# Patient Record
Sex: FEMALE | Race: WHITE | NOT HISPANIC OR LATINO | Employment: OTHER | URBAN - METROPOLITAN AREA
[De-identification: names, ages, dates, MRNs, and addresses within clinical notes are randomized per-mention and may not be internally consistent; named-entity substitution may affect disease eponyms.]

---

## 2017-01-10 DIAGNOSIS — M54.9 CHRONIC BACK PAIN GREATER THAN 3 MONTHS DURATION: ICD-10-CM

## 2017-01-10 DIAGNOSIS — G89.29 CHRONIC BACK PAIN GREATER THAN 3 MONTHS DURATION: ICD-10-CM

## 2017-01-11 RX ORDER — HYDROCODONE BITARTRATE AND ACETAMINOPHEN 7.5; 325 MG/1; MG/1
1 TABLET ORAL EVERY 6 HOURS PRN
Qty: 90 TABLET | Refills: 0 | Status: SHIPPED | OUTPATIENT
Start: 2017-01-11 | End: 2017-04-11 | Stop reason: SDUPTHER

## 2017-01-16 ENCOUNTER — TELEPHONE (OUTPATIENT)
Dept: GASTROENTEROLOGY | Facility: CLINIC | Age: 62
End: 2017-01-16

## 2017-01-16 DIAGNOSIS — R12 HEARTBURN: Primary | ICD-10-CM

## 2017-01-16 DIAGNOSIS — R10.816 EPIGASTRIC ABDOMINAL TENDERNESS WITHOUT REBOUND TENDERNESS: ICD-10-CM

## 2017-01-16 DIAGNOSIS — R07.9 NONSPECIFIC CHEST PAIN: ICD-10-CM

## 2017-01-16 NOTE — TELEPHONE ENCOUNTER
"----- Message from Janny Joshua RN sent at 1/16/2017  1:01 PM CST -----  Regarding: Cancellation of Order # [id number]  Order number [id number] for the procedure [facility] GI [GI5]   has been canceled by Janny Joshua RN [[id number]]. This   procedure was ordered by LYNETTE Roper [[id number]] on Dec  27, 2016 for the patient Edie Hernandez [[id number]]. The reason   for cancellation was "None".  "

## 2017-01-16 NOTE — TELEPHONE ENCOUNTER
EGD was canceled due to financial issues, please offer patient the option to do an upper gi radiology study to further evaluate symptoms (order in system)  thanks  HAYDEN

## 2017-01-19 ENCOUNTER — DOCUMENTATION ONLY (OUTPATIENT)
Dept: FAMILY MEDICINE | Facility: CLINIC | Age: 62
End: 2017-01-19

## 2017-01-19 NOTE — PROGRESS NOTES
Pre-Visit Chart Review  For Appointment Scheduled on 1-20-17    Health Maintenance Due   Topic Date Due    TETANUS VACCINE  07/26/1974    Zoster Vaccine  07/26/2016

## 2017-01-20 ENCOUNTER — LAB VISIT (OUTPATIENT)
Dept: LAB | Facility: HOSPITAL | Age: 62
End: 2017-01-20
Attending: FAMILY MEDICINE
Payer: COMMERCIAL

## 2017-01-20 ENCOUNTER — OFFICE VISIT (OUTPATIENT)
Dept: FAMILY MEDICINE | Facility: CLINIC | Age: 62
End: 2017-01-20
Payer: COMMERCIAL

## 2017-01-20 VITALS
HEIGHT: 65 IN | HEART RATE: 62 BPM | DIASTOLIC BLOOD PRESSURE: 60 MMHG | WEIGHT: 249.13 LBS | SYSTOLIC BLOOD PRESSURE: 106 MMHG | TEMPERATURE: 98 F | BODY MASS INDEX: 41.51 KG/M2

## 2017-01-20 DIAGNOSIS — E53.8 B12 NUTRITIONAL DEFICIENCY: ICD-10-CM

## 2017-01-20 DIAGNOSIS — Z23 NEED FOR SHINGLES VACCINE: ICD-10-CM

## 2017-01-20 DIAGNOSIS — E78.5 HYPERLIPIDEMIA, UNSPECIFIED HYPERLIPIDEMIA TYPE: ICD-10-CM

## 2017-01-20 DIAGNOSIS — G47.33 OSA (OBSTRUCTIVE SLEEP APNEA): ICD-10-CM

## 2017-01-20 DIAGNOSIS — E55.9 VITAMIN D DEFICIENCY: ICD-10-CM

## 2017-01-20 DIAGNOSIS — E03.9 HYPOTHYROIDISM, UNSPECIFIED TYPE: Primary | ICD-10-CM

## 2017-01-20 DIAGNOSIS — E03.9 HYPOTHYROIDISM, UNSPECIFIED TYPE: ICD-10-CM

## 2017-01-20 DIAGNOSIS — Z23 NEED FOR DIPHTHERIA-TETANUS-PERTUSSIS (TDAP) VACCINE: ICD-10-CM

## 2017-01-20 LAB
25(OH)D3+25(OH)D2 SERPL-MCNC: 29 NG/ML
ALBUMIN SERPL BCP-MCNC: 3.7 G/DL
ALP SERPL-CCNC: 85 U/L
ALT SERPL W/O P-5'-P-CCNC: 23 U/L
ANION GAP SERPL CALC-SCNC: 9 MMOL/L
AST SERPL-CCNC: 20 U/L
BASOPHILS # BLD AUTO: 0.03 K/UL
BASOPHILS NFR BLD: 0.5 %
BILIRUB SERPL-MCNC: 1.5 MG/DL
BUN SERPL-MCNC: 18 MG/DL
CALCIUM SERPL-MCNC: 9.5 MG/DL
CHLORIDE SERPL-SCNC: 106 MMOL/L
CHOLEST/HDLC SERPL: 3.7 {RATIO}
CO2 SERPL-SCNC: 27 MMOL/L
CREAT SERPL-MCNC: 1.1 MG/DL
DIFFERENTIAL METHOD: NORMAL
EOSINOPHIL # BLD AUTO: 0.4 K/UL
EOSINOPHIL NFR BLD: 6.4 %
ERYTHROCYTE [DISTWIDTH] IN BLOOD BY AUTOMATED COUNT: 13.1 %
EST. GFR  (AFRICAN AMERICAN): >60 ML/MIN/1.73 M^2
EST. GFR  (NON AFRICAN AMERICAN): 54.7 ML/MIN/1.73 M^2
GLUCOSE SERPL-MCNC: 95 MG/DL
HCT VFR BLD AUTO: 41.6 %
HDL/CHOLESTEROL RATIO: 27 %
HDLC SERPL-MCNC: 215 MG/DL
HDLC SERPL-MCNC: 58 MG/DL
HGB BLD-MCNC: 14.1 G/DL
LDLC SERPL CALC-MCNC: 129.2 MG/DL
LYMPHOCYTES # BLD AUTO: 1.3 K/UL
LYMPHOCYTES NFR BLD: 22.3 %
MCH RBC QN AUTO: 30.9 PG
MCHC RBC AUTO-ENTMCNC: 33.9 %
MCV RBC AUTO: 91 FL
MONOCYTES # BLD AUTO: 0.5 K/UL
MONOCYTES NFR BLD: 8.1 %
NEUTROPHILS # BLD AUTO: 3.7 K/UL
NEUTROPHILS NFR BLD: 62.5 %
NONHDLC SERPL-MCNC: 157 MG/DL
PLATELET # BLD AUTO: 238 K/UL
PMV BLD AUTO: 9.9 FL
POTASSIUM SERPL-SCNC: 4.1 MMOL/L
PROT SERPL-MCNC: 7 G/DL
RBC # BLD AUTO: 4.57 M/UL
SODIUM SERPL-SCNC: 142 MMOL/L
T4 FREE SERPL-MCNC: 0.81 NG/DL
TRIGL SERPL-MCNC: 139 MG/DL
TSH SERPL DL<=0.005 MIU/L-ACNC: 7.92 UIU/ML
VIT B12 SERPL-MCNC: 352 PG/ML
WBC # BLD AUTO: 5.91 K/UL

## 2017-01-20 PROCEDURE — 80053 COMPREHEN METABOLIC PANEL: CPT

## 2017-01-20 PROCEDURE — 90736 HZV VACCINE LIVE SUBQ: CPT | Mod: S$GLB,,, | Performed by: FAMILY MEDICINE

## 2017-01-20 PROCEDURE — 80061 LIPID PANEL: CPT

## 2017-01-20 PROCEDURE — 90472 IMMUNIZATION ADMIN EACH ADD: CPT | Mod: S$GLB,,, | Performed by: FAMILY MEDICINE

## 2017-01-20 PROCEDURE — 85025 COMPLETE CBC W/AUTO DIFF WBC: CPT

## 2017-01-20 PROCEDURE — 82607 VITAMIN B-12: CPT

## 2017-01-20 PROCEDURE — 84439 ASSAY OF FREE THYROXINE: CPT

## 2017-01-20 PROCEDURE — 90471 IMMUNIZATION ADMIN: CPT | Mod: S$GLB,,, | Performed by: FAMILY MEDICINE

## 2017-01-20 PROCEDURE — 82306 VITAMIN D 25 HYDROXY: CPT

## 2017-01-20 PROCEDURE — 1159F MED LIST DOCD IN RCRD: CPT | Mod: S$GLB,,, | Performed by: FAMILY MEDICINE

## 2017-01-20 PROCEDURE — 84443 ASSAY THYROID STIM HORMONE: CPT

## 2017-01-20 PROCEDURE — 99999 PR PBB SHADOW E&M-EST. PATIENT-LVL III: CPT | Mod: PBBFAC,,, | Performed by: FAMILY MEDICINE

## 2017-01-20 PROCEDURE — 36415 COLL VENOUS BLD VENIPUNCTURE: CPT | Mod: PO

## 2017-01-20 PROCEDURE — 99214 OFFICE O/P EST MOD 30 MIN: CPT | Mod: 25,S$GLB,, | Performed by: FAMILY MEDICINE

## 2017-01-20 PROCEDURE — 90715 TDAP VACCINE 7 YRS/> IM: CPT | Mod: S$GLB,,, | Performed by: FAMILY MEDICINE

## 2017-01-20 NOTE — MR AVS SNAPSHOT
[facility]  [address]  [place] [address]  Phone: [phone]  Fax: [phone]                  Edie Hernandez   2017 9:40 AM   Office Visit    Description:  Female : 1955   Provider:  Lydia Eugnee MD   Department:  [facility]           Reason for Visit     Follow-up     Fatigue           Diagnoses this Visit        Comments    Hypothyroidism, unspecified type    -  Primary     B12 nutritional deficiency         Hyperlipidemia, unspecified hyperlipidemia type         Vitamin D deficiency         Need for diphtheria-tetanus-pertussis (Tdap) vaccine         Need for shingles vaccine         BMI 40.0-44.9, adult         CLINT (obstructive sleep apnea)                To Do List           Future Appointments        Provider Department Dept Phone    2017 12:15 PM [facility] [phone]    2017 9:20 AM Lydia Eugene MD [facility] [phone]      Goals (5 Years of Data)     None      Follow-Up and Disposition     Return in about 3 months (around 2017).      [facility] On [facility]     [facility] On Call Nurse Care Line - 24/7 Assistance  Registered nurses in the [facility] On Call Center provide clinical advisement, health education, appointment booking, and other advisory services.  Call for this free service at [phone].             Medications           Message regarding Medications     Verify the changes and/or additions to your medication regime listed below are the same as discussed with your clinician today.  If any of these changes or additions are incorrect, please notify your healthcare provider.             Verify that the below list of medications is an accurate representation of the medications you are currently taking.  If none reported, the list may be blank. If incorrect, please contact your healthcare provider. Carry this list with you in case of emergency.           Current Medications      "albuterol 90 mcg/actuation inhaler Inhale 2 puffs into the lungs every 6 (six) hours as needed for Wheezing.    alprazolam (XANAX) 0.5 MG tablet Take 1 tablet (0.5 mg total) by mouth nightly as needed for Anxiety.    carisoprodol (SOMA) 350 MG tablet Take 1 tablet (350 mg total) by mouth nightly as needed for Muscle spasms.    cetirizine (ALL DAY ALLERGY, CETIRIZINE,) 10 MG tablet Take 1 tablet (10 mg total) by mouth once daily.    cholecalciferol, vitamin D3, 1,000 unit capsule Take 1 capsule (1,000 Units total) by mouth once daily.    clobetasol (TEMOVATE) 0.05 % cream APPLY TOPICALLY TWICE A DAY    dicyclomine (BENTYL) 10 MG capsule Take 1 capsule (10 mg total) by mouth every 6 (six) hours as needed. PRN Abdominal pain/cramping    fluticasone (FLONASE) 50 mcg/actuation nasal spray 1 spray by Each Nare route once daily.    furosemide (LASIX) 40 MG tablet Take 1 tablet (40 mg total) by mouth daily as needed. Every day PRN    gabapentin (NEURONTIN) 300 MG capsule Take 1 capsule (300 mg total) by mouth 2 (two) times daily.    hydrocodone-acetaminophen 7.5-325mg (NORCO) 7.5-325 mg per tablet Take 1 tablet by mouth every 6 (six) hours as needed for Pain.    indomethacin (INDOCIN) 25 MG capsule TAKE ONE CAPSULE BY MOUTH 3 TIMES A DAY WITH MEALS    levothyroxine (SYNTHROID) 137 MCG Tab tablet Take 1 tablet (137 mcg total) by mouth once daily.    methylphenidate (RITALIN LA) 40 MG 24 hr capsule Take 40 mg by mouth as needed.    oxybutynin (DITROPAN XL) 15 MG TR24 Take 1 tablet (15 mg total) by mouth once daily.    pantoprazole (PROTONIX) 40 MG tablet Take 1 tablet (40 mg total) by mouth once daily.           Clinical Reference Information           Vital Signs - Last Recorded  Most recent update: 1/20/2017  9:24 AM by Elli Bonilla MA    BP Pulse Temp Ht Wt BMI    106/60 (BP Location: Left arm, Patient Position: Sitting, BP Method: Automatic) 62 98.2 °F (36.8 °C) (Oral) 5' 5" (1.651 m) 113 kg (249 lb 1.9 oz) 41.46 " kg/m2      Blood Pressure          Most Recent Value    BP  106/60      Allergies as of 1/20/2017     Codeine      Immunizations Administered on Date of Encounter - 1/20/2017     Name Date Dose VIS Date Route    TDAP 1/20/2017 0.5 mL 2/24/2015 Intramuscular    Zoster 1/20/2017 0.65 mL 10/6/2009 Subcutaneous      Orders Placed During Today's Visit      Normal Orders This Visit    Tdap Vaccine (Adult)     Zoster Vaccine - Live     Future Labs/Procedures Expected by Expires    CBC auto differential  1/20/2017 3/21/2018    Comprehensive metabolic panel  1/20/2017 3/21/2018    T4, free  1/20/2017 3/21/2018    TSH  1/20/2017 3/21/2018    Vitamin B12  1/20/2017 3/21/2018    Vitamin D  1/20/2017 3/21/2018    Lipid panel  7/20/2017 (Approximate) 1/20/2018      Instructions      Walking for Fitness  Fitness walking has something for everyone, even people who are already fit. Walking is one of the safest ways to condition your body aerobically. It can boost energy, help you lose weight, and reduce stress.    Physical benefits  · Walking strengthens your heart and lungs, and tones your muscles.  · When walking, your feet land with less impact than in other sports. This reduces chances of muscle, bone, and joint injury.  · Regular walking improves your cholesterol levels and lowers your risk of heart disease. And it helps you control your blood sugar if you have diabetes.  · Walking is a weight-bearing activity, which helps maintain bone density. This can help prevent osteoporosis.  Personal rewards  · Taking walks can help you relax and manage stress. And fitness walking may make you feel better about yourself.  · Walking can help you sleep better at night and make you less likely to be depressed.  · Regular walking may help maintain your memory as you get older.  · Walking is a great way to spend extra time with friends and family members. Be sure to invite your dog along!  Q&A about fitness walking  Q: Will walking keep me  fit?  A: Yes. Regular walking at the right pace gives you all the benefits of other aerobic activities, such as jogging and swimming.  Q: Will walking help me lose weight and keep it off?  A: Yes. Per mile, walking can burn as many calories as jogging. Your health care provider can help work walking into your weight-loss plan.  Q: Is walking safe for my health?  A: Yes. Walking is safe if you have high blood pressure, diabetes, heart disease, or other conditions. Talk to your health care provider before you start.  © 4487-8110 [employer]. [address] [place] [address], [place] [address]. All rights reserved. This information is not intended as a substitute for professional medical care. Always follow your healthcare professional's instructions.        Weight Management: Getting Started  Healthy bodies come in all shapes and sizes. Not all bodies are made to be thin. For some people, a healthy weight is higher than the average weight listed on weight charts. Your healthcare provider can help you decide on a healthy weight for you.    Reasons to lose weight  Losing weight can help with some health problems, such as high blood pressure, heart disease, diabetes, sleep apnea, and arthritis. You may also feel more energy.  Set your long-term goal  Your goal doesn't even have to be a specific weight. You may decide on a fitness goal (such as being able to walk 10 miles a week), or a health goal (such as lowering your blood pressure). Choose a goal that is measurable and reasonable, so you know when you've reached it. A goal of reaching a BMI of less than 25 is not always reasonable (or possible).   Make an action plan  Habits dont change overnight. Setting your goals too high can leave you feeling discouraged if you cant reach them. Be realistic. Choose one or two small changes you can make now. Set an action plan for how you are going to make these changes. When you can stick to this plan, keep making a few more  small changes. Taking small steps will help you stay on the path to success.  Track your progress  Write down your goals. Then, keep a daily record of your progress. Write down what you eat and how active you are. This record lets you look back on how much youve done. It may also help when youre feeling frustrated. Reward yourself for success. Even if you dont reach every goal, give yourself credit for what you do get done.  Get support  Encouragement from others can help make losing weight easier. Ask your family members and friends for support. They may even want to join you. Also look to your healthcare provider, registered dietitian, and  for help. Your local hospital can give you more information about nutrition, exercise, and weight loss.  © 8952-4387 [employer]. [address] [place] [address], [place] [address]. All rights reserved. This information is not intended as a substitute for professional medical care. Always follow your healthcare professional's instructions.

## 2017-01-20 NOTE — PROGRESS NOTES
Subjective:       Patient ID: Edie Hernandez is a 61 y.o. female.    Chief Complaint: Follow-up and Fatigue    Patient Active Problem List   Diagnosis    GERD (gastroesophageal reflux disease)    Chronic back pain greater than 3 months duration    Environmental allergies    Fibromyalgia    B12 nutritional deficiency    CLINT (obstructive sleep apnea)    Chronic sinusitis    Asthma    Hypothyroid    Nausea    Night sweats    Tinnitus, bilateral    Frequent UTI    Hyperlipidemia    Colon polyp, hyperplastic    Interstitial cystitis    Hemangioma    Wart    Globus sensation    DDD (degenerative disc disease)    BMI 40.0-44.9, adult    Edema    Mixed incontinence   Patient is here to f/u chronic conditions    CLINT/daytime somnolence-Nuvigil did not work.  Went back to ritalin which helps and is sufficient.  No daytime somnolence.  No se    Weight loss -15 lbs since 1 month ago-unexplained    HPI  Review of Systems   Constitutional: Positive for fatigue and unexpected weight change.   Respiratory: Negative for chest tightness and shortness of breath.    Cardiovascular: Negative for chest pain, palpitations and leg swelling.   Gastrointestinal: Negative for abdominal pain.   Endocrine: Negative for polydipsia, polyphagia and polyuria.   Musculoskeletal: Negative for arthralgias.   Neurological: Negative for dizziness, syncope, light-headedness and headaches.       Objective:      Physical Exam   Constitutional: She is oriented to person, place, and time. She appears well-developed and well-nourished.   Cardiovascular: Normal rate, regular rhythm and normal heart sounds.    Pulmonary/Chest: Effort normal and breath sounds normal.   Musculoskeletal: She exhibits no edema.   Neurological: She is alert and oriented to person, place, and time.   Skin: Skin is warm and dry.   Psychiatric: She has a normal mood and affect.   Nursing note and vitals reviewed.      Assessment:       1. Hypothyroidism,  "unspecified type    2. B12 nutritional deficiency    3. Hyperlipidemia, unspecified hyperlipidemia type    4. Vitamin D deficiency    5. Need for diphtheria-tetanus-pertussis (Tdap) vaccine    6. Need for shingles vaccine    7. BMI 40.0-44.9, adult    8. CLINT (obstructive sleep apnea)        Plan:         1. Hypothyroidism, unspecified type  Stable condition.  Continue current medications.  Will adjust based on lab findings or if condition changes.    - TSH; Future  - CBC auto differential; Future  - T4, free; Future    2. B12 nutritional deficiency  Screen and treat as indicated:    - Vitamin B12; Future    3. Hyperlipidemia, unspecified hyperlipidemia type  Stable condition.  Continue current medications.  Will adjust based on lab findings or if condition changes.    - Comprehensive metabolic panel; Future  - Lipid panel; Future    4. Vitamin D deficiency  Stable condition.  Continue current medications.  Will adjust based on lab findings or if condition changes.    - Vitamin D; Future    5. Need for diphtheria-tetanus-pertussis (Tdap) vaccine  Immunize today.  Counseled patient on risks, benefits and side effects.  Patient elected to proceed with vaccination.    - Tdap Vaccine (Adult)    6. Need for shingles vaccine  Immunize today.  Counseled patient on risks, benefits and side effects.  Patient elected to proceed with vaccination.    - Zoster Vaccine - Live    7. BMI 40.0-44.9, adult  Counseled patient on his ideal body weight, health consequences of being obese and current recommendations including weekly exercise and a heart healthy diet.  Current BMI is:Estimated body mass index is 41.46 kg/(m^2) as calculated from the following:    Height as of this encounter: 5' 5" (1.651 m).    Weight as of this encounter: 113 kg (249 lb 1.9 oz)..  Patient is aware that ideal BMI < 25 or Weight in (lb) to have BMI = 25: 149.9.        8. CLINT (obstructive sleep apnea)  Cont ritalin for daytime somnolence    [facility] goal " documentation:  Patient readiness: acceptance and barriers:readiness  During the course of the visit the patient was educated and counseled about the following: Obesity:   General weight loss/lifestyle modification strategies discussed (elicit support from others; identify saboteurs; non-food rewards, etc).  Goals: Obesity: Reduce calorie intake and BMI  Goal/Outcomes met:none  The following self management tools provided:none  Patient Instructions (the written plan) was given to the patient/family: Yes  Time spent with patient: 20 minutes    Patient with be reevaluated in 3 months or sooner prn    Greater than 50% of this visit was spent counseling as described in above documentation:Yes

## 2017-01-20 NOTE — PATIENT INSTRUCTIONS

## 2017-01-20 NOTE — NURSING
2 patient identifiers used (name & ). Administered tdap vaccine IM. Patient tolerated well, no bleeding at insertion site noted. Pain scale 0/10. Aseptic technique maintained. 2 patient identifiers used (name & ). Administered Zostavax vaccine IM. Patient tolerated well, no bleeding at insertion site noted. Pain scale 0/10. Aseptic technique maintained. Immunization information given to patient.

## 2017-01-20 NOTE — TELEPHONE ENCOUNTER
Spoke with pt, offered the other option. Pt states she has so many bills right now and has to meet her deductible first, and then will maybe call back to schedule this

## 2017-01-26 ENCOUNTER — TELEPHONE (OUTPATIENT)
Dept: FAMILY MEDICINE | Facility: CLINIC | Age: 62
End: 2017-01-26

## 2017-01-26 DIAGNOSIS — Z12.31 ENCOUNTER FOR SCREENING MAMMOGRAM FOR MALIGNANT NEOPLASM OF BREAST: Primary | ICD-10-CM

## 2017-01-26 NOTE — TELEPHONE ENCOUNTER
----- Message from Snow Huitron sent at 1/26/2017  1:32 PM CST -----  Patient states she needs a order for a mammo please call when she can schedule [phone] (home)

## 2017-01-31 ENCOUNTER — HOSPITAL ENCOUNTER (OUTPATIENT)
Dept: RADIOLOGY | Facility: CLINIC | Age: 62
Discharge: HOME | End: 2017-01-31
Attending: FAMILY MEDICINE
Payer: COMMERCIAL

## 2017-01-31 DIAGNOSIS — Z12.31 ENCOUNTER FOR SCREENING MAMMOGRAM FOR MALIGNANT NEOPLASM OF BREAST: ICD-10-CM

## 2017-01-31 PROCEDURE — 77067 SCR MAMMO BI INCL CAD: CPT | Mod: TC

## 2017-01-31 PROCEDURE — 77067 SCR MAMMO BI INCL CAD: CPT | Mod: 26,,, | Performed by: RADIOLOGY

## 2017-01-31 PROCEDURE — 77063 BREAST TOMOSYNTHESIS BI: CPT | Mod: 26,,, | Performed by: RADIOLOGY

## 2017-02-02 ENCOUNTER — TELEPHONE (OUTPATIENT)
Dept: FAMILY MEDICINE | Facility: CLINIC | Age: 62
End: 2017-02-02

## 2017-02-02 ENCOUNTER — TELEPHONE (OUTPATIENT)
Dept: RADIOLOGY | Facility: HOSPITAL | Age: 62
End: 2017-02-02

## 2017-02-02 DIAGNOSIS — R92.8 ABNORMAL MAMMOGRAM OF RIGHT BREAST: Primary | ICD-10-CM

## 2017-02-02 NOTE — TELEPHONE ENCOUNTER
Patient called stating she wanted to cancel her mammogram.   She will have diagnostic mammogram done at [facility].  Instructed to call Dr Eugene to have diagnostic mammogram orders sent to them.   Due to cost She has not met her deductible.

## 2017-02-02 NOTE — TELEPHONE ENCOUNTER
----- Message from Hanh Zavala sent at 2/2/2017 12:05 PM CST -----  Contact: self [phone]  She is requesting that you send the orders for the diagnostic mammo and u/s to [facility].  Phone# [phone].  Thank you!

## 2017-02-24 DIAGNOSIS — G89.29 CHRONIC BACK PAIN GREATER THAN 3 MONTHS DURATION: ICD-10-CM

## 2017-02-24 DIAGNOSIS — M54.9 CHRONIC BACK PAIN GREATER THAN 3 MONTHS DURATION: ICD-10-CM

## 2017-02-24 RX ORDER — CARISOPRODOL 350 MG/1
350 TABLET ORAL NIGHTLY PRN
Qty: 30 TABLET | Refills: 0 | Status: SHIPPED | OUTPATIENT
Start: 2017-02-24 | End: 2017-04-11 | Stop reason: SDUPTHER

## 2017-03-07 ENCOUNTER — OFFICE VISIT (OUTPATIENT)
Dept: OBSTETRICS AND GYNECOLOGY | Facility: CLINIC | Age: 62
End: 2017-03-07
Payer: COMMERCIAL

## 2017-03-07 VITALS
DIASTOLIC BLOOD PRESSURE: 66 MMHG | WEIGHT: 266.44 LBS | HEIGHT: 65 IN | HEART RATE: 71 BPM | BODY MASS INDEX: 44.39 KG/M2 | SYSTOLIC BLOOD PRESSURE: 119 MMHG

## 2017-03-07 DIAGNOSIS — Z12.31 ENCOUNTER FOR SCREENING MAMMOGRAM FOR BREAST CANCER: ICD-10-CM

## 2017-03-07 DIAGNOSIS — Z01.419 WELL WOMAN EXAM WITH ROUTINE GYNECOLOGICAL EXAM: Primary | ICD-10-CM

## 2017-03-07 PROCEDURE — 99396 PREV VISIT EST AGE 40-64: CPT | Mod: S$GLB,,, | Performed by: OBSTETRICS & GYNECOLOGY

## 2017-03-07 PROCEDURE — 99999 PR PBB SHADOW E&M-EST. PATIENT-LVL III: CPT | Mod: PBBFAC,,, | Performed by: OBSTETRICS & GYNECOLOGY

## 2017-03-07 RX ORDER — METHYLPHENIDATE HYDROCHLORIDE 10 MG/1
10 TABLET ORAL DAILY PRN
COMMUNITY
Start: 2017-02-24 | End: 2020-01-02 | Stop reason: SDUPTHER

## 2017-03-07 RX ORDER — CLOBETASOL PROPIONATE 0.5 MG/G
1 CREAM TOPICAL 2 TIMES DAILY
Qty: 30 G | Refills: 2 | Status: SHIPPED | OUTPATIENT
Start: 2017-03-07 | End: 2017-05-22 | Stop reason: SDUPTHER

## 2017-03-07 RX ORDER — ALLOPURINOL 300 MG/1
300 TABLET ORAL DAILY
Refills: 4 | COMMUNITY
Start: 2016-12-08 | End: 2018-05-24 | Stop reason: SDUPTHER

## 2017-03-07 RX ORDER — METHYLPHENIDATE HYDROCHLORIDE 40 MG/1
CAPSULE, EXTENDED RELEASE ORAL
COMMUNITY
Start: 2017-02-24 | End: 2017-04-11

## 2017-03-07 NOTE — PROGRESS NOTES
SUBJECTIVE:   61 y.o. female   for annual routine Pap and checkup. No LMP recorded. Patient has had a hysterectomy..  She has no unusual complaints.        Past Medical History:   Diagnosis Date    Arthritis     Colon polyp, hyperplastic     recheck 5-10 years    Diverticulosis     Fatty liver     GERD (gastroesophageal reflux disease)     CLINT (obstructive sleep apnea)     C-pap    Thyroid disease      Past Surgical History:   Procedure Laterality Date    COLONOSCOPY  2013    Dr. Vogel: repeat in 5-10 years    COLONOSCOPY W/ BIOPSIES AND POLYPECTOMY  2013    hyperplastic, recheck 5-10 years    EXTERNAL EAR SURGERY      HYSTERECTOMY      THROAT SURGERY      for CLINT    UPPER GASTROINTESTINAL ENDOSCOPY       Social History     Social History    Marital status:      Spouse name: N/A    Number of children: N/A    Years of education: N/A     Occupational History     [employer]     Social History Main Topics    Smoking status: Never Smoker    Smokeless tobacco: Never Used    Alcohol use No    Drug use: No    Sexual activity: Yes     Partners: Male     Other Topics Concern    Not on file     Social History Narrative     Family History   Problem Relation Age of Onset    Cancer Mother      breast    Hypertension Mother     Heart disease Father     Hypertension Father     Cancer Maternal Aunt      breast    Macular degeneration Neg Hx     Retinal detachment Neg Hx     Glaucoma Neg Hx     Colon cancer Neg Hx     Colon polyps Neg Hx     Crohn's disease Neg Hx     Ulcerative colitis Neg Hx      OB History    Para Term  AB SAB TAB Ectopic Multiple Living   2 2 2             # Outcome Date GA Lbr Krish/2nd Weight Sex Delivery Anes PTL Lv   2 Term            1 Term                     Current Outpatient Prescriptions   Medication Sig Dispense Refill    albuterol 90 mcg/actuation inhaler Inhale 2 puffs into the lungs every 6 (six) hours  as needed for Wheezing. 18 g 0    allopurinol (ZYLOPRIM) 300 MG tablet Take 300 mg by mouth once daily.  4    alprazolam (XANAX) 0.5 MG tablet Take 1 tablet (0.5 mg total) by mouth nightly as needed for Anxiety. 30 tablet 2    carisoprodol (SOMA) 350 MG tablet Take 1 tablet (350 mg total) by mouth nightly as needed for Muscle spasms. 30 tablet 0    cetirizine (ALL DAY ALLERGY, CETIRIZINE,) 10 MG tablet Take 1 tablet (10 mg total) by mouth once daily. 30 tablet 3    cholecalciferol, vitamin D3, 1,000 unit capsule Take 1 capsule (1,000 Units total) by mouth once daily.  0    clobetasol (TEMOVATE) 0.05 % cream Apply 1 application topically 2 (two) times daily. Apply to affected area 30 g 2    fluticasone (FLONASE) 50 mcg/actuation nasal spray 1 spray by Each Nare route once daily. 16 g 3    furosemide (LASIX) 40 MG tablet Take 1 tablet (40 mg total) by mouth daily as needed. Every day PRN 30 tablet 4    gabapentin (NEURONTIN) 300 MG capsule Take 1 capsule (300 mg total) by mouth 2 (two) times daily. 60 capsule 5    hydrocodone-acetaminophen 7.5-325mg (NORCO) 7.5-325 mg per tablet Take 1 tablet by mouth every 6 (six) hours as needed for Pain. 90 tablet 0    indomethacin (INDOCIN) 25 MG capsule TAKE ONE CAPSULE BY MOUTH 3 TIMES A DAY WITH MEALS 90 capsule 3    levothyroxine (SYNTHROID) 137 MCG Tab tablet Take 1 tablet (137 mcg total) by mouth once daily. 30 tablet 5    methylphenidate (METADATE CD) 40 MG CR capsule       methylphenidate (RITALIN LA) 40 MG 24 hr capsule Take 40 mg by mouth as needed.      methylphenidate (RITALIN) 10 MG tablet       oxybutynin (DITROPAN XL) 15 MG TR24 Take 1 tablet (15 mg total) by mouth once daily. 30 tablet 5    pantoprazole (PROTONIX) 40 MG tablet Take 1 tablet (40 mg total) by mouth once daily. 30 tablet 5     No current facility-administered medications for this visit.      Allergies: Codeine     ROS:  Constitutional: no weight loss, weight gain, fever,  "fatigue  Eyes:  No vision changes, glasses/contacts  ENT/Mouth: No ulcers, sinus problems, ears ringing, headache  Cardiovascular: No inability to lie flat, chest pain, exercise intolerance, swelling, heart palpitations  Respiratory: No wheezing, coughing blood, shortness of breath, or cough  Gastrointestinal: No diarrhea, bloody stool, nausea/vomiting, constipation, gas, hemorrhoids  Genitourinary: No blood in urine, painful urination, urgency of urination, frequency of urination, incomplete emptying, incontinence, abnormal bleeding, painful periods, heavy periods, vaginal discharge, vaginal odor, painful intercourse, sexual problems, bleeding after intercourse.  Musculoskeletal: No muscle weakness  Skin/Breast: No painful breasts, nipple discharge, masses, rash, ulcers  Neurological: No passing out, seizures, numbness, headache  Endocrine: No diabetes, hypothyroid, hyperthyroid, hot flashes, hair loss, abnormal hair growth, ance  Psychiatric: No depression, crying  Hematologic: No bruises, bleeding, swollen lymph nodes, anemia.      OBJECTIVE:   The patient appears well, alert, oriented x 3, in no distress.  /66  Pulse 71  Ht 5' 5" (1.651 m)  Wt 120.8 kg (266 lb 6.8 oz)  BMI 44.34 kg/m2  NECK: no thyromegaly, trachea midline  SKIN: no acne, striae, hirsutism  BREAST EXAM: breasts appear normal, no suspicious masses, no skin or nipple changes or axillary nodes  ABDOMEN: no hernias, masses, or hepatosplenomegaly  GENITALIA: normal external genitalia, no erythema, no discharge  URETHRA: normal urethra, normal urethral meatus  VAGINA: mucosal atrophy  CERVIX:  absent  UTERUS: uterus absent  ADNEXA: no mass, fullness, tenderness    ASSESSMENT and PLAN    Well woman exam with routine gynecological exam  -     Mammo Digital Screening Bilat with CAD; Future; Expected date: 3/7/17    Encounter for screening mammogram for breast cancer  -     Mammo Digital Screening Bilat with CAD; Future; Expected date: " 3/7/17    Other orders  -     clobetasol (TEMOVATE) 0.05 % cream; Apply 1 application topically 2 (two) times daily. Apply to affected area  Dispense: 30 g; Refill: 2

## 2017-03-09 ENCOUNTER — TELEPHONE (OUTPATIENT)
Dept: FAMILY MEDICINE | Facility: CLINIC | Age: 62
End: 2017-03-09

## 2017-03-09 DIAGNOSIS — R92.8 ABNORMAL MAMMOGRAM OF RIGHT BREAST: Primary | ICD-10-CM

## 2017-03-09 NOTE — TELEPHONE ENCOUNTER
Notify patient: diag mammo and u/s at [facility] findings were interpreted with probable benign characteristics.  However a f/u right breast was recommended in 6 months.  I have placed an order

## 2017-03-17 DIAGNOSIS — E55.9 VITAMIN D DEFICIENCY: Primary | ICD-10-CM

## 2017-03-17 DIAGNOSIS — E03.9 HYPOTHYROIDISM (ACQUIRED): ICD-10-CM

## 2017-03-17 RX ORDER — LEVOTHYROXINE SODIUM 150 UG/1
150 TABLET ORAL DAILY
Qty: 90 TABLET | Refills: 3 | Status: SHIPPED | OUTPATIENT
Start: 2017-03-17 | End: 2017-10-18 | Stop reason: SDUPTHER

## 2017-03-17 RX ORDER — ERGOCALCIFEROL 1.25 MG/1
50000 CAPSULE ORAL
Qty: 12 CAPSULE | Refills: 1 | Status: SHIPPED | OUTPATIENT
Start: 2017-03-17 | End: 2017-04-11 | Stop reason: SDUPTHER

## 2017-04-11 ENCOUNTER — DOCUMENTATION ONLY (OUTPATIENT)
Dept: FAMILY MEDICINE | Facility: CLINIC | Age: 62
End: 2017-04-11

## 2017-04-11 ENCOUNTER — OFFICE VISIT (OUTPATIENT)
Dept: FAMILY MEDICINE | Facility: CLINIC | Age: 62
End: 2017-04-11
Payer: COMMERCIAL

## 2017-04-11 VITALS
BODY MASS INDEX: 44.15 KG/M2 | WEIGHT: 265 LBS | HEIGHT: 65 IN | SYSTOLIC BLOOD PRESSURE: 120 MMHG | HEART RATE: 68 BPM | TEMPERATURE: 98 F | DIASTOLIC BLOOD PRESSURE: 68 MMHG

## 2017-04-11 DIAGNOSIS — N39.0 URINARY TRACT INFECTION WITHOUT HEMATURIA, SITE UNSPECIFIED: Primary | ICD-10-CM

## 2017-04-11 DIAGNOSIS — E55.9 VITAMIN D DEFICIENCY: ICD-10-CM

## 2017-04-11 DIAGNOSIS — M79.7 FIBROMYALGIA: ICD-10-CM

## 2017-04-11 DIAGNOSIS — F41.9 CHRONIC ANXIETY: ICD-10-CM

## 2017-04-11 DIAGNOSIS — M75.52 CHRONIC SHOULDER BURSITIS, LEFT: ICD-10-CM

## 2017-04-11 DIAGNOSIS — G89.29 CHRONIC BACK PAIN GREATER THAN 3 MONTHS DURATION: ICD-10-CM

## 2017-04-11 DIAGNOSIS — E03.9 HYPOTHYROIDISM, UNSPECIFIED TYPE: ICD-10-CM

## 2017-04-11 DIAGNOSIS — M54.9 CHRONIC BACK PAIN GREATER THAN 3 MONTHS DURATION: ICD-10-CM

## 2017-04-11 DIAGNOSIS — K21.9 GASTROESOPHAGEAL REFLUX DISEASE, ESOPHAGITIS PRESENCE NOT SPECIFIED: ICD-10-CM

## 2017-04-11 DIAGNOSIS — N32.81 OAB (OVERACTIVE BLADDER): ICD-10-CM

## 2017-04-11 DIAGNOSIS — E66.01 MORBID OBESITY WITH BMI OF 40.0-44.9, ADULT: ICD-10-CM

## 2017-04-11 PROCEDURE — 1160F RVW MEDS BY RX/DR IN RCRD: CPT | Mod: S$GLB,,, | Performed by: FAMILY MEDICINE

## 2017-04-11 PROCEDURE — 87086 URINE CULTURE/COLONY COUNT: CPT

## 2017-04-11 PROCEDURE — 96372 THER/PROPH/DIAG INJ SC/IM: CPT | Mod: S$GLB,,, | Performed by: FAMILY MEDICINE

## 2017-04-11 PROCEDURE — 87186 SC STD MICRODIL/AGAR DIL: CPT

## 2017-04-11 PROCEDURE — 99214 OFFICE O/P EST MOD 30 MIN: CPT | Mod: 25,S$GLB,, | Performed by: FAMILY MEDICINE

## 2017-04-11 PROCEDURE — 87088 URINE BACTERIA CULTURE: CPT

## 2017-04-11 PROCEDURE — 87077 CULTURE AEROBIC IDENTIFY: CPT

## 2017-04-11 PROCEDURE — 99999 PR PBB SHADOW E&M-EST. PATIENT-LVL III: CPT | Mod: PBBFAC,,, | Performed by: FAMILY MEDICINE

## 2017-04-11 RX ORDER — KETOROLAC TROMETHAMINE 30 MG/ML
60 INJECTION, SOLUTION INTRAMUSCULAR; INTRAVENOUS
Status: COMPLETED | OUTPATIENT
Start: 2017-04-11 | End: 2017-04-11

## 2017-04-11 RX ORDER — CARISOPRODOL 350 MG/1
350 TABLET ORAL NIGHTLY PRN
Qty: 30 TABLET | Refills: 0 | Status: SHIPPED | OUTPATIENT
Start: 2017-04-11 | End: 2017-05-22 | Stop reason: SDUPTHER

## 2017-04-11 RX ORDER — HYDROCODONE BITARTRATE AND ACETAMINOPHEN 7.5; 325 MG/1; MG/1
1 TABLET ORAL EVERY 6 HOURS PRN
Qty: 90 TABLET | Refills: 0 | Status: SHIPPED | OUTPATIENT
Start: 2017-04-11 | End: 2017-07-10 | Stop reason: SDUPTHER

## 2017-04-11 RX ORDER — OXYBUTYNIN CHLORIDE 15 MG/1
15 TABLET, EXTENDED RELEASE ORAL DAILY
Qty: 30 TABLET | Refills: 5 | Status: SHIPPED | OUTPATIENT
Start: 2017-04-11 | End: 2018-01-20 | Stop reason: SDUPTHER

## 2017-04-11 RX ORDER — ALPRAZOLAM 0.5 MG/1
0.5 TABLET ORAL NIGHTLY PRN
Qty: 30 TABLET | Refills: 2 | Status: SHIPPED | OUTPATIENT
Start: 2017-04-11 | End: 2017-05-22 | Stop reason: SDUPTHER

## 2017-04-11 RX ORDER — CIPROFLOXACIN 250 MG/1
250 TABLET, FILM COATED ORAL EVERY 12 HOURS
Qty: 6 TABLET | Refills: 0 | Status: SHIPPED | OUTPATIENT
Start: 2017-04-11 | End: 2017-04-21 | Stop reason: ALTCHOICE

## 2017-04-11 RX ORDER — ERGOCALCIFEROL 1.25 MG/1
50000 CAPSULE ORAL
Qty: 12 CAPSULE | Refills: 1 | Status: SHIPPED | OUTPATIENT
Start: 2017-04-11 | End: 2017-11-08 | Stop reason: SDUPTHER

## 2017-04-11 RX ORDER — GABAPENTIN 300 MG/1
300 CAPSULE ORAL 2 TIMES DAILY
Qty: 60 CAPSULE | Refills: 5 | Status: SHIPPED | OUTPATIENT
Start: 2017-04-11 | End: 2018-09-14 | Stop reason: SINTOL

## 2017-04-11 RX ORDER — PANTOPRAZOLE SODIUM 40 MG/1
40 TABLET, DELAYED RELEASE ORAL DAILY
Qty: 30 TABLET | Refills: 5 | Status: SHIPPED | OUTPATIENT
Start: 2017-04-11 | End: 2017-12-24 | Stop reason: SDUPTHER

## 2017-04-11 RX ADMIN — KETOROLAC TROMETHAMINE 60 MG: 30 INJECTION, SOLUTION INTRAMUSCULAR; INTRAVENOUS at 10:04

## 2017-04-11 NOTE — NURSING
2 Patient identifiers used (name & ). Administered 60 mg Toradol IM. Patient tolerated well. No bleeding at insertion site noted. Pain scale 0/10. Allergies reviewed. Aseptic technique maintained.

## 2017-04-11 NOTE — PROGRESS NOTES
Pre-Visit Chart Review  For Appointment Scheduled on 4-12-17    There are no preventive care reminders to display for this patient.

## 2017-04-11 NOTE — PATIENT INSTRUCTIONS

## 2017-04-11 NOTE — MR AVS SNAPSHOT
[facility]  [address] [place] [address]  Phone: [phone]  Fax: [phone]                  Edie Hernandez   2017 9:40 AM   Office Visit    Description:  Female : 1955   Provider:  Lydia Eugene MD   Department:  [facility]           Reason for Visit     Follow-up     Arm Pain           Diagnoses this Visit        Comments    Urinary tract infection without hematuria, site unspecified    -  Primary     Hypothyroidism, unspecified type         Gastroesophageal reflux disease, esophagitis presence not specified         OAB (overactive bladder)         Chronic back pain greater than 3 months duration         Vitamin D deficiency         Fibromyalgia         Chronic anxiety         Chronic shoulder bursitis, left                To Do List           Future Appointments        Provider Department Dept Phone    2017 10:30 AM [facility] [phone]    7/10/2017 10:00 AM Lydia Eugene MD [facility] [phone]      Goals (5 Years of Data)     None      Follow-Up and Disposition     Return in about 3 months (around 2017).       These Medications        Disp Refills Start End    ciprofloxacin HCl (CIPRO) 250 MG tablet 6 tablet 0 2017     Take 1 tablet (250 mg total) by mouth every 12 (twelve) hours. - Oral    Pharmacy: [facility]/pharmacy #[id number] - [place] [address]  Ph #: [phone]       pantoprazole (PROTONIX) 40 MG tablet 30 tablet 5 2017     Take 1 tablet (40 mg total) by mouth once daily. - Oral    Pharmacy: [facility]/pharmacy #[id number] - [place] [address]  Ph #: [phone]       oxybutynin (DITROPAN XL) 15 MG TR24 30 tablet 5 2017     Take 1 tablet (15 mg total) by mouth once daily. - Oral    Pharmacy: [facility]/pharmacy #[id number] - [place] [address]  Ph #: [phone]       hydrocodone-acetaminophen 7.5-325mg (NORCO) 7.5-325 mg per tablet 90 tablet 0 2017      Take 1 tablet by mouth every 6 (six) hours as needed for Pain. - Oral    Pharmacy: [facility]/pharmacy #[id number] - [address] Ph #: [phone]       gabapentin (NEURONTIN) 300 MG capsule 60 capsule 5 4/11/2017     Take 1 capsule (300 mg total) by mouth 2 (two) times daily. - Oral    Pharmacy: [facility]/pharmacy #[facility] - [address] Ph #: [phone]       ergocalciferol (ERGOCALCIFEROL) 50,000 unit Cap 12 capsule 1 4/11/2017     Take 1 capsule (50,000 Units total) by mouth every 7 days. - Oral    Pharmacy: [facility]/pharmacy #[facility] - [address] Ph #: [phone]       carisoprodol (SOMA) 350 MG tablet 30 tablet 0 4/11/2017     Take 1 tablet (350 mg total) by mouth nightly as needed for Muscle spasms. - Oral    Pharmacy: [facility]/pharmacy #[id number] - [address] Ph #: [phone]       alprazolam (XANAX) 0.5 MG tablet 30 tablet 2 4/11/2017     Take 1 tablet (0.5 mg total) by mouth nightly as needed for Anxiety. - Oral    Pharmacy: [facility]/pharmacy #[id number] - [address] Ph #: [phone]         [facility]     [facility] Nurse Care Line - 24/7 Assistance  Unless otherwise directed by your provider, please contact [facility], our nurse care line that is available for 24/7 assistance.     Registered nurses in the [facility] provide: appointment scheduling, clinical advisement, health education, and other advisory services.  Call: [phone] (toll free)               Medications           Message regarding Medications     Verify the changes and/or additions to your medication regime listed below are the same as discussed with your clinician today.  If any of these changes or additions are incorrect, please notify your healthcare provider.        START taking these NEW medications        Refills    ciprofloxacin HCl (CIPRO) 250 MG tablet 0    Sig: Take 1 tablet (250 mg total) by mouth every 12 (twelve) hours.    Class:  Normal    Route: Oral      These medications were administered today        Dose Freq    ketorolac injection 60 mg 60 mg Clinic/HOD 1 time    Sig: Inject 2 mLs (60 mg total) into the muscle one time.    Class: Normal    Route: Intramuscular      STOP taking these medications     methylphenidate (METADATE CD) 40 MG CR capsule            Verify that the below list of medications is an accurate representation of the medications you are currently taking.  If none reported, the list may be blank. If incorrect, please contact your healthcare provider. Carry this list with you in case of emergency.           Current Medications     albuterol 90 mcg/actuation inhaler Inhale 2 puffs into the lungs every 6 (six) hours as needed for Wheezing.    allopurinol (ZYLOPRIM) 300 MG tablet Take 300 mg by mouth once daily.    alprazolam (XANAX) 0.5 MG tablet Take 1 tablet (0.5 mg total) by mouth nightly as needed for Anxiety.    carisoprodol (SOMA) 350 MG tablet Take 1 tablet (350 mg total) by mouth nightly as needed for Muscle spasms.    cetirizine (ALL DAY ALLERGY, CETIRIZINE,) 10 MG tablet Take 1 tablet (10 mg total) by mouth once daily.    cholecalciferol, vitamin D3, 1,000 unit capsule Take 1 capsule (1,000 Units total) by mouth once daily.    clobetasol (TEMOVATE) 0.05 % cream Apply 1 application topically 2 (two) times daily. Apply to affected area    ergocalciferol (ERGOCALCIFEROL) 50,000 unit Cap Take 1 capsule (50,000 Units total) by mouth every 7 days.    fluticasone (FLONASE) 50 mcg/actuation nasal spray 1 spray by Each Nare route once daily.    furosemide (LASIX) 40 MG tablet Take 1 tablet (40 mg total) by mouth daily as needed. Every day PRN    gabapentin (NEURONTIN) 300 MG capsule Take 1 capsule (300 mg total) by mouth 2 (two) times daily.    hydrocodone-acetaminophen 7.5-325mg (NORCO) 7.5-325 mg per tablet Take 1 tablet by mouth every 6 (six) hours as needed for Pain.    indomethacin (INDOCIN) 25 MG capsule TAKE ONE  "CAPSULE BY MOUTH 3 TIMES A DAY WITH MEALS    levothyroxine (SYNTHROID) 150 MCG tablet Take 1 tablet (150 mcg total) by mouth once daily.    methylphenidate (RITALIN LA) 40 MG 24 hr capsule Take 40 mg by mouth as needed.    methylphenidate (RITALIN) 10 MG tablet     oxybutynin (DITROPAN XL) 15 MG TR24 Take 1 tablet (15 mg total) by mouth once daily.    pantoprazole (PROTONIX) 40 MG tablet Take 1 tablet (40 mg total) by mouth once daily.    ciprofloxacin HCl (CIPRO) 250 MG tablet Take 1 tablet (250 mg total) by mouth every 12 (twelve) hours.           Clinical Reference Information           Your Vitals Were     BP Pulse Temp Height Weight BMI    120/68 (BP Location: Right arm, Patient Position: Sitting, BP Method: Automatic) 68 98.1 °F (36.7 °C) (Oral) 5' 5" (1.651 m) 120.2 kg (264 lb 15.9 oz) 44.1 kg/m2      Blood Pressure          Most Recent Value    BP  120/68      Allergies as of 4/11/2017     Codeine      Immunizations Administered on Date of Encounter - 4/11/2017     None      Orders Placed During Today's Visit      Normal Orders This Visit    Urine culture     Future Labs/Procedures Expected by Expires    T3, free  4/11/2017 6/10/2018    T4, free  4/11/2017 6/10/2018    TSH  4/11/2017 6/10/2018      Instructions      Walking for Fitness  Fitness walking has something for everyone, even people who are already fit. Walking is one of the safest ways to condition your body aerobically. It can boost energy, help you lose weight, and reduce stress.    Physical benefits  · Walking strengthens your heart and lungs, and tones your muscles.  · When walking, your feet land with less impact than in other sports. This reduces chances of muscle, bone, and joint injury.  · Regular walking improves your cholesterol levels and lowers your risk of heart disease. And it helps you control your blood sugar if you have diabetes.  · Walking is a weight-bearing activity, which helps maintain bone density. This can help prevent " osteoporosis.  Personal rewards  · Taking walks can help you relax and manage stress. And fitness walking may make you feel better about yourself.  · Walking can help you sleep better at night and make you less likely to be depressed.  · Regular walking may help maintain your memory as you get older.  · Walking is a great way to spend extra time with friends and family members. Be sure to invite your dog along!  Q&A about fitness walking  Q: Will walking keep me fit?  A: Yes. Regular walking at the right pace gives you all the benefits of other aerobic activities, such as jogging and swimming.  Q: Will walking help me lose weight and keep it off?  A: Yes. Per mile, walking can burn as many calories as jogging. Your health care provider can help work walking into your weight-loss plan.  Q: Is walking safe for my health?  A: Yes. Walking is safe if you have high blood pressure, diabetes, heart disease, or other conditions. Talk to your health care provider before you start.  Date Last Reviewed: 5/9/2015 © 2000-2016 [employer]. [address] [place] [address]. All rights reserved. This information is not intended as a substitute for professional medical care. Always follow your healthcare professional's instructions.        Weight Management: Getting Started  Healthy bodies come in all shapes and sizes. Not all bodies are made to be thin. For some people, a healthy weight is higher than the average weight listed on weight charts. Your healthcare provider can help you decide on a healthy weight for you.    Reasons to lose weight  Losing weight can help with some health problems, such as high blood pressure, heart disease, diabetes, sleep apnea, and arthritis. You may also feel more energy.  Set your long-term goal  Your goal doesn't even have to be a specific weight. You may decide on a fitness goal (such as being able to walk 10 miles a week), or a health goal (such as lowering your blood  pressure). Choose a goal that is measurable and reasonable, so you know when you've reached it. A goal of reaching a BMI of less than 25 is not always reasonable (or possible).   Make an action plan  Habits dont change overnight. Setting your goals too high can leave you feeling discouraged if you cant reach them. Be realistic. Choose one or two small changes you can make now. Set an action plan for how you are going to make these changes. When you can stick to this plan, keep making a few more small changes. Taking small steps will help you stay on the path to success.  Track your progress  Write down your goals. Then, keep a daily record of your progress. Write down what you eat and how active you are. This record lets you look back on how much youve done. It may also help when youre feeling frustrated. Reward yourself for success. Even if you dont reach every goal, give yourself credit for what you do get done.  Get support  Encouragement from others can help make losing weight easier. Ask your family members and friends for support. They may even want to join you. Also look to your healthcare provider, registered dietitian, and  for help. Your local hospital can give you more information about nutrition, exercise, and weight loss.  Date Last Reviewed: 1/31/2016  © 1064-3645 [employer]. [address] [place] [address], [place] [address]. All rights reserved. This information is not intended as a substitute for professional medical care. Always follow your healthcare professional's instructions.             Language Assistance Services     ATTENTION: Language assistance services are available, free of charge. Please call [phone].      ATENCIÓN: Si habla español, tiene a dallas disposición servicios gratuitos de asistencia lingüística. Llame al [phone].     [facility] Ý: N?u b?n nói Ti?ng Vi?t, có các d?ch v? h? tr? ngôn ng? mi?n phí dành cho b?n. G?i s? [phone].          [facility] complies with applicable Federal civil rights laws and does not discriminate on the basis of race, color, national origin, age, disability, or sex.

## 2017-04-13 LAB — BACTERIA UR CULT: NORMAL

## 2017-04-13 NOTE — PROGRESS NOTES
Subjective:       Patient ID: Edie Hernandez is a 61 y.o. female.    Chief Complaint: Follow-up and Arm Pain    Patient Active Problem List   Diagnosis    GERD (gastroesophageal reflux disease)    Chronic back pain greater than 3 months duration    Environmental allergies    Fibromyalgia    B12 nutritional deficiency    CLINT (obstructive sleep apnea)    Chronic sinusitis    Asthma    Hypothyroid    Nausea    Night sweats    Tinnitus, bilateral    Frequent UTI    Hyperlipidemia    Colon polyp, hyperplastic    Interstitial cystitis    Hemangioma    Wart    Globus sensation    DDD (degenerative disc disease)    Morbid obesity with BMI of 40.0-44.9, adult    Edema    Mixed incontinence       HPI  Review of Systems   Constitutional: Negative for chills and fever.   Gastrointestinal: Positive for abdominal pain. Negative for nausea and vomiting.   Genitourinary: Positive for dysuria, frequency and urgency.   Skin: Negative for rash.       Objective:      Physical Exam   Constitutional: She is oriented to person, place, and time. She appears well-developed and well-nourished.   Cardiovascular: Normal rate, regular rhythm and normal heart sounds.    Pulmonary/Chest: Effort normal and breath sounds normal. No respiratory distress.   Abdominal: Soft. Bowel sounds are normal. She exhibits no distension and no mass. There is tenderness in the suprapubic area. There is no rebound and no guarding. No hernia.   Musculoskeletal:        Left shoulder: She exhibits tenderness and pain. She exhibits normal range of motion and no spasm.   Increased pain with abd and flexion > 100 degrees   Neurological: She is alert and oriented to person, place, and time.   Skin: Skin is warm and dry.   Psychiatric: She has a normal mood and affect.   Nursing note and vitals reviewed.      Assessment:       1. Urinary tract infection without hematuria, site unspecified    2. Hypothyroidism, unspecified type    3.  Gastroesophageal reflux disease, esophagitis presence not specified    4. OAB (overactive bladder)    5. Chronic back pain greater than 3 months duration    6. Vitamin D deficiency    7. Fibromyalgia    8. Chronic anxiety    9. Chronic shoulder bursitis, left    10. Morbid obesity with BMI of 40.0-44.9, adult        Plan:       1. Urinary tract infection without hematuria, site unspecified  Patient was counseled to increase fluid intake.  Avoid carbonated beverages.  Empty your bladder frequently, before and after intercourse, and wipe front to back when cleaning after using the bathroom.  Cranberry juice intake may help.  Notify MD if you have fever > 100.4, severe abdominal pain, blood in urine or if you see no improvement in 3 days.    - ciprofloxacin HCl (CIPRO) 250 MG tablet; Take 1 tablet (250 mg total) by mouth every 12 (twelve) hours.  Dispense: 6 tablet; Refill: 0  - Urine culture    2. Hypothyroidism, unspecified type  Stable condition.  Continue current medications.  Will adjust based on lab findings or if condition changes.    - TSH; Future  - T4, free; Future  - T3, free; Future    3. Gastroesophageal reflux disease, esophagitis presence not specified  Controlled on current medications.  Continue current medications.    - pantoprazole (PROTONIX) 40 MG tablet; Take 1 tablet (40 mg total) by mouth once daily.  Dispense: 30 tablet; Refill: 5    4. OAB (overactive bladder)  Controlled on current medications.  Continue current medications.    - oxybutynin (DITROPAN XL) 15 MG TR24; Take 1 tablet (15 mg total) by mouth once daily.  Dispense: 30 tablet; Refill: 5    5. Chronic back pain greater than 3 months duration  Controlled on current medications.  Continue current medications.    - hydrocodone-acetaminophen 7.5-325mg (NORCO) 7.5-325 mg per tablet; Take 1 tablet by mouth every 6 (six) hours as needed for Pain.  Dispense: 90 tablet; Refill: 0  - gabapentin (NEURONTIN) 300 MG capsule; Take 1 capsule (300 mg  total) by mouth 2 (two) times daily.  Dispense: 60 capsule; Refill: 5  - carisoprodol (SOMA) 350 MG tablet; Take 1 tablet (350 mg total) by mouth nightly as needed for Muscle spasms.  Dispense: 30 tablet; Refill: 0    6. Vitamin D deficiency  Stable condition.  Continue current medications.  Will adjust based on lab findings or if condition changes.    - ergocalciferol (ERGOCALCIFEROL) 50,000 unit Cap; Take 1 capsule (50,000 Units total) by mouth every 7 days.  Dispense: 12 capsule; Refill: 1    7. Fibromyalgia  Use prn-limit use  - alprazolam (XANAX) 0.5 MG tablet; Take 1 tablet (0.5 mg total) by mouth nightly as needed for Anxiety.  Dispense: 30 tablet; Refill: 2    8. Chronic anxiety  Counseled patient on habit forming potential of opiates, risk of sedation, respiratory suppression or arrest especially if used in conjunction with benzodiazepines or alcohol.  Counseled patient to avoid driving while on the medication, rules of the pain contract and need for routine 3 month follow ups.  Patient agrees to all aspects of the plan of care and wishes to proceed with therapy.  The plan is always to use alternatives less habit forming, to utilize interventions and therapies that reduce pain as an adjunctive treatment, and limit and wean the dose of narcotics as soon as able and appropriate.      - alprazolam (XANAX) 0.5 MG tablet; Take 1 tablet (0.5 mg total) by mouth nightly as needed for Anxiety.  Dispense: 30 tablet; Refill: 2    9. Chronic shoulder bursitis, left  RICE, heat, rom exercises. Treat today:  - ketorolac injection 60 mg; Inject 2 mLs (60 mg total) into the muscle one time.      10. Morbid obesity with BMI of 40.0-44.9, adult  Counseled patient on his ideal body weight, health consequences of being obese and current recommendations including weekly exercise and a heart healthy diet.  Current BMI is:Estimated body mass index is 44.1 kg/(m^2) as calculated from the following:    Height as of this encounter:  "5' 5" (1.651 m).    Weight as of this encounter: 120.2 kg (264 lb 15.9 oz)..  Patient is aware that ideal BMI < 25 or Weight in (lb) to have BMI = 25: 149.9.    [facility] goal documentation:  Patient readiness: acceptance and barriers:readiness  During the course of the visit the patient was educated and counseled about the following: Obesity:   General weight loss/lifestyle modification strategies discussed (elicit support from others; identify saboteurs; non-food rewards, etc).  Goals: Obesity: Reduce calorie intake and BMI  Goal/Outcomes met:obesity  The following self management tools provided:none  Patient Instructions (the written plan) was given to the patient/family: Yes  Time spent with patient: 20 minutes    Patient with be reevaluated in 3 months or sooner prn    Greater than 50% of this visit was spent counseling as described in above documentation:Yes            "

## 2017-04-17 ENCOUNTER — PATIENT MESSAGE (OUTPATIENT)
Dept: FAMILY MEDICINE | Facility: CLINIC | Age: 62
End: 2017-04-17

## 2017-04-17 DIAGNOSIS — N39.0 URINARY TRACT INFECTION WITHOUT HEMATURIA, SITE UNSPECIFIED: Primary | ICD-10-CM

## 2017-04-19 ENCOUNTER — LAB VISIT (OUTPATIENT)
Dept: LAB | Facility: HOSPITAL | Age: 62
End: 2017-04-19
Attending: FAMILY MEDICINE
Payer: COMMERCIAL

## 2017-04-19 DIAGNOSIS — N39.0 URINARY TRACT INFECTION WITHOUT HEMATURIA, SITE UNSPECIFIED: ICD-10-CM

## 2017-04-19 LAB
BACTERIA #/AREA URNS AUTO: ABNORMAL /HPF
BILIRUB UR QL STRIP: NEGATIVE
CLARITY UR REFRACT.AUTO: ABNORMAL
COLOR UR AUTO: YELLOW
GLUCOSE UR QL STRIP: NEGATIVE
HGB UR QL STRIP: NEGATIVE
KETONES UR QL STRIP: NEGATIVE
LEUKOCYTE ESTERASE UR QL STRIP: ABNORMAL
MICROSCOPIC COMMENT: ABNORMAL
NITRITE UR QL STRIP: NEGATIVE
PH UR STRIP: 7 [PH] (ref 5–8)
PROT UR QL STRIP: NEGATIVE
RBC #/AREA URNS AUTO: 2 /HPF (ref 0–4)
SP GR UR STRIP: 1.01 (ref 1–1.03)
SQUAMOUS #/AREA URNS AUTO: 3 /HPF
URN SPEC COLLECT METH UR: ABNORMAL
UROBILINOGEN UR STRIP-ACNC: NEGATIVE EU/DL
WBC #/AREA URNS AUTO: 6 /HPF (ref 0–5)

## 2017-04-19 PROCEDURE — 81001 URINALYSIS AUTO W/SCOPE: CPT

## 2017-04-19 PROCEDURE — 87086 URINE CULTURE/COLONY COUNT: CPT

## 2017-04-21 ENCOUNTER — TELEPHONE (OUTPATIENT)
Dept: FAMILY MEDICINE | Facility: CLINIC | Age: 62
End: 2017-04-21

## 2017-04-21 DIAGNOSIS — N39.0 URINARY TRACT INFECTION WITHOUT HEMATURIA, SITE UNSPECIFIED: Primary | ICD-10-CM

## 2017-04-21 LAB
BACTERIA UR CULT: NORMAL
BACTERIA UR CULT: NORMAL

## 2017-04-21 RX ORDER — AMOXICILLIN AND CLAVULANATE POTASSIUM 875; 125 MG/1; MG/1
1 TABLET, FILM COATED ORAL EVERY 12 HOURS
Qty: 20 TABLET | Refills: 0 | Status: SHIPPED | OUTPATIENT
Start: 2017-04-21 | End: 2017-05-01

## 2017-04-21 NOTE — TELEPHONE ENCOUNTER
I am going to start her on treatment for UTI with a different abx -Augmentin 875/125 BID for 10 days. Repeat urinalysis in 2 weeks. Return to clinic if symptoms worsen or do not improve with treatment before then.

## 2017-04-21 NOTE — TELEPHONE ENCOUNTER
----- Message from Yaneli Shore MA sent at 4/21/2017  1:26 PM CDT -----  Patient notified and state she is still having frequent urination and bladder pressure. Please advise.

## 2017-05-22 DIAGNOSIS — M54.9 CHRONIC BACK PAIN GREATER THAN 3 MONTHS DURATION: ICD-10-CM

## 2017-05-22 DIAGNOSIS — M79.7 FIBROMYALGIA: ICD-10-CM

## 2017-05-22 DIAGNOSIS — F41.9 CHRONIC ANXIETY: ICD-10-CM

## 2017-05-22 DIAGNOSIS — G89.29 CHRONIC BACK PAIN GREATER THAN 3 MONTHS DURATION: ICD-10-CM

## 2017-05-22 DIAGNOSIS — E55.9 VITAMIN D DEFICIENCY: ICD-10-CM

## 2017-05-22 RX ORDER — CLOBETASOL PROPIONATE 0.5 MG/G
1 CREAM TOPICAL 2 TIMES DAILY
Qty: 30 G | Refills: 2 | Status: SHIPPED | OUTPATIENT
Start: 2017-05-22

## 2017-05-22 RX ORDER — CARISOPRODOL 350 MG/1
350 TABLET ORAL NIGHTLY PRN
Qty: 30 TABLET | Refills: 0 | Status: SHIPPED | OUTPATIENT
Start: 2017-05-22 | End: 2017-07-10 | Stop reason: SDUPTHER

## 2017-05-22 RX ORDER — ALPRAZOLAM 0.5 MG/1
0.5 TABLET ORAL NIGHTLY PRN
Qty: 30 TABLET | Refills: 2 | Status: SHIPPED | OUTPATIENT
Start: 2017-05-22 | End: 2017-07-10 | Stop reason: SDUPTHER

## 2017-05-22 RX ORDER — VIT C/E/ZN/COPPR/LUTEIN/ZEAXAN 250MG-90MG
1000 CAPSULE ORAL DAILY
Refills: 0 | COMMUNITY
Start: 2017-05-22 | End: 2017-06-13 | Stop reason: DRUGHIGH

## 2017-05-22 NOTE — TELEPHONE ENCOUNTER
Patient is requesting a refill on Clobetasol Cream. Last ov and refill was on 3/7/17 with 2 refills.

## 2017-06-13 ENCOUNTER — OFFICE VISIT (OUTPATIENT)
Dept: UROLOGY | Facility: CLINIC | Age: 62
End: 2017-06-13
Payer: COMMERCIAL

## 2017-06-13 VITALS
DIASTOLIC BLOOD PRESSURE: 57 MMHG | BODY MASS INDEX: 43.65 KG/M2 | WEIGHT: 262 LBS | TEMPERATURE: 98 F | HEART RATE: 67 BPM | HEIGHT: 65 IN | SYSTOLIC BLOOD PRESSURE: 103 MMHG

## 2017-06-13 DIAGNOSIS — R30.0 DYSURIA: Primary | ICD-10-CM

## 2017-06-13 DIAGNOSIS — N30.10 IC (INTERSTITIAL CYSTITIS): ICD-10-CM

## 2017-06-13 DIAGNOSIS — R39.89 BLADDER PAIN: ICD-10-CM

## 2017-06-13 LAB
BILIRUB SERPL-MCNC: ABNORMAL MG/DL
BLOOD URINE, POC: ABNORMAL
COLOR, POC UA: ABNORMAL
GLUCOSE UR QL STRIP: ABNORMAL
KETONES UR QL STRIP: ABNORMAL
LEUKOCYTE ESTERASE URINE, POC: ABNORMAL
NITRITE, POC UA: ABNORMAL
PH, POC UA: 5
POC RESIDUAL URINE VOLUME: 0 ML (ref 0–100)
PROTEIN, POC: ABNORMAL
SPECIFIC GRAVITY, POC UA: 1.02
UROBILINOGEN, POC UA: ABNORMAL

## 2017-06-13 PROCEDURE — 99214 OFFICE O/P EST MOD 30 MIN: CPT | Mod: 25,S$GLB,, | Performed by: NURSE PRACTITIONER

## 2017-06-13 PROCEDURE — 99999 PR PBB SHADOW E&M-EST. PATIENT-LVL IV: CPT | Mod: PBBFAC,,, | Performed by: NURSE PRACTITIONER

## 2017-06-13 PROCEDURE — 87086 URINE CULTURE/COLONY COUNT: CPT

## 2017-06-13 PROCEDURE — 81001 URINALYSIS AUTO W/SCOPE: CPT | Mod: S$GLB,,, | Performed by: NURSE PRACTITIONER

## 2017-06-13 PROCEDURE — 51798 US URINE CAPACITY MEASURE: CPT | Mod: S$GLB,,, | Performed by: NURSE PRACTITIONER

## 2017-06-13 RX ORDER — METHYLPHENIDATE HYDROCHLORIDE 40 MG/1
CAPSULE, EXTENDED RELEASE ORAL
COMMUNITY
Start: 2017-05-02 | End: 2017-06-13 | Stop reason: SDUPTHER

## 2017-06-13 NOTE — PROGRESS NOTES
[facility] Note  Staff: LYNETTE Bingham-C      Chief Complaint: History of interstitial cystitis, dysuria, nocturia, hx of recurrent urinary tract infections.    Subjective:        HPI: Edie Hernandez is a 61 y.o. female presents with complaints of   Nocturia 2-3x nightly, increased dysuria and lower pelvic pains x 3 weeks.  Pt states it mostly occurs when she awakens in the mornings.    PVR by bladder scan performed by CHIKIS Telles MA today: 0 mL*    History of urine cultures:  04/19/2017:  Multiple organisms  04/11/2017:  E. Coli  06/13/2016:  +Group B Strep; was treated with Augmentin    Pt was last seen by Dr. Nicholson on 07/06/2016.  In the past, pt has been diagnosed with having interstitial cystitis on April 2013.  She underwent a cystoscopy, urethral dilation and bladder hydrodistention on 04/23/2013.    REVIEW OF SYSTEMS:  Review of Systems   Constitutional: Negative for chills, diaphoresis, fever and weight loss.   HENT: Negative for congestion, hearing loss, nosebleeds and sore throat.    Eyes: Negative for blurred vision and pain.   Respiratory: Negative for cough and wheezing.    Cardiovascular: Negative for chest pain, palpitations and leg swelling.   Gastrointestinal: Negative for abdominal pain, heartburn, nausea and vomiting.   Genitourinary: Positive for dysuria, frequency and urgency. Negative for flank pain and hematuria.   Musculoskeletal: Negative for back pain, joint pain, myalgias and neck pain.   Skin: Negative for itching and rash.   Neurological: Negative for dizziness, tremors, sensory change, seizures, loss of consciousness, weakness and headaches.   Endo/Heme/Allergies: Does not bruise/bleed easily.   Psychiatric/Behavioral: Negative for depression and suicidal ideas. The patient is not nervous/anxious.      PMHx:  Past Medical History:   Diagnosis Date    Arthritis     Colon polyp, hyperplastic 1/13    recheck 5-10 years    Diverticulosis     Fatty liver      GERD (gastroesophageal reflux disease)     CLINT (obstructive sleep apnea)     C-pap    Thyroid disease      PSHx:  Past Surgical History:   Procedure Laterality Date    COLONOSCOPY  02/14/2013    Dr. Vogel: repeat in 5-10 years    COLONOSCOPY W/ BIOPSIES AND POLYPECTOMY  02/2013    hyperplastic, recheck 5-10 years    EXTERNAL EAR SURGERY      HYSTERECTOMY      THROAT SURGERY      for CLINT    UPPER GASTROINTESTINAL ENDOSCOPY       Social History     Social History    Marital status:      Spouse name: N/A    Number of children: N/A    Years of education: N/A     Occupational History     [employer] [place]     Social History Main Topics    Smoking status: Never Smoker    Smokeless tobacco: Never Used    Alcohol use No    Drug use: No    Sexual activity: Yes     Partners: Male     Other Topics Concern    None     Social History Narrative    None     Allergies:  Codeine    Medications: reviewed   Objective:     Vitals:    06/13/17 1043   BP: (!) 103/57   Pulse: 67   Temp: 98.1 °F (36.7 °C)     Physical Exam   Vitals reviewed.  Constitutional: She is oriented to person, place, and time. She appears well-developed and well-nourished.   HENT:   Head: Normocephalic and atraumatic.   Eyes: Conjunctivae and EOM are normal. Pupils are equal, round, and reactive to light.   Neck: Normal range of motion. Neck supple.   Cardiovascular: Normal rate, regular rhythm, normal heart sounds and intact distal pulses.    Pulmonary/Chest: Effort normal and breath sounds normal.   Abdominal: Soft. Bowel sounds are normal.   Musculoskeletal: Normal range of motion.   Neurological: She is alert and oriented to person, place, and time. She has normal reflexes.   Skin: Skin is warm and dry.     Psychiatric: She has a normal mood and affect. Her behavior is normal. Judgment and thought content normal.     LABS REVIEW:  UA today:   Color, UA  yellow clear   Spec Grav UA 1.020   pH, UA 5   WBC, UA 1+    Nitrite, UA n   Protein n   Glucose, UA n   Ketones, UA n   Urobilinogen, UA n   Bilirubin n   Blood, UA trace     Cr:   Lab Results   Component Value Date    CREATININE 1.1 01/20/2017     Assessment:       1. Dysuria    2. Bladder pain    3. IC (interstitial cystitis)          Plan:     We will send urine for culture testing today.    Due to recent recurrent UTIs and/or if culture shows no current infection, pt may need repeat cystoscopy for further evaluation of her symptoms.    F/u with Dr. Nicholson at next office visit.    [facility]: Active    Sherley Willis, FNP-C

## 2017-06-15 LAB
BACTERIA UR CULT: NORMAL
BACTERIA UR CULT: NORMAL

## 2017-06-28 DIAGNOSIS — G89.29 CHRONIC BACK PAIN GREATER THAN 3 MONTHS DURATION: ICD-10-CM

## 2017-06-28 DIAGNOSIS — M54.9 CHRONIC BACK PAIN GREATER THAN 3 MONTHS DURATION: ICD-10-CM

## 2017-06-29 RX ORDER — CARISOPRODOL 350 MG/1
350 TABLET ORAL NIGHTLY PRN
Qty: 30 TABLET | Refills: 0 | OUTPATIENT
Start: 2017-06-29

## 2017-07-07 ENCOUNTER — DOCUMENTATION ONLY (OUTPATIENT)
Dept: FAMILY MEDICINE | Facility: CLINIC | Age: 62
End: 2017-07-07

## 2017-07-07 NOTE — PROGRESS NOTES
Pre-Visit Chart Review  For Appointment Scheduled on 7/10/17    There are no preventive care reminders to display for this patient.

## 2017-07-10 ENCOUNTER — OFFICE VISIT (OUTPATIENT)
Dept: FAMILY MEDICINE | Facility: CLINIC | Age: 62
End: 2017-07-10
Payer: COMMERCIAL

## 2017-07-10 VITALS
TEMPERATURE: 98 F | SYSTOLIC BLOOD PRESSURE: 119 MMHG | WEIGHT: 267 LBS | HEIGHT: 65 IN | BODY MASS INDEX: 44.48 KG/M2 | DIASTOLIC BLOOD PRESSURE: 58 MMHG | HEART RATE: 69 BPM

## 2017-07-10 DIAGNOSIS — G89.29 CHRONIC BACK PAIN GREATER THAN 3 MONTHS DURATION: ICD-10-CM

## 2017-07-10 DIAGNOSIS — G89.29 CHRONIC BACK PAIN GREATER THAN 3 MONTHS DURATION: Primary | ICD-10-CM

## 2017-07-10 DIAGNOSIS — M54.2 NECK PAIN: ICD-10-CM

## 2017-07-10 DIAGNOSIS — M54.9 CHRONIC BACK PAIN GREATER THAN 3 MONTHS DURATION: ICD-10-CM

## 2017-07-10 DIAGNOSIS — E66.01 MORBID OBESITY WITH BMI OF 40.0-44.9, ADULT: ICD-10-CM

## 2017-07-10 DIAGNOSIS — M79.7 FIBROMYALGIA: ICD-10-CM

## 2017-07-10 DIAGNOSIS — F41.9 CHRONIC ANXIETY: ICD-10-CM

## 2017-07-10 DIAGNOSIS — M54.9 CHRONIC BACK PAIN GREATER THAN 3 MONTHS DURATION: Primary | ICD-10-CM

## 2017-07-10 PROCEDURE — 96372 THER/PROPH/DIAG INJ SC/IM: CPT | Mod: S$GLB,,, | Performed by: NURSE PRACTITIONER

## 2017-07-10 PROCEDURE — 99214 OFFICE O/P EST MOD 30 MIN: CPT | Mod: S$GLB,,, | Performed by: NURSE PRACTITIONER

## 2017-07-10 PROCEDURE — 99999 PR PBB SHADOW E&M-EST. PATIENT-LVL III: CPT | Mod: PBBFAC,,, | Performed by: NURSE PRACTITIONER

## 2017-07-10 RX ORDER — CARISOPRODOL 350 MG/1
350 TABLET ORAL NIGHTLY PRN
Qty: 30 TABLET | Refills: 0 | Status: SHIPPED | OUTPATIENT
Start: 2017-07-10 | End: 2017-09-14 | Stop reason: SDUPTHER

## 2017-07-10 RX ORDER — ALPRAZOLAM 0.5 MG/1
0.5 TABLET ORAL NIGHTLY PRN
Qty: 30 TABLET | Refills: 2 | Status: SHIPPED | OUTPATIENT
Start: 2017-07-10 | End: 2017-09-14 | Stop reason: SDUPTHER

## 2017-07-10 RX ORDER — HYDROCODONE BITARTRATE AND ACETAMINOPHEN 7.5; 325 MG/1; MG/1
1 TABLET ORAL EVERY 6 HOURS PRN
Qty: 90 TABLET | Refills: 0 | Status: SHIPPED | OUTPATIENT
Start: 2017-07-10 | End: 2017-10-18 | Stop reason: SDUPTHER

## 2017-07-10 RX ORDER — KETOROLAC TROMETHAMINE 30 MG/ML
60 INJECTION, SOLUTION INTRAMUSCULAR; INTRAVENOUS
Status: COMPLETED | OUTPATIENT
Start: 2017-07-10 | End: 2017-07-10

## 2017-07-10 RX ADMIN — KETOROLAC TROMETHAMINE 60 MG: 30 INJECTION, SOLUTION INTRAMUSCULAR; INTRAVENOUS at 10:07

## 2017-07-10 NOTE — PROGRESS NOTES
Subjective:       Patient ID: Edie Hernandez is a 61 y.o. female.    Chief Complaint: Medication Refill (Norco)    Ms. Hernandez presents to the clinic today for medication documentation for Norco, Soma, Xanax.  She takes Norco and Soma for chronic back pain which radiates down the left leg.  She also has neck and left shoulder pain.  This was improved with Toradol shot at last visit and she requests another.  She is obese and she exercises by walking 1/2 mile per day.  She eats a lot of sweets.        Review of Systems   Constitutional: Negative for chills and fever.   Respiratory: Negative for cough and shortness of breath.    Cardiovascular: Negative for chest pain, palpitations and leg swelling.   Musculoskeletal: Positive for arthralgias, back pain and neck pain.   Neurological: Negative for numbness.       Objective:      Physical Exam   Constitutional: She is oriented to person, place, and time. She appears well-nourished. No distress.   HENT:   Head: Normocephalic and atraumatic.   Right Ear: External ear normal.   Left Ear: External ear normal.   Mouth/Throat: Oropharynx is clear and moist. No oropharyngeal exudate.   Eyes: Pupils are equal, round, and reactive to light. Right eye exhibits no discharge. Left eye exhibits no discharge.   Neck: Neck supple. No thyromegaly present.   Cardiovascular: Normal rate and regular rhythm.  Exam reveals no gallop and no friction rub.    No murmur heard.  Pulmonary/Chest: Effort normal and breath sounds normal. No respiratory distress. She has no wheezes. She has no rales.   Abdominal: Soft. She exhibits no distension. There is no tenderness.   Musculoskeletal:        Left shoulder: She exhibits no tenderness.        Lumbar back: She exhibits tenderness. She exhibits no bony tenderness.        Back:    Lymphadenopathy:     She has no cervical adenopathy.   Neurological: She is alert and oriented to person, place, and time. Coordination normal.   Skin: Skin is warm and  dry.   Psychiatric: She has a normal mood and affect. Her behavior is normal. Thought content normal.   Vitals reviewed.          Current Outpatient Prescriptions:     allopurinol (ZYLOPRIM) 300 MG tablet, Take 300 mg by mouth once daily., Disp: , Rfl: 4    alprazolam (XANAX) 0.5 MG tablet, Take 1 tablet (0.5 mg total) by mouth nightly as needed for Anxiety., Disp: 30 tablet, Rfl: 2    carisoprodol (SOMA) 350 MG tablet, Take 1 tablet (350 mg total) by mouth nightly as needed for Muscle spasms., Disp: 30 tablet, Rfl: 0    cetirizine (ALL DAY ALLERGY, CETIRIZINE,) 10 MG tablet, Take 1 tablet (10 mg total) by mouth once daily. (Patient taking differently: Take 10 mg by mouth daily as needed. ), Disp: 30 tablet, Rfl: 3    clobetasol (TEMOVATE) 0.05 % cream, Apply 1 application topically 2 (two) times daily. Apply to affected area, Disp: 30 g, Rfl: 2    ergocalciferol (ERGOCALCIFEROL) 50,000 unit Cap, Take 1 capsule (50,000 Units total) by mouth every 7 days., Disp: 12 capsule, Rfl: 1    fluticasone (FLONASE) 50 mcg/actuation nasal spray, 1 spray by Each Nare route once daily. (Patient taking differently: 1 spray by Each Nare route daily as needed. ), Disp: 16 g, Rfl: 3    furosemide (LASIX) 40 MG tablet, Take 1 tablet (40 mg total) by mouth daily as needed. Every day PRN, Disp: 30 tablet, Rfl: 4    gabapentin (NEURONTIN) 300 MG capsule, Take 1 capsule (300 mg total) by mouth 2 (two) times daily., Disp: 60 capsule, Rfl: 5    hydrocodone-acetaminophen 7.5-325mg (NORCO) 7.5-325 mg per tablet, Take 1 tablet by mouth every 6 (six) hours as needed for Pain., Disp: 90 tablet, Rfl: 0    indomethacin (INDOCIN) 25 MG capsule, TAKE ONE CAPSULE BY MOUTH 3 TIMES A DAY WITH MEALS, Disp: 90 capsule, Rfl: 3    levothyroxine (SYNTHROID) 150 MCG tablet, Take 1 tablet (150 mcg total) by mouth once daily., Disp: 90 tablet, Rfl: 3    methylphenidate (RITALIN LA) 40 MG 24 hr capsule, Take 40 mg by mouth daily as needed. , Disp:  , Rfl:     methylphenidate (RITALIN) 10 MG tablet, Take 10 mg by mouth daily as needed. , Disp: , Rfl:     oxybutynin (DITROPAN XL) 15 MG TR24, Take 1 tablet (15 mg total) by mouth once daily., Disp: 30 tablet, Rfl: 5    pantoprazole (PROTONIX) 40 MG tablet, Take 1 tablet (40 mg total) by mouth once daily., Disp: 30 tablet, Rfl: 5    albuterol 90 mcg/actuation inhaler, Inhale 2 puffs into the lungs every 6 (six) hours as needed for Wheezing., Disp: 18 g, Rfl: 0    Current Facility-Administered Medications:     ketorolac injection 60 mg, 60 mg, Intramuscular, 1 time in Clinic/HOD, Zainab Shaikh, LYNETTE-PHYLLIS  Assessment:       1. Chronic back pain greater than 3 months duration    2. Neck pain    3. Morbid obesity with BMI of 40.0-44.9, adult    4. Fibromyalgia        Plan:       Edie was seen today for medication refill.    Diagnoses and all orders for this visit:    Chronic back pain greater than 3 months duration  Does not want JED.   Advised weight loss, yoga.  -     ketorolac injection 60 mg; Inject 2 mLs (60 mg total) into the muscle one time.    Neck pain  Likely muscular.    Make sure your pillow is supportive.  Apply heat, consider massage therapy.    Morbid obesity with BMI of 40.0-44.9, adult  Diet diary.   Do not keep sweets in the house.    Fibromyalgia  Taking Xanax PRN occasionally.    Advised do not mix Xanax, Soma, Norco due to risk of respiratory depression and death.  Do not drink ETOH on these medications.  She has an alarm on her bathroom cabinet to prevent theft of the medications.    Patient readiness: acceptance and barriers:readiness    During the course of the visit the patient was educated and counseled about the following:     Obesity:   Diet interventions: moderate (500 kCal/d) deficit diet and qualitative changes (increase low-fat,  high-fiber foods).  Regular aerobic exercise program discussed.    Goals: Obesity: Reduce calorie intake and BMI    Did patient meet goals/outcomes:  No    The following self management tools provided: declined    Patient Instructions (the written plan) was given to the patient/family.     Time spent with patient: 30 minutes

## 2017-09-14 DIAGNOSIS — G89.29 CHRONIC BACK PAIN GREATER THAN 3 MONTHS DURATION: ICD-10-CM

## 2017-09-14 DIAGNOSIS — M79.7 FIBROMYALGIA: ICD-10-CM

## 2017-09-14 DIAGNOSIS — F41.9 CHRONIC ANXIETY: ICD-10-CM

## 2017-09-14 DIAGNOSIS — M54.9 CHRONIC BACK PAIN GREATER THAN 3 MONTHS DURATION: ICD-10-CM

## 2017-09-14 RX ORDER — CARISOPRODOL 350 MG/1
350 TABLET ORAL NIGHTLY PRN
Qty: 30 TABLET | Refills: 0 | Status: SHIPPED | OUTPATIENT
Start: 2017-09-14 | End: 2018-02-23 | Stop reason: SDUPTHER

## 2017-09-14 RX ORDER — ALPRAZOLAM 0.5 MG/1
0.5 TABLET ORAL NIGHTLY PRN
Qty: 30 TABLET | Refills: 0 | Status: SHIPPED | OUTPATIENT
Start: 2017-09-14 | End: 2017-10-18

## 2017-10-17 ENCOUNTER — DOCUMENTATION ONLY (OUTPATIENT)
Dept: FAMILY MEDICINE | Facility: CLINIC | Age: 62
End: 2017-10-17

## 2017-10-17 NOTE — PROGRESS NOTES
Pre-Visit Chart Review  For Appointment Scheduled on 10/18/17    Health Maintenance Due   Topic Date Due    Influenza Vaccine  08/01/2017

## 2017-10-18 ENCOUNTER — LAB VISIT (OUTPATIENT)
Dept: LAB | Facility: HOSPITAL | Age: 62
End: 2017-10-18
Attending: FAMILY MEDICINE
Payer: COMMERCIAL

## 2017-10-18 ENCOUNTER — OFFICE VISIT (OUTPATIENT)
Dept: FAMILY MEDICINE | Facility: CLINIC | Age: 62
End: 2017-10-18
Payer: COMMERCIAL

## 2017-10-18 VITALS
DIASTOLIC BLOOD PRESSURE: 62 MMHG | TEMPERATURE: 98 F | OXYGEN SATURATION: 96 % | HEART RATE: 58 BPM | HEIGHT: 65 IN | BODY MASS INDEX: 43.89 KG/M2 | WEIGHT: 263.44 LBS | SYSTOLIC BLOOD PRESSURE: 116 MMHG

## 2017-10-18 DIAGNOSIS — E55.9 VITAMIN D DEFICIENCY: ICD-10-CM

## 2017-10-18 DIAGNOSIS — M54.9 CHRONIC BACK PAIN GREATER THAN 3 MONTHS DURATION: ICD-10-CM

## 2017-10-18 DIAGNOSIS — D49.2: ICD-10-CM

## 2017-10-18 DIAGNOSIS — F11.20 CONTINUOUS OPIOID DEPENDENCE: ICD-10-CM

## 2017-10-18 DIAGNOSIS — Z23 FLU VACCINE NEED: ICD-10-CM

## 2017-10-18 DIAGNOSIS — E66.01 MORBID OBESITY WITH BMI OF 40.0-44.9, ADULT: ICD-10-CM

## 2017-10-18 DIAGNOSIS — R30.0 DYSURIA: ICD-10-CM

## 2017-10-18 DIAGNOSIS — E53.8 B12 NUTRITIONAL DEFICIENCY: ICD-10-CM

## 2017-10-18 DIAGNOSIS — E78.5 HYPERLIPIDEMIA, UNSPECIFIED HYPERLIPIDEMIA TYPE: Primary | ICD-10-CM

## 2017-10-18 DIAGNOSIS — F41.9 ANXIETY: ICD-10-CM

## 2017-10-18 DIAGNOSIS — E03.9 HYPOTHYROIDISM (ACQUIRED): ICD-10-CM

## 2017-10-18 DIAGNOSIS — E03.9 HYPOTHYROIDISM, UNSPECIFIED TYPE: ICD-10-CM

## 2017-10-18 DIAGNOSIS — E78.5 HYPERLIPIDEMIA, UNSPECIFIED HYPERLIPIDEMIA TYPE: ICD-10-CM

## 2017-10-18 DIAGNOSIS — G89.29 CHRONIC BACK PAIN GREATER THAN 3 MONTHS DURATION: ICD-10-CM

## 2017-10-18 LAB
25(OH)D3+25(OH)D2 SERPL-MCNC: 30 NG/ML
ALBUMIN SERPL BCP-MCNC: 3.5 G/DL
ALP SERPL-CCNC: 88 U/L
ALT SERPL W/O P-5'-P-CCNC: 30 U/L
ANION GAP SERPL CALC-SCNC: 10 MMOL/L
AST SERPL-CCNC: 24 U/L
BASOPHILS # BLD AUTO: 0.04 K/UL
BASOPHILS NFR BLD: 0.6 %
BILIRUB SERPL-MCNC: 1.5 MG/DL
BILIRUB SERPL-MCNC: NEGATIVE MG/DL
BLOOD URINE, POC: NEGATIVE
BUN SERPL-MCNC: 16 MG/DL
CALCIUM SERPL-MCNC: 9.4 MG/DL
CHLORIDE SERPL-SCNC: 107 MMOL/L
CHOLEST SERPL-MCNC: 206 MG/DL
CHOLEST/HDLC SERPL: 3.5 {RATIO}
CO2 SERPL-SCNC: 26 MMOL/L
COLOR, POC UA: YELLOW
CREAT SERPL-MCNC: 1 MG/DL
DIFFERENTIAL METHOD: NORMAL
EOSINOPHIL # BLD AUTO: 0.4 K/UL
EOSINOPHIL NFR BLD: 5.9 %
ERYTHROCYTE [DISTWIDTH] IN BLOOD BY AUTOMATED COUNT: 13.1 %
EST. GFR  (AFRICAN AMERICAN): >60 ML/MIN/1.73 M^2
EST. GFR  (NON AFRICAN AMERICAN): >60 ML/MIN/1.73 M^2
GLUCOSE SERPL-MCNC: 109 MG/DL
GLUCOSE UR QL STRIP: NORMAL
HCT VFR BLD AUTO: 42 %
HDLC SERPL-MCNC: 59 MG/DL
HDLC SERPL: 28.6 %
HGB BLD-MCNC: 14.1 G/DL
IMM GRANULOCYTES # BLD AUTO: 0.02 K/UL
IMM GRANULOCYTES NFR BLD AUTO: 0.3 %
KETONES UR QL STRIP: NEGATIVE
LDLC SERPL CALC-MCNC: 117.6 MG/DL
LEUKOCYTE ESTERASE URINE, POC: ABNORMAL
LYMPHOCYTES # BLD AUTO: 1.4 K/UL
LYMPHOCYTES NFR BLD: 21.1 %
MCH RBC QN AUTO: 30.4 PG
MCHC RBC AUTO-ENTMCNC: 33.6 G/DL
MCV RBC AUTO: 91 FL
MONOCYTES # BLD AUTO: 0.5 K/UL
MONOCYTES NFR BLD: 7.6 %
NEUTROPHILS # BLD AUTO: 4.4 K/UL
NEUTROPHILS NFR BLD: 64.5 %
NITRITE, POC UA: NEGATIVE
NONHDLC SERPL-MCNC: 147 MG/DL
NRBC BLD-RTO: 0 /100 WBC
PH, POC UA: 5
PLATELET # BLD AUTO: 264 K/UL
PMV BLD AUTO: 9.8 FL
POTASSIUM SERPL-SCNC: 4.1 MMOL/L
PROT SERPL-MCNC: 7 G/DL
PROTEIN, POC: NEGATIVE
RBC # BLD AUTO: 4.64 M/UL
SODIUM SERPL-SCNC: 143 MMOL/L
SPECIFIC GRAVITY, POC UA: 1.01
T3FREE SERPL-MCNC: 2.3 PG/ML
T4 FREE SERPL-MCNC: 1.06 NG/DL
TRIGL SERPL-MCNC: 147 MG/DL
TSH SERPL DL<=0.005 MIU/L-ACNC: 4.67 UIU/ML
UROBILINOGEN, POC UA: NORMAL
WBC # BLD AUTO: 6.74 K/UL

## 2017-10-18 PROCEDURE — 36415 COLL VENOUS BLD VENIPUNCTURE: CPT | Mod: PO

## 2017-10-18 PROCEDURE — 80307 DRUG TEST PRSMV CHEM ANLYZR: CPT

## 2017-10-18 PROCEDURE — 82306 VITAMIN D 25 HYDROXY: CPT

## 2017-10-18 PROCEDURE — 84439 ASSAY OF FREE THYROXINE: CPT

## 2017-10-18 PROCEDURE — 80061 LIPID PANEL: CPT

## 2017-10-18 PROCEDURE — 81001 URINALYSIS AUTO W/SCOPE: CPT | Mod: S$GLB,,, | Performed by: FAMILY MEDICINE

## 2017-10-18 PROCEDURE — 96372 THER/PROPH/DIAG INJ SC/IM: CPT | Mod: S$GLB,,, | Performed by: FAMILY MEDICINE

## 2017-10-18 PROCEDURE — 99214 OFFICE O/P EST MOD 30 MIN: CPT | Mod: 25,S$GLB,, | Performed by: FAMILY MEDICINE

## 2017-10-18 PROCEDURE — 80053 COMPREHEN METABOLIC PANEL: CPT

## 2017-10-18 PROCEDURE — 87086 URINE CULTURE/COLONY COUNT: CPT

## 2017-10-18 PROCEDURE — 99999 PR PBB SHADOW E&M-EST. PATIENT-LVL IV: CPT | Mod: PBBFAC,,, | Performed by: FAMILY MEDICINE

## 2017-10-18 PROCEDURE — 84443 ASSAY THYROID STIM HORMONE: CPT

## 2017-10-18 PROCEDURE — 84481 FREE ASSAY (FT-3): CPT

## 2017-10-18 PROCEDURE — 85025 COMPLETE CBC W/AUTO DIFF WBC: CPT

## 2017-10-18 RX ORDER — METHYLPHENIDATE HYDROCHLORIDE 40 MG/1
CAPSULE, EXTENDED RELEASE ORAL
COMMUNITY
Start: 2017-10-06 | End: 2017-10-18 | Stop reason: SDUPTHER

## 2017-10-18 RX ORDER — TRAZODONE HYDROCHLORIDE 50 MG/1
50 TABLET ORAL NIGHTLY PRN
Qty: 30 TABLET | Refills: 5 | Status: SHIPPED | OUTPATIENT
Start: 2017-10-18 | End: 2018-02-02

## 2017-10-18 RX ORDER — HYDROCODONE BITARTRATE AND ACETAMINOPHEN 7.5; 325 MG/1; MG/1
1 TABLET ORAL EVERY 6 HOURS PRN
Qty: 90 TABLET | Refills: 0 | Status: SHIPPED | OUTPATIENT
Start: 2017-10-18 | End: 2018-02-02 | Stop reason: SDUPTHER

## 2017-10-18 RX ORDER — KETOROLAC TROMETHAMINE 30 MG/ML
60 INJECTION, SOLUTION INTRAMUSCULAR; INTRAVENOUS ONCE
Status: COMPLETED | OUTPATIENT
Start: 2017-10-18 | End: 2017-10-18

## 2017-10-18 RX ORDER — CARISOPRODOL 350 MG/1
350 TABLET ORAL NIGHTLY PRN
Qty: 30 TABLET | Refills: 0 | Status: CANCELLED | OUTPATIENT
Start: 2017-10-18

## 2017-10-18 RX ORDER — LEVOTHYROXINE SODIUM 150 UG/1
150 TABLET ORAL DAILY
Qty: 90 TABLET | Refills: 3 | Status: SHIPPED | OUTPATIENT
Start: 2017-10-18 | End: 2017-11-12 | Stop reason: SDUPTHER

## 2017-10-18 RX ORDER — HYDROXYZINE HYDROCHLORIDE 25 MG/1
25 TABLET, FILM COATED ORAL 4 TIMES DAILY PRN
Qty: 30 TABLET | Refills: 0 | Status: SHIPPED | OUTPATIENT
Start: 2017-10-18 | End: 2018-02-02

## 2017-10-18 RX ORDER — TIZANIDINE HYDROCHLORIDE 4 MG/1
4 CAPSULE, GELATIN COATED ORAL 3 TIMES DAILY PRN
Qty: 30 CAPSULE | Refills: 1 | Status: SHIPPED | OUTPATIENT
Start: 2017-10-18 | End: 2017-10-28

## 2017-10-18 RX ADMIN — KETOROLAC TROMETHAMINE 60 MG: 30 INJECTION, SOLUTION INTRAMUSCULAR; INTRAVENOUS at 09:10

## 2017-10-18 NOTE — LETTER
"2017    Edie KE[place]Y [place] [address]             [facility]  [address]  [place] [address]  Phone: [phone]  Fax: [phone]   2017    Re: Edei Schreiberton        : 1955        MRN: [id number]    PAIN ORIENTATION AGREEMENT    My doctor and I have decided that as part of my treatment for chronic pain, I will receive prescriptions for controlled substances.  As a patient, I will agree to the following terms in order for my provider to effectively treat my pain and also comply with the rules set forth by [facility] Board of Medical Examiners and Drug Enforcement Agency.  1. I understand that in order for me to receive the best possible care, my pain management doctor needs a copy of any previous medical records, including MRI, office notes, lab results, etc.  2. I will provide a full list of my medications, current dose, and how often I take my medication.  3. A single physician shall be responsible for prescribing my pain medication.  4. I understand that my physician may require an office visit every 3 months for certain controlled substances.  5. It is my responsibility to keep my appointments.  If I miss my schedule appointment, I will be rescheduled to the first available time slot.  I understand that pain medications may not be refilled until seen.  6. If my doctor agrees to refill my pain medication by telephone, I will call the office at least 5 business days in advance to request a refill prescription.  7. Refill prescriptions will not be written in the evenings, weekends, or on holidays.  8. Each prescription is expected to last at least one month.  Refills will not be given early if I "run out early", "lose a prescription", "spill" or "misplaced" my medication.  9. It may be necessary for prescriptions to be picked up in person and proof of identification may be required.  10. I will have my pain medication filled at " only one pharmacy.                 [id number] - [place], [place] - [address]  [address]  [place] [address]  Phone: [phone] Fax: [phone]    [id number] - [place], [place] - [address]  [address]  [place] [address]  Phone: [phone] Fax: [phone]         11. A baseline drug screen may be completed on my first visit and or randomly at other routine clinic visits.      I have read and understand the above information.  I will, to the best of my ability, adhere to these policies and commitments.  I further understand that noncompliance with these policies may result in my being discharged as the patient.      _____________________                     _____Edie Hernandez______  Patient signature/date         Patient printed  name                                                                                  _____________________                    ____________________  Physician signature/printed name/date         Witness/date    Lydia Eugene MD 10/18/2017                BEHAVIOR AGREEMENT FOR THE USE OF CONTROLLED DRUGS  The following has been explained to me:  1. It is possible that I may become physically dependent, psychologically dependent, tolerant and/or addicted to controlled substance medications.   2. Physical dependence occurs if withdrawal symptoms are experienced when the drug is suddenly discontinued.  3. Tolerance is the need for higher doses of the drug to achieve the same amount of pain control.  4. Addition is a psychological and behavioral syndrome that is recognized when the patient abuses the drug to obtain mental numbness or euphoria (get high), or shows drug craving behavior or manipulative attitude toward the physician in order to obtain the drug.  5. Withdrawal symptoms may occur if pain medication is stopped abruptly.   These symptoms include yawning, sweating, watery eyes, runny nose, anxiety, tremors, achy muscles, hot and cold flashes,  ""gooseflesh ", abdominal cramps or diarrhea.  6. I will not cut or chew long-acting pain medication.  7. If severe sedation (sleepiness) or any other medical emergency relating to my pain medication occurs, I will contact my doctor's office or seek ER attention immediately.  8. I will not combine these drugs with alcohol or recreational drugs (this includes marijuana).     9. I must inform my doctor if I am taking any other sedating drugs such as Valium, Ativan, seizure medication or psychiatric drugs.    10. I will inform my physician of any current or prior history of drug abuse or prescription medication misuse.  11. These medications may be harmful to an unborn child.  I have been advised to use 2 forms of birth control (at least one barrier, such as condoms) while using these medications.    12. If I test positive for drugs that my doctor has not prescribed, and/or if I refuses a random drug test, my physician has the right to stop my controlled substance, end  his/her relationship with me, and I may be terminated from the clinic.   13. If at any time I become violent or abusive, verbally or physically, my actions will be considered cause to terminate care from the clinic and discontinuation of pain medications.          I understand and agree that if I fail to abide by the above agreements, or if I show signs suspicious of narcotic over use or abuse, my pain management physician may discontinue treatment, and narcotic prescriptions will be discontinued.            _____________________                     _____Edie Hernandez______  Patient signature/date          Patient printed  name                                                                                _____________________                    ____________________  Physician signature/printed name/date           Witness/date    Lydia Eugene MD 10/18/2017         "

## 2017-10-18 NOTE — PROGRESS NOTES
Subjective:       Patient ID: Edie Hernandez is a 62 y.o. female.    Chief Complaint: hypothyroidism, hyperlipidemia (complains of cough, left ear pain, spot on neck left side)    Patient Active Problem List   Diagnosis    GERD (gastroesophageal reflux disease)    Chronic back pain greater than 3 months duration    Environmental allergies    Fibromyalgia    B12 nutritional deficiency    CLINT (obstructive sleep apnea)    Chronic sinusitis    Asthma    Hypothyroid    Nausea    Night sweats    Tinnitus, bilateral    Frequent UTI    Hyperlipidemia    Colon polyp, hyperplastic    Interstitial cystitis    Hemangioma    Wart    Globus sensation    DDD (degenerative disc disease)    Morbid obesity with BMI of 40.0-44.9, adult    Edema    Mixed incontinence   Family history of substance abuse: female   prescription drugs-4(daughter)    Personal history of substance abuse: no    Age (alexei box if 16 to 45) :no-0    History of preadolescent sexual abuse:no    Psychological disease: male with ADD,OCD, bipolar or schizophrenia-2(dad schizophrenia)    Total:_____6___  Low risk:0-3  Moderate risk:4-7  High risk:>8  Chronic low back pain requiring norco 7.5 mg one qpm (90/3 months).  IS doing home exercises.  Has tried pt.  Not interested in surgery or JED.  Taking 1 soma a day for muscle spasm and xanax at bedtime for sleep or intense anxiety.  Recommended avoidance of sedatives while taking norco due to risk of overdose.  Patient agrees to try alternatives.  UDS and contract signed today. DPS checked- no evidence of diversion    HPI  Review of Systems   Constitutional: Negative for fatigue and unexpected weight change.   Respiratory: Negative for chest tightness and shortness of breath.    Cardiovascular: Negative for chest pain, palpitations and leg swelling.   Gastrointestinal: Negative for abdominal pain.   Musculoskeletal: Negative for arthralgias.   Neurological: Negative for dizziness, syncope,  light-headedness and headaches.       Objective:      Physical Exam   Constitutional: She is oriented to person, place, and time. She appears well-developed and well-nourished.   Cardiovascular: Normal rate, regular rhythm and normal heart sounds.    Pulmonary/Chest: Effort normal and breath sounds normal.   Musculoskeletal: She exhibits no edema.   Neurological: She is alert and oriented to person, place, and time.   Skin: Skin is warm and dry.        Psychiatric: She has a normal mood and affect.   Nursing note and vitals reviewed.      Assessment:       1. Hyperlipidemia, unspecified hyperlipidemia type    2. Hypothyroidism (acquired)    3. Chronic back pain greater than 3 months duration    4. B12 nutritional deficiency    5. Hypothyroidism, unspecified type    6. Morbid obesity with BMI of 40.0-44.9, adult    7. Continuous opioid dependence    8. Flu vaccine need    9. Vitamin D deficiency    10. Anxiety    11. Neoplasm of skin of shoulder    12. Dysuria        Plan:       1. Hypothyroidism (acquired)  Stable condition.  Continue current medications.  Will adjust based on lab findings or if condition changes.    - levothyroxine (SYNTHROID) 150 MCG tablet; Take 1 tablet (150 mcg total) by mouth once daily.  Dispense: 90 tablet; Refill: 3  - TSH; Future  - T3, free; Future  - T4, free; Future    2. Chronic back pain greater than 3 months duration  Controlled on current medications.  Continue current medications.  Counseled patient on habit forming potential of opiates, risk of sedation, respiratory suppression or arrest especially if used in conjunction with benzodiazepines or alcohol.  Counseled patient to avoid driving while on the medication, rules of the pain contract and need for routine 3 month follow ups.  Patient agrees to all aspects of the plan of care and wishes to proceed with therapy.  The plan is always to use alternatives less habit forming, to utilize interventions and therapies that reduce pain  "as an adjunctive treatment, and limit and wean the dose of narcotics as soon as able and appropriate.    - hydrocodone-acetaminophen 7.5-325mg (NORCO) 7.5-325 mg per tablet; Take 1 tablet by mouth every 6 (six) hours as needed for Pain.  Dispense: 90 tablet; Refill: 0  - ketorolac injection 60 mg; Inject 2 mLs (60 mg total) into the muscle once.    3. Hyperlipidemia, unspecified hyperlipidemia type  Stable condition.  Continue current medications.  Will adjust based on lab findings or if condition changes.    - Comprehensive metabolic panel; Future  - Lipid panel; Future    4. B12 nutritional deficiency  Stable condition.  Continue current medications.  Will adjust based on lab findings or if condition changes.    - CBC auto differential; Future    5. Hypothyroidism, unspecified type  Stable condition.  Continue current medications.  Will adjust based on lab findings or if condition changes.      6. Morbid obesity with BMI of 40.0-44.9, adult  Counseled patient on his ideal body weight, health consequences of being obese and current recommendations including weekly exercise and a heart healthy diet.  Current BMI is:Estimated body mass index is 43.84 kg/m² as calculated from the following:    Height as of this encounter: 5' 5" (1.651 m).    Weight as of this encounter: 119.5 kg (263 lb 7.2 oz)..  Patient is aware that ideal BMI < 25 or Weight in (lb) to have BMI = 25: 149.9.        7. Continuous opioid dependence  order  - POCT Urine Drug Screen Pain Management  - Pain Clinic Drug Screen    8. Flu vaccine need  Immunize today.  Counseled patient on risks, benefits and side effects.  Patient elected to proceed with vaccination.      9. Vitamin D deficiency  Stable condition.  Continue current medications.  Will adjust based on lab findings or if condition changes.    - Vitamin D; Future    10. Anxiety  USe prn.    - hydrOXYzine HCl (ATARAX) 25 MG tablet; Take 1 tablet (25 mg total) by mouth 4 (four) times daily as " needed for Anxiety.  Dispense: 30 tablet; Refill: 0  D/c xanax    11. Neoplasm of skin of shoulder  Refer for eval and treat  - Ambulatory referral to Dermatology    12. Dysuria  Screen and treat as indicated:    - POCT urinalysis, dipstick or tablet reag  - Urine culture  [facility] goal documentation:  Patient readiness: acceptance and barriers:readiness  During the course of the visit the patient was educated and counseled about the following: Obesity:   General weight loss/lifestyle modification strategies discussed (elicit support from others; identify saboteurs; non-food rewards, etc).  Goals: Obesity: Reduce calorie intake and BMI  Goal/Outcomes met:obesity  The following self management tools provided:none  Patient Instructions (the written plan) was given to the patient/family: Yes  Time spent with patient: 20 minutes    Patient with be reevaluated in 3 months or sooner prn    Greater than 50% of this visit was spent counseling as described in above documentation:Yes

## 2017-10-19 LAB
BACTERIA UR CULT: NORMAL
BACTERIA UR CULT: NORMAL

## 2017-10-23 LAB
6MAM UR QL: NOT DETECTED
7AMINOCLONAZEPAM UR QL: NOT DETECTED
A-OH ALPRAZ UR QL: NOT DETECTED
ALPRAZ UR QL: NOT DETECTED
AMPHET UR QL SCN: NOT DETECTED
ANNOTATION COMMENT IMP: NORMAL
ANNOTATION COMMENT IMP: NORMAL
BARBITURATES UR QL: NOT DETECTED
BUPRENORPHINE UR QL: NOT DETECTED
BZE UR QL: NOT DETECTED
CARBOXYTHC UR QL: NOT DETECTED
CARISOPRODOL UR QL: NOT DETECTED
CLONAZEPAM UR QL: NOT DETECTED
CODEINE UR QL: NOT DETECTED
CREAT UR-MCNC: 120.6 MG/DL (ref 20–400)
DIAZEPAM UR QL: NOT DETECTED
ETHYL GLUCURONIDE UR QL: NOT DETECTED
FENTANYL UR QL: NOT DETECTED
HYDROCODONE UR QL: NOT DETECTED
HYDROMORPHONE UR QL: NOT DETECTED
LORAZEPAM UR QL: NOT DETECTED
MDA UR QL: NOT DETECTED
MDEA UR QL: NOT DETECTED
MDMA UR QL: NOT DETECTED
ME-PHENIDATE UR QL: PRESENT
MEPERIDINE UR QL: NOT DETECTED
METHADONE UR QL: NOT DETECTED
METHAMPHET UR QL: NOT DETECTED
MIDAZOLAM UR QL SCN: NOT DETECTED
MORPHINE UR QL: NOT DETECTED
NORBUPRENORPHINE UR QL CFM: NOT DETECTED
NORDIAZEPAM UR QL: NOT DETECTED
NORFENTANYL UR QL: NOT DETECTED
NORHYDROCODONE UR QL CFM: NOT DETECTED
NOROXYCODONE UR QL CFM: NOT DETECTED
NOROXYMORPHONE: NOT DETECTED
OXAZEPAM UR QL: NOT DETECTED
OXYCODONE UR QL: NOT DETECTED
OXYMORPHONE UR QL: NOT DETECTED
PATHOLOGY STUDY: NORMAL
PCP UR QL: NOT DETECTED
PHENTERMINE UR QL: NOT DETECTED
PROPOXYPH UR QL: NOT DETECTED
SERVICE CMNT-IMP: NORMAL
TAPENTADOL UR QL SCN: NOT DETECTED
TAPENTADOL-O-SULF: NOT DETECTED
TEMAZEPAM UR QL: NOT DETECTED
TRAMADOL UR QL: NOT DETECTED
ZOLPIDEM UR QL: NOT DETECTED

## 2017-11-08 DIAGNOSIS — E55.9 VITAMIN D DEFICIENCY: ICD-10-CM

## 2017-11-09 RX ORDER — ERGOCALCIFEROL 1.25 MG/1
CAPSULE ORAL
Qty: 12 CAPSULE | Refills: 1 | Status: SHIPPED | OUTPATIENT
Start: 2017-11-09 | End: 2018-05-02 | Stop reason: SDUPTHER

## 2017-11-12 ENCOUNTER — PATIENT MESSAGE (OUTPATIENT)
Dept: FAMILY MEDICINE | Facility: CLINIC | Age: 62
End: 2017-11-12

## 2017-11-12 DIAGNOSIS — E03.9 HYPOTHYROIDISM (ACQUIRED): ICD-10-CM

## 2017-11-12 RX ORDER — LEVOTHYROXINE SODIUM 137 UG/1
137 TABLET ORAL DAILY
Qty: 90 TABLET | Refills: 3 | Status: SHIPPED | OUTPATIENT
Start: 2017-11-12 | End: 2018-05-24 | Stop reason: SDUPTHER

## 2017-12-24 DIAGNOSIS — K21.9 GASTROESOPHAGEAL REFLUX DISEASE, ESOPHAGITIS PRESENCE NOT SPECIFIED: ICD-10-CM

## 2017-12-26 RX ORDER — PANTOPRAZOLE SODIUM 40 MG/1
TABLET, DELAYED RELEASE ORAL
Qty: 30 TABLET | Refills: 5 | Status: SHIPPED | OUTPATIENT
Start: 2017-12-26 | End: 2018-05-24 | Stop reason: SDUPTHER

## 2018-01-20 DIAGNOSIS — N32.81 OAB (OVERACTIVE BLADDER): ICD-10-CM

## 2018-01-22 RX ORDER — OXYBUTYNIN CHLORIDE 15 MG/1
TABLET, EXTENDED RELEASE ORAL
Qty: 30 TABLET | Refills: 5 | Status: SHIPPED | OUTPATIENT
Start: 2018-01-22 | End: 2018-05-24 | Stop reason: SDUPTHER

## 2018-02-02 ENCOUNTER — OFFICE VISIT (OUTPATIENT)
Dept: FAMILY MEDICINE | Facility: CLINIC | Age: 63
End: 2018-02-02
Payer: COMMERCIAL

## 2018-02-02 VITALS
HEIGHT: 65 IN | HEART RATE: 71 BPM | TEMPERATURE: 98 F | DIASTOLIC BLOOD PRESSURE: 64 MMHG | SYSTOLIC BLOOD PRESSURE: 116 MMHG | WEIGHT: 270.75 LBS | BODY MASS INDEX: 45.11 KG/M2

## 2018-02-02 DIAGNOSIS — M54.9 CHRONIC BACK PAIN GREATER THAN 3 MONTHS DURATION: ICD-10-CM

## 2018-02-02 DIAGNOSIS — E66.01 MORBID OBESITY WITH BMI OF 40.0-44.9, ADULT: ICD-10-CM

## 2018-02-02 DIAGNOSIS — G89.29 CHRONIC BACK PAIN GREATER THAN 3 MONTHS DURATION: ICD-10-CM

## 2018-02-02 DIAGNOSIS — M25.512 CHRONIC LEFT SHOULDER PAIN: Primary | ICD-10-CM

## 2018-02-02 DIAGNOSIS — F11.20 CONTINUOUS OPIOID DEPENDENCE: ICD-10-CM

## 2018-02-02 DIAGNOSIS — Z86.010 HISTORY OF COLON POLYPS: ICD-10-CM

## 2018-02-02 DIAGNOSIS — G89.29 CHRONIC LEFT SHOULDER PAIN: Primary | ICD-10-CM

## 2018-02-02 PROCEDURE — 80307 DRUG TEST PRSMV CHEM ANLYZR: CPT

## 2018-02-02 PROCEDURE — 99999 PR PBB SHADOW E&M-EST. PATIENT-LVL IV: CPT | Mod: PBBFAC,,, | Performed by: FAMILY MEDICINE

## 2018-02-02 PROCEDURE — 3008F BODY MASS INDEX DOCD: CPT | Mod: S$GLB,,, | Performed by: FAMILY MEDICINE

## 2018-02-02 PROCEDURE — 99214 OFFICE O/P EST MOD 30 MIN: CPT | Mod: S$GLB,,, | Performed by: FAMILY MEDICINE

## 2018-02-02 RX ORDER — HYDROCODONE BITARTRATE AND ACETAMINOPHEN 7.5; 325 MG/1; MG/1
1 TABLET ORAL EVERY 6 HOURS PRN
Qty: 90 TABLET | Refills: 0 | Status: SHIPPED | OUTPATIENT
Start: 2018-02-02 | End: 2018-05-24 | Stop reason: SDUPTHER

## 2018-02-02 NOTE — PROGRESS NOTES
"Subjective:       Patient ID: Edie Hernandez is a 62 y.o. female.    Chief Complaint: Follow-up    Patient Active Problem List   Diagnosis    GERD (gastroesophageal reflux disease)    Chronic back pain greater than 3 months duration    Environmental allergies    Fibromyalgia    B12 nutritional deficiency    CLINT (obstructive sleep apnea)    Chronic sinusitis    Asthma    Hypothyroid    Nausea    Night sweats    Tinnitus, bilateral    Frequent UTI    Hyperlipidemia    Colon polyp, hyperplastic    Interstitial cystitis    Hemangioma    Wart    Globus sensation    DDD (degenerative disc disease)    Morbid obesity with BMI of 40.0-44.9, adult    Edema    Mixed incontinence    Continuous opioid dependence- contract 10/18/17-failed uds for norco   Left shoulder pain 10/17-ache and muscle pain    Insomnia-couldn't tolerate trazodone.  Hydoxyzine made her feel not well-"yucks"  HPI  Review of Systems   Constitutional: Negative for fatigue and unexpected weight change.   Respiratory: Negative for chest tightness and shortness of breath.    Cardiovascular: Negative for chest pain, palpitations and leg swelling.   Gastrointestinal: Negative for abdominal pain.   Musculoskeletal: Positive for arthralgias.   Neurological: Negative for dizziness, syncope, light-headedness and headaches.       Objective:      Physical Exam   Constitutional: She is oriented to person, place, and time. She appears well-developed and well-nourished.   Cardiovascular: Normal rate, regular rhythm and normal heart sounds.    Pulmonary/Chest: Effort normal and breath sounds normal.   Musculoskeletal: She exhibits no edema.        Left shoulder: She exhibits tenderness. She exhibits normal range of motion.   Neurological: She is alert and oriented to person, place, and time.   Skin: Skin is warm and dry.   Psychiatric: She has a normal mood and affect.   Nursing note and vitals reviewed.      Assessment:       1. Chronic left " shoulder pain    2. Continuous opioid dependence- contract 10/18/17-failed uds for norco    3. History of colon polyps    4. Chronic back pain greater than 3 months duration    5. Morbid obesity with BMI of 40.0-44.9, adult        Plan:       1. Continuous opioid dependence- contract 10/18/17-failed uds for norco  Controlled on current medications.  Continue current medications.  Repeat:  - POCT Urine Drug Screen Pain Management  - Pain Clinic Drug Screen    2. Chronic left shoulder pain  Refer for eval and treat  - Ambulatory referral to Orthopedics    3. History of colon polyps  Screen and treat as indicated:    - Ambulatory referral to Gastroenterology    4. Chronic back pain greater than 3 months duration  Home Care:  1. You may need to stay in bed the first few days. But, as soon as possible, begin sitting or walking to avoid problems with prolonged bed rest (muscle weakness, worsening back stiffness and pain, blood clots in the legs).  2. When in bed, try to find a position of comfort. A firm mattress is best. Try lying flat on your back with pillows under your knees. You can also try lying on your side with your knees bent up towards your chest and a pillow between your knees.  3. Avoid prolonged sitting. This puts more stress on the lower back than standing or walking.  4. During the first two days after injury, apply an ICE PACK to the painful area for 20 minutes every 2-4 hours. This will reduce swelling and pain. HEAT (hot shower, hot bath or heating pad) works well for muscle spasm. You can start with ice, then switch to heat after two days. Some patients feel best alternating ice and heat treatments. Use the one method that feels the best to you.  5. You may use acetaminophen (Tylenol) or ibuprofen (Motrin, Advil) to control pain, unless another pain medicine was prescribed. [NOTE: If you have chronic liver or kidney disease or ever had a stomach ulcer or GI bleeding, talk with your doctor before using  "these medicines.]  6. Be aware of safe lifting methods and do not lift anything over 15 pounds until all the pain is gone      - hydrocodone-acetaminophen 7.5-325mg (NORCO) 7.5-325 mg per tablet; Take 1 tablet by mouth every 6 (six) hours as needed for Pain.  Dispense: 90 tablet; Refill: 0    5. Morbid obesity with BMI of 40.0-44.9, adult  Counseled patient on his ideal body weight, health consequences of being obese and current recommendations including weekly exercise and a heart healthy diet.  Current BMI is:Estimated body mass index is 45.05 kg/m² as calculated from the following:    Height as of this encounter: 5' 5" (1.651 m).    Weight as of this encounter: 122.8 kg (270 lb 11.6 oz)..  Patient is aware that ideal BMI < 25 or Weight in (lb) to have BMI = 25: 149.9.        [facility] goal documentation:  Patient readiness: acceptance and barriers:readiness  During the course of the visit the patient was educated and counseled about the following: Obesity:   General weight loss/lifestyle modification strategies discussed (elicit support from others; identify saboteurs; non-food rewards, etc).  Goals: Obesity: Reduce calorie intake and BMI  Goal/Outcomes met:obesity  The following self management tools provided:none  Patient Instructions (the written plan) was given to the patient/family: Yes  Time spent with patient: 20 minutes    Patient with be reevaluated in 3 months or sooner prn    Greater than 50% of this visit was spent counseling as described in above documentation:Yes    "

## 2018-02-06 LAB
6MAM UR QL: NOT DETECTED
7AMINOCLONAZEPAM UR QL: NOT DETECTED
A-OH ALPRAZ UR QL: NOT DETECTED
ALPRAZ UR QL: NOT DETECTED
AMPHET UR QL SCN: NOT DETECTED
ANNOTATION COMMENT IMP: NORMAL
ANNOTATION COMMENT IMP: NORMAL
BARBITURATES UR QL: NOT DETECTED
BUPRENORPHINE UR QL: NOT DETECTED
BZE UR QL: NOT DETECTED
CARBOXYTHC UR QL: NOT DETECTED
CARISOPRODOL UR QL: NOT DETECTED
CLONAZEPAM UR QL: NOT DETECTED
CODEINE UR QL: NOT DETECTED
CREAT UR-MCNC: 155.2 MG/DL (ref 20–400)
DIAZEPAM UR QL: NOT DETECTED
ETHYL GLUCURONIDE UR QL: NOT DETECTED
FENTANYL UR QL: NOT DETECTED
HYDROCODONE UR QL: PRESENT
HYDROMORPHONE UR QL: NOT DETECTED
LORAZEPAM UR QL: NOT DETECTED
MDA UR QL: NOT DETECTED
MDEA UR QL: NOT DETECTED
MDMA UR QL: NOT DETECTED
ME-PHENIDATE UR QL: PRESENT
MEPERIDINE UR QL: NOT DETECTED
METHADONE UR QL: NOT DETECTED
METHAMPHET UR QL: NOT DETECTED
MIDAZOLAM UR QL SCN: NOT DETECTED
MORPHINE UR QL: NOT DETECTED
NORBUPRENORPHINE UR QL CFM: NOT DETECTED
NORDIAZEPAM UR QL: NOT DETECTED
NORFENTANYL UR QL: NOT DETECTED
NORHYDROCODONE UR QL CFM: PRESENT
NOROXYCODONE UR QL CFM: NOT DETECTED
NOROXYMORPHONE: NOT DETECTED
OXAZEPAM UR QL: NOT DETECTED
OXYCODONE UR QL: NOT DETECTED
OXYMORPHONE UR QL: NOT DETECTED
PATHOLOGY STUDY: NORMAL
PCP UR QL: NOT DETECTED
PHENTERMINE UR QL: NOT DETECTED
PROPOXYPH UR QL: NOT DETECTED
SERVICE CMNT-IMP: NORMAL
TAPENTADOL UR QL SCN: NOT DETECTED
TAPENTADOL-O-SULF: NOT DETECTED
TEMAZEPAM UR QL: NOT DETECTED
TRAMADOL UR QL: NOT DETECTED
ZOLPIDEM UR QL: NOT DETECTED

## 2018-02-23 ENCOUNTER — OFFICE VISIT (OUTPATIENT)
Dept: FAMILY MEDICINE | Facility: CLINIC | Age: 63
End: 2018-02-23
Payer: COMMERCIAL

## 2018-02-23 VITALS
HEART RATE: 57 BPM | BODY MASS INDEX: 44.81 KG/M2 | TEMPERATURE: 98 F | RESPIRATION RATE: 16 BRPM | DIASTOLIC BLOOD PRESSURE: 65 MMHG | WEIGHT: 268.94 LBS | HEIGHT: 65 IN | SYSTOLIC BLOOD PRESSURE: 124 MMHG

## 2018-02-23 DIAGNOSIS — M54.9 CHRONIC BACK PAIN GREATER THAN 3 MONTHS DURATION: ICD-10-CM

## 2018-02-23 DIAGNOSIS — M54.42 ACUTE MIDLINE LOW BACK PAIN WITH LEFT-SIDED SCIATICA: ICD-10-CM

## 2018-02-23 DIAGNOSIS — H65.93 SEROMUCINOUS OTITIS MEDIA OF BOTH EARS: ICD-10-CM

## 2018-02-23 DIAGNOSIS — G89.29 CHRONIC BACK PAIN GREATER THAN 3 MONTHS DURATION: ICD-10-CM

## 2018-02-23 DIAGNOSIS — H69.93 DYSFUNCTION OF BOTH EUSTACHIAN TUBES: Primary | ICD-10-CM

## 2018-02-23 PROCEDURE — 99999 PR PBB SHADOW E&M-EST. PATIENT-LVL III: CPT | Mod: PBBFAC,,, | Performed by: FAMILY MEDICINE

## 2018-02-23 PROCEDURE — 96372 THER/PROPH/DIAG INJ SC/IM: CPT | Mod: S$GLB,,, | Performed by: FAMILY MEDICINE

## 2018-02-23 PROCEDURE — 99214 OFFICE O/P EST MOD 30 MIN: CPT | Mod: 25,S$GLB,, | Performed by: FAMILY MEDICINE

## 2018-02-23 PROCEDURE — 3008F BODY MASS INDEX DOCD: CPT | Mod: S$GLB,,, | Performed by: FAMILY MEDICINE

## 2018-02-23 RX ORDER — CARISOPRODOL 350 MG/1
350 TABLET ORAL NIGHTLY PRN
Qty: 30 TABLET | Refills: 0 | Status: SHIPPED | OUTPATIENT
Start: 2018-02-23 | End: 2018-05-24 | Stop reason: SDUPTHER

## 2018-02-23 RX ORDER — METHYLPREDNISOLONE 4 MG/1
TABLET ORAL
Qty: 1 PACKAGE | Refills: 0 | Status: SHIPPED | OUTPATIENT
Start: 2018-02-23 | End: 2018-05-24

## 2018-02-23 RX ORDER — KETOROLAC TROMETHAMINE 30 MG/ML
30 INJECTION, SOLUTION INTRAMUSCULAR; INTRAVENOUS ONCE
Status: COMPLETED | OUTPATIENT
Start: 2018-02-23 | End: 2018-02-23

## 2018-02-23 RX ADMIN — KETOROLAC TROMETHAMINE 30 MG: 30 INJECTION, SOLUTION INTRAMUSCULAR; INTRAVENOUS at 09:02

## 2018-02-23 NOTE — PATIENT INSTRUCTIONS
Flonase - 2 squirts each nostril every day.  Zyrtec 10 mg once a day and add original, behind the counter Sudafed - 30 mg and use as needed for congestion (ear/nose/sinus) up to three times a day.

## 2018-05-02 DIAGNOSIS — E55.9 VITAMIN D DEFICIENCY: ICD-10-CM

## 2018-05-02 RX ORDER — ERGOCALCIFEROL 1.25 MG/1
CAPSULE ORAL
Qty: 12 CAPSULE | Refills: 1 | Status: SHIPPED | OUTPATIENT
Start: 2018-05-02 | End: 2018-05-24 | Stop reason: SDUPTHER

## 2018-05-23 ENCOUNTER — DOCUMENTATION ONLY (OUTPATIENT)
Dept: FAMILY MEDICINE | Facility: CLINIC | Age: 63
End: 2018-05-23

## 2018-05-23 NOTE — PROGRESS NOTES
Pre-Visit Chart Review  For Appointment Scheduled on 5-24-18    Health Maintenance Due   Topic Date Due    Colonoscopy  02/14/2018

## 2018-05-24 ENCOUNTER — LAB VISIT (OUTPATIENT)
Dept: LAB | Facility: HOSPITAL | Age: 63
End: 2018-05-24
Attending: FAMILY MEDICINE
Payer: COMMERCIAL

## 2018-05-24 ENCOUNTER — OFFICE VISIT (OUTPATIENT)
Dept: FAMILY MEDICINE | Facility: CLINIC | Age: 63
End: 2018-05-24
Payer: COMMERCIAL

## 2018-05-24 VITALS
HEART RATE: 58 BPM | WEIGHT: 268.75 LBS | BODY MASS INDEX: 44.78 KG/M2 | DIASTOLIC BLOOD PRESSURE: 72 MMHG | HEIGHT: 65 IN | TEMPERATURE: 98 F | SYSTOLIC BLOOD PRESSURE: 130 MMHG

## 2018-05-24 DIAGNOSIS — G89.29 CHRONIC BACK PAIN GREATER THAN 3 MONTHS DURATION: ICD-10-CM

## 2018-05-24 DIAGNOSIS — E03.9 HYPOTHYROIDISM, UNSPECIFIED TYPE: ICD-10-CM

## 2018-05-24 DIAGNOSIS — E03.9 HYPOTHYROIDISM (ACQUIRED): ICD-10-CM

## 2018-05-24 DIAGNOSIS — E53.8 B12 NUTRITIONAL DEFICIENCY: ICD-10-CM

## 2018-05-24 DIAGNOSIS — E66.01 MORBID OBESITY WITH BMI OF 40.0-44.9, ADULT: ICD-10-CM

## 2018-05-24 DIAGNOSIS — E78.5 HYPERLIPIDEMIA, UNSPECIFIED HYPERLIPIDEMIA TYPE: ICD-10-CM

## 2018-05-24 DIAGNOSIS — K63.5 HYPERPLASTIC COLONIC POLYP, UNSPECIFIED PART OF COLON: ICD-10-CM

## 2018-05-24 DIAGNOSIS — K21.9 GASTROESOPHAGEAL REFLUX DISEASE, ESOPHAGITIS PRESENCE NOT SPECIFIED: ICD-10-CM

## 2018-05-24 DIAGNOSIS — E55.9 VITAMIN D DEFICIENCY: ICD-10-CM

## 2018-05-24 DIAGNOSIS — E78.5 HYPERLIPIDEMIA, UNSPECIFIED HYPERLIPIDEMIA TYPE: Primary | ICD-10-CM

## 2018-05-24 DIAGNOSIS — N32.81 OAB (OVERACTIVE BLADDER): ICD-10-CM

## 2018-05-24 DIAGNOSIS — E79.0 HYPERURICEMIA: ICD-10-CM

## 2018-05-24 DIAGNOSIS — J06.9 UPPER RESPIRATORY TRACT INFECTION, UNSPECIFIED TYPE: ICD-10-CM

## 2018-05-24 DIAGNOSIS — F11.20 CONTINUOUS OPIOID DEPENDENCE: ICD-10-CM

## 2018-05-24 DIAGNOSIS — Z87.39 HISTORY OF ACUTE GOUTY ARTHRITIS: ICD-10-CM

## 2018-05-24 DIAGNOSIS — H65.91 RIGHT SEROUS OTITIS MEDIA, UNSPECIFIED CHRONICITY: ICD-10-CM

## 2018-05-24 DIAGNOSIS — M54.9 CHRONIC BACK PAIN GREATER THAN 3 MONTHS DURATION: ICD-10-CM

## 2018-05-24 LAB
25(OH)D3+25(OH)D2 SERPL-MCNC: 28 NG/ML
ALBUMIN SERPL BCP-MCNC: 3.6 G/DL
ALP SERPL-CCNC: 95 U/L
ALT SERPL W/O P-5'-P-CCNC: 29 U/L
ANION GAP SERPL CALC-SCNC: 9 MMOL/L
AST SERPL-CCNC: 21 U/L
BASOPHILS # BLD AUTO: 0.04 K/UL
BASOPHILS NFR BLD: 0.7 %
BILIRUB SERPL-MCNC: 1.2 MG/DL
BUN SERPL-MCNC: 12 MG/DL
CALCIUM SERPL-MCNC: 9.2 MG/DL
CHLORIDE SERPL-SCNC: 107 MMOL/L
CHOLEST SERPL-MCNC: 208 MG/DL
CHOLEST/HDLC SERPL: 3.5 {RATIO}
CO2 SERPL-SCNC: 26 MMOL/L
CREAT SERPL-MCNC: 0.9 MG/DL
DIFFERENTIAL METHOD: NORMAL
EOSINOPHIL # BLD AUTO: 0.3 K/UL
EOSINOPHIL NFR BLD: 5 %
ERYTHROCYTE [DISTWIDTH] IN BLOOD BY AUTOMATED COUNT: 13.2 %
EST. GFR  (AFRICAN AMERICAN): >60 ML/MIN/1.73 M^2
EST. GFR  (NON AFRICAN AMERICAN): >60 ML/MIN/1.73 M^2
GLUCOSE SERPL-MCNC: 103 MG/DL
HCT VFR BLD AUTO: 42.6 %
HDLC SERPL-MCNC: 59 MG/DL
HDLC SERPL: 28.4 %
HGB BLD-MCNC: 14 G/DL
IMM GRANULOCYTES # BLD AUTO: 0.01 K/UL
IMM GRANULOCYTES NFR BLD AUTO: 0.2 %
LDLC SERPL CALC-MCNC: 118.8 MG/DL
LYMPHOCYTES # BLD AUTO: 1.4 K/UL
LYMPHOCYTES NFR BLD: 22.9 %
MCH RBC QN AUTO: 30.2 PG
MCHC RBC AUTO-ENTMCNC: 32.9 G/DL
MCV RBC AUTO: 92 FL
MONOCYTES # BLD AUTO: 0.5 K/UL
MONOCYTES NFR BLD: 8 %
NEUTROPHILS # BLD AUTO: 3.8 K/UL
NEUTROPHILS NFR BLD: 63.2 %
NONHDLC SERPL-MCNC: 149 MG/DL
NRBC BLD-RTO: 0 /100 WBC
PLATELET # BLD AUTO: 260 K/UL
PMV BLD AUTO: 10 FL
POTASSIUM SERPL-SCNC: 4.2 MMOL/L
PROT SERPL-MCNC: 6.8 G/DL
RBC # BLD AUTO: 4.63 M/UL
SODIUM SERPL-SCNC: 142 MMOL/L
T4 FREE SERPL-MCNC: 0.83 NG/DL
TRIGL SERPL-MCNC: 151 MG/DL
TSH SERPL DL<=0.005 MIU/L-ACNC: 5.26 UIU/ML
URATE SERPL-MCNC: 7.8 MG/DL
VIT B12 SERPL-MCNC: 298 PG/ML
WBC # BLD AUTO: 5.97 K/UL

## 2018-05-24 PROCEDURE — 84439 ASSAY OF FREE THYROXINE: CPT

## 2018-05-24 PROCEDURE — 82607 VITAMIN B-12: CPT

## 2018-05-24 PROCEDURE — 99214 OFFICE O/P EST MOD 30 MIN: CPT | Mod: 25,S$GLB,, | Performed by: FAMILY MEDICINE

## 2018-05-24 PROCEDURE — 80061 LIPID PANEL: CPT

## 2018-05-24 PROCEDURE — 80053 COMPREHEN METABOLIC PANEL: CPT

## 2018-05-24 PROCEDURE — 3008F BODY MASS INDEX DOCD: CPT | Mod: CPTII,S$GLB,, | Performed by: FAMILY MEDICINE

## 2018-05-24 PROCEDURE — 84550 ASSAY OF BLOOD/URIC ACID: CPT

## 2018-05-24 PROCEDURE — 85025 COMPLETE CBC W/AUTO DIFF WBC: CPT

## 2018-05-24 PROCEDURE — 99999 PR PBB SHADOW E&M-EST. PATIENT-LVL IV: CPT | Mod: PBBFAC,,, | Performed by: FAMILY MEDICINE

## 2018-05-24 PROCEDURE — 82306 VITAMIN D 25 HYDROXY: CPT

## 2018-05-24 PROCEDURE — 96372 THER/PROPH/DIAG INJ SC/IM: CPT | Mod: S$GLB,,, | Performed by: FAMILY MEDICINE

## 2018-05-24 PROCEDURE — 36415 COLL VENOUS BLD VENIPUNCTURE: CPT | Mod: PO

## 2018-05-24 PROCEDURE — 84443 ASSAY THYROID STIM HORMONE: CPT

## 2018-05-24 RX ORDER — KETOROLAC TROMETHAMINE 30 MG/ML
60 INJECTION, SOLUTION INTRAMUSCULAR; INTRAVENOUS
Status: COMPLETED | OUTPATIENT
Start: 2018-05-24 | End: 2018-05-24

## 2018-05-24 RX ORDER — INDOMETHACIN 25 MG/1
CAPSULE ORAL
Qty: 90 CAPSULE | Refills: 3 | Status: SHIPPED | OUTPATIENT
Start: 2018-05-24 | End: 2019-04-22 | Stop reason: SDUPTHER

## 2018-05-24 RX ORDER — LEVOTHYROXINE SODIUM 137 UG/1
137 TABLET ORAL DAILY
Qty: 90 TABLET | Refills: 3 | Status: SHIPPED | OUTPATIENT
Start: 2018-05-24 | End: 2018-12-29

## 2018-05-24 RX ORDER — DICLOFENAC SODIUM 10 MG/G
2 GEL TOPICAL 4 TIMES DAILY
Qty: 100 G | Refills: 3 | Status: SHIPPED | OUTPATIENT
Start: 2018-05-24 | End: 2018-09-14

## 2018-05-24 RX ORDER — ALBUTEROL SULFATE 90 UG/1
2 AEROSOL, METERED RESPIRATORY (INHALATION) EVERY 6 HOURS PRN
Qty: 18 G | Refills: 3 | Status: SHIPPED | OUTPATIENT
Start: 2018-05-24 | End: 2020-09-29 | Stop reason: ALTCHOICE

## 2018-05-24 RX ORDER — ERGOCALCIFEROL 1.25 MG/1
50000 CAPSULE ORAL
Qty: 12 CAPSULE | Refills: 1 | Status: SHIPPED | OUTPATIENT
Start: 2018-05-24 | End: 2019-06-17 | Stop reason: SDUPTHER

## 2018-05-24 RX ORDER — PANTOPRAZOLE SODIUM 40 MG/1
40 TABLET, DELAYED RELEASE ORAL DAILY
Qty: 90 TABLET | Refills: 3 | Status: SHIPPED | OUTPATIENT
Start: 2018-05-24 | End: 2019-06-13 | Stop reason: SDUPTHER

## 2018-05-24 RX ORDER — HYDROCODONE BITARTRATE AND ACETAMINOPHEN 7.5; 325 MG/1; MG/1
1 TABLET ORAL EVERY 6 HOURS PRN
Qty: 90 TABLET | Refills: 0 | Status: SHIPPED | OUTPATIENT
Start: 2018-05-24 | End: 2018-08-27 | Stop reason: SDUPTHER

## 2018-05-24 RX ORDER — OXYBUTYNIN CHLORIDE 15 MG/1
15 TABLET, EXTENDED RELEASE ORAL DAILY
Qty: 90 TABLET | Refills: 3 | Status: SHIPPED | OUTPATIENT
Start: 2018-05-24 | End: 2019-06-08 | Stop reason: SDUPTHER

## 2018-05-24 RX ORDER — ALLOPURINOL 300 MG/1
300 TABLET ORAL DAILY
Qty: 90 TABLET | Refills: 4 | Status: SHIPPED | OUTPATIENT
Start: 2018-05-24 | End: 2020-07-01

## 2018-05-24 RX ORDER — CARISOPRODOL 350 MG/1
350 TABLET ORAL NIGHTLY PRN
Qty: 30 TABLET | Refills: 0 | Status: SHIPPED | OUTPATIENT
Start: 2018-05-24 | End: 2018-08-06 | Stop reason: SDUPTHER

## 2018-05-24 RX ORDER — FLUTICASONE PROPIONATE 50 MCG
1 SPRAY, SUSPENSION (ML) NASAL DAILY PRN
Qty: 1 BOTTLE | Refills: 11 | Status: SHIPPED | OUTPATIENT
Start: 2018-05-24 | End: 2021-04-11 | Stop reason: SDUPTHER

## 2018-05-24 RX ADMIN — KETOROLAC TROMETHAMINE 60 MG: 30 INJECTION, SOLUTION INTRAMUSCULAR; INTRAVENOUS at 10:05

## 2018-05-24 NOTE — PROGRESS NOTES
Subjective:       Patient ID: Edie Hernandez is a 62 y.o. female.    Chief Complaint: Follow-up    HPI  Review of Systems   Constitutional: Negative for fatigue and unexpected weight change.   Respiratory: Negative for chest tightness and shortness of breath.    Cardiovascular: Negative for chest pain, palpitations and leg swelling.   Gastrointestinal: Negative for abdominal pain.   Musculoskeletal: Negative for arthralgias.   Neurological: Negative for dizziness, syncope, light-headedness and headaches.       Patient Active Problem List   Diagnosis    GERD (gastroesophageal reflux disease)    Chronic back pain greater than 3 months duration    Environmental allergies    Fibromyalgia    B12 nutritional deficiency    CLINT (obstructive sleep apnea)    Chronic sinusitis    Asthma    Hypothyroid    Nausea    Night sweats    Tinnitus, bilateral    Frequent UTI    Hyperlipidemia    Colon polyp, hyperplastic    Interstitial cystitis    Hemangioma    Wart    Globus sensation    DDD (degenerative disc disease)    Morbid obesity with BMI of 40.0-44.9, adult    Edema    Mixed incontinence    Continuous opioid dependence- contract 10/18/17-failed uds for norco, + 2/18     Patient is here for a chronic conditions follow up.    Lab Visit on 05/24/2018   Component Date Value Ref Range Status    WBC 05/24/2018 5.97  3.90 - 12.70 K/uL Final    RBC 05/24/2018 4.63  4.00 - 5.40 M/uL Final    Hemoglobin 05/24/2018 14.0  12.0 - 16.0 g/dL Final    Hematocrit 05/24/2018 42.6  37.0 - 48.5 % Final    MCV 05/24/2018 92  82 - 98 fL Final    MCH 05/24/2018 30.2  27.0 - 31.0 pg Final    MCHC 05/24/2018 32.9  32.0 - 36.0 g/dL Final    RDW 05/24/2018 13.2  11.5 - 14.5 % Final    Platelets 05/24/2018 260  150 - 350 K/uL Final    MPV 05/24/2018 10.0  9.2 - 12.9 fL Final    Immature Granulocytes 05/24/2018 0.2  0.0 - 0.5 % Final    Gran # (ANC) 05/24/2018 3.8  1.8 - 7.7 K/uL Final    Immature Grans (Abs)  05/24/2018 0.01  0.00 - 0.04 K/uL Final    Lymph # 05/24/2018 1.4  1.0 - 4.8 K/uL Final    Mono # 05/24/2018 0.5  0.3 - 1.0 K/uL Final    Eos # 05/24/2018 0.3  0.0 - 0.5 K/uL Final    Baso # 05/24/2018 0.04  0.00 - 0.20 K/uL Final    nRBC 05/24/2018 0  0 /100 WBC Final    Gran% 05/24/2018 63.2  38.0 - 73.0 % Final    Lymph% 05/24/2018 22.9  18.0 - 48.0 % Final    Mono% 05/24/2018 8.0  4.0 - 15.0 % Final    Eosinophil% 05/24/2018 5.0  0.0 - 8.0 % Final    Basophil% 05/24/2018 0.7  0.0 - 1.9 % Final    Differential Method 05/24/2018 Automated   Final    Sodium 05/24/2018 142  136 - 145 mmol/L Final    Potassium 05/24/2018 4.2  3.5 - 5.1 mmol/L Final    Chloride 05/24/2018 107  95 - 110 mmol/L Final    CO2 05/24/2018 26  23 - 29 mmol/L Final    Glucose 05/24/2018 103  70 - 110 mg/dL Final    BUN, Bld 05/24/2018 12  8 - 23 mg/dL Final    Creatinine 05/24/2018 0.9  0.5 - 1.4 mg/dL Final    Calcium 05/24/2018 9.2  8.7 - 10.5 mg/dL Final    Total Protein 05/24/2018 6.8  6.0 - 8.4 g/dL Final    Albumin 05/24/2018 3.6  3.5 - 5.2 g/dL Final    Total Bilirubin 05/24/2018 1.2* 0.1 - 1.0 mg/dL Final    Alkaline Phosphatase 05/24/2018 95  55 - 135 U/L Final    AST 05/24/2018 21  10 - 40 U/L Final    ALT 05/24/2018 29  10 - 44 U/L Final    Anion Gap 05/24/2018 9  8 - 16 mmol/L Final    eGFR if African American 05/24/2018 >60.0  >60 mL/min/1.73 m^2 Final    eGFR if non African American 05/24/2018 >60.0  >60 mL/min/1.73 m^2 Final    Cholesterol 05/24/2018 208* 120 - 199 mg/dL Final    Triglycerides 05/24/2018 151* 30 - 150 mg/dL Final    HDL 05/24/2018 59  40 - 75 mg/dL Final    LDL Cholesterol 05/24/2018 118.8  63.0 - 159.0 mg/dL Final    HDL/Chol Ratio 05/24/2018 28.4  20.0 - 50.0 % Final    Total Cholesterol/HDL Ratio 05/24/2018 3.5  2.0 - 5.0 Final    Non-HDL Cholesterol 05/24/2018 149  mg/dL Final    TSH 05/24/2018 5.259* 0.400 - 4.000 uIU/mL Final    Free T4 05/24/2018 0.83  0.71 - 1.51  ng/dL Final    Uric Acid 05/24/2018 7.8* 2.4 - 5.7 mg/dL Final    Vitamin B-12 05/24/2018 298  210 - 950 pg/mL Final    Vit D, 25-Hydroxy 05/24/2018 28* 30 - 96 ng/mL Final   }  Objective:      Physical Exam   Constitutional: She is oriented to person, place, and time. She appears well-developed and well-nourished.   Cardiovascular: Normal rate, regular rhythm and normal heart sounds.    Pulmonary/Chest: Effort normal and breath sounds normal.   Musculoskeletal: She exhibits no edema.   Neurological: She is alert and oriented to person, place, and time.   Skin: Skin is warm and dry.   Psychiatric: She has a normal mood and affect.   Nursing note and vitals reviewed.      Assessment:       1. Hyperlipidemia, unspecified hyperlipidemia type    2. B12 nutritional deficiency    3. Hypothyroidism, unspecified type    4. Hyperplastic colonic polyp, unspecified part of colon    5. Morbid obesity with BMI of 40.0-44.9, adult    6. Continuous opioid dependence- contract 10/18/17-failed uds for norco    7. Vitamin D deficiency    8. Hyperuricemia    9. Upper respiratory tract infection, unspecified type    10. Chronic back pain greater than 3 months duration    11. History of acute gouty arthritis    12. Hypothyroidism (acquired)    13. OAB (overactive bladder)    14. Gastroesophageal reflux disease, esophagitis presence not specified    15. Right serous otitis media, unspecified chronicity        Plan:     1. Hyperlipidemia, unspecified hyperlipidemia type  Stable condition.  Continue current medications.  Will adjust based on lab findings or if condition changes.    - Comprehensive metabolic panel; Future  - Lipid panel; Future    2. B12 nutritional deficiency  Screen and treat as indicated:    - Vitamin B12; Future    3. Hypothyroidism, unspecified type  Stable condition.  Continue current medications.  Will adjust based on lab findings or if condition changes.    - CBC auto differential; Future  - TSH; Future  - T4,  "free; Future    4. Hyperplastic colonic polyp, unspecified part of colon  Refer for eval and treat  - Ambulatory referral to Gastroenterology    5. Morbid obesity with BMI of 40.0-44.9, adult  Counseled patient on his ideal body weight, health consequences of being obese and current recommendations including weekly exercise and a heart healthy diet.  Current BMI is:Estimated body mass index is 44.72 kg/m² as calculated from the following:    Height as of this encounter: 5' 5" (1.651 m).    Weight as of this encounter: 121.9 kg (268 lb 11.9 oz)..  Patient is aware that ideal BMI < 25 or Weight in (lb) to have BMI = 25: 149.9.        6. Continuous opioid dependence- contract 10/18/17-failed uds for norco  Controlled on current medications.  Continue current medications.      7. Vitamin D deficiency  Stable condition.  Continue current medications.  Will adjust based on lab findings or if condition changes.    - Vitamin D; Future  - ergocalciferol (VITAMIN D2) 50,000 unit Cap; Take 1 capsule (50,000 Units total) by mouth every 7 days.  Dispense: 12 capsule; Refill: 1    8. Hyperuricemia  Screen and treat as indicated:    - Uric acid; Future    9. Upper respiratory tract infection, unspecified type  Use prn  - albuterol 90 mcg/actuation inhaler; Inhale 2 puffs into the lungs every 6 (six) hours as needed for Wheezing.  Dispense: 18 g; Refill: 3    10. Chronic back pain greater than 3 months duration  Controlled on current medications.  Continue current medications.    - carisoprodol (SOMA) 350 MG tablet; Take 1 tablet (350 mg total) by mouth nightly as needed for Muscle spasms.  Dispense: 30 tablet; Refill: 0  - HYDROcodone-acetaminophen (NORCO) 7.5-325 mg per tablet; Take 1 tablet by mouth every 6 (six) hours as needed for Pain.  Dispense: 90 tablet; Refill: 0  - Ambulatory referral to Physical Medicine Rehab  - diclofenac sodium (VOLTAREN) 1 % Gel; Apply 2 g topically 4 (four) times daily.  Dispense: 100 g; Refill: " 3  - ketorolac injection 60 mg; Inject 2 mLs (60 mg total) into the muscle one time.    11. History of acute gouty arthritis  Use prn  - indomethacin (INDOCIN) 25 MG capsule; TAKE ONE CAPSULE BY MOUTH 3 TIMES A DAY WITH MEALS  Dispense: 90 capsule; Refill: 3    12. Hypothyroidism (acquired)  Stable condition.  Continue current medications.  Will adjust based on lab findings or if condition changes.    - levothyroxine (SYNTHROID) 137 MCG Tab tablet; Take 1 tablet (137 mcg total) by mouth once daily.  Dispense: 90 tablet; Refill: 3    13. OAB (overactive bladder)  Continue  - oxybutynin (DITROPAN XL) 15 MG TR24; Take 1 tablet (15 mg total) by mouth once daily.  Dispense: 90 tablet; Refill: 3    14. Gastroesophageal reflux disease, esophagitis presence not specified  Controlled on current medications.  Continue current medications.    - pantoprazole (PROTONIX) 40 MG tablet; Take 1 tablet (40 mg total) by mouth once daily.  Dispense: 90 tablet; Refill: 3    15. Right serous otitis media, unspecified chronicity  Refer for eval and treat  - Ambulatory referral to ENT    Reeval 3 months PA Rebeka and 6 months with me

## 2018-05-24 NOTE — NURSING
2 Patient identifiers used (name & ). Administered 60 mgs of Toradol  IM to L Glt. Patient tolerated well. No bleeding at insertion site noted. Pain scale 0/10. Allergies reviewed. Aseptic technique maintained.

## 2018-06-13 ENCOUNTER — TELEPHONE (OUTPATIENT)
Dept: FAMILY MEDICINE | Facility: CLINIC | Age: 63
End: 2018-06-13

## 2018-06-13 DIAGNOSIS — Z12.31 ENCOUNTER FOR SCREENING MAMMOGRAM FOR BREAST CANCER: Primary | ICD-10-CM

## 2018-06-13 NOTE — TELEPHONE ENCOUNTER
----- Message from Mary aKte Boone sent at 6/13/2018  3:14 PM CDT -----  Contact: PT  Type:  Mammogram    Caller is requesting to schedule their annual mammogram appointment.  Order is not listed in EPIC.  Please enter order and contact patient to schedule.    Name of Caller:  Ediecathleen Hernandez   Where would they like the mammogram performed?  n/a  Best Call Back Number:      Additional Information:  n/a

## 2018-06-14 DIAGNOSIS — Z12.31 ENCOUNTER FOR SCREENING MAMMOGRAM FOR HIGH-RISK PATIENT: Primary | ICD-10-CM

## 2018-06-15 ENCOUNTER — OFFICE VISIT (OUTPATIENT)
Dept: GASTROENTEROLOGY | Facility: CLINIC | Age: 63
End: 2018-06-15
Payer: COMMERCIAL

## 2018-06-15 VITALS
DIASTOLIC BLOOD PRESSURE: 62 MMHG | WEIGHT: 269.81 LBS | HEART RATE: 66 BPM | SYSTOLIC BLOOD PRESSURE: 116 MMHG | BODY MASS INDEX: 44.9 KG/M2

## 2018-06-15 DIAGNOSIS — K21.9 GASTROESOPHAGEAL REFLUX DISEASE, ESOPHAGITIS PRESENCE NOT SPECIFIED: ICD-10-CM

## 2018-06-15 DIAGNOSIS — R19.7 DIARRHEA, UNSPECIFIED TYPE: ICD-10-CM

## 2018-06-15 DIAGNOSIS — Z86.010 HISTORY OF COLON POLYPS: Primary | ICD-10-CM

## 2018-06-15 PROCEDURE — 99999 PR PBB SHADOW E&M-EST. PATIENT-LVL II: CPT | Mod: PBBFAC,,, | Performed by: INTERNAL MEDICINE

## 2018-06-15 PROCEDURE — 3008F BODY MASS INDEX DOCD: CPT | Mod: CPTII,S$GLB,, | Performed by: INTERNAL MEDICINE

## 2018-06-15 PROCEDURE — 99215 OFFICE O/P EST HI 40 MIN: CPT | Mod: S$GLB,,, | Performed by: INTERNAL MEDICINE

## 2018-06-15 NOTE — PROGRESS NOTES
[facility]     CC: Diarrhea    HPI 62 y.o. female presents for evaluation of > 20 years of chronic, intermittent, non-bloody diarrhea, not associated with PO intake, nocturnal stools, weight loss or blood in stool. She states she will have diarrhea for several days then it will resolve. Her last colonoscopy was in 2013 and notable for small benign colon polyps. She has no family history of colon polyps.She rarely consumes dairy and does not use sugar free substitutes.     She also has chronic GERD well controlled with daily Protonix. Her last EGD was several years ago, which she reports as normal. She denies dysphagia.     Past Medical History:   Diagnosis Date    Arthritis     Colon polyp, hyperplastic 1/13    recheck 5-10 years    Diverticulosis     Fatty liver     GERD (gastroesophageal reflux disease)     CLINT (obstructive sleep apnea)     C-pap    Thyroid disease        Past Surgical History:   Procedure Laterality Date    COLONOSCOPY  02/14/2013    Dr. 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; drives a school bus    Family History   Problem Relation Age of Onset    Cancer Mother         breast    Hypertension Mother     Heart disease Father     Hypertension Father     Cancer Maternal Aunt         breast    Macular degeneration Neg Hx     Retinal detachment Neg Hx     Glaucoma Neg Hx     Colon cancer Neg Hx     Colon polyps Neg Hx     Crohn's disease Neg Hx     Ulcerative colitis Neg Hx        Review of Systems  General ROS: negative for - chills, fever or weight loss  Psychological ROS: negative for - hallucination, depression or suicidal  ideation  Ophthalmic ROS: negative for - blurry vision, photophobia or eye pain  ENT ROS: negative for - epistaxis, sore throat or rhinorrhea  Respiratory ROS: no cough, shortness of breath, or wheezing  Cardiovascular ROS: no chest pain or dyspnea on exertion  Gastrointestinal ROS: + intermittent diarrhea, no blood in stool, + GERD  Genito-Urinary ROS: no dysuria, trouble voiding, or hematuria  Musculoskeletal ROS: negative for - arthralgia, myalgia, weakness  Neurological ROS: no syncope or seizures; no ataxia  Dermatological ROS: negative for pruritis, rash and jaundice    Physical Examination  /62   Pulse 66   Wt 122.4 kg (269 lb 13.5 oz)   BMI 44.90 kg/m²   General appearance: alert, cooperative, no distress  HENT: Normocephalic, atraumatic, neck symmetrical, no nasal discharge   Eyes: conjunctivae/corneas clear, PERRL, EOM's intact, sclera anicteric  Lungs: clear to auscultation bilaterally, no dullness to percussion bilaterally, symmetric expansion, breathing unlabored  Heart: regular rate and rhythm without rub; no displacement of the PMI   Abdomen: soft, nontender,nondistended, BS normoactive, no organomegaly appreciated  Extremities: extremities symmetric; no clubbing, cyanosis, or edema  Integument: Skin color, texture, turgor normal; no rashes; hair distrubution normal, no jaundice  Neurologic: Alert and oriented X 3, no focal sensory or motor neurologic deficits  Psychiatric: no pressured speech; normal affect; no evidence of impaired cognition, no anxiety/depression     Labs:  Lab Results   Component Value Date    WBC 5.97 05/24/2018    HGB 14.0 05/24/2018    HCT 42.6 05/24/2018    MCV 92 05/24/2018     05/24/2018         Imaging:  CT abdomen/pelvis 2016  was independently visualized and reviewed by me and showed fatty liver, stable adrenal nodule    Assessment:   62 y.o. female with personal history of colon polyps presents with chronic intermittent diarrhea and chronic controlled  GERD. Chronic diarrhea ? IBS    Plan:  -Schedule colonoscopy for screening (h/o polyps)  -Continue daily PPI- instructed to take 30 minutes prior to breakfast  -Patient does not wish for specific treatment of diarrhea as chronic in nature and does not affect her quality of life.    Emely Husain MD  [facility]  [address], [address]  [place] [address]  Office: [phone]  Fax: [phone]

## 2018-06-18 ENCOUNTER — HOSPITAL ENCOUNTER (OUTPATIENT)
Dept: RADIOLOGY | Facility: CLINIC | Age: 63
Discharge: HOME | End: 2018-06-18
Attending: FAMILY MEDICINE
Payer: COMMERCIAL

## 2018-06-18 DIAGNOSIS — Z12.31 ENCOUNTER FOR SCREENING MAMMOGRAM FOR HIGH-RISK PATIENT: ICD-10-CM

## 2018-06-18 PROCEDURE — 77067 SCR MAMMO BI INCL CAD: CPT | Mod: TC,PO

## 2018-06-18 PROCEDURE — 77067 SCR MAMMO BI INCL CAD: CPT | Mod: 26,,, | Performed by: RADIOLOGY

## 2018-06-18 PROCEDURE — 77063 BREAST TOMOSYNTHESIS BI: CPT | Mod: 26,,, | Performed by: RADIOLOGY

## 2018-06-20 ENCOUNTER — TELEPHONE (OUTPATIENT)
Dept: GASTROENTEROLOGY | Facility: CLINIC | Age: 63
End: 2018-06-20

## 2018-06-20 NOTE — TELEPHONE ENCOUNTER
----- Message from Terrance Gary sent at 6/20/2018 10:02 AM CDT -----  Contact: self   Patient want to cancel colonoscopy procedure will call back to reschedule, any questions please call back at [phone] (home)

## 2018-06-20 NOTE — TELEPHONE ENCOUNTER
Returned call to pt regarding her procedure. Pt states she will need to cancel her colonoscopy d/t she can not afford the financial obligation at this time. Endo notified.

## 2018-07-26 DIAGNOSIS — E53.8 B12 NUTRITIONAL DEFICIENCY: Primary | ICD-10-CM

## 2018-07-26 DIAGNOSIS — E53.8 VITAMIN B12 DEFICIENCY: ICD-10-CM

## 2018-07-26 DIAGNOSIS — E55.9 VITAMIN D DEFICIENCY: ICD-10-CM

## 2018-07-26 DIAGNOSIS — E78.5 HYPERLIPIDEMIA, UNSPECIFIED HYPERLIPIDEMIA TYPE: ICD-10-CM

## 2018-07-26 DIAGNOSIS — E03.9 HYPOTHYROIDISM, UNSPECIFIED TYPE: ICD-10-CM

## 2018-08-06 DIAGNOSIS — M54.9 CHRONIC BACK PAIN GREATER THAN 3 MONTHS DURATION: ICD-10-CM

## 2018-08-06 DIAGNOSIS — G89.29 CHRONIC BACK PAIN GREATER THAN 3 MONTHS DURATION: ICD-10-CM

## 2018-08-08 RX ORDER — CARISOPRODOL 350 MG/1
350 TABLET ORAL NIGHTLY PRN
Qty: 30 TABLET | Refills: 0 | Status: SHIPPED | OUTPATIENT
Start: 2018-08-08 | End: 2018-09-14

## 2018-08-09 NOTE — TELEPHONE ENCOUNTER
The  (Prescription Monitoring Program) website was checked with no inappropriate activity found.    Patient contract up to date, renews in October 2018    Use of soma and opioids is strongly discouraged due to increased risk of abuse.

## 2018-08-23 ENCOUNTER — DOCUMENTATION ONLY (OUTPATIENT)
Dept: FAMILY MEDICINE | Facility: CLINIC | Age: 63
End: 2018-08-23

## 2018-08-23 NOTE — PROGRESS NOTES
Pre-Visit Chart Review  For Appointment Scheduled on 8/24/18    Health Maintenance Due   Topic Date Due    Colonoscopy  02/14/2018    Influenza Vaccine  08/01/2018

## 2018-08-24 ENCOUNTER — OFFICE VISIT (OUTPATIENT)
Dept: FAMILY MEDICINE | Facility: CLINIC | Age: 63
End: 2018-08-24
Payer: COMMERCIAL

## 2018-08-24 VITALS
HEIGHT: 65 IN | OXYGEN SATURATION: 96 % | TEMPERATURE: 98 F | SYSTOLIC BLOOD PRESSURE: 109 MMHG | WEIGHT: 269.19 LBS | HEART RATE: 65 BPM | BODY MASS INDEX: 44.85 KG/M2 | DIASTOLIC BLOOD PRESSURE: 66 MMHG

## 2018-08-24 DIAGNOSIS — G89.29 CHRONIC BACK PAIN GREATER THAN 3 MONTHS DURATION: Primary | ICD-10-CM

## 2018-08-24 DIAGNOSIS — M54.9 CHRONIC BACK PAIN GREATER THAN 3 MONTHS DURATION: Primary | ICD-10-CM

## 2018-08-24 DIAGNOSIS — F11.20 CONTINUOUS OPIOID DEPENDENCE: ICD-10-CM

## 2018-08-24 DIAGNOSIS — E66.01 MORBID OBESITY WITH BMI OF 40.0-44.9, ADULT: ICD-10-CM

## 2018-08-24 PROCEDURE — 99999 PR PBB SHADOW E&M-EST. PATIENT-LVL V: CPT | Mod: PBBFAC,,, | Performed by: PHYSICIAN ASSISTANT

## 2018-08-24 PROCEDURE — 3008F BODY MASS INDEX DOCD: CPT | Mod: CPTII,S$GLB,, | Performed by: PHYSICIAN ASSISTANT

## 2018-08-24 PROCEDURE — 96372 THER/PROPH/DIAG INJ SC/IM: CPT | Mod: S$GLB,,, | Performed by: PHYSICIAN ASSISTANT

## 2018-08-24 PROCEDURE — 99213 OFFICE O/P EST LOW 20 MIN: CPT | Mod: 25,S$GLB,, | Performed by: PHYSICIAN ASSISTANT

## 2018-08-24 RX ORDER — KETOROLAC TROMETHAMINE 30 MG/ML
60 INJECTION, SOLUTION INTRAMUSCULAR; INTRAVENOUS
Status: COMPLETED | OUTPATIENT
Start: 2018-08-24 | End: 2018-08-24

## 2018-08-24 RX ADMIN — KETOROLAC TROMETHAMINE 60 MG: 30 INJECTION, SOLUTION INTRAMUSCULAR; INTRAVENOUS at 10:08

## 2018-08-24 NOTE — PATIENT INSTRUCTIONS
Weight Management: Getting Started  Healthy bodies come in all shapes and sizes. Not all bodies are made to be thin. For some people, a healthy weight is higher than the average weight listed on weight charts. Your healthcare provider can help you decide on a healthy weight for you.    Reasons to lose weight  Losing weight can help with some health problems, such as high blood pressure, heart disease, diabetes, sleep apnea, and arthritis. You may also feel more energy.  Set your long-term goal  Your goal doesn't even have to be a specific weight. You may decide on a fitness goal (such as being able to walk 10 miles a week), or a health goal (such as lowering your blood pressure). Choose a goal that is measurable and reasonable, so you know when you've reached it. A goal of reaching a BMI of less than 25 is not always reasonable (or possible).   Make an action plan  Habits dont change overnight. Setting your goals too high can leave you feeling discouraged if you cant reach them. Be realistic. Choose one or two small changes you can make now. Set an action plan for how you are going to make these changes. When you can stick to this plan, keep making a few more small changes. Taking small steps will help you stay on the path to success.  Track your progress  Write down your goals. Then, keep a daily record of your progress. Write down what you eat and how active you are. This record lets you look back on how much youve done. It may also help when youre feeling frustrated. Reward yourself for success. Even if you dont reach every goal, give yourself credit for what you do get done.  Get support  Encouragement from others can help make losing weight easier. Ask your family members and friends for support. They may even want to join you. Also look to your healthcare provider, registered dietitian, and  for help. Your local hospital can give you more information about nutrition, exercise, and  weight loss.  Date Last Reviewed: 1/31/2016 © 2000-2017 [employer]. [address] [place] [address], [place] [address]. All rights reserved. This information is not intended as a substitute for professional medical care. Always follow your healthcare professional's instructions.        Weight Management: Healthy Eating  Food is your bodys fuel. You cant live without it. The key is to give your body enough nutrients and energy without eating too much. Reading food labels can help you make healthy choices. Also, learn new eating habits to manage your weight.     All the values on the label are based on one serving. The serving size is the average portion. Remember to multiply the values on the label by the number of servings you eat.   Eat less fat  A gram of fat has almost 2.5 times the calories of a gram of protein or carbohydrates. Try to balance your food choices so that only 20% to 35% of your calories comes from total fat. This means an average of 2½ to 3½ grams of fat for each 100 calories you eat.  Eat more fiber  High-fiber foods are digested more slowly than low-fiber foods, so you feel full longer. Try to get at least 25 grams of fiber each day for a 2000 calorie diet. Foods high in fiber include:  · Vegetables and fruits  · Whole-grain or bran breads, pastas, and cereals  · Legumes (beans) and peas  As you begin to eat more fiber, be sure to drink plenty of water to keep your digestive system working smoothly.  Tips  Do's and don'ts include:   · Dont skip meals. This often leads to overeating later on. Its best to spread your eating throughout the day.  · Eat a variety of foods, not just a few favorites.  · If you find yourself eating when youre not hungry, ask yourself why. Many of us eat when were bored, stressed, or just to be polite. Listen to your body. If youre not hungry, get busy doing something else instead of eating.  · Eat slower, shooting for 20 to 30 minutes for each meal. It  takes 20 minutes for your stomach to tell your brain that its full. Slow eaters tend to eat less and are still satisfied, while fast eaters may tend to be overeaters.   · Pay attention to what you eat. Dont read or watch TV during your meal.  Date Last Reviewed: 1/31/2016  © 5544-9099 [employer]. [address] [place] [address], [place] [address]. All rights reserved. This information is not intended as a substitute for professional medical care. Always follow your healthcare professional's instructions.

## 2018-08-24 NOTE — PROGRESS NOTES
Subjective:       Patient ID: Edie Hernandez is a 63 y.o. female.    Chief Complaint: Follow-up (3 months)    Mrs. Hernandez is a 63 year old female who present to clinic for 3 month f/u on chronic back pain.     Continuous use of opioids follow-up:  Current medication and dosing:  norco 7.5/325 mg every 6 hours PRN  Side effects: none  Pain controlled: stable  Medication compliance: taking medication only PRN  UDS: 2/2/2018  pain contract signed: 8/24/2018    She states she was watering her flowers recently and tripped and hit her arm on the pilling under her house. She states since that episode her back pain has worsened and she is requesting an injection for pain today. She informs me that she has been holding gabapentin x 3 months because she was concerned this caused her memory problems. She does not want me to remove it from her med list in case she wants to resume it later.      Review of Systems   Constitutional: Negative for activity change, appetite change, chills, fatigue and fever.   Eyes: Negative for visual disturbance.   Respiratory: Negative for cough and shortness of breath.    Cardiovascular: Negative for chest pain, palpitations and leg swelling.   Gastrointestinal: Negative for abdominal pain, constipation, diarrhea, nausea and vomiting.   Musculoskeletal: Positive for back pain. Negative for arthralgias.   Neurological: Negative for dizziness, weakness, light-headedness and headaches.       Objective:      Vitals:    08/24/18 0943   BP: 109/66   Pulse: 65   Temp: 97.8 °F (36.6 °C)     Physical Exam   Constitutional: She is oriented to person, place, and time. She appears well-developed.   Obese body habitus.    HENT:   Head: Normocephalic and atraumatic.   Eyes: Conjunctivae and EOM are normal. Pupils are equal, round, and reactive to light.   Cardiovascular: Normal rate, regular rhythm, normal heart sounds and intact distal pulses.   Pulmonary/Chest: Effort normal and breath sounds normal.    Musculoskeletal:        Back:         Arms:  Neurological: She is alert and oriented to person, place, and time.   Skin: Skin is warm and dry.   Psychiatric: She has a normal mood and affect.       Assessment:       1. Chronic back pain greater than 3 months duration    2. Continuous opioid dependence- contract 10/18/17-failed uds for norco, + 2/18    3. Morbid obesity with BMI of 40.0-44.9, adult        Plan:       Chronic back pain greater than 3 months duration  -     ketorolac injection 60 mg; Inject 2 mLs (60 mg total) into the muscle one time.   - Continue norco 7.5/325 mg only nightly PRN for severe pain  - Continue soma as prescribed  - Pt is holding gabapentin at this time.     Continuous opioid dependence- contract 10/18/17-failed uds for norco, + 2/18         - Contract updated today 8/24/18         - UDS 2/2/2018    Morbid obesity with BMI of 40.0-44.9, adult         - See below      Patient readiness: acceptance and barriers:none    During the course of the visit the patient was educated and counseled about the following:     Obesity:   Informal exercise measures discussed, e.g. taking stairs instead of elevator.    Goals: Obesity: Reduce calorie intake and BMI    Did patient meet goals/outcomes: No    The following self management tools provided: declined    Patient Instructions (the written plan) was given to the patient/family.     Time spent with patient: 15 minutes     Follow up in November with labs prior with  Dr. Eugene

## 2018-08-24 NOTE — LETTER
"2018    Edie [place]Y [place] [address]             [facility]  [address]  [place] [address]  Phone: [phone]  Fax: [phone]   2018    Re: Edie Hernandez        : 1955        MRN: [id number]    PAIN ORIENTATION AGREEMENT    My doctor and I have decided that as part of my treatment for chronic pain, I will receive prescriptions for controlled substances.  As a patient, I will agree to the following terms in order for my provider to effectively treat my pain and also comply with the rules set forth by [facility] Board of Medical Examiners and Drug Enforcement Agency.  1. I understand that in order for me to receive the best possible care, my pain management doctor needs a copy of any previous medical records, including MRI, office notes, lab results, etc.  2. I will provide a full list of my medications, current dose, and how often I take my medication.  3. A single physician shall be responsible for prescribing my pain medication.  4. I understand that my physician may require an office visit every 3 months for certain controlled substances.  5. It is my responsibility to keep my appointments.  If I miss my schedule appointment, I will be rescheduled to the first available time slot.  I understand that pain medications may not be refilled until seen.  6. If my doctor agrees to refill my pain medication by telephone, I will call the office at least 5 business days in advance to request a refill prescription.  7. Refill prescriptions will not be written in the evenings, weekends, or on holidays.  8. Each prescription is expected to last at least one month.  Refills will not be given early if I "run out early", "lose a prescription", "spill" or "misplaced" my medication.  9. It may be necessary for prescriptions to be picked up in person and proof of identification may be required.  10. I will have my pain medication filled at only " "one pharmacy.                 [facility]/pharmacy #[id number] - [place] - [address]  [address] [place] [address]  Phone: [phone] Fax: [phone]      11. A baseline drug screen may be completed on my first visit and or randomly at other routine clinic visits.      I have read and understand the above information.  I will, to the best of my ability, adhere to these policies and commitments.  I further understand that noncompliance with these policies may result in my being discharged as the patient.      _____________________                     _____Edie RICCI [place]  Patient signature/date         Patient printed  name                                                                                  _____________________                    ____________________  Physician signature/printed name/date         Witness/date    Kerri Staley PA-C 8/24/2018                BEHAVIOR AGREEMENT FOR THE USE OF CONTROLLED DRUGS  The following has been explained to me:  1. It is possible that I may become physically dependent, psychologically dependent, tolerant and/or addicted to controlled substance medications.   2. Physical dependence occurs if withdrawal symptoms are experienced when the drug is suddenly discontinued.  3. Tolerance is the need for higher doses of the drug to achieve the same amount of pain control.  4. Addition is a psychological and behavioral syndrome that is recognized when the patient abuses the drug to obtain mental numbness or euphoria (get high), or shows drug craving behavior or manipulative attitude toward the physician in order to obtain the drug.  5. Withdrawal symptoms may occur if pain medication is stopped abruptly.   These symptoms include yawning, sweating, watery eyes, runny nose, anxiety, tremors, achy muscles, hot and cold flashes, "gooseflesh ", abdominal cramps or diarrhea.  6. I will not cut or chew long-acting pain medication.  7. If severe sedation (sleepiness) or any " other medical emergency relating to my pain medication occurs, I will contact my doctor's office or seek ER attention immediately.  8. I will not combine these drugs with alcohol or recreational drugs (this includes marijuana).     9. I must inform my doctor if I am taking any other sedating drugs such as Valium, Ativan, seizure medication or psychiatric drugs.    10. I will inform my physician of any current or prior history of drug abuse or prescription medication misuse.  11. These medications may be harmful to an unborn child.  I have been advised to use 2 forms of birth control (at least one barrier, such as condoms) while using these medications.    12. If I test positive for drugs that my doctor has not prescribed, and/or if I refuses a random drug test, my physician has the right to stop my controlled substance, end  his/her relationship with me, and I may be terminated from the clinic.   13. If at any time I become violent or abusive, verbally or physically, my actions will be considered cause to terminate care from the clinic and discontinuation of pain medications.          I understand and agree that if I fail to abide by the above agreements, or if I show signs suspicious of narcotic over use or abuse, my pain management physician may discontinue treatment, and narcotic prescriptions will be discontinued.            _____________________                     _____Edie Hernandez  Patient signature/date          Patient printed  name                                                                                _____________________                    ____________________  Physician signature/printed name/date           Witness/date    Kerri Staley PA-C 8/24/2018

## 2018-08-27 DIAGNOSIS — G89.29 CHRONIC BACK PAIN GREATER THAN 3 MONTHS DURATION: ICD-10-CM

## 2018-08-27 DIAGNOSIS — M54.9 CHRONIC BACK PAIN GREATER THAN 3 MONTHS DURATION: ICD-10-CM

## 2018-08-27 RX ORDER — HYDROCODONE BITARTRATE AND ACETAMINOPHEN 7.5; 325 MG/1; MG/1
1 TABLET ORAL EVERY 6 HOURS PRN
Qty: 90 TABLET | Refills: 0 | Status: SHIPPED | OUTPATIENT
Start: 2018-08-27 | End: 2018-12-06 | Stop reason: SDUPTHER

## 2018-09-14 ENCOUNTER — OFFICE VISIT (OUTPATIENT)
Dept: FAMILY MEDICINE | Facility: CLINIC | Age: 63
End: 2018-09-14
Payer: COMMERCIAL

## 2018-09-14 ENCOUNTER — DOCUMENTATION ONLY (OUTPATIENT)
Dept: FAMILY MEDICINE | Facility: CLINIC | Age: 63
End: 2018-09-14

## 2018-09-14 VITALS
WEIGHT: 266.56 LBS | HEART RATE: 77 BPM | DIASTOLIC BLOOD PRESSURE: 64 MMHG | SYSTOLIC BLOOD PRESSURE: 109 MMHG | BODY MASS INDEX: 44.41 KG/M2 | TEMPERATURE: 98 F | HEIGHT: 65 IN

## 2018-09-14 DIAGNOSIS — G62.9 NEUROPATHY: ICD-10-CM

## 2018-09-14 DIAGNOSIS — E66.01 MORBID OBESITY WITH BMI OF 40.0-44.9, ADULT: ICD-10-CM

## 2018-09-14 DIAGNOSIS — M54.9 CHRONIC BACK PAIN GREATER THAN 3 MONTHS DURATION: ICD-10-CM

## 2018-09-14 DIAGNOSIS — M79.7 FIBROMYALGIA: ICD-10-CM

## 2018-09-14 DIAGNOSIS — G89.29 CHRONIC MIDLINE LOW BACK PAIN, WITH SCIATICA PRESENCE UNSPECIFIED: Primary | ICD-10-CM

## 2018-09-14 DIAGNOSIS — F11.20 CONTINUOUS OPIOID DEPENDENCE: ICD-10-CM

## 2018-09-14 DIAGNOSIS — M54.5 CHRONIC MIDLINE LOW BACK PAIN, WITH SCIATICA PRESENCE UNSPECIFIED: Primary | ICD-10-CM

## 2018-09-14 DIAGNOSIS — G89.29 CHRONIC BACK PAIN GREATER THAN 3 MONTHS DURATION: ICD-10-CM

## 2018-09-14 PROCEDURE — 96372 THER/PROPH/DIAG INJ SC/IM: CPT | Mod: S$GLB,,, | Performed by: PHYSICIAN ASSISTANT

## 2018-09-14 PROCEDURE — 99999 PR PBB SHADOW E&M-EST. PATIENT-LVL IV: CPT | Mod: PBBFAC,,, | Performed by: PHYSICIAN ASSISTANT

## 2018-09-14 PROCEDURE — 99213 OFFICE O/P EST LOW 20 MIN: CPT | Mod: 25,S$GLB,, | Performed by: PHYSICIAN ASSISTANT

## 2018-09-14 PROCEDURE — 3008F BODY MASS INDEX DOCD: CPT | Mod: CPTII,S$GLB,, | Performed by: PHYSICIAN ASSISTANT

## 2018-09-14 RX ORDER — METHOCARBAMOL 500 MG/1
500 TABLET, FILM COATED ORAL NIGHTLY
Qty: 30 TABLET | Refills: 0 | Status: SHIPPED | OUTPATIENT
Start: 2018-09-14 | End: 2018-09-17 | Stop reason: RX

## 2018-09-14 RX ORDER — DULOXETIN HYDROCHLORIDE 30 MG/1
30 CAPSULE, DELAYED RELEASE ORAL DAILY
Qty: 30 CAPSULE | Refills: 11 | Status: SHIPPED | OUTPATIENT
Start: 2018-09-14 | End: 2020-06-08

## 2018-09-14 RX ORDER — KETOROLAC TROMETHAMINE 30 MG/ML
60 INJECTION, SOLUTION INTRAMUSCULAR; INTRAVENOUS ONCE
Status: COMPLETED | OUTPATIENT
Start: 2018-09-14 | End: 2018-09-14

## 2018-09-14 RX ADMIN — KETOROLAC TROMETHAMINE 60 MG: 30 INJECTION, SOLUTION INTRAMUSCULAR; INTRAVENOUS at 12:09

## 2018-09-14 NOTE — PROGRESS NOTES
Pre-Visit Chart Review  For Appointment Scheduled on 09/14/2018  Health Maintenance Due   Topic Date Due    Colonoscopy  02/14/2018    Influenza Vaccine  08/01/2018

## 2018-09-14 NOTE — MEDICAL/APP STUDENT
Subjective:       Patient ID: Edie Hernandez is a 63 y.o. female.    Chief Complaint: Hip Pain and Back Pain    Patient presents to clinic today for back pain and hip pain. She states that this is a chronic issue for her which has been managed by LAMB[facility]O Garsia and [facility]. She states that she Soma does not work for her and is interested in a different medication to help her sleep, such as Xanax. I discussed that these medications should not be used in combination. She states that Gabapentin has improved her pain in the past, but that she stopped taking this because it caused her to have memory impairment. She is interested in trying a different muscle relaxer for her pain and to help her sleep. She rates her pain as an 8/10 today in the office and is interested in a Toradol shot as these have helped her in the past. She has been seen by [facility] where they offered her surgical intervention for her pain, however she states she is not interested in that at this time.        Review of Systems   Constitutional: Negative for chills and fever.   Respiratory: Negative for cough and shortness of breath.    Cardiovascular: Negative for chest pain.   Gastrointestinal: Negative for nausea and vomiting.   Musculoskeletal: Positive for arthralgias, back pain and myalgias.   All other systems reviewed and are negative.      Objective:      Physical Exam   Constitutional: She is oriented to person, place, and time. She appears well-developed.   Patient is obese.   HENT:   Head: Normocephalic and atraumatic.   Eyes: Conjunctivae are normal.   Cardiovascular: Normal rate.   Pulmonary/Chest: Effort normal. No respiratory distress.   Neurological: She is alert and oriented to person, place, and time.   Psychiatric: She has a normal mood and affect. Her behavior is normal.       Assessment:       1. Chronic midline low back pain, with sciatica presence unspecified    2. Fibromyalgia    3. Neuropathy    4. Chronic back pain greater than 3 months  duration    5. Morbid obesity with BMI of 40.0-44.9, adult    6. Continuous opioid dependence- contract 10/18/17-failed uds for norco, + 2/18        Plan:       Edie was seen today for hip pain and back pain.    Diagnoses and all orders for this visit:    Chronic midline low back pain, with sciatica presence unspecified  -     ketorolac injection 60 mg; Inject 2 mLs (60 mg total) into the muscle once.    Fibromyalgia  -     DULoxetine (CYMBALTA) 30 MG capsule; Take 1 capsule (30 mg total) by mouth once daily.    Neuropathy  -     DULoxetine (CYMBALTA) 30 MG capsule; Take 1 capsule (30 mg total) by mouth once daily.    Chronic back pain greater than 3 months duration   -  Continue Norco PRN, Toradol injection in the office today    Morbid obesity with BMI of 40.0-44.9, adult   - Encouraged diet and exercise    Continuous opioid dependence- contract 10/18/17-failed uds for norco, + 2/18    Other orders  -     methocarbamol (ROBAXIN) 500 MG Tab; Take 1 tablet (500 mg total) by mouth every evening. For back pain/muscle spasm

## 2018-09-14 NOTE — PROGRESS NOTES
Subjective:       Patient ID: Edie Hernandez is a 63 y.o. female.     Chief Complaint: Hip Pain and Back Pain     Patient presents to clinic today for back pain and hip pain. She states that this is a chronic issue for her which has been managed by [facility] Garsia and [facility]. She states that she Soma does not work for her and is interested in a different medication to help her sleep, such as Xanax. I discussed that these medications should not be used in combination. She states that Gabapentin has improved her pain in the past, but that she stopped taking this because it caused her to have memory impairment. She is interested in trying a different muscle relaxer for her pain and to help her sleep. She rates her pain as an 8/10 today in the office and is interested in a Toradol shot as these have helped her in the past. She has been seen by [facility] where they offered her surgical intervention for her pain, however she states she is not interested in that at this time.           Review of Systems   Constitutional: Negative for chills and fever.   Respiratory: Negative for cough and shortness of breath.    Cardiovascular: Negative for chest pain.   Gastrointestinal: Negative for nausea and vomiting.   Musculoskeletal: Positive for arthralgias, back pain and myalgias.   All other systems reviewed and are negative.      Objective:   Physical Exam   Constitutional: She is oriented to person, place, and time. She appears well-developed.   Patient is obese.   HENT:   Head: Normocephalic and atraumatic.   Eyes: Conjunctivae are normal.   Cardiovascular: Normal rate.   Pulmonary/Chest: Effort normal. No respiratory distress.   Neurological: She is alert and oriented to person, place, and time.   Psychiatric: She has a normal mood and affect. Her behavior is normal.       Assessment:       1. Chronic midline low back pain, with sciatica presence unspecified    2. Fibromyalgia    3. Neuropathy    4. Chronic back pain greater than 3  months duration    5. Morbid obesity with BMI of 40.0-44.9, adult    6. Continuous opioid dependence- contract 10/18/17-failed uds for norco, + 2/18        Plan:       Edie was seen today for hip pain and back pain.     Diagnoses and all orders for this visit:     Chronic midline low back pain, with sciatica presence unspecified  -     ketorolac injection 60 mg; Inject 2 mLs (60 mg total) into the muscle once.     Fibromyalgia  -     DULoxetine (CYMBALTA) 30 MG capsule; Take 1 capsule (30 mg total) by mouth once daily.     Neuropathy  -     DULoxetine (CYMBALTA) 30 MG capsule; Take 1 capsule (30 mg total) by mouth once daily.     Chronic back pain greater than 3 months duration              -  Continue Norco PRN, Toradol injection in the office today     Morbid obesity with BMI of 40.0-44.9, adult              - Encouraged diet and exercise     Continuous opioid dependence- contract 10/18/17-failed uds for norco, + 2/18     Other orders  -     methocarbamol (ROBAXIN) 500 MG Tab; Take 1 tablet (500 mg total) by mouth every evening. For back pain/muscle spasm      Patient readiness: acceptance and barriers:readiness    During the course of the visit the patient was educated and counseled about the following:     Obesity:   General weight loss/lifestyle modification strategies discussed (elicit support from others; identify saboteurs; non-food rewards, etc).    Goals: Obesity: Reduce calorie intake and BMI    Did patient meet goals/outcomes: No    The following self management tools provided: excercise log    Patient Instructions (the written plan) was given to the patient/family.     Time spent with patient: 30 minutes    Barriers to medications present (no )    Adverse reactions to current medications (no)    Over the counter medications reviewed (Yes)

## 2018-09-17 ENCOUNTER — TELEPHONE (OUTPATIENT)
Dept: FAMILY MEDICINE | Facility: CLINIC | Age: 63
End: 2018-09-17

## 2018-09-17 DIAGNOSIS — G89.29 OTHER CHRONIC PAIN: Primary | ICD-10-CM

## 2018-09-17 RX ORDER — METAXALONE 400 MG/1
400 TABLET ORAL 3 TIMES DAILY
Qty: 30 TABLET | Refills: 0 | Status: SHIPPED | OUTPATIENT
Start: 2018-09-17 | End: 2018-12-12 | Stop reason: SDUPTHER

## 2018-09-25 ENCOUNTER — OFFICE VISIT (OUTPATIENT)
Dept: ORTHOPEDICS | Facility: CLINIC | Age: 63
End: 2018-09-25
Payer: COMMERCIAL

## 2018-09-25 VITALS
BODY MASS INDEX: 43.32 KG/M2 | SYSTOLIC BLOOD PRESSURE: 142 MMHG | DIASTOLIC BLOOD PRESSURE: 77 MMHG | WEIGHT: 260 LBS | HEART RATE: 60 BPM | HEIGHT: 65 IN

## 2018-09-25 DIAGNOSIS — M72.2 PLANTAR FASCIA SYNDROME: ICD-10-CM

## 2018-09-25 DIAGNOSIS — M51.36 DEGENERATIVE DISC DISEASE, LUMBAR: Primary | ICD-10-CM

## 2018-09-25 PROCEDURE — 73630 X-RAY EXAM OF FOOT: CPT | Mod: RT,,, | Performed by: ORTHOPAEDIC SURGERY

## 2018-09-25 PROCEDURE — 99204 OFFICE O/P NEW MOD 45 MIN: CPT | Mod: 25,,, | Performed by: ORTHOPAEDIC SURGERY

## 2018-09-25 PROCEDURE — 72100 X-RAY EXAM L-S SPINE 2/3 VWS: CPT | Mod: ,,, | Performed by: ORTHOPAEDIC SURGERY

## 2018-09-25 PROCEDURE — 3008F BODY MASS INDEX DOCD: CPT | Mod: ,,, | Performed by: ORTHOPAEDIC SURGERY

## 2018-09-25 RX ORDER — TRAMADOL HYDROCHLORIDE 50 MG/1
50 TABLET ORAL EVERY 6 HOURS PRN
Qty: 30 TABLET | Refills: 0 | Status: SHIPPED | OUTPATIENT
Start: 2018-09-25 | End: 2018-10-05

## 2018-09-25 RX ORDER — MELOXICAM 7.5 MG/1
7.5 TABLET ORAL DAILY
Qty: 30 TABLET | Refills: 1 | Status: SHIPPED | OUTPATIENT
Start: 2018-09-25 | End: 2018-10-25

## 2018-09-25 NOTE — PROGRESS NOTES
[facility]    Subjective:     Chief Complaint:   Chief Complaint   Patient presents with    Right Foot - Pain     Right foot pain x yesterday. States that she woke up with her foot in severe pain and states that it was hard for her to put weight on the foot.     Lower Back - Pain     Lower back pain x a while.  States that the pain is radiating down her legs. States that she does not have any numbness or tingling down her leg.        Past Medical History:   Diagnosis Date    Arthritis     Colon polyp, hyperplastic 1/13    recheck 5-10 years    Diverticulosis     Fatty liver     GERD (gastroesophageal reflux disease)     CLINT (obstructive sleep apnea)     C-pap    Thyroid disease        Past Surgical History:   Procedure Laterality Date    COLONOSCOPY  02/14/2013    Dr. Vogel: repeat in 5-10 years    COLONOSCOPY N/A 2/14/2013    Performed by Angelica Shankar III, [facility] at [facility] ENDO    COLONOSCOPY W/ BIOPSIES AND POLYPECTOMY  02/2013    hyperplastic, recheck 5-10 years    CYSTOSCOPY WITH HYDRODISTENSION N/A 4/23/2013    Performed by Thom Nicholson MD at [facility] OR    EXTERNAL EAR SURGERY      HYSTERECTOMY      THROAT SURGERY      for CLINT    UPPER GASTROINTESTINAL ENDOSCOPY         Current Outpatient Medications   Medication Sig    clobetasol (TEMOVATE) 0.05 % cream Apply 1 application topically 2 (two) times daily. Apply to affected area    ergocalciferol (VITAMIN D2) 50,000 unit Cap Take 1 capsule (50,000 Units total) by mouth every 7 days.    HYDROcodone-acetaminophen (NORCO) 7.5-325 mg per tablet Take 1 tablet by mouth every 6 (six) hours as needed for Pain.    levothyroxine (SYNTHROID) 137 MCG Tab tablet Take 1 tablet (137 mcg total) by mouth once daily.    metaxalone (SKELAXIN) 400 mg tablet Take 1 tablet (400 mg total) by mouth 3 (three) times daily. for 10 days    methylphenidate (RITALIN LA) 40 MG 24 hr capsule Take 40 mg by mouth daily as needed.     methylphenidate  (RITALIN) 10 MG tablet Take 10 mg by mouth daily as needed.     oxybutynin (DITROPAN XL) 15 MG TR24 Take 1 tablet (15 mg total) by mouth once daily.    pantoprazole (PROTONIX) 40 MG tablet Take 1 tablet (40 mg total) by mouth once daily.    albuterol 90 mcg/actuation inhaler Inhale 2 puffs into the lungs every 6 (six) hours as needed for Wheezing.    allopurinol (ZYLOPRIM) 300 MG tablet Take 1 tablet (300 mg total) by mouth once daily.    cetirizine (ALL DAY ALLERGY, CETIRIZINE,) 10 MG tablet Take 1 tablet (10 mg total) by mouth once daily. (Patient taking differently: Take 10 mg by mouth daily as needed. )    diclofenac sodium (VOLTAREN) 1 % Gel Apply 2 g topically 4 (four) times daily.    DULoxetine (CYMBALTA) 30 MG capsule Take 1 capsule (30 mg total) by mouth once daily.    fluticasone (FLONASE) 50 mcg/actuation nasal spray 1 spray (50 mcg total) by Each Nare route daily as needed.    furosemide (LASIX) 40 MG tablet Take 1 tablet (40 mg total) by mouth daily as needed. Every day PRN    indomethacin (INDOCIN) 25 MG capsule TAKE ONE CAPSULE BY MOUTH 3 TIMES A DAY WITH MEALS     No current facility-administered medications for this visit.        Review of patient's allergies indicates:   Allergen Reactions    Codeine      Other reaction(s): Nausea    Gabapentin Other (See Comments)     Memory loss       Family History   Problem Relation Age of Onset    Hypertension Mother     Breast cancer Mother 50    Heart disease Father     Hypertension Father     Ovarian cancer Paternal Aunt     Macular degeneration Neg Hx     Retinal detachment Neg Hx     Glaucoma Neg Hx     Colon cancer Neg Hx     Colon polyps Neg Hx     Crohn's disease Neg Hx     Ulcerative colitis Neg Hx        Social History     Socioeconomic History    Marital status:      Spouse name: Not on file    Number of children: Not on file    Years of education: Not on file    Highest education level: Not on file   Social Needs     Financial resource strain: Not on file    Food insecurity - worry: Not on file    Food insecurity - inability: Not on file    Transportation needs - medical: Not on file    Transportation needs - non-medical: Not on file   Occupational History     Employer: [employer]   Tobacco Use    Smoking status: Never Smoker    Smokeless tobacco: Never Used   Substance and Sexual Activity    Alcohol use: No    Drug use: No    Sexual activity: Yes     Partners: Male   Other Topics Concern    Not on file   Social History Narrative    Not on file       History of present illness: Patient comes in today for her right foot low back. She's had several months of low back pain. In terms of her right foot its own been bothering her for a day. She does not feel like the pain is connected. She denies any numbness or tingling in the lower extremities. Denies any bowel or bladder dysfunction.      Review of Systems:    Constitution: Negative for chills, fever, and sweats.  Negative for unexplained weight loss.    HENT:  Negative for headaches and blurry vision.    Cardiovascular:Negative for chest pain or irregular heart beat. Negative for hypertension.    Respiratory:  Negative for cough and shortness of breath.    Gastrointestinal: Negative for abdominal pain, heartburn, melena, nausea, and vomitting.    Genitourinary:  Negative bladder incontinence and dysuria.    Musculoskeletal:  See HPI for details.     Neurological: Negative for numbness.    Psychiatric/Behavioral: Negative for depression.  The patient is not nervous/anxious.      Endocrine: Negative for polyuria    Hematologic/Lymphatic: Negative for bleeding problem.  Does not bruise/bleed easily.    Skin: Negative for poor would healing and rash    Objective:      Physical Examination:    Vital Signs:    Vitals:    09/25/18 1011   BP: (!) 142/77   Pulse: 60       Body mass index is 43.27 kg/m².    This a well-developed, well nourished patient in  no acute distress.  They are alert and oriented and cooperative to examination.          Pertinent New Results:    XRAY Report / Interpretation:   AP and lateral the lumbar spine demonstrate mild degenerative changes of the lower lumbar segments. Primarily L4-5 and S1. No fractures or subluxations.    AP and lateral of the right foot demonstrate traction spurs off the calcaneus at both the insertion of the plantar fascia and the Achilles tendon. No fractures or subluxations    Assessment/Plan:      Lumbar degenerative and early plantar fasciitis. I started her on physical therapy. I've started her on Mobicox. She will follow-up in a month      This note was created using Dragon voice recognition software that occasionally misinterpreted phrases or words.

## 2018-10-27 DIAGNOSIS — E55.9 VITAMIN D DEFICIENCY: ICD-10-CM

## 2018-10-29 RX ORDER — ERGOCALCIFEROL 1.25 MG/1
CAPSULE ORAL
Qty: 12 CAPSULE | Refills: 1 | Status: SHIPPED | OUTPATIENT
Start: 2018-10-29 | End: 2019-04-24 | Stop reason: SDUPTHER

## 2018-11-05 ENCOUNTER — PATIENT OUTREACH (OUTPATIENT)
Dept: ADMINISTRATIVE | Facility: HOSPITAL | Age: 63
End: 2018-11-05

## 2018-11-09 ENCOUNTER — PATIENT MESSAGE (OUTPATIENT)
Dept: FAMILY MEDICINE | Facility: CLINIC | Age: 63
End: 2018-11-09

## 2018-11-13 NOTE — TELEPHONE ENCOUNTER
Would recommend avoidance of soma or other habit forming medications considering the other medications she takes.  Please make appt with me or OVIDIO to discuss alternatives.

## 2018-11-15 ENCOUNTER — LAB VISIT (OUTPATIENT)
Dept: LAB | Facility: HOSPITAL | Age: 63
End: 2018-11-15
Attending: FAMILY MEDICINE
Payer: COMMERCIAL

## 2018-11-15 DIAGNOSIS — E78.5 HYPERLIPIDEMIA, UNSPECIFIED HYPERLIPIDEMIA TYPE: ICD-10-CM

## 2018-11-15 DIAGNOSIS — E53.8 VITAMIN B12 DEFICIENCY: ICD-10-CM

## 2018-11-15 DIAGNOSIS — E55.9 VITAMIN D DEFICIENCY: ICD-10-CM

## 2018-11-15 DIAGNOSIS — E03.9 HYPOTHYROIDISM, UNSPECIFIED TYPE: ICD-10-CM

## 2018-11-15 LAB
25(OH)D3+25(OH)D2 SERPL-MCNC: 27 NG/ML
ALBUMIN SERPL BCP-MCNC: 3.5 G/DL
ALP SERPL-CCNC: 109 U/L
ALT SERPL W/O P-5'-P-CCNC: 33 U/L
ANION GAP SERPL CALC-SCNC: 9 MMOL/L
AST SERPL-CCNC: 28 U/L
BASOPHILS # BLD AUTO: 0.04 K/UL
BASOPHILS NFR BLD: 0.6 %
BILIRUB SERPL-MCNC: 1.4 MG/DL
BUN SERPL-MCNC: 12 MG/DL
CALCIUM SERPL-MCNC: 9 MG/DL
CHLORIDE SERPL-SCNC: 107 MMOL/L
CHOLEST SERPL-MCNC: 201 MG/DL
CHOLEST/HDLC SERPL: 3.7 {RATIO}
CO2 SERPL-SCNC: 25 MMOL/L
CREAT SERPL-MCNC: 0.9 MG/DL
DIFFERENTIAL METHOD: NORMAL
EOSINOPHIL # BLD AUTO: 0.4 K/UL
EOSINOPHIL NFR BLD: 6.3 %
ERYTHROCYTE [DISTWIDTH] IN BLOOD BY AUTOMATED COUNT: 13.2 %
EST. GFR  (AFRICAN AMERICAN): >60 ML/MIN/1.73 M^2
EST. GFR  (NON AFRICAN AMERICAN): >60 ML/MIN/1.73 M^2
GLUCOSE SERPL-MCNC: 124 MG/DL
HCT VFR BLD AUTO: 40.5 %
HDLC SERPL-MCNC: 55 MG/DL
HDLC SERPL: 27.4 %
HGB BLD-MCNC: 13.2 G/DL
IMM GRANULOCYTES # BLD AUTO: 0.02 K/UL
IMM GRANULOCYTES NFR BLD AUTO: 0.3 %
LDLC SERPL CALC-MCNC: 119.6 MG/DL
LYMPHOCYTES # BLD AUTO: 1.3 K/UL
LYMPHOCYTES NFR BLD: 19.9 %
MCH RBC QN AUTO: 30 PG
MCHC RBC AUTO-ENTMCNC: 32.6 G/DL
MCV RBC AUTO: 92 FL
MONOCYTES # BLD AUTO: 0.4 K/UL
MONOCYTES NFR BLD: 5.8 %
NEUTROPHILS # BLD AUTO: 4.5 K/UL
NEUTROPHILS NFR BLD: 67.1 %
NONHDLC SERPL-MCNC: 146 MG/DL
NRBC BLD-RTO: 0 /100 WBC
PLATELET # BLD AUTO: 277 K/UL
PMV BLD AUTO: 10 FL
POTASSIUM SERPL-SCNC: 4.1 MMOL/L
PROT SERPL-MCNC: 6.8 G/DL
RBC # BLD AUTO: 4.4 M/UL
SODIUM SERPL-SCNC: 141 MMOL/L
T4 FREE SERPL-MCNC: 0.92 NG/DL
TRIGL SERPL-MCNC: 132 MG/DL
TSH SERPL DL<=0.005 MIU/L-ACNC: 4.42 UIU/ML
VIT B12 SERPL-MCNC: 370 PG/ML
WBC # BLD AUTO: 6.68 K/UL

## 2018-11-15 PROCEDURE — 80053 COMPREHEN METABOLIC PANEL: CPT

## 2018-11-15 PROCEDURE — 82306 VITAMIN D 25 HYDROXY: CPT

## 2018-11-15 PROCEDURE — 80061 LIPID PANEL: CPT

## 2018-11-15 PROCEDURE — 82607 VITAMIN B-12: CPT

## 2018-11-15 PROCEDURE — 84439 ASSAY OF FREE THYROXINE: CPT

## 2018-11-15 PROCEDURE — 84443 ASSAY THYROID STIM HORMONE: CPT

## 2018-11-15 PROCEDURE — 36415 COLL VENOUS BLD VENIPUNCTURE: CPT | Mod: PO

## 2018-11-15 PROCEDURE — 85025 COMPLETE CBC W/AUTO DIFF WBC: CPT

## 2018-12-06 ENCOUNTER — OFFICE VISIT (OUTPATIENT)
Dept: FAMILY MEDICINE | Facility: CLINIC | Age: 63
End: 2018-12-06
Payer: COMMERCIAL

## 2018-12-06 VITALS
HEIGHT: 65 IN | BODY MASS INDEX: 44.92 KG/M2 | DIASTOLIC BLOOD PRESSURE: 56 MMHG | HEART RATE: 65 BPM | WEIGHT: 269.63 LBS | SYSTOLIC BLOOD PRESSURE: 136 MMHG | TEMPERATURE: 98 F

## 2018-12-06 DIAGNOSIS — M54.9 CHRONIC BACK PAIN GREATER THAN 3 MONTHS DURATION: ICD-10-CM

## 2018-12-06 DIAGNOSIS — E66.01 MORBID OBESITY WITH BMI OF 40.0-44.9, ADULT: ICD-10-CM

## 2018-12-06 DIAGNOSIS — M54.50 CHRONIC MIDLINE LOW BACK PAIN WITHOUT SCIATICA: Primary | ICD-10-CM

## 2018-12-06 DIAGNOSIS — K63.5 HYPERPLASTIC COLONIC POLYP, UNSPECIFIED PART OF COLON: ICD-10-CM

## 2018-12-06 DIAGNOSIS — G89.29 CHRONIC BACK PAIN GREATER THAN 3 MONTHS DURATION: ICD-10-CM

## 2018-12-06 DIAGNOSIS — G47.33 OSA (OBSTRUCTIVE SLEEP APNEA): ICD-10-CM

## 2018-12-06 DIAGNOSIS — F11.20 CONTINUOUS OPIOID DEPENDENCE: ICD-10-CM

## 2018-12-06 DIAGNOSIS — G89.29 CHRONIC MIDLINE LOW BACK PAIN WITHOUT SCIATICA: Primary | ICD-10-CM

## 2018-12-06 PROCEDURE — 96372 THER/PROPH/DIAG INJ SC/IM: CPT | Mod: S$GLB,,, | Performed by: NURSE PRACTITIONER

## 2018-12-06 PROCEDURE — 99999 PR PBB SHADOW E&M-EST. PATIENT-LVL IV: CPT | Mod: PBBFAC,,, | Performed by: NURSE PRACTITIONER

## 2018-12-06 PROCEDURE — 99214 OFFICE O/P EST MOD 30 MIN: CPT | Mod: 25,S$GLB,, | Performed by: NURSE PRACTITIONER

## 2018-12-06 PROCEDURE — 3008F BODY MASS INDEX DOCD: CPT | Mod: CPTII,S$GLB,, | Performed by: NURSE PRACTITIONER

## 2018-12-06 RX ORDER — KETOROLAC TROMETHAMINE 30 MG/ML
30 INJECTION, SOLUTION INTRAMUSCULAR; INTRAVENOUS
Status: COMPLETED | OUTPATIENT
Start: 2018-12-06 | End: 2018-12-06

## 2018-12-06 RX ADMIN — KETOROLAC TROMETHAMINE 30 MG: 30 INJECTION, SOLUTION INTRAMUSCULAR; INTRAVENOUS at 11:12

## 2018-12-06 NOTE — TELEPHONE ENCOUNTER
Pt seen 12/6/18  Urine tox screen 2/18  Contract 8/24/18  [place]  checked--gets Ritalin from Dr. Yang.

## 2018-12-06 NOTE — PROGRESS NOTES
Subjective:       Patient ID: Edie Hernandez is a 63 y.o. female.    Chief Complaint: No chief complaint on file.    Ms. Hernandez presents to the clinic today for three month follow up for back pain.  She reports the back pain is no better and she did see Dr. Moe.  She does not want surgery. She is having a lot of stress due to raising grandson who is on the autism spectrum.  She cannot afford co-pays to see a therapist.  She talks to her  about her stress.  She is due for colonoscopy but cannot afford it at this time.      Review of Systems   Constitutional: Negative for chills and fever.   HENT: Negative for congestion, ear pain and sinus pressure.    Respiratory: Negative for cough and shortness of breath.    Cardiovascular: Negative for chest pain, palpitations and leg swelling.   Gastrointestinal: Negative for abdominal pain, blood in stool, constipation, diarrhea, nausea and vomiting.   Musculoskeletal: Positive for back pain. Negative for joint swelling.   Skin: Negative for rash and wound.   Psychiatric/Behavioral: Negative for suicidal ideas.        +stress       Objective:      Physical Exam   Constitutional: She is oriented to person, place, and time. She appears well-nourished. No distress.   HENT:   Head: Normocephalic and atraumatic.   Right Ear: External ear normal.   Left Ear: External ear normal.   Mouth/Throat: Oropharynx is clear and moist. No oropharyngeal exudate.   Eyes: Pupils are equal, round, and reactive to light. Right eye exhibits no discharge. Left eye exhibits no discharge.   Neck: Neck supple. No thyromegaly present.   Cardiovascular: Normal rate and regular rhythm. Exam reveals no gallop and no friction rub.   No murmur heard.  Pulmonary/Chest: Effort normal and breath sounds normal. No respiratory distress. She has no wheezes. She has no rales.   Abdominal: Soft. She exhibits no distension. There is no tenderness.   Musculoskeletal:        Lumbar back: She exhibits  tenderness. She exhibits no bony tenderness.   Lymphadenopathy:     She has no cervical adenopathy.   Neurological: She is alert and oriented to person, place, and time. Coordination normal.   Skin: Skin is warm and dry.   Psychiatric: She has a normal mood and affect. Her behavior is normal. Thought content normal.   Vitals reviewed.          Current Outpatient Medications:     albuterol 90 mcg/actuation inhaler, Inhale 2 puffs into the lungs every 6 (six) hours as needed for Wheezing., Disp: 18 g, Rfl: 3    allopurinol (ZYLOPRIM) 300 MG tablet, Take 1 tablet (300 mg total) by mouth once daily., Disp: 90 tablet, Rfl: 4    cetirizine (ALL DAY ALLERGY, CETIRIZINE,) 10 MG tablet, Take 1 tablet (10 mg total) by mouth once daily. (Patient taking differently: Take 10 mg by mouth daily as needed. ), Disp: 30 tablet, Rfl: 3    clobetasol (TEMOVATE) 0.05 % cream, Apply 1 application topically 2 (two) times daily. Apply to affected area, Disp: 30 g, Rfl: 2    diclofenac sodium (VOLTAREN) 1 % Gel, Apply 2 g topically 4 (four) times daily., Disp: 100 g, Rfl: 3    DULoxetine (CYMBALTA) 30 MG capsule, Take 1 capsule (30 mg total) by mouth once daily., Disp: 30 capsule, Rfl: 11    ergocalciferol (VITAMIN D2) 50,000 unit Cap, Take 1 capsule (50,000 Units total) by mouth every 7 days., Disp: 12 capsule, Rfl: 1    fluticasone (FLONASE) 50 mcg/actuation nasal spray, 1 spray (50 mcg total) by Each Nare route daily as needed., Disp: 1 Bottle, Rfl: 11    furosemide (LASIX) 40 MG tablet, Take 1 tablet (40 mg total) by mouth daily as needed. Every day PRN, Disp: 30 tablet, Rfl: 4    HYDROcodone-acetaminophen (NORCO) 7.5-325 mg per tablet, Take 1 tablet by mouth every 6 (six) hours as needed for Pain., Disp: 90 tablet, Rfl: 0    indomethacin (INDOCIN) 25 MG capsule, TAKE ONE CAPSULE BY MOUTH 3 TIMES A DAY WITH MEALS, Disp: 90 capsule, Rfl: 3    levothyroxine (SYNTHROID) 137 MCG Tab tablet, Take 1 tablet (137 mcg total) by mouth  once daily., Disp: 90 tablet, Rfl: 3    methylphenidate (RITALIN LA) 40 MG 24 hr capsule, Take 40 mg by mouth daily as needed. , Disp: , Rfl:     methylphenidate (RITALIN) 10 MG tablet, Take 10 mg by mouth daily as needed. , Disp: , Rfl:     oxybutynin (DITROPAN XL) 15 MG TR24, Take 1 tablet (15 mg total) by mouth once daily., Disp: 90 tablet, Rfl: 3    pantoprazole (PROTONIX) 40 MG tablet, Take 1 tablet (40 mg total) by mouth once daily., Disp: 90 tablet, Rfl: 3    VITAMIN D2 50,000 unit capsule, TAKE 1 CAPSULE BY MOUTH EVERY 7 DAYS, Disp: 12 capsule, Rfl: 1    Current Facility-Administered Medications:     ketorolac injection 30 mg, 30 mg, Intramuscular, 1 time in Clinic/HOD, Zainab [place], MARGARETP-PHYLLIS  Assessment:       1. Chronic midline low back pain without sciatica    2. Continuous opioid dependence- contract 08/24/2018-failed uds for norco, + 2/18    3. Morbid obesity with BMI of 40.0-44.9, adult    4. Hyperplastic colonic polyp, unspecified part of colon    5. CLINT (obstructive sleep apnea)        Plan:       Chronic midline low back pain without sciatica  No NSAID's for 24 hours.   -     ketorolac injection 30 mg    Continuous opioid dependence- contract 08/24/2018-failed uds for norco, + 2/18  Will send refill of [place] to PCP.  She asked about Soma--advised this medication is dangerous and can cause respiratory depression and death especially when mixed with opiates.  She verbalized understanding.  RTC 3 mos.    Morbid obesity with BMI of 40.0-44.9, adult  Eating more due to stress.   Discussed stress management.    Hyperplastic colonic polyp, unspecified part of colon  Cannot afford colonoscopy at this time.    CLINT (obstructive sleep apnea)  Stable, follow up Dr. Yang.      Patient readiness: acceptance and barriers:none    During the course of the visit the patient was educated and counseled about the following:     Obesity:   General weight loss/lifestyle modification strategies discussed  (elicit support from others; identify saboteurs; non-food rewards, etc).  Regular aerobic exercise program discussed.    Goals: Obesity: Reduce calorie intake and BMI    Did patient meet goals/outcomes: No    The following self management tools provided: declined    Patient Instructions (the written plan) was given to the patient/family.     Time spent with patient: 30 minutes    Barriers to medications present (no )    Adverse reactions to current medications (no)    Over the counter medications reviewed (Yes)

## 2018-12-08 DIAGNOSIS — M51.36 DEGENERATIVE DISC DISEASE, LUMBAR: ICD-10-CM

## 2018-12-08 RX ORDER — HYDROCODONE BITARTRATE AND ACETAMINOPHEN 7.5; 325 MG/1; MG/1
1 TABLET ORAL EVERY 6 HOURS PRN
Qty: 90 TABLET | Refills: 0 | Status: SHIPPED | OUTPATIENT
Start: 2018-12-08 | End: 2019-03-06 | Stop reason: SDUPTHER

## 2018-12-11 RX ORDER — MELOXICAM 7.5 MG/1
TABLET ORAL
Qty: 30 TABLET | Refills: 1 | OUTPATIENT
Start: 2018-12-11

## 2018-12-12 DIAGNOSIS — G89.29 OTHER CHRONIC PAIN: ICD-10-CM

## 2018-12-12 RX ORDER — METAXALONE 400 MG/1
TABLET ORAL
Qty: 30 TABLET | Refills: 0 | Status: SHIPPED | OUTPATIENT
Start: 2018-12-12 | End: 2019-03-06 | Stop reason: SDUPTHER

## 2018-12-29 DIAGNOSIS — E55.9 VITAMIN D DEFICIENCY: Primary | ICD-10-CM

## 2018-12-29 DIAGNOSIS — E03.9 HYPOTHYROIDISM (ACQUIRED): ICD-10-CM

## 2018-12-29 RX ORDER — LEVOTHYROXINE SODIUM 150 UG/1
137 TABLET ORAL DAILY
Qty: 90 TABLET | Refills: 3 | Status: SHIPPED | OUTPATIENT
Start: 2018-12-29 | End: 2019-12-01 | Stop reason: SDUPTHER

## 2019-01-24 ENCOUNTER — OFFICE VISIT (OUTPATIENT)
Dept: FAMILY MEDICINE | Facility: CLINIC | Age: 64
End: 2019-01-24
Payer: COMMERCIAL

## 2019-01-24 VITALS
TEMPERATURE: 98 F | BODY MASS INDEX: 44.76 KG/M2 | DIASTOLIC BLOOD PRESSURE: 68 MMHG | HEART RATE: 72 BPM | SYSTOLIC BLOOD PRESSURE: 123 MMHG | WEIGHT: 268.94 LBS

## 2019-01-24 DIAGNOSIS — E66.01 MORBID OBESITY WITH BMI OF 40.0-44.9, ADULT: ICD-10-CM

## 2019-01-24 DIAGNOSIS — N30.01 ACUTE CYSTITIS WITH HEMATURIA: Primary | ICD-10-CM

## 2019-01-24 DIAGNOSIS — N30.10 INTERSTITIAL CYSTITIS: ICD-10-CM

## 2019-01-24 LAB
BILIRUB SERPL-MCNC: ABNORMAL MG/DL
BLOOD URINE, POC: ABNORMAL
CLARITY UR: ABNORMAL
COLOR, POC UA: ABNORMAL
GLUCOSE UR QL STRIP: NORMAL
KETONES UR QL STRIP: ABNORMAL
LEUKOCYTE ESTERASE URINE, POC: ABNORMAL
NITRITE, POC UA: ABNORMAL
PH, POC UA: 5
PROTEIN, POC: ABNORMAL
SPECIFIC GRAVITY, POC UA: 1015
UROBILINOGEN, POC UA: NORMAL

## 2019-01-24 PROCEDURE — 99999 PR PBB SHADOW E&M-EST. PATIENT-LVL V: CPT | Mod: PBBFAC,,, | Performed by: NURSE PRACTITIONER

## 2019-01-24 PROCEDURE — 99214 OFFICE O/P EST MOD 30 MIN: CPT | Mod: 25,S$GLB,, | Performed by: NURSE PRACTITIONER

## 2019-01-24 PROCEDURE — 99214 PR OFFICE/OUTPT VISIT, EST, LEVL IV, 30-39 MIN: ICD-10-PCS | Mod: 25,S$GLB,, | Performed by: NURSE PRACTITIONER

## 2019-01-24 PROCEDURE — 81002 URINALYSIS NONAUTO W/O SCOPE: CPT | Mod: S$GLB,,, | Performed by: NURSE PRACTITIONER

## 2019-01-24 PROCEDURE — 3008F PR BODY MASS INDEX (BMI) DOCUMENTED: ICD-10-PCS | Mod: CPTII,S$GLB,, | Performed by: NURSE PRACTITIONER

## 2019-01-24 PROCEDURE — 99999 PR PBB SHADOW E&M-EST. PATIENT-LVL V: ICD-10-PCS | Mod: PBBFAC,,, | Performed by: NURSE PRACTITIONER

## 2019-01-24 PROCEDURE — 87086 URINE CULTURE/COLONY COUNT: CPT

## 2019-01-24 PROCEDURE — 3008F BODY MASS INDEX DOCD: CPT | Mod: CPTII,S$GLB,, | Performed by: NURSE PRACTITIONER

## 2019-01-24 PROCEDURE — 87088 URINE BACTERIA CULTURE: CPT

## 2019-01-24 PROCEDURE — 87077 CULTURE AEROBIC IDENTIFY: CPT | Mod: 59

## 2019-01-24 PROCEDURE — 81002 POCT URINE DIPSTICK WITHOUT MICROSCOPE: ICD-10-PCS | Mod: S$GLB,,, | Performed by: NURSE PRACTITIONER

## 2019-01-24 PROCEDURE — 87186 SC STD MICRODIL/AGAR DIL: CPT | Mod: 59

## 2019-01-24 RX ORDER — SULFAMETHOXAZOLE AND TRIMETHOPRIM 800; 160 MG/1; MG/1
1 TABLET ORAL 2 TIMES DAILY
Qty: 6 TABLET | Refills: 0 | Status: SHIPPED | OUTPATIENT
Start: 2019-01-24 | End: 2019-01-27

## 2019-01-24 NOTE — PROGRESS NOTES
Subjective:       Patient ID: Edie Hernandez is a 63 y.o. female.    Chief Complaint: Urinary Tract Infection (possible for 2-3 days)    Urinary Tract Infection    This is a new problem. The current episode started acute onset. The problem occurs every urination. The problem has been unchanged. The quality of the pain is described as burning. There has been no fever. She is not sexually active. There is a history of pyelonephritis. Associated symptoms include urgency. Pertinent negatives include no chills, flank pain, frequency, hematuria, hesitancy, possible pregnancy or constipation. She has tried nothing for the symptoms. The treatment provided no relief. interstitial cystitis     Review of Systems   Constitutional: Negative for chills and fever.   Gastrointestinal: Negative for constipation.   Genitourinary: Positive for dysuria and urgency. Negative for decreased urine volume, difficulty urinating, flank pain, frequency, hematuria and hesitancy.       Objective:      Physical Exam   Constitutional: She is oriented to person, place, and time. She appears well-nourished. No distress.   HENT:   Head: Normocephalic and atraumatic.   Right Ear: External ear normal.   Left Ear: External ear normal.   Eyes: Right eye exhibits no discharge. Left eye exhibits no discharge.   Cardiovascular: Normal rate and regular rhythm. Exam reveals no gallop and no friction rub.   No murmur heard.  Pulmonary/Chest: Effort normal and breath sounds normal. No respiratory distress. She has no wheezes. She has no rales.   Abdominal: Soft. Bowel sounds are normal. She exhibits no distension. There is tenderness in the suprapubic area.   Neurological: She is alert and oriented to person, place, and time. Coordination normal.   Skin: Skin is warm and dry.   Psychiatric: She has a normal mood and affect. Her behavior is normal. Thought content normal.   Vitals reviewed.          Current Outpatient Medications:     albuterol 90  mcg/actuation inhaler, Inhale 2 puffs into the lungs every 6 (six) hours as needed for Wheezing., Disp: 18 g, Rfl: 3    allopurinol (ZYLOPRIM) 300 MG tablet, Take 1 tablet (300 mg total) by mouth once daily., Disp: 90 tablet, Rfl: 4    cetirizine (ALL DAY ALLERGY, CETIRIZINE,) 10 MG tablet, Take 1 tablet (10 mg total) by mouth once daily. (Patient taking differently: Take 10 mg by mouth daily as needed. ), Disp: 30 tablet, Rfl: 3    clobetasol (TEMOVATE) 0.05 % cream, Apply 1 application topically 2 (two) times daily. Apply to affected area, Disp: 30 g, Rfl: 2    diclofenac sodium (VOLTAREN) 1 % Gel, Apply 2 g topically 4 (four) times daily., Disp: 100 g, Rfl: 3    DULoxetine (CYMBALTA) 30 MG capsule, Take 1 capsule (30 mg total) by mouth once daily., Disp: 30 capsule, Rfl: 11    ergocalciferol (VITAMIN D2) 50,000 unit Cap, Take 1 capsule (50,000 Units total) by mouth every 7 days., Disp: 12 capsule, Rfl: 1    fluticasone (FLONASE) 50 mcg/actuation nasal spray, 1 spray (50 mcg total) by Each Nare route daily as needed., Disp: 1 Bottle, Rfl: 11    furosemide (LASIX) 40 MG tablet, Take 1 tablet (40 mg total) by mouth daily as needed. Every day PRN, Disp: 30 tablet, Rfl: 4    HYDROcodone-acetaminophen (NORCO) 7.5-325 mg per tablet, Take 1 tablet by mouth every 6 (six) hours as needed for Pain., Disp: 90 tablet, Rfl: 0    indomethacin (INDOCIN) 25 MG capsule, TAKE ONE CAPSULE BY MOUTH 3 TIMES A DAY WITH MEALS, Disp: 90 capsule, Rfl: 3    levothyroxine (SYNTHROID) 150 MCG tablet, Take 1 tablet (150 mcg total) by mouth once daily., Disp: 90 tablet, Rfl: 3    metaxalone (SKELAXIN) 400 mg tablet, TAKE 1 TABLET BY MOUTH 3 TIMES A DAY FOR 10 DAYS, Disp: 30 tablet, Rfl: 0    methylphenidate (RITALIN LA) 40 MG 24 hr capsule, Take 40 mg by mouth daily as needed. , Disp: , Rfl:     methylphenidate (RITALIN) 10 MG tablet, Take 10 mg by mouth daily as needed. , Disp: , Rfl:     oxybutynin (DITROPAN XL) 15 MG TR24,  Take 1 tablet (15 mg total) by mouth once daily., Disp: 90 tablet, Rfl: 3    pantoprazole (PROTONIX) 40 MG tablet, Take 1 tablet (40 mg total) by mouth once daily., Disp: 90 tablet, Rfl: 3    sulfamethoxazole-trimethoprim 800-160mg (BACTRIM DS) 800-160 mg Tab, Take 1 tablet by mouth 2 (two) times daily. for 3 days, Disp: 6 tablet, Rfl: 0    VITAMIN D2 50,000 unit capsule, TAKE 1 CAPSULE BY MOUTH EVERY 7 DAYS, Disp: 12 capsule, Rfl: 1  Assessment:       1. Acute cystitis with hematuria    2. Interstitial cystitis    3. Morbid obesity with BMI of 40.0-44.9, adult        Plan:       Acute cystitis with hematuria  Increase water intake.  -     POCT URINE DIPSTICK WITHOUT MICROSCOPE  -     Urine culture; Future; Expected date: 01/24/2019  -     sulfamethoxazole-trimethoprim 800-160mg (BACTRIM DS) 800-160 mg Tab; Take 1 tablet by mouth 2 (two) times daily. for 3 days  Dispense: 6 tablet; Refill: 0    Interstitial cystitis  No caffeine, soda, OJ, acidic drinks    Morbid obesity with BMI of 40.0-44.9, adult  Patient readiness: acceptance and barriers:none    During the course of the visit the patient was educated and counseled about the following:     Obesity:   General weight loss/lifestyle modification strategies discussed (elicit support from others; identify saboteurs; non-food rewards, etc).    Goals: Obesity: Reduce calorie intake and BMI    Did patient meet goals/outcomes: No    The following self management tools provided: declined    Patient Instructions (the written plan) was given to the patient/family.     Time spent with patient: 15 minutes    Barriers to medications present (no )    Adverse reactions to current medications (no)    Over the counter medications reviewed (No)

## 2019-01-24 NOTE — PATIENT INSTRUCTIONS

## 2019-01-28 LAB — BACTERIA UR CULT: NORMAL

## 2019-03-06 ENCOUNTER — LAB VISIT (OUTPATIENT)
Dept: LAB | Facility: HOSPITAL | Age: 64
End: 2019-03-06
Attending: NURSE PRACTITIONER
Payer: COMMERCIAL

## 2019-03-06 ENCOUNTER — OFFICE VISIT (OUTPATIENT)
Dept: FAMILY MEDICINE | Facility: CLINIC | Age: 64
End: 2019-03-06
Payer: COMMERCIAL

## 2019-03-06 VITALS
DIASTOLIC BLOOD PRESSURE: 59 MMHG | HEIGHT: 65 IN | WEIGHT: 277.31 LBS | SYSTOLIC BLOOD PRESSURE: 125 MMHG | HEART RATE: 66 BPM | BODY MASS INDEX: 46.2 KG/M2 | TEMPERATURE: 98 F

## 2019-03-06 DIAGNOSIS — M54.9 CHRONIC BACK PAIN GREATER THAN 3 MONTHS DURATION: ICD-10-CM

## 2019-03-06 DIAGNOSIS — E03.9 HYPOTHYROIDISM, UNSPECIFIED TYPE: ICD-10-CM

## 2019-03-06 DIAGNOSIS — E55.9 VITAMIN D DEFICIENCY: ICD-10-CM

## 2019-03-06 DIAGNOSIS — G89.29 CHRONIC BACK PAIN GREATER THAN 3 MONTHS DURATION: ICD-10-CM

## 2019-03-06 DIAGNOSIS — E03.9 HYPOTHYROIDISM (ACQUIRED): ICD-10-CM

## 2019-03-06 DIAGNOSIS — M54.5 CHRONIC LOW BACK PAIN, UNSPECIFIED BACK PAIN LATERALITY, WITH SCIATICA PRESENCE UNSPECIFIED: Primary | ICD-10-CM

## 2019-03-06 DIAGNOSIS — G89.29 CHRONIC LOW BACK PAIN, UNSPECIFIED BACK PAIN LATERALITY, WITH SCIATICA PRESENCE UNSPECIFIED: Primary | ICD-10-CM

## 2019-03-06 DIAGNOSIS — F11.20 CONTINUOUS OPIOID DEPENDENCE: ICD-10-CM

## 2019-03-06 DIAGNOSIS — E66.01 MORBID OBESITY WITH BMI OF 45.0-49.9, ADULT: ICD-10-CM

## 2019-03-06 LAB
25(OH)D3+25(OH)D2 SERPL-MCNC: 28 NG/ML
T4 FREE SERPL-MCNC: 1.18 NG/DL
TSH SERPL DL<=0.005 MIU/L-ACNC: 1.95 UIU/ML

## 2019-03-06 PROCEDURE — 99999 PR PBB SHADOW E&M-EST. PATIENT-LVL IV: ICD-10-PCS | Mod: PBBFAC,,, | Performed by: NURSE PRACTITIONER

## 2019-03-06 PROCEDURE — 99214 OFFICE O/P EST MOD 30 MIN: CPT | Mod: S$GLB,,, | Performed by: NURSE PRACTITIONER

## 2019-03-06 PROCEDURE — 99999 PR PBB SHADOW E&M-EST. PATIENT-LVL IV: CPT | Mod: PBBFAC,,, | Performed by: NURSE PRACTITIONER

## 2019-03-06 PROCEDURE — 3008F PR BODY MASS INDEX (BMI) DOCUMENTED: ICD-10-PCS | Mod: CPTII,S$GLB,, | Performed by: NURSE PRACTITIONER

## 2019-03-06 PROCEDURE — 84443 ASSAY THYROID STIM HORMONE: CPT

## 2019-03-06 PROCEDURE — 82306 VITAMIN D 25 HYDROXY: CPT

## 2019-03-06 PROCEDURE — 3008F BODY MASS INDEX DOCD: CPT | Mod: CPTII,S$GLB,, | Performed by: NURSE PRACTITIONER

## 2019-03-06 PROCEDURE — 84439 ASSAY OF FREE THYROXINE: CPT

## 2019-03-06 PROCEDURE — 99214 PR OFFICE/OUTPT VISIT, EST, LEVL IV, 30-39 MIN: ICD-10-PCS | Mod: S$GLB,,, | Performed by: NURSE PRACTITIONER

## 2019-03-06 PROCEDURE — 36415 COLL VENOUS BLD VENIPUNCTURE: CPT | Mod: PO

## 2019-03-06 PROCEDURE — 80307 DRUG TEST PRSMV CHEM ANLYZR: CPT

## 2019-03-06 RX ORDER — METAXALONE 400 MG/1
400 TABLET ORAL 3 TIMES DAILY PRN
Qty: 30 TABLET | Refills: 1 | Status: SHIPPED | OUTPATIENT
Start: 2019-03-06 | End: 2019-04-22 | Stop reason: SDUPTHER

## 2019-03-07 RX ORDER — HYDROCODONE BITARTRATE AND ACETAMINOPHEN 7.5; 325 MG/1; MG/1
1 TABLET ORAL EVERY 6 HOURS PRN
Qty: 90 TABLET | Refills: 0 | Status: SHIPPED | OUTPATIENT
Start: 2019-03-07 | End: 2019-06-17 | Stop reason: SDUPTHER

## 2019-03-11 LAB
6MAM UR QL: NOT DETECTED
7AMINOCLONAZEPAM UR QL: NOT DETECTED
A-OH ALPRAZ UR QL: NOT DETECTED
ALPRAZ UR QL: NOT DETECTED
AMPHET UR QL SCN: NOT DETECTED
ANNOTATION COMMENT IMP: NORMAL
ANNOTATION COMMENT IMP: NORMAL
BARBITURATES UR QL: NOT DETECTED
BUPRENORPHINE UR QL: NOT DETECTED
BZE UR QL: NOT DETECTED
CARBOXYTHC UR QL: NOT DETECTED
CARISOPRODOL UR QL: NOT DETECTED
CLONAZEPAM UR QL: NOT DETECTED
CODEINE UR QL: NOT DETECTED
CREAT UR-MCNC: 59.7 MG/DL (ref 20–400)
DIAZEPAM UR QL: NOT DETECTED
ETHYL GLUCURONIDE UR QL: NOT DETECTED
FENTANYL UR QL: NOT DETECTED
HYDROCODONE UR QL: PRESENT
HYDROMORPHONE UR QL: NOT DETECTED
LORAZEPAM UR QL: NOT DETECTED
MDA UR QL: NOT DETECTED
MDEA UR QL: NOT DETECTED
MDMA UR QL: NOT DETECTED
ME-PHENIDATE UR QL: NOT DETECTED
MEPERIDINE UR QL: NOT DETECTED
METHADONE UR QL: NOT DETECTED
METHAMPHET UR QL: NOT DETECTED
MIDAZOLAM UR QL SCN: NOT DETECTED
MORPHINE UR QL: NOT DETECTED
NORBUPRENORPHINE UR QL CFM: NOT DETECTED
NORDIAZEPAM UR QL: NOT DETECTED
NORFENTANYL UR QL: NOT DETECTED
NORHYDROCODONE UR QL CFM: NOT DETECTED
NOROXYCODONE UR QL CFM: NOT DETECTED
NOROXYMORPHONE: NOT DETECTED
OXAZEPAM UR QL: NOT DETECTED
OXYCODONE UR QL: NOT DETECTED
OXYMORPHONE UR QL: NOT DETECTED
PATHOLOGY STUDY: NORMAL
PCP UR QL: NOT DETECTED
PHENTERMINE UR QL: NOT DETECTED
PROPOXYPH UR QL: NOT DETECTED
SERVICE CMNT-IMP: NORMAL
TAPENTADOL UR QL SCN: NOT DETECTED
TAPENTADOL-O-SULF: NOT DETECTED
TEMAZEPAM UR QL: NOT DETECTED
TRAMADOL UR QL: NOT DETECTED
ZOLPIDEM UR QL: NOT DETECTED

## 2019-04-22 DIAGNOSIS — G89.29 CHRONIC LOW BACK PAIN, UNSPECIFIED BACK PAIN LATERALITY, WITH SCIATICA PRESENCE UNSPECIFIED: ICD-10-CM

## 2019-04-22 DIAGNOSIS — M54.5 CHRONIC LOW BACK PAIN, UNSPECIFIED BACK PAIN LATERALITY, WITH SCIATICA PRESENCE UNSPECIFIED: ICD-10-CM

## 2019-04-22 DIAGNOSIS — Z87.39 HISTORY OF ACUTE GOUTY ARTHRITIS: ICD-10-CM

## 2019-04-22 RX ORDER — METAXALONE 400 MG/1
400 TABLET ORAL 3 TIMES DAILY PRN
Qty: 30 TABLET | Refills: 1 | Status: SHIPPED | OUTPATIENT
Start: 2019-04-22 | End: 2019-06-17 | Stop reason: SDUPTHER

## 2019-04-22 RX ORDER — INDOMETHACIN 25 MG/1
CAPSULE ORAL
Qty: 90 CAPSULE | Refills: 0 | Status: SHIPPED | OUTPATIENT
Start: 2019-04-22 | End: 2020-07-01 | Stop reason: SDUPTHER

## 2019-04-24 DIAGNOSIS — E55.9 VITAMIN D DEFICIENCY: ICD-10-CM

## 2019-04-25 RX ORDER — ERGOCALCIFEROL 1.25 MG/1
CAPSULE ORAL
Qty: 12 CAPSULE | Refills: 1 | Status: SHIPPED | OUTPATIENT
Start: 2019-04-25 | End: 2019-10-04 | Stop reason: SDUPTHER

## 2019-06-03 ENCOUNTER — PATIENT OUTREACH (OUTPATIENT)
Dept: ADMINISTRATIVE | Facility: HOSPITAL | Age: 64
End: 2019-06-03

## 2019-06-08 DIAGNOSIS — N32.81 OAB (OVERACTIVE BLADDER): ICD-10-CM

## 2019-06-09 RX ORDER — OXYBUTYNIN CHLORIDE 15 MG/1
TABLET, EXTENDED RELEASE ORAL
Qty: 90 TABLET | Refills: 3 | Status: SHIPPED | OUTPATIENT
Start: 2019-06-09 | End: 2020-03-19 | Stop reason: SDUPTHER

## 2019-06-10 ENCOUNTER — OCCUPATIONAL HEALTH (OUTPATIENT)
Dept: URGENT CARE | Facility: CLINIC | Age: 64
End: 2019-06-10

## 2019-06-10 PROCEDURE — 99499 UNLISTED E&M SERVICE: CPT | Mod: S$GLB,,, | Performed by: NURSE PRACTITIONER

## 2019-06-10 PROCEDURE — 99499 PR PHYSICAL - DOT/CDL: ICD-10-PCS | Mod: S$GLB,,, | Performed by: NURSE PRACTITIONER

## 2019-06-13 DIAGNOSIS — K21.9 GASTROESOPHAGEAL REFLUX DISEASE, ESOPHAGITIS PRESENCE NOT SPECIFIED: ICD-10-CM

## 2019-06-13 RX ORDER — PANTOPRAZOLE SODIUM 40 MG/1
TABLET, DELAYED RELEASE ORAL
Qty: 90 TABLET | Refills: 3 | Status: SHIPPED | OUTPATIENT
Start: 2019-06-13 | End: 2020-03-19 | Stop reason: SDUPTHER

## 2019-06-17 ENCOUNTER — OFFICE VISIT (OUTPATIENT)
Dept: FAMILY MEDICINE | Facility: CLINIC | Age: 64
End: 2019-06-17
Payer: COMMERCIAL

## 2019-06-17 VITALS
HEART RATE: 61 BPM | DIASTOLIC BLOOD PRESSURE: 68 MMHG | WEIGHT: 268.75 LBS | BODY MASS INDEX: 44.78 KG/M2 | TEMPERATURE: 98 F | RESPIRATION RATE: 12 BRPM | HEIGHT: 65 IN | SYSTOLIC BLOOD PRESSURE: 130 MMHG | OXYGEN SATURATION: 96 %

## 2019-06-17 DIAGNOSIS — R73.9 HYPERGLYCEMIA: ICD-10-CM

## 2019-06-17 DIAGNOSIS — G89.29 CHRONIC LOW BACK PAIN, UNSPECIFIED BACK PAIN LATERALITY, WITH SCIATICA PRESENCE UNSPECIFIED: ICD-10-CM

## 2019-06-17 DIAGNOSIS — H65.91 RIGHT OTITIS MEDIA WITH EFFUSION: ICD-10-CM

## 2019-06-17 DIAGNOSIS — M54.9 CHRONIC BACK PAIN GREATER THAN 3 MONTHS DURATION: ICD-10-CM

## 2019-06-17 DIAGNOSIS — Z12.31 ENCOUNTER FOR SCREENING MAMMOGRAM FOR MALIGNANT NEOPLASM OF BREAST: ICD-10-CM

## 2019-06-17 DIAGNOSIS — G89.29 CHRONIC BACK PAIN GREATER THAN 3 MONTHS DURATION: ICD-10-CM

## 2019-06-17 DIAGNOSIS — E66.01 MORBID OBESITY WITH BMI OF 45.0-49.9, ADULT: ICD-10-CM

## 2019-06-17 DIAGNOSIS — E53.8 B12 NUTRITIONAL DEFICIENCY: ICD-10-CM

## 2019-06-17 DIAGNOSIS — F11.20 CONTINUOUS OPIOID DEPENDENCE: ICD-10-CM

## 2019-06-17 DIAGNOSIS — K63.5 HYPERPLASTIC COLONIC POLYP, UNSPECIFIED PART OF COLON: ICD-10-CM

## 2019-06-17 DIAGNOSIS — M54.5 CHRONIC LOW BACK PAIN, UNSPECIFIED BACK PAIN LATERALITY, WITH SCIATICA PRESENCE UNSPECIFIED: ICD-10-CM

## 2019-06-17 DIAGNOSIS — E03.9 HYPOTHYROIDISM, UNSPECIFIED TYPE: ICD-10-CM

## 2019-06-17 DIAGNOSIS — E55.9 VITAMIN D DEFICIENCY: ICD-10-CM

## 2019-06-17 DIAGNOSIS — E78.5 HYPERLIPIDEMIA, UNSPECIFIED HYPERLIPIDEMIA TYPE: Primary | ICD-10-CM

## 2019-06-17 PROCEDURE — 99999 PR PBB SHADOW E&M-EST. PATIENT-LVL IV: ICD-10-PCS | Mod: PBBFAC,,, | Performed by: FAMILY MEDICINE

## 2019-06-17 PROCEDURE — 3008F BODY MASS INDEX DOCD: CPT | Mod: CPTII,S$GLB,, | Performed by: FAMILY MEDICINE

## 2019-06-17 PROCEDURE — 99214 OFFICE O/P EST MOD 30 MIN: CPT | Mod: S$GLB,,, | Performed by: FAMILY MEDICINE

## 2019-06-17 PROCEDURE — 3008F PR BODY MASS INDEX (BMI) DOCUMENTED: ICD-10-PCS | Mod: CPTII,S$GLB,, | Performed by: FAMILY MEDICINE

## 2019-06-17 PROCEDURE — 99999 PR PBB SHADOW E&M-EST. PATIENT-LVL IV: CPT | Mod: PBBFAC,,, | Performed by: FAMILY MEDICINE

## 2019-06-17 PROCEDURE — 99214 PR OFFICE/OUTPT VISIT, EST, LEVL IV, 30-39 MIN: ICD-10-PCS | Mod: S$GLB,,, | Performed by: FAMILY MEDICINE

## 2019-06-17 RX ORDER — METAXALONE 400 MG/1
400 TABLET ORAL 3 TIMES DAILY PRN
Qty: 90 TABLET | Refills: 1 | Status: SHIPPED | OUTPATIENT
Start: 2019-06-17 | End: 2019-07-12 | Stop reason: SDUPTHER

## 2019-06-17 RX ORDER — HYDROCODONE BITARTRATE AND ACETAMINOPHEN 7.5; 325 MG/1; MG/1
1 TABLET ORAL EVERY 6 HOURS PRN
Qty: 90 TABLET | Refills: 0 | Status: SHIPPED | OUTPATIENT
Start: 2019-06-17 | End: 2019-10-04 | Stop reason: SDUPTHER

## 2019-06-17 RX ORDER — DICLOFENAC SODIUM 10 MG/G
2 GEL TOPICAL 4 TIMES DAILY
COMMUNITY
End: 2022-08-19 | Stop reason: SDUPTHER

## 2019-06-17 RX ORDER — AZITHROMYCIN 250 MG/1
TABLET, FILM COATED ORAL
Qty: 6 TABLET | Refills: 0 | Status: SHIPPED | OUTPATIENT
Start: 2019-06-17 | End: 2019-06-22

## 2019-06-17 NOTE — PROGRESS NOTES
Subjective:       Patient ID: Edie Henrandez is a 63 y.o. female.    Chief Complaint: Follow-up (3mth f/u )    HPI  Review of Systems   Constitutional: Negative for fatigue and unexpected weight change.   Respiratory: Negative for chest tightness and shortness of breath.    Cardiovascular: Negative for chest pain, palpitations and leg swelling.   Gastrointestinal: Negative for abdominal pain.   Musculoskeletal: Negative for arthralgias.   Neurological: Negative for dizziness, syncope, light-headedness and headaches.       Patient Active Problem List   Diagnosis    GERD (gastroesophageal reflux disease)    Chronic back pain greater than 3 months duration    Environmental allergies    Fibromyalgia    B12 nutritional deficiency    CLINT (obstructive sleep apnea)    Chronic sinusitis    Asthma    Hypothyroid    Nausea    Night sweats    Tinnitus, bilateral    Frequent UTI    Hyperlipidemia    Colon polyp, hyperplastic    Interstitial cystitis    Hemangioma    Wart    Globus sensation    DDD (degenerative disc disease), lumbar    Morbid obesity with BMI of 45.0-49.9, adult    Edema    Mixed incontinence    Continuous opioid dependence- contract 10/18/17-failed uds for norco, + 2/18     Patient is here for a chronic conditions follow up.    H/o colon polyp. Needs 5 year surveillance 2018.  Can't afford out of pocket if neg so putting it off.  Will check into cologuard    Continuous use of opioids follow-up:  Current medication and dosing:  norco 7.5 qday   Supply:  90 day supply  Side effects: none  Pain controlled: yes  Medication compliance: yes  DPS checked today: yes today, no evidence of diversion  UDS: 3/19  pain contract signed: 9/18  Opioid risk tool done:n/A  Cause of Pain: chronic low back apin      Taking ritalin LA 40mg a day for daytime somnolence Dr. Yang. Taking break over the summer since off  Objective:      Physical Exam   Constitutional: She is oriented to person, place, and  time. She appears well-developed and well-nourished.   HENT:   Right Ear: Ear canal normal. Tympanic membrane is erythematous and retracted. A middle ear effusion is present.   Left Ear: Tympanic membrane and ear canal normal.   Cardiovascular: Normal rate, regular rhythm and normal heart sounds.   Pulmonary/Chest: Effort normal and breath sounds normal.   Musculoskeletal: She exhibits no edema.   Neurological: She is alert and oriented to person, place, and time.   Skin: Skin is warm and dry.   Psychiatric: She has a normal mood and affect.   Nursing note and vitals reviewed.      Assessment:       1. Hyperlipidemia, unspecified hyperlipidemia type    2. Continuous opioid dependence- contract 10/18/17-failed uds for norco, + 2/18    3. B12 nutritional deficiency    4. Hypothyroidism, unspecified type    5. Morbid obesity with BMI of 45.0-49.9, adult    6. Hyperglycemia    7. Vitamin D deficiency    8. Hyperplastic colonic polyp, unspecified part of colon    9. Chronic back pain greater than 3 months duration    10. Chronic low back pain, unspecified back pain laterality, with sciatica presence unspecified    11. Right otitis media with effusion    12. Encounter for screening mammogram for malignant neoplasm of breast        Plan:         1. Hyperlipidemia, unspecified hyperlipidemia type  Controlled on current medications.  Continue current medications.    - Comprehensive metabolic panel; Future  - Lipid panel; Future    2. Continuous opioid dependence- contract 10/18/17-failed uds for norco, + 2/18  Controlled on current medications.  Continue current medications.  Counseled patient on habit forming potential of opiates, risk of sedation, respiratory suppression or arrest especially if used in conjunction with benzodiazepines or alcohol.  Counseled patient to avoid driving while on the medication, rules of the pain contract and need for routine 3 month follow ups.  Patient agrees to all aspects of the plan of care  "and wishes to proceed with therapy.  The plan is always to use alternatives less habit forming, to utilize interventions and therapies that reduce pain as an adjunctive treatment, and limit and wean the dose of narcotics as soon as able and appropriate.        3. B12 nutritional deficiency  Stable condition.  Continue current medications.  Will adjust based on lab findings or if condition changes.    - Vitamin B12; Future    4. Hypothyroidism, unspecified type  Controlled on current medications.  Continue current medications.    - CBC auto differential; Future  - TSH; Future  - T4, free; Future    5. Morbid obesity with BMI of 45.0-49.9, adult  Counseled patient on his ideal body weight, health consequences of being obese and current recommendations including weekly exercise and a heart healthy diet.  Current BMI is:Estimated body mass index is 44.72 kg/m² as calculated from the following:    Height as of this encounter: 5' 5" (1.651 m).    Weight as of this encounter: 121.9 kg (268 lb 11.9 oz)..  Patient is aware that ideal BMI < 25 or Weight in (lb) to have BMI = 25: 149.9.        6. Hyperglycemia   Your blood sugar is borderline high.  This means you are at risk for developing type 2 diabetes mellitus.  To lessen your risk you should exercise regularly, avoid excess carbohydrates and work toward a body mass index of less than 25.      - Hemoglobin A1c; Future    7. Vitamin D deficiency  Cont weekly supplement  - Vitamin D; Future    8. Hyperplastic colonic polyp, unspecified part of colon  Recommend colonoscopy surveillance asap    9. Chronic back pain greater than 3 months duration  Cont current mgmt  - HYDROcodone-acetaminophen (NORCO) 7.5-325 mg per tablet; Take 1 tablet by mouth every 6 (six) hours as needed for Pain.  Dispense: 90 tablet; Refill: 0    10. Chronic low back pain, unspecified back pain laterality, with sciatica presence unspecified  Cont current mgmt  - metaxalone (SKELAXIN) 400 mg tablet; Take " 1 tablet (400 mg total) by mouth 3 (three) times daily as needed.  Dispense: 90 tablet; Refill: 1    11. Right otitis media with effusion  Recommend otc non-sedating antihistamine such as Loratadine and/or steroid nasal spray such as Flonase as directed and as needed.  Please return to clinic if symptoms persist after these interventions.  Treat  - azithromycin (Z-JESS) 250 MG tablet; Take 2 tablets by mouth on day 1; Take 1 tablet by mouth on days 2-5  Dispense: 6 tablet; Refill: 0    12. Encounter for screening mammogram for malignant neoplasm of breast  Screen and treat as indicated:    - Mammo Digital Screening Bilateral With CAD; Future    Time spent with patient: 20 minutes    Patient with be reevaluated in 3 months or sooner prn    Greater than 50% of this visit was spent counseling as described in above documentation:Yes

## 2019-06-17 NOTE — PATIENT INSTRUCTIONS
Check into whether insurance pays for Cologuard  Walking for Fitness  Fitness walking has something for everyone, even people who are already fit. Walking is one of the safest ways to condition your body aerobically. It can boost energy, help you lose weight, and reduce stress.    Physical benefits  · Walking strengthens your heart and lungs, and tones your muscles.  · When walking, your feet land with less impact than in other sports. This reduces chances of muscle, bone, and joint injury.  · Regular walking improves your cholesterol levels and lowers your risk of heart disease. And it helps you control your blood sugar if you have diabetes.  · Walking is a weight-bearing activity, which helps maintain bone density. This can help prevent osteoporosis.  Personal rewards  · Taking walks can help you relax and manage stress. And fitness walking may make you feel better about yourself.  · Walking can help you sleep better at night and make you less likely to be depressed.  · Regular walking may help maintain your memory as you get older.  · Walking is a great way to spend extra time with friends and family members. Be sure to invite your dog along!  Q&A about fitness walking  Q: Will walking keep me fit?  A: Yes. Regular walking at the right pace gives you all the benefits of other aerobic activities, such as jogging and swimming.  Q: Will walking help me lose weight and keep it off?  A: Yes. Per mile, walking can burn as many calories as jogging. Your health care provider can help work walking into your weight-loss plan.  Q: Is walking safe for my health?  A: Yes. Walking is safe if you have high blood pressure, diabetes, heart disease, or other conditions. Talk to your healthcare provider before you start.  Date Last Reviewed: 4/1/2017  © 5321-4097 [employer]. [address] [place] [address], [place] [address]. All rights reserved. This information is not intended as a substitute for professional medical care.  Always follow your healthcare professional's instructions.          Weight Management: Getting Started  Healthy bodies come in all shapes and sizes. Not all bodies are made to be thin. For some people, a healthy weight is higher than the average weight listed on weight charts. Your healthcare provider can help you decide on a healthy weight for you.    Reasons to lose weight  Losing weight can help with some health problems, such as high blood pressure, heart disease, diabetes, sleep apnea, and arthritis. You may also feel more energy.  Set your long-term goal  Your goal doesn't even have to be a specific weight. You may decide on a fitness goal (such as being able to walk 10 miles a week), or a health goal (such as lowering your blood pressure). Choose a goal that is measurable and reasonable, so you know when you've reached it. A goal of reaching a BMI of less than 25 is not always reasonable (or possible).   Make an action plan  Habits dont change overnight. Setting your goals too high can leave you feeling discouraged if you cant reach them. Be realistic. Choose one or two small changes you can make now. Set an action plan for how you are going to make these changes. When you can stick to this plan, keep making a few more small changes. Taking small steps will help you stay on the path to success.  Track your progress  Write down your goals. Then, keep a daily record of your progress. Write down what you eat and how active you are. This record lets you look back on how much youve done. It may also help when youre feeling frustrated. Reward yourself for success. Even if you dont reach every goal, give yourself credit for what you do get done.  Get support  Encouragement from others can help make losing weight easier. Ask your family members and friends for support. They may even want to join you. Also look to your healthcare provider, registered dietitian, and  for help. Your local  hospital can give you more information about nutrition, exercise, and weight loss.  Date Last Reviewed: 1/31/2016  © 3642-3086 [employer], [employer]. [address] [place] [address], [place] [address]. All rights reserved. This information is not intended as a substitute for professional medical care. Always follow your healthcare professional's instructions.

## 2019-06-20 ENCOUNTER — HOSPITAL ENCOUNTER (OUTPATIENT)
Dept: RADIOLOGY | Facility: CLINIC | Age: 64
Discharge: HOME | End: 2019-06-20
Attending: FAMILY MEDICINE
Payer: COMMERCIAL

## 2019-06-20 DIAGNOSIS — Z12.31 ENCOUNTER FOR SCREENING MAMMOGRAM FOR MALIGNANT NEOPLASM OF BREAST: ICD-10-CM

## 2019-06-20 PROCEDURE — 77067 SCR MAMMO BI INCL CAD: CPT | Mod: 26,,, | Performed by: RADIOLOGY

## 2019-06-20 PROCEDURE — 77067 MAMMO DIGITAL SCREENING BILAT WITH TOMOSYNTHESIS_CAD: ICD-10-PCS | Mod: 26,,, | Performed by: RADIOLOGY

## 2019-06-20 PROCEDURE — 77067 SCR MAMMO BI INCL CAD: CPT | Mod: TC,PO

## 2019-06-20 PROCEDURE — 77063 BREAST TOMOSYNTHESIS BI: CPT | Mod: 26,,, | Performed by: RADIOLOGY

## 2019-06-20 PROCEDURE — 77063 MAMMO DIGITAL SCREENING BILAT WITH TOMOSYNTHESIS_CAD: ICD-10-PCS | Mod: 26,,, | Performed by: RADIOLOGY

## 2019-07-12 DIAGNOSIS — G89.29 CHRONIC LOW BACK PAIN, UNSPECIFIED BACK PAIN LATERALITY, WITH SCIATICA PRESENCE UNSPECIFIED: ICD-10-CM

## 2019-07-12 DIAGNOSIS — M54.5 CHRONIC LOW BACK PAIN, UNSPECIFIED BACK PAIN LATERALITY, WITH SCIATICA PRESENCE UNSPECIFIED: ICD-10-CM

## 2019-07-14 RX ORDER — METAXALONE 400 MG/1
400 TABLET ORAL 3 TIMES DAILY PRN
Qty: 90 TABLET | Refills: 1 | Status: SHIPPED | OUTPATIENT
Start: 2019-07-14 | End: 2019-08-10 | Stop reason: SDUPTHER

## 2019-08-05 NOTE — PROGRESS NOTES
8/5/2019      Elizabeth Patiño  [address] [place] [address]         To [employer] Employer of Elizabeth Patiño:    Please excuse Elizabeth Patiño from work from 7/22/19 through 8/5/19. Her reason for missing work is surigcal. She is to return to work on 8/6/19 with no lifting anything over 10lb sand is avoid any excessive twisting for 4 weeks from the date of surgery.    Thank you,          Mary Espinoza MD  General Surgery  [facility]  [id number] [place]  [address] [phone]   Subjective:       Patient ID: Edie Hernandez is a 62 y.o. female.    Chief Complaint: Otalgia (left ear pain; shooting pains, not constant; X 2 days)    2- also hurt low back raking leaves at home.  Pain radiates from low midline L-S area to left hip/thigh.  Same pain pattern as past events. Some left sided muscle spasms as well.  Has used carisopodol in past (bedtime only).      Otalgia    There is pain in the left ear. This is a new problem. The current episode started in the past 7 days. The problem has been waxing and waning. There has been no fever. The pain is mild. Pertinent negatives include no abdominal pain, rash or sore throat. Associated symptoms comments: Ears feel clogged; chronic sinus congestion.. She has tried nothing for the symptoms.     Review of Systems   Constitutional: Negative for fever.   HENT: Positive for ear pain. Negative for sore throat.    Respiratory: Negative for shortness of breath.    Cardiovascular: Negative for chest pain.   Gastrointestinal: Negative for abdominal pain and nausea.   Skin: Negative for rash.   All other systems reviewed and are negative.      Objective:      Physical Exam   Constitutional: She appears well-developed. No distress.   HENT:   Right Ear: Ear canal normal. Tympanic membrane is bulging. Tympanic membrane is not erythematous. A middle ear effusion is present.   Left Ear: Ear canal normal. Tympanic membrane is injected and bulging. Tympanic membrane is not erythematous. A middle ear effusion is present.   Nose: Mucosal edema present. Right sinus exhibits no maxillary sinus tenderness. Left sinus exhibits no maxillary sinus tenderness.   Mouth/Throat: Posterior oropharyngeal erythema present.   Neck: Neck supple.   Cardiovascular: Normal rate and regular rhythm.    No murmur heard.  Pulmonary/Chest: Effort normal and breath sounds normal.   Musculoskeletal:        Lumbar back: She exhibits tenderness.        Back:    Lymphadenopathy:     She has no  cervical adenopathy.   Skin: Skin is warm and dry.       Assessment:       1. Dysfunction of both eustachian tubes    2. Seromucinous otitis media of both ears    3. Acute midline low back pain with left-sided sciatica    4. Chronic back pain greater than 3 months duration        Plan:         Edie was seen today for otalgia.    Diagnoses and all orders for this visit:    Dysfunction of both eustachian tubes  -     methylPREDNISolone (MEDROL DOSEPACK) 4 mg tablet; use as directed    Seromucinous otitis media of both ears  -     methylPREDNISolone (MEDROL DOSEPACK) 4 mg tablet; use as directed    Acute midline low back pain with left-sided sciatica  -     ketorolac injection 30 mg; Inject 30 mg into the muscle once.  -     methylPREDNISolone (MEDROL DOSEPACK) 4 mg tablet; use as directed    Chronic back pain greater than 3 months duration  -     ketorolac injection 30 mg; Inject 30 mg into the muscle once.  -     carisoprodol (SOMA) 350 MG tablet; Take 1 tablet (350 mg total) by mouth nightly as needed for Muscle spasms.

## 2019-08-10 DIAGNOSIS — G89.29 CHRONIC LOW BACK PAIN, UNSPECIFIED BACK PAIN LATERALITY, WITH SCIATICA PRESENCE UNSPECIFIED: ICD-10-CM

## 2019-08-10 DIAGNOSIS — M54.5 CHRONIC LOW BACK PAIN, UNSPECIFIED BACK PAIN LATERALITY, WITH SCIATICA PRESENCE UNSPECIFIED: ICD-10-CM

## 2019-08-10 RX ORDER — METAXALONE 400 MG/1
400 TABLET ORAL 3 TIMES DAILY PRN
Qty: 90 TABLET | Refills: 1 | Status: SHIPPED | OUTPATIENT
Start: 2019-08-10 | End: 2019-09-09 | Stop reason: SDUPTHER

## 2019-09-09 DIAGNOSIS — M54.5 CHRONIC LOW BACK PAIN, UNSPECIFIED BACK PAIN LATERALITY, WITH SCIATICA PRESENCE UNSPECIFIED: ICD-10-CM

## 2019-09-09 DIAGNOSIS — G89.29 CHRONIC LOW BACK PAIN, UNSPECIFIED BACK PAIN LATERALITY, WITH SCIATICA PRESENCE UNSPECIFIED: ICD-10-CM

## 2019-09-09 RX ORDER — METAXALONE 400 MG/1
TABLET ORAL
Qty: 90 TABLET | Refills: 1 | Status: SHIPPED | OUTPATIENT
Start: 2019-09-09 | End: 2019-10-03 | Stop reason: SDUPTHER

## 2019-09-27 ENCOUNTER — LAB VISIT (OUTPATIENT)
Dept: LAB | Facility: HOSPITAL | Age: 64
End: 2019-09-27
Attending: FAMILY MEDICINE
Payer: COMMERCIAL

## 2019-09-27 DIAGNOSIS — R73.9 HYPERGLYCEMIA: ICD-10-CM

## 2019-09-27 DIAGNOSIS — E53.8 B12 NUTRITIONAL DEFICIENCY: ICD-10-CM

## 2019-09-27 DIAGNOSIS — E55.9 VITAMIN D DEFICIENCY: ICD-10-CM

## 2019-09-27 DIAGNOSIS — E78.5 HYPERLIPIDEMIA, UNSPECIFIED HYPERLIPIDEMIA TYPE: ICD-10-CM

## 2019-09-27 DIAGNOSIS — E03.9 HYPOTHYROIDISM, UNSPECIFIED TYPE: ICD-10-CM

## 2019-09-27 LAB
25(OH)D3+25(OH)D2 SERPL-MCNC: 28 NG/ML (ref 30–96)
ALBUMIN SERPL BCP-MCNC: 3.5 G/DL (ref 3.5–5.2)
ALP SERPL-CCNC: 119 U/L (ref 55–135)
ALT SERPL W/O P-5'-P-CCNC: 41 U/L (ref 10–44)
ANION GAP SERPL CALC-SCNC: 10 MMOL/L (ref 8–16)
AST SERPL-CCNC: 35 U/L (ref 10–40)
BASOPHILS # BLD AUTO: 0.04 K/UL (ref 0–0.2)
BASOPHILS NFR BLD: 0.7 % (ref 0–1.9)
BILIRUB SERPL-MCNC: 1.3 MG/DL (ref 0.1–1)
BUN SERPL-MCNC: 14 MG/DL (ref 8–23)
CALCIUM SERPL-MCNC: 8.7 MG/DL (ref 8.7–10.5)
CHLORIDE SERPL-SCNC: 106 MMOL/L (ref 95–110)
CHOLEST SERPL-MCNC: 191 MG/DL (ref 120–199)
CHOLEST/HDLC SERPL: 3.7 {RATIO} (ref 2–5)
CO2 SERPL-SCNC: 21 MMOL/L (ref 23–29)
CREAT SERPL-MCNC: 1.2 MG/DL (ref 0.5–1.4)
DIFFERENTIAL METHOD: ABNORMAL
EOSINOPHIL # BLD AUTO: 0.4 K/UL (ref 0–0.5)
EOSINOPHIL NFR BLD: 7.1 % (ref 0–8)
ERYTHROCYTE [DISTWIDTH] IN BLOOD BY AUTOMATED COUNT: 13 % (ref 11.5–14.5)
EST. GFR  (AFRICAN AMERICAN): 55.6 ML/MIN/1.73 M^2
EST. GFR  (NON AFRICAN AMERICAN): 48.2 ML/MIN/1.73 M^2
ESTIMATED AVG GLUCOSE: 180 MG/DL (ref 68–131)
GLUCOSE SERPL-MCNC: 280 MG/DL (ref 70–110)
HBA1C MFR BLD HPLC: 7.9 % (ref 4–5.6)
HCT VFR BLD AUTO: 43.2 % (ref 37–48.5)
HDLC SERPL-MCNC: 51 MG/DL (ref 40–75)
HDLC SERPL: 26.7 % (ref 20–50)
HGB BLD-MCNC: 13.9 G/DL (ref 12–16)
IMM GRANULOCYTES # BLD AUTO: 0.02 K/UL (ref 0–0.04)
IMM GRANULOCYTES NFR BLD AUTO: 0.3 % (ref 0–0.5)
LDLC SERPL CALC-MCNC: 96.2 MG/DL (ref 63–159)
LYMPHOCYTES # BLD AUTO: 1.2 K/UL (ref 1–4.8)
LYMPHOCYTES NFR BLD: 20 % (ref 18–48)
MCH RBC QN AUTO: 31.2 PG (ref 27–31)
MCHC RBC AUTO-ENTMCNC: 32.2 G/DL (ref 32–36)
MCV RBC AUTO: 97 FL (ref 82–98)
MONOCYTES # BLD AUTO: 0.4 K/UL (ref 0.3–1)
MONOCYTES NFR BLD: 6.3 % (ref 4–15)
NEUTROPHILS # BLD AUTO: 3.9 K/UL (ref 1.8–7.7)
NEUTROPHILS NFR BLD: 65.6 % (ref 38–73)
NONHDLC SERPL-MCNC: 140 MG/DL
NRBC BLD-RTO: 0 /100 WBC
PLATELET # BLD AUTO: 240 K/UL (ref 150–350)
PMV BLD AUTO: 10.2 FL (ref 9.2–12.9)
POTASSIUM SERPL-SCNC: 3.9 MMOL/L (ref 3.5–5.1)
PROT SERPL-MCNC: 6.6 G/DL (ref 6–8.4)
RBC # BLD AUTO: 4.46 M/UL (ref 4–5.4)
SODIUM SERPL-SCNC: 137 MMOL/L (ref 136–145)
T4 FREE SERPL-MCNC: 0.88 NG/DL (ref 0.71–1.51)
TRIGL SERPL-MCNC: 219 MG/DL (ref 30–150)
TSH SERPL DL<=0.005 MIU/L-ACNC: 2.69 UIU/ML (ref 0.4–4)
VIT B12 SERPL-MCNC: 358 PG/ML (ref 210–950)
WBC # BLD AUTO: 5.89 K/UL (ref 3.9–12.7)

## 2019-09-27 PROCEDURE — 83036 HEMOGLOBIN GLYCOSYLATED A1C: CPT

## 2019-09-27 PROCEDURE — 80053 COMPREHEN METABOLIC PANEL: CPT

## 2019-09-27 PROCEDURE — 80061 LIPID PANEL: CPT

## 2019-09-27 PROCEDURE — 84439 ASSAY OF FREE THYROXINE: CPT

## 2019-09-27 PROCEDURE — 36415 COLL VENOUS BLD VENIPUNCTURE: CPT | Mod: PO

## 2019-09-27 PROCEDURE — 85025 COMPLETE CBC W/AUTO DIFF WBC: CPT

## 2019-09-27 PROCEDURE — 82306 VITAMIN D 25 HYDROXY: CPT

## 2019-09-27 PROCEDURE — 82607 VITAMIN B-12: CPT

## 2019-09-27 PROCEDURE — 84443 ASSAY THYROID STIM HORMONE: CPT

## 2019-10-03 DIAGNOSIS — M54.5 CHRONIC LOW BACK PAIN, UNSPECIFIED BACK PAIN LATERALITY, WITH SCIATICA PRESENCE UNSPECIFIED: ICD-10-CM

## 2019-10-03 DIAGNOSIS — G89.29 CHRONIC BACK PAIN GREATER THAN 3 MONTHS DURATION: ICD-10-CM

## 2019-10-03 DIAGNOSIS — G89.29 CHRONIC LOW BACK PAIN, UNSPECIFIED BACK PAIN LATERALITY, WITH SCIATICA PRESENCE UNSPECIFIED: ICD-10-CM

## 2019-10-03 DIAGNOSIS — M54.9 CHRONIC BACK PAIN GREATER THAN 3 MONTHS DURATION: ICD-10-CM

## 2019-10-04 ENCOUNTER — LAB VISIT (OUTPATIENT)
Dept: LAB | Facility: HOSPITAL | Age: 64
End: 2019-10-04
Attending: NURSE PRACTITIONER
Payer: COMMERCIAL

## 2019-10-04 ENCOUNTER — OFFICE VISIT (OUTPATIENT)
Dept: FAMILY MEDICINE | Facility: CLINIC | Age: 64
End: 2019-10-04
Payer: COMMERCIAL

## 2019-10-04 VITALS
HEART RATE: 70 BPM | BODY MASS INDEX: 44.95 KG/M2 | DIASTOLIC BLOOD PRESSURE: 62 MMHG | HEIGHT: 65 IN | OXYGEN SATURATION: 97 % | SYSTOLIC BLOOD PRESSURE: 128 MMHG | TEMPERATURE: 98 F | WEIGHT: 269.81 LBS

## 2019-10-04 DIAGNOSIS — E11.65 UNCONTROLLED TYPE 2 DIABETES MELLITUS WITH HYPERGLYCEMIA: ICD-10-CM

## 2019-10-04 DIAGNOSIS — R30.0 DYSURIA: ICD-10-CM

## 2019-10-04 DIAGNOSIS — E11.9 TYPE 2 DIABETES MELLITUS WITHOUT COMPLICATION, UNSPECIFIED WHETHER LONG TERM INSULIN USE: ICD-10-CM

## 2019-10-04 DIAGNOSIS — H65.191 ACUTE MUCOID OTITIS MEDIA OF RIGHT EAR: Primary | ICD-10-CM

## 2019-10-04 DIAGNOSIS — E78.1 HYPERTRIGLYCERIDEMIA: ICD-10-CM

## 2019-10-04 DIAGNOSIS — E78.5 HYPERLIPIDEMIA, UNSPECIFIED HYPERLIPIDEMIA TYPE: ICD-10-CM

## 2019-10-04 DIAGNOSIS — G62.9 NEUROPATHY: ICD-10-CM

## 2019-10-04 DIAGNOSIS — R94.4 DECREASED GFR: ICD-10-CM

## 2019-10-04 DIAGNOSIS — E11.9 TYPE 2 DIABETES MELLITUS WITHOUT COMPLICATION: ICD-10-CM

## 2019-10-04 DIAGNOSIS — E55.9 VITAMIN D DEFICIENCY: ICD-10-CM

## 2019-10-04 DIAGNOSIS — F11.20 CONTINUOUS OPIOID DEPENDENCE: ICD-10-CM

## 2019-10-04 DIAGNOSIS — E03.9 HYPOTHYROIDISM, UNSPECIFIED TYPE: ICD-10-CM

## 2019-10-04 LAB
ANION GAP SERPL CALC-SCNC: 10 MMOL/L (ref 8–16)
BILIRUB SERPL-MCNC: ABNORMAL MG/DL
BLOOD URINE, POC: 50
BUN SERPL-MCNC: 13 MG/DL (ref 8–23)
CALCIUM SERPL-MCNC: 8.9 MG/DL (ref 8.7–10.5)
CHLORIDE SERPL-SCNC: 104 MMOL/L (ref 95–110)
CO2 SERPL-SCNC: 26 MMOL/L (ref 23–29)
COLOR, POC UA: YELLOW
CREAT SERPL-MCNC: 0.8 MG/DL (ref 0.5–1.4)
EST. GFR  (AFRICAN AMERICAN): >60 ML/MIN/1.73 M^2
EST. GFR  (NON AFRICAN AMERICAN): >60 ML/MIN/1.73 M^2
GLUCOSE SERPL-MCNC: 312 MG/DL (ref 70–110)
GLUCOSE UR QL STRIP: 1000
KETONES UR QL STRIP: ABNORMAL
LEUKOCYTE ESTERASE URINE, POC: ABNORMAL
NITRITE, POC UA: ABNORMAL
PH, POC UA: 5
POTASSIUM SERPL-SCNC: 3.8 MMOL/L (ref 3.5–5.1)
PROTEIN, POC: ABNORMAL
SODIUM SERPL-SCNC: 140 MMOL/L (ref 136–145)
SPECIFIC GRAVITY, POC UA: 1015
UROBILINOGEN, POC UA: ABNORMAL

## 2019-10-04 PROCEDURE — 81002 POCT URINE DIPSTICK WITHOUT MICROSCOPE: ICD-10-PCS | Mod: S$GLB,ICN,, | Performed by: NURSE PRACTITIONER

## 2019-10-04 PROCEDURE — 99999 PR PBB SHADOW E&M-EST. PATIENT-LVL IV: CPT | Mod: PBBFAC,,, | Performed by: NURSE PRACTITIONER

## 2019-10-04 PROCEDURE — 3008F PR BODY MASS INDEX (BMI) DOCUMENTED: ICD-10-PCS | Mod: CPTII,S$GLB,ICN, | Performed by: NURSE PRACTITIONER

## 2019-10-04 PROCEDURE — 36415 COLL VENOUS BLD VENIPUNCTURE: CPT | Mod: PO

## 2019-10-04 PROCEDURE — 81002 URINALYSIS NONAUTO W/O SCOPE: CPT | Mod: S$GLB,ICN,, | Performed by: NURSE PRACTITIONER

## 2019-10-04 PROCEDURE — 99214 OFFICE O/P EST MOD 30 MIN: CPT | Mod: 25,S$GLB,ICN, | Performed by: NURSE PRACTITIONER

## 2019-10-04 PROCEDURE — 87186 SC STD MICRODIL/AGAR DIL: CPT

## 2019-10-04 PROCEDURE — 87077 CULTURE AEROBIC IDENTIFY: CPT

## 2019-10-04 PROCEDURE — 3051F PR MOST RECENT HEMOGLOBIN A1C LEVEL 7.0 - < 8.0%: ICD-10-PCS | Mod: CPTII,S$GLB,, | Performed by: NURSE PRACTITIONER

## 2019-10-04 PROCEDURE — 99999 PR PBB SHADOW E&M-EST. PATIENT-LVL IV: ICD-10-PCS | Mod: PBBFAC,,, | Performed by: NURSE PRACTITIONER

## 2019-10-04 PROCEDURE — 87088 URINE BACTERIA CULTURE: CPT

## 2019-10-04 PROCEDURE — 3008F BODY MASS INDEX DOCD: CPT | Mod: CPTII,S$GLB,ICN, | Performed by: NURSE PRACTITIONER

## 2019-10-04 PROCEDURE — 87086 URINE CULTURE/COLONY COUNT: CPT

## 2019-10-04 PROCEDURE — 87147 CULTURE TYPE IMMUNOLOGIC: CPT

## 2019-10-04 PROCEDURE — 99214 PR OFFICE/OUTPT VISIT, EST, LEVL IV, 30-39 MIN: ICD-10-PCS | Mod: 25,S$GLB,ICN, | Performed by: NURSE PRACTITIONER

## 2019-10-04 PROCEDURE — 3051F HG A1C>EQUAL 7.0%<8.0%: CPT | Mod: CPTII,S$GLB,, | Performed by: NURSE PRACTITIONER

## 2019-10-04 PROCEDURE — 80048 BASIC METABOLIC PNL TOTAL CA: CPT

## 2019-10-04 RX ORDER — GABAPENTIN 300 MG/1
300 CAPSULE ORAL 2 TIMES DAILY
Qty: 60 CAPSULE | Refills: 3 | Status: SHIPPED | OUTPATIENT
Start: 2019-10-04 | End: 2019-12-18 | Stop reason: SDUPTHER

## 2019-10-04 RX ORDER — AMOXICILLIN AND CLAVULANATE POTASSIUM 500; 125 MG/1; MG/1
1 TABLET, FILM COATED ORAL 2 TIMES DAILY
Qty: 10 TABLET | Refills: 0 | Status: SHIPPED | OUTPATIENT
Start: 2019-10-04 | End: 2019-10-04 | Stop reason: DRUGHIGH

## 2019-10-04 RX ORDER — AMOXICILLIN AND CLAVULANATE POTASSIUM 875; 125 MG/1; MG/1
1 TABLET, FILM COATED ORAL 2 TIMES DAILY
Qty: 14 TABLET | Refills: 0 | Status: SHIPPED | OUTPATIENT
Start: 2019-10-04 | End: 2019-10-11

## 2019-10-04 RX ORDER — GABAPENTIN 300 MG/1
300 CAPSULE ORAL 2 TIMES DAILY
COMMUNITY
End: 2019-10-04 | Stop reason: SDUPTHER

## 2019-10-04 RX ORDER — INSULIN PUMP SYRINGE, 3 ML
EACH MISCELLANEOUS
Qty: 1 EACH | Refills: 0 | Status: SHIPPED | OUTPATIENT
Start: 2019-10-04 | End: 2023-05-15

## 2019-10-04 RX ORDER — ERGOCALCIFEROL 1.25 MG/1
50000 CAPSULE ORAL
Qty: 12 CAPSULE | Refills: 1 | Status: SHIPPED | OUTPATIENT
Start: 2019-10-04 | End: 2020-02-03 | Stop reason: SDUPTHER

## 2019-10-04 NOTE — PATIENT INSTRUCTIONS
Understanding Carbohydrates, Fats, and Protein  Food is a source of fuel and nourishment for your body. Its also a source of pleasure. Having diabetes doesnt mean you have to eat special foods or give up desserts. Instead, your dietitian can show you how to plan meals to suit your body. To start, learn how different foods affect blood sugar.  Carbohydrates  Carbohydrates are the main source of fuel for the body. Carbohydrates raise blood sugar. Many people think carbohydrates are only found in pasta or bread. But carbohydrates are actually in many kinds of foods:  · Sugars occur naturally in foods such as fruit, milk, honey, and molasses. Sugars can also be added to many foods, from cereals and yogurt to candy and desserts. Sugars raise blood sugar.  · Starches are found in bread, cereals, pasta, and dried beans. Theyre also found in corn, peas, potatoes, yam, acorn squash, and butternut squash. Starches also raise blood sugar.   · Fiber is found in foods such as vegetables, fruits, beans, and whole grains. Unlike other carbs, fiber isnt digested or absorbed. So it doesnt raise blood sugar. In fact, fiber can help keep blood sugar from rising too fast. It also helps keep blood cholesterol at a healthy level.  Did you know?  Even though carbohydrates raise blood sugar, its best to have some in every meal. They are an important part of a healthy diet.   Fat  Fat is an energy source that can be stored until needed. Fat does not raise blood sugar. However, it can raise blood cholesterol, increasing the risk of heart disease. Fat is also high in calories, which can cause weight gain. Not all types of fat are the same.  More Healthy:  · Monounsaturated fats are mostly found in vegetable oils, such as olive, canola, and peanut oils. They are also found in avocados and some nuts. Monounsaturated fats are healthy for your heart. Thats because they lower LDL (unhealthy) cholesterol.  · Polyunsaturated fats are mostly  found in vegetable oils, such as corn, safflower, and soybean oils. They are also found in some seeds, nuts, and fish. Polyunsaturated fats lower LDL (unhealthy) cholesterol. So, choosing them instead of saturated fats is healthy for your heart. Certain unsaturated fats can help lower triglycerides.   Less Healthy:  · Saturated fats are found in animal products, such as meat, poultry, whole milk, lard, and butter. Saturated fats raise LDL cholesterol and are not healthy for your heart.  · Hydrogenated oils and trans fats are formed when vegetable oils are processed into solid fats. They are found in many processed foods. Hydrogenated oils and trans fats raise LDL cholesterol and lower HDL (healthy) cholesterol. They are not healthy for your heart.  Protein  Protein helps the body build and repair muscle and other tissue. Protein has little or no effect on blood sugar. However, many foods that contain protein also contain saturated fat. By choosing low-fat protein sources, you can get the benefits of protein without the extra fat:  · Plant protein is found in dry beans and peas, nuts, and soy products, such as tofu and soymilk. These sources tend to be cholesterol-free and low in saturated fat.  · Animal protein is found in fish, poultry, meat, cheese, milk, and eggs. These contain cholesterol and can be high in saturated fat. Aim for lean, lower-fat choices.  Date Last Reviewed: 3/1/2016  © 2439-6117 [employer]. [address] [place] [id number]. All rights reserved. This information is not intended as a substitute for professional medical care. Always follow your healthcare professional's instructions.        Diet: Diabetes  Food is an important tool that you can use to control diabetes and stay healthy. Eating well-balanced meals in the correct amounts will help you control your blood glucose levels and prevent low blood sugar reactions. It will also help you reduce the health risks of  diabetes. There is no one specific diet that is right for everyone with diabetes. But there are general guidelines to follow. A registered dietitian (RD) will create a tailored diet approach thats just right for you. He or she will also help you plan healthy meals and snacks. If you have any questions, call your dietitian for advice.     Guidelines for success  Talk with your healthcare provider before starting a diabetes diet or weight loss program. If you haven't talked with a dietitian yet, ask your provider for a referral. The following guidelines can help you succeed:  · Select foods from the 6 food groups below. Your dietitian will help you find food choices within each group. He or she will also show you serving sizes and how many servings you can have at each meal.  ¨ Grains, beans, and starchy vegetables  ¨ Vegetables  ¨ Fruit  ¨ Milk or yogurt  ¨ Meat, poultry, fish, or tofu  ¨ Healthy fats  · Check your blood sugar levels as directed by your provider. Take any medicine as prescribed by your provider.  · Learn to read food labels and pick the right portion sizes.  · Eat only the amount of food in your meal plan. Eat about the same amount of food at regular times each day. Dont skip meals. Eat meals 4 to 5 hours apart, with snacks in between.  · Limit alcohol. It raises blood sugar levels. Drink water or calorie-free diet drinks that use safe sweeteners.  · Eat less fat to help lower your risk of heart disease. Use nonfat or low-fat dairy products and lean meats. Avoid fried foods. Use cooking oils that are unsaturated, such as olive, canola, or peanut oil.  · Talk with your dietitian about safe sugar substitutes.  · Avoid added salt. It can contribute to high blood pressure, which can cause heart disease. People with diabetes already have a risk of high blood pressure and heart disease.  · Stay at a healthy weight. If you need to lose weight, cut down on your portion sizes. But dont skip meals. Exercise  is an important part of any weight management program. Talk with your provider about an exercise program thats right for you.  · For more information about the best diet plan for you, talk with a registered dietitian (RD). To find an RD in your area, contact:  ¨ Academy of Nutrition and Dietetics www.eatright.org  ¨ The American Diabetes Association [phone] www.diabetes.org  Date Last Reviewed: 8/1/2016 © 2000-2017 [employer]. [address] [place] [address]. All rights reserved. This information is not intended as a substitute for professional medical care. Always follow your healthcare professional's instructions.        Understanding Carbohydrates    A car needs the right type of fuel to run. And you need the right kind of food to function. To keep your energy level up, your body needs food that has carbohydrates. But carbohydrates raise blood sugar levels higher and faster than other kinds of food. Your dietitian will work with you to figure out the amount of carbohydrates you need.  Starches  Starches are found in grains, some vegetables, and beans. Grain products include bread, pasta, cereal, and tortillas. Starchy vegetables include potatoes, peas, corn, lima beans, yams, and squash. Kidney beans, montoya beans, black beans, garbanzo beans, and lentils also contain starches.  Sugars  Sugars are found naturally in many foods. Or sugar can be added. Foods that contain natural sugar include fruits and fruit juices, dairy products, honey, and molasses. Added sugars are found in most desserts, processed foods, candy, regular soda, and fruit drinks. These are very helpful for treating low blood sugar, or hypoglycemia. They provide sugar quickly. Try to keep at least 15 to 20 grams of these simple sugars with you at all times. Eat this if you begin to have low blood sugar symptoms.  Fiber  Fiber comes from plant foods. Most fiber isnt digested by the body. Instead of raising blood  sugar levels like other carbohydrates, it actually stops blood sugar from rising too fast. Fiber is found in fruits, vegetables, whole grains, beans, peas, and many nuts.  Carb counting  Keep track of the amount of carbohydrates you eat. This can help you keep the right balance of physical activity and medicine. The amount of carbohydrates needed will vary for each person. It depends on many things such as your health, the medicines you take, and how active you are. Your healthcare team will help you figure out the right amount of carbohydrates for you. You may start with around 45 to 60 grams of carbohydrate per meal, depending on your situation. Carb counting is a system that helps you keep track of the carbohydrates you eat at each meal.  Carbohydrates come from a variety of foods. These include grains, starchy vegetables, fruit, milk, beans, and snack foods. You can either count carbohydrate grams or carbohydrate servings. When you count carbohydrate servings, 1 carbohydrate serving = 15 grams of carbohydrates.  Here are some examples of foods containing about 15 grams of carbohydrates (1 serving of carbohydrates):  · 1/2 cup of canned or frozen fruit  · A small piece of fresh fruit (4 ounces)  · 1 slice of bread  · 1/2 cup of oatmeal  · 1/3 cup of rice  · 4 to 6 crackers  · 1/2 English muffin  · 1/2 cup of black beans  · 1/4 of a large baked potato (3 ounces)  · 2/3 cup of plain fat-free yogurt  · 1 cup of soup  · 1/2 cup of casserole  · 6 chicken nuggets  · 2-inch-square brownie or cake without frosting  · 2 small cookies  · 1/2 cup of ice cream or sherbet  Carb counting is easier when food labels are available. Look at the label to see how many grams of total carbohydrates the food contains. Then you can figure out how much you should eat.  Two very important lines to look at on the label are the serving size and the total carbohydrate amount. Here are some tips for using food labels to count your  carbohydrate intake:  · Check the serving size. The information on the label is based on that serving size. If you eat more than the listed serving size, you may have to double or triple the other information on the label.   · Check the total grams of carbohydrates. Total carbohydrate from the label includes sugar, starch, and fiber. Be sure to use the total carbohydrate number and not sugar alone.  · Know how many grams of carbohydrates you can have.  Be familiar with the matching portion sizes.  · Compare labels. Compare the labels of different products, looking at serving sizes and total carbohydrates to find the products that work best for you.   · Don't forget protein and fat. With all the focus on carb counting, it might be easy to forget protein and fat in your meals. Don't forget to include sources of protein and healthy fat to balance your meals.  Its also important to be consistent with the amount and time you eat when taking a fixed dose of diabetes medicine. Work with your healthcare provider or dietitian if you need additional help. He or she can help you keep track of your carbohydrate intake. He or she can also help you figure out how many grams of carbohydrates you should have.  Date Last Reviewed: 7/1/2016  © 6757-1697 [employer]. [address] [place] [address], [place] [address]. All rights reserved. This information is not intended as a substitute for professional medical care. Always follow your healthcare professional's instructions.        Long-Term Complications of Diabetes    Diabetes can cause health problems over time. These are called complications. They are more likely to happen if your blood sugar is often too high. Over time, high blood sugar can damage blood vessels in your body. It is important to keep your blood sugar in your target range. This can help prevent or delay complications from diabetes.  Possible complications  Complications of diabetes include:  · Eye problems,  including damage to the blood vessels in the eyes (retinopathy), pressure in the eye (glaucoma), and clouding of the eyes lens (a cataract). Eye problems can eventually lead to irreversible blindness.   · Tooth and gum problems (periodontal disease), causing loss of teeth and bone  · Blood vessel (vascular) disease leading to circulation problems, heart attack or stroke, or a need for amputation of a limb   · Problems with sexual function leading to erectile dysfunction in men and sexual discomfort in women   · Kidney disease (nephropathy) can eventually lead to kidney failure, which may require dialysis or kidney transplant   · Nerve problems (neuropathy), causing pain or loss of feeling in your feet and other parts of your body, potentially leading to an amputation of a limb   · High blood pressure (hypertension), putting strain on your heart and blood vessels  · Serious infections, possibly leading to loss of toes, feet, or limbs  How to avoid complications  The serious consequences of these complications may be avoidable for most people with diabetes by managing your blood glucose, blood pressure, and cholesterol levels. This can help you feel better and stay healthy. You can manage diabetes by tracking your blood sugar. You can also eat healthy and exercise to avoid gaining weight. And you should take medicine if directed by your healthcare provider.  Date Last Reviewed: 5/1/2016  © 5395-2724 [employer]. [address] [place] [address], [place] [address]. All rights reserved. This information is not intended as a substitute for professional medical care. Always follow your healthcare professional's instructions.        Healthy Meals for Diabetes     A healthcare provider will help you develop a meal plan that fits your needs.   Ask your healthcare team to help you make a meal plan that fits your needs. Your meal plan tells you when to eat your meals and snacks, what kinds of foods to eat, and how much of  each food to eat. You dont have to give up all the foods you like. But you do need to follow some guidelines.  Choose healthy carbohydrates  Starches, sugars, and fiber are all types of carbohydrates. Fiber can help lower your cholesterol and triglycerides. Fiber is also healthy for your heart. You should have 20 to 35 grams of total fiber each day. Fiber-rich foods include:  · Whole-grain breads and cereals  · Bulgur wheat  · Brown rice     · Whole-wheat pasta  · Fruits and vegetables  · Dry beans, and peas   Keep track of the amount of carbohydrates you eat. This can help you keep the right balance of physical activity and medicine. The amount of carbohydrates needed will vary for each person. It depends on many things such as your health, the medicines you take, and how active you are. Your healthcare team will help you figure out the right amount of carbohydrates for you. You may start with around 45 to 60 grams of carbohydrates per meal, depending on your situation.   Here are some examples of foods containing about 15 grams of carbohydrates (1 serving of carbohydrates):  · 1/2 cup of canned or frozen fruit  · A small piece of fresh fruit (4 ounces)  · 1 slice of bread  · 1/2 cup of oatmeal  · 1/3 cup of rice  · 4 to 6 crackers  · 1/2 English muffin  · 1/2 cup of black beans  · 1/4 of a large baked potato (3 ounces)  · 2/3 cup of plain fat-free yogurt  · 1 cup of soup  · 1/2 cup of casserole  · 6 chicken nuggets  · 2-inch-square brownie or cake without frosting  · 2 small cookies  · 1/2 cup of ice cream or sherbet  Choose healthy protein foods  Eating protein that is low in fat can help you control your weight. It also helps keep your heart healthy. Low-fat protein foods include:  · Fish  · Plant proteins, such as dry beans and peas, nuts, and soy products like tofu and soymilk  · Lean meat with all visible fat removed  · Poultry with the skin removed  · Low-fat or nonfat milk, cheese, and yogurt  Limit  unhealthy fats and sugar  Saturated and trans fats are unhealthy for your heart. They raise LDL (bad) cholesterol. Fat is also high in calories, so it can make you gain weight. To cut down on unhealthy fats and sugar, limit these foods:  · Butter or margarine  · Palm and palm kernel oils and coconut oil  · Cream  · Cheese  · Weir  · Lunch meats     · Ice cream  · Sweet bakery goods such as pies, muffins, and donuts  · Jams and jellies  · Candy bars  · Regular sodas   How much to eat  The amount of food you eat affects your blood sugar. It also affects your weight. Your healthcare team will tell you how much of each type of food you should eat.  · Use measuring cups and spoons and a food scale to measure serving sizes.  · Learn what a correct serving size looks like on your plate. This will help when you are away from home and cant measure your servings.  · Eat only the number of servings given on your meal plan for each food. Dont take seconds.  · Learn to read food labels. Be sure to look at serving size, total carbohydrates, fiber, calories, sugar, and saturated and trans fats. Look for healthier alternatives to foods that have added sugar.  · Plan ahead for parties so you can still have a good time without going overboard with unhealthy food choices. Set a good example yourself by bringing a healthy dish to pot lucks.   Choose healthy snacks  When it comes to snacks, we usually think about foods with added sugar and fats. But there are many other options for healthier snack choices. Here are a few snack ideas to choose from:  Snacks with less than 5 grams of carbohydrates  · 1 piece of string cheese  · 3 celery sticks plus 1 tablespoon of peanut butter  · 5 cherry tomatoes plus 1 tablespoon of ranch dressing  · 1 hard-boiled egg  · 1/4 cup of fresh blueberries  ·  5 baby carrots  · 1 cup of light popcorn  · 1/2 cup of sugar-free gelatin  · 15 almonds  Snacks with about 10 to 20 grams of carbohydrates  · 1/3  cup of hummus plus 1 cup of fresh cut nonstarchy vegetables (carrots, green peppers, broccoli, celery, or a combination)  · 1/2 cup of fresh or canned fruit plus 1/4 cup of cottage cheese  · 1/2 cup of tuna salad with 4 crackers  · 2 rice cakes and a tablespoon of peanut butter  · 1 small apple or orange  · 3 cups light popcorn  · 1/2 of a turkey sandwich (1 slice of whole-wheat bread, 2 ounces of turkey, and mustard)  Portion sizes are important to controlling your blood sugar and staying at a healthy weight. Stock up on healthy snack items so you always have them on hand.  When to eat  Your meal plan will likely include breakfast, lunch, dinner, and some snacks.  · Try to eat your meals and snacks at about the same times each day.  · Eat all your meals and snacks. Skipping a meal or snack can make your blood sugar drop too low. It can also cause you to eat too much at the next meal or snack. Then your blood sugar could get too high.  Date Last Reviewed: 7/1/2016  © 6204-5204 [employer]. [address] [place] [address], [place] [address]. All rights reserved. This information is not intended as a substitute for professional medical care. Always follow your healthcare professional's instructions.

## 2019-10-04 NOTE — PROGRESS NOTES
Subjective:       Patient ID: Edie Hernandez is a 64 y.o. female.    Chief Complaint: Follow-up (burning urine)    Ms. Hernandez presents today for a chronic conditions F/U and lab review. Recent labs indicate diabetes, A1C 7.9. Elevated triglycerides 219, decreased GFR, elevated blood sugar, low vitamin D. No history of diabetes,  and daughter both diagnosed. She is very sedentary. Drives a school bus. Eats a lot of sweets. She does not want to take metformin or an injectable medication.     Also has complaints of painful urination and ear pain. Left ear more painful than the right. No fever or sinus symptoms. Has hx of frequent UTIs, she reports she is minimally symptomatic.     Vitals stable today. Requesting refill on medications, including Norco. Prescribed 7.5-325 mg [id number] PRN. Only taking if pain is severe.     Diet recall-  Breakfast- grits, eggs, breakfast meat  No lunch  Dinner- fried shrimp, red potatoes, shrimp     Urinary Tract Infection    This is a new problem. The current episode started in the past 7 days. The problem has been unchanged. The quality of the pain is described as burning. The pain is at a severity of 2/10. The pain is mild. There has been no fever. Pertinent negatives include no chills, flank pain, frequency, hematuria, hesitancy, nausea, sweats or vomiting. She has tried nothing for the symptoms. Her past medical history is significant for diabetes mellitus.     Patient Active Problem List   Diagnosis    GERD (gastroesophageal reflux disease)    Chronic back pain greater than 3 months duration    Environmental allergies    Fibromyalgia    B12 nutritional deficiency    CLINT (obstructive sleep apnea)    Chronic sinusitis    Asthma    Hypothyroid    Nausea    Night sweats    Tinnitus, bilateral    Frequent UTI    Hyperlipidemia    Colon polyp, hyperplastic    Interstitial cystitis    Hemangioma    Wart    Globus sensation    DDD (degenerative disc disease),  lumbar    Morbid obesity with BMI of 45.0-49.9, adult    Edema    Mixed incontinence    Continuous opioid dependence- contract 10/18/17-failed uds for norco, + 2/18    Hypertriglyceridemia    Uncontrolled type 2 diabetes mellitus with hyperglycemia       Current Outpatient Medications:     allopurinol (ZYLOPRIM) 300 MG tablet, Take 1 tablet (300 mg total) by mouth once daily., Disp: 90 tablet, Rfl: 4    cetirizine (ALL DAY ALLERGY, CETIRIZINE,) 10 MG tablet, Take 1 tablet (10 mg total) by mouth once daily. (Patient taking differently: Take 10 mg by mouth daily as needed. ), Disp: 30 tablet, Rfl: 3    clobetasol (TEMOVATE) 0.05 % cream, Apply 1 application topically 2 (two) times daily. Apply to affected area, Disp: 30 g, Rfl: 2    diclofenac sodium (VOLTAREN) 1 % Gel, Apply 2 g topically 4 (four) times daily., Disp: , Rfl:     ergocalciferol (VITAMIN D2) 50,000 unit Cap, Take 1 capsule (50,000 Units total) by mouth every 7 days., Disp: 12 capsule, Rfl: 1    fluticasone (FLONASE) 50 mcg/actuation nasal spray, 1 spray (50 mcg total) by Each Nare route daily as needed., Disp: 1 Bottle, Rfl: 11    furosemide (LASIX) 40 MG tablet, Take 1 tablet (40 mg total) by mouth daily as needed. Every day PRN, Disp: 30 tablet, Rfl: 4    gabapentin (NEURONTIN) 300 MG capsule, Take 1 capsule (300 mg total) by mouth 2 (two) times daily., Disp: 60 capsule, Rfl: 3    HYDROcodone-acetaminophen (NORCO) 7.5-325 mg per tablet, Take 1 tablet by mouth every 6 (six) hours as needed for Pain., Disp: 90 tablet, Rfl: 0    indomethacin (INDOCIN) 25 MG capsule, TAKE ONE CAPSULE BY MOUTH 3 TIMES A DAY WITH MEALS, Disp: 90 capsule, Rfl: 0    levothyroxine (SYNTHROID) 150 MCG tablet, Take 1 tablet (150 mcg total) by mouth once daily., Disp: 90 tablet, Rfl: 3    metaxalone (SKELAXIN) 400 mg tablet, TAKE 1 TABLET BY MOUTH 3 TIMES DAILY AS NEEDED., Disp: 90 tablet, Rfl: 1    methylphenidate (RITALIN LA) 40 MG 24 hr capsule, Take 40 mg  by mouth daily as needed. , Disp: , Rfl:     methylphenidate (RITALIN) 10 MG tablet, Take 10 mg by mouth daily as needed. , Disp: , Rfl:     oxybutynin (DITROPAN XL) 15 MG TR24, TAKE 1 TABLET BY MOUTH EVERY DAY, Disp: 90 tablet, Rfl: 3    pantoprazole (PROTONIX) 40 MG tablet, TAKE 1 TABLET BY MOUTH EVERY DAY, Disp: 90 tablet, Rfl: 3    albuterol 90 mcg/actuation inhaler, Inhale 2 puffs into the lungs every 6 (six) hours as needed for Wheezing., Disp: 18 g, Rfl: 3    amoxicillin-clavulanate 875-125mg (AUGMENTIN) 875-125 mg per tablet, Take 1 tablet by mouth 2 (two) times daily. for 7 days, Disp: 14 tablet, Rfl: 0    blood-glucose meter kit, Use as instructed, Disp: 1 each, Rfl: 0    DULoxetine (CYMBALTA) 30 MG capsule, Take 1 capsule (30 mg total) by mouth once daily., Disp: 30 capsule, Rfl: 11    empagliflozin (JARDIANCE) 10 mg Tab, Take 10 mg by mouth once daily., Disp: 30 tablet, Rfl: 1    Lab Results   Component Value Date    WBC 5.89 09/27/2019    HGB 13.9 09/27/2019    HCT 43.2 09/27/2019     09/27/2019    CHOL 191 09/27/2019    TRIG 219 (H) 09/27/2019    HDL 51 09/27/2019    ALT 41 09/27/2019    AST 35 09/27/2019     09/27/2019    K 3.9 09/27/2019     09/27/2019    CREATININE 1.2 09/27/2019    BUN 14 09/27/2019    CO2 21 (L) 09/27/2019    TSH 2.685 09/27/2019    HGBA1C 7.9 (H) 09/27/2019     Review of Systems   Constitutional: Negative for chills.   Respiratory: Positive for shortness of breath (from obesity). Negative for wheezing.    Cardiovascular: Negative for chest pain and palpitations.   Gastrointestinal: Negative for abdominal pain, nausea and vomiting.   Genitourinary: Negative for flank pain, frequency, hematuria and hesitancy.   Musculoskeletal: Positive for arthralgias and back pain.   Neurological: Negative for dizziness, weakness and light-headedness.       Objective:      Physical Exam   Constitutional: She is oriented to person, place, and time. Vital signs are  normal. She appears well-developed and well-nourished. No distress.   Obese body habitus    HENT:   Right Ear: External ear and ear canal normal.   Left Ear: External ear and ear canal normal.   Dull left TM  purulence behind right TM    Cardiovascular: Normal rate, regular rhythm and normal heart sounds.   Pulmonary/Chest: Effort normal and breath sounds normal. She has no wheezes.   Abdominal: Soft. Normal appearance.   Musculoskeletal: She exhibits no edema.   Neurological: She is alert and oriented to person, place, and time.   Skin: Skin is warm and dry.   Psychiatric: She has a normal mood and affect.   Nursing note and vitals reviewed.      Assessment:       1. Acute mucoid otitis media of right ear    2. Dysuria    3. Uncontrolled type 2 diabetes mellitus with hyperglycemia    4. Hyperlipidemia, unspecified hyperlipidemia type    5. Hypothyroidism, unspecified type    6. Continuous opioid dependence- contract 10/18/17-failed uds for norco, + 2/18    7. Decreased GFR    8. Neuropathy    9. Vitamin D deficiency    10. Hypertriglyceridemia        Plan:       Edie was seen today for follow-up.    Diagnoses and all orders for this visit:    Acute mucoid otitis media of right ear  -     amoxicillin-clavulanate 875-125mg (AUGMENTIN) 875-125 mg per tablet; Take 1 tablet by mouth 2 (two) times daily. for 7 days  Take antibiotics with food, increase fluids. RTC if symptoms fail to improve, become worse or return after completion of antibiotics     Dysuria  -     POCT urine dipstick without microscope  -     Urine culture  -     amoxicillin-clavulanate 875-125mg (AUGMENTIN) 875-125 mg per tablet; Take 1 tablet by mouth 2 (two) times daily. for 7 days  Await culture  Patient was counseled to increase fluid intake.  Avoid carbonated beverages.  Empty your bladder frequently, before and after intercourse, and wipe front to back when cleaning after using the bathroom.  Cranberry juice intake may help.  Notify MD if you  have fever > 100.4, severe abdominal pain, blood in urine or if you see no improvement in 3 days.    Uncontrolled type 2 diabetes mellitus with hyperglycemia  -     empagliflozin (JARDIANCE) 10 mg Tab; Take 10 mg by mouth once daily.  -     blood-glucose meter kit; Use as instructed  -     Ambulatory consult to Diabetic Education; Future  Patient refuses metformin, injectable medications. Agreeable to Jardiance at lowest dose-  takes this medication. Discussed side effects including increased incidence of UTIs. Educated to drink large amounts of water  Prescription sent for glucometer- patient monitor BG at least 2x daily, preferable 2x daily. Record on provided log. Educated that goal fasting AM BG is 150 or less. Also discussed she may require more than 1 medication to gain diabetic control.   Emphasized need for lifestyle modifications- printed materials provided, diabetic education referral  Discussed hypoglycemia- notify clinic if readings below 70 or symptomatic  Check labs- adjust based on kidney function, if needed    Hyperlipidemia, unspecified hyperlipidemia type  The 10-year ASCVD risk score (Pepe WHITESIDE Jr., et al., 2013) is: 8.6%    Values used to calculate the score:      Age: 64 years      Sex: Female      Is Non- : No      Diabetic: Yes      Tobacco smoker: No      Systolic Blood Pressure: 128 mmHg      Is BP treated: No      HDL Cholesterol: 51 mg/dL      Total Cholesterol: 191 mg/dL  Discussed increased risk associated with type 2 diabetes. Patient verbalized understanding and will consider statin.   Lipid panel reviewed    Hypothyroidism, unspecified type  Stable condition.  Continue current medications.  Will adjust based on lab findings or if condition changes.    Continuous opioid dependence- contract 10/18/17-failed uds for norco, + 2/18  Medications managed by Dr. Eugene, will submit refill for her to review    Decreased GFR  -     Basic metabolic panel;  Future  Recheck labs    Neuropathy  -     gabapentin (NEURONTIN) 300 MG capsule; Take 1 capsule (300 mg total) by mouth 2 (two) times daily.  Patient requesting refill    Vitamin D deficiency  -     ergocalciferol (VITAMIN D2) 50,000 unit Cap; Take 1 capsule (50,000 Units total) by mouth every 7 days.  Stable condition.  Continue current medications.  Will adjust based on lab findings or if condition changes.    Hypertriglyceridemia  I recommend a heart healthy diet rich in fiber, fresh vegetables and fruit and low in saturated fats (fried foods, red meat, etc.).  I also recommend regular exercise including a minimum of 150 minutes of cardio exercise per week and 2-30 minute workouts of strength training like light weights, yoga, pilates, etc.  You should work toward a body mass index of < 25.    Recommend fiber supplement, fish oil to help  Patient to consider statin    Patient readiness: acceptance and barriers:none    During the course of the visit the patient was educated and counseled about the following:     Diabetes:  Discussed general issues about diabetes pathophysiology and management.  Addressed ADA diet.  Suggested low cholesterol diet.  Encouraged aerobic exercise.  Diabetes educator referral.  Reminded to bring in blood sugar diary at next visit.  Follow up in 4 weeks or as needed.    Goals: Diabetes: Maintain Hemoglobin A1C below 7    Did patient meet goals/outcomes: No    The following self management tools provided: blood glucose log    Patient Instructions (the written plan) was given to the patient/family.     Time spent with patient: 45 minutes    Barriers to medications present (no )    Adverse reactions to current medications (no)    Over the counter medications reviewed (Yes)    F/U 1 month with blood glucose log

## 2019-10-06 RX ORDER — METAXALONE 400 MG/1
TABLET ORAL
Qty: 90 TABLET | Refills: 1 | Status: SHIPPED | OUTPATIENT
Start: 2019-10-06 | End: 2019-11-06 | Stop reason: SDUPTHER

## 2019-10-06 RX ORDER — HYDROCODONE BITARTRATE AND ACETAMINOPHEN 7.5; 325 MG/1; MG/1
1 TABLET ORAL EVERY 6 HOURS PRN
Qty: 90 TABLET | Refills: 0 | Status: SHIPPED | OUTPATIENT
Start: 2019-10-06 | End: 2020-01-02 | Stop reason: SDUPTHER

## 2019-10-07 LAB
BACTERIA UR CULT: ABNORMAL
BACTERIA UR CULT: ABNORMAL

## 2019-10-07 NOTE — PROGRESS NOTES
Spoke with patient regarding her results, she expressed understanding and will call if her symptoms don't desolve.

## 2019-10-09 RX ORDER — LANCETS
EACH MISCELLANEOUS
COMMUNITY
End: 2019-10-09 | Stop reason: SDUPTHER

## 2019-10-09 RX ORDER — LANCETS
1 EACH MISCELLANEOUS 4 TIMES DAILY
Qty: 100 EACH | Refills: 3 | Status: SHIPPED | OUTPATIENT
Start: 2019-10-09 | End: 2020-09-29 | Stop reason: SDUPTHER

## 2019-10-09 NOTE — TELEPHONE ENCOUNTER
----- Message from Olive Bates sent at 10/9/2019 10:43 AM CDT -----  Type: Needs Medical Advice    Who Called:  Patient  Best Call Back Number: [phone]  Additional Information: Patient needs Diabetic Test Strips ordered at [facility]/received meter only/please order and call patient back to advise.

## 2019-10-18 ENCOUNTER — PATIENT OUTREACH (OUTPATIENT)
Dept: ADMINISTRATIVE | Facility: HOSPITAL | Age: 64
End: 2019-10-18

## 2019-10-18 NOTE — LETTER
October 25, 2019    [place] [id number]             [facility]  [address]  [facility] [address]  Phone: [phone] [facility] is committed to your overall health.  To help you get the most out of each of your visits, we will review your information to make sure you are up to date on all of your recommended tests and/or procedures.       Dr. Lydia Eugene MD has found that your chart shows you may be due for a:     DIABETIC EYE EXAM   DIABETIC FOOT EXAM   URINE MICROALBUMIN (LAB)     If you have had any of the above done at another facility, please bring the records or information with you so that your record at [facility] will be complete.  If you would like to schedule any of these, please contact the clinic at [phone], OR USE THE SCHEDULING LINK PROVIDED IN THIS MESSAGE TO EASILY SCHEDULE YOUR DIABETIC EYE EXAM WITH DR. STALLWORTH.       If you are currently taking medication, please bring it with you to your appointment for review.     Also, if you have any type of Advanced Directives, please bring them with you to your office visit so we may scan them into your chart.     Thank You,     Your [facility] Team,   MD Sherley Camilo LPN Clinical Care Coordinator   [facility] [address] [place] [address]   Phone [phone]   Fax [phone]

## 2019-10-18 NOTE — PROGRESS NOTES
Health Maintenance Due   Topic Date Due    Foot Exam  07/26/1965    Pneumococcal Vaccine (Medium Risk) (1 of 1 - PPSV23) 07/26/1975    Shingles Vaccine (2 of 3) 03/17/2017    Eye Exam  07/14/2017    Urine Microalbumin  10/18/2018

## 2019-10-24 DIAGNOSIS — E55.9 VITAMIN D DEFICIENCY: ICD-10-CM

## 2019-10-24 RX ORDER — ERGOCALCIFEROL 1.25 MG/1
CAPSULE ORAL
Qty: 12 CAPSULE | Refills: 1 | Status: SHIPPED | OUTPATIENT
Start: 2019-10-24 | End: 2020-05-27

## 2019-10-25 DIAGNOSIS — E11.9 TYPE 2 DIABETES MELLITUS WITHOUT COMPLICATION, UNSPECIFIED WHETHER LONG TERM INSULIN USE: ICD-10-CM

## 2019-11-01 ENCOUNTER — OFFICE VISIT (OUTPATIENT)
Dept: FAMILY MEDICINE | Facility: CLINIC | Age: 64
End: 2019-11-01
Payer: COMMERCIAL

## 2019-11-01 VITALS
HEART RATE: 60 BPM | SYSTOLIC BLOOD PRESSURE: 128 MMHG | RESPIRATION RATE: 16 BRPM | TEMPERATURE: 99 F | OXYGEN SATURATION: 96 % | WEIGHT: 262.56 LBS | HEIGHT: 65 IN | BODY MASS INDEX: 43.75 KG/M2 | DIASTOLIC BLOOD PRESSURE: 70 MMHG

## 2019-11-01 DIAGNOSIS — E11.65 UNCONTROLLED TYPE 2 DIABETES MELLITUS WITH HYPERGLYCEMIA: Primary | ICD-10-CM

## 2019-11-01 DIAGNOSIS — R35.0 URINARY FREQUENCY: ICD-10-CM

## 2019-11-01 LAB
BILIRUB SERPL-MCNC: ABNORMAL MG/DL
BLOOD URINE, POC: ABNORMAL
COLOR, POC UA: YELLOW
GLUCOSE UR QL STRIP: 500
KETONES UR QL STRIP: ABNORMAL
LEUKOCYTE ESTERASE URINE, POC: ABNORMAL
NITRITE, POC UA: ABNORMAL
PH, POC UA: 6
PROTEIN, POC: 30
SPECIFIC GRAVITY, POC UA: 1.01
UROBILINOGEN, POC UA: NORMAL

## 2019-11-01 PROCEDURE — 99213 PR OFFICE/OUTPT VISIT, EST, LEVL III, 20-29 MIN: ICD-10-PCS | Mod: 25,S$GLB,, | Performed by: NURSE PRACTITIONER

## 2019-11-01 PROCEDURE — 81002 URINALYSIS NONAUTO W/O SCOPE: CPT | Mod: S$GLB,,, | Performed by: NURSE PRACTITIONER

## 2019-11-01 PROCEDURE — 81002 POCT URINE DIPSTICK WITHOUT MICROSCOPE: ICD-10-PCS | Mod: S$GLB,,, | Performed by: NURSE PRACTITIONER

## 2019-11-01 PROCEDURE — 87086 URINE CULTURE/COLONY COUNT: CPT

## 2019-11-01 PROCEDURE — 99213 OFFICE O/P EST LOW 20 MIN: CPT | Mod: 25,S$GLB,, | Performed by: NURSE PRACTITIONER

## 2019-11-01 PROCEDURE — 3008F BODY MASS INDEX DOCD: CPT | Mod: CPTII,S$GLB,, | Performed by: NURSE PRACTITIONER

## 2019-11-01 PROCEDURE — 99999 PR PBB SHADOW E&M-EST. PATIENT-LVL III: CPT | Mod: PBBFAC,,, | Performed by: NURSE PRACTITIONER

## 2019-11-01 PROCEDURE — 3008F PR BODY MASS INDEX (BMI) DOCUMENTED: ICD-10-PCS | Mod: CPTII,S$GLB,, | Performed by: NURSE PRACTITIONER

## 2019-11-01 PROCEDURE — 99999 PR PBB SHADOW E&M-EST. PATIENT-LVL III: ICD-10-PCS | Mod: PBBFAC,,, | Performed by: NURSE PRACTITIONER

## 2019-11-01 RX ORDER — METFORMIN HYDROCHLORIDE 500 MG/1
1000 TABLET, EXTENDED RELEASE ORAL 2 TIMES DAILY WITH MEALS
Qty: 360 TABLET | Refills: 3 | Status: SHIPPED | OUTPATIENT
Start: 2019-11-01 | End: 2020-03-19 | Stop reason: SDUPTHER

## 2019-11-01 NOTE — PROGRESS NOTES
Subjective:       Patient ID: Edie Hernandez is a 64 y.o. female.    Chief Complaint: Urinary Tract Infection and Otalgia    Ms. Hernandez presents today for follow-up on diabetes.  At her last visit she was started on Jardiance because she was hesitant to take metformin or an injectable medication.  She reports that since starting the medication, feels like she has a urinary tract infection and initially had a yeast infection that was treated with over-the-counter medications.  Does still seem to have urinary frequency but no other urinary symptoms.  Denies dysuria, urgency or hematuria.  Has blood sugar log today.  Most morning fasting glucose readings at goal of less than 150.  She has made efforts to change her diet to avoid sugars and carbohydrates.  No episodes of hypoglycemia.  Today she reports right ear pain. Was treated for left ear infection at last appointment.  Vitals stable.  She is due for a foot exam.    Patient Active Problem List   Diagnosis    GERD (gastroesophageal reflux disease)    Chronic back pain greater than 3 months duration    Environmental allergies    Fibromyalgia    B12 nutritional deficiency    CLINT (obstructive sleep apnea)    Chronic sinusitis    Asthma    Hypothyroid    Nausea    Night sweats    Tinnitus, bilateral    Frequent UTI    Hyperlipidemia    Colon polyp, hyperplastic    Interstitial cystitis    Hemangioma    Wart    Globus sensation    DDD (degenerative disc disease), lumbar    Morbid obesity with BMI of 45.0-49.9, adult    Edema    Mixed incontinence    Continuous opioid dependence- contract 10/18/17-failed uds for norco, + 2/18    Hypertriglyceridemia    Uncontrolled type 2 diabetes mellitus with hyperglycemia       Current Outpatient Medications:     allopurinol (ZYLOPRIM) 300 MG tablet, Take 1 tablet (300 mg total) by mouth once daily., Disp: 90 tablet, Rfl: 4    blood sugar diagnostic Strp, 1 each by Misc.(Non-Drug; Combo Route) route 4  (four) times daily. Patient has One touch Ultra II meter, Disp: 100 strip, Rfl: 3    blood-glucose meter kit, Use as instructed, Disp: 1 each, Rfl: 0    cetirizine (ALL DAY ALLERGY, CETIRIZINE,) 10 MG tablet, Take 1 tablet (10 mg total) by mouth once daily. (Patient taking differently: Take 10 mg by mouth daily as needed. ), Disp: 30 tablet, Rfl: 3    clobetasol (TEMOVATE) 0.05 % cream, Apply 1 application topically 2 (two) times daily. Apply to affected area, Disp: 30 g, Rfl: 2    diclofenac sodium (VOLTAREN) 1 % Gel, Apply 2 g topically 4 (four) times daily., Disp: , Rfl:     ergocalciferol (ERGOCALCIFEROL) 50,000 unit Cap, TAKE 1 CAPSULE BY MOUTH EVERY 7 DAYS, Disp: 12 capsule, Rfl: 1    ergocalciferol (VITAMIN D2) 50,000 unit Cap, Take 1 capsule (50,000 Units total) by mouth every 7 days., Disp: 12 capsule, Rfl: 1    fluticasone (FLONASE) 50 mcg/actuation nasal spray, 1 spray (50 mcg total) by Each Nare route daily as needed., Disp: 1 Bottle, Rfl: 11    furosemide (LASIX) 40 MG tablet, Take 1 tablet (40 mg total) by mouth daily as needed. Every day PRN, Disp: 30 tablet, Rfl: 4    gabapentin (NEURONTIN) 300 MG capsule, Take 1 capsule (300 mg total) by mouth 2 (two) times daily., Disp: 60 capsule, Rfl: 3    HYDROcodone-acetaminophen (NORCO) 7.5-325 mg per tablet, Take 1 tablet by mouth every 6 (six) hours as needed for Pain., Disp: 90 tablet, Rfl: 0    indomethacin (INDOCIN) 25 MG capsule, TAKE ONE CAPSULE BY MOUTH 3 TIMES A DAY WITH MEALS, Disp: 90 capsule, Rfl: 0    lancets Misc, 1 each by Misc.(Non-Drug; Combo Route) route 4 (four) times daily. Patient has a one touch ultra II meter, Disp: 100 each, Rfl: 3    levothyroxine (SYNTHROID) 150 MCG tablet, Take 1 tablet (150 mcg total) by mouth once daily., Disp: 90 tablet, Rfl: 3    metaxalone (SKELAXIN) 400 mg tablet, TAKE 1 TABLET BY MOUTH 3 TIMES DAILY AS NEEDED., Disp: 90 tablet, Rfl: 1    methylphenidate (RITALIN LA) 40 MG 24 hr capsule, Take 40  mg by mouth daily as needed. , Disp: , Rfl:     methylphenidate (RITALIN) 10 MG tablet, Take 10 mg by mouth daily as needed. , Disp: , Rfl:     oxybutynin (DITROPAN XL) 15 MG TR24, TAKE 1 TABLET BY MOUTH EVERY DAY, Disp: 90 tablet, Rfl: 3    pantoprazole (PROTONIX) 40 MG tablet, TAKE 1 TABLET BY MOUTH EVERY DAY, Disp: 90 tablet, Rfl: 3    albuterol 90 mcg/actuation inhaler, Inhale 2 puffs into the lungs every 6 (six) hours as needed for Wheezing., Disp: 18 g, Rfl: 3    DULoxetine (CYMBALTA) 30 MG capsule, Take 1 capsule (30 mg total) by mouth once daily., Disp: 30 capsule, Rfl: 11    metFORMIN (GLUCOPHAGE-XR) 500 MG 24 hr tablet, Take 2 tablets (1,000 mg total) by mouth 2 (two) times daily with meals., Disp: 360 tablet, Rfl: 3    Lab Results   Component Value Date    WBC 5.89 09/27/2019    HGB 13.9 09/27/2019    HCT 43.2 09/27/2019     09/27/2019    CHOL 191 09/27/2019    TRIG 219 (H) 09/27/2019    HDL 51 09/27/2019    ALT 41 09/27/2019    AST 35 09/27/2019     10/04/2019    K 3.8 10/04/2019     10/04/2019    CREATININE 0.8 10/04/2019    BUN 13 10/04/2019    CO2 26 10/04/2019    TSH 2.685 09/27/2019    HGBA1C 7.9 (H) 09/27/2019     Review of Systems   Constitutional: Negative for chills.   HENT: Positive for ear pain (right).    Respiratory: Negative for cough and wheezing.    Cardiovascular: Negative for chest pain and palpitations.   Gastrointestinal: Negative for abdominal pain, nausea and vomiting.   Genitourinary: Positive for frequency. Negative for difficulty urinating, dysuria, flank pain, hematuria and urgency.   Musculoskeletal: Positive for arthralgias and back pain.   Neurological: Negative for dizziness, weakness and light-headedness.         Objective:      Physical Exam   Constitutional: She is oriented to person, place, and time. Vital signs are normal. She appears well-developed and well-nourished. No distress.   HENT:   Right Ear: Tympanic membrane, external ear and ear  canal normal.   Left Ear: Tympanic membrane, external ear and ear canal normal.   No evidence of infection bilateral ears   Cardiovascular: Normal rate, regular rhythm and normal heart sounds.   Pulses:       Dorsalis pedis pulses are 2+ on the right side, and 2+ on the left side.   Pulmonary/Chest: Effort normal and breath sounds normal. She has no wheezes.   Abdominal: Soft. Normal appearance.   Musculoskeletal: She exhibits no edema.   Feet:   Right Foot:   Protective Sensation: 10 sites tested. 10 sites sensed.   Skin Integrity: Positive for callus (heel). Negative for skin breakdown or dry skin.   Left Foot:   Protective Sensation: 10 sites tested. 10 sites sensed.   Skin Integrity: Positive for callus (heel). Negative for skin breakdown or dry skin.   Neurological: She is alert and oriented to person, place, and time.   Skin: Skin is warm and dry.   Psychiatric: She has a normal mood and affect.   Nursing note and vitals reviewed.      Assessment:       1. Uncontrolled type 2 diabetes mellitus with hyperglycemia    2. Urinary frequency        Plan:       Edie was seen today for urinary tract infection and otalgia.    Diagnoses and all orders for this visit:    Uncontrolled type 2 diabetes mellitus with hyperglycemia  -     metFORMIN (GLUCOPHAGE-XR) 500 MG 24 hr tablet; Take 2 tablets (1,000 mg total) by mouth 2 (two) times daily with meals.  -     Hemoglobin A1c; Future  -     Comprehensive metabolic panel; Future  Stop jardiance given side effects, agreeable to try metformin extended release  Continue to monitoring and dietary efforts  Notify clinic if metformin causes GI upset, consider alternate therapy.  Not open to injectable medications at this time.   Repeat labs before follow-up    Urinary frequency  -     POCT urine dipstick without microscope  -     Urine culture  Test of cure, await culture before treatment  Patient was counseled to increase fluid intake.  Avoid carbonated beverages.  Empty your  bladder frequently, before and after intercourse, and wipe front to back when cleaning after using the bathroom.  Cranberry juice intake may help.  Notify MD if you have fever > 100.4, severe abdominal pain, blood in urine or if you see no improvement in 3 days.    Patient readiness: acceptance and barriers:none    During the course of the visit the patient was educated and counseled about the following:     Diabetes:  Discussed general issues about diabetes pathophysiology and management.  Addressed ADA diet.  Suggested low cholesterol diet.  Encouraged aerobic exercise.  Discussed foot care.  Started metformin; see  medication orders.    Goals: Diabetes: Maintain Hemoglobin A1C below 7    Did patient meet goals/outcomes: No    The following self management tools provided: blood glucose log    Patient Instructions (the written plan) was given to the patient/family.     Time spent with patient: 15 minutes    Barriers to medications present (no )    Adverse reactions to current medications (yes-jardiance, changed to metformin)    Over the counter medications reviewed (Yes)    Follow-up as scheduled, fasting labs prior

## 2019-11-02 LAB
LEFT EYE DM RETINOPATHY: NEGATIVE
RIGHT EYE DM RETINOPATHY: NEGATIVE

## 2019-11-03 LAB
BACTERIA UR CULT: NORMAL
BACTERIA UR CULT: NORMAL

## 2019-11-04 ENCOUNTER — TELEPHONE (OUTPATIENT)
Dept: FAMILY MEDICINE | Facility: CLINIC | Age: 64
End: 2019-11-04

## 2019-11-04 DIAGNOSIS — R35.0 URINARY FREQUENCY: Primary | ICD-10-CM

## 2019-11-04 NOTE — PROGRESS NOTES
Spoke with patient regarding her results, patient said she still have the burning when she urinate, she can drop off a sample on tomorrow. Please place orders.

## 2019-11-04 NOTE — TELEPHONE ENCOUNTER
I advise the cath specimen for the most sterile technique, but if she refuses, the choice is hers. A cath sample is the most reliable collection method. Orders in for home collect.

## 2019-11-04 NOTE — TELEPHONE ENCOUNTER
----- Message from Polly Malin sent at 11/4/2019 12:10 PM CST -----  Contact: self  Type:  Patient Returning Call    Who Called:  Patient   Who Left Message for Patient:  Hernesto   Does the patient know what this is regarding?:    Best Call Back Number:  [phone] (home)     Additional Information:

## 2019-11-04 NOTE — TELEPHONE ENCOUNTER
Spoke with patient regarding Yas suggestion below, patient stated she did not want to do a catheter because she had that done before and it hurts. Patient still would like to drop off a sample tomorrow and she said she promise to wipe really clean with the wipes.

## 2019-11-05 ENCOUNTER — LAB VISIT (OUTPATIENT)
Dept: LAB | Facility: HOSPITAL | Age: 64
End: 2019-11-05
Attending: NURSE PRACTITIONER
Payer: COMMERCIAL

## 2019-11-05 DIAGNOSIS — R35.0 URINARY FREQUENCY: ICD-10-CM

## 2019-11-05 LAB
BACTERIA #/AREA URNS AUTO: ABNORMAL /HPF
BILIRUB UR QL STRIP: NEGATIVE
CLARITY UR REFRACT.AUTO: CLEAR
COLOR UR AUTO: YELLOW
GLUCOSE UR QL STRIP: NEGATIVE
HGB UR QL STRIP: NEGATIVE
KETONES UR QL STRIP: NEGATIVE
LEUKOCYTE ESTERASE UR QL STRIP: ABNORMAL
MICROSCOPIC COMMENT: ABNORMAL
NITRITE UR QL STRIP: NEGATIVE
PH UR STRIP: 5 [PH] (ref 5–8)
PROT UR QL STRIP: NEGATIVE
RBC #/AREA URNS AUTO: 1 /HPF (ref 0–4)
SP GR UR STRIP: 1.01 (ref 1–1.03)
SQUAMOUS #/AREA URNS AUTO: 1 /HPF
URN SPEC COLLECT METH UR: ABNORMAL
WBC #/AREA URNS AUTO: 7 /HPF (ref 0–5)

## 2019-11-05 PROCEDURE — 81001 URINALYSIS AUTO W/SCOPE: CPT

## 2019-11-05 PROCEDURE — 87086 URINE CULTURE/COLONY COUNT: CPT

## 2019-11-06 DIAGNOSIS — G89.29 CHRONIC LOW BACK PAIN: ICD-10-CM

## 2019-11-06 DIAGNOSIS — M54.50 CHRONIC LOW BACK PAIN: ICD-10-CM

## 2019-11-06 RX ORDER — METAXALONE 400 MG/1
TABLET ORAL
Qty: 90 TABLET | Refills: 1 | Status: SHIPPED | OUTPATIENT
Start: 2019-11-06 | End: 2020-02-02

## 2019-11-07 LAB
BACTERIA UR CULT: NORMAL
BACTERIA UR CULT: NORMAL

## 2019-11-08 ENCOUNTER — TELEPHONE (OUTPATIENT)
Dept: FAMILY MEDICINE | Facility: CLINIC | Age: 64
End: 2019-11-08

## 2019-11-08 NOTE — TELEPHONE ENCOUNTER
Spoke with patient regarding her results, informed her that celestina suggested a urine cath but patient refused again. She will come in to drop off another sample today.

## 2019-11-08 NOTE — TELEPHONE ENCOUNTER
----- Message from Lisa Saini sent at 11/8/2019  7:13 AM CST -----    Type:  Test Results    Who Called:  pt  Name of Test (Lab/Mammo/Etc): urine  Best Call Back Number:  [phone]  Additional Information:   Test results

## 2019-11-12 ENCOUNTER — PATIENT MESSAGE (OUTPATIENT)
Dept: FAMILY MEDICINE | Facility: CLINIC | Age: 64
End: 2019-11-12

## 2019-11-12 DIAGNOSIS — N39.0 URINARY TRACT INFECTION WITHOUT HEMATURIA, SITE UNSPECIFIED: Primary | ICD-10-CM

## 2019-11-14 ENCOUNTER — TELEPHONE (OUTPATIENT)
Dept: FAMILY MEDICINE | Facility: CLINIC | Age: 64
End: 2019-11-14

## 2019-11-14 NOTE — TELEPHONE ENCOUNTER
----- Message from Rodolfo Vasquez sent at 11/14/2019 10:03 AM CST -----  Contact: same  Patient called in and stated she missed a call back from office about patient may having a bladder infection.  Patient would like a call back at [phone]

## 2019-11-14 NOTE — TELEPHONE ENCOUNTER
Urine cultures x 2 have shown multiple organisms which means it is likely not a UTI but a contaminated urine specimen.  A cath specimen is much more accurate.  Since she is having recurrent problems I recommend a urology consult. Please schedule one.

## 2019-11-14 NOTE — TELEPHONE ENCOUNTER
Returned call to patient regarding test results and referral to Urologist, no answer left message for patient to call back.

## 2019-11-21 ENCOUNTER — PES CALL (OUTPATIENT)
Dept: ADMINISTRATIVE | Facility: CLINIC | Age: 64
End: 2019-11-21

## 2019-11-27 DIAGNOSIS — E11.65 UNCONTROLLED TYPE 2 DIABETES MELLITUS WITH HYPERGLYCEMIA: ICD-10-CM

## 2019-11-27 RX ORDER — EMPAGLIFLOZIN 10 MG/1
TABLET, FILM COATED ORAL
Qty: 30 TABLET | Refills: 11 | Status: SHIPPED | OUTPATIENT
Start: 2019-11-27 | End: 2020-03-05

## 2019-12-01 DIAGNOSIS — E03.9 HYPOTHYROIDISM (ACQUIRED): ICD-10-CM

## 2019-12-01 RX ORDER — LEVOTHYROXINE SODIUM 150 UG/1
137 TABLET ORAL DAILY
Qty: 90 TABLET | Refills: 3 | Status: SHIPPED | OUTPATIENT
Start: 2019-12-01 | End: 2020-03-19 | Stop reason: SDUPTHER

## 2019-12-18 ENCOUNTER — TELEPHONE (OUTPATIENT)
Dept: FAMILY MEDICINE | Facility: CLINIC | Age: 64
End: 2019-12-18

## 2019-12-18 DIAGNOSIS — G62.9 NEUROPATHY: ICD-10-CM

## 2019-12-18 RX ORDER — GABAPENTIN 300 MG/1
300 CAPSULE ORAL 2 TIMES DAILY
Qty: 180 CAPSULE | Refills: 0 | Status: SHIPPED | OUTPATIENT
Start: 2019-12-18 | End: 2020-03-19 | Stop reason: SDUPTHER

## 2019-12-18 RX ORDER — GABAPENTIN 300 MG/1
CAPSULE ORAL
Qty: 180 CAPSULE | Refills: 0 | Status: SHIPPED | OUTPATIENT
Start: 2019-12-18 | End: 2019-12-18

## 2019-12-18 NOTE — TELEPHONE ENCOUNTER
Spoke with patient , she stated she is not allergic to Gabapentin, she thought she was years ago, but she has been taking this medication for 7 years. Patient would like a refill please

## 2019-12-20 ENCOUNTER — LAB VISIT (OUTPATIENT)
Dept: LAB | Facility: HOSPITAL | Age: 64
End: 2019-12-20
Attending: INTERNAL MEDICINE
Payer: COMMERCIAL

## 2019-12-20 DIAGNOSIS — K58.0 IRRITABLE BOWEL SYNDROME WITH DIARRHEA: Primary | ICD-10-CM

## 2019-12-20 LAB — CRP SERPL-MCNC: 8.1 MG/L (ref 0–8.2)

## 2019-12-20 PROCEDURE — 36415 COLL VENOUS BLD VENIPUNCTURE: CPT | Mod: PO

## 2019-12-20 PROCEDURE — 86140 C-REACTIVE PROTEIN: CPT

## 2019-12-20 PROCEDURE — 83516 IMMUNOASSAY NONANTIBODY: CPT | Mod: 59

## 2019-12-23 LAB
GLIADIN PEPTIDE IGA SER-ACNC: 2 UNITS
GLIADIN PEPTIDE IGG SER-ACNC: 2 UNITS
IGA SERPL-MCNC: 93 MG/DL (ref 70–400)
TTG IGA SER-ACNC: 3 UNITS
TTG IGG SER-ACNC: 4 UNITS

## 2019-12-30 ENCOUNTER — LAB VISIT (OUTPATIENT)
Dept: LAB | Facility: HOSPITAL | Age: 64
End: 2019-12-30
Attending: NURSE PRACTITIONER
Payer: COMMERCIAL

## 2019-12-30 DIAGNOSIS — E11.65 UNCONTROLLED TYPE 2 DIABETES MELLITUS WITH HYPERGLYCEMIA: ICD-10-CM

## 2019-12-30 LAB
ALBUMIN SERPL BCP-MCNC: 3.6 G/DL (ref 3.5–5.2)
ALP SERPL-CCNC: 93 U/L (ref 55–135)
ALT SERPL W/O P-5'-P-CCNC: 32 U/L (ref 10–44)
ANION GAP SERPL CALC-SCNC: 11 MMOL/L (ref 8–16)
AST SERPL-CCNC: 23 U/L (ref 10–40)
BILIRUB SERPL-MCNC: 1.1 MG/DL (ref 0.1–1)
BUN SERPL-MCNC: 15 MG/DL (ref 8–23)
CALCIUM SERPL-MCNC: 9.3 MG/DL (ref 8.7–10.5)
CHLORIDE SERPL-SCNC: 107 MMOL/L (ref 95–110)
CO2 SERPL-SCNC: 24 MMOL/L (ref 23–29)
CREAT SERPL-MCNC: 0.9 MG/DL (ref 0.5–1.4)
EST. GFR  (AFRICAN AMERICAN): >60 ML/MIN/1.73 M^2
EST. GFR  (NON AFRICAN AMERICAN): >60 ML/MIN/1.73 M^2
ESTIMATED AVG GLUCOSE: 134 MG/DL (ref 68–131)
GLUCOSE SERPL-MCNC: 109 MG/DL (ref 70–110)
HBA1C MFR BLD HPLC: 6.3 % (ref 4–5.6)
POTASSIUM SERPL-SCNC: 4.1 MMOL/L (ref 3.5–5.1)
PROT SERPL-MCNC: 6.8 G/DL (ref 6–8.4)
SODIUM SERPL-SCNC: 142 MMOL/L (ref 136–145)

## 2019-12-30 PROCEDURE — 83036 HEMOGLOBIN GLYCOSYLATED A1C: CPT

## 2019-12-30 PROCEDURE — 80053 COMPREHEN METABOLIC PANEL: CPT

## 2019-12-30 PROCEDURE — 36415 COLL VENOUS BLD VENIPUNCTURE: CPT | Mod: PO

## 2020-01-02 ENCOUNTER — OFFICE VISIT (OUTPATIENT)
Dept: FAMILY MEDICINE | Facility: CLINIC | Age: 65
End: 2020-01-02
Payer: COMMERCIAL

## 2020-01-02 VITALS
HEIGHT: 65 IN | DIASTOLIC BLOOD PRESSURE: 70 MMHG | RESPIRATION RATE: 14 BRPM | SYSTOLIC BLOOD PRESSURE: 112 MMHG | WEIGHT: 260.38 LBS | OXYGEN SATURATION: 96 % | BODY MASS INDEX: 43.38 KG/M2 | HEART RATE: 69 BPM | TEMPERATURE: 98 F

## 2020-01-02 DIAGNOSIS — E53.8 B12 NUTRITIONAL DEFICIENCY: ICD-10-CM

## 2020-01-02 DIAGNOSIS — E78.5 HYPERLIPIDEMIA ASSOCIATED WITH TYPE 2 DIABETES MELLITUS: ICD-10-CM

## 2020-01-02 DIAGNOSIS — E11.9 TYPE 2 DIABETES MELLITUS WITHOUT COMPLICATION, WITHOUT LONG-TERM CURRENT USE OF INSULIN: ICD-10-CM

## 2020-01-02 DIAGNOSIS — E11.69 HYPERLIPIDEMIA ASSOCIATED WITH TYPE 2 DIABETES MELLITUS: ICD-10-CM

## 2020-01-02 DIAGNOSIS — Z86.010 HISTORY OF COLON POLYPS: ICD-10-CM

## 2020-01-02 DIAGNOSIS — E55.9 VITAMIN D DEFICIENCY: ICD-10-CM

## 2020-01-02 DIAGNOSIS — M54.9 CHRONIC BACK PAIN GREATER THAN 3 MONTHS DURATION: ICD-10-CM

## 2020-01-02 DIAGNOSIS — G89.29 CHRONIC BACK PAIN GREATER THAN 3 MONTHS DURATION: ICD-10-CM

## 2020-01-02 DIAGNOSIS — E66.01 MORBID OBESITY WITH BMI OF 45.0-49.9, ADULT: ICD-10-CM

## 2020-01-02 DIAGNOSIS — E03.9 HYPOTHYROIDISM, UNSPECIFIED TYPE: Primary | ICD-10-CM

## 2020-01-02 PROBLEM — E78.1 HYPERTRIGLYCERIDEMIA: Status: RESOLVED | Noted: 2019-10-04 | Resolved: 2020-01-02

## 2020-01-02 PROBLEM — Z86.0100 HISTORY OF COLON POLYPS: Status: ACTIVE | Noted: 2020-01-02

## 2020-01-02 PROCEDURE — 99214 PR OFFICE/OUTPT VISIT, EST, LEVL IV, 30-39 MIN: ICD-10-PCS | Mod: 25,S$GLB,, | Performed by: FAMILY MEDICINE

## 2020-01-02 PROCEDURE — 3044F HG A1C LEVEL LT 7.0%: CPT | Mod: CPTII,S$GLB,, | Performed by: FAMILY MEDICINE

## 2020-01-02 PROCEDURE — 3008F BODY MASS INDEX DOCD: CPT | Mod: CPTII,S$GLB,, | Performed by: FAMILY MEDICINE

## 2020-01-02 PROCEDURE — 99999 PR PBB SHADOW E&M-EST. PATIENT-LVL III: CPT | Mod: PBBFAC,,, | Performed by: FAMILY MEDICINE

## 2020-01-02 PROCEDURE — 99214 OFFICE O/P EST MOD 30 MIN: CPT | Mod: 25,S$GLB,, | Performed by: FAMILY MEDICINE

## 2020-01-02 PROCEDURE — 96372 PR INJECTION,THERAP/PROPH/DIAG2ST, IM OR SUBCUT: ICD-10-PCS | Mod: S$GLB,,, | Performed by: FAMILY MEDICINE

## 2020-01-02 PROCEDURE — 99999 PR PBB SHADOW E&M-EST. PATIENT-LVL III: ICD-10-PCS | Mod: PBBFAC,,, | Performed by: FAMILY MEDICINE

## 2020-01-02 PROCEDURE — 96372 THER/PROPH/DIAG INJ SC/IM: CPT | Mod: S$GLB,,, | Performed by: FAMILY MEDICINE

## 2020-01-02 PROCEDURE — 3008F PR BODY MASS INDEX (BMI) DOCUMENTED: ICD-10-PCS | Mod: CPTII,S$GLB,, | Performed by: FAMILY MEDICINE

## 2020-01-02 PROCEDURE — 3044F PR MOST RECENT HEMOGLOBIN A1C LEVEL <7.0%: ICD-10-PCS | Mod: CPTII,S$GLB,, | Performed by: FAMILY MEDICINE

## 2020-01-02 RX ORDER — KETOROLAC TROMETHAMINE 30 MG/ML
60 INJECTION, SOLUTION INTRAMUSCULAR; INTRAVENOUS
Status: COMPLETED | OUTPATIENT
Start: 2020-01-02 | End: 2020-01-02

## 2020-01-02 RX ORDER — HYDROCODONE BITARTRATE AND ACETAMINOPHEN 7.5; 325 MG/1; MG/1
1 TABLET ORAL EVERY 6 HOURS PRN
Qty: 90 TABLET | Refills: 0 | Status: SHIPPED | OUTPATIENT
Start: 2020-01-02 | End: 2020-03-19 | Stop reason: SDUPTHER

## 2020-01-02 RX ADMIN — KETOROLAC TROMETHAMINE 60 MG: 30 INJECTION, SOLUTION INTRAMUSCULAR; INTRAVENOUS at 01:01

## 2020-01-02 NOTE — PROGRESS NOTES
Subjective:       Patient ID: Edie Hernandez is a 64 y.o. female.    Chief Complaint: Follow-up (3mth f/u diabetes)    HPI  Review of Systems   Constitutional: Negative for fatigue and unexpected weight change.   Respiratory: Negative for chest tightness and shortness of breath.    Cardiovascular: Negative for chest pain, palpitations and leg swelling.   Gastrointestinal: Negative for abdominal pain.   Musculoskeletal: Negative for arthralgias.   Neurological: Negative for dizziness, syncope, light-headedness and headaches.       Patient Active Problem List   Diagnosis    GERD (gastroesophageal reflux disease)    Chronic back pain greater than 3 months duration    Environmental allergies    Fibromyalgia    B12 nutritional deficiency    CLINT (obstructive sleep apnea)    Chronic sinusitis    Asthma    Hypothyroid    Nausea    Night sweats    Tinnitus, bilateral    Frequent UTI    Hyperlipidemia    Colon polyp, hyperplastic    Interstitial cystitis    Hemangioma    Wart    Globus sensation    DDD (degenerative disc disease), lumbar    Morbid obesity with BMI of 45.0-49.9, adult    Edema    Mixed incontinence    Continuous opioid dependence- contract 10/18/17-failed uds for norco, + 2/18    Type 2 diabetes mellitus    History of colon polyps     Patient is here for a chronic conditions follow up.    Reviewed labs 12/19    Pulm/Sleep Dr. Yang-CLINT on daily methylphendiate    Continuous use of opioids follow-up:  Current medication and dosing:  norco 7.5 once daily  Supply:  90 day supply  Side effects: none  Pain controlled: yes  Medication compliance: yes  DPS checked today: no evidence of diversion  UDS: 3/19  pain contract signed: today    Cause of Pain: chronic low back pain    Colonoscopy just done-polyp [address] [place] [address] GI    Objective:      Physical Exam   Constitutional: She is oriented to person, place, and time. She appears well-developed and well-nourished.   Cardiovascular: Normal  "rate, regular rhythm and normal heart sounds.   Pulmonary/Chest: Effort normal and breath sounds normal.   Musculoskeletal: She exhibits no edema.   Neurological: She is alert and oriented to person, place, and time.   Skin: Skin is warm and dry.   Psychiatric: She has a normal mood and affect.   Nursing note and vitals reviewed.      Assessment:       1. Hypothyroidism, unspecified type    2. History of colon polyps    3. B12 nutritional deficiency    4. Type 2 diabetes mellitus without complication, without long-term current use of insulin    5. Morbid obesity with BMI of 45.0-49.9, adult    6. Chronic back pain greater than 3 months duration    7. Vitamin D deficiency    8. Hyperlipidemia associated with type 2 diabetes mellitus        Plan:         1. History of colon polyps  Cont colonoscopic surveillance    2. Hypothyroidism, unspecified type  Stable condition.  Continue current medications.  Will adjust based on lab findings or if condition changes.    - CBC auto differential; Future  - TSH; Future  - T4, free; Future    3. B12 nutritional deficiency  Screen and treat as indicated:    - Vitamin B12; Future    4. Type 2 diabetes mellitus without complication, without long-term current use of insulin  Stable condition.  Continue current medications.  Will adjust based on lab findings or if condition changes.    - Comprehensive metabolic panel; Future  - Hemoglobin A1c; Future  - Microalbumin/creatinine urine ratio; Future    5. Morbid obesity with BMI of 45.0-49.9, adult  Counseled patient on his ideal body weight, health consequences of being obese and current recommendations including weekly exercise and a heart healthy diet.  Current BMI is:Estimated body mass index is 43.33 kg/m² as calculated from the following:    Height as of this encounter: 5' 5" (1.651 m).    Weight as of this encounter: 118.1 kg (260 lb 5.8 oz)..  Patient is aware that ideal BMI < 25 or Weight in (lb) to have BMI = 25: " 149.9.        6. Chronic back pain greater than 3 months duration  Controlled on current medications.  Continue current medications.  Counseled patient on habit forming potential of opiates, risk of sedation, respiratory suppression or arrest especially if used in conjunction with benzodiazepines or alcohol.  Counseled patient to avoid driving while on the medication, rules of the pain contract and need for routine 3 month follow ups.  Patient agrees to all aspects of the plan of care and wishes to proceed with therapy.  The plan is always to use alternatives less habit forming, to utilize interventions and therapies that reduce pain as an adjunctive treatment, and limit and wean the dose of narcotics as soon as able and appropriate.      - HYDROcodone-acetaminophen (NORCO) 7.5-325 mg per tablet; Take 1 tablet by mouth every 6 (six) hours as needed for Pain.  Dispense: 90 tablet; Refill: 0  - ketorolac injection 60 mg    7. Vitamin D deficiency  Screen and treat as indicated:    - Vitamin D; Future    8. Hyperlipidemia associated with type 2 diabetes mellitus  Stable condition.  Continue current medications.  Will adjust based on lab findings or if condition changes.    - Lipid panel; Future        Time spent with patient: 20 minutes    Patient with be reevaluated in 3 and  6 months or sooner prn    Greater than 50% of this visit was spent counseling as described in above documentation:Yes

## 2020-01-02 NOTE — PROGRESS NOTES
Patients name and date of birth Verified. Patient received Ketorolac 60 mg injection IM to the left upper outer quad gluteus as ordered per MAR. Patient tolerated injection well, no adverse reactons noted at this time.

## 2020-01-02 NOTE — LETTER
2020                                 NAME:Edie Hernandez  : 1955   MRN: [id number]     [facility]  [address] [address]  Phone: [phone]  Fax: [phone]      [facility]  PAIN MANAGEMENT    PAIN MANAGEMENT CONTRACT      My doctor and I have decided that as part of my treatment for chronic pain, I will receive prescriptions for controlled substances. As a patient, I will agree to the following terms in order for my provider to effectively treat my pain and also comply with the rules set forth by [facility] Board of Medical Examiners and Drug Enforcement Agency.     1. A single physician shall be responsible for prescribing my pain medication.    2. I understand that in order for me to receive the best possible care, my prescribing doctor needs me to provide a copy of any of my previous copy of any previous medical records,including MRI, office notes, lab results, etc.    3. I will also provide a full list of ALL of my medications, current dose, and how often I take my medication. I must inform my doctor if I am taking any other medications, particularly sedating medications, such as Valium, Ativan, seizure medications, or psychiatric medications.    4. I will inform my physician of any current or prior history of abuse or misuse of prescription medication, illegal drugs, or alcohol.    5. I must not obtain controlled substances (including but not limited to, Xanax, Vicodin,Percocet, Tylenol #3,  ) from any other doctor without first telling my physician at this clinic.    6. I understand that my physician will assess the efficacy of my treatment with controlled substances and monitor my progress every twelve weeks, unless my physician, in his orher clinical judgment, determines otherwise.    7. It is my responsibility to keep my appointments. If I miss my scheduled appointment, I will be rescheduled to the first available time slot. I  "understand that pain medications may not be refilled until seen during a scheduled appointment.    8. I will take my medications only in the directed doses and manner and at the directed time. I will not deviate from my prescribed usage.    9. I will not combine these prescribed medications with alcohol or recreational medications,including, but not limited to, marijuana.    10. I will not drive or operate heavy machinery while on this medication.    11. I will not cut or chew long-acting pain medication.    12. I must inform my physician of any side effects that I may be experiencing.    13. If severe sedation (sleepiness) or any other medical emergency relating to my pain medication occurs, I will make contact with my doctors office or seek ER attention immediately.    14. If my doctor agrees to refill my pain medication by telephone, I will call the office at least 5 business days in advance to request a refill prescription.    15. Refill prescriptions will not be written in the evenings, weekends, or on holidays.    16. Each prescription is expected to last at least one month. Refills will not be given early if I"run out early", "lose a prescription", "spill" or "misplace" my medication. I will report stolen medications to the police.    17. No prescription refills will be given if I have not been seen by my physician in the clinic for 3 months.    18. It may be necessary for prescriptions to be picked up in person and proof of identification may be required.    19. I will have my pain medication filled at only one pharmacy.          (pharmacy name/address)     20. A baseline medication screen may be completed on my first visit and or randomly at other routine clinic visits.    21. These medications may be harmful to an unborn child. I have been advised to use 2 forms of birth control (at least one barrier, such as condoms) while using these medications. I will inform my doctor immediately if I become pregnant " while on this medication.    22. If I test positive for medications that my doctor has not prescribed, and/or if I refuse a random medication test, my physician has the right to stop my controlled substance, end his/her relationship with me, and I may be terminated from the clinic.    23. If at any time I become violent or abusive, verbally or physically, my actions will be considered cause to terminate care from the clinic and discontinuation of pain medications.    I have read and understand the above information. I will, to the best of my ability, adhere to these policies and commitments. I further understand that noncompliance with these policies or demonstration of signs suspicious of medication overuse or abuse, may result in my being discharged as the patient.     I DO HEREBY AGREE AND UNDERSTAND ALL OF THE ABOVE. I HAVE RECEIVED A COPY OF THIS CONTROLLED SUBSTANCE TREATMENT ORIENTATION AND BEHAVIOR AGREEMENT.       Patient Signature:______________________________ Date/Time:________________    Patient Printed Name: Edie Hernandez     Physician Signature:____________________________ Date/Time:________________    Physician Printed Name: Lydia Eugene MD    Witness Signature:_____________________________ Date/Time:________________    Witness Printed Name

## 2020-01-14 ENCOUNTER — PATIENT OUTREACH (OUTPATIENT)
Dept: ADMINISTRATIVE | Facility: HOSPITAL | Age: 65
End: 2020-01-14

## 2020-01-20 ENCOUNTER — OFFICE VISIT (OUTPATIENT)
Dept: ENDOCRINOLOGY | Facility: CLINIC | Age: 65
End: 2020-01-20
Payer: COMMERCIAL

## 2020-01-20 VITALS
HEART RATE: 76 BPM | DIASTOLIC BLOOD PRESSURE: 80 MMHG | BODY MASS INDEX: 43.14 KG/M2 | TEMPERATURE: 98 F | WEIGHT: 258.94 LBS | HEIGHT: 65 IN | SYSTOLIC BLOOD PRESSURE: 138 MMHG

## 2020-01-20 DIAGNOSIS — E11.9 TYPE 2 DIABETES MELLITUS WITHOUT COMPLICATION, WITHOUT LONG-TERM CURRENT USE OF INSULIN: ICD-10-CM

## 2020-01-20 DIAGNOSIS — Z78.0 POSTMENOPAUSAL: ICD-10-CM

## 2020-01-20 DIAGNOSIS — E03.8 HYPOTHYROIDISM DUE TO HASHIMOTO'S THYROIDITIS: ICD-10-CM

## 2020-01-20 DIAGNOSIS — E06.3 HYPOTHYROIDISM DUE TO HASHIMOTO'S THYROIDITIS: ICD-10-CM

## 2020-01-20 DIAGNOSIS — D35.02 ADRENAL ADENOMA, LEFT: Primary | ICD-10-CM

## 2020-01-20 DIAGNOSIS — G47.33 OSA (OBSTRUCTIVE SLEEP APNEA): ICD-10-CM

## 2020-01-20 PROCEDURE — 3008F BODY MASS INDEX DOCD: CPT | Mod: CPTII,S$GLB,, | Performed by: PHYSICIAN ASSISTANT

## 2020-01-20 PROCEDURE — 99203 PR OFFICE/OUTPT VISIT, NEW, LEVL III, 30-44 MIN: ICD-10-PCS | Mod: S$GLB,,, | Performed by: PHYSICIAN ASSISTANT

## 2020-01-20 PROCEDURE — 99999 PR PBB SHADOW E&M-EST. PATIENT-LVL V: CPT | Mod: PBBFAC,,, | Performed by: PHYSICIAN ASSISTANT

## 2020-01-20 PROCEDURE — 3008F PR BODY MASS INDEX (BMI) DOCUMENTED: ICD-10-PCS | Mod: CPTII,S$GLB,, | Performed by: PHYSICIAN ASSISTANT

## 2020-01-20 PROCEDURE — 99203 OFFICE O/P NEW LOW 30 MIN: CPT | Mod: S$GLB,,, | Performed by: PHYSICIAN ASSISTANT

## 2020-01-20 PROCEDURE — 99999 PR PBB SHADOW E&M-EST. PATIENT-LVL V: ICD-10-PCS | Mod: PBBFAC,,, | Performed by: PHYSICIAN ASSISTANT

## 2020-01-20 PROCEDURE — 3044F HG A1C LEVEL LT 7.0%: CPT | Mod: CPTII,S$GLB,, | Performed by: PHYSICIAN ASSISTANT

## 2020-01-20 PROCEDURE — 3044F PR MOST RECENT HEMOGLOBIN A1C LEVEL <7.0%: ICD-10-PCS | Mod: CPTII,S$GLB,, | Performed by: PHYSICIAN ASSISTANT

## 2020-01-20 NOTE — LETTER
January 26, 2020      Yas Jimenez, MARC  [address] [facility] [address]           [place] [facility]/Diabetes  [address]  [facility] [address]  Phone: [phone]          Patient: Edie Hernandez   MR Number: [id number]   YOB: 1955   Date of Visit: 1/20/2020       Dear Yas Jimenez:    Thank you for referring Edie Hernandez to me for evaluation. Attached you will find relevant portions of my assessment and plan of care.    If you have questions, please do not hesitate to call me. I look forward to following Edie Hernandez along with you.    Sincerely,    DELIA Ulrich PA-C    Enclosure  CC:  No Recipients    If you would like to receive this communication electronically, please contact [email or web address] or [phone] to request more information on [facility] access.    For providers and/or their staff who would like to refer a patient to [facility], please contact us through our one-stop-shop provider referral line, [facility], at [phone].    If you feel you have received this communication in error or would no longer like to receive these types of communications, please e-mail [email or web address]

## 2020-01-20 NOTE — PROGRESS NOTES
"CC: Adrenal Nodule    HPI: Edie Hernandez is a 64 y.o. female here for an adrenal nodule along with pending conditions listed in the Visit Diagnosis. New to endocrine.    Adrenal nodule  Dx in 2016  Last CT 12/19 from [facility] shows a 1.7 left adrenal nodule with 48 HU on postcontrast and 53 units on the delayed scan. No precontrast HU score available. This nodule was 1.1 cm in 2016.   No HA or palpitations.  States sweating started a few months ago. She will feel a hot flash on the top half of her body. Bp is controlled. No hair loss or acne.    Hypothyroidism  Dx 35 years ago.   Her daughters had nodular thyroid disease and hashimoto's.   Thyroid u/s 4/12 did not show any nodules.  + diarrhea, memory changes, fatigue, wt loss.   No tremors, constipation or changes in nails.     T2DM  Dx 6 wks ago  Taking metformin 1000 mg bid  Eye exam: 10/20 [facility]  Podiatry exam: n/a  No formal exercise. Walks stairs daily.     CLINT-wears CPAP    PMHx, PSHx: reviewed in epic.    Social Hx: no ETOH/tobacco use.     ROS:   Constitutional: wt loss  Eyes: No recent visual changes  Cardiovascular: Denies current anginal symptoms  Respiratory: Denies current respiratory difficulty  Gastrointestinal: Denies recent bowel disturbances  GenitoUrinary - No dysuria  Skin: No new skin rash  Neurologic: No focal neurologic complaints  Musculoskeletal: + joint pain, back pain  Endocrine: no polyphagia, polydipsia or polyuria  Remainder ROS negative     /80 (BP Location: Left arm, Patient Position: Sitting, BP Method: Medium (Manual))   Pulse 76   Temp 98.1 °F (36.7 °C) (Oral)   Ht 5' 5" (1.651 m)   Wt 117.4 kg (258 lb 14.9 oz)   BMI 43.09 kg/m²      Personally reviewed labs below:    Lab Results   Component Value Date    TSH 2.685 09/27/2019    FREET4 0.88 09/27/2019        Chemistry        Component Value Date/Time     12/30/2019 0909    K 4.1 12/30/2019 0909     12/30/2019 0909    CO2 24 12/30/2019 0909    BUN 15 " 12/30/2019 0909    CREATININE 0.9 12/30/2019 0909    CREATININE 0.9 04/16/2013 0952     12/30/2019 0909        Component Value Date/Time    CALCIUM 9.3 12/30/2019 0909    CALCIUM 9.5 04/16/2013 0952    ALKPHOS 93 12/30/2019 0909    AST 23 12/30/2019 0909    ALT 32 12/30/2019 0909    BILITOT 1.1 (H) 12/30/2019 0909    ESTGFRAFRICA >60.0 12/30/2019 0909    EGFRNONAA >60.0 12/30/2019 0909         Lab Results   Component Value Date    HGBA1C 6.3 (H) 12/30/2019    HGBA1C 7.9 (H) 09/27/2019    HGBA1C 5.7 07/08/2008      PE:  GENERAL: middle aged female, well developed, well nourished  NECK: Supple neck, normal thyroid. No bruit. + nuchal skin tags.  LYMPHATIC: No cervical or supraclavicular lymphadenopathy  CARDIOVASCULAR: Normal heart sounds, + 1 pedal edema bl  RESPIRATORY: Normal effort, clear to auscultation bl  ABDOMEN: soft, non-tender, non-distended.  MUSC: 2+ DTR UE/LE  NEURO: steady gait, CN ll-Xll grossly intact  PSYCH: normal mood and affect  FEET: appropriate footwear.     CT scan 12/19          Assessment/Plan:   1. Adrenal adenoma, left  Metanephrines, Plasma Free    ACTH    Aldosterone    Renin    Testosterone Panel    Estrogens, fractionated   2. Hypothyroidism due to Hashimoto's thyroiditis     3. Postmenopausal  DXA Bone Density Spine And Hip    Cortisol   4. Type 2 diabetes mellitus without complication, without long-term current use of insulin     5. CLINT (obstructive sleep apnea)       Left Adrenal Adenoma- check hormones for abnormal function. R/o cushing's or pheochromocytoma. Will repeat CT scan next time at [facility] to determine precontrast HU. There may be a discrepancy in the size of the nodule because a comparison was made between two facilities.  Hypothyroidism- TFTs today. Continue LT4 dose.  Postmenopausal-Dexa scan  [id number] Metformin. F/u w/ PCP.  [id number] CPAP    Additional labs approved by  to determine adrenal function.    F/u in 6 mths

## 2020-01-30 ENCOUNTER — OFFICE VISIT (OUTPATIENT)
Dept: FAMILY MEDICINE | Facility: CLINIC | Age: 65
End: 2020-01-30
Payer: COMMERCIAL

## 2020-01-30 VITALS
HEIGHT: 65 IN | WEIGHT: 256.38 LBS | SYSTOLIC BLOOD PRESSURE: 134 MMHG | TEMPERATURE: 98 F | HEART RATE: 70 BPM | BODY MASS INDEX: 42.71 KG/M2 | DIASTOLIC BLOOD PRESSURE: 84 MMHG | OXYGEN SATURATION: 95 %

## 2020-01-30 DIAGNOSIS — R19.00 RIGHT FLANK MASS: Primary | ICD-10-CM

## 2020-01-30 DIAGNOSIS — D17.1 LIPOMA OF BACK: ICD-10-CM

## 2020-01-30 DIAGNOSIS — M51.36 DDD (DEGENERATIVE DISC DISEASE), LUMBAR: ICD-10-CM

## 2020-01-30 PROCEDURE — 3008F PR BODY MASS INDEX (BMI) DOCUMENTED: ICD-10-PCS | Mod: CPTII,S$GLB,, | Performed by: FAMILY MEDICINE

## 2020-01-30 PROCEDURE — 99999 PR PBB SHADOW E&M-EST. PATIENT-LVL III: CPT | Mod: PBBFAC,,, | Performed by: FAMILY MEDICINE

## 2020-01-30 PROCEDURE — 99214 PR OFFICE/OUTPT VISIT, EST, LEVL IV, 30-39 MIN: ICD-10-PCS | Mod: 25,S$GLB,, | Performed by: FAMILY MEDICINE

## 2020-01-30 PROCEDURE — 3008F BODY MASS INDEX DOCD: CPT | Mod: CPTII,S$GLB,, | Performed by: FAMILY MEDICINE

## 2020-01-30 PROCEDURE — 99999 PR PBB SHADOW E&M-EST. PATIENT-LVL III: ICD-10-PCS | Mod: PBBFAC,,, | Performed by: FAMILY MEDICINE

## 2020-01-30 PROCEDURE — 99214 OFFICE O/P EST MOD 30 MIN: CPT | Mod: 25,S$GLB,, | Performed by: FAMILY MEDICINE

## 2020-01-30 PROCEDURE — 96372 PR INJECTION,THERAP/PROPH/DIAG2ST, IM OR SUBCUT: ICD-10-PCS | Mod: S$GLB,,, | Performed by: FAMILY MEDICINE

## 2020-01-30 PROCEDURE — 96372 THER/PROPH/DIAG INJ SC/IM: CPT | Mod: S$GLB,,, | Performed by: FAMILY MEDICINE

## 2020-01-30 RX ORDER — KETOROLAC TROMETHAMINE 30 MG/ML
60 INJECTION, SOLUTION INTRAMUSCULAR; INTRAVENOUS
Status: COMPLETED | OUTPATIENT
Start: 2020-01-30 | End: 2020-01-30

## 2020-01-30 RX ADMIN — KETOROLAC TROMETHAMINE 60 MG: 30 INJECTION, SOLUTION INTRAMUSCULAR; INTRAVENOUS at 09:01

## 2020-01-30 NOTE — PROGRESS NOTES
Identified patient's name and . Administered 60 mg tordol, IM. Patient tolerated well, aseptic technique maintained. Pain scale 0/10 with injection. No residual bleeding at insertion site noted.

## 2020-01-30 NOTE — PROGRESS NOTES
Subjective:       Patient ID: Edie Hernandez is a 64 y.o. female.    Chief Complaint: No chief complaint on file.    Patient here for UC visit.  Pt reports feeling a lump under skin in right flank area of back.  Medial to that, the skin has felt itchy for years - nothing noted as far as rash etc on the skin.    Mass   This is a new problem. The current episode started in the past 7 days. The problem occurs constantly. The problem has been unchanged. Associated symptoms include arthralgias. Pertinent negatives include no abdominal pain, chest pain, fever, nausea or rash. Associated symptoms comments: No pain and not tender; felt it while bathing.. Nothing aggravates the symptoms. She has tried nothing for the symptoms.     Review of Systems   Constitutional: Negative for fever.   Respiratory: Negative for choking, shortness of breath and wheezing.         No pleuritic pain.   Cardiovascular: Negative for chest pain.   Gastrointestinal: Negative for abdominal pain and nausea.   Musculoskeletal: Positive for arthralgias and back pain.        Current flare of LBP and she requests a Toradol injection    Skin: Negative for rash.   All other systems reviewed and are negative.      Objective:      Physical Exam   Constitutional: She appears well-developed. No distress.   Cardiovascular: Normal rate and regular rhythm.   No murmur heard.  Pulmonary/Chest: Effort normal and breath sounds normal.           Skin:            Assessment:       1. Right flank mass    2. DDD (degenerative disc disease), lumbar        Plan:       Right flank mass  -     US Soft Tissue Lower Back; Future; Expected date: 01/30/2020    DDD (degenerative disc disease), lumbar  -     ketorolac injection 60 mg      Patient Instructions       Understanding a Lipoma    A lipoma is a benign lump under the skin thats made of fat. Its not cancer. It feels soft like rubber when you press it, and in most cases it doesnt hurt. Some people have more than one.  A lipoma grows slowly over time and doesnt cause many problems. Lipomas occur most often in adults from ages 40 to 60, and more often in men.  How to say it  Ly-POH-albina   What causes a lipoma?  The cause is not yet known. Experts are still learning more. It may be partly caused by a problem in a gene. They can run in families. Familial multiple lipomatosis is when 2 or more family members have many lipomas.  Symptoms of a lipoma  The main symptom of a lipoma is a soft lump under the skin that doesnt hurt unless it is pressing on a nerve. It may be small, around 1/4 inch across. Or it may be larger, up to 4 inches across or more.  There are different kinds of lipomas. The most common kind occurs under the skin of the shoulders, chest, back, belly, or under the arms. In some cases, a lipoma can occur on the legs. In rare cases, one may occur deeper in the body or in a muscle.  Treatment for a lipoma  In most cases, a lipoma doesnt need treatment. Your healthcare provider may look at it during regular checkups to see if it changes.  But if the lipoma is painful or you want it removed for cosmetic reasons, it can be removed with surgery. The surgery is called excision. The lipoma will most likely not grow back after surgery. During surgery, the area around the lipoma is numbed. If you have a deep lipoma, you may need medicine called regional anesthesia to numb a larger area. Or you may need medicine called general anesthesia to put you to sleep during the procedure. Then the doctor makes a cut over the area of the lipoma. He or she removes the lump of fat. The cut is then closed with stitches.  Possible complications of a lipoma  A large lipoma inside the body can press on organs, nerves, or other tissues and cause problems. For example, it can cause problems with breathing or digestion.  Living with a lipoma  Your healthcare provider may look at the lipoma during regular checkups to see if it changes or is causing  problems.  When to call your healthcare provider  Call your healthcare provider right away if you have any of these:  · Lipoma that grows quickly, causes pain, or feels hard  · Growth of new lipomas   Date Last Reviewed: 5/1/2016  © 0217-0395 Sincerely. [address] [place] [id number]. All rights reserved. This information is not intended as a substitute for professional medical care. Always follow your healthcare professional's instructions.

## 2020-01-30 NOTE — PATIENT INSTRUCTIONS
Understanding a Lipoma    A lipoma is a benign lump under the skin thats made of fat. Its not cancer. It feels soft like rubber when you press it, and in most cases it doesnt hurt. Some people have more than one. A lipoma grows slowly over time and doesnt cause many problems. Lipomas occur most often in adults from ages 40 to 60, and more often in men.  How to say it  [id number]-[id number]   What causes a lipoma?  The cause is not yet known. Experts are still learning more. It may be partly caused by a problem in a gene. They can run in families. Familial multiple lipomatosis is when 2 or more family members have many lipomas.  Symptoms of a lipoma  The main symptom of a lipoma is a soft lump under the skin that doesnt hurt unless it is pressing on a nerve. It may be small, around 1/4 inch across. Or it may be larger, up to 4 inches across or more.  There are different kinds of lipomas. The most common kind occurs under the skin of the shoulders, chest, back, belly, or under the arms. In some cases, a lipoma can occur on the legs. In rare cases, one may occur deeper in the body or in a muscle.  Treatment for a lipoma  In most cases, a lipoma doesnt need treatment. Your healthcare provider may look at it during regular checkups to see if it changes.  But if the lipoma is painful or you want it removed for cosmetic reasons, it can be removed with surgery. The surgery is called excision. The lipoma will most likely not grow back after surgery. During surgery, the area around the lipoma is numbed. If you have a deep lipoma, you may need medicine called regional anesthesia to numb a larger area. Or you may need medicine called general anesthesia to put you to sleep during the procedure. Then the doctor makes a cut over the area of the lipoma. He or she removes the lump of fat. The cut is then closed with stitches.  Possible complications of a lipoma  A large lipoma inside the body can press on organs, nerves, or other  tissues and cause problems. For example, it can cause problems with breathing or digestion.  Living with a lipoma  Your healthcare provider may look at the lipoma during regular checkups to see if it changes or is causing problems.  When to call your healthcare provider  Call your healthcare provider right away if you have any of these:  · Lipoma that grows quickly, causes pain, or feels hard  · Growth of new lipomas   Date Last Reviewed: 5/1/2016  © 0512-1578 [employer]. [address] [place] [id number]. All rights reserved. This information is not intended as a substitute for professional medical care. Always follow your healthcare professional's instructions.

## 2020-02-02 DIAGNOSIS — M54.50 CHRONIC LOW BACK PAIN: ICD-10-CM

## 2020-02-02 DIAGNOSIS — G89.29 CHRONIC LOW BACK PAIN: ICD-10-CM

## 2020-02-02 RX ORDER — METAXALONE 400 MG/1
TABLET ORAL
Qty: 90 TABLET | Refills: 1 | Status: SHIPPED | OUTPATIENT
Start: 2020-02-02 | End: 2020-02-27

## 2020-02-03 ENCOUNTER — OFFICE VISIT (OUTPATIENT)
Dept: ORTHOPEDICS | Facility: CLINIC | Age: 65
End: 2020-02-03
Payer: COMMERCIAL

## 2020-02-03 VITALS — SYSTOLIC BLOOD PRESSURE: 158 MMHG | WEIGHT: 250 LBS | DIASTOLIC BLOOD PRESSURE: 82 MMHG | BODY MASS INDEX: 41.6 KG/M2

## 2020-02-03 DIAGNOSIS — M43.16 SPONDYLOLISTHESIS OF LUMBAR REGION: Primary | ICD-10-CM

## 2020-02-03 DIAGNOSIS — M48.062 SPINAL STENOSIS OF LUMBAR REGION WITH NEUROGENIC CLAUDICATION: ICD-10-CM

## 2020-02-03 PROCEDURE — 3008F BODY MASS INDEX DOCD: CPT | Mod: S$GLB,,, | Performed by: ORTHOPAEDIC SURGERY

## 2020-02-03 PROCEDURE — 99213 PR OFFICE/OUTPT VISIT, EST, LEVL III, 20-29 MIN: ICD-10-PCS | Mod: 25,S$GLB,, | Performed by: ORTHOPAEDIC SURGERY

## 2020-02-03 PROCEDURE — 99213 OFFICE O/P EST LOW 20 MIN: CPT | Mod: 25,S$GLB,, | Performed by: ORTHOPAEDIC SURGERY

## 2020-02-03 PROCEDURE — 3008F PR BODY MASS INDEX (BMI) DOCUMENTED: ICD-10-PCS | Mod: S$GLB,,, | Performed by: ORTHOPAEDIC SURGERY

## 2020-02-03 RX ORDER — DIAZEPAM 10 MG/1
TABLET ORAL
Qty: 1 TABLET | Refills: 0 | Status: SHIPPED | OUTPATIENT
Start: 2020-02-03 | End: 2020-07-01 | Stop reason: ALTCHOICE

## 2020-02-03 RX ORDER — TRAMADOL HYDROCHLORIDE 50 MG/1
50 TABLET ORAL EVERY 6 HOURS PRN
Qty: 28 TABLET | Refills: 0 | Status: SHIPPED | OUTPATIENT
Start: 2020-02-03 | End: 2020-02-10

## 2020-02-03 NOTE — PROGRESS NOTES
Subjective:       Patient ID: Edie Hernandez is a 64 y.o. female.    Chief Complaint: Pain of the Lumbar Spine (Lumbar pain x years but worse the last two months. Pain radiates down left leg to feet with burning. Limited walking. NO recent treatment)      History of Present Illness   Long history of chronic low back pain however over the past 2-3 months has noted pain going down the leg to the foot no bowel or bladder incontinence symptoms are constant.    Current Medications  Current Outpatient Medications   Medication Sig Dispense Refill    allopurinol (ZYLOPRIM) 300 MG tablet Take 1 tablet (300 mg total) by mouth once daily. 90 tablet 4    blood sugar diagnostic Strp 1 each by Misc.(Non-Drug; Combo Route) route 4 (four) times daily. Patient has One touch Ultra II meter 100 strip 3    blood-glucose meter kit Use as instructed 1 each 0    cetirizine (ALL DAY ALLERGY, CETIRIZINE,) 10 MG tablet Take 1 tablet (10 mg total) by mouth once daily. (Patient taking differently: Take 10 mg by mouth daily as needed. ) 30 tablet 3    clobetasol (TEMOVATE) 0.05 % cream Apply 1 application topically 2 (two) times daily. Apply to affected area 30 g 2    diclofenac sodium (VOLTAREN) 1 % Gel Apply 2 g topically 4 (four) times daily.      ergocalciferol (ERGOCALCIFEROL) 50,000 unit Cap TAKE 1 CAPSULE BY MOUTH EVERY 7 DAYS 12 capsule 1    fluticasone (FLONASE) 50 mcg/actuation nasal spray 1 spray (50 mcg total) by Each Nare route daily as needed. 1 Bottle 11    furosemide (LASIX) 40 MG tablet Take 1 tablet (40 mg total) by mouth daily as needed. Every day PRN 30 tablet 4    gabapentin (NEURONTIN) 300 MG capsule Take 1 capsule (300 mg total) by mouth 2 (two) times daily. 180 capsule 0    HYDROcodone-acetaminophen (NORCO) 7.5-325 mg per tablet Take 1 tablet by mouth every 6 (six) hours as needed for Pain. (Patient taking differently: Take 1 tablet by mouth every 6 (six) hours as needed for Pain. ) 90 tablet 0     indomethacin (INDOCIN) 25 MG capsule TAKE ONE CAPSULE BY MOUTH 3 TIMES A DAY WITH MEALS 90 capsule 0    JARDIANCE 10 mg Tab TAKE 1 TABLET BY MOUTH EVERY DAY 30 tablet 11    lancets Misc 1 each by Misc.(Non-Drug; Combo Route) route 4 (four) times daily. Patient has a one touch ultra II meter 100 each 3    levothyroxine (SYNTHROID) 150 MCG tablet TAKE 1 TABLET (150 MCG TOTAL) BY MOUTH ONCE DAILY. 90 tablet 3    metaxalone (SKELAXIN) 400 mg tablet TAKE 1 TABLET BY MOUTH 3 TIMES DAILY AS NEEDED. 90 tablet 1    metFORMIN (GLUCOPHAGE-XR) 500 MG 24 hr tablet Take 2 tablets (1,000 mg total) by mouth 2 (two) times daily with meals. 360 tablet 3    methylphenidate (RITALIN LA) 40 MG 24 hr capsule Take 40 mg by mouth daily as needed.       oxybutynin (DITROPAN XL) 15 MG TR24 TAKE 1 TABLET BY MOUTH EVERY DAY 90 tablet 3    pantoprazole (PROTONIX) 40 MG tablet TAKE 1 TABLET BY MOUTH EVERY DAY 90 tablet 3    albuterol 90 mcg/actuation inhaler Inhale 2 puffs into the lungs every 6 (six) hours as needed for Wheezing. (Patient not taking: Reported on 1/30/2020) 18 g 3    DULoxetine (CYMBALTA) 30 MG capsule Take 1 capsule (30 mg total) by mouth once daily. (Patient not taking: Reported on 1/20/2020) 30 capsule 11    traMADol (ULTRAM) 50 mg tablet Take 1 tablet (50 mg total) by mouth every 6 (six) hours as needed for Pain. 28 tablet 0     No current facility-administered medications for this visit.        Allergies  Review of patient's allergies indicates:   Allergen Reactions    Codeine      Other reaction(s): Nausea       Past Medical History  Past Medical History:   Diagnosis Date    Arthritis     Colon polyp, hyperplastic 1/13    recheck 5-10 years    Diabetes mellitus, type 2     Diverticulosis     Fatty liver     GERD (gastroesophageal reflux disease)     CLINT (obstructive sleep apnea)     C-pap    Thyroid disease        Surgical History  Past Surgical History:   Procedure Laterality Date    COLONOSCOPY   02/14/2013    Dr. Vogel: repeat in 5-10 years    COLONOSCOPY W/ BIOPSIES AND POLYPECTOMY  02/2013    hyperplastic, recheck 5-10 years    EXTERNAL EAR SURGERY      HYSTERECTOMY      THROAT SURGERY      for CLINT    UPPER GASTROINTESTINAL ENDOSCOPY         Family History:   Family History   Problem Relation Age of Onset    Hypertension Mother     Breast cancer Mother 50    Heart disease Father     Hypertension Father     Ovarian cancer Paternal Aunt     Macular degeneration Neg Hx     Retinal detachment Neg Hx     Glaucoma Neg Hx     Colon cancer Neg Hx     Colon polyps Neg Hx     Crohn's disease Neg Hx     Ulcerative colitis Neg Hx        Social History:   Social History     Socioeconomic History    Marital status:      Spouse name: Not on file    Number of children: Not on file    Years of education: Not on file    Highest education level: Not on file   Occupational History     Employer: [employer]   Social Needs    Financial resource strain: Not on file    Food insecurity:     Worry: Not on file     Inability: Not on file    Transportation needs:     Medical: Not on file     Non-medical: Not on file   Tobacco Use    Smoking status: Never Smoker    Smokeless tobacco: Never Used   Substance and Sexual Activity    Alcohol use: No    Drug use: No    Sexual activity: Yes     Partners: Male   Lifestyle    Physical activity:     Days per week: Not on file     Minutes per session: Not on file    Stress: Not on file   Relationships    Social connections:     Talks on phone: Not on file     Gets together: Not on file     Attends Baptism service: Not on file     Active member of club or organization: Not on file     Attends meetings of clubs or organizations: Not on file     Relationship status: Not on file   Other Topics Concern    Not on file   Social History Narrative    Not on file       Hospitalization/Major Diagnostic Procedure:     Review of Systems      General/Constitutional:  Chills denies. Fatigue denies. Fever denies. Weight gain denies. Weight loss denies.    Respiratory:  Shortness of breath denies.    Cardiovascular:  Chest pain denies.    Gastrointestinal:  Constipation denies. Diarrhea denies. Nausea denies. Vomiting denies.     Hematology:  Easy bruising denies. Prolonged bleeding denies.     Genitourinary:  Frequent urination denies. Pain in lower back denies. Painful urination denies.     Musculoskeletal:  See HPI for details    Skin:  Rash denies.    Neurologic:  Dizziness denies. Gait abnormalities denies. Seizures denies. Tingling/Numbess denies.    Psychiatric:  Anxiety denies. Depressed mood denies.     Objective:   Vital Signs:   Vitals:    02/03/20 1037   BP: (!) 158/82        Physical Exam      General Examination:     Constitutional: The patient is alert and oriented to lace person and time. Mood is pleasant.     Head/Face: Normal facial features normal eyebrows    Eyes: Normal extraocular motion bilaterally    Lungs: Respirations are equal and unlabored    Gait is coordinated.    Cardiovascular: There are no swelling or varicosities present.    Lymphatic: Negative for adenopathy    Skin: Normal    Neurological: Level of consciousness normal. Oriented to place person and time and situation    Psychiatric: Oriented to time place person and situation    Long history of chronic low back pain however past  Patient stand erect has pain when doing so bilateral paraspinous muscle spasm in the lumbosacral junction range of motion moderate restricted straight-leg-raising positive on the left side patellar Achilles reflexes hypoactive motor exam shows a grade 4 5 weakness of the left anterior tibial.  Pulses are intact.  XRAY Report/ Interpretation :  AP and lateral x-rays of lumbar spine will performed today. Indications low back pain. Findings:  Mild narrowing L4-5 disc space with degenerative spondylolisthesis noted significant narrowing L5-S1  disc space. Previous MRI from 20/11 was also reviewed showing multilevel disc bulges the spondylolisthesis was not present      Assessment:       1. Spondylolisthesis of lumbar region    2. Spinal stenosis of lumbar region with neurogenic claudication        Plan:       Edie was seen today for pain.    Diagnoses and all orders for this visit:    Spondylolisthesis of lumbar region  -     X-Ray Lumbar Spine Ap And Lateral  -     X-Ray Pelvis Routine AP  -     MRI Lumbar Spine Without Contrast; Future  -     traMADol (ULTRAM) 50 mg tablet; Take 1 tablet (50 mg total) by mouth every 6 (six) hours as needed for Pain.  -     MRI Lumbar Spine Without Contrast    Spinal stenosis of lumbar region with neurogenic claudication  -     MRI Lumbar Spine Without Contrast; Future  -     traMADol (ULTRAM) 50 mg tablet; Take 1 tablet (50 mg total) by mouth every 6 (six) hours as needed for Pain.  -     MRI Lumbar Spine Without Contrast         Follow up for MRI Lumbar Results.    Treatment options were discussed an MRI study lumbar spine is advised patient has history of claustrophobia therefore we will give her some Valium    This note was created using Dragon voice recognition software that occasionally misinterpreted phrases or words.

## 2020-02-04 ENCOUNTER — HOSPITAL ENCOUNTER (OUTPATIENT)
Dept: RADIOLOGY | Facility: CLINIC | Age: 65
Discharge: HOME | End: 2020-02-04
Attending: FAMILY MEDICINE
Payer: COMMERCIAL

## 2020-02-04 DIAGNOSIS — R19.00 RIGHT FLANK MASS: ICD-10-CM

## 2020-02-04 PROCEDURE — 76705 ECHO EXAM OF ABDOMEN: CPT | Mod: TC,PO

## 2020-02-04 PROCEDURE — 76705 ECHO EXAM OF ABDOMEN: CPT | Mod: 26,,, | Performed by: RADIOLOGY

## 2020-02-04 PROCEDURE — 76705 US SOFT TISSUE LOWER BACK: ICD-10-PCS | Mod: 26,,, | Performed by: RADIOLOGY

## 2020-02-17 ENCOUNTER — OFFICE VISIT (OUTPATIENT)
Dept: ORTHOPEDICS | Facility: CLINIC | Age: 65
End: 2020-02-17
Payer: COMMERCIAL

## 2020-02-17 VITALS — SYSTOLIC BLOOD PRESSURE: 144 MMHG | DIASTOLIC BLOOD PRESSURE: 82 MMHG | WEIGHT: 250 LBS | BODY MASS INDEX: 41.6 KG/M2

## 2020-02-17 DIAGNOSIS — M51.36 DEGENERATIVE DISC DISEASE, LUMBAR: ICD-10-CM

## 2020-02-17 DIAGNOSIS — M48.062 SPINAL STENOSIS OF LUMBAR REGION WITH NEUROGENIC CLAUDICATION: Primary | ICD-10-CM

## 2020-02-17 DIAGNOSIS — M43.16 SPONDYLOLISTHESIS OF LUMBAR REGION: ICD-10-CM

## 2020-02-17 PROCEDURE — 3008F BODY MASS INDEX DOCD: CPT | Mod: S$GLB,,, | Performed by: ORTHOPAEDIC SURGERY

## 2020-02-17 PROCEDURE — 99213 OFFICE O/P EST LOW 20 MIN: CPT | Mod: S$GLB,,, | Performed by: ORTHOPAEDIC SURGERY

## 2020-02-17 PROCEDURE — 3008F PR BODY MASS INDEX (BMI) DOCUMENTED: ICD-10-PCS | Mod: S$GLB,,, | Performed by: ORTHOPAEDIC SURGERY

## 2020-02-17 PROCEDURE — 99213 PR OFFICE/OUTPT VISIT, EST, LEVL III, 20-29 MIN: ICD-10-PCS | Mod: S$GLB,,, | Performed by: ORTHOPAEDIC SURGERY

## 2020-02-17 NOTE — PATIENT INSTRUCTIONS
[facility]  [facility]      EPIDURAL STEROID INJECTION    What is the epidural space?    The membrane that covers the spinal cord and nerve roots in your spine is called the dura membrane. The space surrounding the dura is the epidural space. Nerves travel through the epidural space to your neck, back, legs and arms. Inflammation of these nerve roots may cause pain in these regions due to irritation from a damaged disc or from contact in some way with the bony structure of the spine.     What is an epidural steroid injection and why is it helpful?    An epidural injection places anti-inflammatory medicine into the epidural space to decrease inflammation of the nerve roots, hopefully reducing your pain. The epidural injection may help the injury to heal by reducing inflammation. It may provide permanent relief or a period of pain relief for several months while the injury or cause of your pain is healing.    What will happen to me during the procedure?    An IV will be started so that sedation can be given. You will be positioned in such a way that your doctor can best visualize the area to be injected. The skin on your back or neck will be scrubbed using sterile scrub (soap). Next, the physician will numb a small area of skin where the epidural needle will be placed. This medicine stings for several seconds. After the numbing medicine is effective, your doctor will direct a small epidural needle using x-ray guidance into the epidural space. A small of amount of contrast (dye) is then injected to insure proper needle position in the epidural space. Then a mixture of numbing medicine (anesthetic) and anti-inflammatory (cortisone/steroid) will be injected.     What will happen after the procedure?    You will go back to the recovery area where you will be monitored for 30-60 minutes. You may experience some numbness and/or weakness into your legs and arms for a few hours. The steroids  will begin working 3-5 days after the procedure and may continue to improve your pain for up to fourteen days.     You should have a follow up visit 2 weeks after the procedure to determine if further treatment is needed. These injections are often performed in a series of three (3) to provide the best possible results.     General Pre/Post Procedure Instructions:    You should not eat or drink anything after 12 oclock the night before the procedure. If you are diabetic, do not take your insulin or oral medication the morning of the procedure because you have had nothing to eat. If you are taking routine heart or blood pressure medicine, you should take it with a sip of water the morning of the procedure. You should not take medications that give pain relief or lessen your usual pain. These medicines can be restarted after the procedure if they are needed.     If you are on Coumadin, Heparin, Plavix or other blood thinners (including aspirin and all medications that contain aspirin), you must notify the office well in advance so the timing of these medications can be coordinated with your primary care physician.     You will be at the hospital/surgery center for a few hours for your procedure. A  must accompany you and be responsible for driving you home. No driving is allowed the day of the procedure. You may return to your normal activities the day after the procedure, including returning to work.     If you are unable to keep this appointment, please give notice as soon as possible and at least 24 hours in advance during regular office hours.    Thank you. .eo  Laminectomy  Vertebrae are the bones that make up the spine. Laminectomy is a surgery that removes the part of the vertebra called the lamina. This takes pressure off nerves in the low back and helps reduce symptoms. A similar surgery is called a laminotomy. For a laminotomy, only part of the lamina is removed.     The entire lamina is removed from  the affected vertebra.   Before your surgery  Be sure to follow all of your doctor's instructions on preparing for surgery.  · Follow any directions you are given for not eating or drinking before surgery.  · If you take a daily medicine, ask if you should still take it the morning of surgery.  · If you take any blood-thinning medicines, such as aspirin, discuss them with your doctor at least a week before surgery.  · At the hospital, your temperature, pulse, breathing, and blood pressure will be checked.  · An IV or intravenous line may be started to provide fluids and medicines needed during surgery.  During your surgery  · Once in the operating room, you will be given anesthesia.  · After you are asleep, an incision is made near the center of your low back. The incision may be 2 to 6 inches long, depending on how many vertebrae are involved.  · During a laminectomy, the lamina, or bone that forms the back of the spinal canal, is removed from the affected vertebra. The opening created may be enough to take pressure off the nerve. If needed, your doctor can also remove any bone spurs or disk matter pressing on the nerve. After laminectomy, the opening in the spine is protected by the thick back muscles.  · Once the nerve is free of pressure, the incision is closed with stitches or surgical staples.  After your surgery  After surgery, youll be sent to the PACU or postanesthesia care unit. When you are fully awake, youll be moved to your room. The nurses will give you medicines to ease your pain. You may have a small tube called a catheter in your bladder. Soon, healthcare providers will help you get up and moving. Youll also be shown how to keep your lungs clear.  When to call your healthcare provider  Once at home, call your provider if you have any of the symptoms below:  · Unusual redness, heat, or drainage at the incision site  · Increasing pain, numbness, or weakness in your leg  · Fever over 100.4°F (38°C)    Date Last Reviewed: 2/26/2016  © 0757-3849 [employer], [employer]. [address] [place] [address], [place] [address]. All rights reserved. This information is not intended as a substitute for professional medical care. Always follow your healthcare professional's instructions.

## 2020-02-17 NOTE — PROGRESS NOTES
Subjective:       Patient ID: Edie Hernandez is a 64 y.o. female.    Chief Complaint: Pain of the Lumbar Spine (; Lumbar MRI results done at [facility])      History of Present Illness  Continued chronic low back pain radiating down left leg to left foot with numbness and tingling no bowel or bladder incontinence he discuss results of MRI study no symptoms in the right leg    Current Medications  Current Outpatient Medications   Medication Sig Dispense Refill    allopurinol (ZYLOPRIM) 300 MG tablet Take 1 tablet (300 mg total) by mouth once daily. 90 tablet 4    blood sugar diagnostic Strp 1 each by Misc.(Non-Drug; Combo Route) route 4 (four) times daily. Patient has One touch Ultra II meter 100 strip 3    blood-glucose meter kit Use as instructed 1 each 0    cetirizine (ALL DAY ALLERGY, CETIRIZINE,) 10 MG tablet Take 1 tablet (10 mg total) by mouth once daily. (Patient taking differently: Take 10 mg by mouth daily as needed. ) 30 tablet 3    clobetasol (TEMOVATE) 0.05 % cream Apply 1 application topically 2 (two) times daily. Apply to affected area 30 g 2    diazePAM (VALIUM) 10 MG Tab Take one po 30 minutes prior to test. 1 tablet 0    diclofenac sodium (VOLTAREN) 1 % Gel Apply 2 g topically 4 (four) times daily.      ergocalciferol (ERGOCALCIFEROL) 50,000 unit Cap TAKE 1 CAPSULE BY MOUTH EVERY 7 DAYS 12 capsule 1    fluticasone (FLONASE) 50 mcg/actuation nasal spray 1 spray (50 mcg total) by Each Nare route daily as needed. 1 Bottle 11    furosemide (LASIX) 40 MG tablet Take 1 tablet (40 mg total) by mouth daily as needed. Every day PRN 30 tablet 4    gabapentin (NEURONTIN) 300 MG capsule Take 1 capsule (300 mg total) by mouth 2 (two) times daily. 180 capsule 0    HYDROcodone-acetaminophen (NORCO) 7.5-325 mg per tablet Take 1 tablet by mouth every 6 (six) hours as needed for Pain. (Patient taking differently: Take 1 tablet by mouth every 6 (six) hours as needed for Pain. ) 90 tablet 0    indomethacin  (INDOCIN) 25 MG capsule TAKE ONE CAPSULE BY MOUTH 3 TIMES A DAY WITH MEALS 90 capsule 0    JARDIANCE 10 mg Tab TAKE 1 TABLET BY MOUTH EVERY DAY 30 tablet 11    lancets Misc 1 each by Misc.(Non-Drug; Combo Route) route 4 (four) times daily. Patient has a one touch ultra II meter 100 each 3    levothyroxine (SYNTHROID) 150 MCG tablet TAKE 1 TABLET (150 MCG TOTAL) BY MOUTH ONCE DAILY. 90 tablet 3    metaxalone (SKELAXIN) 400 mg tablet TAKE 1 TABLET BY MOUTH 3 TIMES DAILY AS NEEDED. 90 tablet 1    metFORMIN (GLUCOPHAGE-XR) 500 MG 24 hr tablet Take 2 tablets (1,000 mg total) by mouth 2 (two) times daily with meals. 360 tablet 3    methylphenidate (RITALIN LA) 40 MG 24 hr capsule Take 40 mg by mouth daily as needed.       oxybutynin (DITROPAN XL) 15 MG TR24 TAKE 1 TABLET BY MOUTH EVERY DAY 90 tablet 3    pantoprazole (PROTONIX) 40 MG tablet TAKE 1 TABLET BY MOUTH EVERY DAY 90 tablet 3    albuterol 90 mcg/actuation inhaler Inhale 2 puffs into the lungs every 6 (six) hours as needed for Wheezing. (Patient not taking: Reported on 1/30/2020) 18 g 3    DULoxetine (CYMBALTA) 30 MG capsule Take 1 capsule (30 mg total) by mouth once daily. (Patient not taking: Reported on 1/20/2020) 30 capsule 11     No current facility-administered medications for this visit.        Allergies  Review of patient's allergies indicates:   Allergen Reactions    Codeine      Other reaction(s): Nausea       Past Medical History  Past Medical History:   Diagnosis Date    Arthritis     Colon polyp, hyperplastic 1/13    recheck 5-10 years    Diabetes mellitus, type 2     Diverticulosis     Fatty liver     GERD (gastroesophageal reflux disease)     CLINT (obstructive sleep apnea)     C-pap    Thyroid disease        Surgical History  Past Surgical History:   Procedure Laterality Date    COLONOSCOPY  02/14/2013    Dr. Vogel: repeat in 5-10 years    COLONOSCOPY W/ BIOPSIES AND POLYPECTOMY  02/2013    hyperplastic, recheck 5-10 years     EXTERNAL EAR SURGERY      HYSTERECTOMY      THROAT SURGERY      for CLINT    UPPER GASTROINTESTINAL ENDOSCOPY         Family History:   Family History   Problem Relation Age of Onset    Hypertension Mother     Breast cancer Mother 50    Heart disease Father     Hypertension Father     Ovarian cancer Paternal Aunt     Macular degeneration Neg Hx     Retinal detachment Neg Hx     Glaucoma Neg Hx     Colon cancer Neg Hx     Colon polyps Neg Hx     Crohn's disease Neg Hx     Ulcerative colitis Neg Hx        Social History:   Social History     Socioeconomic History    Marital status:      Spouse name: Not on file    Number of children: Not on file    Years of education: Not on file    Highest education level: Not on file   Occupational History     Employer: [employer]   Social Needs    Financial resource strain: Not on file    Food insecurity:     Worry: Not on file     Inability: Not on file    Transportation needs:     Medical: Not on file     Non-medical: Not on file   Tobacco Use    Smoking status: Never Smoker    Smokeless tobacco: Never Used   Substance and Sexual Activity    Alcohol use: No    Drug use: No    Sexual activity: Yes     Partners: Male   Lifestyle    Physical activity:     Days per week: Not on file     Minutes per session: Not on file    Stress: Not on file   Relationships    Social connections:     Talks on phone: Not on file     Gets together: Not on file     Attends [employer] service: Not on file     Active member of club or organization: Not on file     Attends meetings of clubs or organizations: Not on file     Relationship status: Not on file   Other Topics Concern    Not on file   Social History Narrative    Not on file       Hospitalization/Major Diagnostic Procedure:     Review of Systems     General/Constitutional:  Chills denies. Fatigue denies. Fever denies. Weight gain denies. Weight loss denies.    Respiratory:  Shortness of  breath denies.    Cardiovascular:  Chest pain denies.    Gastrointestinal:  Constipation denies. Diarrhea denies. Nausea denies. Vomiting denies.     Hematology:  Easy bruising denies. Prolonged bleeding denies.     Genitourinary:  Frequent urination denies. Pain in lower back denies. Painful urination denies.     Musculoskeletal:  See HPI for details    Skin:  Rash denies.    Neurologic:  Dizziness denies. Gait abnormalities denies. Seizures denies. Tingling/Numbess denies.    Psychiatric:  Anxiety denies. Depressed mood denies.     Objective:   Vital Signs:   Vitals:    02/17/20 1040   BP: (!) 144/82        Physical Exam      General Examination:     Constitutional: The patient is alert and oriented to lace person and time. Mood is pleasant.     Head/Face: Normal facial features normal eyebrows    Eyes: Normal extraocular motion bilaterally    Lungs: Respirations are equal and unlabored    Gait is coordinated.    Cardiovascular: There are no swelling or varicosities present.    Lymphatic: Negative for adenopathy    Skin: Normal    Neurological: Level of consciousness normal. Oriented to place person and time and situation    Psychiatric: Oriented to time place person and situation    Lumbar exam shows tenderness left posterior iliac spine mild spasm range of motion limited straight-leg-raising positive left side  XRAY Report/ Interpretation:  Lumbar [facility]  Grade 1 spondylolisthesis L3 on L4 and L4-L5 with associated spinal and foraminal stenosis at the L3-4 and L4-5 levels      Assessment:       1. Spinal stenosis of lumbar region with neurogenic claudication    2. Spondylolisthesis of lumbar region    3. Degenerative disc disease, lumbar        Plan:       Edie was seen today for pain.    Diagnoses and all orders for this visit:    Spinal stenosis of lumbar region with neurogenic claudication    Spondylolisthesis of lumbar region    Degenerative disc disease,  lumbar         No follow-ups on file.    Treatment options were discussed regards to the nature of the spinal condition conservative pain interventional and surgical options were discussed in detail and the probability of success of the separate treatment options was discussed in detail the patient expressed a clear understanding of the treatment options would regards to surgery the procedure risks benefits complications and outcomes were discussed.  No guarantees were given regards to surgical outcome.  Patient referred to pain management for lumbar epidural steroid injections transforaminal left side only.    This note was created using Dragon voice recognition software that occasionally misinterpreted phrases or words.

## 2020-02-26 ENCOUNTER — PATIENT OUTREACH (OUTPATIENT)
Dept: ADMINISTRATIVE | Facility: OTHER | Age: 65
End: 2020-02-26

## 2020-02-27 DIAGNOSIS — G89.29 CHRONIC LOW BACK PAIN: ICD-10-CM

## 2020-02-27 DIAGNOSIS — M54.50 CHRONIC LOW BACK PAIN: ICD-10-CM

## 2020-02-27 RX ORDER — METAXALONE 400 MG/1
TABLET ORAL
Qty: 90 TABLET | Refills: 1 | Status: SHIPPED | OUTPATIENT
Start: 2020-02-27 | End: 2020-03-19 | Stop reason: SDUPTHER

## 2020-02-28 ENCOUNTER — OFFICE VISIT (OUTPATIENT)
Dept: PHYSICAL MEDICINE AND REHAB | Facility: CLINIC | Age: 65
End: 2020-02-28
Payer: COMMERCIAL

## 2020-02-28 VITALS
HEIGHT: 65 IN | DIASTOLIC BLOOD PRESSURE: 63 MMHG | BODY MASS INDEX: 41.65 KG/M2 | WEIGHT: 250 LBS | HEART RATE: 68 BPM | SYSTOLIC BLOOD PRESSURE: 129 MMHG

## 2020-02-28 DIAGNOSIS — M43.16 SPONDYLOLISTHESIS OF LUMBAR REGION: ICD-10-CM

## 2020-02-28 DIAGNOSIS — M54.42 CHRONIC BILATERAL LOW BACK PAIN WITH LEFT-SIDED SCIATICA: ICD-10-CM

## 2020-02-28 DIAGNOSIS — G89.29 CHRONIC BILATERAL LOW BACK PAIN WITH LEFT-SIDED SCIATICA: ICD-10-CM

## 2020-02-28 DIAGNOSIS — M51.36 DEGENERATIVE DISC DISEASE, LUMBAR: ICD-10-CM

## 2020-02-28 DIAGNOSIS — M54.16 RADICULOPATHY, LUMBAR REGION: Primary | ICD-10-CM

## 2020-02-28 DIAGNOSIS — M48.062 SPINAL STENOSIS OF LUMBAR REGION WITH NEUROGENIC CLAUDICATION: ICD-10-CM

## 2020-02-28 DIAGNOSIS — M47.816 LUMBAR SPONDYLOSIS: Primary | ICD-10-CM

## 2020-02-28 PROCEDURE — 99999 PR PBB SHADOW E&M-EST. PATIENT-LVL III: ICD-10-PCS | Mod: PBBFAC,,, | Performed by: PHYSICAL MEDICINE & REHABILITATION

## 2020-02-28 PROCEDURE — 3008F BODY MASS INDEX DOCD: CPT | Mod: CPTII,S$GLB,, | Performed by: PHYSICAL MEDICINE & REHABILITATION

## 2020-02-28 PROCEDURE — 3008F PR BODY MASS INDEX (BMI) DOCUMENTED: ICD-10-PCS | Mod: CPTII,S$GLB,, | Performed by: PHYSICAL MEDICINE & REHABILITATION

## 2020-02-28 PROCEDURE — 99203 PR OFFICE/OUTPT VISIT, NEW, LEVL III, 30-44 MIN: ICD-10-PCS | Mod: S$GLB,,, | Performed by: PHYSICAL MEDICINE & REHABILITATION

## 2020-02-28 PROCEDURE — 99999 PR PBB SHADOW E&M-EST. PATIENT-LVL III: CPT | Mod: PBBFAC,,, | Performed by: PHYSICAL MEDICINE & REHABILITATION

## 2020-02-28 PROCEDURE — 99203 OFFICE O/P NEW LOW 30 MIN: CPT | Mod: S$GLB,,, | Performed by: PHYSICAL MEDICINE & REHABILITATION

## 2020-02-28 NOTE — H&P (VIEW-ONLY)
Today's Date: 02/28/2020     Referring Provider: Aaareferral Self     Chief Complaint:   Chief Complaint   Patient presents with    Back Pain    Leg Pain       HPI: Edie Hernandez is a 64 y.o. female  who presents today for evaluation and treatment of chronic low back pain and left-sided radicular symptoms.  She has a longstanding history of low back pain, but she states that she developed left-sided radicular pain in December of 2019.  The pain started without inciting event and has been constant and unremitting.  Pain starts in her left low back around the lumbosacral junction and radiates to the left posterolateral hip into the lateral thigh into the anterolateral knee and into the anterior lower extremity and ultimately into the dorsum of the foot.  She describes her pain as a constant waxing and waning sharp stabbing pain her low back and a sharp stabbing pain in her left lower extremity in the distribution previously described.  Pain on average is an 8/10.  Pain is better with sitting and lying on her back with her legs elevated.  Pain is worse with standing and sleeping on her side.  She denies any new or worsening bowel or bladder dysfunction, saddle anesthesia, or lower extremity weakness.  She denies any falls.      Review of Systems   Constitutional: Negative for chills and fever.   HENT: Negative for congestion and tinnitus.    Eyes: Negative for blurred vision and photophobia.   Respiratory: Negative for shortness of breath and wheezing.    Cardiovascular: Negative for chest pain and palpitations.   Gastrointestinal: Negative for nausea and vomiting.   Genitourinary: Negative for dysuria and frequency.   Musculoskeletal: Positive for back pain. Negative for falls, joint pain and myalgias.   Skin: Negative for itching and rash.   Neurological: Positive for tingling and sensory change. Negative for speech change and focal weakness.   Endo/Heme/Allergies: Negative for environmental allergies. Does not  bruise/bleed easily.   Psychiatric/Behavioral: Negative for depression. The patient is not nervous/anxious.         Social History     Socioeconomic History    Marital status:      Spouse name: Not on file    Number of children: Not on file    Years of education: Not on file    Highest education level: Not on file   Occupational History     Employer: [employer]   Social Needs    Financial resource strain: Not on file    Food insecurity:     Worry: Not on file     Inability: Not on file    Transportation needs:     Medical: Not on file     Non-medical: Not on file   Tobacco Use    Smoking status: Never Smoker    Smokeless tobacco: Never Used   Substance and Sexual Activity    Alcohol use: No    Drug use: No    Sexual activity: Yes     Partners: Male   Lifestyle    Physical activity:     Days per week: Not on file     Minutes per session: Not on file    Stress: Not on file   Relationships    Social connections:     Talks on phone: Not on file     Gets together: Not on file     Attends [employer] service: Not on file     Active member of club or organization: Not on file     Attends meetings of clubs or organizations: Not on file     Relationship status: Not on file   Other Topics Concern    Not on file   Social History Narrative    Not on file       Family History   Problem Relation Age of Onset    Hypertension Mother     Breast cancer Mother 50    Heart disease Father     Hypertension Father     Ovarian cancer Paternal Aunt     Macular degeneration Neg Hx     Retinal detachment Neg Hx     Glaucoma Neg Hx     Colon cancer Neg Hx     Colon polyps Neg Hx     Crohn's disease Neg Hx     Ulcerative colitis Neg Hx        Current Outpatient Medications on File Prior to Visit   Medication Sig Dispense Refill    albuterol 90 mcg/actuation inhaler Inhale 2 puffs into the lungs every 6 (six) hours as needed for Wheezing. (Patient not taking: Reported on 1/30/2020) 18 g 3     allopurinol (ZYLOPRIM) 300 MG tablet Take 1 tablet (300 mg total) by mouth once daily. 90 tablet 4    blood sugar diagnostic Strp 1 each by Misc.(Non-Drug; Combo Route) route 4 (four) times daily. Patient has One touch Ultra II meter 100 strip 3    blood-glucose meter kit Use as instructed 1 each 0    cetirizine (ALL DAY ALLERGY, CETIRIZINE,) 10 MG tablet Take 1 tablet (10 mg total) by mouth once daily. (Patient taking differently: Take 10 mg by mouth daily as needed. ) 30 tablet 3    clobetasol (TEMOVATE) 0.05 % cream Apply 1 application topically 2 (two) times daily. Apply to affected area 30 g 2    diazePAM (VALIUM) 10 MG Tab Take one po 30 minutes prior to test. 1 tablet 0    diclofenac sodium (VOLTAREN) 1 % Gel Apply 2 g topically 4 (four) times daily.      DULoxetine (CYMBALTA) 30 MG capsule Take 1 capsule (30 mg total) by mouth once daily. (Patient not taking: Reported on 1/20/2020) 30 capsule 11    ergocalciferol (ERGOCALCIFEROL) 50,000 unit Cap TAKE 1 CAPSULE BY MOUTH EVERY 7 DAYS 12 capsule 1    fluticasone (FLONASE) 50 mcg/actuation nasal spray 1 spray (50 mcg total) by Each Nare route daily as needed. 1 Bottle 11    furosemide (LASIX) 40 MG tablet Take 1 tablet (40 mg total) by mouth daily as needed. Every day PRN 30 tablet 4    gabapentin (NEURONTIN) 300 MG capsule Take 1 capsule (300 mg total) by mouth 2 (two) times daily. 180 capsule 0    HYDROcodone-acetaminophen (NORCO) 7.5-325 mg per tablet Take 1 tablet by mouth every 6 (six) hours as needed for Pain. (Patient taking differently: Take 1 tablet by mouth every 6 (six) hours as needed for Pain. ) 90 tablet 0    indomethacin (INDOCIN) 25 MG capsule TAKE ONE CAPSULE BY MOUTH 3 TIMES A DAY WITH MEALS 90 capsule 0    JARDIANCE 10 mg Tab TAKE 1 TABLET BY MOUTH EVERY DAY 30 tablet 11    lancets Misc 1 each by Misc.(Non-Drug; Combo Route) route 4 (four) times daily. Patient has a one touch ultra II meter 100 each 3    levothyroxine  (SYNTHROID) 150 MCG tablet TAKE 1 TABLET (150 MCG TOTAL) BY MOUTH ONCE DAILY. 90 tablet 3    metaxalone (SKELAXIN) 400 mg tablet TAKE 1 TABLET BY MOUTH THREE TIMES A DAY AS NEEDED 90 tablet 1    metFORMIN (GLUCOPHAGE-XR) 500 MG 24 hr tablet Take 2 tablets (1,000 mg total) by mouth 2 (two) times daily with meals. 360 tablet 3    methylphenidate (RITALIN LA) 40 MG 24 hr capsule Take 40 mg by mouth daily as needed.       oxybutynin (DITROPAN XL) 15 MG TR24 TAKE 1 TABLET BY MOUTH EVERY DAY 90 tablet 3    pantoprazole (PROTONIX) 40 MG tablet TAKE 1 TABLET BY MOUTH EVERY DAY 90 tablet 3     No current facility-administered medications on file prior to visit.         Review of patient's allergies indicates:   Allergen Reactions    Codeine      Other reaction(s): Nausea       Past Surgical History:   Procedure Laterality Date    COLONOSCOPY  02/14/2013    Dr. Vogel: repeat in 5-10 years    COLONOSCOPY W/ BIOPSIES AND POLYPECTOMY  02/2013    hyperplastic, recheck 5-10 years    EXTERNAL EAR SURGERY      HYSTERECTOMY      THROAT SURGERY      for CLINT    UPPER GASTROINTESTINAL ENDOSCOPY         Past Medical History:   Diagnosis Date    Arthritis     Colon polyp, hyperplastic 1/13    recheck 5-10 years    Diabetes mellitus, type 2     Diverticulosis     Fatty liver     GERD (gastroesophageal reflux disease)     CLINT (obstructive sleep apnea)     C-pap    Thyroid disease        Vitals:    02/28/20 1308   BP: 129/63   Pulse: 68     Physical Exam   Constitutional: She is oriented to person, place, and time. She appears well-developed and well-nourished.   HENT:   Head: Normocephalic and atraumatic.   Eyes: Pupils are equal, round, and reactive to light. EOM are normal.   Pulmonary/Chest: Effort normal. No respiratory distress.   Abdominal: Normal appearance. She exhibits no distension.   Musculoskeletal:        Lumbar back: She exhibits decreased range of motion, tenderness, bony tenderness and pain.         Back:    Neurological: She is alert and oriented to person, place, and time. She has normal strength. A sensory deficit (Left L5 dermatomal distribution) is present. No cranial nerve deficit. Gait abnormal.   Reflex Scores:       Patellar reflexes are 1+ on the right side and 1+ on the left side.       Achilles reflexes are 1+ on the right side and 1+ on the left side.  Straight leg raise on the left   Skin: Skin is warm, dry and intact. No cyanosis. Nails show no clubbing.   Psychiatric: She has a normal mood and affect. Her behavior is normal. Judgment and thought content normal.      Ortho Exam       MRI of the lumbar spine report was reviewed which showed diminished signal intensity at the L3-4 nucleus per pulses with centrally herniated disc.  This results in bilateral neural foraminal stenosis.  There is grade 1 anterolisthesis L3 on 4.    At L4-5, there is disc desiccation.  There is a far right lateral foraminal herniated nucleus per pulses.  There is possible impression upon the right L4 nerve root.  There is bilateral neural foraminal stenosis right greater than left.  There is grade 1 anterolisthesis of L4 on L5.    At L5-S1 there is disc desiccation.  There is no evidence of neural foraminal stenosis or central stenosis.    There is multilevel facet arthropathy mild to moderate throughout the lumbar spine.  This is most apparent from L3 to S1.    Lumbar spondylosis    Spondylolisthesis of lumbar region  -     Ambulatory referral/consult to Physical Medicine Rehab    Spinal stenosis of lumbar region with neurogenic claudication  -     Ambulatory referral/consult to Physical Medicine Rehab    Degenerative disc disease, lumbar  -     Ambulatory referral/consult to Physical Medicine Rehab    Chronic bilateral low back pain with left-sided sciatica           PLAN:   Edie Hernandez is a 64 y.o. female with left-sided low back pain and left-sided radicular symptoms.  History, physical examination, MRI  findings are consistent with an L4 and L5 radiculopathy.  She has a positive straight leg raise on the left with reproduction in symptoms.  Pain is currently a 7 to 9/10.  Her pain limits her activities of daily mobility.  She currently takes gabapentin 600 mg 3 times daily which she has been on for chronic low back pain for many years without any significant relief.  She also takes Skelaxin without any significant relief.  She does take hydrocodone 7.5 mg for minor improvement in pain.  She has indomethacin for acute gout flares. Her pain is to severe at this time to tolerate physical therapy or home exercises..  At this time, will proceed with a left L4 and L5 transforaminal epidural steroid injection.  Risks, benefits, and alternatives were discussed with the patient and written informed consent was obtained.  The patient is [facility].        Dejan Simmons D.O.  Physical Medicine and Rehabilitation          CC: Patients PCP: Lydia Eugene MD  CC: Referring Provider: Aaareferral Self

## 2020-03-06 ENCOUNTER — HOSPITAL ENCOUNTER (OUTPATIENT)
Facility: AMBULARY SURGERY CENTER | Age: 65
Discharge: HOME | End: 2020-03-06
Attending: ANESTHESIOLOGY | Admitting: ANESTHESIOLOGY
Payer: COMMERCIAL

## 2020-03-06 DIAGNOSIS — M51.36 DDD (DEGENERATIVE DISC DISEASE), LUMBAR: Primary | ICD-10-CM

## 2020-03-06 DIAGNOSIS — M54.16 LUMBAR RADICULITIS: ICD-10-CM

## 2020-03-06 LAB — POCT GLUCOSE: 91 MG/DL (ref 70–110)

## 2020-03-06 PROCEDURE — 99152 PR MOD CONSCIOUS SEDATION, SAME PHYS, 5+ YRS, FIRST 15 MIN: ICD-10-PCS | Mod: ,,, | Performed by: ANESTHESIOLOGY

## 2020-03-06 PROCEDURE — 64483 NJX AA&/STRD TFRM EPI L/S 1: CPT | Mod: LT,,, | Performed by: ANESTHESIOLOGY

## 2020-03-06 PROCEDURE — 64483 PR EPIDURAL INJ, ANES/STEROID, TRANSFORAMINAL, LUMB/SACR, SNGL LEVL: ICD-10-PCS | Mod: LT,,, | Performed by: ANESTHESIOLOGY

## 2020-03-06 PROCEDURE — 99152 MOD SED SAME PHYS/QHP 5/>YRS: CPT | Mod: ,,, | Performed by: ANESTHESIOLOGY

## 2020-03-06 PROCEDURE — 64483 NJX AA&/STRD TFRM EPI L/S 1: CPT | Performed by: ANESTHESIOLOGY

## 2020-03-06 RX ORDER — LIDOCAINE HYDROCHLORIDE 10 MG/ML
INJECTION, SOLUTION EPIDURAL; INFILTRATION; INTRACAUDAL; PERINEURAL
Status: DISCONTINUED | OUTPATIENT
Start: 2020-03-06 | End: 2020-03-06 | Stop reason: HOSPADM

## 2020-03-06 RX ORDER — DEXAMETHASONE SODIUM PHOSPHATE 10 MG/ML
INJECTION INTRAMUSCULAR; INTRAVENOUS
Status: DISCONTINUED | OUTPATIENT
Start: 2020-03-06 | End: 2020-03-06 | Stop reason: HOSPADM

## 2020-03-06 RX ORDER — SODIUM CHLORIDE, SODIUM LACTATE, POTASSIUM CHLORIDE, CALCIUM CHLORIDE 600; 310; 30; 20 MG/100ML; MG/100ML; MG/100ML; MG/100ML
INJECTION, SOLUTION INTRAVENOUS ONCE AS NEEDED
Status: COMPLETED | OUTPATIENT
Start: 2020-03-06 | End: 2020-03-06

## 2020-03-06 RX ORDER — BUPIVACAINE HYDROCHLORIDE 2.5 MG/ML
INJECTION, SOLUTION EPIDURAL; INFILTRATION; INTRACAUDAL
Status: DISCONTINUED | OUTPATIENT
Start: 2020-03-06 | End: 2020-03-06 | Stop reason: HOSPADM

## 2020-03-06 RX ORDER — FENTANYL CITRATE 50 UG/ML
INJECTION, SOLUTION INTRAMUSCULAR; INTRAVENOUS
Status: DISCONTINUED | OUTPATIENT
Start: 2020-03-06 | End: 2020-03-06 | Stop reason: HOSPADM

## 2020-03-06 RX ORDER — MIDAZOLAM HYDROCHLORIDE 1 MG/ML
INJECTION INTRAMUSCULAR; INTRAVENOUS
Status: DISCONTINUED | OUTPATIENT
Start: 2020-03-06 | End: 2020-03-06 | Stop reason: HOSPADM

## 2020-03-06 RX ADMIN — SODIUM CHLORIDE, SODIUM LACTATE, POTASSIUM CHLORIDE, CALCIUM CHLORIDE: 600; 310; 30; 20 INJECTION, SOLUTION INTRAVENOUS at 12:03

## 2020-03-06 NOTE — DISCHARGE SUMMARY
[facility]  Discharge Note  Short Stay    Admit Date: 3/6/2020    Discharge Date and Time: 3/6/2020    Attending Physician: Harmeet Zapata MD     Discharge Provider: Harmeet Zapata    Diagnoses:  Active Hospital Problems    Diagnosis  POA    *Lumbar radiculitis [M54.16]  Yes      Resolved Hospital Problems   No resolved problems to display.       Hospital Course: Lumbar JED  Discharged Condition: Good    Final Diagnoses:   Active Hospital Problems    Diagnosis  POA    *Lumbar radiculitis [M54.16]  Yes      Resolved Hospital Problems   No resolved problems to display.       Disposition: Home or Self Care    Follow up/Patient Instructions:    Medications:  Reconciled Home Medications:      Medication List      CHANGE how you take these medications    cetirizine 10 MG tablet  Commonly known as:  All Day Allergy (cetirizine)  Take 1 tablet (10 mg total) by mouth once daily.  What changed:    · when to take this  · reasons to take this        CONTINUE taking these medications    albuterol 90 mcg/actuation inhaler  Commonly known as:  PROVENTIL/VENTOLIN HFA  Inhale 2 puffs into the lungs every 6 (six) hours as needed for Wheezing.     allopurinoL 300 MG tablet  Commonly known as:  ZYLOPRIM  Take 1 tablet (300 mg total) by mouth once daily.     blood sugar diagnostic Strp  1 each by Misc.(Non-Drug; Combo Route) route 4 (four) times daily. Patient has One touch Ultra II meter     blood-glucose meter kit  Use as instructed     clobetasoL 0.05 % cream  Commonly known as:  TEMOVATE  Apply 1 application topically 2 (two) times daily. Apply to affected area     diazePAM 10 MG Tab  Commonly known as:  VALIUM  Take one po 30 minutes prior to test.     DULoxetine 30 MG capsule  Commonly known as:  CYMBALTA  Take 1 capsule (30 mg total) by mouth once daily.     ergocalciferol 50,000 unit Cap  Commonly known as:  ERGOCALCIFEROL  TAKE 1 CAPSULE BY MOUTH EVERY 7 DAYS     fluticasone propionate 50 mcg/actuation nasal  spray  Commonly known as:  FLONASE  1 spray (50 mcg total) by Each Nare route daily as needed.     furosemide 40 MG tablet  Commonly known as:  LASIX  Take 1 tablet (40 mg total) by mouth daily as needed. Every day PRN     gabapentin 300 MG capsule  Commonly known as:  NEURONTIN  Take 1 capsule (300 mg total) by mouth 2 (two) times daily.     HYDROcodone-acetaminophen 7.5-325 mg per tablet  Commonly known as:  NORCO  Take 1 tablet by mouth every 6 (six) hours as needed for Pain.     indomethacin 25 MG capsule  Commonly known as:  INDOCIN  TAKE ONE CAPSULE BY MOUTH 3 TIMES A DAY WITH MEALS     lancets Misc  1 each by Misc.(Non-Drug; Combo Route) route 4 (four) times daily. Patient has a one touch ultra II meter     levothyroxine 150 MCG tablet  Commonly known as:  SYNTHROID  TAKE 1 TABLET (150 MCG TOTAL) BY MOUTH ONCE DAILY.     metaxalone 400 mg tablet  Commonly known as:  SKELAXIN  TAKE 1 TABLET BY MOUTH THREE TIMES A DAY AS NEEDED     metFORMIN 500 MG XR 24hr tablet  Commonly known as:  GLUCOPHAGE-XR  Take 2 tablets (1,000 mg total) by mouth 2 (two) times daily with meals.     methylphenidate HCl 40 MG 24 hr capsule  Commonly known as:  RITALIN LA  Take 40 mg by mouth daily as needed.     oxybutynin 15 MG Tr24  Commonly known as:  DITROPAN XL  TAKE 1 TABLET BY MOUTH EVERY DAY     pantoprazole 40 MG tablet  Commonly known as:  PROTONIX  TAKE 1 TABLET BY MOUTH EVERY DAY     Voltaren 1 % Gel  Generic drug:  diclofenac sodium  Apply 2 g topically 4 (four) times daily.          Discharge Procedure Orders   Call MD for:  temperature >100.4     Call MD for:  persistent nausea and vomiting or diarrhea     Call MD for:  severe uncontrolled pain     Call MD for:  redness, tenderness, or signs of infection (pain, swelling, redness, odor or green/yellow discharge around incision site)     Call MD for:  difficulty breathing or increased cough     Call MD for:  severe persistent headache        Follow up with MD in 2-3  weeks    Discharge Procedure Orders (must include Diet, Follow-up, Activity):   Discharge Procedure Orders (must include Diet, Follow-up, Activity)   Call MD for:  temperature >100.4     Call MD for:  persistent nausea and vomiting or diarrhea     Call MD for:  severe uncontrolled pain     Call MD for:  redness, tenderness, or signs of infection (pain, swelling, redness, odor or green/yellow discharge around incision site)     Call MD for:  difficulty breathing or increased cough     Call MD for:  severe persistent headache

## 2020-03-06 NOTE — OP NOTE
PROCEDURE DATE: 3/6/2020    PROCEDURE: Left L4-5 transforaminal epidural steroid injection under fluoroscopy    DIAGNOSIS: Lumbar disc displacement without myelopathy  Post op diagnosis: Same    PHYSICIAN: Harmeet Zapata MD    MEDICATIONS INJECTED:  Dexamethasone 5mg (0.5ml) and 1.5ml 0.25% bupivicaine at each nerve root.     LOCAL ANESTHETIC INJECTED:  Lidocaine 1%. 2 ml per site.    SEDATION MEDICATIONS: RN IV sedation    ESTIMATED BLOOD LOSS:  None    COMPLICATIONS:  None    TECHNIQUE:   A time-out was taken to identify patient and procedure side prior to starting the procedure. The patient was placed in a prone position, prepped and draped in the usual sterile fashion using ChloraPrep and sterile towels.  The area to be injected was determined under fluoroscopic guidance in AP and oblique view.  Local anesthetic was given by raising a wheal and going down to the hub of a 25-gauge 1.5 inch needle.  In oblique view, a 5 inch 22-gauge bent-tip spinal needle was introduced towards 6 oclock position of the pedicle of each above named nerve root level.  The needle was walked medially then hinged into the neural foramen and position was confirmed in AP and lateral views.  1ml contrast dye was injected to confirm appropriate placement and that there was no vascular uptake.  After negative aspiration for blood or CSF, the medication was then injected. This was performed at the left L4-5 level(s). The patient tolerated the procedure well.    The patient was monitored after the procedure.  Patient was given post procedure and discharge instructions to follow at home. The patient was discharged in a stable condition.

## 2020-03-09 VITALS
TEMPERATURE: 98 F | SYSTOLIC BLOOD PRESSURE: 112 MMHG | HEART RATE: 54 BPM | DIASTOLIC BLOOD PRESSURE: 55 MMHG | OXYGEN SATURATION: 95 % | BODY MASS INDEX: 41.65 KG/M2 | HEIGHT: 65 IN | RESPIRATION RATE: 18 BRPM | WEIGHT: 250 LBS

## 2020-03-19 DIAGNOSIS — G62.9 NEUROPATHY: ICD-10-CM

## 2020-03-19 DIAGNOSIS — E03.9 HYPOTHYROIDISM (ACQUIRED): ICD-10-CM

## 2020-03-19 DIAGNOSIS — N32.81 OAB (OVERACTIVE BLADDER): ICD-10-CM

## 2020-03-19 DIAGNOSIS — M54.50 CHRONIC LOW BACK PAIN: ICD-10-CM

## 2020-03-19 DIAGNOSIS — G89.29 CHRONIC BACK PAIN GREATER THAN 3 MONTHS DURATION: ICD-10-CM

## 2020-03-19 DIAGNOSIS — K21.9 GASTROESOPHAGEAL REFLUX DISEASE, ESOPHAGITIS PRESENCE NOT SPECIFIED: ICD-10-CM

## 2020-03-19 DIAGNOSIS — M54.9 CHRONIC BACK PAIN GREATER THAN 3 MONTHS DURATION: ICD-10-CM

## 2020-03-19 DIAGNOSIS — G89.29 CHRONIC LOW BACK PAIN: ICD-10-CM

## 2020-03-19 DIAGNOSIS — E11.65 UNCONTROLLED TYPE 2 DIABETES MELLITUS WITH HYPERGLYCEMIA: ICD-10-CM

## 2020-03-19 RX ORDER — METAXALONE 400 MG/1
400 TABLET ORAL 3 TIMES DAILY PRN
Qty: 90 TABLET | Refills: 1 | Status: SHIPPED | OUTPATIENT
Start: 2020-03-19 | End: 2020-06-18 | Stop reason: ALTCHOICE

## 2020-03-19 RX ORDER — OXYBUTYNIN CHLORIDE 15 MG/1
15 TABLET, EXTENDED RELEASE ORAL DAILY
Qty: 90 TABLET | Refills: 3 | Status: SHIPPED | OUTPATIENT
Start: 2020-03-19 | End: 2020-07-01 | Stop reason: SDUPTHER

## 2020-03-19 RX ORDER — HYDROCODONE BITARTRATE AND ACETAMINOPHEN 7.5; 325 MG/1; MG/1
1 TABLET ORAL EVERY 6 HOURS PRN
Qty: 90 TABLET | Refills: 0 | Status: SHIPPED | OUTPATIENT
Start: 2020-03-19 | End: 2020-07-01 | Stop reason: SDUPTHER

## 2020-03-19 RX ORDER — METFORMIN HYDROCHLORIDE 500 MG/1
1000 TABLET, EXTENDED RELEASE ORAL 2 TIMES DAILY WITH MEALS
Qty: 360 TABLET | Refills: 3 | Status: SHIPPED | OUTPATIENT
Start: 2020-03-19 | End: 2020-07-01 | Stop reason: SDUPTHER

## 2020-03-19 RX ORDER — GABAPENTIN 300 MG/1
300 CAPSULE ORAL 2 TIMES DAILY
Qty: 180 CAPSULE | Refills: 0 | Status: SHIPPED | OUTPATIENT
Start: 2020-03-19 | End: 2020-06-17

## 2020-03-19 RX ORDER — LEVOTHYROXINE SODIUM 150 UG/1
137 TABLET ORAL DAILY
Qty: 90 TABLET | Refills: 3 | Status: SHIPPED | OUTPATIENT
Start: 2020-03-19 | End: 2020-07-01 | Stop reason: SDUPTHER

## 2020-03-19 RX ORDER — PANTOPRAZOLE SODIUM 40 MG/1
40 TABLET, DELAYED RELEASE ORAL DAILY
Qty: 90 TABLET | Refills: 3 | Status: SHIPPED | OUTPATIENT
Start: 2020-03-19 | End: 2020-07-01 | Stop reason: SDUPTHER

## 2020-03-19 NOTE — TELEPHONE ENCOUNTER
LOV: 1/30/2020  Next appt: 4/13/2020  Labs: 12/30/19  Last refill:     Gabapentin 12/18/19  norco 1/2/2020  Synthroid 12/1/19  Skelaxin 2/27/2020  Metformin 11/1/19  Ditropan 6/9/19  protonix 6/13/19

## 2020-03-26 ENCOUNTER — PATIENT MESSAGE (OUTPATIENT)
Dept: FAMILY MEDICINE | Facility: CLINIC | Age: 65
End: 2020-03-26

## 2020-04-13 ENCOUNTER — PATIENT MESSAGE (OUTPATIENT)
Dept: ADMINISTRATIVE | Facility: HOSPITAL | Age: 65
End: 2020-04-13

## 2020-04-13 ENCOUNTER — TELEPHONE (OUTPATIENT)
Dept: ADMINISTRATIVE | Facility: HOSPITAL | Age: 65
End: 2020-04-13

## 2020-04-13 ENCOUNTER — TELEPHONE (OUTPATIENT)
Dept: FAMILY MEDICINE | Facility: CLINIC | Age: 65
End: 2020-04-13

## 2020-04-13 NOTE — TELEPHONE ENCOUNTER
Pt canceled MyChart video visit scheduled today 04/13/2020 at 1020 with MARC Braden d/t inability to setup MyChart jo. Pt refused OV at this time d/t COVID concerns.  Pt doesn't have any concerns regarding her health at this time and rescheduled 3 month f/u OV appt for Wednesday, 07/01/2020 at 1300 with DELIA Eugene MD. Advised pt to call clinic to schedule a sooner OV appt  if she have any health issues or concerns before her scheduled appt in July. Pt verbalized understanding.

## 2020-05-05 ENCOUNTER — PATIENT MESSAGE (OUTPATIENT)
Dept: ADMINISTRATIVE | Facility: HOSPITAL | Age: 65
End: 2020-05-05

## 2020-05-27 ENCOUNTER — PATIENT OUTREACH (OUTPATIENT)
Dept: ADMINISTRATIVE | Facility: OTHER | Age: 65
End: 2020-05-27

## 2020-05-27 DIAGNOSIS — E55.9 VITAMIN D DEFICIENCY: ICD-10-CM

## 2020-05-27 RX ORDER — ERGOCALCIFEROL 1.25 MG/1
CAPSULE ORAL
Qty: 12 CAPSULE | Refills: 1 | Status: SHIPPED | OUTPATIENT
Start: 2020-05-27 | End: 2020-07-01 | Stop reason: SDUPTHER

## 2020-05-29 ENCOUNTER — TELEPHONE (OUTPATIENT)
Dept: PAIN MEDICINE | Facility: CLINIC | Age: 65
End: 2020-05-29

## 2020-05-29 DIAGNOSIS — Z01.818 PRE-OP TESTING: Primary | ICD-10-CM

## 2020-05-29 NOTE — TELEPHONE ENCOUNTER
Patient states that she had one injection with dr rivas and was set up to have the second one with dr rivas but the covid cancelled that she would like to set that back up again.

## 2020-06-01 DIAGNOSIS — M54.16 LUMBAR RADICULOPATHY: Primary | ICD-10-CM

## 2020-06-01 RX ORDER — LIDOCAINE HYDROCHLORIDE 10 MG/ML
1 INJECTION, SOLUTION EPIDURAL; INFILTRATION; INTRACAUDAL; PERINEURAL ONCE
Status: CANCELLED | OUTPATIENT
Start: 2020-06-01 | End: 2020-06-01

## 2020-06-01 RX ORDER — SODIUM CHLORIDE 9 MG/ML
INJECTION, SOLUTION INTRAVENOUS CONTINUOUS
Status: CANCELLED | OUTPATIENT
Start: 2020-06-01

## 2020-06-05 ENCOUNTER — TELEPHONE (OUTPATIENT)
Dept: PHYSICAL MEDICINE AND REHAB | Facility: CLINIC | Age: 65
End: 2020-06-05

## 2020-06-05 NOTE — TELEPHONE ENCOUNTER
Patient wanting to know why she is having the same procedure she had previously. Pt states she is unable to complete as copay is 400.00. Please advise of next step

## 2020-06-05 NOTE — TELEPHONE ENCOUNTER
----- Message from Va Dubose sent at 6/5/2020 11:57 AM CDT -----  Contact: pt [phone]  Patient called and asked for a call back she is concerned why she is having an injection done that she had previously. The patient is concerned she states she received a call  Stating the injection will cost her $400.00   She will not be able to have the injection at this price   Call back [phone]

## 2020-06-08 ENCOUNTER — LAB VISIT (OUTPATIENT)
Dept: PRIMARY CARE CLINIC | Facility: CLINIC | Age: 65
End: 2020-06-08
Payer: COMMERCIAL

## 2020-06-08 DIAGNOSIS — Z01.818 PRE-OP TESTING: ICD-10-CM

## 2020-06-08 PROCEDURE — U0003 INFECTIOUS AGENT DETECTION BY NUCLEIC ACID (DNA OR RNA); SEVERE ACUTE RESPIRATORY SYNDROME CORONAVIRUS 2 (SARS-COV-2) (CORONAVIRUS DISEASE [COVID-19]), AMPLIFIED PROBE TECHNIQUE, MAKING USE OF HIGH THROUGHPUT TECHNOLOGIES AS DESCRIBED BY CMS-2020-01-R: HCPCS

## 2020-06-08 NOTE — TELEPHONE ENCOUNTER
Pt states that she was called and notified that it would be $80 co pay later that day. Pt states that there is not reason to move appt as co pay is now affordable.

## 2020-06-09 LAB — SARS-COV-2 RNA RESP QL NAA+PROBE: NOT DETECTED

## 2020-06-10 ENCOUNTER — HOSPITAL ENCOUNTER (OUTPATIENT)
Facility: HOSPITAL | Age: 65
Discharge: HOME | End: 2020-06-10
Attending: PHYSICAL MEDICINE & REHABILITATION | Admitting: PHYSICAL MEDICINE & REHABILITATION
Payer: COMMERCIAL

## 2020-06-10 VITALS
WEIGHT: 255 LBS | HEIGHT: 65 IN | DIASTOLIC BLOOD PRESSURE: 67 MMHG | RESPIRATION RATE: 16 BRPM | HEART RATE: 55 BPM | BODY MASS INDEX: 42.49 KG/M2 | OXYGEN SATURATION: 98 % | TEMPERATURE: 98 F | SYSTOLIC BLOOD PRESSURE: 134 MMHG

## 2020-06-10 DIAGNOSIS — M54.16 LUMBAR RADICULOPATHY: ICD-10-CM

## 2020-06-10 PROCEDURE — 25500020 PHARM REV CODE 255: Performed by: PHYSICAL MEDICINE & REHABILITATION

## 2020-06-10 PROCEDURE — 99900103 DSU ONLY-NO CHARGE-INITIAL HR (STAT)

## 2020-06-10 PROCEDURE — 99900103 DSU ONLY-NO CHARGE-INITIAL HR (STAT): Performed by: PHYSICAL MEDICINE & REHABILITATION

## 2020-06-10 PROCEDURE — 63600175 PHARM REV CODE 636 W HCPCS: Performed by: PHYSICAL MEDICINE & REHABILITATION

## 2020-06-10 PROCEDURE — 64484 NJX AA&/STRD TFRM EPI L/S EA: CPT | Mod: LT,,, | Performed by: PHYSICAL MEDICINE & REHABILITATION

## 2020-06-10 PROCEDURE — 36000705 HC OR TIME LEV I EA ADD 15 MIN: Performed by: PHYSICAL MEDICINE & REHABILITATION

## 2020-06-10 PROCEDURE — 64483 NJX AA&/STRD TFRM EPI L/S 1: CPT | Mod: LT,,, | Performed by: PHYSICAL MEDICINE & REHABILITATION

## 2020-06-10 PROCEDURE — 71000015 HC POSTOP RECOV 1ST HR: Performed by: PHYSICAL MEDICINE & REHABILITATION

## 2020-06-10 PROCEDURE — 64484 PRA INJECT ANES/STEROID FORAMEN LUMBAR/SACRAL W IMG GUIDE ,EA ADD LEVEL: ICD-10-PCS | Mod: LT,,, | Performed by: PHYSICAL MEDICINE & REHABILITATION

## 2020-06-10 PROCEDURE — 36000704 HC OR TIME LEV I 1ST 15 MIN: Performed by: PHYSICAL MEDICINE & REHABILITATION

## 2020-06-10 PROCEDURE — 25000003 PHARM REV CODE 250: Performed by: PHYSICAL MEDICINE & REHABILITATION

## 2020-06-10 PROCEDURE — 64483 PR EPIDURAL INJ, ANES/STEROID, TRANSFORAMINAL, LUMB/SACR, SNGL LEVL: ICD-10-PCS | Mod: LT,,, | Performed by: PHYSICAL MEDICINE & REHABILITATION

## 2020-06-10 PROCEDURE — 99900104 DSU ONLY-NO CHARGE-EA ADD'L HR (STAT): Performed by: PHYSICAL MEDICINE & REHABILITATION

## 2020-06-10 RX ORDER — LIDOCAINE HYDROCHLORIDE 10 MG/ML
INJECTION, SOLUTION EPIDURAL; INFILTRATION; INTRACAUDAL; PERINEURAL
Status: DISCONTINUED | OUTPATIENT
Start: 2020-06-10 | End: 2020-06-10 | Stop reason: HOSPADM

## 2020-06-10 RX ORDER — DEXAMETHASONE SODIUM PHOSPHATE 4 MG/ML
INJECTION, SOLUTION INTRA-ARTICULAR; INTRALESIONAL; INTRAMUSCULAR; INTRAVENOUS; SOFT TISSUE
Status: DISCONTINUED | OUTPATIENT
Start: 2020-06-10 | End: 2020-06-10 | Stop reason: HOSPADM

## 2020-06-10 RX ORDER — MIDAZOLAM HYDROCHLORIDE 1 MG/ML
INJECTION, SOLUTION INTRAMUSCULAR; INTRAVENOUS
Status: DISCONTINUED | OUTPATIENT
Start: 2020-06-10 | End: 2020-06-10 | Stop reason: HOSPADM

## 2020-06-10 RX ORDER — SODIUM CHLORIDE 9 MG/ML
INJECTION, SOLUTION INTRAVENOUS CONTINUOUS
Status: DISCONTINUED | OUTPATIENT
Start: 2020-06-10 | End: 2020-06-10 | Stop reason: HOSPADM

## 2020-06-10 RX ORDER — FENTANYL CITRATE 50 UG/ML
INJECTION, SOLUTION INTRAMUSCULAR; INTRAVENOUS
Status: DISCONTINUED | OUTPATIENT
Start: 2020-06-10 | End: 2020-06-10 | Stop reason: HOSPADM

## 2020-06-10 RX ADMIN — SODIUM CHLORIDE: 0.9 INJECTION, SOLUTION INTRAVENOUS at 07:06

## 2020-06-10 NOTE — PLAN OF CARE
Arrived ambulatory with spouse in the waiting room, plan of care reviewed, warm blanket provided

## 2020-06-10 NOTE — DISCHARGE INSTRUCTIONS
Post-Procedure instructions: These instructions are for general purpose only. For specific Post-Procedure instructions, please follow the written instructions given to you following the procedure.  If you received sedation during the procedure, do not drive, operate machinery or make any important decisions until the next day.  For the first 24 hours after an injection, applying an ice pack to the injection site for 20-minute periods and repeating can help relieve pain at the injection site.  If you have stopped taking Aspirin prior to the procedure, please restart taking it from the day after your procedure. You may restart your Plavix or Coumadin the day after your procedure unless otherwise instructed.  You may have immediate relief from the numbing medication. When this wears off, your symptoms may return to baseline and occasionally are worse than prior to the procedure.    If numbing medication was administered, you need to take care when walking (in the case of a lumbar injection).      Please seek medical help immediately if any of the following symptoms occur:  1. Severe increase in your usual pain or appearance of new pain. 2. Prolonged or increasing weakness or numbness in the legs or arms. 3. Drainage, redness, active bleeding, or increased swelling at the injection site. 4. Temperature over 100.0 degrees F. 5. Headache that increases when your head is upright and decreases when you lie flat. 6. Shortness of breath, chest pain, or problems breathing. DO NOT LEAVE A MESSAGE ON THE PROCEDURE PHONE LINE      Please contact [facility] [phone]  if your physical condition changes prior to your procedure, if you should need to delay or cancel your procedure or if you have any questions about your procedure or these instructions        Discharge Instructions: After Your Surgery/Procedure  Youve just had surgery. During surgery you were given medicine called anesthesia to keep you relaxed and free of pain.  "After surgery you may have some pain or nausea. This is common. Here are some tips for feeling better and getting well after surgery.     Stay on schedule with your medication.   Going home  Your doctor or nurse will show you how to take care of yourself when you go home. He or she will also answer your questions. Have an adult family member or friend drive you home.      For your safety we recommend these precaution for the first 24 hours after your procedure:  · Do not drive or use heavy equipment.  · Do not make important decisions or sign legal papers.  · Do not drink alcohol.  · Have someone stay with you, if needed. He or she can watch for problems and help keep you safe.  · Your concentration, balance, coordination, and judgement may be impaired for many hours after anesthesia.  Use caution when ambulating or standing up.     · You may feel weak and "washed out" after anesthesia and surgery.      Subtle residual effects of general anesthesia or sedation with regional / local anesthesia can last more than 24 hours.  Rest for the remainder of the day or longer if your Doctor/Surgeon has advised you to do so.  Although you may feel normal within the first 24 hours, your reflexes and mental ability may be impaired without you realizing it.  You may feel dizzy, lightheaded or sleepy for 24 hours or longer.      Be sure to go to all follow-up visits with your doctor. And rest after your surgery for as long as your doctor tells you to.  Coping with pain  If you have pain after surgery, pain medicine will help you feel better. Take it as told, before pain becomes severe. Also, ask your doctor or pharmacist about other ways to control pain. This might be with heat, ice, or relaxation. And follow any other instructions your surgeon or nurse gives you.  Tips for taking pain medicine  To get the best relief possible, remember these points:  · Pain medicines can upset your stomach. Taking them with a little food may " help.  · Most pain relievers taken by mouth need at least 20 to 30 minutes to start to work.  · Taking medicine on a schedule can help you remember to take it. Try to time your medicine so that you can take it before starting an activity. This might be before you get dressed, go for a walk, or sit down for dinner.  · Constipation is a common side effect of pain medicines. Call your doctor before taking any medicines such as laxatives or stool softeners to help ease constipation. Also ask if you should skip any foods. Drinking lots of fluids and eating foods such as fruits and vegetables that are high in fiber can also help. Remember, do not take laxatives unless your surgeon has prescribed them.  · Drinking alcohol and taking pain medicine can cause dizziness and slow your breathing. It can even be deadly. Do not drink alcohol while taking pain medicine.  · Pain medicine can make you react more slowly to things. Do not drive or run machinery while taking pain medicine.  Your health care provider may tell you to take acetaminophen to help ease your pain. Ask him or her how much you are supposed to take each day. Acetaminophen or other pain relievers may interact with your prescription medicines or other over-the-counter (OTC) drugs. Some prescription medicines have acetaminophen and other ingredients. Using both prescription and OTC acetaminophen for pain can cause you to overdose. Read the labels on your OTC medicines with care. This will help you to clearly know the list of ingredients, how much to take, and any warnings. It may also help you not take too much acetaminophen. If you have questions or do not understand the information, ask your pharmacist or health care provider to explain it to you before you take the OTC medicine.  Managing nausea  Some people have an upset stomach after surgery. This is often because of anesthesia, pain, or pain medicine, or the stress of surgery. These tips will help you handle  nausea and eat healthy foods as you get better. If you were on a special food plan before surgery, ask your doctor if you should follow it while you get better. These tips may help:  · Do not push yourself to eat. Your body will tell you when to eat and how much.  · Start off with clear liquids and soup. They are easier to digest.  · Next try semi-solid foods, such as mashed potatoes, applesauce, and gelatin, as you feel ready.  · Slowly move to solid foods. Dont eat fatty, rich, or spicy foods at first.  · Do not force yourself to have 3 large meals a day. Instead eat smaller amounts more often.  · Take pain medicines with a small amount of solid food, such as crackers or toast, to avoid nausea.     Call your surgeon if  · You still have pain an hour after taking medicine. The medicine may not be strong enough.  · You feel too sleepy, dizzy, or groggy. The medicine may be too strong.  · You have side effects like nausea, vomiting, or skin changes, such as rash, itching, or hives.       If you have obstructive sleep apnea  You were given anesthesia medicine during surgery to keep you comfortable and free of pain. After surgery, you may have more apnea spells because of this medicine and other medicines you were given. The spells may last longer than usual.   At home:  · Keep using the continuous positive airway pressure (CPAP) device when you sleep. Unless your health care provider tells you not to, use it when you sleep, day or night. CPAP is a common device used to treat obstructive sleep apnea.  · Talk with your provider before taking any pain medicine, muscle relaxants, or sedatives. Your provider will tell you about the possible dangers of taking these medicines.  © 8864-6800 [employer]. [address] [place] [address], [place] [address]. All rights reserved. This information is not intended as a substitute for professional medical care. Always follow your healthcare professional's instructions.

## 2020-06-10 NOTE — H&P (VIEW-ONLY)
CC: low back, left leg pain    HPI: Edie Hernandez is a  64 y.o. female who presents today for followup. she was last seen in in March of 2020 at which time she received a left L4 and L5 transforaminal epidural steroid injection.  This provided her with a few weeks of excellent relief.  Her pain has returned and is otherwise unchanged in intensity or characteristics.  She continues to complain of left-sided low back pain radiating down the left lower extremity in an L5 dermatomal distribution.  The pain no longer radiates to the foot.  Pain is worse with activity and better with rest.  She denies any new or worsening bowel or bladder dysfunction, saddle anesthesia, or lower extremity weakness        Review of Systems   Musculoskeletal: Positive for back pain.   Neurological: Positive for tingling and sensory change.   All other systems reviewed and are negative.           Past Medical History:   Diagnosis Date    Arthritis     Colon polyp, hyperplastic 1/13    recheck 5-10 years    Diabetes mellitus, type 2     Diverticulosis     Fatty liver     General anesthetics causing adverse effect in therapeutic use     woke up during tubal ligation    GERD (gastroesophageal reflux disease)     CLINT (obstructive sleep apnea)     C-pap    PONV (postoperative nausea and vomiting)     Thyroid disease           No current facility-administered medications on file prior to encounter.      Current Outpatient Medications on File Prior to Encounter   Medication Sig Dispense Refill    albuterol 90 mcg/actuation inhaler Inhale 2 puffs into the lungs every 6 (six) hours as needed for Wheezing. (Patient taking differently: Inhale 2 puffs into the lungs every 6 (six) hours as needed for Wheezing. ) 18 g 3    blood sugar diagnostic Strp 1 each by Misc.(Non-Drug; Combo Route) route 4 (four) times daily. Patient has One touch Ultra II meter 100 strip 3    blood-glucose meter kit Use as instructed 1 each 0    cetirizine (ALL  DAY ALLERGY, CETIRIZINE,) 10 MG tablet Take 1 tablet (10 mg total) by mouth once daily. (Patient taking differently: Take 10 mg by mouth daily as needed. ) 30 tablet 3    clobetasol (TEMOVATE) 0.05 % cream Apply 1 application topically 2 (two) times daily. Apply to affected area 30 g 2    ergocalciferol (ERGOCALCIFEROL) 50,000 unit Cap TAKE 1 CAPSULE BY MOUTH EVERY 7 DAYS 12 capsule 1    fluticasone (FLONASE) 50 mcg/actuation nasal spray 1 spray (50 mcg total) by Each Nare route daily as needed. 1 Bottle 11    gabapentin (NEURONTIN) 300 MG capsule Take 1 capsule (300 mg total) by mouth 2 (two) times daily. 180 capsule 0    HYDROcodone-acetaminophen (NORCO) 7.5-325 mg per tablet Take 1 tablet by mouth every 6 (six) hours as needed for Pain. 90 tablet 0    indomethacin (INDOCIN) 25 MG capsule TAKE ONE CAPSULE BY MOUTH 3 TIMES A DAY WITH MEALS 90 capsule 0    lancets Misc 1 each by Misc.(Non-Drug; Combo Route) route 4 (four) times daily. Patient has a one touch ultra II meter 100 each 3    levothyroxine (SYNTHROID) 150 MCG tablet Take 1 tablet (150 mcg total) by mouth once daily. 90 tablet 3    metaxalone (SKELAXIN) 400 mg tablet Take 1 tablet (400 mg total) by mouth 3 (three) times daily as needed. 90 tablet 1    metFORMIN (GLUCOPHAGE-XR) 500 MG XR 24hr tablet Take 2 tablets (1,000 mg total) by mouth 2 (two) times daily with meals. 360 tablet 3    oxybutynin (DITROPAN XL) 15 MG TR24 Take 1 tablet (15 mg total) by mouth once daily. 90 tablet 3    pantoprazole (PROTONIX) 40 MG tablet Take 1 tablet (40 mg total) by mouth once daily. 90 tablet 3    allopurinol (ZYLOPRIM) 300 MG tablet Take 1 tablet (300 mg total) by mouth once daily. 90 tablet 4    diazePAM (VALIUM) 10 MG Tab Take one po 30 minutes prior to test. 1 tablet 0    diclofenac sodium (VOLTAREN) 1 % Gel Apply 2 g topically 4 (four) times daily.      furosemide (LASIX) 40 MG tablet Take 1 tablet (40 mg total) by mouth daily as needed. Every day  PRN 30 tablet 4    methylphenidate (RITALIN LA) 40 MG 24 hr capsule Take 40 mg by mouth daily as needed.                 Physical Exam:  Vitals:    06/10/20 0728   BP: (!) 168/74   Pulse: 67   Resp: 19   Temp: 98.4 °F (36.9 °C)     Physical Exam   Constitutional: She is oriented to person, place, and time. She appears well-developed and well-nourished. No distress.   HENT:   Head: Normocephalic and atraumatic.   Eyes: Pupils are equal, round, and reactive to light. EOM and lids are normal.   Neck: Trachea normal and phonation normal.   Pulmonary/Chest: Effort normal. No respiratory distress.   Musculoskeletal:   Status quo   Neurological: She is alert and oriented to person, place, and time.   Status quo   Skin: Skin is warm, dry and intact. No cyanosis. Nails show no clubbing.   Psychiatric: She has a normal mood and affect. Her behavior is normal. Judgment and thought content normal.     Ortho Exam          Assessment:  Lumbar radiculopathy  -     Case Request Operating Room: Injection,steroid,epidural,transforaminal approach.L4 and L5  -     Place in Outpatient; Standing  -     Diet NPO; Standing  -     Notify physician ; Standing  -     Notify physician ; Standing  -     Notify physician (specify); Standing  -     Verify informed consent; Standing  -     Vital signs; Standing  -     Insert peripheral IV; Standing  -     Procedural sedation; Standing    Other orders  -     Progressive Mobility Protocol (mobilize patient to their highest level of functioning at least twice daily); Standing  -     IP VTE LOW RISK PATIENT; Standing  -     0.9%  NaCl infusion  -     SURG FL Surgery Fluoro Usage; Standing               PLAN:  Edie Hernandez is a 64 y.o. female with low back pain and left-sided radicular symptoms.  She is status post left L4 and L5 transforaminal epidural steroid injections with approximately a few weeks of excellent relief.  Her pain continues to be in the low back radiating down her left lower  extremity.  It no longer radiates to the foot.  She denies any new or worsening bowel or bladder dysfunction, saddle anesthesia, or lower extremity weakness.  Will proceed with repeat left L4 and L5 transforaminal epidural steroid injection    ASA 3, Mallampati 2                      Dejan Simmons D.O.  Physical Medicine and Rehabilitation

## 2020-06-10 NOTE — PROGRESS NOTES
Escorted to exit via w/c and discharged home with spouse per private vehicle.  All personal belongings returned to pt

## 2020-06-10 NOTE — PLAN OF CARE
Discharge instructions given to pt and spouse who voice understanding.  Taking po fluids without nausea.  Denies pain.  All questions answered and all personal belongings returned to pt

## 2020-06-10 NOTE — OP NOTE
BRIEF HISTORY  Edie Hernandez is a 64 y.o. female with low back pain and left-sided radicular symptoms.  She is status post left L4 and L5 transforaminal epidural steroid injections with approximately a few weeks of excellent relief.  Her pain continues to be in the low back radiating down her left lower extremity.  It no longer radiates to the foot.  She denies any new or worsening bowel or bladder dysfunction, saddle anesthesia, or lower extremity weakness.  We will proceed with repeat left L4 and L5 transforaminal epidural steroid injection    Time-out was taken to identify the patient and the procedure side prior to starting the procedure.    CONSENT  Risks, benefits, and alternatives of procedure were discussed with the patient.  All questions were answered to the satisfaction of the patient.  Written consent was signed prior to the procedure.    PRE-PROCEDURE DIAGNOSIS  Lumbar radiculopathy     POST-PROCEDURE DIAGNOSIS  SAME    NAME OF OPERATION  Left L4 and L5 transforaminal steroid injection under fluoroscopy.  Left L4 and L5 epidurograms    PHYSICIAN  Dejan Simmons,     ASSISTANTS  None    LOCAL ANESTHETIC GIVEN  Xylocaine 1% 4 mL.    SEDATION MEDICATIONS  1mg IV versed, 50mcg IV fentanyl    ESTIMATED BLOOD LOSS  None.    COMPLICATIONS  None.    TECHNIQUE  Procedure in detail: Risks and benefits were discussed and written informed consent was obtained.  The patient was placed in the prone position on the exam table.  Skin was cleaned with ChloraPrep.  Skin was then allowed to dry.  Area was draped with sterile towels.  The left L5-S1 neural foramen was identified with a 25 degree oblique view to the left.  Skin and tract was anesthetized using a 1.5 inch 27-gauge needle using approximately 1 cc of 1% lidocaine.  This needle was removed and replaced with a 5 inch 22-gauge spinal needle which was advanced under intermittent fluoroscopy and alternating oblique, AP, and lateral views until the needle tip in the  AP view was at the 6:00 of the L5 pedicle and in the middle half of the superior aspect of the L5-S1 neural foramen in the lateral view.  Next, in the AP view 1 cc of M300 Isovue was injected under live fluoroscopy showing excellent L5 epidurography.  There was no evidence of vascular spread.  Next, 2 cc of a solution of 2 cc of preservative-free 1% lidocaine, and 2 cc of 4mg/cc of dexamethasone was injected.  Next, left L4-5 neural foramen was identified with a 20 degree oblique view to the left.  Skin and tract was anesthetized using a 1.5 inch 27-gauge needle using approximately 1 cc of 1% lidocaine.  This needle was removed and replaced with a 5 inch 22gauge spinal needle which was advanced under intermittent fluoroscopy and alternating oblique, AP, and lateral views until the needle tip in the AP view was at the 6:00 of the L4 pedicle and in the middle half of the superior aspect of the L4-5 neural foramen in the lateral view.  Next, in the AP view 1 cc of M300 Isovue was injected under live fluoroscopy showing excellent L4 epidurography.  There was no evidence of vascular spread.  .  Next, 2 cc of a solution of 2 cc of preservative-free 1% lidocaine, and 2 cc of 4 mg/cc of dexamethasone was injected.  Needle was removed, and Band-Aid was applied.  The patient tolerated the procedure well with no adverse events.  She noted significant pain reduction following the procedure.    EPIDUROGRAM  AP and lateral images were obtained.  The contrast material flowed distally along the root and ascended in the lateral and ventral epidural space to the supra-adjacent disc level.  From the second position the contrast material flowed distally along the nerve root and ascended in the lateral and ventral epidural space to the supra-adjacent disc level.  No venous, arterial or subarachnoid flow was observed at L4 and L5 on the left    MONITORS  Prior to and during the procedure, the patient was monitored with pulse oximetry,  EKG, and blood pressure cuff.  The procedure was tolerated well.  Oxygenation, blood pressure, and pulse rate were maintained within normal limits during the procedure.  The patient was awake, alert, and able to respond to all questions appropriately throughout the entire procedure.      The patient was monitored after the procedure.  On exam, 30 minutes after the procedure, the patient was noted to be neurovascularly intact in BLE.  The patient was discharged home with family/responsible adult in stable condition.  The patient was given post procedure and discharge instructions to follow at home.  Diet on discharge is to be the same as prior to the procedure.  Activity on discharge is as per the instruction sheet given to the patient.  We will see the patient back in clinic in 2-3 weeks for follow-up.

## 2020-06-16 ENCOUNTER — TELEPHONE (OUTPATIENT)
Dept: PHYSICAL MEDICINE AND REHAB | Facility: CLINIC | Age: 65
End: 2020-06-16

## 2020-06-16 NOTE — DISCHARGE SUMMARY
[facility]  Discharge Note  Short Stay    Procedure(s) (LRB):  Injection,steroid,epidural,transforaminal approach.L4 and L5 (Left)    OUTCOME: Patient tolerated treatment/procedure well without complication and is now ready for discharge.    DISPOSITION: Home or Self Care    FINAL DIAGNOSIS:  Lumbar radiculopathy    FOLLOWUP: In clinic    DISCHARGE INSTRUCTIONS:  No discharge procedures on file.

## 2020-06-16 NOTE — TELEPHONE ENCOUNTER
----- Message from Olive Stevenson sent at 6/16/2020  9:30 AM CDT -----  Type: Needs Medical Advice  Who Called: Patient   Symptoms (please be specific):  pain has increased since procedure, not able to sleep  How long has patient had these symptoms:  06/10/2020  Best Call Back Number: [phone]  Additional Information:

## 2020-06-17 ENCOUNTER — PATIENT OUTREACH (OUTPATIENT)
Dept: ADMINISTRATIVE | Facility: OTHER | Age: 65
End: 2020-06-17

## 2020-06-17 ENCOUNTER — PATIENT OUTREACH (OUTPATIENT)
Dept: ADMINISTRATIVE | Facility: HOSPITAL | Age: 65
End: 2020-06-17

## 2020-06-17 NOTE — PROGRESS NOTES
Chart review completed 06/17/2020.  Care Everywhere updates requested and reviewed.  Immunizations reconciled. Media reviewed.     [facility], [facility], and [employer] reviewed    EYE EXAM REQUEST FAXED TO [facility]

## 2020-06-17 NOTE — LETTER
AUTHORIZATION FOR RELEASE OF   CONFIDENTIAL INFORMATION    Dear [facility],    We are seeing Edie Hernandez, date of birth 1955, in the clinic at [facility]. Lydia Eugene MD is the patient's PCP. Edie Hernandez has an outstanding lab/procedure at the time we reviewed her chart. In order to help keep her health information updated, she has authorized us to request the following medical record(s):        (  )  MAMMOGRAM                                      (  )  COLONOSCOPY      (  )  PAP SMEAR                                          (  )  OUTSIDE LAB RESULTS     (  )  DEXA SCAN                                          ( X )  EYE EXAM            (  )  FOOT EXAM                                          (  )  ENTIRE RECORD     (  )  OUTSIDE IMMUNIZATIONS                 (  )  _______________         Please fax records to [facility], Amy L Hammons, MD, [phone]    Sherley Carrera LPN  Clinical Care Coordinator  [address] [address]  P: [phone]  F: [phone]            Patient Name: Edie Hernandez  : 1955  Patient Phone #: [phone]

## 2020-06-18 ENCOUNTER — OFFICE VISIT (OUTPATIENT)
Dept: PHYSICAL MEDICINE AND REHAB | Facility: CLINIC | Age: 65
End: 2020-06-18
Payer: COMMERCIAL

## 2020-06-18 VITALS
HEART RATE: 70 BPM | HEIGHT: 65 IN | WEIGHT: 250 LBS | SYSTOLIC BLOOD PRESSURE: 128 MMHG | DIASTOLIC BLOOD PRESSURE: 61 MMHG | BODY MASS INDEX: 41.65 KG/M2

## 2020-06-18 DIAGNOSIS — Z01.818 PRE-OP TESTING: ICD-10-CM

## 2020-06-18 DIAGNOSIS — M54.16 LUMBAR RADICULOPATHY: Primary | ICD-10-CM

## 2020-06-18 DIAGNOSIS — M47.816 LUMBAR SPONDYLOSIS: ICD-10-CM

## 2020-06-18 DIAGNOSIS — Z74.09 IMPAIRED FUNCTIONAL MOBILITY AND ACTIVITY TOLERANCE: ICD-10-CM

## 2020-06-18 DIAGNOSIS — M51.36 DDD (DEGENERATIVE DISC DISEASE), LUMBAR: ICD-10-CM

## 2020-06-18 PROCEDURE — 99999 PR PBB SHADOW E&M-EST. PATIENT-LVL III: CPT | Mod: PBBFAC,,, | Performed by: PHYSICAL MEDICINE & REHABILITATION

## 2020-06-18 PROCEDURE — 99213 OFFICE O/P EST LOW 20 MIN: CPT | Mod: S$GLB,,, | Performed by: PHYSICAL MEDICINE & REHABILITATION

## 2020-06-18 PROCEDURE — 3008F BODY MASS INDEX DOCD: CPT | Mod: CPTII,S$GLB,, | Performed by: PHYSICAL MEDICINE & REHABILITATION

## 2020-06-18 PROCEDURE — 99213 PR OFFICE/OUTPT VISIT, EST, LEVL III, 20-29 MIN: ICD-10-PCS | Mod: S$GLB,,, | Performed by: PHYSICAL MEDICINE & REHABILITATION

## 2020-06-18 PROCEDURE — 3008F PR BODY MASS INDEX (BMI) DOCUMENTED: ICD-10-PCS | Mod: CPTII,S$GLB,, | Performed by: PHYSICAL MEDICINE & REHABILITATION

## 2020-06-18 PROCEDURE — 99999 PR PBB SHADOW E&M-EST. PATIENT-LVL III: ICD-10-PCS | Mod: PBBFAC,,, | Performed by: PHYSICAL MEDICINE & REHABILITATION

## 2020-06-18 RX ORDER — CYCLOBENZAPRINE HCL 5 MG
5 TABLET ORAL NIGHTLY PRN
Qty: 14 TABLET | Refills: 0 | Status: SHIPPED | OUTPATIENT
Start: 2020-06-18 | End: 2020-07-01 | Stop reason: SDUPTHER

## 2020-06-18 NOTE — PROGRESS NOTES
Chief Complaint   Patient presents with    Follow-up     procedure 6/10/20         HPI: Edie Hernandez is a  64 y.o. female who presents today for followup. she was last seen on 06/10/2020 at which time a performed a repeat left L4 and L5 transforaminal epidural steroid injection.  Initially, she received relief for the duration of the anesthetic.  When the anesthetic wore off, her pain was worse.  Her pain lasted for a few days and she finally obtain some relief.  She states that she is approximately 30-40% improved in pain.  She denies any new or worsening bowel or bladder dysfunction, saddle anesthesia, or lower extremity weakness.  Pain today is a 3 to 5/10.  Her pain has been gradually improving over the last few days.        Review of Systems   Constitutional: Negative for chills and fever.   HENT: Negative for congestion and tinnitus.    Eyes: Negative for blurred vision and photophobia.   Respiratory: Negative for shortness of breath and wheezing.    Cardiovascular: Negative for chest pain and palpitations.   Gastrointestinal: Negative for nausea and vomiting.   Genitourinary: Negative for dysuria and frequency.   Musculoskeletal: Positive for back pain. Negative for falls and myalgias.   Skin: Negative for itching and rash.   Neurological: Positive for tingling and sensory change. Negative for speech change and focal weakness.   Endo/Heme/Allergies: Negative for environmental allergies. Does not bruise/bleed easily.   Psychiatric/Behavioral: Negative for depression. The patient is not nervous/anxious.             Past Medical History:   Diagnosis Date    Arthritis     Colon polyp, hyperplastic 1/13    recheck 5-10 years    Diabetes mellitus, type 2     Diverticulosis     Fatty liver     General anesthetics causing adverse effect in therapeutic use     woke up during tubal ligation    GERD (gastroesophageal reflux disease)     CLINT (obstructive sleep apnea)     C-pap    PONV (postoperative nausea  and vomiting)     Thyroid disease           Current Outpatient Medications on File Prior to Visit   Medication Sig Dispense Refill    albuterol 90 mcg/actuation inhaler Inhale 2 puffs into the lungs every 6 (six) hours as needed for Wheezing. (Patient taking differently: Inhale 2 puffs into the lungs every 6 (six) hours as needed for Wheezing. ) 18 g 3    allopurinol (ZYLOPRIM) 300 MG tablet Take 1 tablet (300 mg total) by mouth once daily. 90 tablet 4    blood sugar diagnostic Strp 1 each by Misc.(Non-Drug; Combo Route) route 4 (four) times daily. Patient has One touch Ultra II meter 100 strip 3    blood-glucose meter kit Use as instructed 1 each 0    cetirizine (ALL DAY ALLERGY, CETIRIZINE,) 10 MG tablet Take 1 tablet (10 mg total) by mouth once daily. (Patient taking differently: Take 10 mg by mouth daily as needed. ) 30 tablet 3    clobetasol (TEMOVATE) 0.05 % cream Apply 1 application topically 2 (two) times daily. Apply to affected area 30 g 2    diazePAM (VALIUM) 10 MG Tab Take one po 30 minutes prior to test. 1 tablet 0    diclofenac sodium (VOLTAREN) 1 % Gel Apply 2 g topically 4 (four) times daily.      ergocalciferol (ERGOCALCIFEROL) 50,000 unit Cap TAKE 1 CAPSULE BY MOUTH EVERY 7 DAYS 12 capsule 1    fluticasone (FLONASE) 50 mcg/actuation nasal spray 1 spray (50 mcg total) by Each Nare route daily as needed. 1 Bottle 11    furosemide (LASIX) 40 MG tablet Take 1 tablet (40 mg total) by mouth daily as needed. Every day PRN 30 tablet 4    gabapentin (NEURONTIN) 300 MG capsule TAKE 1 CAPSULE BY MOUTH TWICE A  capsule 0    HYDROcodone-acetaminophen (NORCO) 7.5-325 mg per tablet Take 1 tablet by mouth every 6 (six) hours as needed for Pain. 90 tablet 0    indomethacin (INDOCIN) 25 MG capsule TAKE ONE CAPSULE BY MOUTH 3 TIMES A DAY WITH MEALS 90 capsule 0    lancets Misc 1 each by Misc.(Non-Drug; Combo Route) route 4 (four) times daily. Patient has a one touch ultra II meter 100 each 3     levothyroxine (SYNTHROID) 150 MCG tablet Take 1 tablet (150 mcg total) by mouth once daily. 90 tablet 3    metFORMIN (GLUCOPHAGE-XR) 500 MG XR 24hr tablet Take 2 tablets (1,000 mg total) by mouth 2 (two) times daily with meals. 360 tablet 3    methylphenidate (RITALIN LA) 40 MG 24 hr capsule Take 40 mg by mouth daily as needed.       oxybutynin (DITROPAN XL) 15 MG TR24 Take 1 tablet (15 mg total) by mouth once daily. 90 tablet 3    pantoprazole (PROTONIX) 40 MG tablet Take 1 tablet (40 mg total) by mouth once daily. 90 tablet 3     No current facility-administered medications on file prior to visit.               Physical Exam:  Vitals:    06/18/20 1036   BP: 128/61   Pulse: 70     Physical Exam  Constitutional:       General: She is not in acute distress.  HENT:      Head: Normocephalic and atraumatic.      Mouth/Throat:      Mouth: Mucous membranes are moist.   Eyes:      General: Lids are normal.      Extraocular Movements: Extraocular movements intact.      Conjunctiva/sclera: Conjunctivae normal.      Pupils: Pupils are equal, round, and reactive to light.   Pulmonary:      Effort: Pulmonary effort is normal. No respiratory distress.   Abdominal:      General: Abdomen is flat. There is no distension.   Musculoskeletal:        Back:       Comments: Low back and left leg:  Status quo   Neurological:      General: No focal deficit present.      Mental Status: She is alert and oriented to person, place, and time.      Sensory: Sensory deficit (Left L4 and L5 dermatomal distribution) present.      Gait: Gait abnormal.      Comments: Status quo   Psychiatric:         Mood and Affect: Mood and affect normal.       Ortho Exam          Assessment:  Lumbar radiculopathy    Lumbar spondylosis    DDD (degenerative disc disease), lumbar    Impaired functional mobility and activity tolerance    Pre-op testing  -     COVID-19 Routine Screening; Future; Expected date: 06/18/2020    Other orders  -     cyclobenzaprine  (FLEXERIL) 5 MG tablet; Take 1 tablet (5 mg total) by mouth nightly as needed for Muscle spasms. One to two tablets qhs prn pain  Dispense: 14 tablet; Refill: 0               PLAN:  Edie Hernandez is a 64 y.o. female with low back pain and left-sided radicular symptoms.  She is approximately 1 week status post left L4 and L5 transforaminal epidural steroid injection.  She received excellent relief for the duration of lidocaine, however after the lidocaine wore off her pain was temporarily worse.  Her pain is now 30-40% improved.  She did receive improvement with the initial epidural for a few days, however the pain had returned.  I will schedule her for a repeat left L4 and L5 transforaminal epidural steroid injection.  I will also have her follow up with Dr. Everton Godinez for potential surgical management as her pain is severe and limiting her activities of daily living and functional mobility.                      Dejan Simmons D.O.  Physical Medicine and Rehabilitation

## 2020-06-19 ENCOUNTER — OCCUPATIONAL HEALTH (OUTPATIENT)
Dept: URGENT CARE | Facility: CLINIC | Age: 65
End: 2020-06-19

## 2020-06-19 ENCOUNTER — PATIENT OUTREACH (OUTPATIENT)
Dept: ADMINISTRATIVE | Facility: HOSPITAL | Age: 65
End: 2020-06-19

## 2020-06-19 PROCEDURE — 99499 PR PHYSICAL - DOT/CDL: ICD-10-PCS | Mod: S$GLB,,, | Performed by: NURSE PRACTITIONER

## 2020-06-19 PROCEDURE — 99499 UNLISTED E&M SERVICE: CPT | Mod: S$GLB,,, | Performed by: NURSE PRACTITIONER

## 2020-06-22 RX ORDER — SODIUM CHLORIDE 9 MG/ML
INJECTION, SOLUTION INTRAVENOUS CONTINUOUS
Status: CANCELLED | OUTPATIENT
Start: 2020-06-22

## 2020-06-23 ENCOUNTER — NURSE TRIAGE (OUTPATIENT)
Dept: ADMINISTRATIVE | Facility: CLINIC | Age: 65
End: 2020-06-23

## 2020-06-23 NOTE — TELEPHONE ENCOUNTER
Contacted pt on behalf of Post Procedural Symptom [facility]. Pt verified by first and last name and . Pt denies any fever, cough, or difficulty breathing since procedure. Advised pt if these symptoms do arise to contact [facility] or PCP. No follow up needed.    Reason for Disposition   Health Information question, no triage required and triager able to answer question    Protocols used: INFORMATION ONLY CALL-A-

## 2020-06-24 ENCOUNTER — LAB VISIT (OUTPATIENT)
Dept: LAB | Facility: HOSPITAL | Age: 65
End: 2020-06-24
Attending: FAMILY MEDICINE
Payer: COMMERCIAL

## 2020-06-24 DIAGNOSIS — E03.9 HYPOTHYROIDISM, UNSPECIFIED TYPE: ICD-10-CM

## 2020-06-24 DIAGNOSIS — E53.8 B12 NUTRITIONAL DEFICIENCY: ICD-10-CM

## 2020-06-24 DIAGNOSIS — E11.9 TYPE 2 DIABETES MELLITUS WITHOUT COMPLICATION, WITHOUT LONG-TERM CURRENT USE OF INSULIN: ICD-10-CM

## 2020-06-24 DIAGNOSIS — E78.5 HYPERLIPIDEMIA ASSOCIATED WITH TYPE 2 DIABETES MELLITUS: ICD-10-CM

## 2020-06-24 DIAGNOSIS — E55.9 VITAMIN D DEFICIENCY: ICD-10-CM

## 2020-06-24 DIAGNOSIS — E11.69 HYPERLIPIDEMIA ASSOCIATED WITH TYPE 2 DIABETES MELLITUS: ICD-10-CM

## 2020-06-24 LAB
25(OH)D3+25(OH)D2 SERPL-MCNC: 27 NG/ML (ref 30–96)
ALBUMIN SERPL BCP-MCNC: 3.6 G/DL (ref 3.5–5.2)
ALP SERPL-CCNC: 87 U/L (ref 55–135)
ALT SERPL W/O P-5'-P-CCNC: 27 U/L (ref 10–44)
ANION GAP SERPL CALC-SCNC: 8 MMOL/L (ref 8–16)
AST SERPL-CCNC: 20 U/L (ref 10–40)
BASOPHILS # BLD AUTO: 0.05 K/UL (ref 0–0.2)
BASOPHILS NFR BLD: 0.6 % (ref 0–1.9)
BILIRUB SERPL-MCNC: 1.1 MG/DL (ref 0.1–1)
BUN SERPL-MCNC: 15 MG/DL (ref 8–23)
CALCIUM SERPL-MCNC: 9.3 MG/DL (ref 8.7–10.5)
CHLORIDE SERPL-SCNC: 105 MMOL/L (ref 95–110)
CHOLEST SERPL-MCNC: 208 MG/DL (ref 120–199)
CHOLEST/HDLC SERPL: 3.6 {RATIO} (ref 2–5)
CO2 SERPL-SCNC: 26 MMOL/L (ref 23–29)
CREAT SERPL-MCNC: 1 MG/DL (ref 0.5–1.4)
DIFFERENTIAL METHOD: ABNORMAL
EOSINOPHIL # BLD AUTO: 0.5 K/UL (ref 0–0.5)
EOSINOPHIL NFR BLD: 6.7 % (ref 0–8)
ERYTHROCYTE [DISTWIDTH] IN BLOOD BY AUTOMATED COUNT: 12.9 % (ref 11.5–14.5)
EST. GFR  (AFRICAN AMERICAN): >60 ML/MIN/1.73 M^2
EST. GFR  (NON AFRICAN AMERICAN): >60 ML/MIN/1.73 M^2
ESTIMATED AVG GLUCOSE: 134 MG/DL (ref 68–131)
GLUCOSE SERPL-MCNC: 130 MG/DL (ref 70–110)
HBA1C MFR BLD HPLC: 6.3 % (ref 4–5.6)
HCT VFR BLD AUTO: 42.6 % (ref 37–48.5)
HDLC SERPL-MCNC: 58 MG/DL (ref 40–75)
HDLC SERPL: 27.9 % (ref 20–50)
HGB BLD-MCNC: 13.6 G/DL (ref 12–16)
IMM GRANULOCYTES # BLD AUTO: 0.02 K/UL (ref 0–0.04)
IMM GRANULOCYTES NFR BLD AUTO: 0.3 % (ref 0–0.5)
LDLC SERPL CALC-MCNC: 114 MG/DL (ref 63–159)
LYMPHOCYTES # BLD AUTO: 1.7 K/UL (ref 1–4.8)
LYMPHOCYTES NFR BLD: 21.2 % (ref 18–48)
MCH RBC QN AUTO: 30.4 PG (ref 27–31)
MCHC RBC AUTO-ENTMCNC: 31.9 G/DL (ref 32–36)
MCV RBC AUTO: 95 FL (ref 82–98)
MONOCYTES # BLD AUTO: 0.5 K/UL (ref 0.3–1)
MONOCYTES NFR BLD: 6.6 % (ref 4–15)
NEUTROPHILS # BLD AUTO: 5.1 K/UL (ref 1.8–7.7)
NEUTROPHILS NFR BLD: 64.6 % (ref 38–73)
NONHDLC SERPL-MCNC: 150 MG/DL
NRBC BLD-RTO: 0 /100 WBC
PLATELET # BLD AUTO: 264 K/UL (ref 150–350)
PMV BLD AUTO: 9.9 FL (ref 9.2–12.9)
POTASSIUM SERPL-SCNC: 4.3 MMOL/L (ref 3.5–5.1)
PROT SERPL-MCNC: 6.8 G/DL (ref 6–8.4)
RBC # BLD AUTO: 4.48 M/UL (ref 4–5.4)
SODIUM SERPL-SCNC: 139 MMOL/L (ref 136–145)
T4 FREE SERPL-MCNC: 0.94 NG/DL (ref 0.71–1.51)
TRIGL SERPL-MCNC: 180 MG/DL (ref 30–150)
TSH SERPL DL<=0.005 MIU/L-ACNC: 2.47 UIU/ML (ref 0.4–4)
VIT B12 SERPL-MCNC: 297 PG/ML (ref 210–950)
WBC # BLD AUTO: 7.87 K/UL (ref 3.9–12.7)

## 2020-06-24 PROCEDURE — 82607 VITAMIN B-12: CPT

## 2020-06-24 PROCEDURE — 83036 HEMOGLOBIN GLYCOSYLATED A1C: CPT

## 2020-06-24 PROCEDURE — 82306 VITAMIN D 25 HYDROXY: CPT

## 2020-06-24 PROCEDURE — 80061 LIPID PANEL: CPT

## 2020-06-24 PROCEDURE — 80053 COMPREHEN METABOLIC PANEL: CPT

## 2020-06-24 PROCEDURE — 36415 COLL VENOUS BLD VENIPUNCTURE: CPT | Mod: PO

## 2020-06-24 PROCEDURE — 84443 ASSAY THYROID STIM HORMONE: CPT

## 2020-06-24 PROCEDURE — 85025 COMPLETE CBC W/AUTO DIFF WBC: CPT

## 2020-06-24 PROCEDURE — 84439 ASSAY OF FREE THYROXINE: CPT

## 2020-07-01 ENCOUNTER — OFFICE VISIT (OUTPATIENT)
Dept: FAMILY MEDICINE | Facility: CLINIC | Age: 65
End: 2020-07-01
Payer: COMMERCIAL

## 2020-07-01 VITALS
BODY MASS INDEX: 43.45 KG/M2 | DIASTOLIC BLOOD PRESSURE: 77 MMHG | TEMPERATURE: 97 F | HEIGHT: 65 IN | RESPIRATION RATE: 14 BRPM | HEART RATE: 82 BPM | OXYGEN SATURATION: 96 % | SYSTOLIC BLOOD PRESSURE: 130 MMHG | WEIGHT: 260.81 LBS

## 2020-07-01 DIAGNOSIS — E55.9 VITAMIN D DEFICIENCY: ICD-10-CM

## 2020-07-01 DIAGNOSIS — E11.65 UNCONTROLLED TYPE 2 DIABETES MELLITUS WITH HYPERGLYCEMIA: ICD-10-CM

## 2020-07-01 DIAGNOSIS — N32.81 OAB (OVERACTIVE BLADDER): ICD-10-CM

## 2020-07-01 DIAGNOSIS — D17.1 LIPOMA OF BACK: Primary | ICD-10-CM

## 2020-07-01 DIAGNOSIS — Z87.39 HISTORY OF ACUTE GOUTY ARTHRITIS: ICD-10-CM

## 2020-07-01 DIAGNOSIS — G62.9 NEUROPATHY: ICD-10-CM

## 2020-07-01 DIAGNOSIS — K21.9 GASTROESOPHAGEAL REFLUX DISEASE, ESOPHAGITIS PRESENCE NOT SPECIFIED: ICD-10-CM

## 2020-07-01 DIAGNOSIS — E03.9 HYPOTHYROIDISM (ACQUIRED): ICD-10-CM

## 2020-07-01 DIAGNOSIS — M54.9 CHRONIC BACK PAIN GREATER THAN 3 MONTHS DURATION: ICD-10-CM

## 2020-07-01 DIAGNOSIS — G89.29 CHRONIC BACK PAIN GREATER THAN 3 MONTHS DURATION: ICD-10-CM

## 2020-07-01 PROCEDURE — 80307 DRUG TEST PRSMV CHEM ANLYZR: CPT

## 2020-07-01 PROCEDURE — 3008F PR BODY MASS INDEX (BMI) DOCUMENTED: ICD-10-PCS | Mod: CPTII,S$GLB,, | Performed by: FAMILY MEDICINE

## 2020-07-01 PROCEDURE — 99214 PR OFFICE/OUTPT VISIT, EST, LEVL IV, 30-39 MIN: ICD-10-PCS | Mod: S$GLB,,, | Performed by: FAMILY MEDICINE

## 2020-07-01 PROCEDURE — 3044F HG A1C LEVEL LT 7.0%: CPT | Mod: CPTII,S$GLB,, | Performed by: FAMILY MEDICINE

## 2020-07-01 PROCEDURE — 99999 PR PBB SHADOW E&M-EST. PATIENT-LVL IV: CPT | Mod: PBBFAC,,, | Performed by: FAMILY MEDICINE

## 2020-07-01 PROCEDURE — 99999 PR PBB SHADOW E&M-EST. PATIENT-LVL IV: ICD-10-PCS | Mod: PBBFAC,,, | Performed by: FAMILY MEDICINE

## 2020-07-01 PROCEDURE — 3044F PR MOST RECENT HEMOGLOBIN A1C LEVEL <7.0%: ICD-10-PCS | Mod: CPTII,S$GLB,, | Performed by: FAMILY MEDICINE

## 2020-07-01 PROCEDURE — 99214 OFFICE O/P EST MOD 30 MIN: CPT | Mod: S$GLB,,, | Performed by: FAMILY MEDICINE

## 2020-07-01 PROCEDURE — 3008F BODY MASS INDEX DOCD: CPT | Mod: CPTII,S$GLB,, | Performed by: FAMILY MEDICINE

## 2020-07-01 RX ORDER — PANTOPRAZOLE SODIUM 40 MG/1
40 TABLET, DELAYED RELEASE ORAL DAILY
Qty: 90 TABLET | Refills: 3 | Status: SHIPPED | OUTPATIENT
Start: 2020-07-01 | End: 2022-05-16 | Stop reason: SDUPTHER

## 2020-07-01 RX ORDER — INDOMETHACIN 25 MG/1
CAPSULE ORAL
Qty: 90 CAPSULE | Refills: 0 | Status: SHIPPED | OUTPATIENT
Start: 2020-07-01 | End: 2020-11-11 | Stop reason: SDUPTHER

## 2020-07-01 RX ORDER — HYDROCODONE BITARTRATE AND ACETAMINOPHEN 7.5; 325 MG/1; MG/1
1 TABLET ORAL EVERY 6 HOURS PRN
Qty: 90 TABLET | Refills: 0 | Status: SHIPPED | OUTPATIENT
Start: 2020-07-01 | End: 2020-09-29 | Stop reason: SDUPTHER

## 2020-07-01 RX ORDER — CYCLOBENZAPRINE HCL 5 MG
5 TABLET ORAL 3 TIMES DAILY PRN
Qty: 90 TABLET | Status: SHIPPED | OUTPATIENT
Start: 2020-07-01 | End: 2020-12-22 | Stop reason: SDUPTHER

## 2020-07-01 RX ORDER — METFORMIN HYDROCHLORIDE 500 MG/1
1000 TABLET, EXTENDED RELEASE ORAL 2 TIMES DAILY WITH MEALS
Qty: 360 TABLET | Refills: 3 | Status: SHIPPED | OUTPATIENT
Start: 2020-07-01 | End: 2020-12-22 | Stop reason: SDUPTHER

## 2020-07-01 RX ORDER — HYDROCODONE BITARTRATE AND ACETAMINOPHEN 7.5; 325 MG/1; MG/1
1 TABLET ORAL
Qty: 30 TABLET | Refills: 0 | Status: SHIPPED | OUTPATIENT
Start: 2020-08-30 | End: 2020-07-27 | Stop reason: SDUPTHER

## 2020-07-01 RX ORDER — OXYBUTYNIN CHLORIDE 15 MG/1
15 TABLET, EXTENDED RELEASE ORAL DAILY
Qty: 90 TABLET | Refills: 3 | Status: SHIPPED | OUTPATIENT
Start: 2020-07-01 | End: 2020-12-22 | Stop reason: SDUPTHER

## 2020-07-01 RX ORDER — LEVOTHYROXINE SODIUM 150 UG/1
137 TABLET ORAL DAILY
Qty: 90 TABLET | Refills: 3 | Status: SHIPPED | OUTPATIENT
Start: 2020-07-01 | End: 2020-12-22 | Stop reason: SDUPTHER

## 2020-07-01 RX ORDER — HYDROCODONE BITARTRATE AND ACETAMINOPHEN 7.5; 325 MG/1; MG/1
1 TABLET ORAL
Qty: 30 TABLET | Refills: 0 | Status: SHIPPED | OUTPATIENT
Start: 2020-07-31 | End: 2020-07-27 | Stop reason: SDUPTHER

## 2020-07-01 RX ORDER — ERGOCALCIFEROL 1.25 MG/1
50000 CAPSULE ORAL
Qty: 12 CAPSULE | Refills: 1 | Status: SHIPPED | OUTPATIENT
Start: 2020-07-01 | End: 2020-11-11 | Stop reason: SDUPTHER

## 2020-07-01 RX ORDER — GABAPENTIN 300 MG/1
300 CAPSULE ORAL 2 TIMES DAILY
Qty: 180 CAPSULE | Refills: 3 | Status: SHIPPED | OUTPATIENT
Start: 2020-07-01 | End: 2021-10-27 | Stop reason: ALTCHOICE

## 2020-07-01 NOTE — LETTER
2020                                 NAME:Edie Hernandez  : 1955   MRN: [id number]     [facility]  [address] [address]  Phone: [phone]  Fax: [phone]      [facility]  PAIN MANAGEMENT    PAIN MANAGEMENT CONTRACT      My doctor and I have decided that as part of my treatment for chronic pain, I will receive prescriptions for controlled substances. As a patient, I will agree to the following terms in order for my provider to effectively treat my pain and also comply with the rules set forth by [facility] Board of Medical Examiners and Drug Enforcement Agency.     1. A single physician shall be responsible for prescribing my pain medication.    2. I understand that in order for me to receive the best possible care, my prescribing doctor needs me to provide a copy of any of my previous copy of any previous medical records,including MRI, office notes, lab results, etc.    3. I will also provide a full list of ALL of my medications, current dose, and how often I take my medication. I must inform my doctor if I am taking any other medications, particularly sedating medications, such as Valium, Ativan, seizure medications, or psychiatric medications.    4. I will inform my physician of any current or prior history of abuse or misuse of prescription medication, illegal drugs, or alcohol.    5. I must not obtain controlled substances (including but not limited to, Xanax, Vicodin,Percocet, Tylenol #3,  ) from any other doctor without first telling my physician at this clinic.    6. I understand that my physician will assess the efficacy of my treatment with controlled substances and monitor my progress every twelve weeks, unless my physician, in his orher clinical judgment, determines otherwise.    7. It is my responsibility to keep my appointments. If I miss my scheduled appointment, I will be rescheduled to the first available time slot. I understand  "that pain medications may not be refilled until seen during a scheduled appointment.    8. I will take my medications only in the directed doses and manner and at the directed time. I will not deviate from my prescribed usage.    9. I will not combine these prescribed medications with alcohol or recreational medications,including, but not limited to, marijuana.    10. I will not drive or operate heavy machinery while on this medication.    11. I will not cut or chew long-acting pain medication.    12. I must inform my physician of any side effects that I may be experiencing.    13. If severe sedation (sleepiness) or any other medical emergency relating to my pain medication occurs, I will make contact with my doctors office or seek ER attention immediately.    14. If my doctor agrees to refill my pain medication by telephone, I will call the office at least 5 business days in advance to request a refill prescription.    15. Refill prescriptions will not be written in the evenings, weekends, or on holidays.    16. Each prescription is expected to last at least one month. Refills will not be given early if I"run out early", "lose a prescription", "spill" or "misplace" my medication. I will report stolen medications to the police.    17. No prescription refills will be given if I have not been seen by my physician in the clinic for 3 months.    18. It may be necessary for prescriptions to be picked up in person and proof of identification may be required.    19. I will have my pain medication filled at only one pharmacy.          (pharmacy name/address)     20. A baseline medication screen may be completed on my first visit and or randomly at other routine clinic visits.    21. These medications may be harmful to an unborn child. I have been advised to use 2 forms of birth control (at least one barrier, such as condoms) while using these medications. I will inform my doctor immediately if I become pregnant while on " this medication.    22. If I test positive for medications that my doctor has not prescribed, and/or if I refuse a random medication test, my physician has the right to stop my controlled substance, end his/her relationship with me, and I may be terminated from the clinic.    23. If at any time I become violent or abusive, verbally or physically, my actions will be considered cause to terminate care from the clinic and discontinuation of pain medications.    I have read and understand the above information. I will, to the best of my ability, adhere to these policies and commitments. I further understand that noncompliance with these policies or demonstration of signs suspicious of medication overuse or abuse, may result in my being discharged as the patient.     I DO HEREBY AGREE AND UNDERSTAND ALL OF THE ABOVE. I HAVE RECEIVED A COPY OF THIS CONTROLLED SUBSTANCE TREATMENT ORIENTATION AND BEHAVIOR AGREEMENT.       Patient Signature:______________________________ Date/Time:________________    Patient Printed Name: Edie Hernandez     Physician Signature:____________________________ Date/Time:________________    Physician Printed Name: Lydia Eugene MD    Witness Signature:_____________________________ Date/Time:________________    Witness Printed Name

## 2020-07-01 NOTE — LETTER
2020                                 NAME:Edie Hernandez  : 1955   MRN: [id number]     [facility]  [address] [address]  Phone: [phone]  Fax: [phone]      [facility]  PAIN MANAGEMENT    PAIN MANAGEMENT CONTRACT      My doctor and I have decided that as part of my treatment for chronic pain, I will receive prescriptions for controlled substances. As a patient, I will agree to the following terms in order for my provider to effectively treat my pain and also comply with the rules set forth by [facility] Board of Medical Examiners and Drug Enforcement Agency.     1. A single physician shall be responsible for prescribing my pain medication.    2. I understand that in order for me to receive the best possible care, my prescribing doctor needs me to provide a copy of any of my previous copy of any previous medical records,including MRI, office notes, lab results, etc.    3. I will also provide a full list of ALL of my medications, current dose, and how often I take my medication. I must inform my doctor if I am taking any other medications, particularly sedating medications, such as Valium, Ativan, seizure medications, or psychiatric medications.    4. I will inform my physician of any current or prior history of abuse or misuse of prescription medication, illegal drugs, or alcohol.    5. I must not obtain controlled substances (including but not limited to, Xanax, Vicodin,Percocet, Tylenol #3,  ) from any other doctor without first telling my physician at this clinic.    6. I understand that my physician will assess the efficacy of my treatment with controlled substances and monitor my progress every twelve weeks, unless my physician, in his orher clinical judgment, determines otherwise.    7. It is my responsibility to keep my appointments. If I miss my scheduled appointment, I will be rescheduled to the first available time slot. I understand  "that pain medications may not be refilled until seen during a scheduled appointment.    8. I will take my medications only in the directed doses and manner and at the directed time. I will not deviate from my prescribed usage.    9. I will not combine these prescribed medications with alcohol or recreational medications,including, but not limited to, marijuana.    10. I will not drive or operate heavy machinery while on this medication.    11. I will not cut or chew long-acting pain medication.    12. I must inform my physician of any side effects that I may be experiencing.    13. If severe sedation (sleepiness) or any other medical emergency relating to my pain medication occurs, I will make contact with my doctors office or seek ER attention immediately.    14. If my doctor agrees to refill my pain medication by telephone, I will call the office at least 5 business days in advance to request a refill prescription.    15. Refill prescriptions will not be written in the evenings, weekends, or on holidays.    16. Each prescription is expected to last at least one month. Refills will not be given early if I"run out early", "lose a prescription", "spill" or "misplace" my medication. I will report stolen medications to the police.    17. No prescription refills will be given if I have not been seen by my physician in the clinic for 3 months.    18. It may be necessary for prescriptions to be picked up in person and proof of identification may be required.    19. I will have my pain medication filled at only one pharmacy.          (pharmacy name/address)     20. A baseline medication screen may be completed on my first visit and or randomly at other routine clinic visits.    21. These medications may be harmful to an unborn child. I have been advised to use 2 forms of birth control (at least one barrier, such as condoms) while using these medications. I will inform my doctor immediately if I become pregnant while on " this medication.    22. If I test positive for medications that my doctor has not prescribed, and/or if I refuse a random medication test, my physician has the right to stop my controlled substance, end his/her relationship with me, and I may be terminated from the clinic.    23. If at any time I become violent or abusive, verbally or physically, my actions will be considered cause to terminate care from the clinic and discontinuation of pain medications.    I have read and understand the above information. I will, to the best of my ability, adhere to these policies and commitments. I further understand that noncompliance with these policies or demonstration of signs suspicious of medication overuse or abuse, may result in my being discharged as the patient.     I DO HEREBY AGREE AND UNDERSTAND ALL OF THE ABOVE. I HAVE RECEIVED A COPY OF THIS CONTROLLED SUBSTANCE TREATMENT ORIENTATION AND BEHAVIOR AGREEMENT.       Patient Signature:______________________________ Date/Time:________________    Patient Printed Name: Edie Hernandez     Physician Signature:____________________________ Date/Time:________________    Physician Printed Name: Lydia Eugene MD    Witness Signature:_____________________________ Date/Time:________________    Witness Printed Name

## 2020-07-01 NOTE — PROGRESS NOTES
Subjective:       Patient ID: Edie Hernandez is a 64 y.o. female.    Chief Complaint: Follow-up (3mth f/u)    HPI  Review of Systems   Constitutional: Negative for fatigue and unexpected weight change.   Respiratory: Negative for chest tightness and shortness of breath.    Cardiovascular: Negative for chest pain, palpitations and leg swelling.   Gastrointestinal: Negative for abdominal pain.   Musculoskeletal: Positive for back pain. Negative for arthralgias.   Neurological: Negative for dizziness, syncope, light-headedness and headaches.       Patient Active Problem List   Diagnosis    GERD (gastroesophageal reflux disease)    Chronic back pain greater than 3 months duration    Environmental allergies    Fibromyalgia    B12 nutritional deficiency    CLINT (obstructive sleep apnea)    Chronic sinusitis    Asthma    Hypothyroid    Nausea    Night sweats    Tinnitus, bilateral    Frequent UTI    Hyperlipidemia    Colon polyp, hyperplastic    Interstitial cystitis    Hemangioma    Wart    Globus sensation    DDD (degenerative disc disease), lumbar    Morbid obesity with BMI of 45.0-49.9, adult    Edema    Mixed incontinence    Continuous opioid dependence- contract 10/18/17-failed uds for norco, + 2/18    Type 2 diabetes mellitus    History of colon polyps    Lumbar radiculitis    Lumbar radiculopathy     Patient is here for a chronic conditions follow up.    Patient is here for a chronic conditions follow up.    Reviewed labs 12/19     Pulm/Sleep Dr. Yang-CLINT on daily methylphendiate     Continuous use of opioids follow-up:  Current medication and dosing:  norco 7.5 once daily  Supply:  90 day supply  Side effects: none  Pain controlled: yes  Medication compliance: yes  DPS checked today: no evidence of diversion  UDS: 3/19, today  pain contract signed:tried today but printer not working     Cause of Pain: chronic low back pain       Reviewed labs 6/20   Objective:      Physical  Exam  Vitals signs and nursing note reviewed.   Constitutional:       Appearance: She is well-developed.   Cardiovascular:      Rate and Rhythm: Normal rate and regular rhythm.      Heart sounds: Normal heart sounds.   Pulmonary:      Effort: Pulmonary effort is normal.      Breath sounds: Normal breath sounds.   Musculoskeletal:        Arms:    Skin:     General: Skin is warm and dry.   Neurological:      Mental Status: She is alert and oriented to person, place, and time.         Assessment:       1. Lipoma of back    2. Vitamin D deficiency    3. Neuropathy    4. Chronic back pain greater than 3 months duration    5. History of acute gouty arthritis    6. Hypothyroidism (acquired)    7. Uncontrolled type 2 diabetes mellitus with hyperglycemia    8. OAB (overactive bladder)    9. Gastroesophageal reflux disease, esophagitis presence not specified        Plan:         1. Vitamin D deficiency  Stable condition.  Continue current medications.  Will adjust based on lab findings or if condition changes.    - ergocalciferol (ERGOCALCIFEROL) 50,000 unit Cap; Take 1 capsule (50,000 Units total) by mouth every 7 days.  Dispense: 12 capsule; Refill: 1    2. Neuropathy  controlled  - gabapentin (NEURONTIN) 300 MG capsule; Take 1 capsule (300 mg total) by mouth 2 (two) times daily.  Dispense: 180 capsule; Refill: 3    3. Chronic back pain greater than 3 months duration  continue  - cyclobenzaprine (FLEXERIL) 5 MG tablet; Take 1 tablet (5 mg total) by mouth 3 (three) times daily as needed for Muscle spasms. One to two tablets qhs prn pain  Dispense: 90 tablet; Refill: prn  - HYDROcodone-acetaminophen (NORCO) 7.5-325 mg per tablet; Take 1 tablet by mouth every 6 (six) hours as needed for Pain.  Dispense: 90 tablet; Refill: 0  - HYDROcodone-acetaminophen (NORCO) 7.5-325 mg per tablet; Take 1 tablet by mouth every 24 hours as needed for Pain.  Dispense: 30 tablet; Refill: 0  - HYDROcodone-acetaminophen (NORCO) 7.5-325 mg per  tablet; Take 1 tablet by mouth every 24 hours as needed for Pain.  Dispense: 30 tablet; Refill: 0  - POCT Urine Drug Screen Pain Management  - Pain Clinic Drug Screen    4. History of acute gouty arthritis  Cont prn  - indomethacin (INDOCIN) 25 MG capsule; TAKE ONE CAPSULE BY MOUTH 3 TIMES A DAY WITH MEALS  Dispense: 90 capsule; Refill: 0    5. Hypothyroidism (acquired)  Stable condition.  Continue current medications.  Will adjust based on lab findings or if condition changes.    - levothyroxine (SYNTHROID) 150 MCG tablet; Take 1 tablet (150 mcg total) by mouth once daily.  Dispense: 90 tablet; Refill: 3    6. Uncontrolled type 2 diabetes mellitus with hyperglycemia  Stable condition.  Continue current medications.  Will adjust based on lab findings or if condition changes.    - metFORMIN (GLUCOPHAGE-XR) 500 MG XR 24hr tablet; Take 2 tablets (1,000 mg total) by mouth 2 (two) times daily with meals.  Dispense: 360 tablet; Refill: 3    7. OAB (overactive bladder)  continue  - oxybutynin (DITROPAN XL) 15 MG TR24; Take 1 tablet (15 mg total) by mouth once daily.  Dispense: 90 tablet; Refill: 3    8. Gastroesophageal reflux disease, esophagitis presence not specified  Counseled patient on prevention of reflux with changes in diet and behavior.  I recommended avoidance of greasy and spicy foods, caffeine and eating within 3 hours of bedtime.  I counseled the patient to avoid eating large meals and instead eating more frequent small meals.  I also recommended weight loss and elevation of the head of the bed by 6 inches.  If symptoms persist after these changes medication may be needed to control GERD.      - pantoprazole (PROTONIX) 40 MG tablet; Take 1 tablet (40 mg total) by mouth once daily.  Dispense: 90 tablet; Refill: 3    9. Lipoma of back  Refer for eval and treat  - Ambulatory referral/consult to General Surgery; Future        Time spent with patient: 20 minutes    Patient with be reevaluated in 3 and 6 months or  sooner prn    Greater than 50% of this visit was spent counseling as described in above documentation:Yes

## 2020-07-03 ENCOUNTER — LAB VISIT (OUTPATIENT)
Dept: PRIMARY CARE CLINIC | Facility: CLINIC | Age: 65
End: 2020-07-03
Payer: COMMERCIAL

## 2020-07-03 DIAGNOSIS — Z01.818 PRE-OP TESTING: ICD-10-CM

## 2020-07-03 PROCEDURE — U0003 INFECTIOUS AGENT DETECTION BY NUCLEIC ACID (DNA OR RNA); SEVERE ACUTE RESPIRATORY SYNDROME CORONAVIRUS 2 (SARS-COV-2) (CORONAVIRUS DISEASE [COVID-19]), AMPLIFIED PROBE TECHNIQUE, MAKING USE OF HIGH THROUGHPUT TECHNOLOGIES AS DESCRIBED BY CMS-2020-01-R: HCPCS

## 2020-07-04 LAB — SARS-COV-2 RNA RESP QL NAA+PROBE: NOT DETECTED

## 2020-07-05 LAB

## 2020-07-06 ENCOUNTER — HOSPITAL ENCOUNTER (OUTPATIENT)
Facility: AMBULARY SURGERY CENTER | Age: 65
Discharge: HOME | End: 2020-07-06
Attending: PHYSICAL MEDICINE & REHABILITATION | Admitting: PHYSICAL MEDICINE & REHABILITATION
Payer: COMMERCIAL

## 2020-07-06 DIAGNOSIS — M54.16 LUMBAR RADICULOPATHY: ICD-10-CM

## 2020-07-06 LAB — POCT GLUCOSE: 120 MG/DL (ref 70–110)

## 2020-07-06 PROCEDURE — 64483 NJX AA&/STRD TFRM EPI L/S 1: CPT | Mod: LT,,, | Performed by: PHYSICAL MEDICINE & REHABILITATION

## 2020-07-06 PROCEDURE — 64483 NJX AA&/STRD TFRM EPI L/S 1: CPT | Performed by: PHYSICAL MEDICINE & REHABILITATION

## 2020-07-06 PROCEDURE — 64484 NJX AA&/STRD TFRM EPI L/S EA: CPT | Performed by: PHYSICAL MEDICINE & REHABILITATION

## 2020-07-06 PROCEDURE — 64484 NJX AA&/STRD TFRM EPI L/S EA: CPT | Mod: LT,,, | Performed by: PHYSICAL MEDICINE & REHABILITATION

## 2020-07-06 PROCEDURE — 64484 PRA INJECT ANES/STEROID FORAMEN LUMBAR/SACRAL W IMG GUIDE ,EA ADD LEVEL: ICD-10-PCS | Mod: LT,,, | Performed by: PHYSICAL MEDICINE & REHABILITATION

## 2020-07-06 PROCEDURE — 64483 PR EPIDURAL INJ, ANES/STEROID, TRANSFORAMINAL, LUMB/SACR, SNGL LEVL: ICD-10-PCS | Mod: LT,,, | Performed by: PHYSICAL MEDICINE & REHABILITATION

## 2020-07-06 RX ORDER — SODIUM CHLORIDE 9 MG/ML
INJECTION, SOLUTION INTRAVENOUS CONTINUOUS
Status: DISCONTINUED | OUTPATIENT
Start: 2020-07-06 | End: 2020-07-06 | Stop reason: HOSPADM

## 2020-07-06 RX ORDER — SODIUM CHLORIDE, SODIUM LACTATE, POTASSIUM CHLORIDE, CALCIUM CHLORIDE 600; 310; 30; 20 MG/100ML; MG/100ML; MG/100ML; MG/100ML
INJECTION, SOLUTION INTRAVENOUS CONTINUOUS
Status: ACTIVE | OUTPATIENT
Start: 2020-07-06

## 2020-07-06 RX ORDER — DEXAMETHASONE SODIUM PHOSPHATE 4 MG/ML
INJECTION, SOLUTION INTRA-ARTICULAR; INTRALESIONAL; INTRAMUSCULAR; INTRAVENOUS; SOFT TISSUE
Status: DISCONTINUED | OUTPATIENT
Start: 2020-07-06 | End: 2020-07-06 | Stop reason: HOSPADM

## 2020-07-06 RX ORDER — FENTANYL CITRATE 50 UG/ML
INJECTION, SOLUTION INTRAMUSCULAR; INTRAVENOUS
Status: DISCONTINUED | OUTPATIENT
Start: 2020-07-06 | End: 2020-07-06 | Stop reason: HOSPADM

## 2020-07-06 RX ORDER — LIDOCAINE HYDROCHLORIDE 10 MG/ML
INJECTION, SOLUTION EPIDURAL; INFILTRATION; INTRACAUDAL; PERINEURAL
Status: DISCONTINUED | OUTPATIENT
Start: 2020-07-06 | End: 2020-07-06 | Stop reason: HOSPADM

## 2020-07-06 RX ORDER — MIDAZOLAM HYDROCHLORIDE 1 MG/ML
INJECTION, SOLUTION INTRAMUSCULAR; INTRAVENOUS
Status: DISCONTINUED | OUTPATIENT
Start: 2020-07-06 | End: 2020-07-06 | Stop reason: HOSPADM

## 2020-07-06 RX ORDER — SODIUM CHLORIDE 9 MG/ML
INJECTION, SOLUTION INTRAMUSCULAR; INTRAVENOUS; SUBCUTANEOUS
Status: DISCONTINUED | OUTPATIENT
Start: 2020-07-06 | End: 2020-07-06 | Stop reason: HOSPADM

## 2020-07-06 RX ADMIN — SODIUM CHLORIDE, SODIUM LACTATE, POTASSIUM CHLORIDE, CALCIUM CHLORIDE: 600; 310; 30; 20 INJECTION, SOLUTION INTRAVENOUS at 08:07

## 2020-07-06 NOTE — OP NOTE
BRIEF HISTORY  Edie Hernandez is a 64 y.o. female with low back pain and left-sided radicular symptoms.  She is approximately 6 week status post left L4 and L5 transforaminal epidural steroid injection with excellent improvement in left lower left pain.  She continues to have left low back and lateral hip pain with standing and walking. We will proceed with a repeat left L4 and L5 transforaminal epidural steroid injection,      Time-out was taken to identify the patient and the procedure side prior to starting the procedure.    CONSENT  Risks, benefits, and alternatives of procedure were discussed with the patient.  All questions were answered to the satisfaction of the patient.  Written consent was signed prior to the procedure.    PRE-PROCEDURE DIAGNOSIS  Lumbar radiculopathy    POST-PROCEDURE DIAGNOSIS  SAME    NAME OF OPERATION  Left L4 and L5 transforaminal steroid injection under fluoroscopy.  Left L4 and L5 epidurograms    PHYSICIAN  Dejan Simmons, DO    ASSISTANTS  None    LOCAL ANESTHETIC GIVEN  Xylocaine 1% 4 mL.    SEDATION MEDICATIONS  RN IV sedation 2mg IV versed, 25 mcg IV fentanyl    ESTIMATED BLOOD LOSS  None.    COMPLICATIONS  None.    TECHNIQUE  Procedure in detail: Risks and benefits were discussed and written informed consent was obtained.  The patient was placed in the prone position on the exam table.  Skin was cleaned with ChloraPrep.  Skin was then allowed to dry.  Area was draped with sterile towels.  The right L5-S1 neural foramen was identified with a 30 degree oblique view to the left.  Skin and tract was anesthetized using a 1.5 inch 27-gauge needle using approximately 1 cc of 1% lidocaine.  This needle was removed and replaced with a 5 inch 22-gauge spinal  needle which was advanced under intermittent fluoroscopy and alternating oblique, AP, and lateral views until the needle tip in the AP view was at the 6:00 of the L5 pedicle and in the middle half of the superior aspect of the L5-S1  neural foramen in the lateral view.  Next, in the AP view 1 cc of M300 Isovue was injected under live fluoroscopy showing excellent L5 epidurography.  There was no evidence of vascular spread.  Next, 2 cc of a solution of 1cc of preservative-free normal saline, 2 cc of preservative-free 1% lidocaine, and 2 cc of 4 mg/cc of dexamethasone was injected.  Next, left L4-5 neural foramen was identified with a 20 degree oblique view to the left.  Skin and tract was anesthetized using a 1.5 inch 27-gauge needle using approximately 1 cc of 1% lidocaine.  This needle was removed and replaced with a 5 inch 22-gauge spinal needle which was advanced under intermittent fluoroscopy and alternating oblique, AP, and lateral views until the needle tip in the AP view was at the 6:00 of the L4 pedicle and in the middle half of the superior aspect of the L4-5 neural foramen in the lateral view.  Next, in the AP view 1 cc of M300 Isovue was injected under live fluoroscopy showing excellent L4 epidurography.  There is no evidence of vascular spread.  .  Next, 2 cc of a solution of 1 cc of preservative-free normal saline, 2 cc of preservative-free 1% lidocaine, and 2 cc of 4 mg/cc of dexamethasone was injected.  Needle was removed, and Band-Aid was applied.  The patient tolerated the procedure well with no adverse events.  She noted significant pain reduction following the procedure.    EPIDUROGRAM  AP and lateral images were obtained.  The contrast material flowed distally along the root and ascended in the lateral and ventral epidural space to the supra-adjacent disc level.  From the second position the contrast material flowed distally along the nerve root and ascended in the lateral and ventral epidural space to the supra-adjacent disc level.  No venous, arterial or subarachnoid flow was observed at L4 and L5 on the left.    MONITORS  Prior to and during the procedure, the patient was monitored with pulse oximetry, EKG, and blood  pressure cuff.  The procedure was tolerated well.  Oxygenation, blood pressure, and pulse rate were maintained within normal limits during the procedure.  The patient was awake, alert, and able to respond to all questions appropriately throughout the entire procedure.      The patient was monitored after the procedure.  On exam, 30 minutes after the procedure, the patient was noted to be neurovascularly intact in BLE.  The patient was discharged home with family/responsible adult in stable condition.  The patient was given post procedure and discharge instructions to follow at home.  Diet on discharge is to be the same as prior to the procedure.  Activity on discharge is as per the instruction sheet given to the patient.  We will see the patient back in clinic in 2-3 weeks for follow-up.

## 2020-07-06 NOTE — INTERVAL H&P NOTE
The patient has been examined and the H&P has been reviewed:    I concur with the findings and no changes have occurred since H&P was written.     ASA 2, Mallampati 2  Patient is cleared for procedure at [facility]    Anesthesia/Surgery risks, benefits and alternative options discussed and understood by patient/family.          Active Hospital Problems    Diagnosis  POA    Lumbar radiculopathy [M54.16]  Yes      Resolved Hospital Problems   No resolved problems to display.

## 2020-07-06 NOTE — DISCHARGE INSTRUCTIONS
Before leaving, please make sure you have all your personal belongings such as glasses, purses, wallets, keys, cell phones, jewelry, jackets etc     Pain injection instructions:     This procedure may take a couple weeks to relieve pain  You may get some pain relief from the local anesthetic initally.    No driving for 24 hrs.   Activity as tolerated- gradually increase activities.  Dont lift over 10 lbs for 24 hrs   No heat at injection sites x 2 days. No heating pads, hot tubs, saunas, or swimming in any body of water or pool for 2 days.  Use ice pack for mild swelling and for comfort , apply for 20 minutes, remove for 20 minute intervals. No direct contact of ice itself  to skin.  May shower today. Do not allow shower water to beat on injection sites for 2 days. No tub baths for two days.      Resume Aspirin, Plavix, or Coumadin the day after the procedure unless otherwise instructed.   If diabetic,monitor your glucose carefully as steroids can increase your glucose level    Seek immediate medical help for:   Severe increase in your usual pain or appearance of new pain.  Prolonged (more than 8 hours) or increasing weakness or numbness in the legs or arms. Numbing medicine was injected and can affect the messages to and from the brain and legs or arms.  .    Fever above 100.4 degrees F ,Drainage,redness,active bleeding, or increased swelling at the injection site.  Headache, shortness of breath, chest pain, or breathing problems.    After Surgery:  Always be aware that any surgery can cause these symptoms:    Pain- Medication can be prescribed for pain to decrease your pain but may not completely take your pain away. Over the Counter pain medicine my be enough and you can always use Ice and rest to help ease pain.    Bleeding- a little bleeding after a surgery is usually within normal.  If there is a lot of blood you need to notify your MD.  Emergency treatments of bleeding are cold application, elevation of the  bleeding site and compression.    Infection- Infection after surgery is NOT a normal occurrence.  Signs of infection are fever, swelling, hot to touch the incision.  If this occurs notify your MD immediately.    Nausea- this can be common after a surgery especially if you have had anesthesia medicine or are taking pain medicine.  Steroids have a side effect of nausea sometimes. Staying on clear liquids, bland foods, gingerale, or over the counter anti nausea medicines can help.  If you vomit more than once, notify your MD.  Anti Nausea medicines can be prescribed.

## 2020-07-07 VITALS
OXYGEN SATURATION: 98 % | HEART RATE: 67 BPM | DIASTOLIC BLOOD PRESSURE: 62 MMHG | RESPIRATION RATE: 18 BRPM | BODY MASS INDEX: 41.57 KG/M2 | SYSTOLIC BLOOD PRESSURE: 130 MMHG | TEMPERATURE: 98 F | WEIGHT: 249.81 LBS

## 2020-07-08 ENCOUNTER — TELEPHONE (OUTPATIENT)
Dept: FAMILY MEDICINE | Facility: CLINIC | Age: 65
End: 2020-07-08

## 2020-07-19 ENCOUNTER — NURSE TRIAGE (OUTPATIENT)
Dept: ADMINISTRATIVE | Facility: CLINIC | Age: 65
End: 2020-07-19

## 2020-07-20 NOTE — TELEPHONE ENCOUNTER
COVID19 Post Procedure Tracker Outreach:  Attempted to contact patient on behalf of [facility] Post Procedural Symptom Tracker. No answer. No follow up call required.    Additional Information   Negative: Caller is angry or rude (e.g., hangs up, verbally abusive, yelling)   Negative: Caller hangs up   Negative: Caller has already spoken with the PCP and has no further questions.   Negative: Caller has already spoken with another triager and has no further questions.   Negative: Caller has already spoken with another triager or PCP AND has further questions AND triager able to answer questions.   Negative: Busy signal.  First attempt to contact caller.  Follow-up call scheduled within 15 minutes.   Negative: No answer.  First attempt to contact caller.  Follow-up call scheduled within 15 minutes.   Negative: Message left on identified voice mail   Negative: Message left on unidentified voice mail.  Phone number verified.   Negative: Message left with person in household.   Negative: Wrong number reached.  Phone number verified.   Negative: Second attempt to contact family AND no contact made.  Phone number verified.   Negative: Cell phone out of range.  Phone number verified.   Negative: Pager number given.  Answering service notified.   Negative: Patient already left for the hospital/clinic.   Negative: Caller has cancelled the call before the first contact   Negative: Unable to complete triage due to phone connection issues   Negative: NON-URGENT call redirected to PCP's office because it is open    Protocols used: NO CONTACT OR DUPLICATE CONTACT CALL-A-

## 2020-07-27 ENCOUNTER — OFFICE VISIT (OUTPATIENT)
Dept: ORTHOPEDICS | Facility: CLINIC | Age: 65
End: 2020-07-27
Payer: COMMERCIAL

## 2020-07-27 ENCOUNTER — OFFICE VISIT (OUTPATIENT)
Dept: PHYSICAL MEDICINE AND REHAB | Facility: CLINIC | Age: 65
End: 2020-07-27
Payer: COMMERCIAL

## 2020-07-27 VITALS
HEIGHT: 65 IN | BODY MASS INDEX: 43.32 KG/M2 | DIASTOLIC BLOOD PRESSURE: 67 MMHG | HEART RATE: 79 BPM | WEIGHT: 260 LBS | SYSTOLIC BLOOD PRESSURE: 132 MMHG

## 2020-07-27 VITALS — WEIGHT: 260 LBS | BODY MASS INDEX: 43.27 KG/M2 | SYSTOLIC BLOOD PRESSURE: 141 MMHG | DIASTOLIC BLOOD PRESSURE: 79 MMHG

## 2020-07-27 DIAGNOSIS — M48.062 SPINAL STENOSIS OF LUMBAR REGION WITH NEUROGENIC CLAUDICATION: ICD-10-CM

## 2020-07-27 DIAGNOSIS — M47.816 LUMBAR FACET ARTHROPATHY: ICD-10-CM

## 2020-07-27 DIAGNOSIS — M70.62 GREATER TROCHANTERIC BURSITIS OF LEFT HIP: ICD-10-CM

## 2020-07-27 DIAGNOSIS — M54.16 LEFT LUMBAR RADICULITIS: ICD-10-CM

## 2020-07-27 DIAGNOSIS — M51.36 DEGENERATIVE DISC DISEASE, LUMBAR: ICD-10-CM

## 2020-07-27 DIAGNOSIS — M65.252 CALCIFIC TENDINITIS OF LEFT HIP: ICD-10-CM

## 2020-07-27 DIAGNOSIS — M47.816 LUMBAR SPONDYLOSIS: Primary | ICD-10-CM

## 2020-07-27 DIAGNOSIS — Z01.818 PRE-OP TESTING: ICD-10-CM

## 2020-07-27 DIAGNOSIS — M67.952 TENDINOPATHY OF LEFT GLUTEUS MEDIUS: ICD-10-CM

## 2020-07-27 DIAGNOSIS — M43.16 SPONDYLOLISTHESIS OF LUMBAR REGION: Primary | ICD-10-CM

## 2020-07-27 PROCEDURE — 20611 DRAIN/INJ JOINT/BURSA W/US: CPT | Mod: LT,S$GLB,, | Performed by: PHYSICAL MEDICINE & REHABILITATION

## 2020-07-27 PROCEDURE — 3008F BODY MASS INDEX DOCD: CPT | Mod: CPTII,S$GLB,, | Performed by: PHYSICAL MEDICINE & REHABILITATION

## 2020-07-27 PROCEDURE — 3008F PR BODY MASS INDEX (BMI) DOCUMENTED: ICD-10-PCS | Mod: S$GLB,,, | Performed by: ORTHOPAEDIC SURGERY

## 2020-07-27 PROCEDURE — 99213 OFFICE O/P EST LOW 20 MIN: CPT | Mod: 25,S$GLB,, | Performed by: PHYSICAL MEDICINE & REHABILITATION

## 2020-07-27 PROCEDURE — 3008F BODY MASS INDEX DOCD: CPT | Mod: S$GLB,,, | Performed by: ORTHOPAEDIC SURGERY

## 2020-07-27 PROCEDURE — 99213 PR OFFICE/OUTPT VISIT, EST, LEVL III, 20-29 MIN: ICD-10-PCS | Mod: 25,S$GLB,, | Performed by: PHYSICAL MEDICINE & REHABILITATION

## 2020-07-27 PROCEDURE — 99213 OFFICE O/P EST LOW 20 MIN: CPT | Mod: S$GLB,,, | Performed by: ORTHOPAEDIC SURGERY

## 2020-07-27 PROCEDURE — 99999 PR PBB SHADOW E&M-EST. PATIENT-LVL III: CPT | Mod: PBBFAC,,, | Performed by: PHYSICAL MEDICINE & REHABILITATION

## 2020-07-27 PROCEDURE — 3008F PR BODY MASS INDEX (BMI) DOCUMENTED: ICD-10-PCS | Mod: CPTII,S$GLB,, | Performed by: PHYSICAL MEDICINE & REHABILITATION

## 2020-07-27 PROCEDURE — 20611 LARGE JOINT ASPIRATION/INJECTION: L GREATER TROCHANTERIC BURSA: ICD-10-PCS | Mod: LT,S$GLB,, | Performed by: PHYSICAL MEDICINE & REHABILITATION

## 2020-07-27 PROCEDURE — 99213 PR OFFICE/OUTPT VISIT, EST, LEVL III, 20-29 MIN: ICD-10-PCS | Mod: S$GLB,,, | Performed by: ORTHOPAEDIC SURGERY

## 2020-07-27 PROCEDURE — 99999 PR PBB SHADOW E&M-EST. PATIENT-LVL III: ICD-10-PCS | Mod: PBBFAC,,, | Performed by: PHYSICAL MEDICINE & REHABILITATION

## 2020-07-27 RX ORDER — METHYLPHENIDATE HYDROCHLORIDE 10 MG/1
TABLET ORAL
COMMUNITY
Start: 2020-07-16

## 2020-07-27 RX ORDER — METHYLPREDNISOLONE ACETATE 40 MG/ML
40 INJECTION, SUSPENSION INTRA-ARTICULAR; INTRALESIONAL; INTRAMUSCULAR; SOFT TISSUE
Status: DISCONTINUED | OUTPATIENT
Start: 2020-07-27 | End: 2020-07-27 | Stop reason: HOSPADM

## 2020-07-27 RX ADMIN — METHYLPREDNISOLONE ACETATE 40 MG: 40 INJECTION, SUSPENSION INTRA-ARTICULAR; INTRALESIONAL; INTRAMUSCULAR; SOFT TISSUE at 01:07

## 2020-07-27 NOTE — PROCEDURES
Large Joint Aspiration/Injection: L greater trochanteric bursa    Date/Time: 7/27/2020 1:40 PM  Performed by: Dejan Simmons MD  Authorized by: Dejan Simmons MD     Consent Done?:  Yes (Written)  Indications:  Diagnostic evaluation and pain  Timeout: prior to procedure the correct patient, procedure, and site was verified    Prep: patient was prepped and draped in usual sterile fashion      Local anesthesia used?: Yes    Anesthesia:  Local infiltration  Local anesthetic:  Lidocaine 1% without epinephrine  Anesthetic total (ml):  2      Details:  Needle Size:  22 G  Ultrasonic Guidance for needle placement?: Yes    Images are saved and documented.  Location:  Hip  Site:  L greater trochanteric bursa  Medications:  40 mg methylPREDNISolone acetate 40 mg/mL    Procedure: left greater trochanteric bursa injection with ultrasound guidance    Preprocedure diagnosis:  left gluteus medius calcific tendinosis    Postprocedure diagnosis:  Same    Anesthetic:  Local    Assistant:  None    Equipment used:  WellFX HS60 with a curvilinear transducer    Procedure in detail:  Risks and benefits were discussed and written informed consent was obtained.  Patient was placed in the right lateral decubitus position.  Using ultrasound, the anterior and lateral facets of the left greater trochanter were visualized at the insertion points of the gluteus minimus and medius tendons respectively.  Posteriorly, skin was cleaned with ChloraPrep and allowed to dry.  Skin and tract was anesthetized using a 27 gauge 1.5 in needle using approximately 2 cc of 1% lidocaine.  This needle was removed and was replaced with a  22 gauge 3.5 in needle which was advanced under direct ultrasound visualization into the greater trochanteric bursa superficial to the gluteus medius tendon do where a 4 cc solution of 3 cc of 0.50% ropivacaine and 1 cc of 40 milligrams/milliliter of Depomedrol was injected under direct ultrasound visualization into the bursa.   Needle was removed and Band-Aid was applied.  The patient tolerated the procedure well with no adverse events.

## 2020-07-27 NOTE — PATIENT INSTRUCTIONS
[facility]  [facility]      STEP 1: Diagnostic Medial Branch Blocks (Facet Nerve Blocks)    What are medial branch nerves? Why are medial branch blocks helpful?     Medial branch nerves are very small nerve branches that communicate pain caused by the facet joints in the spine. These nerves do not control any muscles or sensation in the arms or legs. They are located along a bony groove in the spine.     If this procedure has been scheduled, there is strong evidence to suspect that the facet joints are the source of your pain. Benefit may be obtained from having these medial branch nerves temporarily blocked with an anesthetic to see if a more permanent way of blocking these nerves would provide pain relief for a longer term. Blocking these medial branch nerves temporarily stops the transmission of pain signals from the facet joints to the brain. If you receive temporary relief of a portion of your pain after these injections you will likely benefit from radiofrequency ablation of the same nerves. Radiofrequency ablation provides the same amount of relief as the diagnostic blocks, but lasts approximately 6-12 months.     What happens during medial branch blocks?    An IV will be started, so that sedation can be given. You will be placed on the x-ray table and positioned in such a way that the physician can best visualize the bony areas where the medial branch nerves pass. The skin is cleansed using sterile scrub (soap). Next the physician numbs a small area of skin with numbing medicine. The medicine stings for several seconds. Using x-ray guidance, your physician directs a small needle, near the specific nerve or nerves being tested. A small mixture of numbing medicine (anesthetic) and anti-inflammatory (cortisone/steroid) is then injected.    What happens after the procedure?  Immediately after the procedure, you will go back to the recovery area where you will be monitored for  30-60 minutes. You will be asked to report the immediate percentage of pain relief and record the relief experienced during that day on a post injection evaluation sheet (pain diary). You must call and set up a follow up appointment for two weeks after the procedure to discuss the results from this block and determine if you will proceed to step 2 of the treatment of your facet nerves.               [facility]  [facility]    STEP 2: Radiofrequency Ablation Medial Branches/Facet Nerves    What is radiofrequency (RF) ablation of the facet nerves? Why is it helpful?    Radiofrequency ablation is accomplished by sending a radiofrequency signal to the medial branch that provides sensation to the facet joint. Radiofrequency signal interrupts the pain signal going from the facet nerve to the brain.  The relief from the radiofrequency ablation should mimic that of the diagnostic medial branch blocks, but last approximately 6-12 months. The purpose of RF ablation of the facet nerves is to decrease pain from the facet joint and improve function. This is done only if pain is temporarily relieved by prior diagnostic facet nerve (medial branch nerve) blocks.     How is it done?   An IV will be started so that short acting sedation can be given during placement of needles near the facet nerves using x-ray guidance. The number of needles that will be placed depends on the number of levels that are being treated. After the needles are placed, you will then be awakened and a controlled radiofrequency signal will be sent to a facet nerve. The needles proper location in proximity of the nerve is confirmed by your identification of a change in sensation as a tightness, squeezing, pain, pressure, tingling, burning, etc. By successfully identifying this change in sensation the doctor confirms that the needle is close enough to the nerve so the best outcome of this procedure can be obtained. Your ability to  identify the change in sensation determines the quality of the block. A radiofrequency signal is then used to decrease the sensation of the facet joints to improve your pain. The procedure will take approximately 40-60 minutes. You will be monitored for an additional hour after the procedure. All measures will be taken to ensure your comfort and safety. After you return home, you may use ice packs to relieve any discomfort. You will not be able to drive the day of your procedure because you received sedation. You must call and set up a follow up appointment for two weeks after the procedure.     General Pre/Post Instructions:    You should not eat or drink anything after 12 oclock the night before the procedure. If you are diabetic, do not take your insulin or oral medication the morning of the procedure because you have had nothing to eat.     If you are taking routine heart or blood pressure medicine, you should take it with a sip of water the morning of the procedure.  You should not take medications that may give you pain relief or lessen you usual pain. These medicines can be restarted after the procedure if they are needed.     If you are on Coumadin, Heparin, Plavix or other blood thinners including aspirin and all medications that contain aspirin, you must notify the office well in advance so the timing of these medications can be coordinated with your primary care physician.     You will be at the hospital for a few hours for your procedure. A  must accompany you and be responsible for getting you home. No driving is allowed the day of the procedure. You may return to your normal activities the day after the procedure, including returning to work.     If you are unable to keep this appointment, please give notice as soon as possible and at least 24 hours in advance during regular office hours.    Thank you.

## 2020-07-27 NOTE — LETTER
July 27, 2020      Everton Godinez MD  [address] [facility]  [address]  [place] [address]           [place] [facility]  [address] [place] [address] [address]  [place] [address]  Phone: [phone]  Fax: [phone]          Patient: Edie Hernandez   MR Number: [id number]   YOB: 1955   Date of Visit: 7/27/2020       Dear Dr. Everton Godinez:    Thank you for referring Edie Hernandez to me for evaluation. Attached you will find relevant portions of my assessment and plan of care.    If you have questions, please do not hesitate to call me. I look forward to following Edie Hernandez along with you.    Sincerely,    Dejan Simmons MD    Enclosure  CC:  No Recipients    If you would like to receive this communication electronically, please contact [email or web address] or [phone] to request more information on [employer] access.    For providers and/or their staff who would like to refer a patient to [facility], please contact us through our one-stop-shop provider referral line, [facility], at [phone].    If you feel you have received this communication in error or would no longer like to receive these types of communications, please e-mail [email or web address]

## 2020-07-27 NOTE — H&P (VIEW-ONLY)
Chief Complaint   Patient presents with    Follow-up     discuss treatment         HPI: Edie Hernandez is a  65 y.o. female who presents today for followup. she was last seen approximately 3 weeks ago which time reformed a repeat left L3 and L4 transforaminal epidural steroid injection.  This has helped with her left lower leg pain.  She continues to complain of pain in the left upper buttock and lateral hip radiating down the lateral thigh stopping at about the knee.  Pain is worse with prolonged standing and walking as well as lying on her side at night.  She describes her pain as a dull achy pain.  She denies any numbness or tingling in the lower extremity or feet.  She denies any bowel or bladder dysfunction or saddle anesthesia.  Pain on average is a 7/10 i and limits her activities of ghazal living and functional mobility.      Review of Systems   Constitutional: Negative for chills and fever.   HENT: Negative for congestion and tinnitus.    Eyes: Negative for blurred vision and photophobia.   Respiratory: Negative for shortness of breath and wheezing.    Cardiovascular: Negative for chest pain and palpitations.   Gastrointestinal: Negative for nausea and vomiting.   Genitourinary: Negative for dysuria and frequency.   Musculoskeletal: Positive for joint pain and myalgias.   Skin: Negative for itching and rash.   Neurological: Negative for tingling, sensory change and speech change.   Endo/Heme/Allergies: Negative for environmental allergies. Does not bruise/bleed easily.   Psychiatric/Behavioral: Negative for depression. The patient is not nervous/anxious.             Past Medical History:   Diagnosis Date    Arthritis     Colon polyp, hyperplastic 1/13    recheck 5-10 years    Diabetes mellitus, type 2     Diverticulosis     Fatty liver     General anesthetics causing adverse effect in therapeutic use     woke up during tubal ligation    GERD (gastroesophageal reflux disease)     CLINT (obstructive  sleep apnea)     C-pap    PONV (postoperative nausea and vomiting)     Thyroid disease           Current Outpatient Medications on File Prior to Visit   Medication Sig Dispense Refill    albuterol 90 mcg/actuation inhaler Inhale 2 puffs into the lungs every 6 (six) hours as needed for Wheezing. (Patient taking differently: Inhale 2 puffs into the lungs every 6 (six) hours as needed for Wheezing. ) 18 g 3    blood sugar diagnostic Strp 1 each by Misc.(Non-Drug; Combo Route) route 4 (four) times daily. Patient has One touch Ultra II meter 100 strip 3    blood-glucose meter kit Use as instructed 1 each 0    cetirizine (ALL DAY ALLERGY, CETIRIZINE,) 10 MG tablet Take 1 tablet (10 mg total) by mouth once daily. (Patient taking differently: Take 10 mg by mouth daily as needed. ) 30 tablet 3    clobetasol (TEMOVATE) 0.05 % cream Apply 1 application topically 2 (two) times daily. Apply to affected area 30 g 2    cyclobenzaprine (FLEXERIL) 5 MG tablet Take 1 tablet (5 mg total) by mouth 3 (three) times daily as needed for Muscle spasms. One to two tablets qhs prn pain 90 tablet prn    diclofenac sodium (VOLTAREN) 1 % Gel Apply 2 g topically 4 (four) times daily.      ergocalciferol (ERGOCALCIFEROL) 50,000 unit Cap Take 1 capsule (50,000 Units total) by mouth every 7 days. 12 capsule 1    fluticasone (FLONASE) 50 mcg/actuation nasal spray 1 spray (50 mcg total) by Each Nare route daily as needed. 1 Bottle 11    furosemide (LASIX) 40 MG tablet Take 1 tablet (40 mg total) by mouth daily as needed. Every day PRN 30 tablet 4    gabapentin (NEURONTIN) 300 MG capsule Take 1 capsule (300 mg total) by mouth 2 (two) times daily. 180 capsule 3    HYDROcodone-acetaminophen (NORCO) 7.5-325 mg per tablet Take 1 tablet by mouth every 6 (six) hours as needed for Pain. 90 tablet 0    indomethacin (INDOCIN) 25 MG capsule TAKE ONE CAPSULE BY MOUTH 3 TIMES A DAY WITH MEALS 90 capsule 0    lancets Misc 1 each by Misc.(Non-Drug;  Combo Route) route 4 (four) times daily. Patient has a one touch ultra II meter 100 each 3    levothyroxine (SYNTHROID) 150 MCG tablet Take 1 tablet (150 mcg total) by mouth once daily. 90 tablet 3    metFORMIN (GLUCOPHAGE-XR) 500 MG XR 24hr tablet Take 2 tablets (1,000 mg total) by mouth 2 (two) times daily with meals. 360 tablet 3    methylphenidate (RITALIN LA) 40 MG 24 hr capsule Take 40 mg by mouth daily as needed.       methylphenidate HCl (RITALIN) 10 MG tablet       oxybutynin (DITROPAN XL) 15 MG TR24 Take 1 tablet (15 mg total) by mouth once daily. 90 tablet 3    pantoprazole (PROTONIX) 40 MG tablet Take 1 tablet (40 mg total) by mouth once daily. 90 tablet 3    [DISCONTINUED] HYDROcodone-acetaminophen (NORCO) 7.5-325 mg per tablet Take 1 tablet by mouth every 24 hours as needed for Pain. (Patient not taking: Reported on 7/27/2020) 30 tablet 0    [DISCONTINUED] HYDROcodone-acetaminophen (NORCO) 7.5-325 mg per tablet Take 1 tablet by mouth every 24 hours as needed for Pain. (Patient not taking: Reported on 7/27/2020) 30 tablet 0     Current Facility-Administered Medications on File Prior to Visit   Medication Dose Route Frequency Provider Last Rate Last Dose    lactated ringers infusion   Intravenous Continuous Dejan Simmons MD   Stopped at 07/06/20 1008              Physical Exam:  Vitals:    07/27/20 1341   BP: 132/67   Pulse: 79     Physical Exam  Constitutional:       Appearance: Normal appearance.   HENT:      Head: Normocephalic and atraumatic.      Nose: Nose normal. No congestion.      Mouth/Throat:      Mouth: Mucous membranes are moist.      Pharynx: Oropharynx is clear.   Eyes:      Extraocular Movements: Extraocular movements intact.      Pupils: Pupils are equal, round, and reactive to light.   Abdominal:      General: Abdomen is flat. There is no distension.   Musculoskeletal:      Lumbar back: She exhibits decreased range of motion, tenderness and pain.        Back:    Skin:      General: Skin is warm and dry.      Nails: There is no clubbing.     Neurological:      General: No focal deficit present.      Mental Status: She is alert and oriented to person, place, and time.      Cranial Nerves: Cranial nerves are intact.      Sensory: Sensation is intact.      Motor: Motor function is intact.      Gait: Gait is intact.   Psychiatric:         Mood and Affect: Mood and affect normal.       Left Hip Exam     Tenderness   The patient is experiencing tenderness in the greater trochanter.    Tests   SAMUEL: positive  Anson: positive                  Assessment:  Lumbar spondylosis    Spinal stenosis of lumbar region with neurogenic claudication  -     Ambulatory referral/consult to Physical Medicine Rehab    Degenerative disc disease, lumbar  -     Ambulatory referral/consult to Physical Medicine Rehab    Pre-op testing  -     COVID-19 Routine Screening; Future; Expected date: 07/27/2020    Greater trochanteric bursitis of left hip  -     Large Joint Aspiration/Injection: L greater trochanteric bursa    Calcific tendinitis of left hip  -     Large Joint Aspiration/Injection: L greater trochanteric bursa    Tendinopathy of left gluteus medius  -     Large Joint Aspiration/Injection: L greater trochanteric bursa               PLAN:  Edie Hernandez is a 65 y.o. female with chronic left-sided low back pain and referred pain down the left lower extremity.  She is status post left L3 and L4 transforaminal epidural steroid injections x3 with improvement in left lower leg pain.  She continues to complain of left lower back pain.  The pain can refer into the lateral hip and down the lateral thigh stopping at about the knee.  On diagnostic ultrasound examination of the left hip, there is irregularities at the insertion of the gluteus medius tendon on the lateral facet of the greater trochanter as well as a hyperechoic area within the tendon consistent with a calcific deposit.  I suspect that her left lateral  hip pain and leg pain may be secondary to gluteus medius calcific tendinopathy.  I will perform a left greater trochanteric bursa injection today with ultrasound guidance.  Should this provide her with significant relief in pain and function, this is likely the source of her pain.  I will also have her scheduled for left L2, L3, L4, L5 medial branch blocks to anesthetize the left L3-4, L4-5, L5-S1 facet joints.                      Dejan Simmons D.O.  Physical Medicine and Rehabilitation

## 2020-07-27 NOTE — PROGRESS NOTES
Chief Complaint   Patient presents with    Follow-up     discuss treatment         HPI: Edie Hernandez is a  65 y.o. female who presents today for followup. she was last seen approximately 3 weeks ago which time reformed a repeat left L3 and L4 transforaminal epidural steroid injection.  This has helped with her left lower leg pain.  She continues to complain of pain in the left upper buttock and lateral hip radiating down the lateral thigh stopping at about the knee.  Pain is worse with prolonged standing and walking as well as lying on her side at night.  She describes her pain as a dull achy pain.  She denies any numbness or tingling in the lower extremity or feet.  She denies any bowel or bladder dysfunction or saddle anesthesia.  Pain on average is a 7/10 i and limits her activities of ghazal living and functional mobility.      Review of Systems   Constitutional: Negative for chills and fever.   HENT: Negative for congestion and tinnitus.    Eyes: Negative for blurred vision and photophobia.   Respiratory: Negative for shortness of breath and wheezing.    Cardiovascular: Negative for chest pain and palpitations.   Gastrointestinal: Negative for nausea and vomiting.   Genitourinary: Negative for dysuria and frequency.   Musculoskeletal: Positive for joint pain and myalgias.   Skin: Negative for itching and rash.   Neurological: Negative for tingling, sensory change and speech change.   Endo/Heme/Allergies: Negative for environmental allergies. Does not bruise/bleed easily.   Psychiatric/Behavioral: Negative for depression. The patient is not nervous/anxious.             Past Medical History:   Diagnosis Date    Arthritis     Colon polyp, hyperplastic 1/13    recheck 5-10 years    Diabetes mellitus, type 2     Diverticulosis     Fatty liver     General anesthetics causing adverse effect in therapeutic use     woke up during tubal ligation    GERD (gastroesophageal reflux disease)     CLINT (obstructive  sleep apnea)     C-pap    PONV (postoperative nausea and vomiting)     Thyroid disease           Current Outpatient Medications on File Prior to Visit   Medication Sig Dispense Refill    albuterol 90 mcg/actuation inhaler Inhale 2 puffs into the lungs every 6 (six) hours as needed for Wheezing. (Patient taking differently: Inhale 2 puffs into the lungs every 6 (six) hours as needed for Wheezing. ) 18 g 3    blood sugar diagnostic Strp 1 each by Misc.(Non-Drug; Combo Route) route 4 (four) times daily. Patient has One touch Ultra II meter 100 strip 3    blood-glucose meter kit Use as instructed 1 each 0    cetirizine (ALL DAY ALLERGY, CETIRIZINE,) 10 MG tablet Take 1 tablet (10 mg total) by mouth once daily. (Patient taking differently: Take 10 mg by mouth daily as needed. ) 30 tablet 3    clobetasol (TEMOVATE) 0.05 % cream Apply 1 application topically 2 (two) times daily. Apply to affected area 30 g 2    cyclobenzaprine (FLEXERIL) 5 MG tablet Take 1 tablet (5 mg total) by mouth 3 (three) times daily as needed for Muscle spasms. One to two tablets qhs prn pain 90 tablet prn    diclofenac sodium (VOLTAREN) 1 % Gel Apply 2 g topically 4 (four) times daily.      ergocalciferol (ERGOCALCIFEROL) 50,000 unit Cap Take 1 capsule (50,000 Units total) by mouth every 7 days. 12 capsule 1    fluticasone (FLONASE) 50 mcg/actuation nasal spray 1 spray (50 mcg total) by Each Nare route daily as needed. 1 Bottle 11    furosemide (LASIX) 40 MG tablet Take 1 tablet (40 mg total) by mouth daily as needed. Every day PRN 30 tablet 4    gabapentin (NEURONTIN) 300 MG capsule Take 1 capsule (300 mg total) by mouth 2 (two) times daily. 180 capsule 3    HYDROcodone-acetaminophen (NORCO) 7.5-325 mg per tablet Take 1 tablet by mouth every 6 (six) hours as needed for Pain. 90 tablet 0    indomethacin (INDOCIN) 25 MG capsule TAKE ONE CAPSULE BY MOUTH 3 TIMES A DAY WITH MEALS 90 capsule 0    lancets Misc 1 each by Misc.(Non-Drug;  Combo Route) route 4 (four) times daily. Patient has a one touch ultra II meter 100 each 3    levothyroxine (SYNTHROID) 150 MCG tablet Take 1 tablet (150 mcg total) by mouth once daily. 90 tablet 3    metFORMIN (GLUCOPHAGE-XR) 500 MG XR 24hr tablet Take 2 tablets (1,000 mg total) by mouth 2 (two) times daily with meals. 360 tablet 3    methylphenidate (RITALIN [place]) 40 MG 24 hr capsule Take 40 mg by mouth daily as needed.       methylphenidate HCl (RITALIN) 10 MG tablet       oxybutynin (DITROPAN XL) 15 MG TR24 Take 1 tablet (15 mg total) by mouth once daily. 90 tablet 3    pantoprazole (PROTONIX) 40 MG tablet Take 1 tablet (40 mg total) by mouth once daily. 90 tablet 3    [DISCONTINUED] HYDROcodone-acetaminophen (NORCO) 7.5-325 mg per tablet Take 1 tablet by mouth every 24 hours as needed for Pain. (Patient not taking: Reported on 7/27/2020) 30 tablet 0    [DISCONTINUED] HYDROcodone-acetaminophen (NORCO) 7.5-325 mg per tablet Take 1 tablet by mouth every 24 hours as needed for Pain. (Patient not taking: Reported on 7/27/2020) 30 tablet 0     Current Facility-Administered Medications on File Prior to Visit   Medication Dose Route Frequency Provider Last Rate Last Dose    lactated ringers infusion   Intravenous Continuous Dejan Simmons MD   Stopped at 07/06/20 1008              Physical Exam:  Vitals:    07/27/20 1341   BP: 132/67   Pulse: 79     Physical Exam  Constitutional:       Appearance: Normal appearance.   HENT:      Head: Normocephalic and atraumatic.      Nose: Nose normal. No congestion.      Mouth/Throat:      Mouth: Mucous membranes are moist.      Pharynx: Oropharynx is clear.   Eyes:      Extraocular Movements: Extraocular movements intact.      Pupils: Pupils are equal, round, and reactive to light.   Abdominal:      General: Abdomen is flat. There is no distension.   Musculoskeletal:      Lumbar back: She exhibits decreased range of motion, tenderness and pain.        Back:    Skin:      General: Skin is warm and dry.      Nails: There is no clubbing.     Neurological:      General: No focal deficit present.      Mental Status: She is alert and oriented to person, place, and time.      Cranial Nerves: Cranial nerves are intact.      Sensory: Sensation is intact.      Motor: Motor function is intact.      Gait: Gait is intact.   Psychiatric:         Mood and Affect: Mood and affect normal.       Left Hip Exam     Tenderness   The patient is experiencing tenderness in the greater trochanter.    Tests   SAMUEL: positive  [place]: positive                  Assessment:  Lumbar spondylosis    Spinal stenosis of lumbar region with neurogenic claudication  -     Ambulatory referral/consult to Physical Medicine Rehab    Degenerative disc disease, lumbar  -     Ambulatory referral/consult to Physical Medicine Rehab    Pre-op testing  -     COVID-19 Routine Screening; Future; Expected date: 07/27/2020    Greater trochanteric bursitis of left hip  -     Large Joint Aspiration/Injection: L greater trochanteric bursa    Calcific tendinitis of left hip  -     Large Joint Aspiration/Injection: L greater trochanteric bursa    Tendinopathy of left gluteus medius  -     Large Joint Aspiration/Injection: L greater trochanteric bursa               PLAN:  Edie Hernandez is a 65 y.o. female with chronic left-sided low back pain and referred pain down the left lower extremity.  She is status post left L3 and L4 transforaminal epidural steroid injections x3 with improvement in left lower leg pain.  She continues to complain of left lower back pain.  The pain can refer into the lateral hip and down the lateral thigh stopping at about the knee.  On diagnostic ultrasound examination of the left hip, there is irregularities at the insertion of the gluteus medius tendon on the lateral facet of the greater trochanter as well as a hyperechoic area within the tendon consistent with a calcific deposit.  I suspect that her left lateral  hip pain and leg pain may be secondary to gluteus medius calcific tendinopathy.  I will perform a left greater trochanteric bursa injection today with ultrasound guidance.  Should this provide her with significant relief in pain and function, this is likely the source of her pain.  I will also have her scheduled for left L2, L3, L4, L5 medial branch blocks to anesthetize the left L3-4, L4-5, L5-S1 facet joints.                      Dejan Simmons D.O.  Physical Medicine and Rehabilitation

## 2020-07-27 NOTE — PROGRESS NOTES
Subjective:       Patient ID: Edie Hernandez is a 65 y.o. female.    Chief Complaint: Pain of the Lumbar Spine (Lumbar is a little better. She did see Dr Simmons for injections and the first two did not work but the 3rd helped some but she still can not walk well and has a lot of pain and still has buttock pain and the leg pain is coming back)      History of Present Illness  Patient was last seen by us in February 2020.  Chief complaint at that time was chronic low back pain worsening over time with significant left lower extremity radiculitis dysesthesia.  At that time we reviewed her MRI and noted  Grade 1 spondylolisthesis L3 on L4 and L4-L5 with associated spinal and foraminal stenosis at the L3-4 and L4-5 levels.  She was referred for pain management injections on the left; specifically transforaminal epidural steroid injections.  She has had 3 injections which have helped only minimally.  She was referred back to us by Dr. Simmons due to her significant pain and the disability that causes.    Patient states that her pain has severely decreased her quality of life and limits her mobility.  She is ready to proceed with surgical options if recommended  She does admit that the epidural steroid injections totally 3 have reduced her radiculopathy in the left leg.  She has no right leg complaints  Current Medications  Current Outpatient Medications   Medication Sig Dispense Refill    blood sugar diagnostic Strp 1 each by Misc.(Non-Drug; Combo Route) route 4 (four) times daily. Patient has One touch Ultra II meter 100 strip 3    blood-glucose meter kit Use as instructed 1 each 0    cetirizine (ALL DAY ALLERGY, CETIRIZINE,) 10 MG tablet Take 1 tablet (10 mg total) by mouth once daily. (Patient taking differently: Take 10 mg by mouth daily as needed. ) 30 tablet 3    clobetasol (TEMOVATE) 0.05 % cream Apply 1 application topically 2 (two) times daily. Apply to affected area 30 g 2    cyclobenzaprine (FLEXERIL) 5 MG  tablet Take 1 tablet (5 mg total) by mouth 3 (three) times daily as needed for Muscle spasms. One to two tablets qhs prn pain 90 tablet prn    diclofenac sodium (VOLTAREN) 1 % Gel Apply 2 g topically 4 (four) times daily.      ergocalciferol (ERGOCALCIFEROL) 50,000 unit Cap Take 1 capsule (50,000 Units total) by mouth every 7 days. 12 capsule 1    fluticasone (FLONASE) 50 mcg/actuation nasal spray 1 spray (50 mcg total) by Each Nare route daily as needed. 1 Bottle 11    furosemide (LASIX) 40 MG tablet Take 1 tablet (40 mg total) by mouth daily as needed. Every day PRN 30 tablet 4    gabapentin (NEURONTIN) 300 MG capsule Take 1 capsule (300 mg total) by mouth 2 (two) times daily. 180 capsule 3    HYDROcodone-acetaminophen (NORCO) 7.5-325 mg per tablet Take 1 tablet by mouth every 6 (six) hours as needed for Pain. 90 tablet 0    indomethacin (INDOCIN) 25 MG capsule TAKE ONE CAPSULE BY MOUTH 3 TIMES A DAY WITH MEALS 90 capsule 0    lancets Misc 1 each by Misc.(Non-Drug; Combo Route) route 4 (four) times daily. Patient has a one touch ultra II meter 100 each 3    levothyroxine (SYNTHROID) 150 MCG tablet Take 1 tablet (150 mcg total) by mouth once daily. 90 tablet 3    metFORMIN (GLUCOPHAGE-XR) 500 MG XR 24hr tablet Take 2 tablets (1,000 mg total) by mouth 2 (two) times daily with meals. 360 tablet 3    methylphenidate (RITALIN LA) 40 MG 24 hr capsule Take 40 mg by mouth daily as needed.       methylphenidate HCl (RITALIN) 10 MG tablet       oxybutynin (DITROPAN XL) 15 MG TR24 Take 1 tablet (15 mg total) by mouth once daily. 90 tablet 3    pantoprazole (PROTONIX) 40 MG tablet Take 1 tablet (40 mg total) by mouth once daily. 90 tablet 3    albuterol 90 mcg/actuation inhaler Inhale 2 puffs into the lungs every 6 (six) hours as needed for Wheezing. (Patient taking differently: Inhale 2 puffs into the lungs every 6 (six) hours as needed for Wheezing. ) 18 g 3     No current facility-administered medications  for this visit.      Facility-Administered Medications Ordered in Other Visits   Medication Dose Route Frequency Provider Last Rate Last Dose    lactated ringers infusion   Intravenous Continuous Dejan Simmons MD   Stopped at 07/06/20 1008       Allergies  Review of patient's allergies indicates:   Allergen Reactions    Codeine      Other reaction(s): Nausea       Past Medical History  Past Medical History:   Diagnosis Date    Arthritis     Colon polyp, hyperplastic 1/13    recheck 5-10 years    Diabetes mellitus, type 2     Diverticulosis     Fatty liver     General anesthetics causing adverse effect in therapeutic use     woke up during tubal ligation    GERD (gastroesophageal reflux disease)     CLINT (obstructive sleep apnea)     C-pap    PONV (postoperative nausea and vomiting)     Thyroid disease        Surgical History  Past Surgical History:   Procedure Laterality Date    COLONOSCOPY  02/14/2013    Dr. Vogel: repeat in 5-10 years    COLONOSCOPY W/ BIOPSIES AND POLYPECTOMY  02/2013    hyperplastic, recheck 5-10 years    EXTERNAL EAR SURGERY      HYSTERECTOMY      THROAT SURGERY      for CLINT    TRANSFORAMINAL EPIDURAL INJECTION OF STEROID Left 3/6/2020    Procedure: Injection,steroid,epidural,transforaminal approach;  Surgeon: Harmeet Zapata MD;  Location: [facility] OR;  Service: Pain Management;  Laterality: Left;  L4-L5    TRANSFORAMINAL EPIDURAL INJECTION OF STEROID Left 6/10/2020    Procedure: Injection,steroid,epidural,transforaminal approach.L4 and L5;  Surgeon: Dejan Simmons MD;  Location: [facility] OR;  Service: Pain Management;  Laterality: Left;    TRANSFORAMINAL EPIDURAL INJECTION OF STEROID Left 7/6/2020    Procedure: Injection,steroid,epidural,transforaminal approach. left L4 and L5;  Surgeon: Dejan Simmons MD;  Location: [facility] OR;  Service: Pain Management;  Laterality: Left;    UPPER GASTROINTESTINAL ENDOSCOPY         Family History:   Family History   Problem Relation Age of Onset     Hypertension Mother     Breast cancer Mother 50    Heart disease Father     Hypertension Father     Ovarian cancer Paternal Aunt     Macular degeneration Neg Hx     Retinal detachment Neg Hx     Glaucoma Neg Hx     Colon cancer Neg Hx     Colon polyps Neg Hx     Crohn's disease Neg Hx     Ulcerative colitis Neg Hx        Social History:   Social History     Socioeconomic History    Marital status:      Spouse name: Not on file    Number of children: Not on file    Years of education: Not on file    Highest education level: Not on file   Occupational History     Employer:  [employer]   Social Needs    Financial resource strain: Not on file    Food insecurity     Worry: Not on file     Inability: Not on file    Transportation needs     Medical: Not on file     Non-medical: Not on file   Tobacco Use    Smoking status: Never Smoker    Smokeless tobacco: Never Used   Substance and Sexual Activity    Alcohol use: No    Drug use: No    Sexual activity: Yes     Partners: Male   Lifestyle    Physical activity     Days per week: Not on file     Minutes per session: Not on file    Stress: Not on file   Relationships    Social connections     Talks on phone: Not on file     Gets together: Not on file     Attends Protestant service: Not on file     Active member of club or organization: Not on file     Attends meetings of clubs or organizations: Not on file     Relationship status: Not on file   Other Topics Concern    Not on file   Social History Narrative    Not on file       Hospitalization/Major Diagnostic Procedure:     Review of Systems     General/Constitutional:  Chills denies. Fatigue denies. Fever denies. Weight gain denies. Weight loss denies.    Respiratory:  Shortness of breath denies.    Cardiovascular:  Chest pain denies.    Gastrointestinal:  Constipation denies. Diarrhea denies. Nausea denies. Vomiting denies.     Hematology:  Easy bruising denies. Prolonged  bleeding denies.     Genitourinary:  Frequent urination denies. Pain in lower back denies. Painful urination denies.     Musculoskeletal:  See HPI for details    Skin:  Rash denies.    Neurologic:  Dizziness denies. Gait abnormalities denies. Seizures denies. Tingling/Numbess denies.    Psychiatric:  Anxiety denies. Depressed mood denies.     Objective:   Vital Signs:   Vitals:    07/27/20 0954   BP: (!) 141/79        Physical Exam      General Examination:     Constitutional: The patient is alert and oriented to lace person and time. Mood is pleasant.     Head/Face: Normal facial features normal eyebrows    Eyes: Normal extraocular motion bilaterally    Lungs: Respirations are equal and unlabored    Gait is coordinated.    Cardiovascular: There are no swelling or varicosities present.    Lymphatic: Negative for adenopathy    Skin: Normal    Neurological: Level of consciousness normal. Oriented to place person and time and situation    Psychiatric: Oriented to time place person and situation    Patient is morbidly obese.  Mild antalgic and guarded gait.  Lumbar range of motion within normal limits.  Tenderness palpation midline lumbar lumbosacral and over the left iliac crest.  Bilateral lower extremities are distal neurovascular intact except for some mild right EHL and anterior tibialis weakness 4/5.  Negative straight leg raise maneuver bilateral.  XRAY Report/ Interpretation:  No new studies today but previous MRI and x-rays were reviewed with the patient in the office today she has a degenerative spondylolisthesis at L3 and L4 with foraminal stenosis he has disc degeneration and facet joint hypertrophy      Assessment:       1. Spondylolisthesis of lumbar region    2. Spinal stenosis of lumbar region with neurogenic claudication    3. Degenerative disc disease, lumbar    4. Left lumbar radiculitis    5. Lumbar facet arthropathy        Plan:       Edie was seen today for pain.    Diagnoses and all orders for  this visit:    Spondylolisthesis of lumbar region  Comments:  L3-4, L4-5    Spinal stenosis of lumbar region with neurogenic claudication  -     Ambulatory referral/consult to Physical Medicine Rehab; Future    Degenerative disc disease, lumbar  -     Ambulatory referral/consult to Physical Medicine Rehab; Future    Left lumbar radiculitis    Lumbar facet arthropathy         Follow up in about 6 weeks (around 9/7/2020) for Lumbar MBB.    Treatment options were discussed regards to the nature of the spinal condition conservative pain interventional and surgical options were discussed in detail and the probability of success of the separate treatment options was discussed in detail the patient expressed a clear understanding of the treatment options would regards to surgery the procedure risks benefits complications and outcomes were discussed.  No guarantees were given regards to surgical outcome.  Is seems that this patient would like to try to get rid of some of her left-sided back pain referred pain to her left leg she is interested in surgery but is willing to explore options of medial branch blocks on the left side but clearly understands that this may help lessen some of her complaints but not likely resolve all of her axial back pain.    She is ready to have surgery but I think we should refer her back for a trial of left-sided medial branch blocks and if unsuccessful she and I will know we have tried all reasonable conservative measures before considering surgery which would consist of a lateral interbody fusion at L3-4 and L4-5 with posterior laminectomies of L3 and L4 decompressions on the left side and posterior instrumentation   we referred her back to Dr. Simmons for left L3-4 L4-5 and L5-1 medial branch and possible rhizotomy    This note was created using Dragon voice recognition software that occasionally misinterpreted phrases or words.

## 2020-07-31 ENCOUNTER — LAB VISIT (OUTPATIENT)
Dept: PRIMARY CARE CLINIC | Facility: CLINIC | Age: 65
End: 2020-07-31
Payer: COMMERCIAL

## 2020-07-31 DIAGNOSIS — Z01.818 PRE-OP TESTING: ICD-10-CM

## 2020-07-31 PROCEDURE — U0003 INFECTIOUS AGENT DETECTION BY NUCLEIC ACID (DNA OR RNA); SEVERE ACUTE RESPIRATORY SYNDROME CORONAVIRUS 2 (SARS-COV-2) (CORONAVIRUS DISEASE [COVID-19]), AMPLIFIED PROBE TECHNIQUE, MAKING USE OF HIGH THROUGHPUT TECHNOLOGIES AS DESCRIBED BY CMS-2020-01-R: HCPCS

## 2020-08-01 LAB — SARS-COV-2 RNA RESP QL NAA+PROBE: NOT DETECTED

## 2020-08-03 ENCOUNTER — HOSPITAL ENCOUNTER (OUTPATIENT)
Facility: AMBULARY SURGERY CENTER | Age: 65
Discharge: HOME | End: 2020-08-03
Attending: PHYSICAL MEDICINE & REHABILITATION | Admitting: PHYSICAL MEDICINE & REHABILITATION
Payer: COMMERCIAL

## 2020-08-03 DIAGNOSIS — M47.816 LUMBAR SPONDYLOSIS: ICD-10-CM

## 2020-08-03 PROCEDURE — 64495 INJ PARAVERT F JNT L/S 3 LEV: CPT | Performed by: PHYSICAL MEDICINE & REHABILITATION

## 2020-08-03 PROCEDURE — 64493 PR INJ DX/THER AGNT PARAVERT FACET JOINT,IMG GUIDE,LUMBAR/SAC,1ST LVL: ICD-10-PCS | Mod: LT,,, | Performed by: PHYSICAL MEDICINE & REHABILITATION

## 2020-08-03 PROCEDURE — 64493 INJ PARAVERT F JNT L/S 1 LEV: CPT | Mod: LT,,, | Performed by: PHYSICAL MEDICINE & REHABILITATION

## 2020-08-03 PROCEDURE — 64494 INJ PARAVERT F JNT L/S 2 LEV: CPT | Performed by: PHYSICAL MEDICINE & REHABILITATION

## 2020-08-03 PROCEDURE — 64494 INJ PARAVERT F JNT L/S 2 LEV: CPT | Mod: LT,,, | Performed by: PHYSICAL MEDICINE & REHABILITATION

## 2020-08-03 PROCEDURE — 64493 INJ PARAVERT F JNT L/S 1 LEV: CPT | Performed by: PHYSICAL MEDICINE & REHABILITATION

## 2020-08-03 PROCEDURE — 64494 PR INJ DX/THER AGNT PARAVERT FACET JOINT,IMG GUIDE,LUMBAR/SAC, 2ND LEVEL: ICD-10-PCS | Mod: LT,,, | Performed by: PHYSICAL MEDICINE & REHABILITATION

## 2020-08-03 RX ORDER — BUPIVACAINE HYDROCHLORIDE 5 MG/ML
INJECTION, SOLUTION EPIDURAL; INTRACAUDAL
Status: DISCONTINUED | OUTPATIENT
Start: 2020-08-03 | End: 2020-08-03 | Stop reason: HOSPADM

## 2020-08-03 RX ORDER — LIDOCAINE HYDROCHLORIDE 10 MG/ML
INJECTION, SOLUTION EPIDURAL; INFILTRATION; INTRACAUDAL; PERINEURAL
Status: DISCONTINUED | OUTPATIENT
Start: 2020-08-03 | End: 2020-08-03 | Stop reason: HOSPADM

## 2020-08-03 NOTE — INTERVAL H&P NOTE
The patient has been examined and the H&P has been reviewed:    I concur with the findings and changes have been noted since the H&P was written: I performed a left GTB injection which providedher with 80% reduction in left lateral hip and legt pain. Pain is now mainly axial on the left. we will proceed with a left L2, L3, L4 and L5 MBB # 1    Surgery risks, benefits and alternative options discussed and understood by patient/family.   ASA 3, Mallampati 2        Active Hospital Problems    Diagnosis  POA    Lumbar spondylosis [M47.816]  Yes      Resolved Hospital Problems   No resolved problems to display.

## 2020-08-03 NOTE — OP NOTE
BRIEF HISTORY  Edie Hernandez is a 65 y.o. female with chronic left-sided low back pain and referred pain down the left lower extremity.  She is status post left L3 and L4 transforaminal epidural steroid injections x3 with improvement in left lower leg pain.  She continues to complain of left lower back pain.  The pain can refer into the lateral hip and down the lateral thigh stopping at about the knee.  On diagnostic ultrasound examination of the left hip, there is irregularities at the insertion of the gluteus medius tendon on the lateral facet of the greater trochanter as well as a hyperechoic area within the tendon consistent with a calcific deposit.   she had a left greater trochanteric bursa injection with 80% reduction in lateral hip pain and leg pain.  She continues to complain of axial back pain on the left greater than the right.  Will proceed with medial branch blocks to left L2, L3, L4, L5 medial branches to anesthetize the left L3-4, L4-5, L5-S1 facet joint    Time-out was taken to identify the patient and the procedure side prior to starting the procedure.    CONSENT  Risks, benefits, and alternatives of procedure were discussed with the patient.  All questions were answered to the satisfaction of the patient.  Written consent was signed prior to the procedure.      PRE-PROCEDURE DIAGNOSIS  Lumbar spondylosis without myelopathy     POST-PROCEDURE DIAGNOSIS  SAME    NAME OF OPERATION  Left L2, L3 and L4 medial branch and bilateral L5 primary dorsal rami block.        PHYSICIAN  Dejan Simmons, DO    ASSISTANTS  None    LOCAL ANESTHETIC GIVEN  Xylocaine 1% 3 mL.     SEDATION MEDICATIONS  N/A      ESTIMATED BLOOD LOSS  None.    COMPLICATIONS  None.    PROCEDURE IN DETAIL  Risks and benefits were discussed and written informed consent was obtained.  The patient was placed in the prone position on the exam table.  Skin was cleaned with ChloraPrep and allowed to dry.  Skin was then draped in a sterile fashion.   The right sacral ala was identified with fluoroscopy, a 3.5 inch 25-gauge needle was advanced under intermittent fluoroscopy to the right sacral ala in the area of the right L5 primary dorsal ramus.  Next, the C arm was then rotated 15 degrees to the right and a 3.5 inch 25-gauge needle was advanced under intermittent fluoroscopy until the right L4 and L5 transverse processes wer reached in the area of the L2, L3 and L4 medial branches respectively.  Next, 0.75 cc of 0.50% bupivacaine preservative-free was then injected at each level after negative aspiration.  Needles were removed and the procedure was repeated on the left side in a similar fashion.  The patient tolerated the procedure well with no adverse events.  The patient noted excellent pain relief 10 minutes following the procedure. The patient was discharged with a pain diary which she will fill out every hour over the next 6 hours and return to clinic once completed.    Please note, the procedure was supposed to be for the left L2, L3, L4, L5 medial branches, however I initially performed the right and then proceeded to complete the left as initially planned.    MONITORS  Prior to and during the procedure, the patient was monitored with pulse oximetry, EKG, and blood pressure cuff.  The procedure was tolerated well.  Oxygenation, blood pressure, and pulse rate were maintained within normal limits during the procedure.  The patient was awake, alert, and able to respond to all questions appropriately throughout the entire procedure.    PAIN BEFORE THE PROCEDURE  3/10    PAIN AFTER THE PROCEDURE  0/10    The patient was monitored after the procedure.  On exam 30, minutes after the procedure, the patient was noted to be neurovascularly intact in BLE.  The patient was discharged home with family/responsible adult in stable condition.  The patient was given post procedure and discharge instructions to follow at home.  Diet on discharge is to be the same as prior  to the procedure.  Activity on discharge is as per instruction sheet given to the patient.  The patient will return pain diary to clinic once completed.

## 2020-08-03 NOTE — DISCHARGE INSTRUCTIONS
Before leaving, please make sure you have all your personal belongings such as glasses, purses, wallets, keys, cell phones, jewelry, jackets etc     Nerve Block  This was a test, not a treatment. Your pain may return.  Keep your pain journal Dr office will call to check your pain levels within 3 days   Perform activities that normally cause you pain during this testing period    Home care instructions  You may apply ice pack to the injection site for 2 days , NO HEAT for 2 days  You may take a shower but no soaking above level of injection in tub or pool for 2 days  Resume Aspirin, Plavix, or Coumadin the day after the procedure unless otherwise instructed.  Do not drive for 24 hr      SEEK  IMMEDIATE MEDICAL HELP FOR:  Severe increase in your usual pain or appearance of new pain  Prolonged  ( more than 8 hours)or increasing weakness or numbness in the legs or arms  Drainage, redness, active bleeding, or increased swelling at the injection site  Temperature over 100.0 degrees F.  Headache that increases when your head is upright and decreases when you lie flat  Shortness of breath, chest pain, or problems breathing      After Surgery:  Always be aware that any surgery can cause these symptoms:    Pain- After this  test, we expect your pain to decrease but  then it may return as the local anesthetic wears off. Try to NOT take your pain medicine during this testing period. Ice and rest can help with pain relief.    Bleeding- a little bleeding after a surgery is usually within normal.  If there is a lot of blood you need to notify your MD.  Emergency treatments of bleeding are cold application, elevation of the bleeding site and compression.    Infection- Infection after surgery is NOT a normal occurrence.  Signs of infection are fever, swelling, hot to touch the incision.  If this occurs notify your MD immediately.    Nausea- this can be common after a surgery especially if you have had anesthesia medicine or are  taking pain medicine.  Staying on clear liquids, bland foods, gingerale, or over the counter anti nausea medicines can help.  If you vomit more than once, notify your MD.  Anti Nausea medicines can be prescribed.

## 2020-08-04 VITALS
OXYGEN SATURATION: 96 % | WEIGHT: 260 LBS | RESPIRATION RATE: 18 BRPM | TEMPERATURE: 98 F | DIASTOLIC BLOOD PRESSURE: 55 MMHG | HEART RATE: 73 BPM | HEIGHT: 65 IN | BODY MASS INDEX: 43.32 KG/M2 | SYSTOLIC BLOOD PRESSURE: 118 MMHG

## 2020-08-05 ENCOUNTER — TELEPHONE (OUTPATIENT)
Dept: PHYSICAL MEDICINE AND REHAB | Facility: CLINIC | Age: 65
End: 2020-08-05

## 2020-08-05 DIAGNOSIS — M47.816 LUMBAR SPONDYLOSIS: Primary | ICD-10-CM

## 2020-08-05 NOTE — TELEPHONE ENCOUNTER
----- Message from Demetri Aguiar sent at 8/5/2020 12:45 PM CDT -----  Type: Needs Medical Advice    Who Called:  Patient  Best Call Back Number: [phone]  Additional Information: Patient would like to discuss scheduling an injection. Please call to advise. Thanks!

## 2020-08-06 DIAGNOSIS — Z01.818 PRE-OP TESTING: Primary | ICD-10-CM

## 2020-08-10 ENCOUNTER — LAB VISIT (OUTPATIENT)
Dept: PRIMARY CARE CLINIC | Facility: CLINIC | Age: 65
End: 2020-08-10
Payer: COMMERCIAL

## 2020-08-10 DIAGNOSIS — Z01.818 PRE-OP TESTING: ICD-10-CM

## 2020-08-10 PROCEDURE — U0003 INFECTIOUS AGENT DETECTION BY NUCLEIC ACID (DNA OR RNA); SEVERE ACUTE RESPIRATORY SYNDROME CORONAVIRUS 2 (SARS-COV-2) (CORONAVIRUS DISEASE [COVID-19]), AMPLIFIED PROBE TECHNIQUE, MAKING USE OF HIGH THROUGHPUT TECHNOLOGIES AS DESCRIBED BY CMS-2020-01-R: HCPCS

## 2020-08-11 LAB — SARS-COV-2 RNA RESP QL NAA+PROBE: NOT DETECTED

## 2020-08-13 ENCOUNTER — HOSPITAL ENCOUNTER (OUTPATIENT)
Facility: AMBULARY SURGERY CENTER | Age: 65
Discharge: HOME | End: 2020-08-13
Attending: PHYSICAL MEDICINE & REHABILITATION | Admitting: PHYSICAL MEDICINE & REHABILITATION
Payer: COMMERCIAL

## 2020-08-13 DIAGNOSIS — M47.816 LUMBAR SPONDYLOSIS: ICD-10-CM

## 2020-08-13 PROCEDURE — 64494 PR INJ DX/THER AGNT PARAVERT FACET JOINT,IMG GUIDE,LUMBAR/SAC, 2ND LEVEL: ICD-10-PCS | Mod: LT,,, | Performed by: PHYSICAL MEDICINE & REHABILITATION

## 2020-08-13 PROCEDURE — 64493 PR INJ DX/THER AGNT PARAVERT FACET JOINT,IMG GUIDE,LUMBAR/SAC,1ST LVL: ICD-10-PCS | Mod: LT,,, | Performed by: PHYSICAL MEDICINE & REHABILITATION

## 2020-08-13 PROCEDURE — 64493 INJ PARAVERT F JNT L/S 1 LEV: CPT | Performed by: PHYSICAL MEDICINE & REHABILITATION

## 2020-08-13 PROCEDURE — 64495 INJ PARAVERT F JNT L/S 3 LEV: CPT | Performed by: PHYSICAL MEDICINE & REHABILITATION

## 2020-08-13 PROCEDURE — 64495 PR INJ DX/THER AGNT PARAVERT FACET JOINT,IMG GUIDE,LUMBAR/SAC, ADD LEVEL: ICD-10-PCS | Mod: LT,,, | Performed by: PHYSICAL MEDICINE & REHABILITATION

## 2020-08-13 PROCEDURE — 64493 INJ PARAVERT F JNT L/S 1 LEV: CPT | Mod: LT,,, | Performed by: PHYSICAL MEDICINE & REHABILITATION

## 2020-08-13 PROCEDURE — 64494 INJ PARAVERT F JNT L/S 2 LEV: CPT | Mod: LT,,, | Performed by: PHYSICAL MEDICINE & REHABILITATION

## 2020-08-13 PROCEDURE — 64495 INJ PARAVERT F JNT L/S 3 LEV: CPT | Mod: LT,,, | Performed by: PHYSICAL MEDICINE & REHABILITATION

## 2020-08-13 PROCEDURE — 64494 INJ PARAVERT F JNT L/S 2 LEV: CPT | Performed by: PHYSICAL MEDICINE & REHABILITATION

## 2020-08-13 RX ORDER — LIDOCAINE HYDROCHLORIDE 10 MG/ML
INJECTION, SOLUTION EPIDURAL; INFILTRATION; INTRACAUDAL; PERINEURAL
Status: DISCONTINUED | OUTPATIENT
Start: 2020-08-13 | End: 2020-08-13 | Stop reason: HOSPADM

## 2020-08-13 RX ORDER — BUPIVACAINE HYDROCHLORIDE 5 MG/ML
INJECTION, SOLUTION EPIDURAL; INTRACAUDAL
Status: DISCONTINUED | OUTPATIENT
Start: 2020-08-13 | End: 2020-08-13 | Stop reason: HOSPADM

## 2020-08-13 NOTE — OP NOTE
BRIEF HISTORY  Edie Hernandez is a 65 y.o. female with chronic left-sided low back pain and referred pain down the left lower extremity.  She is status post left L3 and L4 transforaminal epidural steroid injections x3 with improvement in left lower leg pain.  She continues to complain of left lower back pain.  The pain can refer into the lateral hip and down the lateral thigh stopping at about the knee.  She has received bilatera; L2, L3, L4, L5 medial branch blocks to anesthetize the right and left L3-4, L4-5, L5-S1 facet joints with >80% reduction in pain for around 6 hours. We will proceed with medial branch block #2.    Time-out was taken to identify the patient and the procedure side prior to starting the procedure.    CONSENT  Risks, benefits, and alternatives of procedure were discussed with the patient.  All questions were answered to the satisfaction of the patient.  Written consent was signed prior to the procedure.      PRE-PROCEDURE DIAGNOSIS  Lumbar spondylosis without myelopathy     POST-PROCEDURE DIAGNOSIS  SAME    NAME OF OPERATION  Left L2, L3 and L4 medial branch and left L5 primary dorsal rami block.    PHYSICIAN  Dejan Simmons, DO    ASSISTANTS  None    LOCAL ANESTHETIC GIVEN  Xylocaine 1% 3 mL.     SEDATION MEDICATIONS  N/A      ESTIMATED BLOOD LOSS  None.    COMPLICATIONS  None.    PROCEDURE IN DETAIL  Risks and benefits were discussed and written informed consent was obtained.  The patient was placed in the prone position on the exam table.  Skin was cleaned with ChloraPrep and allowed to dry.  Skin was then draped in a sterile fashion.  The left sacral ala was identified with fluoroscopy, a 5 inch 22-gauge needle was advanced under intermittent fluoroscopy to the right sacral ala in the area of the right L5 primary dorsal ramus.  Next, the C arm was then rotated 15 degrees to the right and a 3.5 inch 25-gauge needle was advanced under intermittent fluoroscopy until the left L3, L4 and L5  transverse processes wer reached in the area of the L2, L3 and L4 medial branches respectively.  Next, 0.75 cc of 0.50% bupivacaine preservative-free was then injected at each level after negative aspiration.  Needles were removed and the procedure was repeated on the left side in a similar fashion.  The patient tolerated the procedure well with no adverse events.  The patient noted excellent pain relief 10 minutes following the procedure. The patient was discharged with a pain diary which she will fill out every hour over the next 6 hours and return to clinic once completed.      MONITORS  Prior to and during the procedure, the patient was monitored with pulse oximetry, EKG, and blood pressure cuff.  The procedure was tolerated well.  Oxygenation, blood pressure, and pulse rate were maintained within normal limits during the procedure.  The patient was awake, alert, and able to respond to all questions appropriately throughout the entire procedure.    PAIN BEFORE THE PROCEDURE  5/10    PAIN AFTER THE PROCEDURE  0/10    The patient was monitored after the procedure.  On exam 30, minutes after the procedure, the patient was noted to be neurovascularly intact in BLE.  The patient was discharged home with family/responsible adult in stable condition.  The patient was given post procedure and discharge instructions to follow at home.  Diet on discharge is to be the same as prior to the procedure.  Activity on discharge is as per instruction sheet given to the patient.  The patient will return pain diary to clinic once completed.

## 2020-08-13 NOTE — PLAN OF CARE
Stable states ready to go home, abiodun po fluids, pain minimal, ambulated to car with RN to  , no leg weakness

## 2020-08-13 NOTE — DISCHARGE INSTRUCTIONS
Before leaving, please make sure you have all your personal belongings such as glasses, purses, wallets, keys, cell phones, jewelry, jackets etc     Nerve Block  This was a test, not a treatment. Your pain may return.  Keep your pain journal Dr office will call to check your pain levels within 3 days   Perform activities that normally cause you pain during this testing period     You may get some pain relief from the local anesthetic initally.    No driving for 24 hrs.   Activity as tolerated- gradually increase activities.  Dont lift over 10 lbs for 24 hrs   No heat at injection sites for 2 full days. No heating pads, hot tubs, saunas, or swimming in any body of water or pool for 2 full days.  Use ice pack for mild swelling and for comfort , apply for 20 minutes, remove for 20 minute intervals. No direct contact of ice itself  to skin.  May shower today.  Do not allow shower water to beat on injections site(s) for 2 full days. No tub baths for two full days.      Resume Aspirin, Plavix, or Coumadin the day after the procedure unless otherwise instructed.   If diabetic,monitor your glucose carefully as steroids can increase your glucose level    Seek immediate medical help for:   Severe increase in your usual pain or appearance of new pain.  Prolonged (more than 8 hours) or increasing weakness or numbness in the legs or arms. Numbing medicine was injected and can affect the messages to and from the brain and legs or arms.  .    Fever above 100.4 degrees F ,Drainage,redness,active bleeding, or increased swelling at the injection site.  Headache, shortness of breath, chest pain, or breathing problems.    After Surgery:  Always be aware that any surgery can cause these symptoms:    Pain- Medication can be prescribed for pain to decrease your pain but may not completely take your pain away. Over the Counter pain medicine my be enough and you can always use Ice and rest to help ease pain.    Bleeding- a little bleeding  after a surgery is usually within normal.  If there is a lot of blood you need to notify your MD.  Emergency treatments of bleeding are cold application, elevation of the bleeding site and compression.    Infection- Infection after surgery is NOT a normal occurrence.  Signs of infection are fever, swelling, hot to touch the incision.  If this occurs notify your MD immediately.    Nausea- this can be common after a surgery especially if you have had anesthesia medicine or are taking pain medicine.  Steroids have a side effect of nausea sometimes. Staying on clear liquids, bland foods, gingerale, or over the counter anti nausea medicines can help.  If you vomit more than once, notify your MD.  Anti Nausea medicines can be prescribed.

## 2020-08-13 NOTE — INTERVAL H&P NOTE
The patient has been examined and the H&P has been reviewed:    I concur with the findings and changes have been noted since the H&P was written: Patient received MBB # 1 with 100% reduction in pain    Surgery risks, benefits and alternative options discussed and understood by patient/family.    ASA 2, Mallampati 3      Active Hospital Problems    Diagnosis  POA    Lumbar spondylosis [M47.816]  Yes      Resolved Hospital Problems   No resolved problems to display.

## 2020-08-14 VITALS
TEMPERATURE: 98 F | HEART RATE: 70 BPM | OXYGEN SATURATION: 97 % | WEIGHT: 260 LBS | DIASTOLIC BLOOD PRESSURE: 66 MMHG | RESPIRATION RATE: 20 BRPM | BODY MASS INDEX: 43.27 KG/M2 | SYSTOLIC BLOOD PRESSURE: 145 MMHG

## 2020-08-14 DIAGNOSIS — Z01.818 PRE-OP TESTING: Primary | ICD-10-CM

## 2020-08-14 DIAGNOSIS — M47.816 LUMBAR SPONDYLOSIS: Primary | ICD-10-CM

## 2020-08-14 RX ORDER — SODIUM CHLORIDE 9 MG/ML
INJECTION, SOLUTION INTRAVENOUS CONTINUOUS
Status: CANCELLED | OUTPATIENT
Start: 2020-08-14

## 2020-08-14 RX ORDER — LIDOCAINE HYDROCHLORIDE 10 MG/ML
1 INJECTION, SOLUTION EPIDURAL; INFILTRATION; INTRACAUDAL; PERINEURAL ONCE
Status: CANCELLED | OUTPATIENT
Start: 2020-08-14 | End: 2020-08-14

## 2020-08-17 NOTE — DISCHARGE SUMMARY
[facility]  Discharge Note  Short Stay    Procedure(s) (LRB):  Block-nerve-medial branch-lumbar.  L2, L3, L4, and L5 (Left)    OUTCOME: Patient tolerated treatment/procedure well without complication and is now ready for discharge.    DISPOSITION: Home or Self Care    FINAL DIAGNOSIS:  Lumbar spondylosis    FOLLOWUP:  We will call in assess response to medial branch block 2.    DISCHARGE INSTRUCTIONS:  No discharge procedures on file.

## 2020-08-24 ENCOUNTER — LAB VISIT (OUTPATIENT)
Dept: PRIMARY CARE CLINIC | Facility: CLINIC | Age: 65
End: 2020-08-24
Payer: COMMERCIAL

## 2020-08-24 DIAGNOSIS — Z01.818 PRE-OP TESTING: ICD-10-CM

## 2020-08-24 PROCEDURE — U0003 INFECTIOUS AGENT DETECTION BY NUCLEIC ACID (DNA OR RNA); SEVERE ACUTE RESPIRATORY SYNDROME CORONAVIRUS 2 (SARS-COV-2) (CORONAVIRUS DISEASE [COVID-19]), AMPLIFIED PROBE TECHNIQUE, MAKING USE OF HIGH THROUGHPUT TECHNOLOGIES AS DESCRIBED BY CMS-2020-01-R: HCPCS

## 2020-08-25 LAB — SARS-COV-2 RNA RESP QL NAA+PROBE: NOT DETECTED

## 2020-08-27 ENCOUNTER — HOSPITAL ENCOUNTER (OUTPATIENT)
Facility: AMBULARY SURGERY CENTER | Age: 65
Discharge: HOME | End: 2020-08-27
Attending: PHYSICAL MEDICINE & REHABILITATION | Admitting: PHYSICAL MEDICINE & REHABILITATION
Payer: COMMERCIAL

## 2020-08-27 DIAGNOSIS — M47.816 LUMBAR SPONDYLOSIS: ICD-10-CM

## 2020-08-27 LAB — POCT GLUCOSE: 120 MG/DL (ref 70–110)

## 2020-08-27 PROCEDURE — 64635 PR DESTROY LUMB/SAC FACET JNT: ICD-10-PCS | Mod: LT,,, | Performed by: PHYSICAL MEDICINE & REHABILITATION

## 2020-08-27 PROCEDURE — 64636 PR DESTROY L/S FACET JNT ADDL: ICD-10-PCS | Mod: LT,,, | Performed by: PHYSICAL MEDICINE & REHABILITATION

## 2020-08-27 PROCEDURE — 64636 DESTROY L/S FACET JNT ADDL: CPT | Mod: LT,,, | Performed by: PHYSICAL MEDICINE & REHABILITATION

## 2020-08-27 PROCEDURE — 64636 DESTROY L/S FACET JNT ADDL: CPT | Performed by: PHYSICAL MEDICINE & REHABILITATION

## 2020-08-27 PROCEDURE — 64635 DESTROY LUMB/SAC FACET JNT: CPT | Performed by: PHYSICAL MEDICINE & REHABILITATION

## 2020-08-27 PROCEDURE — 64635 DESTROY LUMB/SAC FACET JNT: CPT | Mod: LT,,, | Performed by: PHYSICAL MEDICINE & REHABILITATION

## 2020-08-27 RX ORDER — FENTANYL CITRATE 50 UG/ML
INJECTION, SOLUTION INTRAMUSCULAR; INTRAVENOUS
Status: DISCONTINUED | OUTPATIENT
Start: 2020-08-27 | End: 2020-08-27 | Stop reason: HOSPADM

## 2020-08-27 RX ORDER — SODIUM CHLORIDE, SODIUM LACTATE, POTASSIUM CHLORIDE, CALCIUM CHLORIDE 600; 310; 30; 20 MG/100ML; MG/100ML; MG/100ML; MG/100ML
INJECTION, SOLUTION INTRAVENOUS CONTINUOUS
Status: DISCONTINUED | OUTPATIENT
Start: 2020-08-27 | End: 2020-08-27 | Stop reason: HOSPADM

## 2020-08-27 RX ORDER — ONDANSETRON 2 MG/ML
4 INJECTION INTRAMUSCULAR; INTRAVENOUS ONCE AS NEEDED
Status: COMPLETED | OUTPATIENT
Start: 2020-08-27 | End: 2020-08-27

## 2020-08-27 RX ORDER — LIDOCAINE HYDROCHLORIDE 10 MG/ML
1 INJECTION, SOLUTION EPIDURAL; INFILTRATION; INTRACAUDAL; PERINEURAL ONCE
Status: DISCONTINUED | OUTPATIENT
Start: 2020-08-27 | End: 2020-08-27 | Stop reason: HOSPADM

## 2020-08-27 RX ORDER — MIDAZOLAM HYDROCHLORIDE 1 MG/ML
INJECTION INTRAMUSCULAR; INTRAVENOUS
Status: DISCONTINUED | OUTPATIENT
Start: 2020-08-27 | End: 2020-08-27 | Stop reason: HOSPADM

## 2020-08-27 RX ORDER — SODIUM CHLORIDE 9 MG/ML
INJECTION, SOLUTION INTRAMUSCULAR; INTRAVENOUS; SUBCUTANEOUS
Status: DISCONTINUED | OUTPATIENT
Start: 2020-08-27 | End: 2020-08-27 | Stop reason: HOSPADM

## 2020-08-27 RX ORDER — LIDOCAINE HYDROCHLORIDE 10 MG/ML
INJECTION, SOLUTION EPIDURAL; INFILTRATION; INTRACAUDAL; PERINEURAL
Status: DISCONTINUED | OUTPATIENT
Start: 2020-08-27 | End: 2020-08-27 | Stop reason: HOSPADM

## 2020-08-27 RX ORDER — BUPIVACAINE HYDROCHLORIDE 5 MG/ML
INJECTION, SOLUTION EPIDURAL; INTRACAUDAL
Status: DISCONTINUED | OUTPATIENT
Start: 2020-08-27 | End: 2020-08-27 | Stop reason: HOSPADM

## 2020-08-27 RX ORDER — SODIUM CHLORIDE 9 MG/ML
INJECTION, SOLUTION INTRAVENOUS CONTINUOUS
Status: DISCONTINUED | OUTPATIENT
Start: 2020-08-27 | End: 2020-08-27 | Stop reason: HOSPADM

## 2020-08-27 RX ORDER — ONDANSETRON 2 MG/ML
INJECTION INTRAMUSCULAR; INTRAVENOUS
Status: COMPLETED
Start: 2020-08-27 | End: 2020-08-27

## 2020-08-27 RX ORDER — DEXAMETHASONE SODIUM PHOSPHATE 4 MG/ML
INJECTION, SOLUTION INTRA-ARTICULAR; INTRALESIONAL; INTRAMUSCULAR; INTRAVENOUS; SOFT TISSUE
Status: DISCONTINUED | OUTPATIENT
Start: 2020-08-27 | End: 2020-08-27 | Stop reason: HOSPADM

## 2020-08-27 RX ADMIN — ONDANSETRON 4 MG: 2 INJECTION INTRAMUSCULAR; INTRAVENOUS at 10:08

## 2020-08-27 RX ADMIN — SODIUM CHLORIDE, SODIUM LACTATE, POTASSIUM CHLORIDE, CALCIUM CHLORIDE: 600; 310; 30; 20 INJECTION, SOLUTION INTRAVENOUS at 09:08

## 2020-08-27 NOTE — INTERVAL H&P NOTE
AThe patient has been examined and the H&P has been reviewed:    I concur with the findings and no changes have occurred since H&P was written.    Anesthesia/Surgery risks, benefits and alternative options discussed and understood by patient/family.      ASA 3, Mallampati 2    Active Hospital Problems    Diagnosis  POA    Lumbar spondylosis [M47.816]  Yes      Resolved Hospital Problems   No resolved problems to display.

## 2020-08-27 NOTE — OP NOTE
BRIEF HISTORY  Edie Hernandez is a very pleasant 65 y.o. female with chronic left sided back pain. She has received MBB x 2 to the left L2, L3, L4, and L5 with >80% improvement in pain and function for the duration of the anesthetic. We will proceed with MBB #2    Time-out was taken to identify the patient and the procedure side prior to starting the procedure.    CONSENT  Risks, benefits, and alternatives of procedure were discussed with the patient.  All questions were answered to the satisfaction of the patient.  Written consent was signed prior to the procedure.    PRE-PROCEDURE DIAGNOSIS  Lumbosacral spondylosis without myelopathy    POST-PROCEDURE DIAGNOSIS  Same     NAME OF OPERATION  Left L2, L3 and L4 medial branch and L5 primary dorsal ramus radiofrequency ablation (rhizotomy).     PHYSICIAN  Dejan Simmons,     ASSISTANTS  None    LOCAL ANESTHETIC GIVEN  Xylocaine 1% 5 mL.    SEDATION MEDICATIONS  Monitored anesthesia care provided.  Medications given:2mg IV Versed 100mg IV Fentanyl      MEDICATIONS INJECTED  1 mL of Bupivacaine 0.5% PF per level after testing and prior to ablation, 1 mL per level after ablation of mixture containing Decadron 1ml of 4mg/ml, 7 mL of preservative-free normal Saline.    ESTIMATED BLOOD LOSS  Trace.    COMPLICATIONS  None.    PROCEDURE IN DETAIL  Laying in the prone position, the patient was prepped and draped in the usual sterile fashion using Chloraprep from a sterile kit.  Following Chloraprep, sterile drapes were applied.  The proper level was determined under fluoroscopic guidance.  Local anesthetic was given using a 1/2-inch 27-gauge needle by raising a skin wheal and going down to the hub of the 27-gauge needle.  For the left L2, L3 and 4 medial branch, a 20-gauge 100-mm radiofrequency needle was introduced and advanced under intermittent fluoroscopic guidance toward the junction of the transverse process, the pedicle, and the superior articulating process until the  needle contacted periosteum. The needle was then guided over the superior aspect of the transverse process in small incremental steps until the needle was felt to just slide over the transverse process, thus overlying the locale of the respective medial branch at that level.  The bevel of the needle was then rotated to face the bone.  For the Right L5 primary dorsal ramus, a20-gauge 100-mm radiofrequency needle was introduced and advanced under intermittent fluoroscopic guidance toward the sacral ala until the needle contacted periosteum.  The needle was then guided over the superior aspect of the ala in small incremental steps until the needle was felt to just slide over the ala, thus overlying the locale of the L5 primary dorsal ramus.  The bevel of the needle was then rotated to face the bone.  A lateral view was then obtained to demonstrate that the needles were not positioned too far anteriorly (needle was not noted to encroach on the neural foramen).      Once the RF needles were felt to be in adequate position, testing was performed at each level. At each site, the medial branch nerve was stimulated at 2Hz to a maximum of 1-2 volts determined to finalize safe needle and electrode placement. The patient was awake and responsive during this portion of the procedure. After confirmatory testing, 1 mL of 0.5% Bupivacaine was injected at each level for anesthetic prior to ablation.  Local anesthetic was given approximately 30-60 seconds to take effect and then ablation was done per level. Utilizing the cooleif radiofrequency generator, each target was lesioned at 80 degrees Celsius for 105 seconds.  Tissue impedences were noted to be between 250 and 500 Ohms.   Electrodes and needles were then removed and bandages placed over the needle placement sites, the patient then returned to the supine position on a stretcher and transported to the recovery room without hemodynamic, neurologic, or allergic reactions.   Fluoroscopic images were printed for hard copy recording and digitally archived.      MONITORS  Prior to and during the procedure, the patient was monitored with pulse oximetry, EKG, and blood pressure cuff.  The procedure was tolerated well.  Oxygenation, blood pressure, and pulse rate were maintained within normal limits during the procedure.  The patient was awake, alert, and able to respond to all questions appropriately throughout the entire procedure.    The patient was monitored after the procedure.  On exam, 30 minutes after the procedure, the patient was noted to be neurovascularly intact in BLE.  The patient was discharged home with family/responsible adult in stable condition.  The patient was given post procedure and discharge instructions to follow at home.  Diet on discharge is to be the same as prior to the procedure.  Activity on discharge is as per the instruction sheet given to the patient.  We will see the patient back in clinic in 3-4 weeks for follow-up.

## 2020-08-27 NOTE — H&P
Today's Date: 08/27/2020     Referring Provider: Dr. Dejan Simmons     Chief Complaint: No chief complaint on file.      HPI: Edie Hernandez is a 65 y.o. female  who presents today for RFA to the left L2, L3, L4 and L5 medial branches to denervate the left L3-4, L4-5 and L5-S1 facet joints    Review of Systems   Constitutional: Negative for chills and fever.   HENT: Negative for congestion and tinnitus.    Eyes: Negative for blurred vision and photophobia.   Respiratory: Negative for shortness of breath and wheezing.    Cardiovascular: Negative for chest pain and palpitations.   Gastrointestinal: Negative for nausea and vomiting.   Genitourinary: Negative for dysuria and frequency.   Musculoskeletal: Positive for back pain. Negative for joint pain and myalgias.   Skin: Negative for itching and rash.   Neurological: Negative for speech change and focal weakness.   Endo/Heme/Allergies: Negative for environmental allergies. Does not bruise/bleed easily.   Psychiatric/Behavioral: Negative for depression. The patient is not nervous/anxious.         Social History     Socioeconomic History    Marital status:      Spouse name: Not on file    Number of children: Not on file    Years of education: Not on file    Highest education level: Not on file   Occupational History     Employer: [employer]   Social Needs    Financial resource strain: Not on file    Food insecurity     Worry: Not on file     Inability: Not on file    Transportation needs     Medical: Not on file     Non-medical: Not on file   Tobacco Use    Smoking status: Never Smoker    Smokeless tobacco: Never Used   Substance and Sexual Activity    Alcohol use: No    Drug use: No    Sexual activity: Yes     Partners: Male   Lifestyle    Physical activity     Days per week: Not on file     Minutes per session: Not on file    Stress: Not on file   Relationships    Social connections     Talks on phone: Not on file     Gets  together: Not on file     Attends Confucianist service: Not on file     Active member of club or organization: Not on file     Attends meetings of clubs or organizations: Not on file     Relationship status: Not on file   Other Topics Concern    Not on file   Social History Narrative    Not on file       Family History   Problem Relation Age of Onset    Hypertension Mother     Breast cancer Mother 50    Heart disease Father     Hypertension Father     Ovarian cancer Paternal Aunt     Macular degeneration Neg Hx     Retinal detachment Neg Hx     Glaucoma Neg Hx     Colon cancer Neg Hx     Colon polyps Neg Hx     Crohn's disease Neg Hx     Ulcerative colitis Neg Hx        Current Facility-Administered Medications on File Prior to Encounter   Medication Dose Route Frequency Provider Last Rate Last Dose    lactated ringers infusion   Intravenous Continuous Dejan Simmons MD   Stopped at 07/06/20 1008     Current Outpatient Medications on File Prior to Encounter   Medication Sig Dispense Refill    albuterol 90 mcg/actuation inhaler Inhale 2 puffs into the lungs every 6 (six) hours as needed for Wheezing. (Patient taking differently: Inhale 2 puffs into the lungs every 6 (six) hours as needed for Wheezing. ) 18 g 3    blood sugar diagnostic Strp 1 each by Misc.(Non-Drug; Combo Route) route 4 (four) times daily. Patient has One touch Ultra II meter 100 strip 3    blood-glucose meter kit Use as instructed 1 each 0    cetirizine (ALL DAY ALLERGY, CETIRIZINE,) 10 MG tablet Take 1 tablet (10 mg total) by mouth once daily. (Patient taking differently: Take 10 mg by mouth daily as needed. ) 30 tablet 3    clobetasol (TEMOVATE) 0.05 % cream Apply 1 application topically 2 (two) times daily. Apply to affected area 30 g 2    cyclobenzaprine (FLEXERIL) 5 MG tablet Take 1 tablet (5 mg total) by mouth 3 (three) times daily as needed for Muscle spasms. One to two tablets qhs prn pain 90 tablet prn    diclofenac sodium  (VOLTAREN) 1 % Gel Apply 2 g topically 4 (four) times daily.      ergocalciferol (ERGOCALCIFEROL) 50,000 unit Cap Take 1 capsule (50,000 Units total) by mouth every 7 days. 12 capsule 1    fluticasone (FLONASE) 50 mcg/actuation nasal spray 1 spray (50 mcg total) by Each Nare route daily as needed. 1 Bottle 11    furosemide (LASIX) 40 MG tablet Take 1 tablet (40 mg total) by mouth daily as needed. Every day PRN 30 tablet 4    gabapentin (NEURONTIN) 300 MG capsule Take 1 capsule (300 mg total) by mouth 2 (two) times daily. 180 capsule 3    HYDROcodone-acetaminophen (NORCO) 7.5-325 mg per tablet Take 1 tablet by mouth every 6 (six) hours as needed for Pain. 90 tablet 0    indomethacin (INDOCIN) 25 MG capsule TAKE ONE CAPSULE BY MOUTH 3 TIMES A DAY WITH MEALS 90 capsule 0    lancets Misc 1 each by Misc.(Non-Drug; Combo Route) route 4 (four) times daily. Patient has a one touch ultra II meter 100 each 3    levothyroxine (SYNTHROID) 150 MCG tablet Take 1 tablet (150 mcg total) by mouth once daily. 90 tablet 3    metFORMIN (GLUCOPHAGE-XR) 500 MG XR 24hr tablet Take 2 tablets (1,000 mg total) by mouth 2 (two) times daily with meals. 360 tablet 3    methylphenidate (RITALIN LA) 40 MG 24 hr capsule Take 40 mg by mouth daily as needed.       methylphenidate HCl (RITALIN) 10 MG tablet       oxybutynin (DITROPAN XL) 15 MG TR24 Take 1 tablet (15 mg total) by mouth once daily. 90 tablet 3    pantoprazole (PROTONIX) 40 MG tablet Take 1 tablet (40 mg total) by mouth once daily. 90 tablet 3        Review of patient's allergies indicates:   Allergen Reactions    Codeine      Other reaction(s): Nausea       Past Surgical History:   Procedure Laterality Date    COLONOSCOPY  02/14/2013    Dr. Vogel: repeat in 5-10 years    COLONOSCOPY W/ BIOPSIES AND POLYPECTOMY  02/2013    hyperplastic, recheck 5-10 years    EXTERNAL EAR SURGERY      HYSTERECTOMY      INJECTION OF ANESTHETIC AGENT AROUND MEDIAL BRANCH NERVES  INNERVATING LUMBAR FACET JOINT Bilateral 8/3/2020    Procedure: Block-nerve-medial branch-lumbar left L2, L3, L4, L5;  Surgeon: Dejan Simmons MD;  Location: [facility] OR;  Service: Pain Management;  Laterality: Bilateral;    INJECTION OF ANESTHETIC AGENT AROUND MEDIAL BRANCH NERVES INNERVATING LUMBAR FACET JOINT Left 8/13/2020    Procedure: Block-nerve-medial branch-lumbar.  L2, L3, L4, and L5;  Surgeon: Dejan Simmons MD;  Location: [facility] OR;  Service: Pain Management;  Laterality: Left;    THROAT SURGERY      for CLINT    TRANSFORAMINAL EPIDURAL INJECTION OF STEROID Left 3/6/2020    Procedure: Injection,steroid,epidural,transforaminal approach;  Surgeon: Harmeet Zapata MD;  Location: [facility] OR;  Service: Pain Management;  Laterality: Left;  L4-L5    TRANSFORAMINAL EPIDURAL INJECTION OF STEROID Left 6/10/2020    Procedure: Injection,steroid,epidural,transforaminal approach.L4 and L5;  Surgeon: Dejan Simmons MD;  Location: [facility] OR;  Service: Pain Management;  Laterality: Left;    TRANSFORAMINAL EPIDURAL INJECTION OF STEROID Left 7/6/2020    Procedure: Injection,steroid,epidural,transforaminal approach. left L4 and L5;  Surgeon: Dejan Simmons MD;  Location: [facility] OR;  Service: Pain Management;  Laterality: Left;    UPPER GASTROINTESTINAL ENDOSCOPY         Past Medical History:   Diagnosis Date    Arthritis     Colon polyp, hyperplastic 1/13    recheck 5-10 years    Diabetes mellitus, type 2     Diverticulosis     Fatty liver     General anesthetics causing adverse effect in therapeutic use     woke up during tubal ligation    GERD (gastroesophageal reflux disease)     CLINT (obstructive sleep apnea)     C-pap    PONV (postoperative nausea and vomiting)     Thyroid disease        Vitals:    08/27/20 0913   BP: (!) 155/81   Pulse: 68   Resp: 16   Temp: 97.9 °F (36.6 °C)       Physical Exam  Constitutional:       Appearance: Normal appearance.   HENT:      Head: Normocephalic and atraumatic.      Nose: Nose normal. No  congestion.      Mouth/Throat:      Mouth: Mucous membranes are moist.      Pharynx: Oropharynx is clear.   Eyes:      Extraocular Movements: Extraocular movements intact.      Pupils: Pupils are equal, round, and reactive to light.   Pulmonary:      Effort: Pulmonary effort is normal. No respiratory distress.   Abdominal:      General: Abdomen is flat. There is no distension.   Musculoskeletal:      Lumbar back: She exhibits tenderness.        Back:       Comments: Status quo   Skin:     General: Skin is warm and dry.   Neurological:      General: No focal deficit present.      Mental Status: She is alert and oriented to person, place, and time.   Psychiatric:         Mood and Affect: Mood normal.         Behavior: Behavior normal.         Thought Content: Thought content normal.        Ortho Exam     Lumbar spondylosis  -     Case Request Operating Room: Radiofrequency Ablation, Nerve, Spinal, Lumbar, Medial Branch,  L2, L3, L4, L5  -     Place in Outpatient; Standing  -     Diet NPO; Standing  -     Notify physician ; Standing  -     Notify physician ; Standing  -     Notify physician (specify); Standing  -     Verify informed consent; Standing  -     Vital signs; Standing  -     Insert peripheral IV; Standing  -     Procedural sedation; Standing    Other orders  -     Progressive Mobility Protocol (mobilize patient to their highest level of functioning at least twice daily); Standing  -     lidocaine (PF) 10 mg/ml (1%) injection 10 mg  -     IP VTE LOW RISK PATIENT; Standing  -     0.9%  NaCl infusion  -     POCT glucose; Standing  -     lidocaine (PF) 10 mg/ml (1%) injection  -     bupivacaine (PF) 0.5% (5 mg/mL) injection  -     sodium chloride 0.9% injection  -     dexamethasone injection  -     lactated ringers infusion           PLAN:   Edie Hernandez is a very pleasant 65 y.o. female with chronic left sided back pain. She has received MBB x 2 to the left L2, L3, L4, and L5 with >80% improvement in pain  and function for the duration of the anesthetic. We will proceed with MBB #2      Dejan Simmons D.O.  Physical Medicine and Rehabilitation          CC: Patients PCP: Lydia Eugene MD  CC: Referring Provider: Dr. Dejan Simmons

## 2020-08-27 NOTE — DISCHARGE INSTRUCTIONS
Before leaving, please make sure you have all your personal belongings such as glasses, purses, wallets, keys, cell phones, jewelry, jackets etc     RADIOFREqUENCY/Pain injection instructions:     This procedure may take a several weeks to relieve pain  You may get some pain relief from the local anesthetic initally.    No driving for 24 hrs.   Activity as tolerated- gradually increase activities.  Dont lift over 10 lbs for 24 hrs   No heat at injection sites for 2 full days. No heating pads, hot tubs, saunas, or swimming in any body of water or pool for 2 full days.  Use ice pack for mild swelling and for comfort , apply for 20 minutes, remove for 20 minute intervals. No direct contact of ice itself  to skin.  May shower today.  Do not allow shower water to beat on injections site(s) for 2 full days. No tub baths for two full days.      Resume Aspirin, Plavix, or Coumadin the day after the procedure unless otherwise instructed.   If diabetic,monitor your glucose carefully as steroids can increase your glucose level    Seek immediate medical help for:   Severe increase in your usual pain or appearance of new pain.  Prolonged (more than 8 hours) or increasing weakness or numbness in the legs or arms. Numbing medicine was injected and can affect the messages to and from the brain and legs or arms.  .    Fever above 100.4 degrees F ,Drainage,redness,active bleeding, or increased swelling at the injection site.  Headache, shortness of breath, chest pain, or breathing problems.     Radiofrequency of Nerves    After this procedure you may have increased pain for three days and lingering pain for up to 6 weeks.   Most patients feel better after 4-6  weeks.    Use your pain medications as needed but the goal of this treartment is to wean you off your pain medication.  You may have weakness after the procedure due to the numbing injection.  You may feel a sunburn effect and some patients may need a burn cream for the skin,  but only apply starting 2 days after your procedure    Use ice pack for discomfort :apply for 20 minutes, remove for 20 minutes for up to 2 days. Do not sleep with ice pack.  Do not use a heating pad or take tub baths or swim for 2 days.  You may shower today  Gradually increase your activities.  Dont lift anything over 10 lbs for the first 24 hours  Dont drive the day of the procedure Wait 24 hrs to drive.  Wait until tomorrow to resume any blood thinners (aspirin, Plavix, Coumadin) but you may resume all your other medications today.  If Diabetic, monitor you glucose carefully due to steroids  used for this procedure    Seek Medical Attention if you have:  Severe pain or headache  Fever or chills  Redness or swelling around the injection site   Difficulty breathing  Vomiting or Increasing numbness or weakness in arms or legs    After Surgery:  Always be aware that any surgery can cause these symptoms:    Pain- Medication can be prescribed for pain to decrease your pain but may not completely take your pain away.  Over the Counter pain medicine my be enough and you can always use Ice and rest to help ease pain.    Bleeding- a little bleeding after a surgery is usually within normal.  If there is a lot of blood you need to notify your MD.  Emergency treatments of bleeding are cold application, elevation of the bleeding site and compression.    Infection- Infection after surgery is NOT a normal occurrence.  Signs of infection are fever, swelling, hot to touch the incision.  If this occurs notify your MD immediately.    Nausea- this can be common after a surgery especially if you have had anesthesia medicine or are taking pain medicine. The steroid the Dr injected can have a side effect of Nausea.  Staying on clear liquids, bland foods, gingerale, or over the counter anti nausea medicines can help.  If you vomit more than once, notify your MD.  Anti Nausea medicines can be prescribed.

## 2020-08-28 VITALS
HEIGHT: 65 IN | SYSTOLIC BLOOD PRESSURE: 146 MMHG | OXYGEN SATURATION: 94 % | DIASTOLIC BLOOD PRESSURE: 80 MMHG | HEART RATE: 68 BPM | WEIGHT: 260 LBS | TEMPERATURE: 97 F | BODY MASS INDEX: 43.32 KG/M2 | RESPIRATION RATE: 18 BRPM

## 2020-08-31 NOTE — DISCHARGE SUMMARY
[facility]  Discharge Note  Short Stay    Procedure(s) (LRB):  Radiofrequency Ablation, Nerve, Spinal, Lumbar, Medial Branch,  L2, L3, L4, L5 (Left)    OUTCOME: Patient tolerated treatment/procedure well without complication and is now ready for discharge.    DISPOSITION: Home or Self Care    FINAL DIAGNOSIS:  Lumbar spondylosis    FOLLOWUP: In clinic 3-4 weeks    DISCHARGE INSTRUCTIONS:  No discharge procedures on file.

## 2020-09-15 ENCOUNTER — PATIENT OUTREACH (OUTPATIENT)
Dept: ADMINISTRATIVE | Facility: HOSPITAL | Age: 65
End: 2020-09-15

## 2020-09-15 ENCOUNTER — PATIENT OUTREACH (OUTPATIENT)
Dept: ADMINISTRATIVE | Facility: OTHER | Age: 65
End: 2020-09-15

## 2020-09-15 NOTE — PROGRESS NOTES
Chart review completed 09/15/2020.  Care Everywhere updates requested and reviewed.  Immunizations reconciled. Media reports reviewed.  Duplicate HM overrides and  orders removed.  Overdue HM topic chart audit and/or requested.  Overdue lab testing linked to upcoming lab appointments if applies.        Health Maintenance Due   Topic Date Due    Influenza Vaccine (1) 2020

## 2020-09-15 NOTE — PROGRESS NOTES
Chart was reviewed for overdue [facility]active [facility] Encounters ([facility])  topics  Updates were requested from care everywhere  Health Maintenance has been updated  LINKS immunization registry triggered

## 2020-09-17 ENCOUNTER — OFFICE VISIT (OUTPATIENT)
Dept: PHYSICAL MEDICINE AND REHAB | Facility: CLINIC | Age: 65
End: 2020-09-17
Payer: COMMERCIAL

## 2020-09-17 VITALS
BODY MASS INDEX: 43.32 KG/M2 | WEIGHT: 260 LBS | HEART RATE: 80 BPM | DIASTOLIC BLOOD PRESSURE: 71 MMHG | SYSTOLIC BLOOD PRESSURE: 118 MMHG | HEIGHT: 65 IN

## 2020-09-17 DIAGNOSIS — M79.2 PERIPHERAL NEURALGIA: Primary | ICD-10-CM

## 2020-09-17 DIAGNOSIS — M54.50 CHRONIC LEFT-SIDED LOW BACK PAIN WITHOUT SCIATICA: ICD-10-CM

## 2020-09-17 DIAGNOSIS — M62.830 SPASM OF MUSCLE OF LOWER BACK: ICD-10-CM

## 2020-09-17 DIAGNOSIS — G89.29 CHRONIC LEFT-SIDED LOW BACK PAIN WITHOUT SCIATICA: ICD-10-CM

## 2020-09-17 DIAGNOSIS — Z74.09 IMPAIRED FUNCTIONAL MOBILITY AND ACTIVITY TOLERANCE: ICD-10-CM

## 2020-09-17 DIAGNOSIS — M47.816 LUMBAR SPONDYLOSIS: ICD-10-CM

## 2020-09-17 PROCEDURE — 3008F BODY MASS INDEX DOCD: CPT | Mod: CPTII,S$GLB,, | Performed by: PHYSICAL MEDICINE & REHABILITATION

## 2020-09-17 PROCEDURE — 64450 NJX AA&/STRD OTHER PN/BRANCH: CPT | Mod: S$GLB,,, | Performed by: PHYSICAL MEDICINE & REHABILITATION

## 2020-09-17 PROCEDURE — 64450 PR NERVE BLOCK INJ, ANES/STEROID, OTHER PERIPHERAL: ICD-10-PCS | Mod: S$GLB,,, | Performed by: PHYSICAL MEDICINE & REHABILITATION

## 2020-09-17 PROCEDURE — 76942 PR U/S GUIDANCE FOR NEEDLE GUIDANCE: ICD-10-PCS | Mod: S$GLB,,, | Performed by: PHYSICAL MEDICINE & REHABILITATION

## 2020-09-17 PROCEDURE — 99999 PR PBB SHADOW E&M-EST. PATIENT-LVL III: CPT | Mod: PBBFAC,,, | Performed by: PHYSICAL MEDICINE & REHABILITATION

## 2020-09-17 PROCEDURE — 99213 OFFICE O/P EST LOW 20 MIN: CPT | Mod: 25,S$GLB,, | Performed by: PHYSICAL MEDICINE & REHABILITATION

## 2020-09-17 PROCEDURE — 3008F PR BODY MASS INDEX (BMI) DOCUMENTED: ICD-10-PCS | Mod: CPTII,S$GLB,, | Performed by: PHYSICAL MEDICINE & REHABILITATION

## 2020-09-17 PROCEDURE — 99213 PR OFFICE/OUTPT VISIT, EST, LEVL III, 20-29 MIN: ICD-10-PCS | Mod: 25,S$GLB,, | Performed by: PHYSICAL MEDICINE & REHABILITATION

## 2020-09-17 PROCEDURE — 76942 ECHO GUIDE FOR BIOPSY: CPT | Mod: S$GLB,,, | Performed by: PHYSICAL MEDICINE & REHABILITATION

## 2020-09-17 PROCEDURE — 99999 PR PBB SHADOW E&M-EST. PATIENT-LVL III: ICD-10-PCS | Mod: PBBFAC,,, | Performed by: PHYSICAL MEDICINE & REHABILITATION

## 2020-09-17 NOTE — PROCEDURES
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  The patient was placed in standing position on the exam table and leaned forward.  The left superior iliac crest was exposed and using a curvilinear transducer, the gluteus preethi origin on the iliac crest was identified in the area of the superior cluneal nerve.  Cephalad, skin was cleaned with alcohol and allowed to dry.  A 3.5 in 25 gauge needle was advanced onto the iliac crest in the area of the superior cluneal nerves where the nerves were and 8 cc solution of 6 cc of 0.50% ropivacaine into cc of 50% dextrose (overall 12.5% dextrose) was injected along the superior iliac crest from medial to lateral in the region of the superior cluneal nerves.  Needle was removed and Band-Aid was applied.  The patient tolerated the procedure well with no adverse events.

## 2020-09-17 NOTE — PROGRESS NOTES
Chief Complaint   Patient presents with    Back Pain     low back pain         HPI: Edie Hernandez is a  65 y.o. female who presents today for followup. she was last seen approximately 3 weeks ago which time I performed radiofrequency ablation to the left L2, L3, L4, L5 medial branches.  She states that this is provided her external relief in her low back pain.  She still continues to complain of some upper buttock pain around the iliac crest worse with standing.  She describes the pain as a burning type sensation on average a 3/10.  She denies any radicular symptoms, bowel or bladder changes, or saddle anesthesia.  Overall, she appreciates greater than 80% reduction in pain is significant improvement in function status post radiofrequency ablation.        Review of Systems   Musculoskeletal: Positive for back pain and myalgias.   All other systems reviewed and are negative.           Past Medical History:   Diagnosis Date    Arthritis     Colon polyp, hyperplastic 1/13    recheck 5-10 years    Diabetes mellitus, type 2     Diverticulosis     Fatty liver     General anesthetics causing adverse effect in therapeutic use     woke up during tubal ligation    GERD (gastroesophageal reflux disease)     CLINT (obstructive sleep apnea)     C-pap    PONV (postoperative nausea and vomiting)     Thyroid disease           Current Outpatient Medications on File Prior to Visit   Medication Sig Dispense Refill    albuterol 90 mcg/actuation inhaler Inhale 2 puffs into the lungs every 6 (six) hours as needed for Wheezing. (Patient taking differently: Inhale 2 puffs into the lungs every 6 (six) hours as needed for Wheezing. ) 18 g 3    blood sugar diagnostic Strp 1 each by Misc.(Non-Drug; Combo Route) route 4 (four) times daily. Patient has One touch Ultra II meter 100 strip 3    blood-glucose meter kit Use as instructed 1 each 0    cetirizine (ALL DAY ALLERGY, CETIRIZINE,) 10 MG tablet Take 1 tablet (10 mg total) by  mouth once daily. (Patient taking differently: Take 10 mg by mouth daily as needed. ) 30 tablet 3    clobetasol (TEMOVATE) 0.05 % cream Apply 1 application topically 2 (two) times daily. Apply to affected area 30 g 2    cyclobenzaprine (FLEXERIL) 5 MG tablet Take 1 tablet (5 mg total) by mouth 3 (three) times daily as needed for Muscle spasms. One to two tablets qhs prn pain 90 tablet prn    diclofenac sodium (VOLTAREN) 1 % Gel Apply 2 g topically 4 (four) times daily.      ergocalciferol (ERGOCALCIFEROL) 50,000 unit Cap Take 1 capsule (50,000 Units total) by mouth every 7 days. 12 capsule 1    fluticasone (FLONASE) 50 mcg/actuation nasal spray 1 spray (50 mcg total) by Each Nare route daily as needed. 1 Bottle 11    furosemide (LASIX) 40 MG tablet Take 1 tablet (40 mg total) by mouth daily as needed. Every day PRN 30 tablet 4    gabapentin (NEURONTIN) 300 MG capsule Take 1 capsule (300 mg total) by mouth 2 (two) times daily. 180 capsule 3    HYDROcodone-acetaminophen (NORCO) 7.5-325 mg per tablet Take 1 tablet by mouth every 6 (six) hours as needed for Pain. 90 tablet 0    indomethacin (INDOCIN) 25 MG capsule TAKE ONE CAPSULE BY MOUTH 3 TIMES A DAY WITH MEALS 90 capsule 0    lancets Misc 1 each by Misc.(Non-Drug; Combo Route) route 4 (four) times daily. Patient has a one touch ultra II meter 100 each 3    levothyroxine (SYNTHROID) 150 MCG tablet Take 1 tablet (150 mcg total) by mouth once daily. 90 tablet 3    metFORMIN (GLUCOPHAGE-XR) 500 MG XR 24hr tablet Take 2 tablets (1,000 mg total) by mouth 2 (two) times daily with meals. 360 tablet 3    methylphenidate (RITALIN LA) 40 MG 24 hr capsule Take 40 mg by mouth daily as needed.       methylphenidate HCl (RITALIN) 10 MG tablet       oxybutynin (DITROPAN XL) 15 MG TR24 Take 1 tablet (15 mg total) by mouth once daily. 90 tablet 3    pantoprazole (PROTONIX) 40 MG tablet Take 1 tablet (40 mg total) by mouth once daily. 90 tablet 3     Current  Facility-Administered Medications on File Prior to Visit   Medication Dose Route Frequency Provider Last Rate Last Dose    lactated ringers infusion   Intravenous Continuous Dejan Simmons MD   Stopped at 07/06/20 1008              Physical Exam:  Vitals:    09/17/20 1045   BP: 118/71   Pulse: 80     Physical Exam  Constitutional:       Appearance: Normal appearance.   HENT:      Head: Normocephalic and atraumatic.      Nose: Nose normal. No congestion.      Mouth/Throat:      Mouth: Mucous membranes are moist.      Pharynx: Oropharynx is clear.   Eyes:      Extraocular Movements: Extraocular movements intact.      Pupils: Pupils are equal, round, and reactive to light.   Pulmonary:      Effort: Pulmonary effort is normal. No respiratory distress.   Abdominal:      General: Abdomen is flat. There is no distension.   Musculoskeletal:      Lumbar back: She exhibits tenderness (Over the left superior iliac crest), pain and spasm.        Back:    Skin:     General: Skin is warm and dry.   Neurological:      General: No focal deficit present.      Mental Status: She is alert and oriented to person, place, and time.   Psychiatric:         Mood and Affect: Mood normal.         Behavior: Behavior normal.       Ortho Exam          Assessment:  Peripheral neuralgia    Chronic left-sided low back pain without sciatica    Lumbar spondylosis    Impaired functional mobility and activity tolerance    Spasm of muscle of lower back               PLAN:  Edie Hernandez is a 65 y.o. female with chronic left-sided low-back pain.  She is approximately 3 weeks status post radiofrequency ablation to the left L2, L3, L4, L5 medial branches.  She has noted excellent improvement in her mid to lower back pain.  At this time, she does have some achiness across the left iliac crest and upper buttock worse with prolonged standing.  I advised her that it can take up to a month to get the full effect from radiofrequency ablation.  At this time,  will proceed with trigger point injections/superior cluneal nerve blocks at the iliac crest with ultrasound guidance.  Please see procedure note for more details.  She will return to clinic as needed                      Dejan Simmons D.O.  Physical Medicine and Rehabilitation

## 2020-09-29 ENCOUNTER — OFFICE VISIT (OUTPATIENT)
Dept: FAMILY MEDICINE | Facility: CLINIC | Age: 65
End: 2020-09-29
Payer: COMMERCIAL

## 2020-09-29 ENCOUNTER — PATIENT MESSAGE (OUTPATIENT)
Dept: FAMILY MEDICINE | Facility: CLINIC | Age: 65
End: 2020-09-29

## 2020-09-29 VITALS
SYSTOLIC BLOOD PRESSURE: 128 MMHG | HEIGHT: 65 IN | DIASTOLIC BLOOD PRESSURE: 74 MMHG | TEMPERATURE: 98 F | WEIGHT: 258.81 LBS | BODY MASS INDEX: 43.12 KG/M2 | HEART RATE: 70 BPM

## 2020-09-29 DIAGNOSIS — F11.20 OPIOID TYPE DEPENDENCE, CONTINUOUS: ICD-10-CM

## 2020-09-29 DIAGNOSIS — E11.9 TYPE 2 DIABETES MELLITUS WITHOUT COMPLICATION, WITHOUT LONG-TERM CURRENT USE OF INSULIN: ICD-10-CM

## 2020-09-29 DIAGNOSIS — E66.01 OBESITY, CLASS III, BMI 40-49.9 (MORBID OBESITY): Primary | ICD-10-CM

## 2020-09-29 DIAGNOSIS — B37.9 YEAST INFECTION: ICD-10-CM

## 2020-09-29 DIAGNOSIS — N39.0 FREQUENT UTI: ICD-10-CM

## 2020-09-29 DIAGNOSIS — R30.0 DYSURIA: ICD-10-CM

## 2020-09-29 DIAGNOSIS — G89.29 CHRONIC BACK PAIN GREATER THAN 3 MONTHS DURATION: ICD-10-CM

## 2020-09-29 DIAGNOSIS — N39.0 URINARY TRACT INFECTION WITHOUT HEMATURIA, SITE UNSPECIFIED: ICD-10-CM

## 2020-09-29 DIAGNOSIS — M54.9 CHRONIC BACK PAIN GREATER THAN 3 MONTHS DURATION: ICD-10-CM

## 2020-09-29 DIAGNOSIS — Z79.899 ENCOUNTER FOR MEDICATION MANAGEMENT: ICD-10-CM

## 2020-09-29 LAB
BILIRUB SERPL-MCNC: NEGATIVE MG/DL
BLOOD URINE, POC: 50
CLARITY, POC UA: CLEAR
COLOR, POC UA: YELLOW
GLUCOSE UR QL STRIP: NORMAL
KETONES UR QL STRIP: NEGATIVE
LEUKOCYTE ESTERASE URINE, POC: ABNORMAL
NITRITE, POC UA: NEGATIVE
PH, POC UA: 5
PROTEIN, POC: ABNORMAL
SPECIFIC GRAVITY, POC UA: 1.02
UROBILINOGEN, POC UA: NORMAL

## 2020-09-29 PROCEDURE — 3008F PR BODY MASS INDEX (BMI) DOCUMENTED: ICD-10-PCS | Mod: CPTII,S$GLB,, | Performed by: FAMILY MEDICINE

## 2020-09-29 PROCEDURE — 81002 URINALYSIS NONAUTO W/O SCOPE: CPT | Mod: S$GLB,,, | Performed by: FAMILY MEDICINE

## 2020-09-29 PROCEDURE — 81001 URINALYSIS AUTO W/SCOPE: CPT

## 2020-09-29 PROCEDURE — 87077 CULTURE AEROBIC IDENTIFY: CPT

## 2020-09-29 PROCEDURE — 87086 URINE CULTURE/COLONY COUNT: CPT

## 2020-09-29 PROCEDURE — 3044F HG A1C LEVEL LT 7.0%: CPT | Mod: CPTII,S$GLB,, | Performed by: FAMILY MEDICINE

## 2020-09-29 PROCEDURE — 3044F PR MOST RECENT HEMOGLOBIN A1C LEVEL <7.0%: ICD-10-PCS | Mod: CPTII,S$GLB,, | Performed by: FAMILY MEDICINE

## 2020-09-29 PROCEDURE — 99214 OFFICE O/P EST MOD 30 MIN: CPT | Mod: 25,S$GLB,, | Performed by: FAMILY MEDICINE

## 2020-09-29 PROCEDURE — 99999 PR PBB SHADOW E&M-EST. PATIENT-LVL V: ICD-10-PCS | Mod: PBBFAC,,, | Performed by: FAMILY MEDICINE

## 2020-09-29 PROCEDURE — 3008F BODY MASS INDEX DOCD: CPT | Mod: CPTII,S$GLB,, | Performed by: FAMILY MEDICINE

## 2020-09-29 PROCEDURE — 99999 PR PBB SHADOW E&M-EST. PATIENT-LVL V: CPT | Mod: PBBFAC,,, | Performed by: FAMILY MEDICINE

## 2020-09-29 PROCEDURE — 87088 URINE BACTERIA CULTURE: CPT

## 2020-09-29 PROCEDURE — 81002 POCT URINE DIPSTICK WITHOUT MICROSCOPE: ICD-10-PCS | Mod: S$GLB,,, | Performed by: FAMILY MEDICINE

## 2020-09-29 PROCEDURE — 99214 PR OFFICE/OUTPT VISIT, EST, LEVL IV, 30-39 MIN: ICD-10-PCS | Mod: 25,S$GLB,, | Performed by: FAMILY MEDICINE

## 2020-09-29 PROCEDURE — 87186 SC STD MICRODIL/AGAR DIL: CPT | Mod: 59

## 2020-09-29 RX ORDER — SULFAMETHOXAZOLE AND TRIMETHOPRIM 800; 160 MG/1; MG/1
1 TABLET ORAL 2 TIMES DAILY
Qty: 14 TABLET | Refills: 0 | Status: SHIPPED | OUTPATIENT
Start: 2020-09-29 | End: 2020-10-02 | Stop reason: ALTCHOICE

## 2020-09-29 RX ORDER — HYDROCODONE BITARTRATE AND ACETAMINOPHEN 7.5; 325 MG/1; MG/1
1 TABLET ORAL
Qty: 90 TABLET | Refills: 0 | Status: SHIPPED | OUTPATIENT
Start: 2020-09-29 | End: 2021-01-04 | Stop reason: SDUPTHER

## 2020-09-29 RX ORDER — PHENAZOPYRIDINE HYDROCHLORIDE 100 MG/1
100 TABLET, FILM COATED ORAL 3 TIMES DAILY PRN
Qty: 6 TABLET | Refills: 0 | Status: SHIPPED | OUTPATIENT
Start: 2020-09-29 | End: 2020-10-01

## 2020-09-29 RX ORDER — FLUCONAZOLE 150 MG/1
150 TABLET ORAL DAILY
Qty: 2 TABLET | Refills: 0 | Status: SHIPPED | OUTPATIENT
Start: 2020-09-29 | End: 2020-09-30

## 2020-09-29 RX ORDER — HYDROCODONE BITARTRATE AND ACETAMINOPHEN 7.5; 325 MG/1; MG/1
1 TABLET ORAL
Qty: 30 TABLET | Refills: 0 | Status: CANCELLED | OUTPATIENT
Start: 2020-10-29

## 2020-09-29 RX ORDER — HYDROCODONE BITARTRATE AND ACETAMINOPHEN 7.5; 325 MG/1; MG/1
1 TABLET ORAL
Qty: 30 TABLET | Refills: 0 | Status: CANCELLED | OUTPATIENT
Start: 2020-11-29

## 2020-09-29 RX ORDER — LANCETS
1 EACH MISCELLANEOUS 4 TIMES DAILY
Qty: 100 EACH | Refills: 3 | Status: CANCELLED | OUTPATIENT
Start: 2020-09-29

## 2020-09-29 NOTE — PATIENT INSTRUCTIONS
"  Bladder Infection, Female (Adult)    Urine is normally doesn't have any bacteria in it. But bacteria can get into the urinary tract from the skin around the rectum. Or they can travel in the blood from elsewhere in the body. Once they are in your urinary tract, they can cause infection in the urethra (urethritis), the bladder (cystitis), or the kidneys (pyelonephritis).  The most common place for an infection is in the bladder. This is called a bladder infection. This is one of the most common infections in women. Most bladder infections are easily treated. They are not serious unless the infection spreads to the kidney.  The phrases "bladder infection," "UTI," and "cystitis" are often used to describe the same thing. But they are not always the same. Cystitis is an inflammation of the bladder. The most common cause of cystitis is an infection.  Symptoms  The infection causes inflammation in the urethra and bladder. This causes many of the symptoms. The most common symptoms of a bladder infection are:  · Pain or burning when urinating  · Having to urinate more often than usual  · Urgent need to urinate  · Only a small amount of urine comes out  · Blood in urine  · Abdominal discomfort. This is usually in the lower abdomen above the pubic bone.  · Cloudy urine  · Strong- or bad-smelling urine  · Unable to urinate (urinary retention)  · Unable to hold urine in (urinary incontinence)  · Fever  · Loss of appetite  · Confusion (in older adults)  Causes  Bladder infections are not contagious. You can't get one from someone else, from a toilet seat, or from sharing a bath.  The most common cause of bladder infections is bacteria from the bowels. The bacteria get onto the skin around the opening of the urethra. From there, they can get into the urine and travel up to the bladder, causing inflammation and infection. This usually happens because of:  · Wiping improperly after urinating. Always wipe from front to " back.  · Bowel incontinence  · Pregnancy  · Procedures such as having a catheter inserted  · Older age  · Not emptying your bladder. This can allow bacteria a chance to grow in your urine.  · Dehydration  · Constipation  · Sex  · Use of a diaphragm for birth control   Treatment  Bladder infections are diagnosed by a urine test. They are treated with antibiotics and usually clear up quickly without complications. Treatment helps prevent a more serious kidney infection.  Medicines  Medicines can help in the treatment of a bladder infection:  · Take antibiotics until they are used up, even if you feel better. It is important to finish them to make sure the infection has cleared.  · You can use acetaminophen or ibuprofen for pain, fever, or discomfort, unless another medicine was prescribed. If you have chronic liver or kidney disease, talk with your healthcare provider before using these medicines. Also talk with your provider if you've ever had a stomach ulcer or gastrointestinal bleeding, or are taking blood-thinner medicines.  · If you are given phenazopydridine to reduce burning with urination, it will cause your urine to become a bright orange color. This can stain clothing.  Care and prevention  These self-care steps can help prevent future infections:  · Drink plenty of fluids to prevent dehydration and flush out your bladder. Do this unless you must restrict fluids for other health reasons, or your doctor told you not to.  · Proper cleaning after going to the bathroom is important. Wipe from front to back after using the toilet to prevent the spread of bacteria.  · Urinate more often. Don't try to hold urine in for a long time.  · Wear loose-fitting clothes and cotton underwear. Avoid tight-fitting pants.  · Improve your diet and prevent constipation. Eat more fresh fruit and vegetables, and fiber, and less junk and fatty foods.  · Avoid sex until your symptoms are gone.  · Avoid caffeine, alcohol, and spicy  foods. These can irritate your bladder.  · Urinate right after intercourse to flush out your bladder.  · If you use birth control pills and have frequent bladder infections, discuss it with your doctor.  Follow-up care  Call your healthcare provider if all symptoms are not gone after 3 days of treatment. This is especially important if you have repeat infections.  If a culture was done, you will be told if your treatment needs to be changed. If directed, you can call to find out the results.  If X-rays were done, you will be told if the results will affect your treatment.  Call 911  Call 911 if any of the following occur:  · Trouble breathing  · Hard to wake up or confusion  · Fainting or loss of consciousness  · Rapid heart rate  When to seek medical advice  Call your healthcare provider right away if any of these occur:  · Fever of 100.4ºF (38.0ºC) or higher, or as directed by your healthcare provider  · Symptoms are not better by the third day of treatment  · Back or belly (abdominal) pain that gets worse  · Repeated vomiting, or unable to keep medicine down  · Weakness or dizziness  · Vaginal discharge  · Pain, redness, or swelling in the outer vaginal area (labia)  Date Last Reviewed: 10/1/2016  © 8880-0355 [employer]. [address] [place] [address], [place] [address]. All rights reserved. This information is not intended as a substitute for professional medical care. Always follow your healthcare professional's instructions.        Head Tilt / Upper Trapezius Stretch (Flexibility)    1. Sit up straight in a chair with your head and neck in a neutral position, ears in line with shoulders. Hold the edge of your chair seat with your right hand. Tuck your chin in slightly.  2. Tilt your head to the left, while looking straight ahead.  3. Put your left hand on the right side of your head. Gently pull your head to the left. Hold for 30 to 60 seconds. Use gentle pressure to increase the stretch. Dont force  your head into position.  4. Return your head and neck to the neutral position.  5. Repeat this exercise 2 times, or as instructed.  6. Switch sides and repeat 2 times, or as instructed.  Challenge yourself  Tuck one end of a towel under your left arm. Then bring the other end over your right shoulder. Pull the towel down on your right shoulder with both hands as you side-bend your head to the left. Repeat with the other side.   Date Last Reviewed: 3/10/2016  © 2026-4705 [employer]. [address] [place] [id number]. All rights reserved. This information is not intended as a substitute for professional medical care. Always follow your healthcare professional's instructions.

## 2020-09-29 NOTE — TELEPHONE ENCOUNTER
I have checked the patient's  prior to prescribing opioids.  Urine drug screen July 2020  Contact signed January 2020

## 2020-09-29 NOTE — PROGRESS NOTES
Subjective:       Patient ID: Edie Hernandez is a 65 y.o. female.    Chief Complaint: Follow-up (3 month follow up) and Medication Refill    This patient is new to me.  Ms Delarosa presents to the clinic today for her three month follow up and medication refills. Patient states she takes her Norco as directed and it does help with her pain. Patient recently had nerves burned in her back and states that has also helped with her pain.   Patient states that Friday she started to have a crick in her neck. Patient states that Saturday and Sunday the neck pain was so bad she was crying from pain. Patient states that the pain has decreased but it is now in traps and is still sore and painful to turn her head. Patient states she has tried taking her medications for this and it has not helped. Patient states her  tried to massage it but that made the pain worse. Patient states she tried some of the muscle creams she had at home and these also did not help. Patient educated on heat therapy for this and states she will try this.   Patient is also complaining of burning and frequent urination that started in the last week or so. Patient states she does get frequent bladder infections and needs to start her vitamin C supplement back. Patient has not had a urinalysis or urine culture with us since November but states she had a UTI a few months ago and had ant biotics at home that she took. Patient states she has been diagnosed with interstitial cystitis in the past. Patient states when she takes antibiotics she usually gets a yeast infection and would like medication for this.   Patient educated on plan of care, verbalized understanding.    Review of Systems   Constitutional: Negative for activity change, appetite change, chills, diaphoresis and fever.   HENT: Negative for congestion, ear pain, postnasal drip, sinus pressure, sneezing and sore throat.    Eyes: Negative for pain, discharge, redness and itching.   Respiratory:  Negative for apnea, cough, chest tightness, shortness of breath and wheezing.    Cardiovascular: Negative for chest pain and leg swelling.   Gastrointestinal: Negative for abdominal distention, abdominal pain, constipation, diarrhea, nausea and vomiting.   Genitourinary: Positive for dysuria and frequency. Negative for difficulty urinating and flank pain.   Musculoskeletal: Positive for neck pain.   Skin: Negative for color change, rash and wound.   Neurological: Negative for dizziness.       Patient Active Problem List   Diagnosis    GERD (gastroesophageal reflux disease)    Chronic back pain greater than 3 months duration    Environmental allergies    Fibromyalgia    B12 nutritional deficiency    CLINT (obstructive sleep apnea)    Chronic sinusitis    Asthma    Hypothyroid    Nausea    Night sweats    Tinnitus, bilateral    Frequent UTI    Hyperlipidemia    Colon polyp, hyperplastic    Interstitial cystitis    Hemangioma    Wart    Globus sensation    DDD (degenerative disc disease), lumbar    Morbid obesity with BMI of 45.0-49.9, adult    Edema    Mixed incontinence    Continuous opioid dependence- contract 10/18/17-failed uds for norco, + 2/18    Type 2 diabetes mellitus    History of colon polyps    Lumbar radiculitis    Lumbar radiculopathy    Lumbar spondylosis       Objective:      Physical Exam  Vitals signs reviewed.   Constitutional:       General: She is not in acute distress.     Appearance: Normal appearance. She is well-developed.   HENT:      Head: Normocephalic.      Nose: Nose normal.   Eyes:      Conjunctiva/sclera: Conjunctivae normal.      Pupils: Pupils are equal, round, and reactive to light.   Neck:      Musculoskeletal: Normal range of motion and neck supple.   Cardiovascular:      Rate and Rhythm: Normal rate and regular rhythm.      Heart sounds: Normal heart sounds.   Pulmonary:      Effort: Pulmonary effort is normal. No respiratory distress.      Breath  sounds: Normal breath sounds.   Abdominal:      General: Bowel sounds are normal. There is no distension.      Palpations: Abdomen is soft.      Tenderness: There is no abdominal tenderness. There is no right CVA tenderness or left CVA tenderness.   Skin:     General: Skin is warm and dry.      Findings: No rash.   Neurological:      Mental Status: She is alert and oriented to person, place, and time.   Psychiatric:         Behavior: Behavior normal.         Lab Results   Component Value Date    WBC 7.87 06/24/2020    HGB 13.6 06/24/2020    HCT 42.6 06/24/2020     06/24/2020    CHOL 208 (H) 06/24/2020    TRIG 180 (H) 06/24/2020    HDL 58 06/24/2020    ALT 27 06/24/2020    AST 20 06/24/2020     06/24/2020    K 4.3 06/24/2020     06/24/2020    CREATININE 1.0 06/24/2020    BUN 15 06/24/2020    CO2 26 06/24/2020    TSH 2.472 06/24/2020    HGBA1C 6.3 (H) 06/24/2020     The 10-year ASCVD risk score (Alden STEVO Jr., et al., 2013) is: 9.5%    Values used to calculate the score:      Age: 65 years      Sex: Female      Is Non- : No      Diabetic: Yes      Tobacco smoker: No      Systolic Blood Pressure: 128 mmHg      Is BP treated: No      HDL Cholesterol: 58 mg/dL      Total Cholesterol: 208 mg/dL    Assessment:       1. Obesity, Class III, BMI 40-49.9 (morbid obesity)    2. Dysuria    3. Opioid type dependence, continuous    4. Encounter for medication management    5. Frequent UTI    6. Chronic back pain greater than 3 months duration    7. Type 2 diabetes mellitus without complication, without long-term current use of insulin    8. Urinary tract infection without hematuria, site unspecified    9. Yeast infection        Plan:       Edie was seen today for follow-up and medication refill.    Diagnoses and all orders for this visit:    Obesity, Class III, BMI 40-49.9 (morbid obesity)  Comments:  today 43.07  Continue healthy diet and increase exercise as tolerated.  Continue  medications as prescribed.  Follow up with PCP    Dysuria / Frequent UTI / Urinary tract infection without hematuria, site unspecified  -     POCT URINE DIPSTICK WITHOUT MICROSCOPE  -     Urinalysis Microscopic  -     Urine culture  -     Urinalysis  -     sulfamethoxazole-trimethoprim 800-160mg (BACTRIM DS) 800-160 mg Tab; Take 1 tablet by mouth 2 (two) times daily. for 7 days  -     phenazopyridine (PYRIDIUM) 100 MG tablet; Take 1 tablet (100 mg total) by mouth 3 (three) times daily as needed for Pain.    Opioid type dependence, continuous / Encounter for medication management / Chronic back pain greater than 3 months duration  Medication refill sent to Dr Eugene.  Continue medications as prescribed.  Follow up with PCP    Type 2 diabetes mellitus without complication, without long-term current use of insulin  Continue medications as prescribed.  Follow up with PCP    Yeast infection  -     fluconazole (DIFLUCAN) 150 MG Tab; Take 1 tablet (150 mg total) by mouth once daily. for 1 day      Follow up in about 3 months (around 12/29/2020), or if symptoms worsen or fail to improve.

## 2020-09-30 LAB
BACTERIA #/AREA URNS AUTO: ABNORMAL /HPF
BILIRUB UR QL STRIP: NEGATIVE
CLARITY UR REFRACT.AUTO: ABNORMAL
COLOR UR AUTO: YELLOW
GLUCOSE UR QL STRIP: NEGATIVE
HGB UR QL STRIP: NEGATIVE
KETONES UR QL STRIP: NEGATIVE
LEUKOCYTE ESTERASE UR QL STRIP: ABNORMAL
MICROSCOPIC COMMENT: ABNORMAL
NITRITE UR QL STRIP: NEGATIVE
PH UR STRIP: 5 [PH] (ref 5–8)
PROT UR QL STRIP: NEGATIVE
RBC #/AREA URNS AUTO: 1 /HPF (ref 0–4)
SP GR UR STRIP: 1.02 (ref 1–1.03)
SQUAMOUS #/AREA URNS AUTO: 1 /HPF
URN SPEC COLLECT METH UR: ABNORMAL
WBC #/AREA URNS AUTO: 9 /HPF (ref 0–5)

## 2020-09-30 RX ORDER — LANCETS
1 EACH MISCELLANEOUS 4 TIMES DAILY
Qty: 100 EACH | Refills: 3 | Status: SHIPPED | OUTPATIENT
Start: 2020-09-30 | End: 2020-11-11

## 2020-10-01 LAB
BACTERIA UR CULT: ABNORMAL
BACTERIA UR CULT: ABNORMAL

## 2020-10-02 DIAGNOSIS — N39.0 URINARY TRACT INFECTION WITHOUT HEMATURIA, SITE UNSPECIFIED: Primary | ICD-10-CM

## 2020-10-02 RX ORDER — CIPROFLOXACIN 250 MG/1
250 TABLET, FILM COATED ORAL 2 TIMES DAILY
Qty: 14 TABLET | Refills: 0 | Status: SHIPPED | OUTPATIENT
Start: 2020-10-02 | End: 2020-10-09

## 2020-10-14 ENCOUNTER — HOSPITAL ENCOUNTER (OUTPATIENT)
Dept: RADIOLOGY | Facility: HOSPITAL | Age: 65
Discharge: HOME | End: 2020-10-14
Attending: FAMILY MEDICINE
Payer: COMMERCIAL

## 2020-10-14 ENCOUNTER — OFFICE VISIT (OUTPATIENT)
Dept: FAMILY MEDICINE | Facility: CLINIC | Age: 65
End: 2020-10-14
Payer: COMMERCIAL

## 2020-10-14 VITALS
DIASTOLIC BLOOD PRESSURE: 74 MMHG | HEART RATE: 73 BPM | SYSTOLIC BLOOD PRESSURE: 118 MMHG | RESPIRATION RATE: 18 BRPM | WEIGHT: 254.56 LBS | OXYGEN SATURATION: 97 % | BODY MASS INDEX: 42.41 KG/M2 | HEIGHT: 65 IN | TEMPERATURE: 98 F

## 2020-10-14 DIAGNOSIS — R22.2 LUMP OF SKIN OF BACK: Primary | ICD-10-CM

## 2020-10-14 DIAGNOSIS — R10.9 FLANK PAIN, ACUTE: ICD-10-CM

## 2020-10-14 DIAGNOSIS — E66.01 OBESITY, CLASS III, BMI 40-49.9 (MORBID OBESITY): ICD-10-CM

## 2020-10-14 DIAGNOSIS — R22.2 LUMP OF SKIN OF BACK: ICD-10-CM

## 2020-10-14 PROCEDURE — 99999 PR PBB SHADOW E&M-EST. PATIENT-LVL V: CPT | Mod: PBBFAC,,, | Performed by: FAMILY MEDICINE

## 2020-10-14 PROCEDURE — 76700 US EXAM ABDOM COMPLETE: CPT | Mod: TC

## 2020-10-14 PROCEDURE — 76604 US SOFT TISSUE CHEST_UPPER BACK: ICD-10-PCS | Mod: 26,,, | Performed by: RADIOLOGY

## 2020-10-14 PROCEDURE — 99999 PR PBB SHADOW E&M-EST. PATIENT-LVL V: ICD-10-PCS | Mod: PBBFAC,,, | Performed by: FAMILY MEDICINE

## 2020-10-14 PROCEDURE — 76604 US EXAM CHEST: CPT | Mod: TC

## 2020-10-14 PROCEDURE — 99214 OFFICE O/P EST MOD 30 MIN: CPT | Mod: S$GLB,,, | Performed by: FAMILY MEDICINE

## 2020-10-14 PROCEDURE — 76700 US ABDOMEN COMPLETE: ICD-10-PCS | Mod: 26,,, | Performed by: RADIOLOGY

## 2020-10-14 PROCEDURE — 3008F PR BODY MASS INDEX (BMI) DOCUMENTED: ICD-10-PCS | Mod: CPTII,S$GLB,, | Performed by: FAMILY MEDICINE

## 2020-10-14 PROCEDURE — 3008F BODY MASS INDEX DOCD: CPT | Mod: CPTII,S$GLB,, | Performed by: FAMILY MEDICINE

## 2020-10-14 PROCEDURE — 76604 US EXAM CHEST: CPT | Mod: 26,,, | Performed by: RADIOLOGY

## 2020-10-14 PROCEDURE — 99214 PR OFFICE/OUTPT VISIT, EST, LEVL IV, 30-39 MIN: ICD-10-PCS | Mod: S$GLB,,, | Performed by: FAMILY MEDICINE

## 2020-10-14 PROCEDURE — 76700 US EXAM ABDOM COMPLETE: CPT | Mod: 26,,, | Performed by: RADIOLOGY

## 2020-10-14 NOTE — PROGRESS NOTES
Subjective:       Patient ID: Edie Hernandez is a 65 y.o. female.    Chief Complaint: Pain (rib cage)    Mr Delarosa presents to the clinic today with complaints of right flank pain/rib pain. Patient states she has a cyst on her back on that side and she believes this may be pushing on her ribs causing the pain. Patient states the pain is mainly in the front of her ribs like below the breast but she believes it originates in her back. Patient states right now pain is 4 out of 10 but yesterday it was excruciating and she was crying in pain. Patient states she took one of her pain pills and this almost resolved the pain but it is back.   Patient educated on plan of care, verbalized understanding.    Pain  This is a recurrent problem. The problem occurs daily. The problem has been waxing and waning. Associated symptoms include abdominal pain and myalgias. Pertinent negatives include no anorexia, arthralgias, change in bowel habit, chest pain, chills, congestion, coughing, diaphoresis, fatigue, fever, headaches, joint swelling, nausea, neck pain, numbness, rash, sore throat, swollen glands, urinary symptoms, vertigo, visual change, vomiting or weakness. Nothing aggravates the symptoms. She has tried oral narcotics for the symptoms. The treatment provided moderate relief.     Review of Systems   Constitutional: Negative for activity change, appetite change, chills, diaphoresis, fatigue and fever.   HENT: Negative for congestion, ear pain, postnasal drip, sinus pressure, sneezing and sore throat.    Eyes: Negative for pain, discharge, redness and itching.   Respiratory: Negative for apnea, cough, chest tightness, shortness of breath and wheezing.    Cardiovascular: Negative for chest pain and leg swelling.   Gastrointestinal: Positive for abdominal pain. Negative for abdominal distention, anorexia, change in bowel habit, constipation, diarrhea, nausea and vomiting.   Genitourinary: Negative for difficulty urinating, dysuria,  flank pain and frequency.   Musculoskeletal: Positive for myalgias. Negative for arthralgias, joint swelling and neck pain.   Skin: Negative for color change, rash and wound.   Neurological: Negative for dizziness, vertigo, weakness, numbness and headaches.       Patient Active Problem List   Diagnosis    GERD (gastroesophageal reflux disease)    Chronic back pain greater than 3 months duration    Environmental allergies    Fibromyalgia    B12 nutritional deficiency    CLINT (obstructive sleep apnea)    Chronic sinusitis    Asthma    Hypothyroid    Nausea    Night sweats    Tinnitus, bilateral    Frequent UTI    Hyperlipidemia    Colon polyp, hyperplastic    Interstitial cystitis    Hemangioma    Wart    Globus sensation    DDD (degenerative disc disease), lumbar    Morbid obesity with BMI of 45.0-49.9, adult    Edema    Mixed incontinence    Continuous opioid dependence- contract 10/18/17-failed uds for norco, + 2/18    Type 2 diabetes mellitus    History of colon polyps    Lumbar radiculitis    Lumbar radiculopathy    Lumbar spondylosis       Objective:      Physical Exam  Vitals signs reviewed.   Constitutional:       General: She is not in acute distress.     Appearance: Normal appearance. She is well-developed.   HENT:      Head: Normocephalic.      Nose: Nose normal.   Eyes:      Conjunctiva/sclera: Conjunctivae normal.      Pupils: Pupils are equal, round, and reactive to light.   Neck:      Musculoskeletal: Normal range of motion and neck supple.   Cardiovascular:      Rate and Rhythm: Normal rate and regular rhythm.      Heart sounds: Normal heart sounds.   Pulmonary:      Effort: Pulmonary effort is normal. No respiratory distress.      Breath sounds: Normal breath sounds.   Abdominal:      General: There is no distension.      Palpations: Abdomen is soft. There is no mass.      Tenderness: There is no abdominal tenderness. There is no right CVA tenderness, left CVA tenderness,  guarding or rebound.      Hernia: No hernia is present.   Skin:     General: Skin is warm and dry.      Findings: No rash.          Neurological:      Mental Status: She is alert and oriented to person, place, and time.   Psychiatric:         Behavior: Behavior normal.         Lab Results   Component Value Date    WBC 7.87 06/24/2020    HGB 13.6 06/24/2020    HCT 42.6 06/24/2020     06/24/2020    CHOL 208 (H) 06/24/2020    TRIG 180 (H) 06/24/2020    HDL 58 06/24/2020    ALT 27 06/24/2020    AST 20 06/24/2020     06/24/2020    K 4.3 06/24/2020     06/24/2020    CREATININE 1.0 06/24/2020    BUN 15 06/24/2020    CO2 26 06/24/2020    TSH 2.472 06/24/2020    HGBA1C 6.3 (H) 06/24/2020     The 10-year ASCVD risk score (Pepe WHITESIDE Jr., et al., 2013) is: 8.1%    Values used to calculate the score:      Age: 65 years      Sex: Female      Is Non- : No      Diabetic: Yes      Tobacco smoker: No      Systolic Blood Pressure: 118 mmHg      Is BP treated: No      HDL Cholesterol: 58 mg/dL      Total Cholesterol: 208 mg/dL    Assessment:       1. Lump of skin of back    2. Flank pain, acute    3. Obesity, Class III, BMI 40-49.9 (morbid obesity)        Plan:       Edie was seen today for pain.    Diagnoses and all orders for this visit:    Lump of skin of back  -     US Soft Tissue Chest_Upper Back; Future    Flank pain, acute  -     US Abdomen Complete; Future    Obesity, Class III, BMI 40-49.9 (morbid obesity)  Comments:  today 42.36  Continue working on healthy diet and increase exercise as tolerated.  Continue medications as prescribed.  Follow up with PCP      Follow up if symptoms worsen or fail to improve.

## 2020-10-15 ENCOUNTER — PATIENT MESSAGE (OUTPATIENT)
Dept: RHEUMATOLOGY | Facility: CLINIC | Age: 65
End: 2020-10-15

## 2020-10-15 DIAGNOSIS — D17.9 LIPOMA, UNSPECIFIED SITE: Primary | ICD-10-CM

## 2020-10-15 DIAGNOSIS — N28.1 RENAL CYST: Primary | ICD-10-CM

## 2020-10-16 ENCOUNTER — PATIENT MESSAGE (OUTPATIENT)
Dept: FAMILY MEDICINE | Facility: CLINIC | Age: 65
End: 2020-10-16

## 2020-11-11 ENCOUNTER — PATIENT MESSAGE (OUTPATIENT)
Dept: FAMILY MEDICINE | Facility: CLINIC | Age: 65
End: 2020-11-11

## 2020-11-11 RX ORDER — ALBUTEROL SULFATE 90 UG/1
2 AEROSOL, METERED RESPIRATORY (INHALATION) EVERY 6 HOURS PRN
Qty: 18 G | Status: SHIPPED | OUTPATIENT
Start: 2020-11-11 | End: 2022-05-24

## 2020-11-11 RX ORDER — LANCETS
EACH MISCELLANEOUS
Qty: 200 EACH | Refills: 3 | Status: SHIPPED | OUTPATIENT
Start: 2020-11-11

## 2020-11-11 NOTE — TELEPHONE ENCOUNTER
Patient is requesting a refill of her albuterol rescue inhaler.  States that she does not have her inhaler any more and the neighbors are doing a lot if burning in th yard.  Needs to get a new rx so she is prepared.

## 2020-11-16 ENCOUNTER — PATIENT MESSAGE (OUTPATIENT)
Dept: FAMILY MEDICINE | Facility: CLINIC | Age: 65
End: 2020-11-16

## 2020-12-11 DIAGNOSIS — E11.9 TYPE 2 DIABETES MELLITUS WITHOUT COMPLICATION, UNSPECIFIED WHETHER LONG TERM INSULIN USE: ICD-10-CM

## 2020-12-21 ENCOUNTER — PATIENT OUTREACH (OUTPATIENT)
Dept: ADMINISTRATIVE | Facility: HOSPITAL | Age: 65
End: 2020-12-21

## 2020-12-21 DIAGNOSIS — E11.9 DIABETES MELLITUS WITHOUT COMPLICATION: Primary | ICD-10-CM

## 2020-12-21 NOTE — PROGRESS NOTES
2020 Care Everywhere updates requested and reviewed.  Immunizations reconciled. Media reports reviewed.  Duplicate HM overrides and  orders removed.  Overdue HM topic chart audit and/or requested.  Overdue lab testing linked to upcoming lab appointments if applies.    LINKS DOWN 2020    WOG orders placed. HA1C  Letter mailed.  PORTAL      Health Maintenance Due   Topic Date Due    Pneumococcal Vaccine (Medium Risk) (1 of 1 - PPSV23) 1975    Shingles Vaccine (2 of 3) 2017    Influenza Vaccine (1) 2020    Eye Exam  2020    Hemoglobin A1c  2020    Foot Exam  2021

## 2020-12-21 NOTE — LETTER
December 28, 2020    [place] [id number]             [facility]  [address]  [facility] [address]  Phone: [phone] [facility] is committed to your overall health.  To help you get the most out of each of your visits, we will review your information to make sure you are up to date on all of your recommended tests and/or procedures.       Dr. Lydia Eugene MD has found that your chart shows you may be due for a:     DIABETIC EYE EXAM   DIABETIC FOOT EXAM   HEMOGLOBIN A1C (LAB)     If you have had any of the above done at another facility, please bring the records or information with you so that your record at [facility] will be complete.  If you would like to schedule any of these, please contact the clinic at [phone].     If you are currently taking medication, please bring it with you to your appointment for review.     Also, if you have any type of Advanced Directives, please bring them with you to your office visit so we may scan them into your chart.     Thank You,     Your [facility] Team,   MD Sherley Camilo LPN Clinical Care Coordinator   [facility]   [address] [place] [address]   Phone [phone]   Fax: [phone]

## 2020-12-22 DIAGNOSIS — E03.9 HYPOTHYROIDISM (ACQUIRED): ICD-10-CM

## 2020-12-22 DIAGNOSIS — G89.29 CHRONIC BACK PAIN GREATER THAN 3 MONTHS DURATION: ICD-10-CM

## 2020-12-22 DIAGNOSIS — E11.65 UNCONTROLLED TYPE 2 DIABETES MELLITUS WITH HYPERGLYCEMIA: ICD-10-CM

## 2020-12-22 DIAGNOSIS — N32.81 OAB (OVERACTIVE BLADDER): ICD-10-CM

## 2020-12-22 DIAGNOSIS — M54.9 CHRONIC BACK PAIN GREATER THAN 3 MONTHS DURATION: ICD-10-CM

## 2020-12-24 RX ORDER — LEVOTHYROXINE SODIUM 150 UG/1
137 TABLET ORAL DAILY
Qty: 90 TABLET | Refills: 3 | Status: SHIPPED | OUTPATIENT
Start: 2020-12-24 | End: 2021-11-16 | Stop reason: SDUPTHER

## 2020-12-24 RX ORDER — OXYBUTYNIN CHLORIDE 15 MG/1
15 TABLET, EXTENDED RELEASE ORAL DAILY
Qty: 90 TABLET | Refills: 3 | Status: SHIPPED | OUTPATIENT
Start: 2020-12-24 | End: 2022-08-04 | Stop reason: ALTCHOICE

## 2020-12-24 RX ORDER — CYCLOBENZAPRINE HCL 5 MG
5 TABLET ORAL 3 TIMES DAILY PRN
Qty: 90 TABLET | Status: SHIPPED | OUTPATIENT
Start: 2020-12-24 | End: 2021-08-19

## 2020-12-24 RX ORDER — METFORMIN HYDROCHLORIDE 500 MG/1
1000 TABLET, EXTENDED RELEASE ORAL 2 TIMES DAILY WITH MEALS
Qty: 360 TABLET | Refills: 3 | Status: SHIPPED | OUTPATIENT
Start: 2020-12-24 | End: 2022-09-27

## 2020-12-28 ENCOUNTER — PATIENT OUTREACH (OUTPATIENT)
Dept: ADMINISTRATIVE | Facility: OTHER | Age: 65
End: 2020-12-28

## 2020-12-30 ENCOUNTER — OFFICE VISIT (OUTPATIENT)
Dept: PHYSICAL MEDICINE AND REHAB | Facility: CLINIC | Age: 65
End: 2020-12-30
Payer: COMMERCIAL

## 2020-12-30 VITALS — BODY MASS INDEX: 42.32 KG/M2 | WEIGHT: 254 LBS | HEIGHT: 65 IN

## 2020-12-30 DIAGNOSIS — Z74.09 IMPAIRED FUNCTIONAL MOBILITY AND ACTIVITY TOLERANCE: ICD-10-CM

## 2020-12-30 DIAGNOSIS — M79.2 NEURALGIA: Primary | ICD-10-CM

## 2020-12-30 DIAGNOSIS — M47.816 LUMBAR SPONDYLOSIS: ICD-10-CM

## 2020-12-30 DIAGNOSIS — M62.830 SPASM OF MUSCLE OF LOWER BACK: ICD-10-CM

## 2020-12-30 DIAGNOSIS — M79.18 LEFT BUTTOCK PAIN: ICD-10-CM

## 2020-12-30 PROCEDURE — 99999 PR PBB SHADOW E&M-EST. PATIENT-LVL II: CPT | Mod: PBBFAC,,, | Performed by: PHYSICAL MEDICINE & REHABILITATION

## 2020-12-30 PROCEDURE — 3008F PR BODY MASS INDEX (BMI) DOCUMENTED: ICD-10-PCS | Mod: CPTII,S$GLB,, | Performed by: PHYSICAL MEDICINE & REHABILITATION

## 2020-12-30 PROCEDURE — 76942 PR U/S GUIDANCE FOR NEEDLE GUIDANCE: ICD-10-PCS | Mod: S$GLB,,, | Performed by: PHYSICAL MEDICINE & REHABILITATION

## 2020-12-30 PROCEDURE — 76942 ECHO GUIDE FOR BIOPSY: CPT | Mod: S$GLB,,, | Performed by: PHYSICAL MEDICINE & REHABILITATION

## 2020-12-30 PROCEDURE — 99213 PR OFFICE/OUTPT VISIT, EST, LEVL III, 20-29 MIN: ICD-10-PCS | Mod: 25,S$GLB,, | Performed by: PHYSICAL MEDICINE & REHABILITATION

## 2020-12-30 PROCEDURE — 1125F PR PAIN SEVERITY QUANTIFIED, PAIN PRESENT: ICD-10-PCS | Mod: S$GLB,,, | Performed by: PHYSICAL MEDICINE & REHABILITATION

## 2020-12-30 PROCEDURE — 99999 PR PBB SHADOW E&M-EST. PATIENT-LVL II: ICD-10-PCS | Mod: PBBFAC,,, | Performed by: PHYSICAL MEDICINE & REHABILITATION

## 2020-12-30 PROCEDURE — 3008F BODY MASS INDEX DOCD: CPT | Mod: CPTII,S$GLB,, | Performed by: PHYSICAL MEDICINE & REHABILITATION

## 2020-12-30 PROCEDURE — 1125F AMNT PAIN NOTED PAIN PRSNT: CPT | Mod: S$GLB,,, | Performed by: PHYSICAL MEDICINE & REHABILITATION

## 2020-12-30 PROCEDURE — 64450 NJX AA&/STRD OTHER PN/BRANCH: CPT | Mod: LT,S$GLB,, | Performed by: PHYSICAL MEDICINE & REHABILITATION

## 2020-12-30 PROCEDURE — 64450 PR NERVE BLOCK INJ, ANES/STEROID, OTHER PERIPHERAL: ICD-10-PCS | Mod: LT,S$GLB,, | Performed by: PHYSICAL MEDICINE & REHABILITATION

## 2020-12-30 PROCEDURE — 99213 OFFICE O/P EST LOW 20 MIN: CPT | Mod: 25,S$GLB,, | Performed by: PHYSICAL MEDICINE & REHABILITATION

## 2020-12-30 NOTE — PROCEDURES
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  The patient was placed in standing position on the exam table and leaned forward.  The left superior iliac crest was exposed and using a curvilinear transducer, the gluteus preethi origin on the iliac crest was identified in the area of the superior cluneal nerve.  Cephalad, skin was cleaned with alcohol and allowed to dry.  A 3.5 in 25 gauge needle was advanced onto the iliac crest in the area of the superior cluneal nerves where the nerves were and 8 cc solution of 6 cc of 0.50% ropivacaine and 2 cc of 50% dextrose (overall 12.5% dextrose) was injected along the superior iliac crest from medial to lateral in the region of the superior cluneal nerves.  Needle was removed and Band-Aid was applied.  The patient tolerated the procedure well with no adverse events.

## 2020-12-30 NOTE — PROGRESS NOTES
Chief Complaint   Patient presents with    Hip Pain     left hip injection         HPI: Edie Hernandez is a  65 y.o. female who presents today for followup. she was last seen in September of 2020.  At that visit, performed a left superior cluneal nerve block.  This provided her near 100% pain relief for approximately 3 months.  Her pain has returned and is otherwise unchanged in location, duration, intensity, or characteristics.  Pain today is a 5/10 and worse with prolonged standing and walking.  She denies any back pain at this time.  Pain is mainly in the left buttock.        Review of Systems   Constitutional: Negative for chills and fever.   HENT: Negative for congestion and tinnitus.    Eyes: Negative for blurred vision and photophobia.   Respiratory: Negative for shortness of breath and wheezing.    Cardiovascular: Negative for chest pain and palpitations.   Gastrointestinal: Negative for nausea and vomiting.   Genitourinary: Negative for dysuria and frequency.   Musculoskeletal: Positive for back pain and myalgias. Negative for joint pain.   Skin: Negative for itching and rash.   Neurological: Negative for speech change and focal weakness.   Endo/Heme/Allergies: Negative for environmental allergies. Does not bruise/bleed easily.   Psychiatric/Behavioral: Negative for depression. The patient is not nervous/anxious.             Past Medical History:   Diagnosis Date    Arthritis     Colon polyp, hyperplastic 1/13    recheck 5-10 years    Diabetes mellitus, type 2     Diverticulosis     Fatty liver     General anesthetics causing adverse effect in therapeutic use     woke up during tubal ligation    GERD (gastroesophageal reflux disease)     CLINT (obstructive sleep apnea)     C-pap    PONV (postoperative nausea and vomiting)     Thyroid disease           Current Outpatient Medications on File Prior to Visit   Medication Sig Dispense Refill    albuterol (VENTOLIN HFA) 90 mcg/actuation inhaler Inhale  2 puffs into the lungs every 6 (six) hours as needed for Wheezing. Rescue 18 g prn    blood-glucose meter kit Use as instructed 1 each 0    clobetasol (TEMOVATE) 0.05 % cream Apply 1 application topically 2 (two) times daily. Apply to affected area 30 g 2    cyclobenzaprine (FLEXERIL) 5 MG tablet Take 1 tablet (5 mg total) by mouth 3 (three) times daily as needed for Muscle spasms. One to two tablets qhs prn pain 90 tablet prn    diclofenac sodium (VOLTAREN) 1 % Gel Apply 2 g topically 4 (four) times daily.      ergocalciferol (ERGOCALCIFEROL) 50,000 unit Cap Take 1 capsule (50,000 Units total) by mouth every 7 days. 12 capsule 1    fluticasone (FLONASE) 50 mcg/actuation nasal spray 1 spray (50 mcg total) by Each Nare route daily as needed. 1 Bottle 11    furosemide (LASIX) 40 MG tablet Take 1 tablet (40 mg total) by mouth daily as needed. Every day PRN 30 tablet 4    gabapentin (NEURONTIN) 300 MG capsule Take 1 capsule (300 mg total) by mouth 2 (two) times daily. 180 capsule 3    HYDROcodone-acetaminophen (NORCO) 7.5-325 mg per tablet Take 1 tablet by mouth every 24 hours as needed for Pain. 90 tablet 0    indomethacin (INDOCIN) 25 MG capsule TAKE ONE CAPSULE BY MOUTH 3 TIMES A DAY WITH MEALS 90 capsule 1    lancets (ACCU-CHEK MULTICLIX LANCET) Misc Test 2 x day 200 each 3    levothyroxine (SYNTHROID) 150 MCG tablet Take 1 tablet (150 mcg total) by mouth once daily. 90 tablet 3    metFORMIN (GLUCOPHAGE-XR) 500 MG ER 24hr tablet Take 2 tablets (1,000 mg total) by mouth 2 (two) times daily with meals. 360 tablet 3    methylphenidate (RITALIN LA) 40 MG 24 hr capsule Take 40 mg by mouth daily as needed.       methylphenidate HCl (RITALIN) 10 MG tablet       ONETOUCH ULTRA BLUE TEST STRIP Strp USE TO TEST BLOOD SUGAR 100 strip 3    oxybutynin (DITROPAN XL) 15 MG TR24 Take 1 tablet (15 mg total) by mouth once daily. 90 tablet 3    pantoprazole (PROTONIX) 40 MG tablet Take 1 tablet (40 mg total) by mouth  "once daily. 90 tablet 3     Current Facility-Administered Medications on File Prior to Visit   Medication Dose Route Frequency Provider Last Rate Last Dose    lactated ringers infusion   Intravenous Continuous Dejan Simmons MD   Stopped at 07/06/20 1008              Physical Exam:  HT: 5'5", Pain 5/10, WT: 254 lb.  Physical Exam  Constitutional:       General: She is not in acute distress.     Appearance: Normal appearance.   HENT:      Head: Normocephalic and atraumatic.      Nose: Nose normal. No congestion.      Mouth/Throat:      Mouth: Mucous membranes are moist.      Pharynx: Oropharynx is clear.   Eyes:      Extraocular Movements: Extraocular movements intact.      Pupils: Pupils are equal, round, and reactive to light.   Neck:      Trachea: Trachea and phonation normal.   Pulmonary:      Effort: Pulmonary effort is normal. No respiratory distress.   Abdominal:      General: Abdomen is flat. There is no distension.   Musculoskeletal:      Lumbar back: She exhibits tenderness, pain and spasm.        Back:    Skin:     General: Skin is warm and dry.   Neurological:      General: No focal deficit present.      Mental Status: She is alert and oriented to person, place, and time.      Cranial Nerves: Cranial nerves are intact.      Sensory: Sensation is intact.      Motor: Motor function is intact.      Gait: Gait is intact.   Psychiatric:         Mood and Affect: Mood and affect normal.         Behavior: Behavior normal.       Ortho Exam          Assessment:  Neuralgia    Left buttock pain    Lumbar spondylosis    Spasm of muscle of lower back    Impaired functional mobility and activity tolerance               PLAN:  Edie Hernandez is a 65 y.o. female with left buttock pain.  She last received a left superior cluneal nerve block back in September which provided her with near 100% pain relief for approximately 3 months.  Her pain has returned over the last month and is otherwise unchanged in location, duration, " intensity, or characteristics.  At this time, I will proceed with a repeat left superior cluneal nerve block.  The patient will follow up in clinic as needed            Dejan Simmons D.O.

## 2021-01-04 ENCOUNTER — PATIENT MESSAGE (OUTPATIENT)
Dept: ADMINISTRATIVE | Facility: HOSPITAL | Age: 66
End: 2021-01-04

## 2021-01-04 ENCOUNTER — OFFICE VISIT (OUTPATIENT)
Dept: FAMILY MEDICINE | Facility: CLINIC | Age: 66
End: 2021-01-04
Payer: COMMERCIAL

## 2021-01-04 VITALS
TEMPERATURE: 98 F | SYSTOLIC BLOOD PRESSURE: 120 MMHG | HEART RATE: 84 BPM | OXYGEN SATURATION: 96 % | DIASTOLIC BLOOD PRESSURE: 60 MMHG | BODY MASS INDEX: 41.47 KG/M2 | RESPIRATION RATE: 14 BRPM | HEIGHT: 65 IN | WEIGHT: 248.88 LBS

## 2021-01-04 DIAGNOSIS — E53.8 B12 NUTRITIONAL DEFICIENCY: ICD-10-CM

## 2021-01-04 DIAGNOSIS — N39.0 FREQUENT UTI: ICD-10-CM

## 2021-01-04 DIAGNOSIS — E66.01 MORBID OBESITY WITH BMI OF 45.0-49.9, ADULT: ICD-10-CM

## 2021-01-04 DIAGNOSIS — E11.9 TYPE 2 DIABETES MELLITUS WITHOUT COMPLICATION, WITHOUT LONG-TERM CURRENT USE OF INSULIN: ICD-10-CM

## 2021-01-04 DIAGNOSIS — K21.9 GASTROESOPHAGEAL REFLUX DISEASE, UNSPECIFIED WHETHER ESOPHAGITIS PRESENT: ICD-10-CM

## 2021-01-04 DIAGNOSIS — M51.36 DDD (DEGENERATIVE DISC DISEASE), LUMBAR: ICD-10-CM

## 2021-01-04 DIAGNOSIS — N30.10 INTERSTITIAL CYSTITIS: ICD-10-CM

## 2021-01-04 DIAGNOSIS — E78.5 HYPERLIPIDEMIA, UNSPECIFIED HYPERLIPIDEMIA TYPE: Primary | ICD-10-CM

## 2021-01-04 DIAGNOSIS — E03.8 HYPOTHYROIDISM DUE TO HASHIMOTO'S THYROIDITIS: ICD-10-CM

## 2021-01-04 DIAGNOSIS — M54.9 CHRONIC BACK PAIN GREATER THAN 3 MONTHS DURATION: ICD-10-CM

## 2021-01-04 DIAGNOSIS — E06.3 HYPOTHYROIDISM DUE TO HASHIMOTO'S THYROIDITIS: ICD-10-CM

## 2021-01-04 DIAGNOSIS — F11.20 CONTINUOUS OPIOID DEPENDENCE: ICD-10-CM

## 2021-01-04 DIAGNOSIS — G89.29 CHRONIC BACK PAIN GREATER THAN 3 MONTHS DURATION: ICD-10-CM

## 2021-01-04 DIAGNOSIS — E55.9 VITAMIN D DEFICIENCY: ICD-10-CM

## 2021-01-04 PROCEDURE — 80307 DRUG TEST PRSMV CHEM ANLYZR: CPT

## 2021-01-04 PROCEDURE — 3008F PR BODY MASS INDEX (BMI) DOCUMENTED: ICD-10-PCS | Mod: CPTII,S$GLB,, | Performed by: FAMILY MEDICINE

## 2021-01-04 PROCEDURE — 99214 PR OFFICE/OUTPT VISIT, EST, LEVL IV, 30-39 MIN: ICD-10-PCS | Mod: S$GLB,,, | Performed by: FAMILY MEDICINE

## 2021-01-04 PROCEDURE — 87077 CULTURE AEROBIC IDENTIFY: CPT

## 2021-01-04 PROCEDURE — 99999 PR PBB SHADOW E&M-EST. PATIENT-LVL III: ICD-10-PCS | Mod: PBBFAC,,, | Performed by: FAMILY MEDICINE

## 2021-01-04 PROCEDURE — 87186 SC STD MICRODIL/AGAR DIL: CPT

## 2021-01-04 PROCEDURE — 87088 URINE BACTERIA CULTURE: CPT

## 2021-01-04 PROCEDURE — 3044F PR MOST RECENT HEMOGLOBIN A1C LEVEL <7.0%: ICD-10-PCS | Mod: CPTII,S$GLB,, | Performed by: FAMILY MEDICINE

## 2021-01-04 PROCEDURE — 1125F PR PAIN SEVERITY QUANTIFIED, PAIN PRESENT: ICD-10-PCS | Mod: S$GLB,,, | Performed by: FAMILY MEDICINE

## 2021-01-04 PROCEDURE — 3008F BODY MASS INDEX DOCD: CPT | Mod: CPTII,S$GLB,, | Performed by: FAMILY MEDICINE

## 2021-01-04 PROCEDURE — 99999 PR PBB SHADOW E&M-EST. PATIENT-LVL III: CPT | Mod: PBBFAC,,, | Performed by: FAMILY MEDICINE

## 2021-01-04 PROCEDURE — 99214 OFFICE O/P EST MOD 30 MIN: CPT | Mod: S$GLB,,, | Performed by: FAMILY MEDICINE

## 2021-01-04 PROCEDURE — 1125F AMNT PAIN NOTED PAIN PRSNT: CPT | Mod: S$GLB,,, | Performed by: FAMILY MEDICINE

## 2021-01-04 PROCEDURE — 3044F HG A1C LEVEL LT 7.0%: CPT | Mod: CPTII,S$GLB,, | Performed by: FAMILY MEDICINE

## 2021-01-04 PROCEDURE — 87086 URINE CULTURE/COLONY COUNT: CPT

## 2021-01-04 RX ORDER — HYDROCODONE BITARTRATE AND ACETAMINOPHEN 7.5; 325 MG/1; MG/1
1 TABLET ORAL
Qty: 90 TABLET | Refills: 0 | Status: SHIPPED | OUTPATIENT
Start: 2021-01-04 | End: 2021-04-11 | Stop reason: SDUPTHER

## 2021-01-04 RX ORDER — CIPROFLOXACIN 250 MG/1
250 TABLET, FILM COATED ORAL EVERY 12 HOURS
Qty: 14 TABLET | Refills: 0 | Status: SHIPPED | OUTPATIENT
Start: 2021-01-04 | End: 2021-01-08

## 2021-01-04 RX ORDER — ERGOCALCIFEROL 1.25 MG/1
50000 CAPSULE ORAL
Qty: 12 CAPSULE | Refills: 1 | Status: SHIPPED | OUTPATIENT
Start: 2021-01-04 | End: 2021-07-19

## 2021-01-07 DIAGNOSIS — Z12.31 OTHER SCREENING MAMMOGRAM: ICD-10-CM

## 2021-01-07 LAB
6MAM UR QL: NOT DETECTED
7AMINOCLONAZEPAM UR QL: NOT DETECTED
A-OH ALPRAZ UR QL: NOT DETECTED
ALPRAZ UR QL: NOT DETECTED
AMPHET UR QL SCN: NOT DETECTED
ANNOTATION COMMENT IMP: NORMAL
ANNOTATION COMMENT IMP: NORMAL
BACTERIA UR CULT: ABNORMAL
BACTERIA UR CULT: ABNORMAL
BARBITURATES UR QL: NOT DETECTED
BUPRENORPHINE UR QL: NOT DETECTED
BZE UR QL: NOT DETECTED
CARBOXYTHC UR QL: NOT DETECTED
CARISOPRODOL UR QL: NOT DETECTED
CLONAZEPAM UR QL: NOT DETECTED
CODEINE UR QL: NOT DETECTED
CREAT UR-MCNC: 101.4 MG/DL (ref 20–400)
DIAZEPAM UR QL: NOT DETECTED
ETHYL GLUCURONIDE UR QL: NOT DETECTED
FENTANYL UR QL: NOT DETECTED
HYDROCODONE UR QL: NOT DETECTED
HYDROMORPHONE UR QL: NOT DETECTED
LORAZEPAM UR QL: NOT DETECTED
MDA UR QL: NOT DETECTED
MDEA UR QL: NOT DETECTED
MDMA UR QL: NOT DETECTED
ME-PHENIDATE UR QL: NOT DETECTED
MEPERIDINE UR QL: NOT DETECTED
METHADONE UR QL: NOT DETECTED
METHAMPHET UR QL: NOT DETECTED
MIDAZOLAM UR QL SCN: NOT DETECTED
MORPHINE UR QL: NOT DETECTED
NORBUPRENORPHINE UR QL CFM: NOT DETECTED
NORDIAZEPAM UR QL: NOT DETECTED
NORFENTANYL UR QL: NOT DETECTED
NORHYDROCODONE UR QL CFM: PRESENT
NOROXYCODONE UR QL CFM: NOT DETECTED
NOROXYMORPHONE: NOT DETECTED
OXAZEPAM UR QL: NOT DETECTED
OXYCODONE UR QL: NOT DETECTED
OXYMORPHONE UR QL: NOT DETECTED
PATHOLOGY STUDY: NORMAL
PCP UR QL: NOT DETECTED
PHENTERMINE UR QL: NOT DETECTED
PROPOXYPH UR QL: NOT DETECTED
SERVICE CMNT-IMP: NORMAL
TAPENTADOL UR QL SCN: NOT DETECTED
TAPENTADOL-O-SULF: NOT DETECTED
TEMAZEPAM UR QL: NOT DETECTED
TRAMADOL UR QL: NOT DETECTED
ZOLPIDEM UR QL: NOT DETECTED

## 2021-01-08 DIAGNOSIS — N39.0 URINARY TRACT INFECTION WITHOUT HEMATURIA, SITE UNSPECIFIED: Primary | ICD-10-CM

## 2021-01-08 RX ORDER — NITROFURANTOIN 25; 75 MG/1; MG/1
100 CAPSULE ORAL 2 TIMES DAILY
Qty: 14 CAPSULE | Refills: 0 | Status: SHIPPED | OUTPATIENT
Start: 2021-01-08 | End: 2021-07-16

## 2021-01-18 DIAGNOSIS — B37.9 YEAST INFECTION: ICD-10-CM

## 2021-01-19 RX ORDER — FLUCONAZOLE 150 MG/1
TABLET ORAL
Qty: 2 TABLET | Refills: 0 | OUTPATIENT
Start: 2021-01-19

## 2021-03-17 DIAGNOSIS — E11.9 TYPE 2 DIABETES MELLITUS WITHOUT COMPLICATION, UNSPECIFIED WHETHER LONG TERM INSULIN USE: ICD-10-CM

## 2021-03-24 ENCOUNTER — PATIENT MESSAGE (OUTPATIENT)
Dept: FAMILY MEDICINE | Facility: CLINIC | Age: 66
End: 2021-03-24

## 2021-03-28 ENCOUNTER — PATIENT OUTREACH (OUTPATIENT)
Dept: ADMINISTRATIVE | Facility: OTHER | Age: 66
End: 2021-03-28

## 2021-03-31 ENCOUNTER — OFFICE VISIT (OUTPATIENT)
Dept: PHYSICAL MEDICINE AND REHAB | Facility: CLINIC | Age: 66
End: 2021-03-31
Payer: COMMERCIAL

## 2021-03-31 VITALS
HEART RATE: 76 BPM | SYSTOLIC BLOOD PRESSURE: 120 MMHG | WEIGHT: 248 LBS | BODY MASS INDEX: 41.32 KG/M2 | HEIGHT: 65 IN | DIASTOLIC BLOOD PRESSURE: 70 MMHG

## 2021-03-31 DIAGNOSIS — M79.2 NEURALGIA: Primary | ICD-10-CM

## 2021-03-31 DIAGNOSIS — M47.816 LUMBAR FACET ARTHROPATHY: ICD-10-CM

## 2021-03-31 DIAGNOSIS — G89.29 CHRONIC LEFT-SIDED LOW BACK PAIN WITHOUT SCIATICA: ICD-10-CM

## 2021-03-31 DIAGNOSIS — M54.50 CHRONIC LEFT-SIDED LOW BACK PAIN WITHOUT SCIATICA: ICD-10-CM

## 2021-03-31 PROCEDURE — 76942 PR U/S GUIDANCE FOR NEEDLE GUIDANCE: ICD-10-PCS | Mod: S$GLB,,, | Performed by: PHYSICAL MEDICINE & REHABILITATION

## 2021-03-31 PROCEDURE — 3008F BODY MASS INDEX DOCD: CPT | Mod: CPTII,S$GLB,, | Performed by: PHYSICAL MEDICINE & REHABILITATION

## 2021-03-31 PROCEDURE — 64450 NJX AA&/STRD OTHER PN/BRANCH: CPT | Mod: LT,S$GLB,, | Performed by: PHYSICAL MEDICINE & REHABILITATION

## 2021-03-31 PROCEDURE — 3008F PR BODY MASS INDEX (BMI) DOCUMENTED: ICD-10-PCS | Mod: CPTII,S$GLB,, | Performed by: PHYSICAL MEDICINE & REHABILITATION

## 2021-03-31 PROCEDURE — 99999 PR PBB SHADOW E&M-EST. PATIENT-LVL III: ICD-10-PCS | Mod: PBBFAC,,, | Performed by: PHYSICAL MEDICINE & REHABILITATION

## 2021-03-31 PROCEDURE — 99999 PR PBB SHADOW E&M-EST. PATIENT-LVL III: CPT | Mod: PBBFAC,,, | Performed by: PHYSICAL MEDICINE & REHABILITATION

## 2021-03-31 PROCEDURE — 64450 PR NERVE BLOCK INJ, ANES/STEROID, OTHER PERIPHERAL: ICD-10-PCS | Mod: LT,S$GLB,, | Performed by: PHYSICAL MEDICINE & REHABILITATION

## 2021-03-31 PROCEDURE — 99213 PR OFFICE/OUTPT VISIT, EST, LEVL III, 20-29 MIN: ICD-10-PCS | Mod: 25,S$GLB,, | Performed by: PHYSICAL MEDICINE & REHABILITATION

## 2021-03-31 PROCEDURE — 99213 OFFICE O/P EST LOW 20 MIN: CPT | Mod: 25,S$GLB,, | Performed by: PHYSICAL MEDICINE & REHABILITATION

## 2021-03-31 PROCEDURE — 1125F PR PAIN SEVERITY QUANTIFIED, PAIN PRESENT: ICD-10-PCS | Mod: S$GLB,,, | Performed by: PHYSICAL MEDICINE & REHABILITATION

## 2021-03-31 PROCEDURE — 1125F AMNT PAIN NOTED PAIN PRSNT: CPT | Mod: S$GLB,,, | Performed by: PHYSICAL MEDICINE & REHABILITATION

## 2021-03-31 PROCEDURE — 76942 ECHO GUIDE FOR BIOPSY: CPT | Mod: S$GLB,,, | Performed by: PHYSICAL MEDICINE & REHABILITATION

## 2021-03-31 RX ORDER — ROPINIROLE 0.5 MG/1
0.5 TABLET, FILM COATED ORAL 2 TIMES DAILY
COMMUNITY
Start: 2021-03-20 | End: 2021-10-06

## 2021-04-01 ENCOUNTER — PATIENT MESSAGE (OUTPATIENT)
Dept: FAMILY MEDICINE | Facility: CLINIC | Age: 66
End: 2021-04-01

## 2021-04-05 ENCOUNTER — PATIENT MESSAGE (OUTPATIENT)
Dept: ADMINISTRATIVE | Facility: HOSPITAL | Age: 66
End: 2021-04-05

## 2021-04-07 ENCOUNTER — HOSPITAL ENCOUNTER (OUTPATIENT)
Dept: RADIOLOGY | Facility: CLINIC | Age: 66
Discharge: HOME | End: 2021-04-07
Attending: FAMILY MEDICINE
Payer: COMMERCIAL

## 2021-04-07 DIAGNOSIS — Z12.31 OTHER SCREENING MAMMOGRAM: ICD-10-CM

## 2021-04-07 PROCEDURE — 77063 MAMMO DIGITAL SCREENING BILAT WITH TOMO: ICD-10-PCS | Mod: 26,,, | Performed by: RADIOLOGY

## 2021-04-07 PROCEDURE — 77063 BREAST TOMOSYNTHESIS BI: CPT | Mod: 26,,, | Performed by: RADIOLOGY

## 2021-04-07 PROCEDURE — 77067 SCR MAMMO BI INCL CAD: CPT | Mod: TC,PO

## 2021-04-07 PROCEDURE — 77067 MAMMO DIGITAL SCREENING BILAT WITH TOMO: ICD-10-PCS | Mod: 26,,, | Performed by: RADIOLOGY

## 2021-04-07 PROCEDURE — 77067 SCR MAMMO BI INCL CAD: CPT | Mod: 26,,, | Performed by: RADIOLOGY

## 2021-04-28 ENCOUNTER — PATIENT OUTREACH (OUTPATIENT)
Dept: ADMINISTRATIVE | Facility: OTHER | Age: 66
End: 2021-04-28

## 2021-04-28 ENCOUNTER — OFFICE VISIT (OUTPATIENT)
Dept: PHYSICAL MEDICINE AND REHAB | Facility: CLINIC | Age: 66
End: 2021-04-28
Payer: COMMERCIAL

## 2021-04-28 VITALS — RESPIRATION RATE: 24 BRPM | BODY MASS INDEX: 41.32 KG/M2 | HEIGHT: 65 IN | WEIGHT: 248 LBS

## 2021-04-28 DIAGNOSIS — G89.29 CHRONIC LEFT-SIDED LOW BACK PAIN WITHOUT SCIATICA: Primary | ICD-10-CM

## 2021-04-28 DIAGNOSIS — M26.622 LEFT-SIDED TEMPOROMANDIBULAR JOINT PAIN-DYSFUNCTION SYNDROME: ICD-10-CM

## 2021-04-28 DIAGNOSIS — Z74.09 IMPAIRED FUNCTIONAL MOBILITY AND ACTIVITY TOLERANCE: ICD-10-CM

## 2021-04-28 DIAGNOSIS — G89.29 OTHER CHRONIC PAIN: ICD-10-CM

## 2021-04-28 DIAGNOSIS — E66.01 CLASS 3 SEVERE OBESITY WITH BODY MASS INDEX (BMI) OF 40.0 TO 44.9 IN ADULT, UNSPECIFIED OBESITY TYPE, UNSPECIFIED WHETHER SERIOUS COMORBIDITY PRESENT: ICD-10-CM

## 2021-04-28 DIAGNOSIS — M54.50 CHRONIC LEFT-SIDED LOW BACK PAIN WITHOUT SCIATICA: Primary | ICD-10-CM

## 2021-04-28 DIAGNOSIS — M47.816 LUMBAR FACET ARTHROPATHY: ICD-10-CM

## 2021-04-28 PROCEDURE — 1125F PR PAIN SEVERITY QUANTIFIED, PAIN PRESENT: ICD-10-PCS | Mod: S$GLB,,, | Performed by: PHYSICAL MEDICINE & REHABILITATION

## 2021-04-28 PROCEDURE — 27096 INJECT SACROILIAC JOINT: CPT | Mod: LT,S$GLB,, | Performed by: PHYSICAL MEDICINE & REHABILITATION

## 2021-04-28 PROCEDURE — 99999 PR PBB SHADOW E&M-EST. PATIENT-LVL II: CPT | Mod: PBBFAC,,, | Performed by: PHYSICAL MEDICINE & REHABILITATION

## 2021-04-28 PROCEDURE — 1125F AMNT PAIN NOTED PAIN PRSNT: CPT | Mod: S$GLB,,, | Performed by: PHYSICAL MEDICINE & REHABILITATION

## 2021-04-28 PROCEDURE — 3008F PR BODY MASS INDEX (BMI) DOCUMENTED: ICD-10-PCS | Mod: CPTII,S$GLB,, | Performed by: PHYSICAL MEDICINE & REHABILITATION

## 2021-04-28 PROCEDURE — 99213 OFFICE O/P EST LOW 20 MIN: CPT | Mod: 25,S$GLB,, | Performed by: PHYSICAL MEDICINE & REHABILITATION

## 2021-04-28 PROCEDURE — 99213 PR OFFICE/OUTPT VISIT, EST, LEVL III, 20-29 MIN: ICD-10-PCS | Mod: 25,S$GLB,, | Performed by: PHYSICAL MEDICINE & REHABILITATION

## 2021-04-28 PROCEDURE — 3008F BODY MASS INDEX DOCD: CPT | Mod: CPTII,S$GLB,, | Performed by: PHYSICAL MEDICINE & REHABILITATION

## 2021-04-28 PROCEDURE — 27096 PR INJECTION,SACROILIAC JOINT: ICD-10-PCS | Mod: LT,S$GLB,, | Performed by: PHYSICAL MEDICINE & REHABILITATION

## 2021-04-28 PROCEDURE — 99999 PR PBB SHADOW E&M-EST. PATIENT-LVL II: ICD-10-PCS | Mod: PBBFAC,,, | Performed by: PHYSICAL MEDICINE & REHABILITATION

## 2021-04-29 ENCOUNTER — PATIENT MESSAGE (OUTPATIENT)
Dept: RESEARCH | Facility: HOSPITAL | Age: 66
End: 2021-04-29

## 2021-05-05 ENCOUNTER — OFFICE VISIT (OUTPATIENT)
Dept: ORTHOPEDICS | Facility: CLINIC | Age: 66
End: 2021-05-05
Payer: COMMERCIAL

## 2021-05-05 VITALS
SYSTOLIC BLOOD PRESSURE: 128 MMHG | BODY MASS INDEX: 41.32 KG/M2 | OXYGEN SATURATION: 99 % | HEIGHT: 65 IN | HEART RATE: 77 BPM | DIASTOLIC BLOOD PRESSURE: 60 MMHG | WEIGHT: 248 LBS

## 2021-05-05 DIAGNOSIS — M54.50 LUMBAR PAIN: ICD-10-CM

## 2021-05-05 DIAGNOSIS — M46.1 SACROILIITIS: Primary | ICD-10-CM

## 2021-05-05 DIAGNOSIS — M47.816 LUMBAR FACET ARTHROPATHY: ICD-10-CM

## 2021-05-05 DIAGNOSIS — M54.16 LUMBAR RADICULITIS: ICD-10-CM

## 2021-05-05 DIAGNOSIS — M51.36 DISC DEGENERATION, LUMBAR: ICD-10-CM

## 2021-05-05 PROCEDURE — 99213 PR OFFICE/OUTPT VISIT, EST, LEVL III, 20-29 MIN: ICD-10-PCS | Mod: S$GLB,,, | Performed by: ORTHOPAEDIC SURGERY

## 2021-05-05 PROCEDURE — 1125F PR PAIN SEVERITY QUANTIFIED, PAIN PRESENT: ICD-10-PCS | Mod: S$GLB,,, | Performed by: ORTHOPAEDIC SURGERY

## 2021-05-05 PROCEDURE — 99213 OFFICE O/P EST LOW 20 MIN: CPT | Mod: S$GLB,,, | Performed by: ORTHOPAEDIC SURGERY

## 2021-05-05 PROCEDURE — 3008F BODY MASS INDEX DOCD: CPT | Mod: S$GLB,,, | Performed by: ORTHOPAEDIC SURGERY

## 2021-05-05 PROCEDURE — 3008F PR BODY MASS INDEX (BMI) DOCUMENTED: ICD-10-PCS | Mod: S$GLB,,, | Performed by: ORTHOPAEDIC SURGERY

## 2021-05-05 PROCEDURE — 1125F AMNT PAIN NOTED PAIN PRSNT: CPT | Mod: S$GLB,,, | Performed by: ORTHOPAEDIC SURGERY

## 2021-05-06 DIAGNOSIS — M46.1 SACROILIITIS: Primary | ICD-10-CM

## 2021-05-06 DIAGNOSIS — Z03.818 ENCOUNTER FOR OBSERVATION FOR SUSPECTED EXPOSURE TO OTHER BIOLOGICAL AGENTS RULED OUT: ICD-10-CM

## 2021-05-17 ENCOUNTER — OFFICE VISIT (OUTPATIENT)
Dept: ORTHOPEDICS | Facility: CLINIC | Age: 66
End: 2021-05-17
Payer: COMMERCIAL

## 2021-05-17 VITALS
WEIGHT: 248 LBS | HEART RATE: 71 BPM | OXYGEN SATURATION: 98 % | BODY MASS INDEX: 41.32 KG/M2 | HEIGHT: 65 IN | SYSTOLIC BLOOD PRESSURE: 134 MMHG | DIASTOLIC BLOOD PRESSURE: 88 MMHG

## 2021-05-17 DIAGNOSIS — M46.1 SACROILIITIS: Primary | ICD-10-CM

## 2021-05-17 PROCEDURE — 1125F AMNT PAIN NOTED PAIN PRSNT: CPT | Mod: S$GLB,,, | Performed by: ORTHOPAEDIC SURGERY

## 2021-05-17 PROCEDURE — 3008F BODY MASS INDEX DOCD: CPT | Mod: S$GLB,,, | Performed by: ORTHOPAEDIC SURGERY

## 2021-05-17 PROCEDURE — 99213 PR OFFICE/OUTPT VISIT, EST, LEVL III, 20-29 MIN: ICD-10-PCS | Mod: S$GLB,,, | Performed by: ORTHOPAEDIC SURGERY

## 2021-05-17 PROCEDURE — 99213 OFFICE O/P EST LOW 20 MIN: CPT | Mod: S$GLB,,, | Performed by: ORTHOPAEDIC SURGERY

## 2021-05-17 PROCEDURE — 1125F PR PAIN SEVERITY QUANTIFIED, PAIN PRESENT: ICD-10-PCS | Mod: S$GLB,,, | Performed by: ORTHOPAEDIC SURGERY

## 2021-05-17 PROCEDURE — 3008F PR BODY MASS INDEX (BMI) DOCUMENTED: ICD-10-PCS | Mod: S$GLB,,, | Performed by: ORTHOPAEDIC SURGERY

## 2021-05-25 ENCOUNTER — HOSPITAL ENCOUNTER (OUTPATIENT)
Dept: RADIOLOGY | Facility: HOSPITAL | Age: 66
Discharge: HOME | End: 2021-05-25
Attending: ORTHOPAEDIC SURGERY
Payer: COMMERCIAL

## 2021-05-25 ENCOUNTER — TELEPHONE (OUTPATIENT)
Dept: ORTHOPEDICS | Facility: CLINIC | Age: 66
End: 2021-05-25

## 2021-05-25 DIAGNOSIS — M46.1 SACROILIITIS: ICD-10-CM

## 2021-05-25 PROCEDURE — 72192 CT PELVIS W/O DYE: CPT | Mod: TC,PO

## 2021-06-01 ENCOUNTER — HOSPITAL ENCOUNTER (OUTPATIENT)
Dept: PREADMISSION TESTING | Facility: HOSPITAL | Age: 66
Discharge: HOME | End: 2021-06-01

## 2021-06-01 DIAGNOSIS — M46.1 SACROILIITIS: ICD-10-CM

## 2021-07-01 ENCOUNTER — LAB VISIT (OUTPATIENT)
Dept: LAB | Facility: HOSPITAL | Age: 66
End: 2021-07-01
Attending: FAMILY MEDICINE
Payer: COMMERCIAL

## 2021-07-01 DIAGNOSIS — E06.3 HYPOTHYROIDISM DUE TO HASHIMOTO'S THYROIDITIS: ICD-10-CM

## 2021-07-01 DIAGNOSIS — E53.8 B12 NUTRITIONAL DEFICIENCY: ICD-10-CM

## 2021-07-01 DIAGNOSIS — E03.8 HYPOTHYROIDISM DUE TO HASHIMOTO'S THYROIDITIS: ICD-10-CM

## 2021-07-01 DIAGNOSIS — E11.9 TYPE 2 DIABETES MELLITUS WITHOUT COMPLICATION, WITHOUT LONG-TERM CURRENT USE OF INSULIN: ICD-10-CM

## 2021-07-01 DIAGNOSIS — E78.5 HYPERLIPIDEMIA, UNSPECIFIED HYPERLIPIDEMIA TYPE: ICD-10-CM

## 2021-07-01 LAB
ALBUMIN SERPL BCP-MCNC: 3.6 G/DL (ref 3.5–5.2)
ALP SERPL-CCNC: 77 U/L (ref 55–135)
ALT SERPL W/O P-5'-P-CCNC: 17 U/L (ref 10–44)
ANION GAP SERPL CALC-SCNC: 10 MMOL/L (ref 8–16)
AST SERPL-CCNC: 14 U/L (ref 10–40)
BASOPHILS # BLD AUTO: 0.04 K/UL (ref 0–0.2)
BASOPHILS NFR BLD: 0.6 % (ref 0–1.9)
BILIRUB SERPL-MCNC: 1 MG/DL (ref 0.1–1)
BUN SERPL-MCNC: 17 MG/DL (ref 8–23)
CALCIUM SERPL-MCNC: 9.3 MG/DL (ref 8.7–10.5)
CHLORIDE SERPL-SCNC: 107 MMOL/L (ref 95–110)
CHOLEST SERPL-MCNC: 216 MG/DL (ref 120–199)
CHOLEST/HDLC SERPL: 3.3 {RATIO} (ref 2–5)
CO2 SERPL-SCNC: 25 MMOL/L (ref 23–29)
CREAT SERPL-MCNC: 0.9 MG/DL (ref 0.5–1.4)
DIFFERENTIAL METHOD: NORMAL
EOSINOPHIL # BLD AUTO: 0.5 K/UL (ref 0–0.5)
EOSINOPHIL NFR BLD: 8 % (ref 0–8)
ERYTHROCYTE [DISTWIDTH] IN BLOOD BY AUTOMATED COUNT: 13.6 % (ref 11.5–14.5)
EST. GFR  (AFRICAN AMERICAN): >60 ML/MIN/1.73 M^2
EST. GFR  (NON AFRICAN AMERICAN): >60 ML/MIN/1.73 M^2
ESTIMATED AVG GLUCOSE: 146 MG/DL (ref 68–131)
GLUCOSE SERPL-MCNC: 124 MG/DL (ref 70–110)
HBA1C MFR BLD: 6.7 % (ref 4–5.6)
HCT VFR BLD AUTO: 44 % (ref 37–48.5)
HDLC SERPL-MCNC: 66 MG/DL (ref 40–75)
HDLC SERPL: 30.6 % (ref 20–50)
HGB BLD-MCNC: 14.3 G/DL (ref 12–16)
IMM GRANULOCYTES # BLD AUTO: 0.02 K/UL (ref 0–0.04)
IMM GRANULOCYTES NFR BLD AUTO: 0.3 % (ref 0–0.5)
LDLC SERPL CALC-MCNC: 124.2 MG/DL (ref 63–159)
LYMPHOCYTES # BLD AUTO: 1.6 K/UL (ref 1–4.8)
LYMPHOCYTES NFR BLD: 24.2 % (ref 18–48)
MCH RBC QN AUTO: 30.5 PG (ref 27–31)
MCHC RBC AUTO-ENTMCNC: 32.5 G/DL (ref 32–36)
MCV RBC AUTO: 94 FL (ref 82–98)
MONOCYTES # BLD AUTO: 0.5 K/UL (ref 0.3–1)
MONOCYTES NFR BLD: 6.9 % (ref 4–15)
NEUTROPHILS # BLD AUTO: 4 K/UL (ref 1.8–7.7)
NEUTROPHILS NFR BLD: 60 % (ref 38–73)
NONHDLC SERPL-MCNC: 150 MG/DL
NRBC BLD-RTO: 0 /100 WBC
PLATELET # BLD AUTO: 266 K/UL (ref 150–450)
PMV BLD AUTO: 10.1 FL (ref 9.2–12.9)
POTASSIUM SERPL-SCNC: 4.2 MMOL/L (ref 3.5–5.1)
PROT SERPL-MCNC: 7 G/DL (ref 6–8.4)
RBC # BLD AUTO: 4.69 M/UL (ref 4–5.4)
SODIUM SERPL-SCNC: 142 MMOL/L (ref 136–145)
T4 FREE SERPL-MCNC: 0.9 NG/DL (ref 0.71–1.51)
TRIGL SERPL-MCNC: 129 MG/DL (ref 30–150)
TSH SERPL DL<=0.005 MIU/L-ACNC: 5.48 UIU/ML (ref 0.4–4)
VIT B12 SERPL-MCNC: 270 PG/ML (ref 210–950)
WBC # BLD AUTO: 6.64 K/UL (ref 3.9–12.7)

## 2021-07-01 PROCEDURE — 83036 HEMOGLOBIN GLYCOSYLATED A1C: CPT | Performed by: FAMILY MEDICINE

## 2021-07-01 PROCEDURE — 84443 ASSAY THYROID STIM HORMONE: CPT | Performed by: FAMILY MEDICINE

## 2021-07-01 PROCEDURE — 84439 ASSAY OF FREE THYROXINE: CPT | Performed by: FAMILY MEDICINE

## 2021-07-01 PROCEDURE — 80061 LIPID PANEL: CPT | Performed by: FAMILY MEDICINE

## 2021-07-01 PROCEDURE — 85025 COMPLETE CBC W/AUTO DIFF WBC: CPT | Performed by: FAMILY MEDICINE

## 2021-07-01 PROCEDURE — 36415 COLL VENOUS BLD VENIPUNCTURE: CPT | Mod: PO | Performed by: FAMILY MEDICINE

## 2021-07-01 PROCEDURE — 80053 COMPREHEN METABOLIC PANEL: CPT | Performed by: FAMILY MEDICINE

## 2021-07-01 PROCEDURE — 82607 VITAMIN B-12: CPT | Performed by: FAMILY MEDICINE

## 2021-07-16 ENCOUNTER — OFFICE VISIT (OUTPATIENT)
Dept: FAMILY MEDICINE | Facility: CLINIC | Age: 66
End: 2021-07-16
Payer: MEDICARE

## 2021-07-16 VITALS
HEART RATE: 80 BPM | OXYGEN SATURATION: 97 % | BODY MASS INDEX: 41.27 KG/M2 | SYSTOLIC BLOOD PRESSURE: 120 MMHG | TEMPERATURE: 99 F | HEIGHT: 65 IN | RESPIRATION RATE: 16 BRPM | DIASTOLIC BLOOD PRESSURE: 70 MMHG

## 2021-07-16 DIAGNOSIS — E11.9 DIABETIC EYE EXAM: ICD-10-CM

## 2021-07-16 DIAGNOSIS — J30.1 SEASONAL ALLERGIC RHINITIS DUE TO POLLEN: ICD-10-CM

## 2021-07-16 DIAGNOSIS — G89.29 CHRONIC BACK PAIN GREATER THAN 3 MONTHS DURATION: ICD-10-CM

## 2021-07-16 DIAGNOSIS — E03.8 HYPOTHYROIDISM DUE TO HASHIMOTO'S THYROIDITIS: ICD-10-CM

## 2021-07-16 DIAGNOSIS — E06.3 HYPOTHYROIDISM DUE TO HASHIMOTO'S THYROIDITIS: ICD-10-CM

## 2021-07-16 DIAGNOSIS — E11.9 TYPE 2 DIABETES MELLITUS WITHOUT COMPLICATION, WITHOUT LONG-TERM CURRENT USE OF INSULIN: ICD-10-CM

## 2021-07-16 DIAGNOSIS — E55.9 VITAMIN D DEFICIENCY: ICD-10-CM

## 2021-07-16 DIAGNOSIS — E78.5 HYPERLIPIDEMIA, UNSPECIFIED HYPERLIPIDEMIA TYPE: ICD-10-CM

## 2021-07-16 DIAGNOSIS — I25.10 ATHEROSCLEROSIS OF NATIVE CORONARY ARTERY OF NATIVE HEART WITHOUT ANGINA PECTORIS: ICD-10-CM

## 2021-07-16 DIAGNOSIS — Z01.00 DIABETIC EYE EXAM: ICD-10-CM

## 2021-07-16 DIAGNOSIS — M54.9 CHRONIC BACK PAIN GREATER THAN 3 MONTHS DURATION: ICD-10-CM

## 2021-07-16 DIAGNOSIS — E53.8 B12 DEFICIENCY: Primary | ICD-10-CM

## 2021-07-16 PROCEDURE — 99214 OFFICE O/P EST MOD 30 MIN: CPT | Mod: S$PBB,,, | Performed by: FAMILY MEDICINE

## 2021-07-16 PROCEDURE — 99999 PR PBB SHADOW E&M-EST. PATIENT-LVL IV: ICD-10-PCS | Mod: PBBFAC,,, | Performed by: FAMILY MEDICINE

## 2021-07-16 PROCEDURE — 99214 PR OFFICE/OUTPT VISIT, EST, LEVL IV, 30-39 MIN: ICD-10-PCS | Mod: S$PBB,,, | Performed by: FAMILY MEDICINE

## 2021-07-16 PROCEDURE — 99999 PR PBB SHADOW E&M-EST. PATIENT-LVL IV: CPT | Mod: PBBFAC,,, | Performed by: FAMILY MEDICINE

## 2021-07-16 PROCEDURE — 96372 THER/PROPH/DIAG INJ SC/IM: CPT | Mod: PBBFAC,PO

## 2021-07-16 PROCEDURE — 99214 OFFICE O/P EST MOD 30 MIN: CPT | Mod: PBBFAC,PO | Performed by: FAMILY MEDICINE

## 2021-07-16 RX ORDER — AZELASTINE 1 MG/ML
1 SPRAY, METERED NASAL 2 TIMES DAILY
Qty: 30 ML | Refills: 11 | Status: SHIPPED | OUTPATIENT
Start: 2021-07-16 | End: 2022-09-14 | Stop reason: SDUPTHER

## 2021-07-16 RX ORDER — ROSUVASTATIN CALCIUM 10 MG/1
10 TABLET, COATED ORAL DAILY
Qty: 90 TABLET | Refills: 3 | Status: CANCELLED | OUTPATIENT
Start: 2021-07-16 | End: 2022-07-16

## 2021-07-16 RX ORDER — CYANOCOBALAMIN 1000 UG/ML
1000 INJECTION, SOLUTION INTRAMUSCULAR; SUBCUTANEOUS
Status: COMPLETED | OUTPATIENT
Start: 2021-07-16 | End: 2021-07-16

## 2021-07-16 RX ORDER — HYDROCORTISONE 1 %
1 GEL (GRAM) TOPICAL DAILY
Qty: 90 TABLET | Refills: 3 | Status: SHIPPED | OUTPATIENT
Start: 2021-07-16 | End: 2022-11-01

## 2021-07-16 RX ORDER — HYDROCODONE BITARTRATE AND ACETAMINOPHEN 7.5; 325 MG/1; MG/1
1 TABLET ORAL
Qty: 90 TABLET | Refills: 0 | Status: SHIPPED | OUTPATIENT
Start: 2021-07-16 | End: 2021-10-11 | Stop reason: SDUPTHER

## 2021-07-16 RX ADMIN — CYANOCOBALAMIN 1000 MCG: 1000 INJECTION, SOLUTION INTRAMUSCULAR at 12:07

## 2021-07-19 ENCOUNTER — PATIENT MESSAGE (OUTPATIENT)
Dept: FAMILY MEDICINE | Facility: CLINIC | Age: 66
End: 2021-07-19

## 2021-07-26 ENCOUNTER — PATIENT MESSAGE (OUTPATIENT)
Dept: FAMILY MEDICINE | Facility: CLINIC | Age: 66
End: 2021-07-26

## 2021-07-29 ENCOUNTER — TELEPHONE (OUTPATIENT)
Dept: FAMILY MEDICINE | Facility: CLINIC | Age: 66
End: 2021-07-29

## 2021-07-29 ENCOUNTER — OFFICE VISIT (OUTPATIENT)
Dept: FAMILY MEDICINE | Facility: CLINIC | Age: 66
End: 2021-07-29
Payer: COMMERCIAL

## 2021-07-29 VITALS
HEIGHT: 65 IN | SYSTOLIC BLOOD PRESSURE: 132 MMHG | DIASTOLIC BLOOD PRESSURE: 84 MMHG | WEIGHT: 256.63 LBS | OXYGEN SATURATION: 97 % | RESPIRATION RATE: 18 BRPM | HEART RATE: 84 BPM | BODY MASS INDEX: 42.76 KG/M2

## 2021-07-29 DIAGNOSIS — N30.90 CYSTITIS: ICD-10-CM

## 2021-07-29 DIAGNOSIS — R35.0 URINE FREQUENCY: Primary | ICD-10-CM

## 2021-07-29 LAB
BILIRUB SERPL-MCNC: NEGATIVE MG/DL
BLOOD URINE, POC: ABNORMAL
CLARITY, POC UA: CLEAR
COLOR, POC UA: YELLOW
GLUCOSE UR QL STRIP: NORMAL
KETONES UR QL STRIP: NEGATIVE
LEUKOCYTE ESTERASE URINE, POC: ABNORMAL
NITRITE, POC UA: NEGATIVE
PH, POC UA: 5
PROTEIN, POC: ABNORMAL
SPECIFIC GRAVITY, POC UA: 1.02
UROBILINOGEN, POC UA: NORMAL

## 2021-07-29 PROCEDURE — 87077 CULTURE AEROBIC IDENTIFY: CPT | Performed by: STUDENT IN AN ORGANIZED HEALTH CARE EDUCATION/TRAINING PROGRAM

## 2021-07-29 PROCEDURE — 99213 PR OFFICE/OUTPT VISIT, EST, LEVL III, 20-29 MIN: ICD-10-PCS | Mod: S$GLB,,, | Performed by: STUDENT IN AN ORGANIZED HEALTH CARE EDUCATION/TRAINING PROGRAM

## 2021-07-29 PROCEDURE — 81001 URINALYSIS AUTO W/SCOPE: CPT | Performed by: STUDENT IN AN ORGANIZED HEALTH CARE EDUCATION/TRAINING PROGRAM

## 2021-07-29 PROCEDURE — 87086 URINE CULTURE/COLONY COUNT: CPT | Performed by: STUDENT IN AN ORGANIZED HEALTH CARE EDUCATION/TRAINING PROGRAM

## 2021-07-29 PROCEDURE — 99213 OFFICE O/P EST LOW 20 MIN: CPT | Mod: S$GLB,,, | Performed by: STUDENT IN AN ORGANIZED HEALTH CARE EDUCATION/TRAINING PROGRAM

## 2021-07-29 PROCEDURE — 81002 URINALYSIS NONAUTO W/O SCOPE: CPT | Mod: PBBFAC,59,PO | Performed by: STUDENT IN AN ORGANIZED HEALTH CARE EDUCATION/TRAINING PROGRAM

## 2021-07-29 PROCEDURE — 99999 PR PBB SHADOW E&M-EST. PATIENT-LVL V: ICD-10-PCS | Mod: PBBFAC,,, | Performed by: STUDENT IN AN ORGANIZED HEALTH CARE EDUCATION/TRAINING PROGRAM

## 2021-07-29 PROCEDURE — 99215 OFFICE O/P EST HI 40 MIN: CPT | Mod: PBBFAC,PO | Performed by: STUDENT IN AN ORGANIZED HEALTH CARE EDUCATION/TRAINING PROGRAM

## 2021-07-29 PROCEDURE — 87186 SC STD MICRODIL/AGAR DIL: CPT | Performed by: STUDENT IN AN ORGANIZED HEALTH CARE EDUCATION/TRAINING PROGRAM

## 2021-07-29 PROCEDURE — 99999 PR PBB SHADOW E&M-EST. PATIENT-LVL V: CPT | Mod: PBBFAC,,, | Performed by: STUDENT IN AN ORGANIZED HEALTH CARE EDUCATION/TRAINING PROGRAM

## 2021-07-29 PROCEDURE — 87088 URINE BACTERIA CULTURE: CPT | Performed by: STUDENT IN AN ORGANIZED HEALTH CARE EDUCATION/TRAINING PROGRAM

## 2021-07-30 ENCOUNTER — TELEPHONE (OUTPATIENT)
Dept: FAMILY MEDICINE | Facility: CLINIC | Age: 66
End: 2021-07-30

## 2021-07-30 DIAGNOSIS — N30.90 CYSTITIS: Primary | ICD-10-CM

## 2021-07-30 LAB
BACTERIA #/AREA URNS AUTO: ABNORMAL /HPF
BILIRUB UR QL STRIP: NEGATIVE
CLARITY UR REFRACT.AUTO: ABNORMAL
COLOR UR AUTO: YELLOW
GLUCOSE UR QL STRIP: NEGATIVE
HGB UR QL STRIP: NEGATIVE
HYALINE CASTS UR QL AUTO: 3 /LPF
KETONES UR QL STRIP: NEGATIVE
LEUKOCYTE ESTERASE UR QL STRIP: ABNORMAL
MICROSCOPIC COMMENT: ABNORMAL
NITRITE UR QL STRIP: NEGATIVE
NON-SQ EPI CELLS #/AREA URNS AUTO: 2 /HPF
PH UR STRIP: 5 [PH] (ref 5–8)
PROT UR QL STRIP: NEGATIVE
RBC #/AREA URNS AUTO: 1 /HPF (ref 0–4)
SP GR UR STRIP: 1.02 (ref 1–1.03)
SQUAMOUS #/AREA URNS AUTO: 3 /HPF
URN SPEC COLLECT METH UR: ABNORMAL
WBC #/AREA URNS AUTO: 38 /HPF (ref 0–5)

## 2021-07-30 RX ORDER — PHENAZOPYRIDINE HYDROCHLORIDE 100 MG/1
100 TABLET, FILM COATED ORAL 3 TIMES DAILY PRN
Qty: 60 TABLET | Refills: 0 | Status: SHIPPED | OUTPATIENT
Start: 2021-07-30 | End: 2021-08-09

## 2021-07-30 RX ORDER — AMOXICILLIN AND CLAVULANATE POTASSIUM 875; 125 MG/1; MG/1
1 TABLET, FILM COATED ORAL 2 TIMES DAILY
Qty: 10 TABLET | Refills: 0 | Status: SHIPPED | OUTPATIENT
Start: 2021-07-30 | End: 2021-08-04

## 2021-08-01 LAB
BACTERIA UR CULT: ABNORMAL
BACTERIA UR CULT: ABNORMAL

## 2021-08-19 ENCOUNTER — OFFICE VISIT (OUTPATIENT)
Dept: UROGYNECOLOGY | Facility: CLINIC | Age: 66
End: 2021-08-19
Payer: MEDICARE

## 2021-08-19 VITALS
HEART RATE: 71 BPM | SYSTOLIC BLOOD PRESSURE: 141 MMHG | DIASTOLIC BLOOD PRESSURE: 79 MMHG | HEIGHT: 65 IN | BODY MASS INDEX: 42.7 KG/M2

## 2021-08-19 DIAGNOSIS — N39.0 RECURRENT UTI: ICD-10-CM

## 2021-08-19 DIAGNOSIS — R35.0 URINE FREQUENCY: Primary | ICD-10-CM

## 2021-08-19 DIAGNOSIS — N30.90 CYSTITIS: ICD-10-CM

## 2021-08-19 DIAGNOSIS — N95.2 ATROPHIC VAGINITIS: ICD-10-CM

## 2021-08-19 LAB
BILIRUB SERPL-MCNC: NORMAL MG/DL
BLOOD URINE, POC: NORMAL
CLARITY, POC UA: CLEAR
COLOR, POC UA: YELLOW
GLUCOSE UR QL STRIP: NORMAL
KETONES UR QL STRIP: NORMAL
LEUKOCYTE ESTERASE URINE, POC: NORMAL
NITRITE, POC UA: NORMAL
PH, POC UA: 5
PROTEIN, POC: NORMAL
SPECIFIC GRAVITY, POC UA: 1.01
UROBILINOGEN, POC UA: NORMAL

## 2021-08-19 PROCEDURE — 3008F BODY MASS INDEX DOCD: CPT | Mod: HCNC,CPTII,S$GLB, | Performed by: NURSE PRACTITIONER

## 2021-08-19 PROCEDURE — 3044F HG A1C LEVEL LT 7.0%: CPT | Mod: HCNC,CPTII,S$GLB, | Performed by: NURSE PRACTITIONER

## 2021-08-19 PROCEDURE — 3078F DIAST BP <80 MM HG: CPT | Mod: HCNC,CPTII,S$GLB, | Performed by: NURSE PRACTITIONER

## 2021-08-19 PROCEDURE — 51700 PR IRRIGATION, BLADDER: ICD-10-PCS | Mod: HCNC,S$GLB,, | Performed by: NURSE PRACTITIONER

## 2021-08-19 PROCEDURE — 3288F FALL RISK ASSESSMENT DOCD: CPT | Mod: HCNC,CPTII,S$GLB, | Performed by: NURSE PRACTITIONER

## 2021-08-19 PROCEDURE — 1160F PR REVIEW ALL MEDS BY PRESCRIBER/CLIN PHARMACIST DOCUMENTED: ICD-10-PCS | Mod: HCNC,CPTII,S$GLB, | Performed by: NURSE PRACTITIONER

## 2021-08-19 PROCEDURE — 3077F SYST BP >= 140 MM HG: CPT | Mod: HCNC,CPTII,S$GLB, | Performed by: NURSE PRACTITIONER

## 2021-08-19 PROCEDURE — 99999 PR PBB SHADOW E&M-EST. PATIENT-LVL IV: ICD-10-PCS | Mod: PBBFAC,HCNC,, | Performed by: NURSE PRACTITIONER

## 2021-08-19 PROCEDURE — 3077F PR MOST RECENT SYSTOLIC BLOOD PRESSURE >= 140 MM HG: ICD-10-PCS | Mod: HCNC,CPTII,S$GLB, | Performed by: NURSE PRACTITIONER

## 2021-08-19 PROCEDURE — 51700 IRRIGATION OF BLADDER: CPT | Mod: HCNC,S$GLB,, | Performed by: NURSE PRACTITIONER

## 2021-08-19 PROCEDURE — 3044F PR MOST RECENT HEMOGLOBIN A1C LEVEL <7.0%: ICD-10-PCS | Mod: HCNC,CPTII,S$GLB, | Performed by: NURSE PRACTITIONER

## 2021-08-19 PROCEDURE — 81002 URINALYSIS NONAUTO W/O SCOPE: CPT | Mod: HCNC,S$GLB,, | Performed by: NURSE PRACTITIONER

## 2021-08-19 PROCEDURE — 1159F MED LIST DOCD IN RCRD: CPT | Mod: HCNC,CPTII,S$GLB, | Performed by: NURSE PRACTITIONER

## 2021-08-19 PROCEDURE — 99214 OFFICE O/P EST MOD 30 MIN: CPT | Mod: HCNC,25,S$GLB, | Performed by: NURSE PRACTITIONER

## 2021-08-19 PROCEDURE — 1101F PR PT FALLS ASSESS DOC 0-1 FALLS W/OUT INJ PAST YR: ICD-10-PCS | Mod: HCNC,CPTII,S$GLB, | Performed by: NURSE PRACTITIONER

## 2021-08-19 PROCEDURE — 1160F RVW MEDS BY RX/DR IN RCRD: CPT | Mod: HCNC,CPTII,S$GLB, | Performed by: NURSE PRACTITIONER

## 2021-08-19 PROCEDURE — 81002 POCT URINE DIPSTICK WITHOUT MICROSCOPE: ICD-10-PCS | Mod: HCNC,S$GLB,, | Performed by: NURSE PRACTITIONER

## 2021-08-19 PROCEDURE — 87086 URINE CULTURE/COLONY COUNT: CPT | Mod: HCNC | Performed by: NURSE PRACTITIONER

## 2021-08-19 PROCEDURE — 3078F PR MOST RECENT DIASTOLIC BLOOD PRESSURE < 80 MM HG: ICD-10-PCS | Mod: HCNC,CPTII,S$GLB, | Performed by: NURSE PRACTITIONER

## 2021-08-19 PROCEDURE — 99999 PR PBB SHADOW E&M-EST. PATIENT-LVL IV: CPT | Mod: PBBFAC,HCNC,, | Performed by: NURSE PRACTITIONER

## 2021-08-19 PROCEDURE — 3288F PR FALLS RISK ASSESSMENT DOCUMENTED: ICD-10-PCS | Mod: HCNC,CPTII,S$GLB, | Performed by: NURSE PRACTITIONER

## 2021-08-19 PROCEDURE — 1126F PR PAIN SEVERITY QUANTIFIED, NO PAIN PRESENT: ICD-10-PCS | Mod: HCNC,CPTII,S$GLB, | Performed by: NURSE PRACTITIONER

## 2021-08-19 PROCEDURE — 3008F PR BODY MASS INDEX (BMI) DOCUMENTED: ICD-10-PCS | Mod: HCNC,CPTII,S$GLB, | Performed by: NURSE PRACTITIONER

## 2021-08-19 PROCEDURE — 99214 PR OFFICE/OUTPT VISIT, EST, LEVL IV, 30-39 MIN: ICD-10-PCS | Mod: HCNC,25,S$GLB, | Performed by: NURSE PRACTITIONER

## 2021-08-19 PROCEDURE — 1159F PR MEDICATION LIST DOCUMENTED IN MEDICAL RECORD: ICD-10-PCS | Mod: HCNC,CPTII,S$GLB, | Performed by: NURSE PRACTITIONER

## 2021-08-19 PROCEDURE — 1126F AMNT PAIN NOTED NONE PRSNT: CPT | Mod: HCNC,CPTII,S$GLB, | Performed by: NURSE PRACTITIONER

## 2021-08-19 PROCEDURE — 1101F PT FALLS ASSESS-DOCD LE1/YR: CPT | Mod: HCNC,CPTII,S$GLB, | Performed by: NURSE PRACTITIONER

## 2021-08-19 RX ORDER — LIDOCAINE HYDROCHLORIDE 20 MG/ML
20 INJECTION, SOLUTION INFILTRATION; PERINEURAL
Status: COMPLETED | OUTPATIENT
Start: 2021-08-19 | End: 2021-08-19

## 2021-08-19 RX ORDER — GENTAMICIN SULFATE 40 MG/ML
80 INJECTION, SOLUTION INTRAMUSCULAR; INTRAVENOUS ONCE
Status: COMPLETED | OUTPATIENT
Start: 2021-08-19 | End: 2021-08-19

## 2021-08-19 RX ORDER — ESTRADIOL 0.1 MG/G
CREAM VAGINAL
Qty: 42.5 G | Refills: 3 | Status: SHIPPED | OUTPATIENT
Start: 2021-08-19

## 2021-08-19 RX ADMIN — LIDOCAINE HYDROCHLORIDE 20 ML: 20 INJECTION, SOLUTION INFILTRATION; PERINEURAL at 09:08

## 2021-08-19 RX ADMIN — GENTAMICIN SULFATE 80 MG: 40 INJECTION, SOLUTION INTRAMUSCULAR; INTRAVENOUS at 09:08

## 2021-08-21 LAB — BACTERIA UR CULT: NO GROWTH

## 2021-08-23 ENCOUNTER — TELEPHONE (OUTPATIENT)
Dept: ORTHOPEDICS | Facility: CLINIC | Age: 66
End: 2021-08-23

## 2021-08-24 ENCOUNTER — TELEPHONE (OUTPATIENT)
Dept: UROGYNECOLOGY | Facility: CLINIC | Age: 66
End: 2021-08-24

## 2021-08-24 ENCOUNTER — CLINICAL SUPPORT (OUTPATIENT)
Dept: UROGYNECOLOGY | Facility: CLINIC | Age: 66
End: 2021-08-24
Payer: MEDICARE

## 2021-08-24 DIAGNOSIS — R35.0 URINARY FREQUENCY: Primary | ICD-10-CM

## 2021-08-24 PROCEDURE — 87088 URINE BACTERIA CULTURE: CPT | Mod: HCNC | Performed by: OBSTETRICS & GYNECOLOGY

## 2021-08-24 PROCEDURE — 87086 URINE CULTURE/COLONY COUNT: CPT | Mod: HCNC | Performed by: OBSTETRICS & GYNECOLOGY

## 2021-08-24 PROCEDURE — 99499 UNLISTED E&M SERVICE: CPT | Mod: HCNC,S$GLB,, | Performed by: OBSTETRICS & GYNECOLOGY

## 2021-08-24 PROCEDURE — 99499 NO LOS: ICD-10-PCS | Mod: HCNC,S$GLB,, | Performed by: OBSTETRICS & GYNECOLOGY

## 2021-08-24 PROCEDURE — 87186 SC STD MICRODIL/AGAR DIL: CPT | Mod: HCNC | Performed by: OBSTETRICS & GYNECOLOGY

## 2021-08-24 PROCEDURE — 87077 CULTURE AEROBIC IDENTIFY: CPT | Mod: HCNC | Performed by: OBSTETRICS & GYNECOLOGY

## 2021-08-24 RX ORDER — CEPHALEXIN 500 MG/1
500 CAPSULE ORAL 3 TIMES DAILY
Qty: 15 CAPSULE | Refills: 0 | Status: SHIPPED | OUTPATIENT
Start: 2021-08-24 | End: 2021-08-29

## 2021-08-25 NOTE — PLAN OF CARE
Dc criteria met. Denies pain. Ambu;ates without difficulty. Home with  driving.   Discussed risk and benefits of surgical treatment vs. medical management.

## 2021-08-27 LAB — BACTERIA UR CULT: ABNORMAL

## 2021-08-27 RX ORDER — FLUCONAZOLE 150 MG/1
150 TABLET ORAL DAILY
Qty: 2 TABLET | Refills: 1 | Status: SHIPPED | OUTPATIENT
Start: 2021-08-27 | End: 2021-08-28

## 2021-09-09 ENCOUNTER — TELEPHONE (OUTPATIENT)
Dept: FAMILY MEDICINE | Facility: CLINIC | Age: 66
End: 2021-09-09

## 2021-09-14 ENCOUNTER — HOSPITAL ENCOUNTER (OUTPATIENT)
Dept: RADIOLOGY | Facility: HOSPITAL | Age: 66
Discharge: HOME | End: 2021-09-14
Attending: FAMILY MEDICINE
Payer: MEDICARE

## 2021-09-14 DIAGNOSIS — I25.10 ATHEROSCLEROSIS OF NATIVE CORONARY ARTERY OF NATIVE HEART WITHOUT ANGINA PECTORIS: ICD-10-CM

## 2021-09-14 PROCEDURE — 75571 CT HRT W/O DYE W/CA TEST: CPT | Mod: TC,HCNC

## 2021-09-14 PROCEDURE — 75571 CT HRT W/O DYE W/CA TEST: CPT | Mod: 26,HCNC,, | Performed by: RADIOLOGY

## 2021-09-14 PROCEDURE — 75571 CT CALCIUM SCORING CARDIAC: ICD-10-PCS | Mod: 26,HCNC,, | Performed by: RADIOLOGY

## 2021-09-21 ENCOUNTER — PATIENT OUTREACH (OUTPATIENT)
Dept: ADMINISTRATIVE | Facility: OTHER | Age: 66
End: 2021-09-21

## 2021-09-21 DIAGNOSIS — E11.9 TYPE 2 DIABETES MELLITUS WITHOUT COMPLICATION, WITHOUT LONG-TERM CURRENT USE OF INSULIN: Primary | ICD-10-CM

## 2021-09-23 ENCOUNTER — OFFICE VISIT (OUTPATIENT)
Dept: UROGYNECOLOGY | Facility: CLINIC | Age: 66
End: 2021-09-23
Payer: MEDICARE

## 2021-09-23 ENCOUNTER — TELEPHONE (OUTPATIENT)
Dept: ORTHOPEDICS | Facility: CLINIC | Age: 66
End: 2021-09-23

## 2021-09-23 VITALS
WEIGHT: 256.38 LBS | HEART RATE: 75 BPM | HEIGHT: 65 IN | SYSTOLIC BLOOD PRESSURE: 171 MMHG | DIASTOLIC BLOOD PRESSURE: 80 MMHG | BODY MASS INDEX: 42.71 KG/M2

## 2021-09-23 DIAGNOSIS — N95.2 ATROPHIC VAGINITIS: ICD-10-CM

## 2021-09-23 DIAGNOSIS — M46.1 SACROILIITIS: Primary | ICD-10-CM

## 2021-09-23 DIAGNOSIS — N39.0 RECURRENT UTI: ICD-10-CM

## 2021-09-23 DIAGNOSIS — N30.90 CYSTITIS: ICD-10-CM

## 2021-09-23 DIAGNOSIS — R35.0 URINARY FREQUENCY: Primary | ICD-10-CM

## 2021-09-23 PROCEDURE — 1159F PR MEDICATION LIST DOCUMENTED IN MEDICAL RECORD: ICD-10-PCS | Mod: HCNC,CPTII,S$GLB, | Performed by: NURSE PRACTITIONER

## 2021-09-23 PROCEDURE — 3077F PR MOST RECENT SYSTOLIC BLOOD PRESSURE >= 140 MM HG: ICD-10-PCS | Mod: HCNC,CPTII,S$GLB, | Performed by: NURSE PRACTITIONER

## 2021-09-23 PROCEDURE — 1160F RVW MEDS BY RX/DR IN RCRD: CPT | Mod: HCNC,CPTII,S$GLB, | Performed by: NURSE PRACTITIONER

## 2021-09-23 PROCEDURE — 99213 OFFICE O/P EST LOW 20 MIN: CPT | Mod: HCNC,S$GLB,, | Performed by: NURSE PRACTITIONER

## 2021-09-23 PROCEDURE — 1101F PR PT FALLS ASSESS DOC 0-1 FALLS W/OUT INJ PAST YR: ICD-10-PCS | Mod: HCNC,CPTII,S$GLB, | Performed by: NURSE PRACTITIONER

## 2021-09-23 PROCEDURE — 1101F PT FALLS ASSESS-DOCD LE1/YR: CPT | Mod: HCNC,CPTII,S$GLB, | Performed by: NURSE PRACTITIONER

## 2021-09-23 PROCEDURE — 99213 PR OFFICE/OUTPT VISIT, EST, LEVL III, 20-29 MIN: ICD-10-PCS | Mod: HCNC,S$GLB,, | Performed by: NURSE PRACTITIONER

## 2021-09-23 PROCEDURE — 3044F HG A1C LEVEL LT 7.0%: CPT | Mod: HCNC,CPTII,S$GLB, | Performed by: NURSE PRACTITIONER

## 2021-09-23 PROCEDURE — 3008F BODY MASS INDEX DOCD: CPT | Mod: HCNC,CPTII,S$GLB, | Performed by: NURSE PRACTITIONER

## 2021-09-23 PROCEDURE — 3008F PR BODY MASS INDEX (BMI) DOCUMENTED: ICD-10-PCS | Mod: HCNC,CPTII,S$GLB, | Performed by: NURSE PRACTITIONER

## 2021-09-23 PROCEDURE — 3079F DIAST BP 80-89 MM HG: CPT | Mod: HCNC,CPTII,S$GLB, | Performed by: NURSE PRACTITIONER

## 2021-09-23 PROCEDURE — 3077F SYST BP >= 140 MM HG: CPT | Mod: HCNC,CPTII,S$GLB, | Performed by: NURSE PRACTITIONER

## 2021-09-23 PROCEDURE — 1159F MED LIST DOCD IN RCRD: CPT | Mod: HCNC,CPTII,S$GLB, | Performed by: NURSE PRACTITIONER

## 2021-09-23 PROCEDURE — 99999 PR PBB SHADOW E&M-EST. PATIENT-LVL IV: CPT | Mod: PBBFAC,HCNC,, | Performed by: NURSE PRACTITIONER

## 2021-09-23 PROCEDURE — 3044F PR MOST RECENT HEMOGLOBIN A1C LEVEL <7.0%: ICD-10-PCS | Mod: HCNC,CPTII,S$GLB, | Performed by: NURSE PRACTITIONER

## 2021-09-23 PROCEDURE — 99999 PR PBB SHADOW E&M-EST. PATIENT-LVL IV: ICD-10-PCS | Mod: PBBFAC,HCNC,, | Performed by: NURSE PRACTITIONER

## 2021-09-23 PROCEDURE — 3288F FALL RISK ASSESSMENT DOCD: CPT | Mod: HCNC,CPTII,S$GLB, | Performed by: NURSE PRACTITIONER

## 2021-09-23 PROCEDURE — 1160F PR REVIEW ALL MEDS BY PRESCRIBER/CLIN PHARMACIST DOCUMENTED: ICD-10-PCS | Mod: HCNC,CPTII,S$GLB, | Performed by: NURSE PRACTITIONER

## 2021-09-23 PROCEDURE — 3288F PR FALLS RISK ASSESSMENT DOCUMENTED: ICD-10-PCS | Mod: HCNC,CPTII,S$GLB, | Performed by: NURSE PRACTITIONER

## 2021-09-23 PROCEDURE — 3079F PR MOST RECENT DIASTOLIC BLOOD PRESSURE 80-89 MM HG: ICD-10-PCS | Mod: HCNC,CPTII,S$GLB, | Performed by: NURSE PRACTITIONER

## 2021-10-05 ENCOUNTER — HOSPITAL ENCOUNTER (OUTPATIENT)
Dept: PREADMISSION TESTING | Facility: HOSPITAL | Age: 66
Discharge: HOME | End: 2021-10-05
Payer: MEDICARE

## 2021-10-05 DIAGNOSIS — M46.1 SACROILIITIS: ICD-10-CM

## 2021-10-06 ENCOUNTER — HOSPITAL ENCOUNTER (OUTPATIENT)
Dept: PREADMISSION TESTING | Facility: HOSPITAL | Age: 66
Discharge: HOME | End: 2021-10-06
Attending: ORTHOPAEDIC SURGERY
Payer: MEDICARE

## 2021-10-06 ENCOUNTER — HOSPITAL ENCOUNTER (OUTPATIENT)
Dept: RADIOLOGY | Facility: HOSPITAL | Age: 66
Discharge: HOME | End: 2021-10-06
Attending: ORTHOPAEDIC SURGERY
Payer: MEDICARE

## 2021-10-06 VITALS — HEIGHT: 65 IN | WEIGHT: 256 LBS | BODY MASS INDEX: 42.65 KG/M2

## 2021-10-06 DIAGNOSIS — M46.1 SACROILIITIS: ICD-10-CM

## 2021-10-06 DIAGNOSIS — Z03.818 ENCOUNTER FOR OBSERVATION FOR SUSPECTED EXPOSURE TO OTHER BIOLOGICAL AGENTS RULED OUT: ICD-10-CM

## 2021-10-06 LAB
ALBUMIN SERPL BCP-MCNC: 3.5 G/DL (ref 3.5–5.2)
ALP SERPL-CCNC: 77 U/L (ref 55–135)
ALT SERPL W/O P-5'-P-CCNC: 27 U/L (ref 10–44)
ANION GAP SERPL CALC-SCNC: 11 MMOL/L (ref 8–16)
AST SERPL-CCNC: 21 U/L (ref 10–40)
BACTERIA #/AREA URNS HPF: ABNORMAL /HPF
BASOPHILS # BLD AUTO: 0.05 K/UL (ref 0–0.2)
BASOPHILS NFR BLD: 0.7 % (ref 0–1.9)
BILIRUB SERPL-MCNC: 0.9 MG/DL (ref 0.1–1)
BILIRUB UR QL STRIP: NEGATIVE
BUN SERPL-MCNC: 13 MG/DL (ref 8–23)
CALCIUM SERPL-MCNC: 8.9 MG/DL (ref 8.7–10.5)
CHLORIDE SERPL-SCNC: 105 MMOL/L (ref 95–110)
CLARITY UR: CLEAR
CO2 SERPL-SCNC: 24 MMOL/L (ref 23–29)
COLOR UR: YELLOW
CREAT SERPL-MCNC: 0.9 MG/DL (ref 0.5–1.4)
DIFFERENTIAL METHOD: ABNORMAL
EOSINOPHIL # BLD AUTO: 0.4 K/UL (ref 0–0.5)
EOSINOPHIL NFR BLD: 5.9 % (ref 0–8)
ERYTHROCYTE [DISTWIDTH] IN BLOOD BY AUTOMATED COUNT: 13 % (ref 11.5–14.5)
EST. GFR  (AFRICAN AMERICAN): >60 ML/MIN/1.73 M^2
EST. GFR  (NON AFRICAN AMERICAN): >60 ML/MIN/1.73 M^2
GLUCOSE SERPL-MCNC: 170 MG/DL (ref 70–110)
GLUCOSE UR QL STRIP: NEGATIVE
HCT VFR BLD AUTO: 42.5 % (ref 37–48.5)
HGB BLD-MCNC: 13.9 G/DL (ref 12–16)
HGB UR QL STRIP: NEGATIVE
IMM GRANULOCYTES # BLD AUTO: 0.01 K/UL (ref 0–0.04)
IMM GRANULOCYTES NFR BLD AUTO: 0.1 % (ref 0–0.5)
KETONES UR QL STRIP: NEGATIVE
LEUKOCYTE ESTERASE UR QL STRIP: ABNORMAL
LYMPHOCYTES # BLD AUTO: 1.7 K/UL (ref 1–4.8)
LYMPHOCYTES NFR BLD: 22.1 % (ref 18–48)
MCH RBC QN AUTO: 30.9 PG (ref 27–31)
MCHC RBC AUTO-ENTMCNC: 32.7 G/DL (ref 32–36)
MCV RBC AUTO: 94 FL (ref 82–98)
MICROSCOPIC COMMENT: ABNORMAL
MONOCYTES # BLD AUTO: 0.6 K/UL (ref 0.3–1)
MONOCYTES NFR BLD: 7.4 % (ref 4–15)
NEUTROPHILS # BLD AUTO: 4.8 K/UL (ref 1.8–7.7)
NEUTROPHILS NFR BLD: 63.8 % (ref 38–73)
NITRITE UR QL STRIP: POSITIVE
NRBC BLD-RTO: 0 /100 WBC
PH UR STRIP: 6 [PH] (ref 5–8)
PLATELET # BLD AUTO: 257 K/UL (ref 150–450)
PMV BLD AUTO: 9.1 FL (ref 9.2–12.9)
POTASSIUM SERPL-SCNC: 4.2 MMOL/L (ref 3.5–5.1)
PROT SERPL-MCNC: 6.5 G/DL (ref 6–8.4)
PROT UR QL STRIP: NEGATIVE
RBC # BLD AUTO: 4.5 M/UL (ref 4–5.4)
RBC #/AREA URNS HPF: 0 /HPF (ref 0–4)
SODIUM SERPL-SCNC: 140 MMOL/L (ref 136–145)
SP GR UR STRIP: 1.02 (ref 1–1.03)
SQUAMOUS #/AREA URNS HPF: 7 /HPF
URN SPEC COLLECT METH UR: ABNORMAL
UROBILINOGEN UR STRIP-ACNC: NEGATIVE EU/DL
WBC # BLD AUTO: 7.45 K/UL (ref 3.9–12.7)
WBC #/AREA URNS HPF: 6 /HPF (ref 0–5)

## 2021-10-06 PROCEDURE — 99900103 DSU ONLY-NO CHARGE-INITIAL HR (STAT): Mod: HCNC

## 2021-10-06 PROCEDURE — 85025 COMPLETE CBC W/AUTO DIFF WBC: CPT | Mod: HCNC | Performed by: ORTHOPAEDIC SURGERY

## 2021-10-06 PROCEDURE — 71046 X-RAY EXAM CHEST 2 VIEWS: CPT | Mod: 26,HCNC,, | Performed by: RADIOLOGY

## 2021-10-06 PROCEDURE — 93010 EKG 12-LEAD: ICD-10-PCS | Mod: HCNC,,, | Performed by: INTERNAL MEDICINE

## 2021-10-06 PROCEDURE — 71046 XR CHEST PA AND LATERAL: ICD-10-PCS | Mod: 26,HCNC,, | Performed by: RADIOLOGY

## 2021-10-06 PROCEDURE — 93010 ELECTROCARDIOGRAM REPORT: CPT | Mod: HCNC,,, | Performed by: INTERNAL MEDICINE

## 2021-10-06 PROCEDURE — 93005 ELECTROCARDIOGRAM TRACING: CPT | Mod: HCNC

## 2021-10-06 PROCEDURE — 99900104 DSU ONLY-NO CHARGE-EA ADD'L HR (STAT): Mod: HCNC

## 2021-10-06 PROCEDURE — 71046 X-RAY EXAM CHEST 2 VIEWS: CPT | Mod: TC,HCNC,FY

## 2021-10-06 PROCEDURE — 36415 COLL VENOUS BLD VENIPUNCTURE: CPT | Mod: HCNC | Performed by: ORTHOPAEDIC SURGERY

## 2021-10-06 PROCEDURE — 80053 COMPREHEN METABOLIC PANEL: CPT | Mod: HCNC | Performed by: ORTHOPAEDIC SURGERY

## 2021-10-06 PROCEDURE — 81000 URINALYSIS NONAUTO W/SCOPE: CPT | Mod: HCNC | Performed by: ORTHOPAEDIC SURGERY

## 2021-10-07 ENCOUNTER — PATIENT MESSAGE (OUTPATIENT)
Dept: ADMINISTRATIVE | Facility: HOSPITAL | Age: 66
End: 2021-10-07

## 2021-10-09 ENCOUNTER — LAB VISIT (OUTPATIENT)
Dept: PRIMARY CARE CLINIC | Facility: CLINIC | Age: 66
End: 2021-10-09
Payer: MEDICARE

## 2021-10-09 DIAGNOSIS — Z01.818 PRE-OP TESTING: ICD-10-CM

## 2021-10-09 PROCEDURE — U0003 INFECTIOUS AGENT DETECTION BY NUCLEIC ACID (DNA OR RNA); SEVERE ACUTE RESPIRATORY SYNDROME CORONAVIRUS 2 (SARS-COV-2) (CORONAVIRUS DISEASE [COVID-19]), AMPLIFIED PROBE TECHNIQUE, MAKING USE OF HIGH THROUGHPUT TECHNOLOGIES AS DESCRIBED BY CMS-2020-01-R: HCPCS | Mod: HCNC | Performed by: ORTHOPAEDIC SURGERY

## 2021-10-09 PROCEDURE — U0005 INFEC AGEN DETEC AMPLI PROBE: HCPCS | Performed by: ORTHOPAEDIC SURGERY

## 2021-10-10 LAB
SARS-COV-2 RNA RESP QL NAA+PROBE: NOT DETECTED
SARS-COV-2- CYCLE NUMBER: NORMAL

## 2021-10-11 ENCOUNTER — ANESTHESIA EVENT (OUTPATIENT)
Dept: SURGERY | Facility: HOSPITAL | Age: 66
End: 2021-10-11
Payer: MEDICARE

## 2021-10-11 RX ORDER — CIPROFLOXACIN 500 MG/1
500 TABLET ORAL 2 TIMES DAILY
Qty: 6 TABLET | Refills: 0 | Status: SHIPPED | OUTPATIENT
Start: 2021-10-11 | End: 2021-10-14

## 2021-10-12 ENCOUNTER — HOSPITAL ENCOUNTER (OUTPATIENT)
Facility: HOSPITAL | Age: 66
Discharge: HOME HEALTH | End: 2021-10-13
Attending: ORTHOPAEDIC SURGERY | Admitting: ORTHOPAEDIC SURGERY
Payer: MEDICARE

## 2021-10-12 ENCOUNTER — ANESTHESIA (OUTPATIENT)
Dept: SURGERY | Facility: HOSPITAL | Age: 66
End: 2021-10-12
Payer: MEDICARE

## 2021-10-12 DIAGNOSIS — M51.36 DDD (DEGENERATIVE DISC DISEASE), LUMBAR: Primary | ICD-10-CM

## 2021-10-12 DIAGNOSIS — M46.1 SACROILIITIS: ICD-10-CM

## 2021-10-12 LAB
ANION GAP SERPL CALC-SCNC: 10 MMOL/L (ref 8–16)
BASOPHILS # BLD AUTO: 0.03 K/UL (ref 0–0.2)
BASOPHILS NFR BLD: 0.3 % (ref 0–1.9)
BUN SERPL-MCNC: 17 MG/DL (ref 8–23)
CALCIUM SERPL-MCNC: 8.8 MG/DL (ref 8.7–10.5)
CHLORIDE SERPL-SCNC: 104 MMOL/L (ref 95–110)
CO2 SERPL-SCNC: 25 MMOL/L (ref 23–29)
CREAT SERPL-MCNC: 1.1 MG/DL (ref 0.5–1.4)
DIFFERENTIAL METHOD: ABNORMAL
EOSINOPHIL # BLD AUTO: 0.2 K/UL (ref 0–0.5)
EOSINOPHIL NFR BLD: 1.9 % (ref 0–8)
ERYTHROCYTE [DISTWIDTH] IN BLOOD BY AUTOMATED COUNT: 13.1 % (ref 11.5–14.5)
EST. GFR  (AFRICAN AMERICAN): >60 ML/MIN/1.73 M^2
EST. GFR  (NON AFRICAN AMERICAN): 53 ML/MIN/1.73 M^2
GLUCOSE SERPL-MCNC: 161 MG/DL (ref 70–110)
HCT VFR BLD AUTO: 42.6 % (ref 37–48.5)
HGB BLD-MCNC: 13.9 G/DL (ref 12–16)
IMM GRANULOCYTES # BLD AUTO: 0.06 K/UL (ref 0–0.04)
IMM GRANULOCYTES NFR BLD AUTO: 0.6 % (ref 0–0.5)
LYMPHOCYTES # BLD AUTO: 1.1 K/UL (ref 1–4.8)
LYMPHOCYTES NFR BLD: 10.9 % (ref 18–48)
MCH RBC QN AUTO: 30.8 PG (ref 27–31)
MCHC RBC AUTO-ENTMCNC: 32.6 G/DL (ref 32–36)
MCV RBC AUTO: 94 FL (ref 82–98)
MONOCYTES # BLD AUTO: 0.3 K/UL (ref 0.3–1)
MONOCYTES NFR BLD: 3.2 % (ref 4–15)
NEUTROPHILS # BLD AUTO: 8.1 K/UL (ref 1.8–7.7)
NEUTROPHILS NFR BLD: 83.1 % (ref 38–73)
NRBC BLD-RTO: 0 /100 WBC
PLATELET # BLD AUTO: 215 K/UL (ref 150–450)
PMV BLD AUTO: 9 FL (ref 9.2–12.9)
POCT GLUCOSE: 175 MG/DL (ref 70–110)
POCT GLUCOSE: 227 MG/DL (ref 70–110)
POTASSIUM SERPL-SCNC: 4.7 MMOL/L (ref 3.5–5.1)
RBC # BLD AUTO: 4.52 M/UL (ref 4–5.4)
SODIUM SERPL-SCNC: 139 MMOL/L (ref 136–145)
WBC # BLD AUTO: 9.8 K/UL (ref 3.9–12.7)

## 2021-10-12 PROCEDURE — 25000003 PHARM REV CODE 250: Mod: HCNC | Performed by: ANESTHESIOLOGY

## 2021-10-12 PROCEDURE — D9220A PRA ANESTHESIA: ICD-10-PCS | Mod: HCNC,CRNA,, | Performed by: REGISTERED NURSE

## 2021-10-12 PROCEDURE — 36415 COLL VENOUS BLD VENIPUNCTURE: CPT | Mod: HCNC | Performed by: PHYSICIAN ASSISTANT

## 2021-10-12 PROCEDURE — 37000008 HC ANESTHESIA 1ST 15 MINUTES: Mod: HCNC | Performed by: ORTHOPAEDIC SURGERY

## 2021-10-12 PROCEDURE — 25000003 PHARM REV CODE 250: Mod: HCNC | Performed by: PHYSICIAN ASSISTANT

## 2021-10-12 PROCEDURE — 71000039 HC RECOVERY, EACH ADD'L HOUR: Mod: HCNC | Performed by: ORTHOPAEDIC SURGERY

## 2021-10-12 PROCEDURE — D9220A PRA ANESTHESIA: Mod: HCNC,CRNA,, | Performed by: REGISTERED NURSE

## 2021-10-12 PROCEDURE — 63600175 PHARM REV CODE 636 W HCPCS: Mod: HCNC | Performed by: PHYSICIAN ASSISTANT

## 2021-10-12 PROCEDURE — 99900103 DSU ONLY-NO CHARGE-INITIAL HR (STAT): Mod: HCNC | Performed by: ORTHOPAEDIC SURGERY

## 2021-10-12 PROCEDURE — 36000711: Mod: HCNC | Performed by: ORTHOPAEDIC SURGERY

## 2021-10-12 PROCEDURE — 94799 UNLISTED PULMONARY SVC/PX: CPT | Mod: HCNC

## 2021-10-12 PROCEDURE — D9220A PRA ANESTHESIA: Mod: HCNC,ANES,, | Performed by: ANESTHESIOLOGY

## 2021-10-12 PROCEDURE — 37000009 HC ANESTHESIA EA ADD 15 MINS: Mod: HCNC | Performed by: ORTHOPAEDIC SURGERY

## 2021-10-12 PROCEDURE — 71000033 HC RECOVERY, INTIAL HOUR: Mod: HCNC | Performed by: ORTHOPAEDIC SURGERY

## 2021-10-12 PROCEDURE — 25000003 PHARM REV CODE 250: Mod: HCNC | Performed by: ORTHOPAEDIC SURGERY

## 2021-10-12 PROCEDURE — 80048 BASIC METABOLIC PNL TOTAL CA: CPT | Mod: HCNC | Performed by: PHYSICIAN ASSISTANT

## 2021-10-12 PROCEDURE — D9220A PRA ANESTHESIA: ICD-10-PCS | Mod: HCNC,ANES,, | Performed by: ANESTHESIOLOGY

## 2021-10-12 PROCEDURE — 36000710: Mod: HCNC | Performed by: ORTHOPAEDIC SURGERY

## 2021-10-12 PROCEDURE — 99900104 DSU ONLY-NO CHARGE-EA ADD'L HR (STAT): Mod: HCNC | Performed by: ORTHOPAEDIC SURGERY

## 2021-10-12 PROCEDURE — 25000003 PHARM REV CODE 250: Mod: HCNC | Performed by: NURSE PRACTITIONER

## 2021-10-12 PROCEDURE — C1713 ANCHOR/SCREW BN/BN,TIS/BN: HCPCS | Mod: HCNC | Performed by: ORTHOPAEDIC SURGERY

## 2021-10-12 PROCEDURE — 63600175 PHARM REV CODE 636 W HCPCS: Mod: HCNC | Performed by: REGISTERED NURSE

## 2021-10-12 PROCEDURE — 94761 N-INVAS EAR/PLS OXIMETRY MLT: CPT | Mod: HCNC

## 2021-10-12 PROCEDURE — 63600175 PHARM REV CODE 636 W HCPCS: Mod: HCNC | Performed by: ORTHOPAEDIC SURGERY

## 2021-10-12 PROCEDURE — 63600175 PHARM REV CODE 636 W HCPCS: Mod: HCNC | Performed by: ANESTHESIOLOGY

## 2021-10-12 PROCEDURE — C9290 INJ, BUPIVACAINE LIPOSOME: HCPCS | Mod: HCNC | Performed by: ORTHOPAEDIC SURGERY

## 2021-10-12 PROCEDURE — 99900035 HC TECH TIME PER 15 MIN (STAT): Mod: HCNC

## 2021-10-12 PROCEDURE — 85025 COMPLETE CBC W/AUTO DIFF WBC: CPT | Mod: HCNC | Performed by: PHYSICIAN ASSISTANT

## 2021-10-12 PROCEDURE — 27000221 HC OXYGEN, UP TO 24 HOURS: Mod: HCNC

## 2021-10-12 PROCEDURE — 25000003 PHARM REV CODE 250: Mod: HCNC | Performed by: REGISTERED NURSE

## 2021-10-12 DEVICE — IMPLANTABLE DEVICE: Type: IMPLANTABLE DEVICE | Site: SPINE LUMBAR | Status: FUNCTIONAL

## 2021-10-12 DEVICE — IMPLANT IFUSE 3D 7X40MM: Type: IMPLANTABLE DEVICE | Site: SPINE LUMBAR | Status: FUNCTIONAL

## 2021-10-12 RX ORDER — OXYCODONE AND ACETAMINOPHEN 10; 325 MG/1; MG/1
1 TABLET ORAL EVERY 4 HOURS PRN
Qty: 40 TABLET | Refills: 0 | Status: SHIPPED | OUTPATIENT
Start: 2021-10-12 | End: 2021-10-19

## 2021-10-12 RX ORDER — AZELASTINE 1 MG/ML
1 SPRAY, METERED NASAL 2 TIMES DAILY
Status: DISCONTINUED | OUTPATIENT
Start: 2021-10-12 | End: 2021-10-13 | Stop reason: HOSPADM

## 2021-10-12 RX ORDER — LIDOCAINE HYDROCHLORIDE 20 MG/ML
INJECTION INTRAVENOUS
Status: DISCONTINUED | OUTPATIENT
Start: 2021-10-12 | End: 2021-10-12

## 2021-10-12 RX ORDER — SCOLOPAMINE TRANSDERMAL SYSTEM 1 MG/1
1 PATCH, EXTENDED RELEASE TRANSDERMAL
Status: DISCONTINUED | OUTPATIENT
Start: 2021-10-12 | End: 2021-10-13 | Stop reason: HOSPADM

## 2021-10-12 RX ORDER — AMOXICILLIN 250 MG
2 CAPSULE ORAL NIGHTLY PRN
Status: DISCONTINUED | OUTPATIENT
Start: 2021-10-12 | End: 2021-10-13 | Stop reason: HOSPADM

## 2021-10-12 RX ORDER — PROCHLORPERAZINE EDISYLATE 5 MG/ML
5 INJECTION INTRAMUSCULAR; INTRAVENOUS EVERY 6 HOURS PRN
Status: DISCONTINUED | OUTPATIENT
Start: 2021-10-12 | End: 2021-10-13 | Stop reason: HOSPADM

## 2021-10-12 RX ORDER — CLOBETASOL PROPIONATE 0.5 MG/G
1 CREAM TOPICAL 2 TIMES DAILY
Status: DISCONTINUED | OUTPATIENT
Start: 2021-10-12 | End: 2021-10-13 | Stop reason: HOSPADM

## 2021-10-12 RX ORDER — CYCLOBENZAPRINE HCL 5 MG
10 TABLET ORAL 3 TIMES DAILY
Status: DISCONTINUED | OUTPATIENT
Start: 2021-10-12 | End: 2021-10-13 | Stop reason: HOSPADM

## 2021-10-12 RX ORDER — ACETAMINOPHEN 10 MG/ML
INJECTION, SOLUTION INTRAVENOUS
Status: DISCONTINUED | OUTPATIENT
Start: 2021-10-12 | End: 2021-10-12

## 2021-10-12 RX ORDER — BISACODYL 10 MG
10 SUPPOSITORY, RECTAL RECTAL DAILY
Status: DISCONTINUED | OUTPATIENT
Start: 2021-10-12 | End: 2021-10-13 | Stop reason: HOSPADM

## 2021-10-12 RX ORDER — DIPHENHYDRAMINE HCL 25 MG
50 CAPSULE ORAL EVERY 6 HOURS PRN
Status: DISCONTINUED | OUTPATIENT
Start: 2021-10-12 | End: 2021-10-13 | Stop reason: HOSPADM

## 2021-10-12 RX ORDER — OXYBUTYNIN CHLORIDE 5 MG/1
15 TABLET, EXTENDED RELEASE ORAL DAILY
Status: DISCONTINUED | OUTPATIENT
Start: 2021-10-12 | End: 2021-10-13 | Stop reason: HOSPADM

## 2021-10-12 RX ORDER — SUCCINYLCHOLINE CHLORIDE 20 MG/ML
INJECTION INTRAMUSCULAR; INTRAVENOUS
Status: DISCONTINUED | OUTPATIENT
Start: 2021-10-12 | End: 2021-10-12

## 2021-10-12 RX ORDER — HYDROMORPHONE HYDROCHLORIDE 2 MG/ML
0.2 INJECTION, SOLUTION INTRAMUSCULAR; INTRAVENOUS; SUBCUTANEOUS EVERY 5 MIN PRN
Status: DISCONTINUED | OUTPATIENT
Start: 2021-10-12 | End: 2021-10-13 | Stop reason: HOSPADM

## 2021-10-12 RX ORDER — OXYCODONE HYDROCHLORIDE 5 MG/1
5 TABLET ORAL EVERY 4 HOURS PRN
Status: DISCONTINUED | OUTPATIENT
Start: 2021-10-12 | End: 2021-10-13 | Stop reason: HOSPADM

## 2021-10-12 RX ORDER — PANTOPRAZOLE SODIUM 40 MG/1
40 TABLET, DELAYED RELEASE ORAL DAILY
Status: DISCONTINUED | OUTPATIENT
Start: 2021-10-12 | End: 2021-10-13 | Stop reason: HOSPADM

## 2021-10-12 RX ORDER — KETOROLAC TROMETHAMINE 30 MG/ML
INJECTION, SOLUTION INTRAMUSCULAR; INTRAVENOUS
Status: DISCONTINUED | OUTPATIENT
Start: 2021-10-12 | End: 2021-10-12

## 2021-10-12 RX ORDER — CEFAZOLIN SODIUM 2 G/50ML
2 SOLUTION INTRAVENOUS
Status: COMPLETED | OUTPATIENT
Start: 2021-10-12 | End: 2021-10-13

## 2021-10-12 RX ORDER — PROCHLORPERAZINE EDISYLATE 5 MG/ML
5 INJECTION INTRAMUSCULAR; INTRAVENOUS EVERY 30 MIN PRN
Status: DISCONTINUED | OUTPATIENT
Start: 2021-10-12 | End: 2021-10-13 | Stop reason: HOSPADM

## 2021-10-12 RX ORDER — ONDANSETRON HYDROCHLORIDE 2 MG/ML
INJECTION, SOLUTION INTRAMUSCULAR; INTRAVENOUS
Status: DISCONTINUED | OUTPATIENT
Start: 2021-10-12 | End: 2021-10-12

## 2021-10-12 RX ORDER — KETAMINE HYDROCHLORIDE 10 MG/ML
INJECTION, SOLUTION INTRAMUSCULAR; INTRAVENOUS
Status: DISCONTINUED | OUTPATIENT
Start: 2021-10-12 | End: 2021-10-12

## 2021-10-12 RX ORDER — KETOROLAC TROMETHAMINE 30 MG/ML
15 INJECTION, SOLUTION INTRAMUSCULAR; INTRAVENOUS EVERY 6 HOURS
Status: COMPLETED | OUTPATIENT
Start: 2021-10-12 | End: 2021-10-13

## 2021-10-12 RX ORDER — OXYCODONE HYDROCHLORIDE 10 MG/1
10 TABLET ORAL EVERY 4 HOURS PRN
Status: DISCONTINUED | OUTPATIENT
Start: 2021-10-12 | End: 2021-10-13 | Stop reason: HOSPADM

## 2021-10-12 RX ORDER — BUPIVACAINE HYDROCHLORIDE AND EPINEPHRINE 2.5; 5 MG/ML; UG/ML
INJECTION, SOLUTION EPIDURAL; INFILTRATION; INTRACAUDAL; PERINEURAL
Status: DISCONTINUED | OUTPATIENT
Start: 2021-10-12 | End: 2021-10-12 | Stop reason: HOSPADM

## 2021-10-12 RX ORDER — SODIUM CHLORIDE, SODIUM LACTATE, POTASSIUM CHLORIDE, CALCIUM CHLORIDE 600; 310; 30; 20 MG/100ML; MG/100ML; MG/100ML; MG/100ML
INJECTION, SOLUTION INTRAVENOUS CONTINUOUS
Status: DISCONTINUED | OUTPATIENT
Start: 2021-10-12 | End: 2021-10-12

## 2021-10-12 RX ORDER — PROPOFOL 10 MG/ML
VIAL (ML) INTRAVENOUS
Status: DISCONTINUED | OUTPATIENT
Start: 2021-10-12 | End: 2021-10-12

## 2021-10-12 RX ORDER — METHOCARBAMOL 750 MG/1
750 TABLET, FILM COATED ORAL EVERY 8 HOURS PRN
Status: DISCONTINUED | OUTPATIENT
Start: 2021-10-12 | End: 2021-10-13 | Stop reason: HOSPADM

## 2021-10-12 RX ORDER — BUPIVACAINE HYDROCHLORIDE 5 MG/ML
INJECTION, SOLUTION EPIDURAL; INTRACAUDAL
Status: DISCONTINUED | OUTPATIENT
Start: 2021-10-12 | End: 2021-10-12 | Stop reason: HOSPADM

## 2021-10-12 RX ORDER — CIPROFLOXACIN 500 MG/1
500 TABLET ORAL 2 TIMES DAILY
Status: DISCONTINUED | OUTPATIENT
Start: 2021-10-12 | End: 2021-10-13 | Stop reason: HOSPADM

## 2021-10-12 RX ORDER — FENTANYL CITRATE 50 UG/ML
INJECTION, SOLUTION INTRAMUSCULAR; INTRAVENOUS
Status: DISCONTINUED | OUTPATIENT
Start: 2021-10-12 | End: 2021-10-12

## 2021-10-12 RX ORDER — ACETAMINOPHEN 325 MG/1
650 TABLET ORAL EVERY 6 HOURS PRN
Status: DISCONTINUED | OUTPATIENT
Start: 2021-10-13 | End: 2021-10-13 | Stop reason: HOSPADM

## 2021-10-12 RX ORDER — DEXAMETHASONE SODIUM PHOSPHATE 4 MG/ML
INJECTION, SOLUTION INTRA-ARTICULAR; INTRALESIONAL; INTRAMUSCULAR; INTRAVENOUS; SOFT TISSUE
Status: DISCONTINUED | OUTPATIENT
Start: 2021-10-12 | End: 2021-10-12

## 2021-10-12 RX ORDER — ROCURONIUM BROMIDE 10 MG/ML
INJECTION, SOLUTION INTRAVENOUS
Status: DISCONTINUED | OUTPATIENT
Start: 2021-10-12 | End: 2021-10-12

## 2021-10-12 RX ORDER — METHYLPHENIDATE HYDROCHLORIDE 40 MG/1
40 CAPSULE, EXTENDED RELEASE ORAL DAILY
Status: DISCONTINUED | OUTPATIENT
Start: 2021-10-12 | End: 2021-10-12 | Stop reason: RX

## 2021-10-12 RX ORDER — ALBUTEROL SULFATE 90 UG/1
2 AEROSOL, METERED RESPIRATORY (INHALATION) EVERY 6 HOURS PRN
Status: DISCONTINUED | OUTPATIENT
Start: 2021-10-12 | End: 2021-10-13 | Stop reason: HOSPADM

## 2021-10-12 RX ORDER — METOCLOPRAMIDE HYDROCHLORIDE 5 MG/ML
10 INJECTION INTRAMUSCULAR; INTRAVENOUS EVERY 10 MIN PRN
Status: COMPLETED | OUTPATIENT
Start: 2021-10-12 | End: 2021-10-12

## 2021-10-12 RX ORDER — MIDAZOLAM HYDROCHLORIDE 1 MG/ML
INJECTION INTRAMUSCULAR; INTRAVENOUS
Status: DISCONTINUED | OUTPATIENT
Start: 2021-10-12 | End: 2021-10-12

## 2021-10-12 RX ORDER — MAG HYDROX/ALUMINUM HYD/SIMETH 200-200-20
30 SUSPENSION, ORAL (FINAL DOSE FORM) ORAL EVERY 4 HOURS PRN
Status: DISCONTINUED | OUTPATIENT
Start: 2021-10-12 | End: 2021-10-13 | Stop reason: HOSPADM

## 2021-10-12 RX ORDER — DOCUSATE SODIUM 100 MG/1
100 CAPSULE, LIQUID FILLED ORAL DAILY
Status: DISCONTINUED | OUTPATIENT
Start: 2021-10-12 | End: 2021-10-13 | Stop reason: HOSPADM

## 2021-10-12 RX ORDER — FUROSEMIDE 40 MG/1
40 TABLET ORAL DAILY
Status: DISCONTINUED | OUTPATIENT
Start: 2021-10-12 | End: 2021-10-13 | Stop reason: HOSPADM

## 2021-10-12 RX ORDER — HYDROMORPHONE HYDROCHLORIDE 2 MG/ML
2 INJECTION, SOLUTION INTRAMUSCULAR; INTRAVENOUS; SUBCUTANEOUS
Status: DISCONTINUED | OUTPATIENT
Start: 2021-10-12 | End: 2021-10-13 | Stop reason: HOSPADM

## 2021-10-12 RX ORDER — ONDANSETRON 8 MG/1
8 TABLET, ORALLY DISINTEGRATING ORAL EVERY 6 HOURS PRN
Status: DISCONTINUED | OUTPATIENT
Start: 2021-10-12 | End: 2021-10-13 | Stop reason: HOSPADM

## 2021-10-12 RX ORDER — MUPIROCIN 20 MG/G
1 OINTMENT TOPICAL 2 TIMES DAILY
Status: DISCONTINUED | OUTPATIENT
Start: 2021-10-12 | End: 2021-10-13 | Stop reason: HOSPADM

## 2021-10-12 RX ORDER — CEFAZOLIN SODIUM 2 G/50ML
2 SOLUTION INTRAVENOUS
Status: COMPLETED | OUTPATIENT
Start: 2021-10-12 | End: 2021-10-12

## 2021-10-12 RX ORDER — METFORMIN HYDROCHLORIDE 500 MG/1
1000 TABLET, EXTENDED RELEASE ORAL 2 TIMES DAILY WITH MEALS
Status: DISCONTINUED | OUTPATIENT
Start: 2021-10-12 | End: 2021-10-13 | Stop reason: HOSPADM

## 2021-10-12 RX ORDER — FLUTICASONE PROPIONATE 50 MCG
1 SPRAY, SUSPENSION (ML) NASAL DAILY
Status: DISCONTINUED | OUTPATIENT
Start: 2021-10-13 | End: 2021-10-13 | Stop reason: HOSPADM

## 2021-10-12 RX ORDER — LIDOCAINE HYDROCHLORIDE 10 MG/ML
1 INJECTION, SOLUTION EPIDURAL; INFILTRATION; INTRACAUDAL; PERINEURAL ONCE
Status: DISCONTINUED | OUTPATIENT
Start: 2021-10-12 | End: 2021-10-13 | Stop reason: HOSPADM

## 2021-10-12 RX ORDER — TALC
9 POWDER (GRAM) TOPICAL NIGHTLY PRN
Status: DISCONTINUED | OUTPATIENT
Start: 2021-10-12 | End: 2021-10-13 | Stop reason: HOSPADM

## 2021-10-12 RX ADMIN — PHENYLEPHRINE HYDROCHLORIDE 0.2 MCG/KG/MIN: 10 INJECTION INTRAVENOUS at 10:10

## 2021-10-12 RX ADMIN — ACETAMINOPHEN 1000 MG: 10 INJECTION, SOLUTION INTRAVENOUS at 11:10

## 2021-10-12 RX ADMIN — KETOROLAC TROMETHAMINE 30 MG: 30 INJECTION, SOLUTION INTRAMUSCULAR; INTRAVENOUS at 12:10

## 2021-10-12 RX ADMIN — HYDROMORPHONE HYDROCHLORIDE 0.2 MG: 2 INJECTION INTRAMUSCULAR; INTRAVENOUS; SUBCUTANEOUS at 01:10

## 2021-10-12 RX ADMIN — SODIUM CHLORIDE, SODIUM GLUCONATE, SODIUM ACETATE, POTASSIUM CHLORIDE, MAGNESIUM CHLORIDE, SODIUM PHOSPHATE, DIBASIC, AND POTASSIUM PHOSPHATE: .53; .5; .37; .037; .03; .012; .00082 INJECTION, SOLUTION INTRAVENOUS at 08:10

## 2021-10-12 RX ADMIN — PROPOFOL 150 MG: 10 INJECTION, EMULSION INTRAVENOUS at 10:10

## 2021-10-12 RX ADMIN — OXYCODONE HYDROCHLORIDE 10 MG: 10 TABLET ORAL at 09:10

## 2021-10-12 RX ADMIN — FENTANYL CITRATE 25 MCG: 50 INJECTION, SOLUTION INTRAMUSCULAR; INTRAVENOUS at 12:10

## 2021-10-12 RX ADMIN — KETAMINE HYDROCHLORIDE 30 MG: 10 INJECTION, SOLUTION INTRAMUSCULAR; INTRAVENOUS at 10:10

## 2021-10-12 RX ADMIN — FENTANYL CITRATE 50 MCG: 50 INJECTION, SOLUTION INTRAMUSCULAR; INTRAVENOUS at 11:10

## 2021-10-12 RX ADMIN — SCOPALAMINE 1 PATCH: 1 PATCH, EXTENDED RELEASE TRANSDERMAL at 08:10

## 2021-10-12 RX ADMIN — MUPIROCIN 1 G: 20 OINTMENT TOPICAL at 09:10

## 2021-10-12 RX ADMIN — KETAMINE HYDROCHLORIDE 10 MG: 10 INJECTION, SOLUTION INTRAMUSCULAR; INTRAVENOUS at 11:10

## 2021-10-12 RX ADMIN — CEFAZOLIN SODIUM 2 G: 2 SOLUTION INTRAVENOUS at 11:10

## 2021-10-12 RX ADMIN — PANTOPRAZOLE SODIUM 40 MG: 40 TABLET, DELAYED RELEASE ORAL at 02:10

## 2021-10-12 RX ADMIN — DEXAMETHASONE SODIUM PHOSPHATE 4 MG: 4 INJECTION, SOLUTION INTRA-ARTICULAR; INTRALESIONAL; INTRAMUSCULAR; INTRAVENOUS; SOFT TISSUE at 10:10

## 2021-10-12 RX ADMIN — SUCCINYLCHOLINE CHLORIDE 140 MG: 20 INJECTION, SOLUTION INTRAMUSCULAR; INTRAVENOUS; PARENTERAL at 10:10

## 2021-10-12 RX ADMIN — ONDANSETRON 4 MG: 2 INJECTION, SOLUTION INTRAMUSCULAR; INTRAVENOUS at 10:10

## 2021-10-12 RX ADMIN — ROCURONIUM BROMIDE 5 MG: 10 INJECTION, SOLUTION INTRAVENOUS at 10:10

## 2021-10-12 RX ADMIN — METFORMIN HYDROCHLORIDE 1000 MG: 500 TABLET, EXTENDED RELEASE ORAL at 04:10

## 2021-10-12 RX ADMIN — CYCLOBENZAPRINE HYDROCHLORIDE 10 MG: 5 TABLET, FILM COATED ORAL at 02:10

## 2021-10-12 RX ADMIN — CYCLOBENZAPRINE HYDROCHLORIDE 10 MG: 5 TABLET, FILM COATED ORAL at 09:10

## 2021-10-12 RX ADMIN — MIDAZOLAM HYDROCHLORIDE 2 MG: 1 INJECTION, SOLUTION INTRAMUSCULAR; INTRAVENOUS at 10:10

## 2021-10-12 RX ADMIN — SODIUM CHLORIDE, SODIUM LACTATE, POTASSIUM CHLORIDE, AND CALCIUM CHLORIDE: .6; .31; .03; .02 INJECTION, SOLUTION INTRAVENOUS at 01:10

## 2021-10-12 RX ADMIN — GLYCOPYRROLATE 0.2 MG: 0.2 INJECTION, SOLUTION INTRAMUSCULAR; INTRAVITREAL at 10:10

## 2021-10-12 RX ADMIN — CIPROFLOXACIN 500 MG: 500 TABLET, FILM COATED ORAL at 09:10

## 2021-10-12 RX ADMIN — METOCLOPRAMIDE 10 MG: 5 INJECTION, SOLUTION INTRAMUSCULAR; INTRAVENOUS at 01:10

## 2021-10-12 RX ADMIN — LEVOTHYROXINE SODIUM 137 MCG: 0.11 TABLET ORAL at 02:10

## 2021-10-12 RX ADMIN — OXYBUTYNIN CHLORIDE 15 MG: 5 TABLET, EXTENDED RELEASE ORAL at 02:10

## 2021-10-12 RX ADMIN — KETOROLAC TROMETHAMINE 15 MG: 30 INJECTION, SOLUTION INTRAMUSCULAR at 11:10

## 2021-10-12 RX ADMIN — FUROSEMIDE 40 MG: 40 TABLET ORAL at 02:10

## 2021-10-12 RX ADMIN — Medication 9 MG: at 09:10

## 2021-10-12 RX ADMIN — DOCUSATE SODIUM 100 MG: 100 CAPSULE, LIQUID FILLED ORAL at 02:10

## 2021-10-12 RX ADMIN — SODIUM CHLORIDE, SODIUM GLUCONATE, SODIUM ACETATE, POTASSIUM CHLORIDE, MAGNESIUM CHLORIDE, SODIUM PHOSPHATE, DIBASIC, AND POTASSIUM PHOSPHATE: .53; .5; .37; .037; .03; .012; .00082 INJECTION, SOLUTION INTRAVENOUS at 11:10

## 2021-10-12 RX ADMIN — LIDOCAINE HYDROCHLORIDE 75 MG: 20 INJECTION, SOLUTION INTRAVENOUS at 10:10

## 2021-10-12 RX ADMIN — BISACODYL 10 MG: 10 SUPPOSITORY RECTAL at 02:10

## 2021-10-12 RX ADMIN — ONDANSETRON 8 MG: 8 TABLET, ORALLY DISINTEGRATING ORAL at 02:10

## 2021-10-12 RX ADMIN — CEFAZOLIN SODIUM 2 G: 2 SOLUTION INTRAVENOUS at 04:10

## 2021-10-12 RX ADMIN — KETOROLAC TROMETHAMINE 15 MG: 30 INJECTION, SOLUTION INTRAMUSCULAR at 05:10

## 2021-10-12 RX ADMIN — FENTANYL CITRATE 25 MCG: 50 INJECTION, SOLUTION INTRAMUSCULAR; INTRAVENOUS at 11:10

## 2021-10-12 RX ADMIN — AZELASTINE HYDROCHLORIDE 137 MCG: 137 SPRAY, METERED NASAL at 09:10

## 2021-10-12 RX ADMIN — FENTANYL CITRATE 100 MCG: 50 INJECTION, SOLUTION INTRAMUSCULAR; INTRAVENOUS at 10:10

## 2021-10-13 VITALS
DIASTOLIC BLOOD PRESSURE: 57 MMHG | RESPIRATION RATE: 16 BRPM | TEMPERATURE: 100 F | OXYGEN SATURATION: 93 % | HEIGHT: 65 IN | BODY MASS INDEX: 41.51 KG/M2 | HEART RATE: 77 BPM | SYSTOLIC BLOOD PRESSURE: 122 MMHG | WEIGHT: 249.13 LBS

## 2021-10-13 LAB
BASOPHILS # BLD AUTO: 0.03 K/UL (ref 0–0.2)
BASOPHILS NFR BLD: 0.3 % (ref 0–1.9)
DIFFERENTIAL METHOD: ABNORMAL
EOSINOPHIL # BLD AUTO: 0.1 K/UL (ref 0–0.5)
EOSINOPHIL NFR BLD: 0.8 % (ref 0–8)
ERYTHROCYTE [DISTWIDTH] IN BLOOD BY AUTOMATED COUNT: 12.8 % (ref 11.5–14.5)
HCT VFR BLD AUTO: 40.7 % (ref 37–48.5)
HGB BLD-MCNC: 13.4 G/DL (ref 12–16)
IMM GRANULOCYTES # BLD AUTO: 0.05 K/UL (ref 0–0.04)
IMM GRANULOCYTES NFR BLD AUTO: 0.5 % (ref 0–0.5)
LYMPHOCYTES # BLD AUTO: 1.3 K/UL (ref 1–4.8)
LYMPHOCYTES NFR BLD: 14 % (ref 18–48)
MCH RBC QN AUTO: 30.2 PG (ref 27–31)
MCHC RBC AUTO-ENTMCNC: 32.9 G/DL (ref 32–36)
MCV RBC AUTO: 92 FL (ref 82–98)
MONOCYTES # BLD AUTO: 0.7 K/UL (ref 0.3–1)
MONOCYTES NFR BLD: 7.8 % (ref 4–15)
NEUTROPHILS # BLD AUTO: 7.1 K/UL (ref 1.8–7.7)
NEUTROPHILS NFR BLD: 76.6 % (ref 38–73)
NRBC BLD-RTO: 0 /100 WBC
PLATELET # BLD AUTO: 252 K/UL (ref 150–450)
PMV BLD AUTO: 9.1 FL (ref 9.2–12.9)
POCT GLUCOSE: 164 MG/DL (ref 70–110)
RBC # BLD AUTO: 4.43 M/UL (ref 4–5.4)
WBC # BLD AUTO: 9.26 K/UL (ref 3.9–12.7)

## 2021-10-13 PROCEDURE — 25000242 PHARM REV CODE 250 ALT 637 W/ HCPCS: Mod: HCNC | Performed by: PHYSICIAN ASSISTANT

## 2021-10-13 PROCEDURE — 97161 PT EVAL LOW COMPLEX 20 MIN: CPT | Mod: HCNC

## 2021-10-13 PROCEDURE — 27000221 HC OXYGEN, UP TO 24 HOURS: Mod: HCNC

## 2021-10-13 PROCEDURE — 97116 GAIT TRAINING THERAPY: CPT | Mod: HCNC

## 2021-10-13 PROCEDURE — 36415 COLL VENOUS BLD VENIPUNCTURE: CPT | Mod: HCNC | Performed by: PHYSICIAN ASSISTANT

## 2021-10-13 PROCEDURE — 94761 N-INVAS EAR/PLS OXIMETRY MLT: CPT | Mod: HCNC

## 2021-10-13 PROCEDURE — 25000003 PHARM REV CODE 250: Mod: HCNC | Performed by: PHYSICIAN ASSISTANT

## 2021-10-13 PROCEDURE — 99900035 HC TECH TIME PER 15 MIN (STAT): Mod: HCNC

## 2021-10-13 PROCEDURE — 85025 COMPLETE CBC W/AUTO DIFF WBC: CPT | Mod: HCNC | Performed by: PHYSICIAN ASSISTANT

## 2021-10-13 PROCEDURE — 63600175 PHARM REV CODE 636 W HCPCS: Mod: HCNC | Performed by: PHYSICIAN ASSISTANT

## 2021-10-13 RX ADMIN — KETOROLAC TROMETHAMINE 15 MG: 30 INJECTION, SOLUTION INTRAMUSCULAR at 11:10

## 2021-10-13 RX ADMIN — KETOROLAC TROMETHAMINE 15 MG: 30 INJECTION, SOLUTION INTRAMUSCULAR at 05:10

## 2021-10-13 RX ADMIN — METFORMIN HYDROCHLORIDE 1000 MG: 500 TABLET, EXTENDED RELEASE ORAL at 08:10

## 2021-10-13 RX ADMIN — AZELASTINE HYDROCHLORIDE 137 MCG: 137 SPRAY, METERED NASAL at 08:10

## 2021-10-13 RX ADMIN — LEVOTHYROXINE SODIUM 137 MCG: 0.11 TABLET ORAL at 08:10

## 2021-10-13 RX ADMIN — CYCLOBENZAPRINE HYDROCHLORIDE 10 MG: 5 TABLET, FILM COATED ORAL at 03:10

## 2021-10-13 RX ADMIN — CIPROFLOXACIN 500 MG: 500 TABLET, FILM COATED ORAL at 08:10

## 2021-10-13 RX ADMIN — PANTOPRAZOLE SODIUM 40 MG: 40 TABLET, DELAYED RELEASE ORAL at 08:10

## 2021-10-13 RX ADMIN — MUPIROCIN 1 G: 20 OINTMENT TOPICAL at 08:10

## 2021-10-13 RX ADMIN — OXYBUTYNIN CHLORIDE 15 MG: 5 TABLET, EXTENDED RELEASE ORAL at 08:10

## 2021-10-13 RX ADMIN — CYCLOBENZAPRINE HYDROCHLORIDE 10 MG: 5 TABLET, FILM COATED ORAL at 08:10

## 2021-10-13 RX ADMIN — DOCUSATE SODIUM 100 MG: 100 CAPSULE, LIQUID FILLED ORAL at 08:10

## 2021-10-13 RX ADMIN — FLUTICASONE PROPIONATE 50 MCG: 50 SPRAY, METERED NASAL at 08:10

## 2021-10-13 RX ADMIN — OXYCODONE HYDROCHLORIDE 10 MG: 10 TABLET ORAL at 03:10

## 2021-10-13 RX ADMIN — CEFAZOLIN SODIUM 2 G: 2 SOLUTION INTRAVENOUS at 04:10

## 2021-10-13 RX ADMIN — FUROSEMIDE 40 MG: 40 TABLET ORAL at 08:10

## 2021-10-14 ENCOUNTER — TELEPHONE (OUTPATIENT)
Dept: MEDSURG UNIT | Facility: HOSPITAL | Age: 66
End: 2021-10-14

## 2021-10-20 ENCOUNTER — EXTERNAL HOME HEALTH (OUTPATIENT)
Dept: HOME HEALTH SERVICES | Facility: HOSPITAL | Age: 66
End: 2021-10-20
Payer: MEDICARE

## 2021-10-27 ENCOUNTER — PATIENT OUTREACH (OUTPATIENT)
Dept: ADMINISTRATIVE | Facility: OTHER | Age: 66
End: 2021-10-27
Payer: MEDICARE

## 2021-10-27 ENCOUNTER — OFFICE VISIT (OUTPATIENT)
Dept: ORTHOPEDICS | Facility: CLINIC | Age: 66
End: 2021-10-27
Payer: MEDICARE

## 2021-10-27 ENCOUNTER — OFFICE VISIT (OUTPATIENT)
Dept: PHYSICAL MEDICINE AND REHAB | Facility: CLINIC | Age: 66
End: 2021-10-27
Payer: MEDICARE

## 2021-10-27 VITALS
BODY MASS INDEX: 41.48 KG/M2 | DIASTOLIC BLOOD PRESSURE: 82 MMHG | HEIGHT: 65 IN | WEIGHT: 249 LBS | SYSTOLIC BLOOD PRESSURE: 140 MMHG

## 2021-10-27 VITALS — BODY MASS INDEX: 41.48 KG/M2 | WEIGHT: 249 LBS | RESPIRATION RATE: 20 BRPM | HEIGHT: 65 IN

## 2021-10-27 DIAGNOSIS — M46.06 SPINAL ENTHESOPATHY, LUMBAR REGION: ICD-10-CM

## 2021-10-27 DIAGNOSIS — M54.16 LUMBAR RADICULITIS: ICD-10-CM

## 2021-10-27 DIAGNOSIS — M54.32 SCIATICA OF LEFT SIDE: ICD-10-CM

## 2021-10-27 DIAGNOSIS — M46.1 SACROILIITIS: ICD-10-CM

## 2021-10-27 DIAGNOSIS — Z01.818 PRE-OP TESTING: ICD-10-CM

## 2021-10-27 DIAGNOSIS — G89.29 CHRONIC LEFT-SIDED LOW BACK PAIN WITH LEFT-SIDED SCIATICA: ICD-10-CM

## 2021-10-27 DIAGNOSIS — M54.42 CHRONIC LEFT-SIDED LOW BACK PAIN WITH LEFT-SIDED SCIATICA: ICD-10-CM

## 2021-10-27 DIAGNOSIS — M43.28 FUSION OF SACRAL REGION OF SPINE: Primary | ICD-10-CM

## 2021-10-27 DIAGNOSIS — M46.1 SACROILIITIS: Primary | ICD-10-CM

## 2021-10-27 DIAGNOSIS — M54.16 LUMBAR RADICULITIS: Primary | ICD-10-CM

## 2021-10-27 PROCEDURE — 99499 UNLISTED E&M SERVICE: CPT | Mod: HCNC,S$GLB,, | Performed by: PHYSICAL MEDICINE & REHABILITATION

## 2021-10-27 PROCEDURE — 96372 THER/PROPH/DIAG INJ SC/IM: CPT | Mod: S$GLB,,, | Performed by: ORTHOPAEDIC SURGERY

## 2021-10-27 PROCEDURE — 3079F DIAST BP 80-89 MM HG: CPT | Mod: S$GLB,,, | Performed by: ORTHOPAEDIC SURGERY

## 2021-10-27 PROCEDURE — 1101F PT FALLS ASSESS-DOCD LE1/YR: CPT | Mod: S$GLB,,, | Performed by: ORTHOPAEDIC SURGERY

## 2021-10-27 PROCEDURE — 3077F SYST BP >= 140 MM HG: CPT | Mod: S$GLB,,, | Performed by: ORTHOPAEDIC SURGERY

## 2021-10-27 PROCEDURE — 3008F BODY MASS INDEX DOCD: CPT | Mod: S$GLB,,, | Performed by: ORTHOPAEDIC SURGERY

## 2021-10-27 PROCEDURE — 3008F BODY MASS INDEX DOCD: CPT | Mod: HCNC,CPTII,S$GLB, | Performed by: PHYSICAL MEDICINE & REHABILITATION

## 2021-10-27 PROCEDURE — 3008F PR BODY MASS INDEX (BMI) DOCUMENTED: ICD-10-PCS | Mod: S$GLB,,, | Performed by: ORTHOPAEDIC SURGERY

## 2021-10-27 PROCEDURE — 3079F PR MOST RECENT DIASTOLIC BLOOD PRESSURE 80-89 MM HG: ICD-10-PCS | Mod: S$GLB,,, | Performed by: ORTHOPAEDIC SURGERY

## 2021-10-27 PROCEDURE — 99213 OFFICE O/P EST LOW 20 MIN: CPT | Mod: HCNC,S$GLB,, | Performed by: PHYSICAL MEDICINE & REHABILITATION

## 2021-10-27 PROCEDURE — 1101F PR PT FALLS ASSESS DOC 0-1 FALLS W/OUT INJ PAST YR: ICD-10-PCS | Mod: HCNC,CPTII,S$GLB, | Performed by: PHYSICAL MEDICINE & REHABILITATION

## 2021-10-27 PROCEDURE — 99213 PR OFFICE/OUTPT VISIT, EST, LEVL III, 20-29 MIN: ICD-10-PCS | Mod: HCNC,S$GLB,, | Performed by: PHYSICAL MEDICINE & REHABILITATION

## 2021-10-27 PROCEDURE — 1160F RVW MEDS BY RX/DR IN RCRD: CPT | Mod: S$GLB,,, | Performed by: ORTHOPAEDIC SURGERY

## 2021-10-27 PROCEDURE — 99024 POSTOP FOLLOW-UP VISIT: CPT | Mod: S$GLB,,, | Performed by: ORTHOPAEDIC SURGERY

## 2021-10-27 PROCEDURE — 3288F PR FALLS RISK ASSESSMENT DOCUMENTED: ICD-10-PCS | Mod: S$GLB,,, | Performed by: ORTHOPAEDIC SURGERY

## 2021-10-27 PROCEDURE — 99499 RISK ADDL DX/OHS AUDIT: ICD-10-PCS | Mod: HCNC,S$GLB,, | Performed by: PHYSICAL MEDICINE & REHABILITATION

## 2021-10-27 PROCEDURE — 3044F PR MOST RECENT HEMOGLOBIN A1C LEVEL <7.0%: ICD-10-PCS | Mod: S$GLB,,, | Performed by: ORTHOPAEDIC SURGERY

## 2021-10-27 PROCEDURE — 99024 PR POST-OP FOLLOW-UP VISIT: ICD-10-PCS | Mod: S$GLB,,, | Performed by: ORTHOPAEDIC SURGERY

## 2021-10-27 PROCEDURE — 3288F PR FALLS RISK ASSESSMENT DOCUMENTED: ICD-10-PCS | Mod: HCNC,CPTII,S$GLB, | Performed by: PHYSICAL MEDICINE & REHABILITATION

## 2021-10-27 PROCEDURE — 3044F PR MOST RECENT HEMOGLOBIN A1C LEVEL <7.0%: ICD-10-PCS | Mod: HCNC,CPTII,S$GLB, | Performed by: PHYSICAL MEDICINE & REHABILITATION

## 2021-10-27 PROCEDURE — 99999 PR PBB SHADOW E&M-EST. PATIENT-LVL IV: ICD-10-PCS | Mod: PBBFAC,HCNC,, | Performed by: PHYSICAL MEDICINE & REHABILITATION

## 2021-10-27 PROCEDURE — 99999 PR PBB SHADOW E&M-EST. PATIENT-LVL IV: CPT | Mod: PBBFAC,HCNC,, | Performed by: PHYSICAL MEDICINE & REHABILITATION

## 2021-10-27 PROCEDURE — 3288F FALL RISK ASSESSMENT DOCD: CPT | Mod: HCNC,CPTII,S$GLB, | Performed by: PHYSICAL MEDICINE & REHABILITATION

## 2021-10-27 PROCEDURE — 1101F PT FALLS ASSESS-DOCD LE1/YR: CPT | Mod: HCNC,CPTII,S$GLB, | Performed by: PHYSICAL MEDICINE & REHABILITATION

## 2021-10-27 PROCEDURE — 3044F HG A1C LEVEL LT 7.0%: CPT | Mod: S$GLB,,, | Performed by: ORTHOPAEDIC SURGERY

## 2021-10-27 PROCEDURE — 1160F PR REVIEW ALL MEDS BY PRESCRIBER/CLIN PHARMACIST DOCUMENTED: ICD-10-PCS | Mod: S$GLB,,, | Performed by: ORTHOPAEDIC SURGERY

## 2021-10-27 PROCEDURE — 3044F HG A1C LEVEL LT 7.0%: CPT | Mod: HCNC,CPTII,S$GLB, | Performed by: PHYSICAL MEDICINE & REHABILITATION

## 2021-10-27 PROCEDURE — 3008F PR BODY MASS INDEX (BMI) DOCUMENTED: ICD-10-PCS | Mod: HCNC,CPTII,S$GLB, | Performed by: PHYSICAL MEDICINE & REHABILITATION

## 2021-10-27 PROCEDURE — 3077F PR MOST RECENT SYSTOLIC BLOOD PRESSURE >= 140 MM HG: ICD-10-PCS | Mod: S$GLB,,, | Performed by: ORTHOPAEDIC SURGERY

## 2021-10-27 PROCEDURE — 3288F FALL RISK ASSESSMENT DOCD: CPT | Mod: S$GLB,,, | Performed by: ORTHOPAEDIC SURGERY

## 2021-10-27 PROCEDURE — 96372 PR INJECTION,THERAP/PROPH/DIAG2ST, IM OR SUBCUT: ICD-10-PCS | Mod: S$GLB,,, | Performed by: ORTHOPAEDIC SURGERY

## 2021-10-27 PROCEDURE — 1101F PR PT FALLS ASSESS DOC 0-1 FALLS W/OUT INJ PAST YR: ICD-10-PCS | Mod: S$GLB,,, | Performed by: ORTHOPAEDIC SURGERY

## 2021-10-27 RX ORDER — GENTAMICIN SULFATE 40 MG/ML
INJECTION, SOLUTION INTRAMUSCULAR; INTRAVENOUS
COMMUNITY
Start: 2021-08-19 | End: 2022-02-01

## 2021-10-27 RX ORDER — PREGABALIN 150 MG/1
150 CAPSULE ORAL 2 TIMES DAILY
Qty: 60 CAPSULE | Refills: 2 | Status: SHIPPED | OUTPATIENT
Start: 2021-10-27 | End: 2021-12-24

## 2021-10-27 RX ORDER — TRIAMCINOLONE ACETONIDE 40 MG/ML
40 INJECTION, SUSPENSION INTRA-ARTICULAR; INTRAMUSCULAR
Status: DISCONTINUED | OUTPATIENT
Start: 2021-10-27 | End: 2021-10-27 | Stop reason: HOSPADM

## 2021-10-27 RX ADMIN — TRIAMCINOLONE ACETONIDE 40 MG: 40 INJECTION, SUSPENSION INTRA-ARTICULAR; INTRAMUSCULAR at 09:10

## 2021-11-02 ENCOUNTER — DOCUMENT SCAN (OUTPATIENT)
Dept: HOME HEALTH SERVICES | Facility: HOSPITAL | Age: 66
End: 2021-11-02
Payer: MEDICARE

## 2021-11-09 ENCOUNTER — LAB VISIT (OUTPATIENT)
Dept: PRIMARY CARE CLINIC | Facility: CLINIC | Age: 66
End: 2021-11-09
Payer: MEDICARE

## 2021-11-09 ENCOUNTER — LAB VISIT (OUTPATIENT)
Dept: LAB | Facility: HOSPITAL | Age: 66
End: 2021-11-09
Attending: FAMILY MEDICINE
Payer: MEDICARE

## 2021-11-09 DIAGNOSIS — E78.5 HYPERLIPIDEMIA, UNSPECIFIED HYPERLIPIDEMIA TYPE: ICD-10-CM

## 2021-11-09 DIAGNOSIS — Z01.818 PRE-OP TESTING: ICD-10-CM

## 2021-11-09 DIAGNOSIS — E55.9 VITAMIN D DEFICIENCY: ICD-10-CM

## 2021-11-09 DIAGNOSIS — E03.8 HYPOTHYROIDISM DUE TO HASHIMOTO'S THYROIDITIS: ICD-10-CM

## 2021-11-09 DIAGNOSIS — E06.3 HYPOTHYROIDISM DUE TO HASHIMOTO'S THYROIDITIS: ICD-10-CM

## 2021-11-09 DIAGNOSIS — E11.9 TYPE 2 DIABETES MELLITUS WITHOUT COMPLICATION, WITHOUT LONG-TERM CURRENT USE OF INSULIN: ICD-10-CM

## 2021-11-09 DIAGNOSIS — E53.8 B12 DEFICIENCY: ICD-10-CM

## 2021-11-09 LAB
25(OH)D3+25(OH)D2 SERPL-MCNC: 33 NG/ML (ref 30–96)
ALBUMIN SERPL BCP-MCNC: 3.7 G/DL (ref 3.5–5.2)
ALP SERPL-CCNC: 114 U/L (ref 55–135)
ALT SERPL W/O P-5'-P-CCNC: 20 U/L (ref 10–44)
ANION GAP SERPL CALC-SCNC: 10 MMOL/L (ref 8–16)
AST SERPL-CCNC: 17 U/L (ref 10–40)
BASOPHILS # BLD AUTO: 0.03 K/UL (ref 0–0.2)
BASOPHILS NFR BLD: 0.5 % (ref 0–1.9)
BILIRUB SERPL-MCNC: 1.1 MG/DL (ref 0.1–1)
BUN SERPL-MCNC: 20 MG/DL (ref 8–23)
CALCIUM SERPL-MCNC: 10.5 MG/DL (ref 8.7–10.5)
CHLORIDE SERPL-SCNC: 102 MMOL/L (ref 95–110)
CHOLEST SERPL-MCNC: 202 MG/DL (ref 120–199)
CHOLEST/HDLC SERPL: 3.1 {RATIO} (ref 2–5)
CO2 SERPL-SCNC: 27 MMOL/L (ref 23–29)
CREAT SERPL-MCNC: 0.9 MG/DL (ref 0.5–1.4)
DIFFERENTIAL METHOD: ABNORMAL
EOSINOPHIL # BLD AUTO: 0.4 K/UL (ref 0–0.5)
EOSINOPHIL NFR BLD: 6.3 % (ref 0–8)
ERYTHROCYTE [DISTWIDTH] IN BLOOD BY AUTOMATED COUNT: 13.3 % (ref 11.5–14.5)
EST. GFR  (AFRICAN AMERICAN): >60 ML/MIN/1.73 M^2
EST. GFR  (NON AFRICAN AMERICAN): >60 ML/MIN/1.73 M^2
ESTIMATED AVG GLUCOSE: 137 MG/DL (ref 68–131)
GLUCOSE SERPL-MCNC: 103 MG/DL (ref 70–110)
HBA1C MFR BLD: 6.4 % (ref 4–5.6)
HCT VFR BLD AUTO: 43.9 % (ref 37–48.5)
HDLC SERPL-MCNC: 65 MG/DL (ref 40–75)
HDLC SERPL: 32.2 % (ref 20–50)
HGB BLD-MCNC: 14 G/DL (ref 12–16)
IMM GRANULOCYTES # BLD AUTO: 0.01 K/UL (ref 0–0.04)
IMM GRANULOCYTES NFR BLD AUTO: 0.2 % (ref 0–0.5)
LDLC SERPL CALC-MCNC: 107.4 MG/DL (ref 63–159)
LYMPHOCYTES # BLD AUTO: 1.6 K/UL (ref 1–4.8)
LYMPHOCYTES NFR BLD: 24.7 % (ref 18–48)
MCH RBC QN AUTO: 30 PG (ref 27–31)
MCHC RBC AUTO-ENTMCNC: 31.9 G/DL (ref 32–36)
MCV RBC AUTO: 94 FL (ref 82–98)
MONOCYTES # BLD AUTO: 0.4 K/UL (ref 0.3–1)
MONOCYTES NFR BLD: 6.3 % (ref 4–15)
NEUTROPHILS # BLD AUTO: 4 K/UL (ref 1.8–7.7)
NEUTROPHILS NFR BLD: 62 % (ref 38–73)
NONHDLC SERPL-MCNC: 137 MG/DL
NRBC BLD-RTO: 0 /100 WBC
PLATELET # BLD AUTO: 277 K/UL (ref 150–450)
PMV BLD AUTO: 10.3 FL (ref 9.2–12.9)
POTASSIUM SERPL-SCNC: 4.4 MMOL/L (ref 3.5–5.1)
PROT SERPL-MCNC: 7 G/DL (ref 6–8.4)
RBC # BLD AUTO: 4.67 M/UL (ref 4–5.4)
SODIUM SERPL-SCNC: 139 MMOL/L (ref 136–145)
T4 FREE SERPL-MCNC: 0.73 NG/DL (ref 0.71–1.51)
TRIGL SERPL-MCNC: 148 MG/DL (ref 30–150)
TSH SERPL DL<=0.005 MIU/L-ACNC: 10.49 UIU/ML (ref 0.4–4)
VIT B12 SERPL-MCNC: 401 PG/ML (ref 210–950)
WBC # BLD AUTO: 6.51 K/UL (ref 3.9–12.7)

## 2021-11-09 PROCEDURE — 85025 COMPLETE CBC W/AUTO DIFF WBC: CPT | Mod: HCNC | Performed by: FAMILY MEDICINE

## 2021-11-09 PROCEDURE — 80053 COMPREHEN METABOLIC PANEL: CPT | Mod: HCNC | Performed by: FAMILY MEDICINE

## 2021-11-09 PROCEDURE — 36415 COLL VENOUS BLD VENIPUNCTURE: CPT | Mod: HCNC,PO | Performed by: FAMILY MEDICINE

## 2021-11-09 PROCEDURE — 84439 ASSAY OF FREE THYROXINE: CPT | Mod: HCNC | Performed by: FAMILY MEDICINE

## 2021-11-09 PROCEDURE — U0003 INFECTIOUS AGENT DETECTION BY NUCLEIC ACID (DNA OR RNA); SEVERE ACUTE RESPIRATORY SYNDROME CORONAVIRUS 2 (SARS-COV-2) (CORONAVIRUS DISEASE [COVID-19]), AMPLIFIED PROBE TECHNIQUE, MAKING USE OF HIGH THROUGHPUT TECHNOLOGIES AS DESCRIBED BY CMS-2020-01-R: HCPCS | Mod: HCNC | Performed by: PHYSICAL MEDICINE & REHABILITATION

## 2021-11-09 PROCEDURE — U0005 INFEC AGEN DETEC AMPLI PROBE: HCPCS | Performed by: PHYSICAL MEDICINE & REHABILITATION

## 2021-11-09 PROCEDURE — 84443 ASSAY THYROID STIM HORMONE: CPT | Mod: HCNC | Performed by: FAMILY MEDICINE

## 2021-11-09 PROCEDURE — 82306 VITAMIN D 25 HYDROXY: CPT | Mod: HCNC | Performed by: FAMILY MEDICINE

## 2021-11-09 PROCEDURE — 82607 VITAMIN B-12: CPT | Mod: HCNC | Performed by: FAMILY MEDICINE

## 2021-11-09 PROCEDURE — 80061 LIPID PANEL: CPT | Mod: HCNC | Performed by: FAMILY MEDICINE

## 2021-11-09 PROCEDURE — 83036 HEMOGLOBIN GLYCOSYLATED A1C: CPT | Mod: HCNC | Performed by: FAMILY MEDICINE

## 2021-11-10 LAB
SARS-COV-2 RNA RESP QL NAA+PROBE: NOT DETECTED
SARS-COV-2- CYCLE NUMBER: NORMAL

## 2021-11-10 RX ORDER — ASPIRIN 81 MG/1
81 TABLET ORAL DAILY
COMMUNITY
End: 2022-11-01 | Stop reason: SDDI

## 2021-11-12 ENCOUNTER — HOSPITAL ENCOUNTER (OUTPATIENT)
Facility: AMBULARY SURGERY CENTER | Age: 66
Discharge: HOME | End: 2021-11-12
Attending: PHYSICAL MEDICINE & REHABILITATION | Admitting: PHYSICAL MEDICINE & REHABILITATION
Payer: MEDICARE

## 2021-11-12 DIAGNOSIS — M54.16 LUMBAR RADICULOPATHY: Primary | ICD-10-CM

## 2021-11-12 DIAGNOSIS — M54.16 LUMBAR RADICULITIS: ICD-10-CM

## 2021-11-12 DIAGNOSIS — M47.816 LUMBAR SPONDYLOSIS: ICD-10-CM

## 2021-11-12 LAB — POCT GLUCOSE: 101 MG/DL (ref 70–110)

## 2021-11-12 PROCEDURE — 64483 NJX AA&/STRD TFRM EPI L/S 1: CPT | Performed by: PHYSICAL MEDICINE & REHABILITATION

## 2021-11-12 PROCEDURE — 64483 PR EPIDURAL INJ, ANES/STEROID, TRANSFORAMINAL, LUMB/SACR, SNGL LEVL: ICD-10-PCS | Mod: HCNC,LT,, | Performed by: PHYSICAL MEDICINE & REHABILITATION

## 2021-11-12 PROCEDURE — 64483 NJX AA&/STRD TFRM EPI L/S 1: CPT | Mod: HCNC,LT,, | Performed by: PHYSICAL MEDICINE & REHABILITATION

## 2021-11-12 RX ORDER — LIDOCAINE HYDROCHLORIDE 10 MG/ML
INJECTION, SOLUTION EPIDURAL; INFILTRATION; INTRACAUDAL; PERINEURAL
Status: DISCONTINUED | OUTPATIENT
Start: 2021-11-12 | End: 2021-11-12 | Stop reason: HOSPADM

## 2021-11-12 RX ORDER — LIDOCAINE HYDROCHLORIDE 10 MG/ML
1 INJECTION, SOLUTION EPIDURAL; INFILTRATION; INTRACAUDAL; PERINEURAL ONCE
Status: DISCONTINUED | OUTPATIENT
Start: 2021-11-12 | End: 2021-11-12 | Stop reason: HOSPADM

## 2021-11-12 RX ORDER — FENTANYL CITRATE 50 UG/ML
INJECTION, SOLUTION INTRAMUSCULAR; INTRAVENOUS
Status: DISCONTINUED | OUTPATIENT
Start: 2021-11-12 | End: 2021-11-12 | Stop reason: HOSPADM

## 2021-11-12 RX ORDER — DEXAMETHASONE SODIUM PHOSPHATE 100 MG/10ML
INJECTION INTRAMUSCULAR; INTRAVENOUS
Status: DISCONTINUED | OUTPATIENT
Start: 2021-11-12 | End: 2021-11-12 | Stop reason: HOSPADM

## 2021-11-12 RX ORDER — SODIUM CHLORIDE, SODIUM LACTATE, POTASSIUM CHLORIDE, CALCIUM CHLORIDE 600; 310; 30; 20 MG/100ML; MG/100ML; MG/100ML; MG/100ML
INJECTION, SOLUTION INTRAVENOUS CONTINUOUS
Status: DISCONTINUED | OUTPATIENT
Start: 2021-11-12 | End: 2021-11-12 | Stop reason: HOSPADM

## 2021-11-12 RX ORDER — MIDAZOLAM HYDROCHLORIDE 1 MG/ML
INJECTION, SOLUTION INTRAMUSCULAR; INTRAVENOUS
Status: DISCONTINUED | OUTPATIENT
Start: 2021-11-12 | End: 2021-11-12 | Stop reason: HOSPADM

## 2021-11-12 RX ORDER — SODIUM CHLORIDE 9 MG/ML
INJECTION, SOLUTION INTRAMUSCULAR; INTRAVENOUS; SUBCUTANEOUS
Status: DISCONTINUED | OUTPATIENT
Start: 2021-11-12 | End: 2021-11-12 | Stop reason: HOSPADM

## 2021-11-12 RX ORDER — ALPRAZOLAM 0.5 MG/1
0.5 TABLET, ORALLY DISINTEGRATING ORAL ONCE AS NEEDED
Status: DISCONTINUED | OUTPATIENT
Start: 2021-11-12 | End: 2021-11-12 | Stop reason: HOSPADM

## 2021-11-12 RX ADMIN — SODIUM CHLORIDE, SODIUM LACTATE, POTASSIUM CHLORIDE, CALCIUM CHLORIDE: 600; 310; 30; 20 INJECTION, SOLUTION INTRAVENOUS at 11:11

## 2021-11-15 VITALS
HEIGHT: 65 IN | RESPIRATION RATE: 17 BRPM | SYSTOLIC BLOOD PRESSURE: 133 MMHG | TEMPERATURE: 98 F | BODY MASS INDEX: 41.48 KG/M2 | HEART RATE: 69 BPM | DIASTOLIC BLOOD PRESSURE: 72 MMHG | WEIGHT: 249 LBS | OXYGEN SATURATION: 93 %

## 2021-11-16 ENCOUNTER — OFFICE VISIT (OUTPATIENT)
Dept: FAMILY MEDICINE | Facility: CLINIC | Age: 66
End: 2021-11-16
Payer: MEDICARE

## 2021-11-16 ENCOUNTER — DOCUMENT SCAN (OUTPATIENT)
Dept: HOME HEALTH SERVICES | Facility: HOSPITAL | Age: 66
End: 2021-11-16
Payer: MEDICARE

## 2021-11-16 ENCOUNTER — TELEPHONE (OUTPATIENT)
Dept: FAMILY MEDICINE | Facility: CLINIC | Age: 66
End: 2021-11-16

## 2021-11-16 VITALS
TEMPERATURE: 99 F | HEART RATE: 78 BPM | RESPIRATION RATE: 20 BRPM | OXYGEN SATURATION: 96 % | BODY MASS INDEX: 43.27 KG/M2 | DIASTOLIC BLOOD PRESSURE: 78 MMHG | SYSTOLIC BLOOD PRESSURE: 138 MMHG | WEIGHT: 259.69 LBS | HEIGHT: 65 IN

## 2021-11-16 DIAGNOSIS — M54.9 CHRONIC BACK PAIN GREATER THAN 3 MONTHS DURATION: ICD-10-CM

## 2021-11-16 DIAGNOSIS — J01.00 ACUTE NON-RECURRENT MAXILLARY SINUSITIS: ICD-10-CM

## 2021-11-16 DIAGNOSIS — Z23 FLU VACCINE NEED: ICD-10-CM

## 2021-11-16 DIAGNOSIS — Z23 NEED FOR PNEUMOCOCCAL VACCINATION: ICD-10-CM

## 2021-11-16 DIAGNOSIS — N95.9 MENOPAUSAL AND POSTMENOPAUSAL DISORDER: ICD-10-CM

## 2021-11-16 DIAGNOSIS — E03.9 HYPOTHYROIDISM (ACQUIRED): Primary | ICD-10-CM

## 2021-11-16 DIAGNOSIS — J30.1 SEASONAL ALLERGIC RHINITIS DUE TO POLLEN: ICD-10-CM

## 2021-11-16 DIAGNOSIS — E11.9 TYPE 2 DIABETES MELLITUS WITHOUT COMPLICATION, WITHOUT LONG-TERM CURRENT USE OF INSULIN: ICD-10-CM

## 2021-11-16 DIAGNOSIS — G89.29 CHRONIC BACK PAIN GREATER THAN 3 MONTHS DURATION: ICD-10-CM

## 2021-11-16 PROCEDURE — 99214 PR OFFICE/OUTPT VISIT, EST, LEVL IV, 30-39 MIN: ICD-10-PCS | Mod: HCNC,S$GLB,, | Performed by: FAMILY MEDICINE

## 2021-11-16 PROCEDURE — 99999 PR PBB SHADOW E&M-EST. PATIENT-LVL IV: CPT | Mod: PBBFAC,HCNC,, | Performed by: FAMILY MEDICINE

## 2021-11-16 PROCEDURE — 3066F NEPHROPATHY DOC TX: CPT | Mod: HCNC,,, | Performed by: FAMILY MEDICINE

## 2021-11-16 PROCEDURE — 99499 UNLISTED E&M SERVICE: CPT | Mod: HCNC,S$GLB,, | Performed by: FAMILY MEDICINE

## 2021-11-16 PROCEDURE — 3062F PR POS MACROALBUMINURIA RESULT DOCUMENTED/REVIEW: ICD-10-PCS | Mod: HCNC,,, | Performed by: FAMILY MEDICINE

## 2021-11-16 PROCEDURE — 99214 OFFICE O/P EST MOD 30 MIN: CPT | Mod: PBBFAC,HCNC,PO | Performed by: FAMILY MEDICINE

## 2021-11-16 PROCEDURE — 3062F POS MACROALBUMINURIA REV: CPT | Mod: HCNC,,, | Performed by: FAMILY MEDICINE

## 2021-11-16 PROCEDURE — 99499 RISK ADDL DX/OHS AUDIT: ICD-10-PCS | Mod: HCNC,S$GLB,, | Performed by: FAMILY MEDICINE

## 2021-11-16 PROCEDURE — 4010F ACE/ARB THERAPY RXD/TAKEN: CPT | Mod: HCNC,,, | Performed by: FAMILY MEDICINE

## 2021-11-16 PROCEDURE — 99999 PR PBB SHADOW E&M-EST. PATIENT-LVL IV: ICD-10-PCS | Mod: PBBFAC,HCNC,, | Performed by: FAMILY MEDICINE

## 2021-11-16 PROCEDURE — 99214 OFFICE O/P EST MOD 30 MIN: CPT | Mod: HCNC,S$GLB,, | Performed by: FAMILY MEDICINE

## 2021-11-16 PROCEDURE — 4010F PR ACE/ARB THEARPY RXD/TAKEN: ICD-10-PCS | Mod: HCNC,,, | Performed by: FAMILY MEDICINE

## 2021-11-16 PROCEDURE — 3066F PR DOCUMENTATION OF TREATMENT FOR NEPHROPATHY: ICD-10-PCS | Mod: HCNC,,, | Performed by: FAMILY MEDICINE

## 2021-11-16 RX ORDER — LOSARTAN POTASSIUM 50 MG/1
50 TABLET ORAL DAILY
Qty: 90 TABLET | Refills: 3 | Status: SHIPPED | OUTPATIENT
Start: 2021-11-16 | End: 2022-10-03

## 2021-11-16 RX ORDER — HYDROCODONE BITARTRATE AND ACETAMINOPHEN 7.5; 325 MG/1; MG/1
1 TABLET ORAL
Qty: 90 TABLET | Refills: 0 | Status: SHIPPED | OUTPATIENT
Start: 2021-11-16 | End: 2021-11-16 | Stop reason: SDUPTHER

## 2021-11-16 RX ORDER — FLUTICASONE PROPIONATE 50 MCG
1 SPRAY, SUSPENSION (ML) NASAL DAILY PRN
Qty: 16 G | Status: SHIPPED | OUTPATIENT
Start: 2021-11-16

## 2021-11-16 RX ORDER — LEVOTHYROXINE SODIUM 175 UG/1
175 TABLET ORAL DAILY
Qty: 90 TABLET | Refills: 3 | Status: SHIPPED | OUTPATIENT
Start: 2021-11-16 | End: 2022-10-21

## 2021-11-16 RX ORDER — HYDROCODONE BITARTRATE AND ACETAMINOPHEN 7.5; 325 MG/1; MG/1
1 TABLET ORAL
Qty: 30 TABLET | Refills: 0 | Status: SHIPPED | OUTPATIENT
Start: 2022-01-15 | End: 2022-02-14

## 2021-11-16 RX ORDER — AMOXICILLIN AND CLAVULANATE POTASSIUM 875; 125 MG/1; MG/1
1 TABLET, FILM COATED ORAL 2 TIMES DAILY
Qty: 20 TABLET | Refills: 0 | Status: SHIPPED | OUTPATIENT
Start: 2021-11-16 | End: 2022-02-01

## 2021-11-16 RX ORDER — HYDROCODONE BITARTRATE AND ACETAMINOPHEN 7.5; 325 MG/1; MG/1
1 TABLET ORAL
Qty: 30 TABLET | Refills: 0 | COMMUNITY
Start: 2021-11-16 | End: 2022-02-21 | Stop reason: SDUPTHER

## 2021-11-16 RX ORDER — HYDROCODONE BITARTRATE AND ACETAMINOPHEN 7.5; 325 MG/1; MG/1
1 TABLET ORAL
Qty: 30 TABLET | Refills: 0 | Status: SHIPPED | OUTPATIENT
Start: 2021-12-16 | End: 2022-10-05 | Stop reason: SDUPTHER

## 2021-11-23 ENCOUNTER — PATIENT MESSAGE (OUTPATIENT)
Dept: FAMILY MEDICINE | Facility: CLINIC | Age: 66
End: 2021-11-23
Payer: MEDICARE

## 2021-11-23 DIAGNOSIS — M54.9 CHRONIC BACK PAIN GREATER THAN 3 MONTHS DURATION: ICD-10-CM

## 2021-11-23 DIAGNOSIS — G89.29 CHRONIC BACK PAIN GREATER THAN 3 MONTHS DURATION: ICD-10-CM

## 2021-11-23 RX ORDER — HYDROCODONE BITARTRATE AND ACETAMINOPHEN 7.5; 325 MG/1; MG/1
1 TABLET ORAL
Qty: 30 TABLET | Refills: 0 | Status: CANCELLED | OUTPATIENT
Start: 2021-12-16 | End: 2022-01-15

## 2021-11-24 ENCOUNTER — OFFICE VISIT (OUTPATIENT)
Dept: ORTHOPEDICS | Facility: CLINIC | Age: 66
End: 2021-11-24
Payer: MEDICARE

## 2021-11-24 VITALS — BODY MASS INDEX: 43.27 KG/M2 | WEIGHT: 259.69 LBS | HEIGHT: 65 IN

## 2021-11-24 DIAGNOSIS — M43.28 FUSION OF SACRAL REGION OF SPINE: Primary | ICD-10-CM

## 2021-11-24 PROCEDURE — 3062F POS MACROALBUMINURIA REV: CPT | Mod: S$GLB,,, | Performed by: PHYSICIAN ASSISTANT

## 2021-11-24 PROCEDURE — 4010F PR ACE/ARB THEARPY RXD/TAKEN: ICD-10-PCS | Mod: S$GLB,,, | Performed by: PHYSICIAN ASSISTANT

## 2021-11-24 PROCEDURE — 99024 POSTOP FOLLOW-UP VISIT: CPT | Mod: S$GLB,,, | Performed by: PHYSICIAN ASSISTANT

## 2021-11-24 PROCEDURE — 4010F ACE/ARB THERAPY RXD/TAKEN: CPT | Mod: S$GLB,,, | Performed by: PHYSICIAN ASSISTANT

## 2021-11-24 PROCEDURE — 3066F NEPHROPATHY DOC TX: CPT | Mod: S$GLB,,, | Performed by: PHYSICIAN ASSISTANT

## 2021-11-24 PROCEDURE — 99024 PR POST-OP FOLLOW-UP VISIT: ICD-10-PCS | Mod: S$GLB,,, | Performed by: PHYSICIAN ASSISTANT

## 2021-11-24 PROCEDURE — 3066F PR DOCUMENTATION OF TREATMENT FOR NEPHROPATHY: ICD-10-PCS | Mod: S$GLB,,, | Performed by: PHYSICIAN ASSISTANT

## 2021-11-24 PROCEDURE — 3062F PR POS MACROALBUMINURIA RESULT DOCUMENTED/REVIEW: ICD-10-PCS | Mod: S$GLB,,, | Performed by: PHYSICIAN ASSISTANT

## 2021-12-29 ENCOUNTER — PATIENT OUTREACH (OUTPATIENT)
Dept: ADMINISTRATIVE | Facility: HOSPITAL | Age: 66
End: 2021-12-29
Payer: MEDICARE

## 2021-12-29 ENCOUNTER — PATIENT MESSAGE (OUTPATIENT)
Dept: FAMILY MEDICINE | Facility: CLINIC | Age: 66
End: 2021-12-29
Payer: MEDICARE

## 2021-12-31 ENCOUNTER — CLINICAL SUPPORT (OUTPATIENT)
Dept: URGENT CARE | Facility: CLINIC | Age: 66
End: 2021-12-31

## 2021-12-31 PROCEDURE — 99499 PR PHYSICAL - DOT/CDL: ICD-10-PCS | Mod: S$GLB,,, | Performed by: EMERGENCY MEDICINE

## 2021-12-31 PROCEDURE — 99499 UNLISTED E&M SERVICE: CPT | Mod: S$GLB,,, | Performed by: EMERGENCY MEDICINE

## 2022-01-12 ENCOUNTER — PATIENT OUTREACH (OUTPATIENT)
Dept: ADMINISTRATIVE | Facility: OTHER | Age: 67
End: 2022-01-12
Payer: MEDICARE

## 2022-01-18 ENCOUNTER — OFFICE VISIT (OUTPATIENT)
Dept: PHYSICAL MEDICINE AND REHAB | Facility: CLINIC | Age: 67
End: 2022-01-18
Payer: MEDICARE

## 2022-01-18 VITALS — WEIGHT: 259 LBS | BODY MASS INDEX: 43.15 KG/M2 | RESPIRATION RATE: 18 BRPM | HEIGHT: 65 IN

## 2022-01-18 DIAGNOSIS — M67.951 TENDINOPATHY OF RIGHT GLUTEUS MEDIUS: Primary | ICD-10-CM

## 2022-01-18 DIAGNOSIS — M25.551 LATERAL PAIN OF RIGHT HIP: ICD-10-CM

## 2022-01-18 DIAGNOSIS — M70.61 GREATER TROCHANTERIC BURSITIS OF RIGHT HIP: ICD-10-CM

## 2022-01-18 PROCEDURE — 99213 OFFICE O/P EST LOW 20 MIN: CPT | Mod: 25,HCNC,S$GLB, | Performed by: PHYSICAL MEDICINE & REHABILITATION

## 2022-01-18 PROCEDURE — 99213 PR OFFICE/OUTPT VISIT, EST, LEVL III, 20-29 MIN: ICD-10-PCS | Mod: 25,HCNC,S$GLB, | Performed by: PHYSICAL MEDICINE & REHABILITATION

## 2022-01-18 PROCEDURE — 1159F PR MEDICATION LIST DOCUMENTED IN MEDICAL RECORD: ICD-10-PCS | Mod: HCNC,CPTII,S$GLB, | Performed by: PHYSICAL MEDICINE & REHABILITATION

## 2022-01-18 PROCEDURE — 3008F BODY MASS INDEX DOCD: CPT | Mod: HCNC,CPTII,S$GLB, | Performed by: PHYSICAL MEDICINE & REHABILITATION

## 2022-01-18 PROCEDURE — 3288F FALL RISK ASSESSMENT DOCD: CPT | Mod: HCNC,CPTII,S$GLB, | Performed by: PHYSICAL MEDICINE & REHABILITATION

## 2022-01-18 PROCEDURE — 1159F MED LIST DOCD IN RCRD: CPT | Mod: HCNC,CPTII,S$GLB, | Performed by: PHYSICAL MEDICINE & REHABILITATION

## 2022-01-18 PROCEDURE — 1125F AMNT PAIN NOTED PAIN PRSNT: CPT | Mod: HCNC,CPTII,S$GLB, | Performed by: PHYSICAL MEDICINE & REHABILITATION

## 2022-01-18 PROCEDURE — 1125F PR PAIN SEVERITY QUANTIFIED, PAIN PRESENT: ICD-10-PCS | Mod: HCNC,CPTII,S$GLB, | Performed by: PHYSICAL MEDICINE & REHABILITATION

## 2022-01-18 PROCEDURE — 3008F PR BODY MASS INDEX (BMI) DOCUMENTED: ICD-10-PCS | Mod: HCNC,CPTII,S$GLB, | Performed by: PHYSICAL MEDICINE & REHABILITATION

## 2022-01-18 PROCEDURE — 99999 PR PBB SHADOW E&M-EST. PATIENT-LVL IV: CPT | Mod: PBBFAC,HCNC,, | Performed by: PHYSICAL MEDICINE & REHABILITATION

## 2022-01-18 PROCEDURE — 20611 DRAIN/INJ JOINT/BURSA W/US: CPT | Mod: HCNC,RT,S$GLB, | Performed by: PHYSICAL MEDICINE & REHABILITATION

## 2022-01-18 PROCEDURE — 1101F PR PT FALLS ASSESS DOC 0-1 FALLS W/OUT INJ PAST YR: ICD-10-PCS | Mod: HCNC,CPTII,S$GLB, | Performed by: PHYSICAL MEDICINE & REHABILITATION

## 2022-01-18 PROCEDURE — 20611 LARGE JOINT ASPIRATION/INJECTION: R GREATER TROCHANTERIC BURSA: ICD-10-PCS | Mod: HCNC,RT,S$GLB, | Performed by: PHYSICAL MEDICINE & REHABILITATION

## 2022-01-18 PROCEDURE — 99999 PR PBB SHADOW E&M-EST. PATIENT-LVL IV: ICD-10-PCS | Mod: PBBFAC,HCNC,, | Performed by: PHYSICAL MEDICINE & REHABILITATION

## 2022-01-18 PROCEDURE — 3288F PR FALLS RISK ASSESSMENT DOCUMENTED: ICD-10-PCS | Mod: HCNC,CPTII,S$GLB, | Performed by: PHYSICAL MEDICINE & REHABILITATION

## 2022-01-18 PROCEDURE — 1101F PT FALLS ASSESS-DOCD LE1/YR: CPT | Mod: HCNC,CPTII,S$GLB, | Performed by: PHYSICAL MEDICINE & REHABILITATION

## 2022-01-18 RX ADMIN — TRIAMCINOLONE ACETONIDE 40 MG: 40 INJECTION, SUSPENSION INTRA-ARTICULAR; INTRAMUSCULAR at 10:01

## 2022-01-19 RX ORDER — TRIAMCINOLONE ACETONIDE 40 MG/ML
40 INJECTION, SUSPENSION INTRA-ARTICULAR; INTRAMUSCULAR
Status: DISCONTINUED | OUTPATIENT
Start: 2022-01-18 | End: 2022-01-19 | Stop reason: HOSPADM

## 2022-01-19 NOTE — PROGRESS NOTES
Chief Complaint   Patient presents with    Leg Pain     R leg pain         HPI: Edie Hernandez is a  66 y.o. female who presents today for followup. she was last seen  At which time a reformed a  Left L5 transforaminal bursa injection.  This provide her significant improvement in left low back pain and left leg pain.  On today's visit, she complains of right leg pain.  Pain starts in the right lateral hip radiates down the thigh stopping at about the knee.  She describes it as a stabbing aching pain.  Pain is worse with lying on her side and with going up   More so than down stairs.  Her pain is beginning to limit her activities of daily living and functional mobility along with interfere with her sleep.      Review of Systems   Constitutional: Negative for chills and fever.   HENT: Negative for congestion and tinnitus.    Eyes: Negative for blurred vision and photophobia.   Respiratory: Negative for shortness of breath and wheezing.    Cardiovascular: Negative for chest pain and palpitations.   Gastrointestinal: Negative for nausea and vomiting.   Genitourinary: Negative for dysuria and frequency.   Musculoskeletal: Positive for joint pain. Negative for myalgias.   Skin: Negative for itching and rash.   Neurological: Negative for speech change and focal weakness.   Endo/Heme/Allergies: Negative for environmental allergies. Does not bruise/bleed easily.   Psychiatric/Behavioral: Negative for depression. The patient is not nervous/anxious.             Past Medical History:   Diagnosis Date    Arthritis     Colon polyp, hyperplastic 1/13    recheck 5-10 years    Diabetes mellitus, type 2     Diverticulosis     Fatty liver     General anesthetics causing adverse effect in therapeutic use     woke up during tubal ligation    GERD (gastroesophageal reflux disease)     CLINT (obstructive sleep apnea)     C-pap    PONV (postoperative nausea and vomiting)     Thyroid disease     Wears partial dentures     UPPER           Current Outpatient Medications on File Prior to Visit   Medication Sig Dispense Refill    albuterol (VENTOLIN HFA) 90 mcg/actuation inhaler Inhale 2 puffs into the lungs every 6 (six) hours as needed for Wheezing. Rescue 18 g prn    amoxicillin-clavulanate 875-125mg (AUGMENTIN) 875-125 mg per tablet Take 1 tablet by mouth 2 (two) times daily. 20 tablet 0    aspirin (ECOTRIN) 81 MG EC tablet Take 81 mg by mouth once daily.      azelastine (ASTELIN) 137 mcg (0.1 %) nasal spray 1 spray (137 mcg total) by Nasal route 2 (two) times daily. 30 mL 11    blood-glucose meter kit Use as instructed 1 each 0    clobetasol (TEMOVATE) 0.05 % cream Apply 1 application topically 2 (two) times daily. Apply to affected area 30 g 2    cyanocobalamin-cobamamide (B12) 5,000-100 mcg Lozg Place 1 tablet under the tongue once daily. 90 tablet 3    cyclobenzaprine (FLEXERIL) 5 MG tablet TAKE 1 TO 2 TABLETS BY MOUTH NIGHTLY AT BEDTIME AS NEEDED FOR PAIN 90 tablet PRN    diclofenac sodium (VOLTAREN) 1 % Gel Apply 2 g topically 4 (four) times daily.      ergocalciferol (ERGOCALCIFEROL) 50,000 unit Cap TAKE 1 CAPSULE BY MOUTH ONE TIME PER WEEK 12 capsule 1    estradioL (ESTRACE) 0.01 % (0.1 mg/gram) vaginal cream Apply 1 fingertipful to vaginal area nightly x 2 weeks then use on MWF 42.5 g 3    fluticasone propionate (FLONASE) 50 mcg/actuation nasal spray 1 spray (50 mcg total) by Each Nostril route daily as needed for Allergies. 16 g prn    furosemide (LASIX) 40 MG tablet Take 1 tablet (40 mg total) by mouth daily as needed. Every day PRN 30 tablet 4    gentamicin (GARAMYCIN) 40 mg/mL injection       HYDROcodone-acetaminophen (NORCO) 7.5-325 mg per tablet Take 1 tablet by mouth every 24 hours as needed for Pain. 30 tablet 0    HYDROcodone-acetaminophen (NORCO) 7.5-325 mg per tablet Take 1 tablet by mouth every 24 hours as needed for Pain. 30 tablet 0    indomethacin (INDOCIN) 25 MG capsule TAKE ONE CAPSULE BY MOUTH 3  TIMES A DAY WITH MEALS 90 capsule 3    lancets (ACCU-CHEK MULTICLIX LANCET) Misc Test 2 x day 200 each 3    levothyroxine (SYNTHROID, LEVOTHROID) 175 MCG tablet Take 1 tablet (175 mcg total) by mouth once daily. 90 tablet 3    losartan (COZAAR) 50 MG tablet Take 1 tablet (50 mg total) by mouth once daily. 90 tablet 3    metFORMIN (GLUCOPHAGE-XR) 500 MG ER 24hr tablet Take 2 tablets (1,000 mg total) by mouth 2 (two) times daily with meals. 360 tablet 3    methylphenidate (RITALIN LA) 40 MG 24 hr capsule Take 40 mg by mouth daily as needed. Not on at this time      methylphenidate HCl (RITALIN) 10 MG tablet       ONETOUCH ULTRA BLUE TEST STRIP Strp USE TO TEST BLOOD SUGAR 100 strip 3    oxybutynin (DITROPAN XL) 15 MG TR24 Take 1 tablet (15 mg total) by mouth once daily. 90 tablet 3    pantoprazole (PROTONIX) 40 MG tablet Take 1 tablet (40 mg total) by mouth once daily. 90 tablet 3    pregabalin (LYRICA) 150 MG capsule TAKE ONE CAPSULE BY MOUTH TWICE DAILY 60 capsule 2     Current Facility-Administered Medications on File Prior to Visit   Medication Dose Route Frequency Provider Last Rate Last Admin    lactated ringers infusion   Intravenous Continuous Dejan Simmons MD   Stopped at 07/06/20 1008              Physical Exam:  Vitals:    01/18/22 1031   Resp: 18     Physical Exam  Constitutional:       General: She is not in acute distress.     Appearance: Normal appearance.   HENT:      Head: Normocephalic and atraumatic.      Nose: Nose normal. No congestion.      Mouth/Throat:      Mouth: Mucous membranes are moist.      Pharynx: Oropharynx is clear.   Eyes:      Extraocular Movements: Extraocular movements intact.      Pupils: Pupils are equal, round, and reactive to light.   Neck:      Trachea: Trachea and phonation normal.   Pulmonary:      Effort: Pulmonary effort is normal. No respiratory distress.   Abdominal:      General: Abdomen is flat. There is no distension.   Skin:     General: Skin is warm and  dry.   Neurological:      Mental Status: She is alert and oriented to person, place, and time.      Cranial Nerves: Cranial nerves are intact.      Sensory: Sensation is intact.      Motor: Motor function is intact.   Psychiatric:         Mood and Affect: Mood and affect normal.         Behavior: Behavior normal.       Right Hip Exam     Tenderness   The patient is experiencing tenderness in the greater trochanter.    Muscle Strength   The patient has normal right hip strength.    Tests   SAMUEL: positive                  Assessment:  Tendinopathy of right gluteus medius    Lateral pain of right hip    Greater trochanteric bursitis of right hip               PLAN:  Edie Hernandez is a 66 y.o. female with   Right lateral hip pain.  History, physical examination is likely consistent with right gluteus medius insertional tendinopathy/ greater trochanteric bursitis.  At this time, proceed with a right greater trochanteric bursa injection with ultrasound guidance.  Please see procedure note for more details. Other considerations may be a right L5 radiculopathy.  She will follow up in clinic as needed.                      Dejan Simmons D.O.  Physical Medicine and Rehabilitation

## 2022-01-19 NOTE — PROCEDURES
Large Joint Aspiration/Injection: R greater trochanteric bursa    Date/Time: 1/18/2022 10:20 AM  Performed by: Dejan Simmons MD  Authorized by: Dejan Simmons MD     Consent Done?:  Yes (Written)  Indications:  Arthritis, joint swelling and pain  Timeout: prior to procedure the correct patient, procedure, and site was verified    Prep: patient was prepped and draped in usual sterile fashion      Local anesthesia used?: Yes    Anesthesia:  Local infiltration  Local anesthetic:  Lidocaine 1% without epinephrine    Details:  Needle Size:  25 G and 22 G  Ultrasonic Guidance for needle placement?: Yes    Images are saved and documented.  Approach:  Posterior  Location:  Hip  Site:  R greater trochanteric bursa  Medications:  40 mg triamcinolone acetonide 40 mg/mL    Procedure: RIGHT greater trochanteric bursa injection with ultrasound guidance    Preprocedure diagnosis:  RIGHT gluteus medius tendinosis    Postprocedure diagnosis:  Same    Anesthetic:  Local    Assistant:  None    Equipment used: VoiceGem HS60 with a curvilinear transducer    Procedure in detail:  Risks and benefits were discussed and written informed consent was obtained.  Patient was placed in the left lateral decubitus position.  Using ultrasound, the anterior and lateral facets of the right greater trochanter were visualized at the insertion points of the gluteus minimus and medius tendons respectively.  Posteriorly, skin was cleaned with ChloraPrep and allowed to dry.  Skin and tract was anesthetized using a 27 gauge 1.5 in needle using approximately 2 cc of 1% lidocaine.  This needle was removed and was replaced with a  22 gauge 3.5 in needle which was advanced under direct ultrasound visualization into the greater trochanteric bursa superficial to the gluteus medius tendon do where a 4 cc solution of 3 cc of 0.50% ropivacaine and 1 cc of 40 milligrams/milliliter of Kenalog was injected under direct ultrasound visualization into the bursa.  Needle was  removed and Band-Aid was applied.  The patient tolerated the procedure well with no adverse events.

## 2022-02-01 ENCOUNTER — OFFICE VISIT (OUTPATIENT)
Dept: UROGYNECOLOGY | Facility: CLINIC | Age: 67
End: 2022-02-01
Payer: MEDICARE

## 2022-02-01 VITALS
BODY MASS INDEX: 44.66 KG/M2 | HEART RATE: 70 BPM | WEIGHT: 268.06 LBS | DIASTOLIC BLOOD PRESSURE: 70 MMHG | SYSTOLIC BLOOD PRESSURE: 142 MMHG | HEIGHT: 65 IN

## 2022-02-01 DIAGNOSIS — N39.41 URGE INCONTINENCE OF URINE: ICD-10-CM

## 2022-02-01 DIAGNOSIS — N30.90 CYSTITIS: ICD-10-CM

## 2022-02-01 DIAGNOSIS — N95.2 ATROPHIC VAGINITIS: ICD-10-CM

## 2022-02-01 DIAGNOSIS — R35.0 URINARY FREQUENCY: Primary | ICD-10-CM

## 2022-02-01 LAB
BILIRUB SERPL-MCNC: NEGATIVE MG/DL
BLOOD URINE, POC: NEGATIVE
CLARITY, POC UA: ABNORMAL
COLOR, POC UA: YELLOW
GLUCOSE UR QL STRIP: NEGATIVE
KETONES UR QL STRIP: NEGATIVE
LEUKOCYTE ESTERASE URINE, POC: ABNORMAL
NITRITE, POC UA: NEGATIVE
PH, POC UA: 5
PROTEIN, POC: ABNORMAL
SPECIFIC GRAVITY, POC UA: 1.01
UROBILINOGEN, POC UA: NORMAL

## 2022-02-01 PROCEDURE — 99999 PR PBB SHADOW E&M-EST. PATIENT-LVL IV: ICD-10-PCS | Mod: PBBFAC,HCNC,, | Performed by: NURSE PRACTITIONER

## 2022-02-01 PROCEDURE — 3288F PR FALLS RISK ASSESSMENT DOCUMENTED: ICD-10-PCS | Mod: HCNC,CPTII,S$GLB, | Performed by: NURSE PRACTITIONER

## 2022-02-01 PROCEDURE — 1159F MED LIST DOCD IN RCRD: CPT | Mod: HCNC,CPTII,S$GLB, | Performed by: NURSE PRACTITIONER

## 2022-02-01 PROCEDURE — 81002 URINALYSIS NONAUTO W/O SCOPE: CPT | Mod: HCNC,S$GLB,, | Performed by: NURSE PRACTITIONER

## 2022-02-01 PROCEDURE — 99999 PR PBB SHADOW E&M-EST. PATIENT-LVL IV: CPT | Mod: PBBFAC,HCNC,, | Performed by: NURSE PRACTITIONER

## 2022-02-01 PROCEDURE — 3008F PR BODY MASS INDEX (BMI) DOCUMENTED: ICD-10-PCS | Mod: HCNC,CPTII,S$GLB, | Performed by: NURSE PRACTITIONER

## 2022-02-01 PROCEDURE — 81002 POCT URINE DIPSTICK WITHOUT MICROSCOPE: ICD-10-PCS | Mod: HCNC,S$GLB,, | Performed by: NURSE PRACTITIONER

## 2022-02-01 PROCEDURE — 87086 URINE CULTURE/COLONY COUNT: CPT | Mod: HCNC | Performed by: NURSE PRACTITIONER

## 2022-02-01 PROCEDURE — 3077F SYST BP >= 140 MM HG: CPT | Mod: HCNC,CPTII,S$GLB, | Performed by: NURSE PRACTITIONER

## 2022-02-01 PROCEDURE — 99213 OFFICE O/P EST LOW 20 MIN: CPT | Mod: HCNC,25,S$GLB, | Performed by: NURSE PRACTITIONER

## 2022-02-01 PROCEDURE — 3288F FALL RISK ASSESSMENT DOCD: CPT | Mod: HCNC,CPTII,S$GLB, | Performed by: NURSE PRACTITIONER

## 2022-02-01 PROCEDURE — 87088 URINE BACTERIA CULTURE: CPT | Mod: HCNC | Performed by: NURSE PRACTITIONER

## 2022-02-01 PROCEDURE — 1160F PR REVIEW ALL MEDS BY PRESCRIBER/CLIN PHARMACIST DOCUMENTED: ICD-10-PCS | Mod: HCNC,CPTII,S$GLB, | Performed by: NURSE PRACTITIONER

## 2022-02-01 PROCEDURE — 3078F PR MOST RECENT DIASTOLIC BLOOD PRESSURE < 80 MM HG: ICD-10-PCS | Mod: HCNC,CPTII,S$GLB, | Performed by: NURSE PRACTITIONER

## 2022-02-01 PROCEDURE — 87186 SC STD MICRODIL/AGAR DIL: CPT | Mod: HCNC | Performed by: NURSE PRACTITIONER

## 2022-02-01 PROCEDURE — 1160F RVW MEDS BY RX/DR IN RCRD: CPT | Mod: HCNC,CPTII,S$GLB, | Performed by: NURSE PRACTITIONER

## 2022-02-01 PROCEDURE — 1101F PR PT FALLS ASSESS DOC 0-1 FALLS W/OUT INJ PAST YR: ICD-10-PCS | Mod: HCNC,CPTII,S$GLB, | Performed by: NURSE PRACTITIONER

## 2022-02-01 PROCEDURE — 1101F PT FALLS ASSESS-DOCD LE1/YR: CPT | Mod: HCNC,CPTII,S$GLB, | Performed by: NURSE PRACTITIONER

## 2022-02-01 PROCEDURE — 99213 PR OFFICE/OUTPT VISIT, EST, LEVL III, 20-29 MIN: ICD-10-PCS | Mod: HCNC,25,S$GLB, | Performed by: NURSE PRACTITIONER

## 2022-02-01 PROCEDURE — 3008F BODY MASS INDEX DOCD: CPT | Mod: HCNC,CPTII,S$GLB, | Performed by: NURSE PRACTITIONER

## 2022-02-01 PROCEDURE — 1159F PR MEDICATION LIST DOCUMENTED IN MEDICAL RECORD: ICD-10-PCS | Mod: HCNC,CPTII,S$GLB, | Performed by: NURSE PRACTITIONER

## 2022-02-01 PROCEDURE — 87077 CULTURE AEROBIC IDENTIFY: CPT | Mod: HCNC | Performed by: NURSE PRACTITIONER

## 2022-02-01 PROCEDURE — 3078F DIAST BP <80 MM HG: CPT | Mod: HCNC,CPTII,S$GLB, | Performed by: NURSE PRACTITIONER

## 2022-02-01 PROCEDURE — 1125F PR PAIN SEVERITY QUANTIFIED, PAIN PRESENT: ICD-10-PCS | Mod: HCNC,CPTII,S$GLB, | Performed by: NURSE PRACTITIONER

## 2022-02-01 PROCEDURE — 1125F AMNT PAIN NOTED PAIN PRSNT: CPT | Mod: HCNC,CPTII,S$GLB, | Performed by: NURSE PRACTITIONER

## 2022-02-01 PROCEDURE — 3077F PR MOST RECENT SYSTOLIC BLOOD PRESSURE >= 140 MM HG: ICD-10-PCS | Mod: HCNC,CPTII,S$GLB, | Performed by: NURSE PRACTITIONER

## 2022-02-01 RX ORDER — NITROFURANTOIN (MACROCRYSTALS) 100 MG/1
100 CAPSULE ORAL EVERY 12 HOURS
Qty: 10 CAPSULE | Refills: 0 | Status: SHIPPED | OUTPATIENT
Start: 2022-02-01 | End: 2022-02-06

## 2022-02-01 NOTE — PROGRESS NOTES
Patient, Edie Hernandez (MRN #[id number]), presented with a recorded BMI of 44.61 kg/m^2 consistent with the definition of morbid obesity (ICD-10 E66.01). The patient's morbid obesity was monitored, evaluated, addressed and/or treated. This addendum to the medical record is made on 02/01/2022.

## 2022-02-01 NOTE — PROGRESS NOTES
Subjective:       Patient ID: Edie Hernandez is a 66 y.o. female.    Chief Complaint: Urinary Tract Infection      Edie Hernandez is a 66 y.o. female who presents today for possible UTI.  She was last seen in our office on 09/23/21 and was doing well.  She did have a UTI on 08/24/21 where the culture grew E. Coli.  Since she was last seen in our office she has had a few occasions where she thought she had a UTI, but she treated herself at home.  She had a spinal fusion on 10/12/21 and was told at that time that she had a UTI.  This was based of her preop microscopic UA.  She was treated with IV antibiotics at surgery and had done well with her bladder until Saturday.  Starting saturday she has had increased urgency frequency and incontinence.  She does have some feeling of PVF at times.  She denies any real dysuria.  She is not sure if she wants to do the gentamicin instillation and thinks she would prefer doing oral antibiotics first while waiting for the urine culture.  Her UA today shows + Leukocytes and protein.  She denies any other real complaints/concerns at this time.     Review of Systems   Constitutional: Negative for activity change, fever and unexpected weight change.   HENT: Negative for hearing loss.    Eyes: Negative for visual disturbance.   Respiratory: Negative for shortness of breath and wheezing.    Cardiovascular: Negative for chest pain, palpitations and leg swelling.   Gastrointestinal: Negative for abdominal pain, constipation and diarrhea.   Genitourinary: Positive for frequency and urgency. Negative for dyspareunia, dysuria, vaginal bleeding and vaginal discharge.   Musculoskeletal: Negative for gait problem and neck pain.   Skin: Negative for rash and wound.   Allergic/Immunologic: Negative for immunocompromised state.   Neurological: Negative for tremors, speech difficulty and weakness.   Hematological: Does not bruise/bleed easily.   Psychiatric/Behavioral: Negative for agitation and  confusion.       Objective:      Physical Exam  Constitutional:       General: She is not in acute distress.     Appearance: She is well-developed and well-nourished.   HENT:      Head: Normocephalic and atraumatic.   Neck:      Thyroid: No thyromegaly.   Pulmonary:      Effort: Pulmonary effort is normal. No respiratory distress.   Abdominal:      Palpations: Abdomen is soft.      Tenderness: There is no abdominal tenderness.      Hernia: No hernia is present.   Musculoskeletal:         General: Normal range of motion.      Cervical back: Neck supple.   Skin:     General: Skin is warm and dry.      Findings: No rash.   Neurological:      Mental Status: She is alert and oriented to person, place, and time.   Psychiatric:         Mood and Affect: Mood and affect normal.         Behavior: Behavior normal.       Pelvic Exam:  deferred      Assessment:       1. Urinary frequency    2. Urge incontinence of urine    3. Cystitis    4. Atrophic vaginitis        Plan:       Urinary frequency- we will send her urine for culture as noted below  -     POCT urine dipstick without microscope  -     Urine culture    Urge incontinence of urine- monitor    Cystitis- begin macrodantin as noted.  -     nitrofurantoin (MACRODANTIN) 100 MG capsule; Take 1 capsule (100 mg total) by mouth every 12 (twelve) hours. for 5 days  Dispense: 10 capsule; Refill: 0    Atrophic vaginitis- continue estrace cream    RTC 3-4 months

## 2022-02-04 ENCOUNTER — TELEPHONE (OUTPATIENT)
Dept: UROGYNECOLOGY | Facility: CLINIC | Age: 67
End: 2022-02-04
Payer: MEDICARE

## 2022-02-04 LAB — BACTERIA UR CULT: ABNORMAL

## 2022-02-04 RX ORDER — FLUCONAZOLE 150 MG/1
150 TABLET ORAL ONCE
COMMUNITY
End: 2022-06-14 | Stop reason: SDUPTHER

## 2022-02-04 RX ORDER — SULFAMETHOXAZOLE AND TRIMETHOPRIM 800; 160 MG/1; MG/1
1 TABLET ORAL 2 TIMES DAILY
COMMUNITY
Start: 2022-02-04 | End: 2022-02-08

## 2022-02-09 ENCOUNTER — LAB VISIT (OUTPATIENT)
Dept: LAB | Facility: HOSPITAL | Age: 67
End: 2022-02-09
Attending: FAMILY MEDICINE
Payer: MEDICARE

## 2022-02-09 DIAGNOSIS — E11.9 TYPE 2 DIABETES MELLITUS WITHOUT COMPLICATION, WITHOUT LONG-TERM CURRENT USE OF INSULIN: ICD-10-CM

## 2022-02-09 DIAGNOSIS — E03.9 HYPOTHYROIDISM (ACQUIRED): ICD-10-CM

## 2022-02-09 LAB
ALBUMIN SERPL BCP-MCNC: 3.7 G/DL (ref 3.5–5.2)
ALP SERPL-CCNC: 91 U/L (ref 55–135)
ALT SERPL W/O P-5'-P-CCNC: 27 U/L (ref 10–44)
ANION GAP SERPL CALC-SCNC: 10 MMOL/L (ref 8–16)
AST SERPL-CCNC: 21 U/L (ref 10–40)
BASOPHILS # BLD AUTO: 0.05 K/UL (ref 0–0.2)
BASOPHILS NFR BLD: 0.7 % (ref 0–1.9)
BILIRUB SERPL-MCNC: 1.2 MG/DL (ref 0.1–1)
BUN SERPL-MCNC: 19 MG/DL (ref 8–23)
CALCIUM SERPL-MCNC: 9.8 MG/DL (ref 8.7–10.5)
CHLORIDE SERPL-SCNC: 101 MMOL/L (ref 95–110)
CO2 SERPL-SCNC: 23 MMOL/L (ref 23–29)
CREAT SERPL-MCNC: 1.1 MG/DL (ref 0.5–1.4)
DIFFERENTIAL METHOD: ABNORMAL
EOSINOPHIL # BLD AUTO: 0.3 K/UL (ref 0–0.5)
EOSINOPHIL NFR BLD: 3.8 % (ref 0–8)
ERYTHROCYTE [DISTWIDTH] IN BLOOD BY AUTOMATED COUNT: 13.4 % (ref 11.5–14.5)
EST. GFR  (AFRICAN AMERICAN): >60 ML/MIN/1.73 M^2
EST. GFR  (NON AFRICAN AMERICAN): 52.8 ML/MIN/1.73 M^2
ESTIMATED AVG GLUCOSE: 157 MG/DL (ref 68–131)
GLUCOSE SERPL-MCNC: 113 MG/DL (ref 70–110)
HBA1C MFR BLD: 7.1 % (ref 4–5.6)
HCT VFR BLD AUTO: 46.4 % (ref 37–48.5)
HGB BLD-MCNC: 14.6 G/DL (ref 12–16)
IMM GRANULOCYTES # BLD AUTO: 0.01 K/UL (ref 0–0.04)
IMM GRANULOCYTES NFR BLD AUTO: 0.1 % (ref 0–0.5)
LYMPHOCYTES # BLD AUTO: 1.8 K/UL (ref 1–4.8)
LYMPHOCYTES NFR BLD: 24.8 % (ref 18–48)
MCH RBC QN AUTO: 29.7 PG (ref 27–31)
MCHC RBC AUTO-ENTMCNC: 31.5 G/DL (ref 32–36)
MCV RBC AUTO: 95 FL (ref 82–98)
MONOCYTES # BLD AUTO: 0.5 K/UL (ref 0.3–1)
MONOCYTES NFR BLD: 6.8 % (ref 4–15)
NEUTROPHILS # BLD AUTO: 4.7 K/UL (ref 1.8–7.7)
NEUTROPHILS NFR BLD: 63.8 % (ref 38–73)
NRBC BLD-RTO: 0 /100 WBC
PLATELET # BLD AUTO: 287 K/UL (ref 150–450)
PMV BLD AUTO: 9.9 FL (ref 9.2–12.9)
POTASSIUM SERPL-SCNC: 4.4 MMOL/L (ref 3.5–5.1)
PROT SERPL-MCNC: 7 G/DL (ref 6–8.4)
RBC # BLD AUTO: 4.91 M/UL (ref 4–5.4)
SODIUM SERPL-SCNC: 134 MMOL/L (ref 136–145)
T4 FREE SERPL-MCNC: 0.88 NG/DL (ref 0.71–1.51)
TSH SERPL DL<=0.005 MIU/L-ACNC: 2.55 UIU/ML (ref 0.4–4)
WBC # BLD AUTO: 7.34 K/UL (ref 3.9–12.7)

## 2022-02-09 PROCEDURE — 84439 ASSAY OF FREE THYROXINE: CPT | Mod: HCNC | Performed by: FAMILY MEDICINE

## 2022-02-09 PROCEDURE — 85025 COMPLETE CBC W/AUTO DIFF WBC: CPT | Mod: HCNC | Performed by: FAMILY MEDICINE

## 2022-02-09 PROCEDURE — 83036 HEMOGLOBIN GLYCOSYLATED A1C: CPT | Mod: HCNC | Performed by: FAMILY MEDICINE

## 2022-02-09 PROCEDURE — 80053 COMPREHEN METABOLIC PANEL: CPT | Mod: HCNC | Performed by: FAMILY MEDICINE

## 2022-02-09 PROCEDURE — 36415 COLL VENOUS BLD VENIPUNCTURE: CPT | Mod: HCNC,PO | Performed by: FAMILY MEDICINE

## 2022-02-09 PROCEDURE — 84443 ASSAY THYROID STIM HORMONE: CPT | Mod: HCNC | Performed by: FAMILY MEDICINE

## 2022-02-16 ENCOUNTER — OFFICE VISIT (OUTPATIENT)
Dept: FAMILY MEDICINE | Facility: CLINIC | Age: 67
End: 2022-02-16
Payer: MEDICARE

## 2022-02-16 VITALS
BODY MASS INDEX: 44.37 KG/M2 | TEMPERATURE: 98 F | HEART RATE: 81 BPM | DIASTOLIC BLOOD PRESSURE: 70 MMHG | WEIGHT: 266.31 LBS | SYSTOLIC BLOOD PRESSURE: 112 MMHG | OXYGEN SATURATION: 96 % | HEIGHT: 65 IN

## 2022-02-16 DIAGNOSIS — M54.9 CHRONIC BACK PAIN GREATER THAN 3 MONTHS DURATION: ICD-10-CM

## 2022-02-16 DIAGNOSIS — G89.29 CHRONIC BACK PAIN GREATER THAN 3 MONTHS DURATION: ICD-10-CM

## 2022-02-16 DIAGNOSIS — F11.20 CONTINUOUS OPIOID DEPENDENCE: Primary | ICD-10-CM

## 2022-02-16 DIAGNOSIS — E11.9 TYPE 2 DIABETES MELLITUS WITHOUT COMPLICATION, WITHOUT LONG-TERM CURRENT USE OF INSULIN: ICD-10-CM

## 2022-02-16 DIAGNOSIS — M79.89 LEG SWELLING: ICD-10-CM

## 2022-02-16 DIAGNOSIS — E66.01 MORBID OBESITY WITH BMI OF 45.0-49.9, ADULT: ICD-10-CM

## 2022-02-16 DIAGNOSIS — E03.9 HYPOTHYROIDISM (ACQUIRED): ICD-10-CM

## 2022-02-16 PROCEDURE — 1159F PR MEDICATION LIST DOCUMENTED IN MEDICAL RECORD: ICD-10-PCS | Mod: HCNC,CPTII,S$GLB, | Performed by: NURSE PRACTITIONER

## 2022-02-16 PROCEDURE — 3074F PR MOST RECENT SYSTOLIC BLOOD PRESSURE < 130 MM HG: ICD-10-PCS | Mod: HCNC,CPTII,S$GLB, | Performed by: NURSE PRACTITIONER

## 2022-02-16 PROCEDURE — 3288F FALL RISK ASSESSMENT DOCD: CPT | Mod: HCNC,CPTII,S$GLB, | Performed by: NURSE PRACTITIONER

## 2022-02-16 PROCEDURE — 3051F PR MOST RECENT HEMOGLOBIN A1C LEVEL 7.0 - < 8.0%: ICD-10-PCS | Mod: HCNC,CPTII,S$GLB, | Performed by: NURSE PRACTITIONER

## 2022-02-16 PROCEDURE — 1160F PR REVIEW ALL MEDS BY PRESCRIBER/CLIN PHARMACIST DOCUMENTED: ICD-10-PCS | Mod: HCNC,CPTII,S$GLB, | Performed by: NURSE PRACTITIONER

## 2022-02-16 PROCEDURE — 1101F PT FALLS ASSESS-DOCD LE1/YR: CPT | Mod: HCNC,CPTII,S$GLB, | Performed by: NURSE PRACTITIONER

## 2022-02-16 PROCEDURE — 99499 UNLISTED E&M SERVICE: CPT | Mod: HCNC,S$GLB,, | Performed by: NURSE PRACTITIONER

## 2022-02-16 PROCEDURE — 99499 RISK ADDL DX/OHS AUDIT: ICD-10-PCS | Mod: HCNC,S$GLB,, | Performed by: NURSE PRACTITIONER

## 2022-02-16 PROCEDURE — 3051F HG A1C>EQUAL 7.0%<8.0%: CPT | Mod: HCNC,CPTII,S$GLB, | Performed by: NURSE PRACTITIONER

## 2022-02-16 PROCEDURE — 99999 PR PBB SHADOW E&M-EST. PATIENT-LVL V: CPT | Mod: PBBFAC,HCNC,, | Performed by: NURSE PRACTITIONER

## 2022-02-16 PROCEDURE — 1160F RVW MEDS BY RX/DR IN RCRD: CPT | Mod: HCNC,CPTII,S$GLB, | Performed by: NURSE PRACTITIONER

## 2022-02-16 PROCEDURE — 1159F MED LIST DOCD IN RCRD: CPT | Mod: HCNC,CPTII,S$GLB, | Performed by: NURSE PRACTITIONER

## 2022-02-16 PROCEDURE — 3288F PR FALLS RISK ASSESSMENT DOCUMENTED: ICD-10-PCS | Mod: HCNC,CPTII,S$GLB, | Performed by: NURSE PRACTITIONER

## 2022-02-16 PROCEDURE — 99214 OFFICE O/P EST MOD 30 MIN: CPT | Mod: HCNC,S$GLB,, | Performed by: NURSE PRACTITIONER

## 2022-02-16 PROCEDURE — 3008F PR BODY MASS INDEX (BMI) DOCUMENTED: ICD-10-PCS | Mod: HCNC,CPTII,S$GLB, | Performed by: NURSE PRACTITIONER

## 2022-02-16 PROCEDURE — 1126F PR PAIN SEVERITY QUANTIFIED, NO PAIN PRESENT: ICD-10-PCS | Mod: HCNC,CPTII,S$GLB, | Performed by: NURSE PRACTITIONER

## 2022-02-16 PROCEDURE — 1101F PR PT FALLS ASSESS DOC 0-1 FALLS W/OUT INJ PAST YR: ICD-10-PCS | Mod: HCNC,CPTII,S$GLB, | Performed by: NURSE PRACTITIONER

## 2022-02-16 PROCEDURE — 3078F PR MOST RECENT DIASTOLIC BLOOD PRESSURE < 80 MM HG: ICD-10-PCS | Mod: HCNC,CPTII,S$GLB, | Performed by: NURSE PRACTITIONER

## 2022-02-16 PROCEDURE — 3074F SYST BP LT 130 MM HG: CPT | Mod: HCNC,CPTII,S$GLB, | Performed by: NURSE PRACTITIONER

## 2022-02-16 PROCEDURE — 99214 PR OFFICE/OUTPT VISIT, EST, LEVL IV, 30-39 MIN: ICD-10-PCS | Mod: HCNC,S$GLB,, | Performed by: NURSE PRACTITIONER

## 2022-02-16 PROCEDURE — 1126F AMNT PAIN NOTED NONE PRSNT: CPT | Mod: HCNC,CPTII,S$GLB, | Performed by: NURSE PRACTITIONER

## 2022-02-16 PROCEDURE — 3008F BODY MASS INDEX DOCD: CPT | Mod: HCNC,CPTII,S$GLB, | Performed by: NURSE PRACTITIONER

## 2022-02-16 PROCEDURE — 3078F DIAST BP <80 MM HG: CPT | Mod: HCNC,CPTII,S$GLB, | Performed by: NURSE PRACTITIONER

## 2022-02-16 PROCEDURE — 99999 PR PBB SHADOW E&M-EST. PATIENT-LVL V: ICD-10-PCS | Mod: PBBFAC,HCNC,, | Performed by: NURSE PRACTITIONER

## 2022-02-16 RX ORDER — FUROSEMIDE 20 MG/1
20 TABLET ORAL DAILY PRN
Qty: 60 TABLET | Refills: 0 | Status: SHIPPED | OUTPATIENT
Start: 2022-02-16 | End: 2023-05-15

## 2022-02-16 RX ORDER — GLIPIZIDE 5 MG/1
5 TABLET ORAL
Qty: 60 TABLET | Refills: 11 | Status: SHIPPED | OUTPATIENT
Start: 2022-02-16 | End: 2022-05-16

## 2022-02-16 NOTE — PATIENT INSTRUCTIONS
If you are due for any health screening(s) below please notify me so we can arrange them to be ordered and scheduled to maintain your health.     Health Maintenance   Topic Date Due    Eye Exam  11/02/2020    Mammogram  04/07/2022    DEXA SCAN  02/16/2022 (Originally 7/26/1996)    Hemoglobin A1c  08/09/2022    Lipid Panel  11/09/2022    Aspirin/Antiplatelet Therapy  11/24/2022    Foot Exam  02/16/2023    TETANUS VACCINE  01/20/2027    Hepatitis C Screening  Completed                  Diabetic Retinal Eye Exam    Diabetes is the #1 cause of blindness in the [place] - early detection before signs or symptoms develop can prevent debilitating blindness.    Once-a-year screening is recommended for all diabetic patients. This exam can prevent and treat diabetes complications in the eye before developing symptoms. This can be done with a special camera is used to take photographs of the back of your eye without having to dilate them, or you can see an eye doctor for a full dilated exam.    Although you are still overdue for this important screening, due to the COVID-19 pandemic, we recommend scheduling it in the near future.

## 2022-02-16 NOTE — PROGRESS NOTES
Subjective:       Patient ID: Edie Hernandez is a 66 y.o. female.    Chief Complaint: Follow-up    HPI    Patient presents today for chronic conditions. Labs reviewed 2/9/22- A1c 7.1  Last visit with PCP [facility] 11/16/21 2/1/21 Urogyn Roseboom-urinary frequency  1/18/22 -Tendinopathy of right gluteus medius-steroid injection  1/24/21 Orthopedic [place] fusion of scral region of spine      Continuous use of opioids follow-up:  Current medication and dosing:  norco 7.5 once daily or less  Supply:  90 day supply  Side effects: none  Pain controlled: yes  Medication compliance: yes  DPS checked today: no evidence of diversion-had short supply percocet Dr. Godinez for flare 10/2021  UDS: 1/2021  Contract : 1/2021     Cause of Pain: chronic low back pain  Past Medical History:   Diagnosis Date    Arthritis     Colon polyp, hyperplastic 1/13    recheck 5-10 years    Diabetes mellitus, type 2     Diverticulosis     Fatty liver     General anesthetics causing adverse effect in therapeutic use     woke up during tubal ligation    GERD (gastroesophageal reflux disease)     CLINT (obstructive sleep apnea)     C-pap    PONV (postoperative nausea and vomiting)     Thyroid disease     Wears partial dentures     UPPER       Review of patient's allergies indicates:   Allergen Reactions    Codeine      Other reaction(s): Nausea         Current Outpatient Medications:     aspirin (ECOTRIN) 81 MG EC tablet, Take 81 mg by mouth once daily., Disp: , Rfl:     azelastine (ASTELIN) 137 mcg (0.1 %) nasal spray, 1 spray (137 mcg total) by Nasal route 2 (two) times daily., Disp: 30 mL, Rfl: 11    clobetasol (TEMOVATE) 0.05 % cream, Apply 1 application topically 2 (two) times daily. Apply to affected area, Disp: 30 g, Rfl: 2    cyanocobalamin-cobamamide (B12) 5,000-100 mcg Lozg, Place 1 tablet under the tongue once daily., Disp: 90 tablet, Rfl: 3    cyclobenzaprine (FLEXERIL) 5 MG tablet, TAKE 1 TO 2 TABLETS BY MOUTH  NIGHTLY AT BEDTIME AS NEEDED FOR PAIN, Disp: 90 tablet, Rfl: PRN    diclofenac sodium (VOLTAREN) 1 % Gel, Apply 2 g topically 4 (four) times daily., Disp: , Rfl:     ergocalciferol (ERGOCALCIFEROL) 50,000 unit Cap, TAKE 1 CAPSULE BY MOUTH ONE TIME PER WEEK, Disp: 12 capsule, Rfl: 1    estradioL (ESTRACE) 0.01 % (0.1 mg/gram) vaginal cream, Apply 1 fingertipful to vaginal area nightly x 2 weeks then use on MW, Disp: 42.5 g, Rfl: 3    fluconazole (DIFLUCAN) 150 MG Tab, Take 150 mg by mouth once. May repeat in 3 days if needed #2 1 refill, Disp: , Rfl:     fluticasone propionate (FLONASE) 50 mcg/actuation nasal spray, 1 spray (50 mcg total) by Each Nostril route daily as needed for Allergies., Disp: 16 g, Rfl: prn    HYDROcodone-acetaminophen (NORCO) 7.5-325 mg per tablet, Take 1 tablet by mouth every 24 hours as needed for Pain., Disp: 30 tablet, Rfl: 0    indomethacin (INDOCIN) 25 MG capsule, TAKE ONE CAPSULE BY MOUTH 3 TIMES A DAY WITH MEALS, Disp: 90 capsule, Rfl: 3    lancets (ACCU-CHEK MULTICLIX LANCET) Misc, Test 2 x day, Disp: 200 each, Rfl: 3    levothyroxine (SYNTHROID, LEVOTHROID) 175 MCG tablet, Take 1 tablet (175 mcg total) by mouth once daily., Disp: 90 tablet, Rfl: 3    losartan (COZAAR) 50 MG tablet, Take 1 tablet (50 mg total) by mouth once daily., Disp: 90 tablet, Rfl: 3    metFORMIN (GLUCOPHAGE-XR) 500 MG ER 24hr tablet, Take 2 tablets (1,000 mg total) by mouth 2 (two) times daily with meals., Disp: 360 tablet, Rfl: 3    methylphenidate (RITALIN LA) 40 MG 24 hr capsule, Take 40 mg by mouth daily as needed. Not on at this time, Disp: , Rfl:     methylphenidate HCl (RITALIN) 10 MG tablet, , Disp: , Rfl:     ONETOUCH ULTRA BLUE TEST STRIP Strp, USE TO TEST BLOOD SUGAR, Disp: 100 strip, Rfl: 3    oxybutynin (DITROPAN XL) 15 MG TR24, Take 1 tablet (15 mg total) by mouth once daily., Disp: 90 tablet, Rfl: 3    pantoprazole (PROTONIX) 40 MG tablet, Take 1 tablet (40 mg total) by mouth once  "daily., Disp: 90 tablet, Rfl: 3    pregabalin (LYRICA) 150 MG capsule, TAKE ONE CAPSULE BY MOUTH TWICE DAILY, Disp: 60 capsule, Rfl: 2    albuterol (VENTOLIN HFA) 90 mcg/actuation inhaler, Inhale 2 puffs into the lungs every 6 (six) hours as needed for Wheezing. Rescue (Patient not taking: Reported on 2/1/2022), Disp: 18 g, Rfl: prn    blood-glucose meter kit, Use as instructed, Disp: 1 each, Rfl: 0    furosemide (LASIX) 20 MG tablet, Take 1 tablet (20 mg total) by mouth daily as needed (leg swelling)., Disp: 60 tablet, Rfl: 0    glipiZIDE (GLUCOTROL) 5 MG tablet, Take 1 tablet (5 mg total) by mouth 2 (two) times daily before meals., Disp: 60 tablet, Rfl: 11  No current facility-administered medications for this visit.    Facility-Administered Medications Ordered in Other Visits:     lactated ringers infusion, , Intravenous, Continuous, Dejan Simmons MD, Stopped at 07/06/20 1008    Review of Systems   Constitutional: Negative for unexpected weight change.   HENT: Negative for trouble swallowing.    Eyes: Negative for visual disturbance.   Respiratory: Negative for shortness of breath.    Cardiovascular: Negative for chest pain, palpitations and leg swelling.   Gastrointestinal: Negative for blood in stool.   Genitourinary: Negative for hematuria.   Skin: Negative for rash.   Allergic/Immunologic: Negative for immunocompromised state.   Neurological: Negative for headaches.   Hematological: Does not bruise/bleed easily.   Psychiatric/Behavioral: Negative for agitation. The patient is not nervous/anxious.        Objective:      /70   Pulse 81   Temp 98.4 °F (36.9 °C)   Ht 5' 5" (1.651 m)   Wt 120.8 kg (266 lb 5.1 oz)   SpO2 96%   BMI 44.32 kg/m²   Physical Exam  Constitutional:       Appearance: She is well-developed.   Eyes:      Pupils: Pupils are equal, round, and reactive to light.   Cardiovascular:      Rate and Rhythm: Normal rate and regular rhythm.      Heart sounds: Normal heart sounds. "   Pulmonary:      Effort: Pulmonary effort is normal.      Breath sounds: Normal breath sounds.   Abdominal:      General: Bowel sounds are normal.      Palpations: Abdomen is soft.   Musculoskeletal:         General: Normal range of motion.      Cervical back: Normal range of motion.   Skin:     General: Skin is warm and dry.   Neurological:      Mental Status: She is alert and oriented to person, place, and time.   Psychiatric:         Behavior: Behavior normal.         Thought Content: Thought content normal.         Judgment: Judgment normal.         Assessment:       1. Continuous opioid dependence- contract 10/18/17-failed uds for norco, + 2/18    2. Chronic back pain greater than 3 months duration    3. Leg swelling    4. Type 2 diabetes mellitus without complication, without long-term current use of insulin    5. Hypothyroidism (acquired)    6. Morbid obesity with BMI of 45.0-49.9, adult        Plan:       Chronic back pain greater than 3 months duration  -      Pain Clinic Drug Screen; Future; Expected date: 02/16/2022-patient could not void at visit.  Patient request 3 months Norco refill. She is due for UDS and contract update.  checked no inappropriate activity found.  Will send request to [facility]  Leg swelling  -     furosemide (LASIX) 20 MG tablet; Take 1 tablet (20 mg total) by mouth daily as needed (leg swelling).  Dispense: 60 tablet; Refill: 0    Type 2 diabetes mellitus without complication, without long-term current use of insulin  -     glipiZIDE (GLUCOTROL) 5 MG tablet; Take 1 tablet (5 mg total) by mouth 2 (two) times daily before meals.  Dispense: 60 tablet; Refill: 11  -     CBC W/ AUTO DIFFERENTIAL; Future; Expected date: 02/16/2022  -     Hemoglobin A1C; Future; Expected date: 02/16/2022  -     Comprehensive Metabolic Panel; Future; Expected date: 02/16/2022  -     Lipid Panel; Future; Expected date: 02/16/2022  Follow the ADA diet  Hemoglobin A1C   Date Value Ref Range Status  "  02/09/2022 7.1 (H) 4.0 - 5.6 % Final     Comment:     ADA Screening Guidelines:  5.7-6.4%  Consistent with prediabetes  >or=6.5%  Consistent with diabetes    High levels of fetal hemoglobin interfere with the HbA1C  assay. Heterozygous hemoglobin variants (HbS, HgC, etc)do  not significantly interfere with this assay.   However, presence of multiple variants may affect accuracy.     11/09/2021 6.4 (H) 4.0 - 5.6 % Final     Comment:     ADA Screening Guidelines:  5.7-6.4%  Consistent with prediabetes  >or=6.5%  Consistent with diabetes    High levels of fetal hemoglobin interfere with the HbA1C  assay. Heterozygous hemoglobin variants (HbS, HgC, etc)do  not significantly interfere with this assay.   However, presence of multiple variants may affect accuracy.     07/01/2021 6.7 (H) 4.0 - 5.6 % Final     Comment:     ADA Screening Guidelines:  5.7-6.4%  Consistent with prediabetes  >or=6.5%  Consistent with diabetes    High levels of fetal hemoglobin interfere with the HbA1C  assay. Heterozygous hemoglobin variants (HbS, HgC, etc)do  not significantly interfere with this assay.   However, presence of multiple variants may affect accuracy.       Hypothyroidism (acquired)  -     TSH; Future; Expected date: 02/16/2022  -     T4, Free; Future; Expected date: 02/16/2022    Morbid obesity with BMI of 45.0-49.9, adult  Counseled patient on his ideal body weight, health consequences of being obese and current recommendations including weekly exercise and a heart healthy diet.  Current BMI is:Estimated body mass index is 44.32 kg/m² as calculated from the following:    Height as of this encounter: 5' 5" (1.651 m).    Weight as of this encounter: 120.8 kg (266 lb 5.1 oz)..  Patient is aware that ideal BMI < 25 or Weight in (lb) to have BMI = 25: 149.9.          Patient request 3 months Norco refill. She is due for UDS and contract update.  checked no inappropriate activity found.    Patient readiness: acceptance and " barriers:none    During the course of the visit the patient was educated and counseled about the following:     Obesity:   General weight loss/lifestyle modification strategies discussed (elicit support from others; identify saboteurs; non-food rewards, etc).  Regular aerobic exercise program discussed.    Goals: Obesity: Reduce calorie intake and BMI    Did patient meet goals/outcomes: Yes    The following self management tools provided: declined    Patient Instructions (the written plan) was given to the patient/family.     Time spent with patient: 15 minutes    Barriers to medications present (no )    Adverse reactions to current medications (no)    Over the counter medications reviewed (Yes)

## 2022-02-16 NOTE — LETTER
2022                                 NAME:Edie Hernandez  : 1955   MRN: [id number]     [facility]  [address] [address]  Phone: [phone]  Fax: [phone]      [facility]  PAIN MANAGEMENT    PAIN MANAGEMENT CONTRACT      My doctor and I have decided that as part of my treatment for chronic pain, I will receive prescriptions for controlled substances. As a patient, I will agree to the following terms in order for my provider to effectively treat my pain and also comply with the rules set forth by [facility] Board of Medical Examiners and Drug Enforcement Agency.     1. A single physician shall be responsible for prescribing my pain medication.    2. I understand that in order for me to receive the best possible care, my prescribing doctor needs me to provide a copy of any of my previous copy of any previous medical records,including MRI, office notes, lab results, etc.    3. I will also provide a full list of ALL of my medications, current dose, and how often I take my medication. I must inform my doctor if I am taking any other medications, particularly sedating medications, such as Valium, Ativan, seizure medications, or psychiatric medications.    4. I will inform my physician of any current or prior history of abuse or misuse of prescription medication, illegal drugs, or alcohol.    5. I must not obtain controlled substances (including but not limited to, Xanax, Vicodin,Percocet, Tylenol #3, Norco ) from any other doctor without first telling my physician at this clinic.    6. I understand that my physician will assess the efficacy of my treatment with controlled substances and monitor my progress every twelve weeks, unless my physician, in his orher clinical judgment, determines otherwise.    7. It is my responsibility to keep my appointments. If I miss my scheduled appointment, I will be rescheduled to the first available time slot. I  "understand that pain medications may not be refilled until seen during a scheduled appointment.    8. I will take my medications only in the directed doses and manner and at the directed time. I will not deviate from my prescribed usage.    9. I will not combine these prescribed medications with alcohol or recreational medications,including, but not limited to, marijuana.    10. I will not drive or operate heavy machinery while on this medication.    11. I will not cut or chew long-acting pain medication.    12. I must inform my physician of any side effects that I may be experiencing.    13. If severe sedation (sleepiness) or any other medical emergency relating to my pain medication occurs, I will make contact with my doctors office or seek ER attention immediately.    14. If my doctor agrees to refill my pain medication by telephone, I will call the office at least 5 business days in advance to request a refill prescription.    15. Refill prescriptions will not be written in the evenings, weekends, or on holidays.    16. Each prescription is expected to last at least one month. Refills will not be given early if I"run out early", "lose a prescription", "spill" or "misplace" my medication. I will report stolen medications to the police.    17. No prescription refills will be given if I have not been seen by my physician in the clinic for 3 months.    18. It may be necessary for prescriptions to be picked up in person and proof of identification may be required.    19. I will have my pain medication filled at only one pharmacy.         [facility]     20. A baseline medication screen may be completed on my first visit and or randomly at other routine clinic visits.    21. These medications may be harmful to an unborn child. I have been advised to use 2 forms of birth control (at least one barrier, such as condoms) while using these medications. I will inform my doctor immediately if I become pregnant while on this " medication.    22. If I test positive for medications that my doctor has not prescribed, and/or if I refuse a random medication test, my physician has the right to stop my controlled substance, end his/her relationship with me, and I may be terminated from the clinic.    23. If at any time I become violent or abusive, verbally or physically, my actions will be considered cause to terminate care from the clinic and discontinuation of pain medications.    I have read and understand the above information. I will, to the best of my ability, adhere to these policies and commitments. I further understand that noncompliance with these policies or demonstration of signs suspicious of medication overuse or abuse, may result in my being discharged as the patient.     I DO HEREBY AGREE AND UNDERSTAND ALL OF THE ABOVE. I HAVE RECEIVED A COPY OF THIS CONTROLLED SUBSTANCE TREATMENT ORIENTATION AND BEHAVIOR AGREEMENT.       Patient Signature:______________________________ Date/Time:________________    Patient Printed Name: Edie Hernandez     Physician Signature:____________________________ Date/Time:________________    Physician Printed Name: Dr.Amy Eugene    Witness Signature:_____________________________ Date/Time:________________    Witness Printed Name:  Ellis Pate NP

## 2022-02-21 DIAGNOSIS — G89.29 CHRONIC BACK PAIN GREATER THAN 3 MONTHS DURATION: ICD-10-CM

## 2022-02-21 DIAGNOSIS — M54.9 CHRONIC BACK PAIN GREATER THAN 3 MONTHS DURATION: ICD-10-CM

## 2022-02-21 NOTE — TELEPHONE ENCOUNTER
Patient request 3 months Norco refill. She is due for UDS and contract update.  checked no inappropriate activity found.

## 2022-02-23 RX ORDER — HYDROCODONE BITARTRATE AND ACETAMINOPHEN 7.5; 325 MG/1; MG/1
1 TABLET ORAL
Qty: 30 TABLET | Refills: 0 | Status: SHIPPED | OUTPATIENT
Start: 2022-02-23 | End: 2022-03-25

## 2022-02-23 RX ORDER — HYDROCODONE BITARTRATE AND ACETAMINOPHEN 7.5; 325 MG/1; MG/1
1 TABLET ORAL
Qty: 30 TABLET | Refills: 0 | Status: SHIPPED | OUTPATIENT
Start: 2022-04-23 | End: 2022-05-16 | Stop reason: SDUPTHER

## 2022-02-23 RX ORDER — HYDROCODONE BITARTRATE AND ACETAMINOPHEN 7.5; 325 MG/1; MG/1
1 TABLET ORAL
Qty: 30 TABLET | Refills: 0 | Status: SHIPPED | OUTPATIENT
Start: 2022-03-23 | End: 2022-04-22

## 2022-03-17 ENCOUNTER — PATIENT OUTREACH (OUTPATIENT)
Dept: ADMINISTRATIVE | Facility: OTHER | Age: 67
End: 2022-03-17
Payer: MEDICARE

## 2022-04-29 ENCOUNTER — PATIENT MESSAGE (OUTPATIENT)
Dept: ADMINISTRATIVE | Facility: OTHER | Age: 67
End: 2022-04-29
Payer: MEDICARE

## 2022-04-29 ENCOUNTER — PATIENT OUTREACH (OUTPATIENT)
Dept: ADMINISTRATIVE | Facility: OTHER | Age: 67
End: 2022-04-29
Payer: MEDICARE

## 2022-04-29 ENCOUNTER — OFFICE VISIT (OUTPATIENT)
Dept: PHYSICAL MEDICINE AND REHAB | Facility: CLINIC | Age: 67
End: 2022-04-29
Payer: MEDICARE

## 2022-04-29 VITALS — BODY MASS INDEX: 44.32 KG/M2 | RESPIRATION RATE: 18 BRPM | HEIGHT: 65 IN | WEIGHT: 266 LBS

## 2022-04-29 DIAGNOSIS — G56.01 RIGHT CARPAL TUNNEL SYNDROME: Primary | ICD-10-CM

## 2022-04-29 DIAGNOSIS — M25.531 RIGHT WRIST PAIN: ICD-10-CM

## 2022-04-29 DIAGNOSIS — M47.816 LUMBAR SPONDYLOSIS: ICD-10-CM

## 2022-04-29 DIAGNOSIS — M79.18 MYOFASCIAL PAIN: ICD-10-CM

## 2022-04-29 DIAGNOSIS — Z12.31 ENCOUNTER FOR SCREENING MAMMOGRAM FOR MALIGNANT NEOPLASM OF BREAST: Primary | ICD-10-CM

## 2022-04-29 PROCEDURE — 99214 PR OFFICE/OUTPT VISIT, EST, LEVL IV, 30-39 MIN: ICD-10-PCS | Mod: 25,S$GLB,, | Performed by: PHYSICAL MEDICINE & REHABILITATION

## 2022-04-29 PROCEDURE — 3051F PR MOST RECENT HEMOGLOBIN A1C LEVEL 7.0 - < 8.0%: ICD-10-PCS | Mod: CPTII,S$GLB,, | Performed by: PHYSICAL MEDICINE & REHABILITATION

## 2022-04-29 PROCEDURE — 1101F PT FALLS ASSESS-DOCD LE1/YR: CPT | Mod: CPTII,S$GLB,, | Performed by: PHYSICAL MEDICINE & REHABILITATION

## 2022-04-29 PROCEDURE — 99214 OFFICE O/P EST MOD 30 MIN: CPT | Mod: 25,S$GLB,, | Performed by: PHYSICAL MEDICINE & REHABILITATION

## 2022-04-29 PROCEDURE — 76942 PR U/S GUIDANCE FOR NEEDLE GUIDANCE: ICD-10-PCS | Mod: S$GLB,,, | Performed by: PHYSICAL MEDICINE & REHABILITATION

## 2022-04-29 PROCEDURE — 20553 NJX 1/MLT TRIGGER POINTS 3/>: CPT | Mod: 59,S$GLB,, | Performed by: PHYSICAL MEDICINE & REHABILITATION

## 2022-04-29 PROCEDURE — 1159F MED LIST DOCD IN RCRD: CPT | Mod: CPTII,S$GLB,, | Performed by: PHYSICAL MEDICINE & REHABILITATION

## 2022-04-29 PROCEDURE — 76942 ECHO GUIDE FOR BIOPSY: CPT | Mod: S$GLB,,, | Performed by: PHYSICAL MEDICINE & REHABILITATION

## 2022-04-29 PROCEDURE — 20526 PR INJECT CARPAL TUNNEL: ICD-10-PCS | Mod: RT,S$GLB,, | Performed by: PHYSICAL MEDICINE & REHABILITATION

## 2022-04-29 PROCEDURE — 99999 PR PBB SHADOW E&M-EST. PATIENT-LVL III: CPT | Mod: PBBFAC,,, | Performed by: PHYSICAL MEDICINE & REHABILITATION

## 2022-04-29 PROCEDURE — 1125F AMNT PAIN NOTED PAIN PRSNT: CPT | Mod: CPTII,S$GLB,, | Performed by: PHYSICAL MEDICINE & REHABILITATION

## 2022-04-29 PROCEDURE — 4010F PR ACE/ARB THEARPY RXD/TAKEN: ICD-10-PCS | Mod: CPTII,S$GLB,, | Performed by: PHYSICAL MEDICINE & REHABILITATION

## 2022-04-29 PROCEDURE — 3288F PR FALLS RISK ASSESSMENT DOCUMENTED: ICD-10-PCS | Mod: CPTII,S$GLB,, | Performed by: PHYSICAL MEDICINE & REHABILITATION

## 2022-04-29 PROCEDURE — 3288F FALL RISK ASSESSMENT DOCD: CPT | Mod: CPTII,S$GLB,, | Performed by: PHYSICAL MEDICINE & REHABILITATION

## 2022-04-29 PROCEDURE — 3008F BODY MASS INDEX DOCD: CPT | Mod: CPTII,S$GLB,, | Performed by: PHYSICAL MEDICINE & REHABILITATION

## 2022-04-29 PROCEDURE — 3008F PR BODY MASS INDEX (BMI) DOCUMENTED: ICD-10-PCS | Mod: CPTII,S$GLB,, | Performed by: PHYSICAL MEDICINE & REHABILITATION

## 2022-04-29 PROCEDURE — 20553 TRIGGER POINT INJECTION: ICD-10-PCS | Mod: 59,S$GLB,, | Performed by: PHYSICAL MEDICINE & REHABILITATION

## 2022-04-29 PROCEDURE — 1101F PR PT FALLS ASSESS DOC 0-1 FALLS W/OUT INJ PAST YR: ICD-10-PCS | Mod: CPTII,S$GLB,, | Performed by: PHYSICAL MEDICINE & REHABILITATION

## 2022-04-29 PROCEDURE — 4010F ACE/ARB THERAPY RXD/TAKEN: CPT | Mod: CPTII,S$GLB,, | Performed by: PHYSICAL MEDICINE & REHABILITATION

## 2022-04-29 PROCEDURE — 1125F PR PAIN SEVERITY QUANTIFIED, PAIN PRESENT: ICD-10-PCS | Mod: CPTII,S$GLB,, | Performed by: PHYSICAL MEDICINE & REHABILITATION

## 2022-04-29 PROCEDURE — 1159F PR MEDICATION LIST DOCUMENTED IN MEDICAL RECORD: ICD-10-PCS | Mod: CPTII,S$GLB,, | Performed by: PHYSICAL MEDICINE & REHABILITATION

## 2022-04-29 PROCEDURE — 3051F HG A1C>EQUAL 7.0%<8.0%: CPT | Mod: CPTII,S$GLB,, | Performed by: PHYSICAL MEDICINE & REHABILITATION

## 2022-04-29 PROCEDURE — 20526 THER INJECTION CARP TUNNEL: CPT | Mod: RT,S$GLB,, | Performed by: PHYSICAL MEDICINE & REHABILITATION

## 2022-04-29 PROCEDURE — 99999 PR PBB SHADOW E&M-EST. PATIENT-LVL III: ICD-10-PCS | Mod: PBBFAC,,, | Performed by: PHYSICAL MEDICINE & REHABILITATION

## 2022-04-29 NOTE — PROGRESS NOTES
Chart was reviewed for overdue Proactive [facility] Encounters ([facility])  topics  Updates were requested from care everywhere  Health Maintenance has been updated  [place] immunization registry triggered   mammogram ordered and tasked

## 2022-05-03 ENCOUNTER — TELEPHONE (OUTPATIENT)
Dept: FAMILY MEDICINE | Facility: CLINIC | Age: 67
End: 2022-05-03
Payer: MEDICARE

## 2022-05-03 NOTE — TELEPHONE ENCOUNTER
I called the patient to schedule their free one hour Enhanced Annual Wellness Visit with   Lanie Braden NP.

## 2022-05-05 PROCEDURE — 96372 THER/PROPH/DIAG INJ SC/IM: CPT | Mod: S$GLB,,, | Performed by: PHYSICAL MEDICINE & REHABILITATION

## 2022-05-05 PROCEDURE — 96372 PR INJECTION,THERAP/PROPH/DIAG2ST, IM OR SUBCUT: ICD-10-PCS | Mod: S$GLB,,, | Performed by: PHYSICAL MEDICINE & REHABILITATION

## 2022-05-05 RX ORDER — TRIAMCINOLONE ACETONIDE 40 MG/ML
40 INJECTION, SUSPENSION INTRA-ARTICULAR; INTRAMUSCULAR
Status: DISCONTINUED | OUTPATIENT
Start: 2022-05-05 | End: 2022-05-05 | Stop reason: HOSPADM

## 2022-05-05 RX ADMIN — TRIAMCINOLONE ACETONIDE 40 MG: 40 INJECTION, SUSPENSION INTRA-ARTICULAR; INTRAMUSCULAR at 09:05

## 2022-05-05 NOTE — PROGRESS NOTES
Chief Complaint   Patient presents with    Hip Pain     L hip pain         HPI: Edie Hernandez is a  66 y.o. female who presents today for follow-up for low back pain.  She complains of left-sided low back pain.  She last received radiofrequency at L2, L3, L4, and L5 approximately 18 months ago.  She subsequently received a left SI joint fusion.  Pain is in the lower lumbar spine.  Pain is nonradiating.  She also complains of right wrist and hand pain/paresthesias.  She is wearing a wrist splint on the right hand today        Review of Systems   Constitutional: Negative for chills and fever.   HENT: Negative for congestion and tinnitus.    Eyes: Negative for blurred vision and photophobia.   Respiratory: Negative for shortness of breath and wheezing.    Cardiovascular: Negative for chest pain and palpitations.   Gastrointestinal: Negative for nausea and vomiting.   Genitourinary: Negative for dysuria and frequency.   Musculoskeletal: Positive for back pain and joint pain. Negative for myalgias.   Skin: Negative for itching and rash.   Neurological: Positive for tingling and sensory change. Negative for speech change and focal weakness.   Endo/Heme/Allergies: Negative for environmental allergies. Does not bruise/bleed easily.   Psychiatric/Behavioral: Negative for depression. The patient is not nervous/anxious.             Past Medical History:   Diagnosis Date    Arthritis     Colon polyp, hyperplastic 1/13    recheck 5-10 years    Diabetes mellitus, type 2     Diverticulosis     Fatty liver     General anesthetics causing adverse effect in therapeutic use     woke up during tubal ligation    GERD (gastroesophageal reflux disease)     CLINT (obstructive sleep apnea)     C-pap    PONV (postoperative nausea and vomiting)     Thyroid disease     Wears partial dentures     UPPER          Current Outpatient Medications on File Prior to Visit   Medication Sig Dispense Refill    albuterol (VENTOLIN HFA) 90  mcg/actuation inhaler Inhale 2 puffs into the lungs every 6 (six) hours as needed for Wheezing. Rescue (Patient not taking: Reported on 2/1/2022) 18 g prn    aspirin (ECOTRIN) 81 MG EC tablet Take 81 mg by mouth once daily.      azelastine (ASTELIN) 137 mcg (0.1 %) nasal spray 1 spray (137 mcg total) by Nasal route 2 (two) times daily. 30 mL 11    blood-glucose meter kit Use as instructed 1 each 0    clobetasol (TEMOVATE) 0.05 % cream Apply 1 application topically 2 (two) times daily. Apply to affected area 30 g 2    cyanocobalamin-cobamamide (B12) 5,000-100 mcg Lozg Place 1 tablet under the tongue once daily. 90 tablet 3    cyclobenzaprine (FLEXERIL) 5 MG tablet TAKE 1 TO 2 TABLETS BY MOUTH NIGHTLY AT BEDTIME AS NEEDED FOR PAIN 90 tablet PRN    diclofenac sodium (VOLTAREN) 1 % Gel Apply 2 g topically 4 (four) times daily.      ergocalciferol (ERGOCALCIFEROL) 50,000 unit Cap TAKE 1 CAPSULE BY MOUTH ONE TIME PER WEEK 12 capsule 1    estradioL (ESTRACE) 0.01 % (0.1 mg/gram) vaginal cream Apply 1 fingertipful to vaginal area nightly x 2 weeks then use on MWF 42.5 g 3    fluconazole (DIFLUCAN) 150 MG Tab Take 150 mg by mouth once. May repeat in 3 days if needed #2 1 refill      fluticasone propionate (FLONASE) 50 mcg/actuation nasal spray 1 spray (50 mcg total) by Each Nostril route daily as needed for Allergies. 16 g prn    furosemide (LASIX) 20 MG tablet Take 1 tablet (20 mg total) by mouth daily as needed (leg swelling). 60 tablet 0    glipiZIDE (GLUCOTROL) 5 MG tablet Take 1 tablet (5 mg total) by mouth 2 (two) times daily before meals. 60 tablet 11    HYDROcodone-acetaminophen (NORCO) 7.5-325 mg per tablet Take 1 tablet by mouth every 24 hours as needed for Pain. 30 tablet 0    indomethacin (INDOCIN) 25 MG capsule TAKE ONE CAPSULE BY MOUTH 3 TIMES A DAY WITH MEALS 90 capsule 3    lancets (ACCU-CHEK MULTICLIX LANCET) Misc Test 2 x day 200 each 3    levothyroxine (SYNTHROID, LEVOTHROID) 175 MCG  tablet Take 1 tablet (175 mcg total) by mouth once daily. 90 tablet 3    losartan (COZAAR) 50 MG tablet Take 1 tablet (50 mg total) by mouth once daily. 90 tablet 3    metFORMIN (GLUCOPHAGE-XR) 500 MG ER 24hr tablet Take 2 tablets (1,000 mg total) by mouth 2 (two) times daily with meals. 360 tablet 3    methylphenidate (RITALIN LA) 40 MG 24 hr capsule Take 40 mg by mouth daily as needed. Not on at this time      methylphenidate HCl (RITALIN) 10 MG tablet       ONETOUCH ULTRA BLUE TEST STRIP Strp USE TO TEST BLOOD SUGAR 100 strip 3    oxybutynin (DITROPAN XL) 15 MG TR24 Take 1 tablet (15 mg total) by mouth once daily. 90 tablet 3    pantoprazole (PROTONIX) 40 MG tablet Take 1 tablet (40 mg total) by mouth once daily. 90 tablet 3    pregabalin (LYRICA) 150 MG capsule TAKE ONE CAPSULE BY MOUTH TWICE DAILY 60 capsule 2     Current Facility-Administered Medications on File Prior to Visit   Medication Dose Route Frequency Provider Last Rate Last Admin    lactated ringers infusion   Intravenous Continuous Dejan Simmons MD   Stopped at 07/06/20 1008              Physical Exam:  Vitals:    04/29/22 1026   Resp: 18     Physical Exam  Constitutional:       General: She is not in acute distress.     Appearance: Normal appearance.   HENT:      Head: Normocephalic and atraumatic.      Nose: Nose normal. No congestion.      Mouth/Throat:      Mouth: Mucous membranes are moist.      Pharynx: Oropharynx is clear.   Eyes:      Extraocular Movements: Extraocular movements intact.      Pupils: Pupils are equal, round, and reactive to light.   Neck:      Trachea: Trachea and phonation normal.   Pulmonary:      Effort: Pulmonary effort is normal. No respiratory distress.   Abdominal:      General: Abdomen is flat. There is no distension.   Musculoskeletal:      Lumbar back: Tenderness and bony tenderness present. Decreased range of motion.        Back:    Skin:     General: Skin is warm and dry.      Nails: There is no clubbing.    Neurological:      Mental Status: She is alert and oriented to person, place, and time. Mental status is at baseline.   Psychiatric:         Mood and Affect: Mood and affect normal.         Behavior: Behavior normal.       Right Hand Exam     Tests   Phalens sign: positive  Tinel's sign (median nerve): positive                  Assessment:  Right carpal tunnel syndrome    Lumbar spondylosis    Myofascial pain    Right wrist pain               PLAN:  Edie Hernandez is a 66 y.o. female with left-sided low back pain.  I will perform trigger point injections to the left lower lumbar paraspinal/multifidus.  Other considerations may be repeat RFA.  In regards to the right hand.  History, physical examination is likely consistent with carpal tunnel syndrome.  Median nerve cross-sectional area is around 18 millimeters squared and consistent with median mononeuropathy at the wrist.  I will proceed with a right carpal tunnel injection for diagnostic and therapeutic purposes.  Depending on response, an EMG/NCS of the right upper extremity may be indicated to grade carpal tunnel syndrome.                    Dejan Simmons D.O.  Physical Medicine and Rehabilitation

## 2022-05-05 NOTE — PROCEDURES
Procedures     Procedure: RIGHT carpal tunnel injection with ultrasound guidance    Preprocedure diagnosis: RIGHT carpal tunnel syndrome    Postprocedure diagnosis:  Same    Anesthetic:  None    Assistant:  None    Equipment used:  Poshly [id number] with a linear transducer    Procedure in detail:  Risks and benefits were discussed and written informed consent was obtained.  The patient was placed in a prone position on the exam table.  The right palm was placed in the supine position in the proximal carpal tunnel was evaluated using a linear transducer.  The fingers were flexed.  Distally skin was cleaned with alcohol x2.  A 30 gauge 1 in needle was advanced under direct ultrasound visualization in the short axis between the flexor digitorum superficialis tendons carefully avoiding the median nerve.  Once proper position was identified on ultrasound visualization, a 1 cc solution of 0.5 cc of normal saline preservative-free and 0.5 cc of 40 milligrams/milliliter of Kenalog was injected showing excellent spread between the flexor tendons.  Needle was removed.  Fingers with an extended and the medication was visualized extending distally into the carpal tunnel.  The patient tolerated the procedure well with no adverse events.

## 2022-05-05 NOTE — PROCEDURES
Trigger Point Injection  Performed by: Dejan Simmons MD  Authorized by: Dejan Simmons MD     Lumbar Paraspinal:  Left    Consent Done?:  Yes (Verbal)    Pre-Procedure:   Indications:  Pain relief   Timeout: prior to procedure the correct patient, procedure, and site was verified    Prep: patient was prepped and draped in usual sterile fashion     Local anesthesia used?: Yes    Anesthesia:  Local infiltration  Local anesthetic:  Lidocaine 1% without epinephrine  Anesthetic total (ml):  9      Medications: 40 mg triamcinolone acetonide 40 mg/mL  Sacral:  Left  Lumbosacral:  Left

## 2022-05-09 ENCOUNTER — LAB VISIT (OUTPATIENT)
Dept: LAB | Facility: HOSPITAL | Age: 67
End: 2022-05-09
Attending: NURSE PRACTITIONER
Payer: MEDICARE

## 2022-05-09 DIAGNOSIS — E03.9 HYPOTHYROIDISM (ACQUIRED): ICD-10-CM

## 2022-05-09 DIAGNOSIS — E11.9 TYPE 2 DIABETES MELLITUS WITHOUT COMPLICATION, WITHOUT LONG-TERM CURRENT USE OF INSULIN: ICD-10-CM

## 2022-05-09 LAB
ALBUMIN SERPL BCP-MCNC: 3.5 G/DL (ref 3.5–5.2)
ALP SERPL-CCNC: 75 U/L (ref 55–135)
ALT SERPL W/O P-5'-P-CCNC: 27 U/L (ref 10–44)
ANION GAP SERPL CALC-SCNC: 9 MMOL/L (ref 8–16)
AST SERPL-CCNC: 18 U/L (ref 10–40)
BASOPHILS # BLD AUTO: 0.05 K/UL (ref 0–0.2)
BASOPHILS NFR BLD: 0.7 % (ref 0–1.9)
BILIRUB SERPL-MCNC: 0.5 MG/DL (ref 0.1–1)
BUN SERPL-MCNC: 20 MG/DL (ref 8–23)
CALCIUM SERPL-MCNC: 9.5 MG/DL (ref 8.7–10.5)
CHLORIDE SERPL-SCNC: 103 MMOL/L (ref 95–110)
CHOLEST SERPL-MCNC: 176 MG/DL (ref 120–199)
CHOLEST/HDLC SERPL: 2.8 {RATIO} (ref 2–5)
CO2 SERPL-SCNC: 27 MMOL/L (ref 23–29)
CREAT SERPL-MCNC: 1 MG/DL (ref 0.5–1.4)
DIFFERENTIAL METHOD: ABNORMAL
EOSINOPHIL # BLD AUTO: 0.4 K/UL (ref 0–0.5)
EOSINOPHIL NFR BLD: 4.8 % (ref 0–8)
ERYTHROCYTE [DISTWIDTH] IN BLOOD BY AUTOMATED COUNT: 13.2 % (ref 11.5–14.5)
EST. GFR  (AFRICAN AMERICAN): >60 ML/MIN/1.73 M^2
EST. GFR  (NON AFRICAN AMERICAN): 59.3 ML/MIN/1.73 M^2
ESTIMATED AVG GLUCOSE: 157 MG/DL (ref 68–131)
GLUCOSE SERPL-MCNC: 146 MG/DL (ref 70–110)
HBA1C MFR BLD: 7.1 % (ref 4–5.6)
HCT VFR BLD AUTO: 46.7 % (ref 37–48.5)
HDLC SERPL-MCNC: 62 MG/DL (ref 40–75)
HDLC SERPL: 35.2 % (ref 20–50)
HGB BLD-MCNC: 14.7 G/DL (ref 12–16)
IMM GRANULOCYTES # BLD AUTO: 0.04 K/UL (ref 0–0.04)
IMM GRANULOCYTES NFR BLD AUTO: 0.5 % (ref 0–0.5)
LDLC SERPL CALC-MCNC: 94.2 MG/DL (ref 63–159)
LYMPHOCYTES # BLD AUTO: 1.4 K/UL (ref 1–4.8)
LYMPHOCYTES NFR BLD: 18.1 % (ref 18–48)
MCH RBC QN AUTO: 29.7 PG (ref 27–31)
MCHC RBC AUTO-ENTMCNC: 31.5 G/DL (ref 32–36)
MCV RBC AUTO: 94 FL (ref 82–98)
MONOCYTES # BLD AUTO: 0.7 K/UL (ref 0.3–1)
MONOCYTES NFR BLD: 8.8 % (ref 4–15)
NEUTROPHILS # BLD AUTO: 5.1 K/UL (ref 1.8–7.7)
NEUTROPHILS NFR BLD: 67.1 % (ref 38–73)
NONHDLC SERPL-MCNC: 114 MG/DL
NRBC BLD-RTO: 0 /100 WBC
PLATELET # BLD AUTO: 267 K/UL (ref 150–450)
PMV BLD AUTO: 10.3 FL (ref 9.2–12.9)
POTASSIUM SERPL-SCNC: 4.7 MMOL/L (ref 3.5–5.1)
PROT SERPL-MCNC: 6.7 G/DL (ref 6–8.4)
RBC # BLD AUTO: 4.95 M/UL (ref 4–5.4)
SODIUM SERPL-SCNC: 139 MMOL/L (ref 136–145)
T4 FREE SERPL-MCNC: 1 NG/DL (ref 0.71–1.51)
TRIGL SERPL-MCNC: 99 MG/DL (ref 30–150)
TSH SERPL DL<=0.005 MIU/L-ACNC: 0.72 UIU/ML (ref 0.4–4)
WBC # BLD AUTO: 7.52 K/UL (ref 3.9–12.7)

## 2022-05-09 PROCEDURE — 85025 COMPLETE CBC W/AUTO DIFF WBC: CPT | Performed by: NURSE PRACTITIONER

## 2022-05-09 PROCEDURE — 36415 COLL VENOUS BLD VENIPUNCTURE: CPT | Mod: PO | Performed by: NURSE PRACTITIONER

## 2022-05-09 PROCEDURE — 84443 ASSAY THYROID STIM HORMONE: CPT | Performed by: NURSE PRACTITIONER

## 2022-05-09 PROCEDURE — 80053 COMPREHEN METABOLIC PANEL: CPT | Performed by: NURSE PRACTITIONER

## 2022-05-09 PROCEDURE — 80061 LIPID PANEL: CPT | Performed by: NURSE PRACTITIONER

## 2022-05-09 PROCEDURE — 84439 ASSAY OF FREE THYROXINE: CPT | Performed by: NURSE PRACTITIONER

## 2022-05-09 PROCEDURE — 83036 HEMOGLOBIN GLYCOSYLATED A1C: CPT | Performed by: NURSE PRACTITIONER

## 2022-05-16 ENCOUNTER — TELEPHONE (OUTPATIENT)
Dept: FAMILY MEDICINE | Facility: CLINIC | Age: 67
End: 2022-05-16

## 2022-05-16 ENCOUNTER — OFFICE VISIT (OUTPATIENT)
Dept: FAMILY MEDICINE | Facility: CLINIC | Age: 67
End: 2022-05-16
Payer: MEDICARE

## 2022-05-16 VITALS
HEIGHT: 65 IN | HEART RATE: 87 BPM | SYSTOLIC BLOOD PRESSURE: 120 MMHG | DIASTOLIC BLOOD PRESSURE: 70 MMHG | BODY MASS INDEX: 45.26 KG/M2 | WEIGHT: 271.63 LBS | TEMPERATURE: 98 F | OXYGEN SATURATION: 96 % | RESPIRATION RATE: 18 BRPM

## 2022-05-16 DIAGNOSIS — G89.29 CHRONIC BACK PAIN GREATER THAN 3 MONTHS DURATION: Primary | ICD-10-CM

## 2022-05-16 DIAGNOSIS — E55.9 VITAMIN D DEFICIENCY: ICD-10-CM

## 2022-05-16 DIAGNOSIS — E11.9 DIABETIC EYE EXAM: ICD-10-CM

## 2022-05-16 DIAGNOSIS — M54.9 CHRONIC BACK PAIN GREATER THAN 3 MONTHS DURATION: Primary | ICD-10-CM

## 2022-05-16 DIAGNOSIS — G56.01 CARPAL TUNNEL SYNDROME OF RIGHT WRIST: ICD-10-CM

## 2022-05-16 DIAGNOSIS — Z01.00 DIABETIC EYE EXAM: ICD-10-CM

## 2022-05-16 DIAGNOSIS — Z12.31 ENCOUNTER FOR SCREENING MAMMOGRAM FOR MALIGNANT NEOPLASM OF BREAST: ICD-10-CM

## 2022-05-16 DIAGNOSIS — N95.9 MENOPAUSAL AND POSTMENOPAUSAL DISORDER: ICD-10-CM

## 2022-05-16 DIAGNOSIS — E66.01 MORBID OBESITY WITH BMI OF 45.0-49.9, ADULT: ICD-10-CM

## 2022-05-16 DIAGNOSIS — E78.5 HYPERLIPIDEMIA, UNSPECIFIED HYPERLIPIDEMIA TYPE: ICD-10-CM

## 2022-05-16 DIAGNOSIS — E11.9 TYPE 2 DIABETES MELLITUS WITHOUT COMPLICATION, WITHOUT LONG-TERM CURRENT USE OF INSULIN: ICD-10-CM

## 2022-05-16 DIAGNOSIS — E03.9 HYPOTHYROIDISM (ACQUIRED): ICD-10-CM

## 2022-05-16 DIAGNOSIS — K21.9 GASTROESOPHAGEAL REFLUX DISEASE, UNSPECIFIED WHETHER ESOPHAGITIS PRESENT: ICD-10-CM

## 2022-05-16 DIAGNOSIS — E53.8 B12 DEFICIENCY: ICD-10-CM

## 2022-05-16 PROCEDURE — 3074F SYST BP LT 130 MM HG: CPT | Mod: CPTII,S$GLB,, | Performed by: FAMILY MEDICINE

## 2022-05-16 PROCEDURE — 3051F PR MOST RECENT HEMOGLOBIN A1C LEVEL 7.0 - < 8.0%: ICD-10-PCS | Mod: CPTII,S$GLB,, | Performed by: FAMILY MEDICINE

## 2022-05-16 PROCEDURE — 1101F PT FALLS ASSESS-DOCD LE1/YR: CPT | Mod: CPTII,S$GLB,, | Performed by: FAMILY MEDICINE

## 2022-05-16 PROCEDURE — 3008F PR BODY MASS INDEX (BMI) DOCUMENTED: ICD-10-PCS | Mod: CPTII,S$GLB,, | Performed by: FAMILY MEDICINE

## 2022-05-16 PROCEDURE — 1160F RVW MEDS BY RX/DR IN RCRD: CPT | Mod: CPTII,S$GLB,, | Performed by: FAMILY MEDICINE

## 2022-05-16 PROCEDURE — 99214 OFFICE O/P EST MOD 30 MIN: CPT | Mod: S$GLB,,, | Performed by: FAMILY MEDICINE

## 2022-05-16 PROCEDURE — 99214 PR OFFICE/OUTPT VISIT, EST, LEVL IV, 30-39 MIN: ICD-10-PCS | Mod: S$GLB,,, | Performed by: FAMILY MEDICINE

## 2022-05-16 PROCEDURE — 1160F PR REVIEW ALL MEDS BY PRESCRIBER/CLIN PHARMACIST DOCUMENTED: ICD-10-PCS | Mod: CPTII,S$GLB,, | Performed by: FAMILY MEDICINE

## 2022-05-16 PROCEDURE — 3078F DIAST BP <80 MM HG: CPT | Mod: CPTII,S$GLB,, | Performed by: FAMILY MEDICINE

## 2022-05-16 PROCEDURE — 3078F PR MOST RECENT DIASTOLIC BLOOD PRESSURE < 80 MM HG: ICD-10-PCS | Mod: CPTII,S$GLB,, | Performed by: FAMILY MEDICINE

## 2022-05-16 PROCEDURE — 1125F PR PAIN SEVERITY QUANTIFIED, PAIN PRESENT: ICD-10-PCS | Mod: CPTII,S$GLB,, | Performed by: FAMILY MEDICINE

## 2022-05-16 PROCEDURE — 1159F PR MEDICATION LIST DOCUMENTED IN MEDICAL RECORD: ICD-10-PCS | Mod: CPTII,S$GLB,, | Performed by: FAMILY MEDICINE

## 2022-05-16 PROCEDURE — 3074F PR MOST RECENT SYSTOLIC BLOOD PRESSURE < 130 MM HG: ICD-10-PCS | Mod: CPTII,S$GLB,, | Performed by: FAMILY MEDICINE

## 2022-05-16 PROCEDURE — 1159F MED LIST DOCD IN RCRD: CPT | Mod: CPTII,S$GLB,, | Performed by: FAMILY MEDICINE

## 2022-05-16 PROCEDURE — 1125F AMNT PAIN NOTED PAIN PRSNT: CPT | Mod: CPTII,S$GLB,, | Performed by: FAMILY MEDICINE

## 2022-05-16 PROCEDURE — 3051F HG A1C>EQUAL 7.0%<8.0%: CPT | Mod: CPTII,S$GLB,, | Performed by: FAMILY MEDICINE

## 2022-05-16 PROCEDURE — 4010F PR ACE/ARB THEARPY RXD/TAKEN: ICD-10-PCS | Mod: CPTII,S$GLB,, | Performed by: FAMILY MEDICINE

## 2022-05-16 PROCEDURE — 99999 PR PBB SHADOW E&M-EST. PATIENT-LVL V: ICD-10-PCS | Mod: PBBFAC,,, | Performed by: FAMILY MEDICINE

## 2022-05-16 PROCEDURE — 3288F FALL RISK ASSESSMENT DOCD: CPT | Mod: CPTII,S$GLB,, | Performed by: FAMILY MEDICINE

## 2022-05-16 PROCEDURE — 3008F BODY MASS INDEX DOCD: CPT | Mod: CPTII,S$GLB,, | Performed by: FAMILY MEDICINE

## 2022-05-16 PROCEDURE — 99999 PR PBB SHADOW E&M-EST. PATIENT-LVL V: CPT | Mod: PBBFAC,,, | Performed by: FAMILY MEDICINE

## 2022-05-16 PROCEDURE — 3288F PR FALLS RISK ASSESSMENT DOCUMENTED: ICD-10-PCS | Mod: CPTII,S$GLB,, | Performed by: FAMILY MEDICINE

## 2022-05-16 PROCEDURE — 4010F ACE/ARB THERAPY RXD/TAKEN: CPT | Mod: CPTII,S$GLB,, | Performed by: FAMILY MEDICINE

## 2022-05-16 PROCEDURE — 1101F PR PT FALLS ASSESS DOC 0-1 FALLS W/OUT INJ PAST YR: ICD-10-PCS | Mod: CPTII,S$GLB,, | Performed by: FAMILY MEDICINE

## 2022-05-16 RX ORDER — HYDROCODONE BITARTRATE AND ACETAMINOPHEN 7.5; 325 MG/1; MG/1
1 TABLET ORAL
Qty: 30 TABLET | Refills: 0 | Status: SHIPPED | OUTPATIENT
Start: 2022-07-15 | End: 2022-08-14

## 2022-05-16 RX ORDER — HYDROCODONE BITARTRATE AND ACETAMINOPHEN 7.5; 325 MG/1; MG/1
1 TABLET ORAL
Qty: 30 TABLET | Refills: 0 | Status: SHIPPED | OUTPATIENT
Start: 2022-05-16 | End: 2022-06-15

## 2022-05-16 RX ORDER — PANTOPRAZOLE SODIUM 40 MG/1
40 TABLET, DELAYED RELEASE ORAL DAILY
Qty: 90 TABLET | Refills: 3 | Status: SHIPPED | OUTPATIENT
Start: 2022-05-16 | End: 2023-04-25

## 2022-05-16 RX ORDER — HYDROCODONE BITARTRATE AND ACETAMINOPHEN 7.5; 325 MG/1; MG/1
1 TABLET ORAL
Qty: 30 TABLET | Refills: 0 | Status: SHIPPED | OUTPATIENT
Start: 2022-06-15 | End: 2022-07-15

## 2022-05-16 NOTE — PROGRESS NOTES
Subjective:       Patient ID: Edie Hernandez is a 66 y.o. female.    Chief Complaint: Follow-up (6mth f/u )    HPI  Review of Systems   Constitutional: Negative for fatigue and unexpected weight change.   Respiratory: Negative for chest tightness and shortness of breath.    Cardiovascular: Negative for chest pain, palpitations and leg swelling.   Gastrointestinal: Negative for abdominal pain.   Musculoskeletal: Positive for arthralgias and back pain.   Neurological: Negative for dizziness, syncope, light-headedness and headaches.       Patient Active Problem List   Diagnosis    GERD (gastroesophageal reflux disease)    Chronic back pain greater than 3 months duration    Environmental allergies    Fibromyalgia    B12 nutritional deficiency    CLINT (obstructive sleep apnea)    Chronic sinusitis    Asthma    Hypothyroid    Nausea    Night sweats    Tinnitus, bilateral    Frequent UTI    Hyperlipidemia    Colon polyp, hyperplastic    Interstitial cystitis    Hemangioma    Wart    Globus sensation    DDD (degenerative disc disease), lumbar    Morbid obesity with BMI of 45.0-49.9, adult    Edema    Mixed incontinence    Continuous opioid dependence- contract 10/18/17-failed uds for norco, + 2/18    Type 2 diabetes mellitus    History of colon polyps    Lumbar radiculitis    Lumbar radiculopathy    Lumbar spondylosis    Sacroiliitis     Patient is here for a chronic conditions follow up.    Reviewed labs 5/22  C/o right CTS sx acting up.  Last 2 months has been wearing old splint      Type 2 DM A1c 7.1. lipids high. CT coronary score 7/2021 0.  Not on aceI/ARB. Urine ma high.  Not on statin or asa    TFts nl   mammo neg 4/2021      Ortho Dr. Godinez treating Sacroiliitis.      Phys Med Dr. Simmons treating chronic left sided low back pain.  Doing injections for low back pain which are helping        Pulm/Sleep Dr. Yang-CLINT on daily methylphendiate     Continuous use of opioids  follow-up:  Current medication and dosing:  norco 7.5 once daily or less  Supply:  90 day supply  Side effects: none  Pain controlled: yes  Medication compliance: yes  DPS checked today: no evidence of diversion-had short supply percocet Dr. Godinez for flare 10/2021  UDS: 5/2022  Contract : 2/2022     Cause of Pain: chronic low back pain  Objective:      Physical Exam  Vitals and nursing note reviewed.   Constitutional:       Appearance: She is well-developed.   Cardiovascular:      Rate and Rhythm: Normal rate and regular rhythm.      Heart sounds: Normal heart sounds.   Pulmonary:      Effort: Pulmonary effort is normal.      Breath sounds: Normal breath sounds.   Skin:     General: Skin is warm and dry.   Neurological:      Mental Status: She is alert and oriented to person, place, and time.         Assessment:       1. Chronic back pain greater than 3 months duration    2. Type 2 diabetes mellitus without complication, without long-term current use of insulin    3. Hypothyroidism (acquired)    4. Morbid obesity with BMI of 45.0-49.9, adult    5. B12 deficiency    6. Hyperlipidemia, unspecified hyperlipidemia type    7. Vitamin D deficiency    8. Encounter for screening mammogram for malignant neoplasm of breast    9. Menopausal and postmenopausal disorder    10. Diabetic eye exam    11. Carpal tunnel syndrome of right wrist    12. Gastroesophageal reflux disease, unspecified whether esophagitis present        Plan:         1. Chronic back pain greater than 3 months duration  Controlled on current medications.  Continue current medications.    - HYDROcodone-acetaminophen (NORCO) 7.5-325 mg per tablet; Take 1 tablet by mouth every 24 hours as needed for Pain.  Dispense: 30 tablet; Refill: 0  - HYDROcodone-acetaminophen (NORCO) 7.5-325 mg per tablet; Take 1 tablet by mouth every 24 hours as needed for Pain.  Dispense: 30 tablet; Refill: 0  - HYDROcodone-acetaminophen (NORCO) 7.5-325 mg per tablet; Take 1 tablet by  "mouth every 24 hours as needed for Pain.  Dispense: 30 tablet; Refill: 0    2. Type 2 diabetes mellitus without complication, without long-term current use of insulin  Goal < 7. Work on diet and exercise. Cont current meds  - Comprehensive Metabolic Panel; Future  - Hemoglobin A1C; Future    3. Hypothyroidism (acquired)  Controlled on current medications.  Continue current medications.    - CBC Auto Differential; Future  - TSH; Future  - T4, Free; Future    4. Morbid obesity with BMI of 45.0-49.9, adult  Counseled patient on his ideal body weight, health consequences of being obese and current recommendations including weekly exercise and a heart healthy diet.  Current BMI is:Estimated body mass index is 45.2 kg/m² as calculated from the following:    Height as of this encounter: 5' 5" (1.651 m).    Weight as of this encounter: 123.2 kg (271 lb 9.7 oz)..  Patient is aware that ideal BMI < 25 or Weight in (lb) to have BMI = 25: 149.9.        5. B12 deficiency  Screen and treat as indicated:    - Vitamin B12; Future    6. Hyperlipidemia, unspecified hyperlipidemia type  Controlled on current medications.  Continue current medications.    - Lipid Panel; Future    7. Vitamin D deficiency  Screen and treat as indicated:    - Vitamin D; Future    8. Encounter for screening mammogram for malignant neoplasm of breast  Screen and treat as indicated:    - Mammo Digital Screening Bilat; Future    9. Menopausal and postmenopausal disorder  Screen and treat as indicated:    - DXA Bone Density Spine And Hip; Future    10. Diabetic eye exam  refer  - Ambulatory referral/consult to Optometry; Future    11. Carpal tunnel syndrome of right wrist  order  - SPLINT FOR HOME USE    12. Gastroesophageal reflux disease, unspecified whether esophagitis present  Add  - pantoprazole (PROTONIX) 40 MG tablet; Take 1 tablet (40 mg total) by mouth once daily.  Dispense: 90 tablet; Refill: 3  Counseled patient on prevention of reflux with changes " in diet and behavior.  I recommended avoidance of greasy and spicy foods, caffeine and eating within 3 hours of bedtime.  I counseled the patient to avoid eating large meals and instead eating more frequent small meals.  I also recommended weight loss and elevation of the head of the bed by 6 inches.  If symptoms persist after these changes medication may be needed to control GERD.          Time spent with patient: 20 minutes    Patient with be reevaluated in 3 months or sooner prn    Greater than 50% of this visit was spent counseling as described in above documentation:Yes

## 2022-05-19 ENCOUNTER — PATIENT MESSAGE (OUTPATIENT)
Dept: FAMILY MEDICINE | Facility: CLINIC | Age: 67
End: 2022-05-19
Payer: MEDICARE

## 2022-05-24 ENCOUNTER — OFFICE VISIT (OUTPATIENT)
Dept: UROGYNECOLOGY | Facility: CLINIC | Age: 67
End: 2022-05-24
Payer: MEDICARE

## 2022-05-24 VITALS
SYSTOLIC BLOOD PRESSURE: 122 MMHG | HEART RATE: 81 BPM | WEIGHT: 275.38 LBS | DIASTOLIC BLOOD PRESSURE: 75 MMHG | BODY MASS INDEX: 45.88 KG/M2 | HEIGHT: 65 IN

## 2022-05-24 DIAGNOSIS — N95.2 ATROPHIC VAGINITIS: ICD-10-CM

## 2022-05-24 DIAGNOSIS — R35.0 URINARY FREQUENCY: Primary | ICD-10-CM

## 2022-05-24 DIAGNOSIS — R39.89 CYSTALGIA: ICD-10-CM

## 2022-05-24 LAB
BILIRUB SERPL-MCNC: NEGATIVE MG/DL
BLOOD URINE, POC: NEGATIVE
CLARITY, POC UA: CLEAR
COLOR, POC UA: YELLOW
GLUCOSE UR QL STRIP: NEGATIVE
KETONES UR QL STRIP: NEGATIVE
LEUKOCYTE ESTERASE URINE, POC: ABNORMAL
NITRITE, POC UA: NEGATIVE
PH, POC UA: 6
PROTEIN, POC: NEGATIVE
SPECIFIC GRAVITY, POC UA: 1
UROBILINOGEN, POC UA: NEGATIVE

## 2022-05-24 PROCEDURE — 81002 POCT URINE DIPSTICK WITHOUT MICROSCOPE: ICD-10-PCS | Mod: S$GLB,,, | Performed by: NURSE PRACTITIONER

## 2022-05-24 PROCEDURE — 99999 PR PBB SHADOW E&M-EST. PATIENT-LVL V: ICD-10-PCS | Mod: PBBFAC,,, | Performed by: NURSE PRACTITIONER

## 2022-05-24 PROCEDURE — 3074F SYST BP LT 130 MM HG: CPT | Mod: CPTII,S$GLB,, | Performed by: NURSE PRACTITIONER

## 2022-05-24 PROCEDURE — 87086 URINE CULTURE/COLONY COUNT: CPT | Performed by: NURSE PRACTITIONER

## 2022-05-24 PROCEDURE — 1159F PR MEDICATION LIST DOCUMENTED IN MEDICAL RECORD: ICD-10-PCS | Mod: CPTII,S$GLB,, | Performed by: NURSE PRACTITIONER

## 2022-05-24 PROCEDURE — 87147 CULTURE TYPE IMMUNOLOGIC: CPT | Performed by: NURSE PRACTITIONER

## 2022-05-24 PROCEDURE — 1159F MED LIST DOCD IN RCRD: CPT | Mod: CPTII,S$GLB,, | Performed by: NURSE PRACTITIONER

## 2022-05-24 PROCEDURE — 3288F FALL RISK ASSESSMENT DOCD: CPT | Mod: CPTII,S$GLB,, | Performed by: NURSE PRACTITIONER

## 2022-05-24 PROCEDURE — 3051F HG A1C>EQUAL 7.0%<8.0%: CPT | Mod: CPTII,S$GLB,, | Performed by: NURSE PRACTITIONER

## 2022-05-24 PROCEDURE — 3008F PR BODY MASS INDEX (BMI) DOCUMENTED: ICD-10-PCS | Mod: CPTII,S$GLB,, | Performed by: NURSE PRACTITIONER

## 2022-05-24 PROCEDURE — 1125F AMNT PAIN NOTED PAIN PRSNT: CPT | Mod: CPTII,S$GLB,, | Performed by: NURSE PRACTITIONER

## 2022-05-24 PROCEDURE — 99213 OFFICE O/P EST LOW 20 MIN: CPT | Mod: 25,S$GLB,, | Performed by: NURSE PRACTITIONER

## 2022-05-24 PROCEDURE — 3288F PR FALLS RISK ASSESSMENT DOCUMENTED: ICD-10-PCS | Mod: CPTII,S$GLB,, | Performed by: NURSE PRACTITIONER

## 2022-05-24 PROCEDURE — 1160F RVW MEDS BY RX/DR IN RCRD: CPT | Mod: CPTII,S$GLB,, | Performed by: NURSE PRACTITIONER

## 2022-05-24 PROCEDURE — 4010F PR ACE/ARB THEARPY RXD/TAKEN: ICD-10-PCS | Mod: CPTII,S$GLB,, | Performed by: NURSE PRACTITIONER

## 2022-05-24 PROCEDURE — 99999 PR PBB SHADOW E&M-EST. PATIENT-LVL V: CPT | Mod: PBBFAC,,, | Performed by: NURSE PRACTITIONER

## 2022-05-24 PROCEDURE — 3074F PR MOST RECENT SYSTOLIC BLOOD PRESSURE < 130 MM HG: ICD-10-PCS | Mod: CPTII,S$GLB,, | Performed by: NURSE PRACTITIONER

## 2022-05-24 PROCEDURE — 1160F PR REVIEW ALL MEDS BY PRESCRIBER/CLIN PHARMACIST DOCUMENTED: ICD-10-PCS | Mod: CPTII,S$GLB,, | Performed by: NURSE PRACTITIONER

## 2022-05-24 PROCEDURE — 4010F ACE/ARB THERAPY RXD/TAKEN: CPT | Mod: CPTII,S$GLB,, | Performed by: NURSE PRACTITIONER

## 2022-05-24 PROCEDURE — 81002 URINALYSIS NONAUTO W/O SCOPE: CPT | Mod: S$GLB,,, | Performed by: NURSE PRACTITIONER

## 2022-05-24 PROCEDURE — 87088 URINE BACTERIA CULTURE: CPT | Performed by: NURSE PRACTITIONER

## 2022-05-24 PROCEDURE — 3008F BODY MASS INDEX DOCD: CPT | Mod: CPTII,S$GLB,, | Performed by: NURSE PRACTITIONER

## 2022-05-24 PROCEDURE — 1125F PR PAIN SEVERITY QUANTIFIED, PAIN PRESENT: ICD-10-PCS | Mod: CPTII,S$GLB,, | Performed by: NURSE PRACTITIONER

## 2022-05-24 PROCEDURE — 99213 PR OFFICE/OUTPT VISIT, EST, LEVL III, 20-29 MIN: ICD-10-PCS | Mod: 25,S$GLB,, | Performed by: NURSE PRACTITIONER

## 2022-05-24 PROCEDURE — 3078F PR MOST RECENT DIASTOLIC BLOOD PRESSURE < 80 MM HG: ICD-10-PCS | Mod: CPTII,S$GLB,, | Performed by: NURSE PRACTITIONER

## 2022-05-24 PROCEDURE — 3051F PR MOST RECENT HEMOGLOBIN A1C LEVEL 7.0 - < 8.0%: ICD-10-PCS | Mod: CPTII,S$GLB,, | Performed by: NURSE PRACTITIONER

## 2022-05-24 PROCEDURE — 1101F PR PT FALLS ASSESS DOC 0-1 FALLS W/OUT INJ PAST YR: ICD-10-PCS | Mod: CPTII,S$GLB,, | Performed by: NURSE PRACTITIONER

## 2022-05-24 PROCEDURE — 1101F PT FALLS ASSESS-DOCD LE1/YR: CPT | Mod: CPTII,S$GLB,, | Performed by: NURSE PRACTITIONER

## 2022-05-24 PROCEDURE — 3078F DIAST BP <80 MM HG: CPT | Mod: CPTII,S$GLB,, | Performed by: NURSE PRACTITIONER

## 2022-05-24 NOTE — PROGRESS NOTES
Subjective:       Patient ID: Edie Hernandez is a 66 y.o. female.    Chief Complaint: bladder pain in am      Edie Hernandez is a 66 y.o. female presents today for cystalgia.  About 2-3 weeks ago she started having some pain in her bladder specifically when she was getting up at 4 AM to go to the restroom.  She would void but then her bladder would continue to hurt until she sat in her chair for about 10 minutes and then it would subside.  She started cutting back on her caffeine and chocolate intake and this helped a little.  She has frequency x q3-4 hours during the day and nocturia x 3-4.  She denies any feeling of PVF.  She denies any incontinence.  She does drink right until she goes to bed.  She is concerned that she might have a UTI as she has had problems with that in the past.  She denies any vaginal complaints/concerns.  She denies any other acute complaints/concerns at this time.    Review of Systems   Constitutional: Negative for activity change, fever and unexpected weight change.   HENT: Negative for hearing loss.    Eyes: Negative for visual disturbance.   Respiratory: Negative for shortness of breath and wheezing.    Cardiovascular: Negative for chest pain, palpitations and leg swelling.   Gastrointestinal: Negative for abdominal pain, constipation and diarrhea.   Genitourinary: Positive for frequency. Negative for dyspareunia, dysuria, urgency, vaginal bleeding and vaginal discharge.        Bladder pain   Musculoskeletal: Negative for gait problem and neck pain.   Skin: Negative for rash and wound.   Allergic/Immunologic: Negative for immunocompromised state.   Neurological: Negative for tremors, speech difficulty and weakness.   Hematological: Does not bruise/bleed easily.   Psychiatric/Behavioral: Negative for agitation and confusion.       Objective:      Physical Exam  Constitutional:       General: She is not in acute distress.     Appearance: She is well-developed.   HENT:      Head:  Normocephalic and atraumatic.   Neck:      Thyroid: No thyromegaly.   Pulmonary:      Effort: Pulmonary effort is normal. No respiratory distress.   Abdominal:      Palpations: Abdomen is soft.      Tenderness: There is no abdominal tenderness.      Hernia: No hernia is present.   Musculoskeletal:         General: Normal range of motion.      Cervical back: Neck supple.   Skin:     General: Skin is warm and dry.      Findings: No rash.   Neurological:      Mental Status: She is alert and oriented to person, place, and time.   Psychiatric:         Behavior: Behavior normal.       Pelvic Exam:  Deferred at this time.      Assessment:       1. Urinary frequency    2. Cystalgia    3. Atrophic vaginitis        Plan:       Urinary frequency- discussed stopping fluid intake 2 hours prior to bedtime.  Pt will try this.  We will send her urine for culture as noted below to check for infection.  -     Urine culture  -     POCT urine dipstick without microscope    Cystalgia- lifestyle changes at this time    Atrophic vaginitis- monitor    RTC 1 month

## 2022-05-25 ENCOUNTER — PATIENT MESSAGE (OUTPATIENT)
Dept: FAMILY MEDICINE | Facility: CLINIC | Age: 67
End: 2022-05-25
Payer: MEDICARE

## 2022-05-26 LAB — BACTERIA UR CULT: ABNORMAL

## 2022-05-26 RX ORDER — CEPHALEXIN 500 MG/1
500 CAPSULE ORAL EVERY 8 HOURS
Qty: 15 CAPSULE | Refills: 0 | Status: SHIPPED | OUTPATIENT
Start: 2022-05-26 | End: 2022-05-31

## 2022-05-31 ENCOUNTER — PATIENT MESSAGE (OUTPATIENT)
Dept: ADMINISTRATIVE | Facility: HOSPITAL | Age: 67
End: 2022-05-31
Payer: MEDICARE

## 2022-06-14 ENCOUNTER — OFFICE VISIT (OUTPATIENT)
Dept: UROGYNECOLOGY | Facility: CLINIC | Age: 67
End: 2022-06-14
Payer: MEDICARE

## 2022-06-14 VITALS
WEIGHT: 275.38 LBS | HEART RATE: 79 BPM | SYSTOLIC BLOOD PRESSURE: 123 MMHG | DIASTOLIC BLOOD PRESSURE: 65 MMHG | HEIGHT: 65 IN | BODY MASS INDEX: 45.88 KG/M2

## 2022-06-14 DIAGNOSIS — N95.2 ATROPHIC VAGINITIS: ICD-10-CM

## 2022-06-14 DIAGNOSIS — R35.0 URINARY FREQUENCY: Primary | ICD-10-CM

## 2022-06-14 DIAGNOSIS — R39.89 CYSTALGIA: ICD-10-CM

## 2022-06-14 DIAGNOSIS — N30.90 CYSTITIS: ICD-10-CM

## 2022-06-14 LAB
BILIRUB SERPL-MCNC: ABNORMAL MG/DL
BLOOD URINE, POC: ABNORMAL
CLARITY, POC UA: ABNORMAL
COLOR, POC UA: YELLOW
GLUCOSE UR QL STRIP: ABNORMAL
KETONES UR QL STRIP: ABNORMAL
LEUKOCYTE ESTERASE URINE, POC: ABNORMAL
NITRITE, POC UA: ABNORMAL
PH, POC UA: 5
PROTEIN, POC: ABNORMAL
SPECIFIC GRAVITY, POC UA: 1.02
UROBILINOGEN, POC UA: ABNORMAL

## 2022-06-14 PROCEDURE — 1101F PR PT FALLS ASSESS DOC 0-1 FALLS W/OUT INJ PAST YR: ICD-10-PCS | Mod: CPTII,S$GLB,, | Performed by: NURSE PRACTITIONER

## 2022-06-14 PROCEDURE — 1126F PR PAIN SEVERITY QUANTIFIED, NO PAIN PRESENT: ICD-10-PCS | Mod: CPTII,S$GLB,, | Performed by: NURSE PRACTITIONER

## 2022-06-14 PROCEDURE — 3288F PR FALLS RISK ASSESSMENT DOCUMENTED: ICD-10-PCS | Mod: CPTII,S$GLB,, | Performed by: NURSE PRACTITIONER

## 2022-06-14 PROCEDURE — 3074F SYST BP LT 130 MM HG: CPT | Mod: CPTII,S$GLB,, | Performed by: NURSE PRACTITIONER

## 2022-06-14 PROCEDURE — 3288F FALL RISK ASSESSMENT DOCD: CPT | Mod: CPTII,S$GLB,, | Performed by: NURSE PRACTITIONER

## 2022-06-14 PROCEDURE — 4010F ACE/ARB THERAPY RXD/TAKEN: CPT | Mod: CPTII,S$GLB,, | Performed by: NURSE PRACTITIONER

## 2022-06-14 PROCEDURE — 51700 IRRIGATION OF BLADDER: CPT | Mod: S$GLB,,, | Performed by: NURSE PRACTITIONER

## 2022-06-14 PROCEDURE — 87186 SC STD MICRODIL/AGAR DIL: CPT | Performed by: NURSE PRACTITIONER

## 2022-06-14 PROCEDURE — 1101F PT FALLS ASSESS-DOCD LE1/YR: CPT | Mod: CPTII,S$GLB,, | Performed by: NURSE PRACTITIONER

## 2022-06-14 PROCEDURE — 81002 URINALYSIS NONAUTO W/O SCOPE: CPT | Mod: S$GLB,,, | Performed by: NURSE PRACTITIONER

## 2022-06-14 PROCEDURE — 1159F MED LIST DOCD IN RCRD: CPT | Mod: CPTII,S$GLB,, | Performed by: NURSE PRACTITIONER

## 2022-06-14 PROCEDURE — 1159F PR MEDICATION LIST DOCUMENTED IN MEDICAL RECORD: ICD-10-PCS | Mod: CPTII,S$GLB,, | Performed by: NURSE PRACTITIONER

## 2022-06-14 PROCEDURE — 1126F AMNT PAIN NOTED NONE PRSNT: CPT | Mod: CPTII,S$GLB,, | Performed by: NURSE PRACTITIONER

## 2022-06-14 PROCEDURE — 51700 PR IRRIGATION, BLADDER: ICD-10-PCS | Mod: S$GLB,,, | Performed by: NURSE PRACTITIONER

## 2022-06-14 PROCEDURE — 4010F PR ACE/ARB THEARPY RXD/TAKEN: ICD-10-PCS | Mod: CPTII,S$GLB,, | Performed by: NURSE PRACTITIONER

## 2022-06-14 PROCEDURE — 99999 PR PBB SHADOW E&M-EST. PATIENT-LVL IV: ICD-10-PCS | Mod: PBBFAC,,, | Performed by: NURSE PRACTITIONER

## 2022-06-14 PROCEDURE — 3008F BODY MASS INDEX DOCD: CPT | Mod: CPTII,S$GLB,, | Performed by: NURSE PRACTITIONER

## 2022-06-14 PROCEDURE — 99214 OFFICE O/P EST MOD 30 MIN: CPT | Mod: 25,S$GLB,, | Performed by: NURSE PRACTITIONER

## 2022-06-14 PROCEDURE — 3074F PR MOST RECENT SYSTOLIC BLOOD PRESSURE < 130 MM HG: ICD-10-PCS | Mod: CPTII,S$GLB,, | Performed by: NURSE PRACTITIONER

## 2022-06-14 PROCEDURE — 87077 CULTURE AEROBIC IDENTIFY: CPT | Performed by: NURSE PRACTITIONER

## 2022-06-14 PROCEDURE — 99214 PR OFFICE/OUTPT VISIT, EST, LEVL IV, 30-39 MIN: ICD-10-PCS | Mod: 25,S$GLB,, | Performed by: NURSE PRACTITIONER

## 2022-06-14 PROCEDURE — 3051F PR MOST RECENT HEMOGLOBIN A1C LEVEL 7.0 - < 8.0%: ICD-10-PCS | Mod: CPTII,S$GLB,, | Performed by: NURSE PRACTITIONER

## 2022-06-14 PROCEDURE — 1160F PR REVIEW ALL MEDS BY PRESCRIBER/CLIN PHARMACIST DOCUMENTED: ICD-10-PCS | Mod: CPTII,S$GLB,, | Performed by: NURSE PRACTITIONER

## 2022-06-14 PROCEDURE — 3078F DIAST BP <80 MM HG: CPT | Mod: CPTII,S$GLB,, | Performed by: NURSE PRACTITIONER

## 2022-06-14 PROCEDURE — 87086 URINE CULTURE/COLONY COUNT: CPT | Performed by: NURSE PRACTITIONER

## 2022-06-14 PROCEDURE — 3008F PR BODY MASS INDEX (BMI) DOCUMENTED: ICD-10-PCS | Mod: CPTII,S$GLB,, | Performed by: NURSE PRACTITIONER

## 2022-06-14 PROCEDURE — 3078F PR MOST RECENT DIASTOLIC BLOOD PRESSURE < 80 MM HG: ICD-10-PCS | Mod: CPTII,S$GLB,, | Performed by: NURSE PRACTITIONER

## 2022-06-14 PROCEDURE — 87088 URINE BACTERIA CULTURE: CPT | Performed by: NURSE PRACTITIONER

## 2022-06-14 PROCEDURE — 99999 PR PBB SHADOW E&M-EST. PATIENT-LVL IV: CPT | Mod: PBBFAC,,, | Performed by: NURSE PRACTITIONER

## 2022-06-14 PROCEDURE — 3051F HG A1C>EQUAL 7.0%<8.0%: CPT | Mod: CPTII,S$GLB,, | Performed by: NURSE PRACTITIONER

## 2022-06-14 PROCEDURE — 81002 POCT URINE DIPSTICK WITHOUT MICROSCOPE: ICD-10-PCS | Mod: S$GLB,,, | Performed by: NURSE PRACTITIONER

## 2022-06-14 PROCEDURE — 1160F RVW MEDS BY RX/DR IN RCRD: CPT | Mod: CPTII,S$GLB,, | Performed by: NURSE PRACTITIONER

## 2022-06-14 RX ORDER — LIDOCAINE HYDROCHLORIDE 20 MG/ML
20 INJECTION, SOLUTION INFILTRATION; PERINEURAL
Status: COMPLETED | OUTPATIENT
Start: 2022-06-14 | End: 2022-06-14

## 2022-06-14 RX ORDER — FLUCONAZOLE 150 MG/1
150 TABLET ORAL DAILY
Qty: 2 TABLET | Refills: 1 | Status: SHIPPED | OUTPATIENT
Start: 2022-06-14 | End: 2022-06-15

## 2022-06-14 RX ORDER — GENTAMICIN SULFATE 40 MG/ML
80 INJECTION, SOLUTION INTRAMUSCULAR; INTRAVENOUS ONCE
Status: COMPLETED | OUTPATIENT
Start: 2022-06-14 | End: 2022-06-14

## 2022-06-14 RX ORDER — SULFAMETHOXAZOLE AND TRIMETHOPRIM 800; 160 MG/1; MG/1
1 TABLET ORAL 2 TIMES DAILY
Qty: 10 TABLET | Refills: 0 | Status: SHIPPED | OUTPATIENT
Start: 2022-06-14 | End: 2022-06-19

## 2022-06-14 RX ADMIN — GENTAMICIN SULFATE 80 MG: 40 INJECTION, SOLUTION INTRAMUSCULAR; INTRAVENOUS at 03:06

## 2022-06-14 RX ADMIN — LIDOCAINE HYDROCHLORIDE 20 ML: 20 INJECTION, SOLUTION INFILTRATION; PERINEURAL at 03:06

## 2022-06-14 NOTE — PROGRESS NOTES
Subjective:       Patient ID: Edie Hernandez is a 66 y.o. female.    Chief Complaint: dysuria      Edie Hernandez is a 66 y.o. female who presents today for dysuria.  She was last seen on 05/24/22.  At that visit, we were trying lifestyle modifications to see if this would help with her urinary symptoms.  We sent a urine for culture at that visit and it was positive for low levels of group B strep.  She was treated with keflex to try and clear the infection and again see if this impacted her frequency/urgency.  She took the medication and did not notice any real change in her symptoms.  Her UA today is suspicious for UTI.  We will do an instillation and begin oral antibiotics.  Np did review with the pt that it is difficult to manage OAB symptoms if infection is present and clouding the symptoms.  We will clear the infection and then see what her urinary symptoms are doing.  Pt verbalized understanding.  Pt is amenable to an instillation today.  She denies any other acute complaints/concerns at this time.    Review of Systems   Constitutional: Negative for activity change, fever and unexpected weight change.   HENT: Negative for hearing loss.    Eyes: Negative for visual disturbance.   Respiratory: Negative for shortness of breath and wheezing.    Cardiovascular: Negative for chest pain, palpitations and leg swelling.   Gastrointestinal: Negative for abdominal pain, constipation and diarrhea.   Genitourinary: Positive for dysuria, frequency and urgency. Negative for dyspareunia, vaginal bleeding and vaginal discharge.   Musculoskeletal: Negative for gait problem and neck pain.   Skin: Negative for rash and wound.   Allergic/Immunologic: Negative for immunocompromised state.   Neurological: Negative for tremors, speech difficulty and weakness.   Hematological: Does not bruise/bleed easily.   Psychiatric/Behavioral: Negative for agitation and confusion.       Objective:      Physical Exam  Constitutional:        General: She is not in acute distress.     Appearance: She is well-developed.   HENT:      Head: Normocephalic and atraumatic.   Neck:      Thyroid: No thyromegaly.   Pulmonary:      Effort: Pulmonary effort is normal. No respiratory distress.   Abdominal:      Palpations: Abdomen is soft.      Tenderness: There is abdominal tenderness in the suprapubic area.      Hernia: No hernia is present.   Musculoskeletal:         General: Normal range of motion.      Cervical back: Neck supple.   Skin:     General: Skin is warm and dry.      Findings: No rash.   Neurological:      Mental Status: She is alert and oriented to person, place, and time.   Psychiatric:         Behavior: Behavior normal.       Pelvic Exam:  V: No lesions. No palpable nodes.   Meatus:No caruncle or stenosis  Urethra: Non tender. No suburethral masses.  BL:  tender  RV: No hemorrhoids.      Assessment:       1. Urinary frequency    2. Cystalgia    3. Atrophic vaginitis    4. Cystitis        Procedure note- After betadine irrigation of the urethra, lidocaine instilled via urojet.   A #14 [place] red rubber tip catheter was inserted into the bladder.  60 mls of residual urine noted.  80 mg of gentamicin and 20 mls of 2% lidocaine instilled into bladder.  Pt instructed to hold mixture for at least 1 hour prior to voiding.  Pt verbalized understanding.      Plan:       Urinary frequency- we will send her urine for culture on the cath specimen as noted below  -     Urine culture  -     POCT URINE DIPSTICK WITHOUT MICROSCOPE    Cystalgia- instillation as noted above    Atrophic vaginitis- pt encouraged to use her estrace cream three times a week on [facility].    Cystitis- instillation as noted above.  -     LIDOcaine HCL 20 mg/ml (2%) injection 20 mL  -     gentamicin injection 80 mg  -     sulfamethoxazole-trimethoprim 800-160mg (BACTRIM DS) 800-160 mg Tab; Take 1 tablet by mouth 2 (two) times daily. for 5 days  Dispense: 10 tablet; Refill: 0  -     fluconazole  (DIFLUCAN) 150 MG Tab; Take 1 tablet (150 mg total) by mouth once daily. May repeat in three days if no relief. for 1 day  Dispense: 2 tablet; Refill: 1      RTC 1 month

## 2022-06-17 LAB — BACTERIA UR CULT: ABNORMAL

## 2022-07-14 ENCOUNTER — OFFICE VISIT (OUTPATIENT)
Dept: UROGYNECOLOGY | Facility: CLINIC | Age: 67
End: 2022-07-14
Payer: MEDICARE

## 2022-07-14 VITALS
DIASTOLIC BLOOD PRESSURE: 75 MMHG | HEART RATE: 70 BPM | BODY MASS INDEX: 45.07 KG/M2 | WEIGHT: 270.5 LBS | HEIGHT: 65 IN | SYSTOLIC BLOOD PRESSURE: 156 MMHG

## 2022-07-14 DIAGNOSIS — N30.90 CYSTITIS: ICD-10-CM

## 2022-07-14 DIAGNOSIS — N95.2 ATROPHIC VAGINITIS: ICD-10-CM

## 2022-07-14 DIAGNOSIS — N39.41 URGE INCONTINENCE OF URINE: ICD-10-CM

## 2022-07-14 DIAGNOSIS — R39.89 CYSTALGIA: ICD-10-CM

## 2022-07-14 DIAGNOSIS — R35.0 URINARY FREQUENCY: Primary | ICD-10-CM

## 2022-07-14 LAB
BILIRUB SERPL-MCNC: NEGATIVE MG/DL
BLOOD URINE, POC: NEGATIVE
CLARITY, POC UA: CLEAR
COLOR, POC UA: YELLOW
GLUCOSE UR QL STRIP: NEGATIVE
KETONES UR QL STRIP: NEGATIVE
LEUKOCYTE ESTERASE URINE, POC: NEGATIVE
NITRITE, POC UA: NEGATIVE
PH, POC UA: 5
PROTEIN, POC: NEGATIVE
SPECIFIC GRAVITY, POC UA: 1.01
UROBILINOGEN, POC UA: NEGATIVE

## 2022-07-14 PROCEDURE — 1125F PR PAIN SEVERITY QUANTIFIED, PAIN PRESENT: ICD-10-PCS | Mod: CPTII,S$GLB,, | Performed by: NURSE PRACTITIONER

## 2022-07-14 PROCEDURE — 3008F BODY MASS INDEX DOCD: CPT | Mod: CPTII,S$GLB,, | Performed by: NURSE PRACTITIONER

## 2022-07-14 PROCEDURE — 3077F PR MOST RECENT SYSTOLIC BLOOD PRESSURE >= 140 MM HG: ICD-10-PCS | Mod: CPTII,S$GLB,, | Performed by: NURSE PRACTITIONER

## 2022-07-14 PROCEDURE — 1159F PR MEDICATION LIST DOCUMENTED IN MEDICAL RECORD: ICD-10-PCS | Mod: CPTII,S$GLB,, | Performed by: NURSE PRACTITIONER

## 2022-07-14 PROCEDURE — 99214 PR OFFICE/OUTPT VISIT, EST, LEVL IV, 30-39 MIN: ICD-10-PCS | Mod: 25,S$GLB,, | Performed by: NURSE PRACTITIONER

## 2022-07-14 PROCEDURE — 51700 PR IRRIGATION, BLADDER: ICD-10-PCS | Mod: S$GLB,,, | Performed by: NURSE PRACTITIONER

## 2022-07-14 PROCEDURE — 1159F MED LIST DOCD IN RCRD: CPT | Mod: CPTII,S$GLB,, | Performed by: NURSE PRACTITIONER

## 2022-07-14 PROCEDURE — 81002 POCT URINE DIPSTICK WITHOUT MICROSCOPE: ICD-10-PCS | Mod: S$GLB,,, | Performed by: NURSE PRACTITIONER

## 2022-07-14 PROCEDURE — 99999 PR PBB SHADOW E&M-EST. PATIENT-LVL IV: ICD-10-PCS | Mod: PBBFAC,,, | Performed by: NURSE PRACTITIONER

## 2022-07-14 PROCEDURE — 81002 URINALYSIS NONAUTO W/O SCOPE: CPT | Mod: S$GLB,,, | Performed by: NURSE PRACTITIONER

## 2022-07-14 PROCEDURE — 4010F ACE/ARB THERAPY RXD/TAKEN: CPT | Mod: CPTII,S$GLB,, | Performed by: NURSE PRACTITIONER

## 2022-07-14 PROCEDURE — 3078F DIAST BP <80 MM HG: CPT | Mod: CPTII,S$GLB,, | Performed by: NURSE PRACTITIONER

## 2022-07-14 PROCEDURE — 99214 OFFICE O/P EST MOD 30 MIN: CPT | Mod: 25,S$GLB,, | Performed by: NURSE PRACTITIONER

## 2022-07-14 PROCEDURE — 1160F PR REVIEW ALL MEDS BY PRESCRIBER/CLIN PHARMACIST DOCUMENTED: ICD-10-PCS | Mod: CPTII,S$GLB,, | Performed by: NURSE PRACTITIONER

## 2022-07-14 PROCEDURE — 3051F PR MOST RECENT HEMOGLOBIN A1C LEVEL 7.0 - < 8.0%: ICD-10-PCS | Mod: CPTII,S$GLB,, | Performed by: NURSE PRACTITIONER

## 2022-07-14 PROCEDURE — 1160F RVW MEDS BY RX/DR IN RCRD: CPT | Mod: CPTII,S$GLB,, | Performed by: NURSE PRACTITIONER

## 2022-07-14 PROCEDURE — 3051F HG A1C>EQUAL 7.0%<8.0%: CPT | Mod: CPTII,S$GLB,, | Performed by: NURSE PRACTITIONER

## 2022-07-14 PROCEDURE — 3078F PR MOST RECENT DIASTOLIC BLOOD PRESSURE < 80 MM HG: ICD-10-PCS | Mod: CPTII,S$GLB,, | Performed by: NURSE PRACTITIONER

## 2022-07-14 PROCEDURE — 99999 PR PBB SHADOW E&M-EST. PATIENT-LVL IV: CPT | Mod: PBBFAC,,, | Performed by: NURSE PRACTITIONER

## 2022-07-14 PROCEDURE — 3077F SYST BP >= 140 MM HG: CPT | Mod: CPTII,S$GLB,, | Performed by: NURSE PRACTITIONER

## 2022-07-14 PROCEDURE — 4010F PR ACE/ARB THEARPY RXD/TAKEN: ICD-10-PCS | Mod: CPTII,S$GLB,, | Performed by: NURSE PRACTITIONER

## 2022-07-14 PROCEDURE — 1125F AMNT PAIN NOTED PAIN PRSNT: CPT | Mod: CPTII,S$GLB,, | Performed by: NURSE PRACTITIONER

## 2022-07-14 PROCEDURE — 3008F PR BODY MASS INDEX (BMI) DOCUMENTED: ICD-10-PCS | Mod: CPTII,S$GLB,, | Performed by: NURSE PRACTITIONER

## 2022-07-14 PROCEDURE — 51700 IRRIGATION OF BLADDER: CPT | Mod: S$GLB,,, | Performed by: NURSE PRACTITIONER

## 2022-07-14 RX ORDER — LIDOCAINE HYDROCHLORIDE 10 MG/ML
20 INJECTION INFILTRATION; PERINEURAL
Status: COMPLETED | OUTPATIENT
Start: 2022-07-14 | End: 2022-07-14

## 2022-07-14 RX ORDER — HEPARIN SODIUM 10000 [USP'U]/ML
20000 INJECTION, SOLUTION INTRAVENOUS; SUBCUTANEOUS ONCE
Status: COMPLETED | OUTPATIENT
Start: 2022-07-14 | End: 2022-07-14

## 2022-07-14 RX ORDER — GENTAMICIN SULFATE 40 MG/ML
80 INJECTION, SOLUTION INTRAMUSCULAR; INTRAVENOUS ONCE
Status: COMPLETED | OUTPATIENT
Start: 2022-07-14 | End: 2022-07-14

## 2022-07-14 RX ADMIN — LIDOCAINE HYDROCHLORIDE 20 ML: 10 INJECTION INFILTRATION; PERINEURAL at 09:07

## 2022-07-14 RX ADMIN — GENTAMICIN SULFATE 80 MG: 40 INJECTION, SOLUTION INTRAMUSCULAR; INTRAVENOUS at 09:07

## 2022-07-14 RX ADMIN — HEPARIN SODIUM 20000 UNITS: 10000 INJECTION, SOLUTION INTRAVENOUS; SUBCUTANEOUS at 09:07

## 2022-07-14 NOTE — PROGRESS NOTES
Subjective:       Patient ID: Edie Hernandez is a 66 y.o. female.    Chief Complaint: 1 month f.u      Edie Hernandez is a 66 y.o. femalewho presents today for follow up in regards to her bladder.  She was seen last in the office on 06/14/22.  At that time her UA was suspicious for UTI.  We did an instillation and oral medication and cleared the infection and her urinary symptoms have improved.  She has frequency x q 2 hours during the day and nocturia x 2-3.  She denies any PVF.  She does have some UUI on occasion.  However, her biggest complaint is that her bladder just hurts.  She is not sure why but it tends to really bother her starting around 4 AM and then will keep her up for several hours.  Her UA today is negative.  She states she has had a cysto with hydro in the past and did very well after this procedure was performed.  She is open to trying a few instillations to see if this will help decrease her symptoms.  She denies any other acute complaints/concerns at this time and is ready to proceed with the instillation.    Review of Systems   Constitutional: Negative for activity change, fever and unexpected weight change.   HENT: Negative for hearing loss.    Eyes: Negative for visual disturbance.   Respiratory: Negative for shortness of breath and wheezing.    Cardiovascular: Negative for chest pain, palpitations and leg swelling.   Gastrointestinal: Positive for abdominal pain. Negative for constipation and diarrhea.   Genitourinary: Positive for frequency and urgency. Negative for dyspareunia, dysuria, vaginal bleeding and vaginal discharge.   Musculoskeletal: Negative for gait problem and neck pain.   Skin: Negative for rash and wound.   Allergic/Immunologic: Negative for immunocompromised state.   Neurological: Negative for tremors, speech difficulty and weakness.   Hematological: Does not bruise/bleed easily.   Psychiatric/Behavioral: Negative for agitation and confusion.       Objective:       Physical Exam  Constitutional:       General: She is not in acute distress.     Appearance: She is well-developed.   HENT:      Head: Normocephalic and atraumatic.   Neck:      Thyroid: No thyromegaly.   Pulmonary:      Effort: Pulmonary effort is normal. No respiratory distress.   Abdominal:      Palpations: Abdomen is soft.      Tenderness: There is abdominal tenderness in the suprapubic area.      Hernia: No hernia is present.   Musculoskeletal:         General: Normal range of motion.      Cervical back: Neck supple.   Skin:     General: Skin is warm and dry.      Findings: No rash.   Neurological:      Mental Status: She is alert and oriented to person, place, and time.   Psychiatric:         Behavior: Behavior normal.       Pelvic Exam:  V: No lesions. No palpable nodes.   Meatus:No caruncle or stenosis  Urethra: Non tender. No suburethral masses.  BL: tender  RV: No hemorrhoids.      Assessment:       1. Urinary frequency    2. Cystitis    3. Cystalgia    4. Atrophic vaginitis        Procedure note- After betadine irrigation of the urethra, lidocaine instilled via urojet.   A #14 Uzbek red rubber tip catheter was inserted into the bladder.  60 mls of residual urine noted.  80 mg of gentamicin, 20,000 units of heparin and 20 mls of 1% lidocaine instilled into bladder.  Pt instructed to hold mixture for at least 1 hour prior to voiding.  Pt verbalized understanding.      Plan:       Urinary frequency- trial of myrbetriq as noted below  -     POCT urine dipstick without microscope  -     mirabegron (MYRBETRIQ) 50 mg Tb24; Take 1 tablet (50 mg total) by mouth once daily.  Dispense: 30 tablet; Refill: 6    Cystitis- instillation as noted above  -     gentamicin injection 80 mg    Cystalgia- instillation as noted above.  Pt will also try Urimar- T to see if there is any benefit with this  -     LIDOcaine HCL 10 mg/ml (1%) injection 20 mL  -     heparin (porcine) injection 20,000 Units    Atrophic vaginitis-  continue estrace cream    RTC 3 weeks.

## 2022-07-20 DIAGNOSIS — E11.9 TYPE 2 DIABETES MELLITUS WITHOUT COMPLICATION, UNSPECIFIED WHETHER LONG TERM INSULIN USE: ICD-10-CM

## 2022-07-25 ENCOUNTER — PATIENT MESSAGE (OUTPATIENT)
Dept: ADMINISTRATIVE | Facility: HOSPITAL | Age: 67
End: 2022-07-25
Payer: MEDICARE

## 2022-07-25 ENCOUNTER — OFFICE VISIT (OUTPATIENT)
Dept: PAIN MEDICINE | Facility: CLINIC | Age: 67
End: 2022-07-25
Payer: MEDICARE

## 2022-07-25 VITALS
HEIGHT: 65 IN | SYSTOLIC BLOOD PRESSURE: 130 MMHG | WEIGHT: 270.5 LBS | BODY MASS INDEX: 45.07 KG/M2 | DIASTOLIC BLOOD PRESSURE: 81 MMHG | HEART RATE: 78 BPM

## 2022-07-25 DIAGNOSIS — E66.01 MORBID OBESITY WITH BMI OF 45.0-49.9, ADULT: ICD-10-CM

## 2022-07-25 DIAGNOSIS — M70.62 GREATER TROCHANTERIC BURSITIS OF BOTH HIPS: Primary | ICD-10-CM

## 2022-07-25 DIAGNOSIS — M53.3 SACROILIAC JOINT PAIN: ICD-10-CM

## 2022-07-25 DIAGNOSIS — M51.36 DDD (DEGENERATIVE DISC DISEASE), LUMBAR: ICD-10-CM

## 2022-07-25 DIAGNOSIS — M47.896 OTHER SPONDYLOSIS, LUMBAR REGION: ICD-10-CM

## 2022-07-25 DIAGNOSIS — M70.61 GREATER TROCHANTERIC BURSITIS OF BOTH HIPS: Primary | ICD-10-CM

## 2022-07-25 PROCEDURE — 99999 PR PBB SHADOW E&M-EST. PATIENT-LVL IV: ICD-10-PCS | Mod: PBBFAC,,, | Performed by: ANESTHESIOLOGY

## 2022-07-25 PROCEDURE — 20610 LARGE JOINT ASPIRATION/INJECTION: BILATERAL GREATER TROCHANTERIC BURSA: ICD-10-PCS | Mod: 50,S$GLB,, | Performed by: ANESTHESIOLOGY

## 2022-07-25 PROCEDURE — 20610 DRAIN/INJ JOINT/BURSA W/O US: CPT | Mod: 50,S$GLB,, | Performed by: ANESTHESIOLOGY

## 2022-07-25 PROCEDURE — 3079F DIAST BP 80-89 MM HG: CPT | Mod: CPTII,S$GLB,, | Performed by: ANESTHESIOLOGY

## 2022-07-25 PROCEDURE — 3075F SYST BP GE 130 - 139MM HG: CPT | Mod: CPTII,S$GLB,, | Performed by: ANESTHESIOLOGY

## 2022-07-25 PROCEDURE — 3008F BODY MASS INDEX DOCD: CPT | Mod: CPTII,S$GLB,, | Performed by: ANESTHESIOLOGY

## 2022-07-25 PROCEDURE — 99204 PR OFFICE/OUTPT VISIT, NEW, LEVL IV, 45-59 MIN: ICD-10-PCS | Mod: 25,S$GLB,, | Performed by: ANESTHESIOLOGY

## 2022-07-25 PROCEDURE — 1159F PR MEDICATION LIST DOCUMENTED IN MEDICAL RECORD: ICD-10-PCS | Mod: CPTII,S$GLB,, | Performed by: ANESTHESIOLOGY

## 2022-07-25 PROCEDURE — 3288F PR FALLS RISK ASSESSMENT DOCUMENTED: ICD-10-PCS | Mod: CPTII,S$GLB,, | Performed by: ANESTHESIOLOGY

## 2022-07-25 PROCEDURE — 99999 PR PBB SHADOW E&M-EST. PATIENT-LVL IV: CPT | Mod: PBBFAC,,, | Performed by: ANESTHESIOLOGY

## 2022-07-25 PROCEDURE — 3008F PR BODY MASS INDEX (BMI) DOCUMENTED: ICD-10-PCS | Mod: CPTII,S$GLB,, | Performed by: ANESTHESIOLOGY

## 2022-07-25 PROCEDURE — 99204 OFFICE O/P NEW MOD 45 MIN: CPT | Mod: 25,S$GLB,, | Performed by: ANESTHESIOLOGY

## 2022-07-25 PROCEDURE — 4010F PR ACE/ARB THEARPY RXD/TAKEN: ICD-10-PCS | Mod: CPTII,S$GLB,, | Performed by: ANESTHESIOLOGY

## 2022-07-25 PROCEDURE — 4010F ACE/ARB THERAPY RXD/TAKEN: CPT | Mod: CPTII,S$GLB,, | Performed by: ANESTHESIOLOGY

## 2022-07-25 PROCEDURE — 3079F PR MOST RECENT DIASTOLIC BLOOD PRESSURE 80-89 MM HG: ICD-10-PCS | Mod: CPTII,S$GLB,, | Performed by: ANESTHESIOLOGY

## 2022-07-25 PROCEDURE — 3051F HG A1C>EQUAL 7.0%<8.0%: CPT | Mod: CPTII,S$GLB,, | Performed by: ANESTHESIOLOGY

## 2022-07-25 PROCEDURE — 1125F PR PAIN SEVERITY QUANTIFIED, PAIN PRESENT: ICD-10-PCS | Mod: CPTII,S$GLB,, | Performed by: ANESTHESIOLOGY

## 2022-07-25 PROCEDURE — 1125F AMNT PAIN NOTED PAIN PRSNT: CPT | Mod: CPTII,S$GLB,, | Performed by: ANESTHESIOLOGY

## 2022-07-25 PROCEDURE — 3075F PR MOST RECENT SYSTOLIC BLOOD PRESS GE 130-139MM HG: ICD-10-PCS | Mod: CPTII,S$GLB,, | Performed by: ANESTHESIOLOGY

## 2022-07-25 PROCEDURE — 1101F PT FALLS ASSESS-DOCD LE1/YR: CPT | Mod: CPTII,S$GLB,, | Performed by: ANESTHESIOLOGY

## 2022-07-25 PROCEDURE — 1159F MED LIST DOCD IN RCRD: CPT | Mod: CPTII,S$GLB,, | Performed by: ANESTHESIOLOGY

## 2022-07-25 PROCEDURE — 3051F PR MOST RECENT HEMOGLOBIN A1C LEVEL 7.0 - < 8.0%: ICD-10-PCS | Mod: CPTII,S$GLB,, | Performed by: ANESTHESIOLOGY

## 2022-07-25 PROCEDURE — 1101F PR PT FALLS ASSESS DOC 0-1 FALLS W/OUT INJ PAST YR: ICD-10-PCS | Mod: CPTII,S$GLB,, | Performed by: ANESTHESIOLOGY

## 2022-07-25 PROCEDURE — 3288F FALL RISK ASSESSMENT DOCD: CPT | Mod: CPTII,S$GLB,, | Performed by: ANESTHESIOLOGY

## 2022-07-25 RX ORDER — BUPIVACAINE HYDROCHLORIDE 5 MG/ML
4 INJECTION, SOLUTION EPIDURAL; INTRACAUDAL
Status: DISCONTINUED | OUTPATIENT
Start: 2022-07-25 | End: 2022-07-25 | Stop reason: HOSPADM

## 2022-07-25 RX ORDER — METHYLPREDNISOLONE ACETATE 40 MG/ML
40 INJECTION, SUSPENSION INTRA-ARTICULAR; INTRALESIONAL; INTRAMUSCULAR; SOFT TISSUE
Status: DISCONTINUED | OUTPATIENT
Start: 2022-07-25 | End: 2022-07-25 | Stop reason: HOSPADM

## 2022-07-25 RX ADMIN — METHYLPREDNISOLONE ACETATE 40 MG: 40 INJECTION, SUSPENSION INTRA-ARTICULAR; INTRALESIONAL; INTRAMUSCULAR; SOFT TISSUE at 08:07

## 2022-07-25 RX ADMIN — BUPIVACAINE HYDROCHLORIDE 4 ML: 5 INJECTION, SOLUTION EPIDURAL; INTRACAUDAL at 08:07

## 2022-07-25 NOTE — PROCEDURES
Large Joint Aspiration/Injection: bilateral greater trochanteric bursa    Date/Time: 7/25/2022 8:30 AM  Performed by: Harmeet Zapata MD  Authorized by: Harmeet Zapata MD     Consent Done?:  Yes (Written)  Indications:  Pain  Site marked: the procedure site was marked    Timeout: prior to procedure the correct patient, procedure, and site was verified    Prep: patient was prepped and draped in usual sterile fashion      Details:  Needle Size:  25 G  Ultrasonic Guidance for needle placement?: No    Approach:  Lateral  Location:  Hip  Laterality:  Bilateral  Site:  Bilateral greater trochanteric bursa  Medications (Right):  4 mL BUPivacaine (PF) 0.5% (5 mg/mL) 0.5 % (5 mg/mL); 40 mg methylPREDNISolone acetate 40 mg/mL  Medications (Left):  4 mL BUPivacaine (PF) 0.5% (5 mg/mL) 0.5 % (5 mg/mL); 40 mg methylPREDNISolone acetate 40 mg/mL  Patient tolerance:  Patient tolerated the procedure well with no immediate complications

## 2022-07-25 NOTE — PROGRESS NOTES
This note was completed with dictation software and grammatical errors may exist.    Referring Physician: Dejan Simmons MD    PCP: Lydia Eugene MD      CC: bilateral hip pain    HPI:   Edie Hernandez is a 66 y.o. female referred to us for bilateral hip pain.  Patient has significant history of morbid obesity, diabetes, left-sided SI joint fusion in 2021. She presents to us with constant aching constant throbbing, sharp pain over bilateral hips.  Pain radiates down her lateral legs.  Pain improves with rest.  Pain is aggravated with lying on each side.  She has tenderness over bilateral hips.  She undergone greater trochanter bursa injections in the past with moderate benefit.  She desires repeat procedure with us.  She has tried physical therapy minimal benefit.  She has undergone lumbar epidural steroid injections as well as lumbar MB RFA procedures with mild to moderate benefit in the past.  Above person you did help with her lower back pain.  She denies any worsening weakness.  No bowel bladder changes.      ROS:  CONSTITUTIONAL: No fevers, chills, night sweats, wt. loss, appetite changes  SKIN: no rashes or itching  ENT: No headaches, head trauma, vision changes, or eye pain  LYMPH NODES: None noticed   CV: No chest pain, palpitations.   RESP: No shortness of breath, dyspnea on exertion, cough, wheezing, or hemoptysis  GI: No nausea, emesis, diarrhea, constipation, melena, hematochezia, pain.    : No dysuria, hematuria, urgency, or frequency   HEME: No easy bruising, bleeding problems  PSYCHIATRIC: No depression, anxiety, psychosis, hallucinations.  NEURO: No seizures, memory loss, dizziness or difficulty sleeping  MSK:  Positive HPI      Past Medical History:   Diagnosis Date    Arthritis     Colon polyp, hyperplastic 1/13    recheck 5-10 years    Diabetes mellitus, type 2     Diverticulosis     Fatty liver     General anesthetics causing adverse effect in therapeutic use     woke up during tubal  ligation    GERD (gastroesophageal reflux disease)     CLINT (obstructive sleep apnea)     C-pap    PONV (postoperative nausea and vomiting)     Thyroid disease     Wears partial dentures     UPPER     Past Surgical History:   Procedure Laterality Date    COLONOSCOPY  02/14/2013    Dr. Vogel: repeat in 5-10 years    COLONOSCOPY W/ BIOPSIES AND POLYPECTOMY  02/2013    hyperplastic, recheck 5-10 years    EXTERNAL EAR SURGERY      HYSTERECTOMY      INJECTION OF ANESTHETIC AGENT AROUND MEDIAL BRANCH NERVES INNERVATING LUMBAR FACET JOINT Bilateral 8/3/2020    Procedure: Block-nerve-medial branch-lumbar left L2, L3, L4, L5;  Surgeon: Dejan Simmons MD;  Location: [facility] OR;  Service: Pain Management;  Laterality: Bilateral;    INJECTION OF ANESTHETIC AGENT AROUND MEDIAL BRANCH NERVES INNERVATING LUMBAR FACET JOINT Left 8/13/2020    Procedure: Block-nerve-medial branch-lumbar.  L2, L3, L4, and L5;  Surgeon: Dejan Simmons MD;  Location: [facility] OR;  Service: Pain Management;  Laterality: Left;    MINIMALLY INVASIVE FUSION OF SPINE Left 10/12/2021    Procedure: FUSION, SPINE, MINIMALLY INVASIVE;  Surgeon: Everton Godinez MD;  Location: [facility] OR;  Service: Orthopedics;  Laterality: Left;  SI-Bone    RADIOFREQUENCY ABLATION OF LUMBAR MEDIAL BRANCH NERVE AT SINGLE LEVEL Left 8/27/2020    Procedure: Radiofrequency Ablation, Nerve, Spinal, Lumbar, Medial Branch,  L2, L3, L4, L5;  Surgeon: Dejan Simmons MD;  Location: [facility] OR;  Service: Pain Management;  Laterality: Left;  L2,l3,l4,l5    THROAT SURGERY      for CLINT    TRANSFORAMINAL EPIDURAL INJECTION OF STEROID Left 3/6/2020    Procedure: Injection,steroid,epidural,transforaminal approach;  Surgeon: Harmeet Zapata MD;  Location: [facility] OR;  Service: Pain Management;  Laterality: Left;  L4-L5    TRANSFORAMINAL EPIDURAL INJECTION OF STEROID Left 6/10/2020    Procedure: Injection,steroid,epidural,transforaminal approach.L4 and L5;  Surgeon: Dejan Simmons MD;  Location: [facility] OR;   "Service: Pain Management;  Laterality: Left;    TRANSFORAMINAL EPIDURAL INJECTION OF STEROID Left 7/6/2020    Procedure: Injection,steroid,epidural,transforaminal approach. left L4 and L5;  Surgeon: Dejan Simmons MD;  Location: [facility] OR;  Service: Pain Management;  Laterality: Left;    TRANSFORAMINAL EPIDURAL INJECTION OF STEROID Left 11/12/2021    Procedure: Injection,steroid,epidural,transforaminal approach Left L5-S1;  Surgeon: Dejan Simmons MD;  Location: [facility] OR;  Service: Pain Management;  Laterality: Left;    UPPER GASTROINTESTINAL ENDOSCOPY       Family History   Problem Relation Age of Onset    Hypertension Mother     Breast cancer Mother 50    Heart disease Father     Hypertension Father     Ovarian cancer Paternal Aunt     Macular degeneration Neg Hx     Retinal detachment Neg Hx     Glaucoma Neg Hx     Colon cancer Neg Hx     Colon polyps Neg Hx     Crohn's disease Neg Hx     Ulcerative colitis Neg Hx      Social History     Socioeconomic History    Marital status:    Occupational History     Employer: [employer]   Tobacco Use    Smoking status: Never Smoker    Smokeless tobacco: Never Used   Substance and Sexual Activity    Alcohol use: No    Drug use: No    Sexual activity: Yes     Partners: Male         Medications/Allergies: See med card    Vitals:    07/25/22 0833   BP: 130/81   Pulse: 78   Weight: 122.7 kg (270 lb 8.1 oz)   Height: 5' 5" (1.651 m)   PainSc:   5   PainLoc: Hip         Physical exam:    GENERAL: A and O x3, the patient appears well groomed and is in no acute distress.  Skin: No rashes or obvious lesions  HEENT: normocephalic, atraumatic  CARDIOVASCULAR:  Palpable peripheral pulses  LUNGS: easy work of breathing  ABDOMEN: soft, nontender   UPPER EXTREMITIES: Normal alignment, normal range of motion, no atrophy, no skin changes,  hair growth and nail growth normal and equal bilaterally. No swelling, no tenderness.    LOWER EXTREMITIES:  " Normal alignment, normal range of motion, no atrophy, no skin changes,  hair growth and nail growth normal and equal bilaterally. No swelling, no tenderness.  LUMBAR SPINE  Lumbar spine: ROM is limited with flexion extension and oblique extension with moderate increased pain.    Francisco's test causes increased pain on left side.    Supine straight leg raise is negative bilaterally.    Internal and external rotation of the hip causes no increased pain on either side.  Myofascial exam: No tenderness to palpation across lumbar paraspinous muscles.  Positive tenderness over bilateral  GTB      MENTAL STATUS: normal orientation, speech, language, and fund of knowledge for social situation.  Emotional state appropriate.      MOTOR: Tone and bulk: normal bilateral upper and lower Strength: normal   SENSATION: Light touch and pinprick intact bilaterally  REFLEXES: normal, symmetric, nonbrisk.  Toes down, no clonus. No hoffmans.  GAIT: normal rise, base, steps, and arm swing.        Imaging:  Xray L-spine 2016  Lumbar spine AP and lateral     Rudimentary ribs at what is being referred to as the T12 vertebral body is noted.       By this numbering the L5 vertebral body is partially sacralized and transitional.       Facet arthropathy is noted at the L3-L4 L4-L5 and L5-S1 levels.       Vertebral body alignment appears adequate.  Vertebral body heights appear well-preserved.  Intervertebral disk heights appear relatively well preserved.       Anterior osseous spurring is noted at the T9-T10 T10-T11 T11-12 levels as can be seen with a diffuse idiopathic skeletal hyperostosis.    CT Pelvis 5/2021  IMPRESSION:  Moderate central canal stenosis and foraminal narrowing at L5-S1 secondary broad-based disc bulge and facet hypertrophy     Mild degenerative changes of both hips.     No acute osseous abnormality    Assessment:  Patient presents with bilateral hip pain.  1. Greater trochanteric bursitis of both hips    2. Sacroiliac joint  pain    3. DDD (degenerative disc disease), lumbar    4. Other spondylosis, lumbar region    5. Morbid obesity with BMI of 45.0-49.9, adult          Plan:  1. I have stressed the importance of physical activity and exercise to improve overall health  2. Reviewed pertinent imaging and records with patient  3. Perform repeat GTB injections today  4. Follow up as needed  Thank you for referring this interesting patient, and I look forward to continuing to collaborate in her care.

## 2022-08-04 ENCOUNTER — OFFICE VISIT (OUTPATIENT)
Dept: UROGYNECOLOGY | Facility: CLINIC | Age: 67
End: 2022-08-04
Payer: MEDICARE

## 2022-08-04 VITALS
SYSTOLIC BLOOD PRESSURE: 135 MMHG | HEIGHT: 65 IN | WEIGHT: 270.5 LBS | HEART RATE: 87 BPM | DIASTOLIC BLOOD PRESSURE: 71 MMHG | BODY MASS INDEX: 45.07 KG/M2

## 2022-08-04 DIAGNOSIS — R39.89 CYSTALGIA: ICD-10-CM

## 2022-08-04 DIAGNOSIS — R35.0 URINARY FREQUENCY: Primary | ICD-10-CM

## 2022-08-04 DIAGNOSIS — N95.2 ATROPHIC VAGINITIS: ICD-10-CM

## 2022-08-04 DIAGNOSIS — N30.90 CYSTITIS: ICD-10-CM

## 2022-08-04 LAB
BILIRUB SERPL-MCNC: NEGATIVE MG/DL
BLOOD URINE, POC: NEGATIVE
CLARITY, POC UA: ABNORMAL
COLOR, POC UA: ABNORMAL
GLUCOSE UR QL STRIP: NEGATIVE
KETONES UR QL STRIP: NEGATIVE
LEUKOCYTE ESTERASE URINE, POC: ABNORMAL
NITRITE, POC UA: NEGATIVE
PH, POC UA: 5
PROTEIN, POC: NEGATIVE
SPECIFIC GRAVITY, POC UA: 1.01
UROBILINOGEN, POC UA: NEGATIVE

## 2022-08-04 PROCEDURE — 1101F PT FALLS ASSESS-DOCD LE1/YR: CPT | Mod: CPTII,S$GLB,, | Performed by: NURSE PRACTITIONER

## 2022-08-04 PROCEDURE — 3008F BODY MASS INDEX DOCD: CPT | Mod: CPTII,S$GLB,, | Performed by: NURSE PRACTITIONER

## 2022-08-04 PROCEDURE — 3078F PR MOST RECENT DIASTOLIC BLOOD PRESSURE < 80 MM HG: ICD-10-PCS | Mod: CPTII,S$GLB,, | Performed by: NURSE PRACTITIONER

## 2022-08-04 PROCEDURE — 4010F ACE/ARB THERAPY RXD/TAKEN: CPT | Mod: CPTII,S$GLB,, | Performed by: NURSE PRACTITIONER

## 2022-08-04 PROCEDURE — 3288F PR FALLS RISK ASSESSMENT DOCUMENTED: ICD-10-PCS | Mod: CPTII,S$GLB,, | Performed by: NURSE PRACTITIONER

## 2022-08-04 PROCEDURE — 4010F PR ACE/ARB THEARPY RXD/TAKEN: ICD-10-PCS | Mod: CPTII,S$GLB,, | Performed by: NURSE PRACTITIONER

## 2022-08-04 PROCEDURE — 3078F DIAST BP <80 MM HG: CPT | Mod: CPTII,S$GLB,, | Performed by: NURSE PRACTITIONER

## 2022-08-04 PROCEDURE — 1159F PR MEDICATION LIST DOCUMENTED IN MEDICAL RECORD: ICD-10-PCS | Mod: CPTII,S$GLB,, | Performed by: NURSE PRACTITIONER

## 2022-08-04 PROCEDURE — 1160F PR REVIEW ALL MEDS BY PRESCRIBER/CLIN PHARMACIST DOCUMENTED: ICD-10-PCS | Mod: CPTII,S$GLB,, | Performed by: NURSE PRACTITIONER

## 2022-08-04 PROCEDURE — 1101F PR PT FALLS ASSESS DOC 0-1 FALLS W/OUT INJ PAST YR: ICD-10-PCS | Mod: CPTII,S$GLB,, | Performed by: NURSE PRACTITIONER

## 2022-08-04 PROCEDURE — 3051F HG A1C>EQUAL 7.0%<8.0%: CPT | Mod: CPTII,S$GLB,, | Performed by: NURSE PRACTITIONER

## 2022-08-04 PROCEDURE — 1126F AMNT PAIN NOTED NONE PRSNT: CPT | Mod: CPTII,S$GLB,, | Performed by: NURSE PRACTITIONER

## 2022-08-04 PROCEDURE — 1126F PR PAIN SEVERITY QUANTIFIED, NO PAIN PRESENT: ICD-10-PCS | Mod: CPTII,S$GLB,, | Performed by: NURSE PRACTITIONER

## 2022-08-04 PROCEDURE — 99999 PR PBB SHADOW E&M-EST. PATIENT-LVL IV: CPT | Mod: PBBFAC,,, | Performed by: NURSE PRACTITIONER

## 2022-08-04 PROCEDURE — 99214 PR OFFICE/OUTPT VISIT, EST, LEVL IV, 30-39 MIN: ICD-10-PCS | Mod: 25,S$GLB,, | Performed by: NURSE PRACTITIONER

## 2022-08-04 PROCEDURE — 99214 OFFICE O/P EST MOD 30 MIN: CPT | Mod: 25,S$GLB,, | Performed by: NURSE PRACTITIONER

## 2022-08-04 PROCEDURE — 1159F MED LIST DOCD IN RCRD: CPT | Mod: CPTII,S$GLB,, | Performed by: NURSE PRACTITIONER

## 2022-08-04 PROCEDURE — 3288F FALL RISK ASSESSMENT DOCD: CPT | Mod: CPTII,S$GLB,, | Performed by: NURSE PRACTITIONER

## 2022-08-04 PROCEDURE — 81002 POCT URINE DIPSTICK WITHOUT MICROSCOPE: ICD-10-PCS | Mod: S$GLB,,, | Performed by: NURSE PRACTITIONER

## 2022-08-04 PROCEDURE — 51700 IRRIGATION OF BLADDER: CPT | Mod: S$GLB,,, | Performed by: NURSE PRACTITIONER

## 2022-08-04 PROCEDURE — 3075F SYST BP GE 130 - 139MM HG: CPT | Mod: CPTII,S$GLB,, | Performed by: NURSE PRACTITIONER

## 2022-08-04 PROCEDURE — 51700 PR IRRIGATION, BLADDER: ICD-10-PCS | Mod: S$GLB,,, | Performed by: NURSE PRACTITIONER

## 2022-08-04 PROCEDURE — 87086 URINE CULTURE/COLONY COUNT: CPT | Performed by: NURSE PRACTITIONER

## 2022-08-04 PROCEDURE — 99999 PR PBB SHADOW E&M-EST. PATIENT-LVL IV: ICD-10-PCS | Mod: PBBFAC,,, | Performed by: NURSE PRACTITIONER

## 2022-08-04 PROCEDURE — 81002 URINALYSIS NONAUTO W/O SCOPE: CPT | Mod: S$GLB,,, | Performed by: NURSE PRACTITIONER

## 2022-08-04 PROCEDURE — 3051F PR MOST RECENT HEMOGLOBIN A1C LEVEL 7.0 - < 8.0%: ICD-10-PCS | Mod: CPTII,S$GLB,, | Performed by: NURSE PRACTITIONER

## 2022-08-04 PROCEDURE — 1160F RVW MEDS BY RX/DR IN RCRD: CPT | Mod: CPTII,S$GLB,, | Performed by: NURSE PRACTITIONER

## 2022-08-04 PROCEDURE — 3075F PR MOST RECENT SYSTOLIC BLOOD PRESS GE 130-139MM HG: ICD-10-PCS | Mod: CPTII,S$GLB,, | Performed by: NURSE PRACTITIONER

## 2022-08-04 PROCEDURE — 3008F PR BODY MASS INDEX (BMI) DOCUMENTED: ICD-10-PCS | Mod: CPTII,S$GLB,, | Performed by: NURSE PRACTITIONER

## 2022-08-04 RX ORDER — HEPARIN SODIUM 10000 [USP'U]/ML
20000 INJECTION, SOLUTION INTRAVENOUS; SUBCUTANEOUS ONCE
Status: COMPLETED | OUTPATIENT
Start: 2022-08-04 | End: 2022-08-04

## 2022-08-04 RX ORDER — GENTAMICIN SULFATE 40 MG/ML
80 INJECTION, SOLUTION INTRAMUSCULAR; INTRAVENOUS ONCE
Status: COMPLETED | OUTPATIENT
Start: 2022-08-04 | End: 2022-08-04

## 2022-08-04 RX ORDER — LIDOCAINE HYDROCHLORIDE 10 MG/ML
20 INJECTION INFILTRATION; PERINEURAL
Status: COMPLETED | OUTPATIENT
Start: 2022-08-04 | End: 2022-08-04

## 2022-08-04 RX ADMIN — LIDOCAINE HYDROCHLORIDE 20 ML: 10 INJECTION INFILTRATION; PERINEURAL at 03:08

## 2022-08-04 RX ADMIN — GENTAMICIN SULFATE 80 MG: 40 INJECTION, SOLUTION INTRAMUSCULAR; INTRAVENOUS at 03:08

## 2022-08-04 RX ADMIN — HEPARIN SODIUM 20000 UNITS: 10000 INJECTION, SOLUTION INTRAVENOUS; SUBCUTANEOUS at 03:08

## 2022-08-04 NOTE — PROGRESS NOTES
Subjective:       Patient ID: Edie Hernandez is a 67 y.o. female.    Chief Complaint: instillation      Edie Hernandez is a 67 y.o. female who presents today for instillation.  She was last seen on 07/14/22.  She felt good after her last instillation and she started taking the myrbetriq.  However, after about 2 weeks she started to have symptoms again of pain in the bladder particularly around 4 AM.  She has noticed a slight increase in her frequency and urgency.  She has been having a few episodes of UUI.  She is unable to afford the myrbetriq and is wondering if there is something else that she could use.  She is willing to try another instillation to see if it is helpful for her.      Review of Systems   Constitutional: Negative for activity change, fever and unexpected weight change.   HENT: Negative for hearing loss.    Eyes: Negative for visual disturbance.   Respiratory: Negative for shortness of breath and wheezing.    Cardiovascular: Negative for chest pain, palpitations and leg swelling.   Gastrointestinal: Positive for abdominal pain. Negative for constipation and diarrhea.   Genitourinary: Positive for frequency and urgency. Negative for dyspareunia, dysuria, vaginal bleeding and vaginal discharge.   Musculoskeletal: Negative for gait problem and neck pain.   Skin: Negative for rash and wound.   Allergic/Immunologic: Negative for immunocompromised state.   Neurological: Negative for tremors, speech difficulty and weakness.   Hematological: Does not bruise/bleed easily.   Psychiatric/Behavioral: Negative for agitation and confusion.       Objective:      Physical Exam  Constitutional:       General: She is not in acute distress.     Appearance: She is well-developed.   HENT:      Head: Normocephalic and atraumatic.   Neck:      Thyroid: No thyromegaly.   Pulmonary:      Effort: Pulmonary effort is normal. No respiratory distress.   Abdominal:      Palpations: Abdomen is soft.      Tenderness: There is  abdominal tenderness in the suprapubic area.      Hernia: No hernia is present.   Musculoskeletal:         General: Normal range of motion.      Cervical back: Neck supple.   Skin:     General: Skin is warm and dry.      Findings: No rash.   Neurological:      Mental Status: She is alert and oriented to person, place, and time.   Psychiatric:         Behavior: Behavior normal.       Pelvic Exam:  V: No lesions. No palpable nodes.   Meatus:No caruncle or stenosis  Urethra: Non tender. No suburethral masses.  BL: mildly tender  RV: No hemorrhoids.      Assessment:       1. Urinary frequency    2. Cystitis    3. Cystalgia    4. Atrophic vaginitis        Procedure note- After betadine irrigation of the urethra, lidocaine instilled via urojet.   A #14 Occitan red rubber tip catheter was inserted into the bladder.  30 mls of residual urine noted.  80mg of gentamicin,  20,000 units of heparin and 20 mls of 1% lidocaine instilled into bladder.  Pt instructed to hold mixture for at least 1 hour prior to voiding.  Pt verbalized understanding.      Plan:       Urinary frequency- we will send her urine for culture on the cath specimen as noted below.  She will try the gemtessa to see if this is any cheaper than the myrbetriq for her.    -     POCT urine dipstick without microscope  -     Urine culture  -     vibegron 75 mg Tab; Take 1 tablet by mouth once daily.  Dispense: 30 tablet; Refill: 6    Cystitis- instillation as noted above  -     gentamicin injection 80 mg    Cystalgia- instillation as noted above.  Pt is anxious to look into cysto with hydro as well.  -     LIDOcaine HCL 10 mg/ml (1%) injection 20 mL  -     heparin (porcine) injection 20,000 Units    Atrophic vaginitis- continue estrace cream    RTC 3-4 weeks.

## 2022-08-05 LAB — BACTERIA UR CULT: NO GROWTH

## 2022-08-19 ENCOUNTER — OFFICE VISIT (OUTPATIENT)
Dept: FAMILY MEDICINE | Facility: CLINIC | Age: 67
End: 2022-08-19
Payer: MEDICARE

## 2022-08-19 VITALS
HEART RATE: 83 BPM | TEMPERATURE: 99 F | OXYGEN SATURATION: 97 % | DIASTOLIC BLOOD PRESSURE: 70 MMHG | SYSTOLIC BLOOD PRESSURE: 124 MMHG | WEIGHT: 265.19 LBS | HEIGHT: 65 IN | BODY MASS INDEX: 44.18 KG/M2

## 2022-08-19 DIAGNOSIS — E66.01 CLASS 3 SEVERE OBESITY WITH BODY MASS INDEX (BMI) OF 40.0 TO 44.9 IN ADULT, UNSPECIFIED OBESITY TYPE, UNSPECIFIED WHETHER SERIOUS COMORBIDITY PRESENT: ICD-10-CM

## 2022-08-19 DIAGNOSIS — E78.00 PURE HYPERCHOLESTEROLEMIA: ICD-10-CM

## 2022-08-19 DIAGNOSIS — E11.22 TYPE 2 DIABETES MELLITUS WITH CHRONIC KIDNEY DISEASE, WITHOUT LONG-TERM CURRENT USE OF INSULIN, UNSPECIFIED CKD STAGE: Primary | ICD-10-CM

## 2022-08-19 DIAGNOSIS — K21.9 GASTROESOPHAGEAL REFLUX DISEASE WITHOUT ESOPHAGITIS: ICD-10-CM

## 2022-08-19 DIAGNOSIS — M54.9 CHRONIC BACK PAIN GREATER THAN 3 MONTHS DURATION: ICD-10-CM

## 2022-08-19 DIAGNOSIS — E03.8 OTHER SPECIFIED HYPOTHYROIDISM: ICD-10-CM

## 2022-08-19 DIAGNOSIS — G89.29 CHRONIC BACK PAIN GREATER THAN 3 MONTHS DURATION: ICD-10-CM

## 2022-08-19 PROCEDURE — 4010F ACE/ARB THERAPY RXD/TAKEN: CPT | Mod: CPTII,S$GLB,, | Performed by: NURSE PRACTITIONER

## 2022-08-19 PROCEDURE — 1101F PT FALLS ASSESS-DOCD LE1/YR: CPT | Mod: CPTII,S$GLB,, | Performed by: NURSE PRACTITIONER

## 2022-08-19 PROCEDURE — 3008F PR BODY MASS INDEX (BMI) DOCUMENTED: ICD-10-PCS | Mod: CPTII,S$GLB,, | Performed by: NURSE PRACTITIONER

## 2022-08-19 PROCEDURE — 99999 PR PBB SHADOW E&M-EST. PATIENT-LVL V: ICD-10-PCS | Mod: PBBFAC,,, | Performed by: NURSE PRACTITIONER

## 2022-08-19 PROCEDURE — 4010F PR ACE/ARB THEARPY RXD/TAKEN: ICD-10-PCS | Mod: CPTII,S$GLB,, | Performed by: NURSE PRACTITIONER

## 2022-08-19 PROCEDURE — 1159F MED LIST DOCD IN RCRD: CPT | Mod: CPTII,S$GLB,, | Performed by: NURSE PRACTITIONER

## 2022-08-19 PROCEDURE — 99499 RISK ADDL DX/OHS AUDIT: ICD-10-PCS | Mod: S$GLB,,, | Performed by: NURSE PRACTITIONER

## 2022-08-19 PROCEDURE — 1101F PR PT FALLS ASSESS DOC 0-1 FALLS W/OUT INJ PAST YR: ICD-10-PCS | Mod: CPTII,S$GLB,, | Performed by: NURSE PRACTITIONER

## 2022-08-19 PROCEDURE — 3078F PR MOST RECENT DIASTOLIC BLOOD PRESSURE < 80 MM HG: ICD-10-PCS | Mod: CPTII,S$GLB,, | Performed by: NURSE PRACTITIONER

## 2022-08-19 PROCEDURE — 3074F PR MOST RECENT SYSTOLIC BLOOD PRESSURE < 130 MM HG: ICD-10-PCS | Mod: CPTII,S$GLB,, | Performed by: NURSE PRACTITIONER

## 2022-08-19 PROCEDURE — 3078F DIAST BP <80 MM HG: CPT | Mod: CPTII,S$GLB,, | Performed by: NURSE PRACTITIONER

## 2022-08-19 PROCEDURE — 3288F FALL RISK ASSESSMENT DOCD: CPT | Mod: CPTII,S$GLB,, | Performed by: NURSE PRACTITIONER

## 2022-08-19 PROCEDURE — 3008F BODY MASS INDEX DOCD: CPT | Mod: CPTII,S$GLB,, | Performed by: NURSE PRACTITIONER

## 2022-08-19 PROCEDURE — 1159F PR MEDICATION LIST DOCUMENTED IN MEDICAL RECORD: ICD-10-PCS | Mod: CPTII,S$GLB,, | Performed by: NURSE PRACTITIONER

## 2022-08-19 PROCEDURE — 3074F SYST BP LT 130 MM HG: CPT | Mod: CPTII,S$GLB,, | Performed by: NURSE PRACTITIONER

## 2022-08-19 PROCEDURE — 3051F HG A1C>EQUAL 7.0%<8.0%: CPT | Mod: CPTII,S$GLB,, | Performed by: NURSE PRACTITIONER

## 2022-08-19 PROCEDURE — 99213 PR OFFICE/OUTPT VISIT, EST, LEVL III, 20-29 MIN: ICD-10-PCS | Mod: S$GLB,,, | Performed by: NURSE PRACTITIONER

## 2022-08-19 PROCEDURE — 1125F PR PAIN SEVERITY QUANTIFIED, PAIN PRESENT: ICD-10-PCS | Mod: CPTII,S$GLB,, | Performed by: NURSE PRACTITIONER

## 2022-08-19 PROCEDURE — 99499 UNLISTED E&M SERVICE: CPT | Mod: S$GLB,,, | Performed by: NURSE PRACTITIONER

## 2022-08-19 PROCEDURE — 1160F RVW MEDS BY RX/DR IN RCRD: CPT | Mod: CPTII,S$GLB,, | Performed by: NURSE PRACTITIONER

## 2022-08-19 PROCEDURE — 3288F PR FALLS RISK ASSESSMENT DOCUMENTED: ICD-10-PCS | Mod: CPTII,S$GLB,, | Performed by: NURSE PRACTITIONER

## 2022-08-19 PROCEDURE — 1125F AMNT PAIN NOTED PAIN PRSNT: CPT | Mod: CPTII,S$GLB,, | Performed by: NURSE PRACTITIONER

## 2022-08-19 PROCEDURE — 3051F PR MOST RECENT HEMOGLOBIN A1C LEVEL 7.0 - < 8.0%: ICD-10-PCS | Mod: CPTII,S$GLB,, | Performed by: NURSE PRACTITIONER

## 2022-08-19 PROCEDURE — 99213 OFFICE O/P EST LOW 20 MIN: CPT | Mod: S$GLB,,, | Performed by: NURSE PRACTITIONER

## 2022-08-19 PROCEDURE — 1160F PR REVIEW ALL MEDS BY PRESCRIBER/CLIN PHARMACIST DOCUMENTED: ICD-10-PCS | Mod: CPTII,S$GLB,, | Performed by: NURSE PRACTITIONER

## 2022-08-19 PROCEDURE — 99999 PR PBB SHADOW E&M-EST. PATIENT-LVL V: CPT | Mod: PBBFAC,,, | Performed by: NURSE PRACTITIONER

## 2022-08-19 RX ORDER — DICLOFENAC SODIUM 10 MG/G
2 GEL TOPICAL 4 TIMES DAILY
Qty: 450 G | Refills: 0 | Status: ON HOLD | OUTPATIENT
Start: 2022-08-19 | End: 2022-11-07 | Stop reason: SDUPTHER

## 2022-08-19 NOTE — PROGRESS NOTES
Subjective:       Patient ID: Edie Hernandez is a 67 y.o. female.    Chief Complaint: No chief complaint on file.     HPI   68 y/o female patient with medical problems listed below presents for 3 month chronic condition follow up. No acute complaints reported. Patient requests refill of voltaren gel. Patient seen by PCP was in 5/2022. Patient states considers weight loss surgery and unsure if the surgery will be covered. So has not decided yet. Patient states will reschedule appt of ophtho for eye exam.     Labs reviewed 5/9/22    Patient Active Problem List   Diagnosis    GERD (gastroesophageal reflux disease)    Chronic back pain greater than 3 months duration    Environmental allergies    Fibromyalgia    B12 nutritional deficiency    CLINT (obstructive sleep apnea)    Chronic sinusitis    Asthma    Hypothyroid    Nausea    Night sweats    Tinnitus, bilateral    Frequent UTI    Hyperlipidemia    Colon polyp, hyperplastic    Interstitial cystitis    Hemangioma    Wart    Globus sensation    DDD (degenerative disc disease), lumbar    Morbid obesity with BMI of 45.0-49.9, adult    Edema    Mixed incontinence    Continuous opioid dependence- contract 10/18/17-failed uds for norco, + 2/18    Type 2 diabetes mellitus    History of colon polyps    Lumbar radiculitis    Lumbar radiculopathy    Lumbar spondylosis    Sacroiliitis        Review of patient's allergies indicates:   Allergen Reactions    Codeine      Other reaction(s): Nausea       Past Surgical History:   Procedure Laterality Date    COLONOSCOPY  02/14/2013    Dr. Vogel: repeat in 5-10 years    COLONOSCOPY W/ BIOPSIES AND POLYPECTOMY  02/2013    hyperplastic, recheck 5-10 years    EXTERNAL EAR SURGERY      HYSTERECTOMY      INJECTION OF ANESTHETIC AGENT AROUND MEDIAL BRANCH NERVES INNERVATING LUMBAR FACET JOINT Bilateral 8/3/2020    Procedure: Block-nerve-medial branch-lumbar left L2, L3, L4, L5;  Surgeon: Dejan Simmons MD;   Location: [facility] OR;  Service: Pain Management;  Laterality: Bilateral;    INJECTION OF ANESTHETIC AGENT AROUND MEDIAL BRANCH NERVES INNERVATING LUMBAR FACET JOINT Left 8/13/2020    Procedure: Block-nerve-medial branch-lumbar.  L2, L3, L4, and L5;  Surgeon: Dejan Simmons MD;  Location: [facility] OR;  Service: Pain Management;  Laterality: Left;    MINIMALLY INVASIVE FUSION OF SPINE Left 10/12/2021    Procedure: FUSION, SPINE, MINIMALLY INVASIVE;  Surgeon: Everton Godinez MD;  Location: [facility] OR;  Service: Orthopedics;  Laterality: Left;  SI-Bone    RADIOFREQUENCY ABLATION OF LUMBAR MEDIAL BRANCH NERVE AT SINGLE LEVEL Left 8/27/2020    Procedure: Radiofrequency Ablation, Nerve, Spinal, Lumbar, Medial Branch,  L2, L3, L4, L5;  Surgeon: Dejan Simmons MD;  Location: [facility] OR;  Service: Pain Management;  Laterality: Left;  L2,l3,l4,l5    THROAT SURGERY      for CLINT    TRANSFORAMINAL EPIDURAL INJECTION OF STEROID Left 3/6/2020    Procedure: Injection,steroid,epidural,transforaminal approach;  Surgeon: Harmeet Zapata MD;  Location: [facility] OR;  Service: Pain Management;  Laterality: Left;  L4-L5    TRANSFORAMINAL EPIDURAL INJECTION OF STEROID Left 6/10/2020    Procedure: Injection,steroid,epidural,transforaminal approach.L4 and L5;  Surgeon: Dejan Simmons MD;  Location: [facility] OR;  Service: Pain Management;  Laterality: Left;    TRANSFORAMINAL EPIDURAL INJECTION OF STEROID Left 7/6/2020    Procedure: Injection,steroid,epidural,transforaminal approach. left L4 and L5;  Surgeon: Dejan Simmons MD;  Location: [facility] OR;  Service: Pain Management;  Laterality: Left;    TRANSFORAMINAL EPIDURAL INJECTION OF STEROID Left 11/12/2021    Procedure: Injection,steroid,epidural,transforaminal approach Left L5-S1;  Surgeon: Dejan Simmons MD;  Location: [facility] OR;  Service: Pain Management;  Laterality: Left;    UPPER GASTROINTESTINAL ENDOSCOPY            Current Outpatient Medications:     clobetasol (TEMOVATE) 0.05 % cream, Apply 1 application  topically 2 (two) times daily. Apply to affected area, Disp: 30 g, Rfl: 2    cyanocobalamin-cobamamide (B12) 5,000-100 mcg Lozg, Place 1 tablet under the tongue once daily., Disp: 90 tablet, Rfl: 3    cyclobenzaprine (FLEXERIL) 5 MG tablet, TAKE 1 TO 2 TABLETS BY MOUTH NIGHTLY AT BEDTIME AS NEEDED FOR PAIN, Disp: 90 tablet, Rfl: PRN    ergocalciferol (ERGOCALCIFEROL) 50,000 unit Cap, TAKE 1 CAPSULE BY MOUTH ONE TIME PER WEEK, Disp: 12 capsule, Rfl: 1    estradioL (ESTRACE) 0.01 % (0.1 mg/gram) vaginal cream, Apply 1 fingertipful to vaginal area nightly x 2 weeks then use on MW, Disp: 42.5 g, Rfl: 3    fluticasone propionate (FLONASE) 50 mcg/actuation nasal spray, 1 spray (50 mcg total) by Each Nostril route daily as needed for Allergies., Disp: 16 g, Rfl: prn    furosemide (LASIX) 20 MG tablet, Take 1 tablet (20 mg total) by mouth daily as needed (leg swelling)., Disp: 60 tablet, Rfl: 0    indomethacin (INDOCIN) 25 MG capsule, TAKE ONE CAPSULE BY MOUTH 3 TIMES A DAY WITH MEALS, Disp: 90 capsule, Rfl: 3    lancets (ACCU-CHEK MULTICLIX LANCET) Misc, Test 2 x day, Disp: 200 each, Rfl: 3    levothyroxine (SYNTHROID, LEVOTHROID) 175 MCG tablet, Take 1 tablet (175 mcg total) by mouth once daily., Disp: 90 tablet, Rfl: 3    losartan (COZAAR) 50 MG tablet, Take 1 tablet (50 mg total) by mouth once daily., Disp: 90 tablet, Rfl: 3    metFORMIN (GLUCOPHAGE-XR) 500 MG ER 24hr tablet, Take 2 tablets (1,000 mg total) by mouth 2 (two) times daily with meals., Disp: 360 tablet, Rfl: 3    ONETOUCH ULTRA BLUE TEST STRIP Strp, USE TO TEST BLOOD SUGAR, Disp: 100 strip, Rfl: 3    pantoprazole (PROTONIX) 40 MG tablet, Take 1 tablet (40 mg total) by mouth once daily., Disp: 90 tablet, Rfl: 3    pregabalin (LYRICA) 150 MG capsule, TAKE ONE CAPSULE BY MOUTH TWICE DAILY, Disp: 60 capsule, Rfl: 2    aspirin (ECOTRIN) 81 MG EC tablet, Take 81 mg by mouth once daily., Disp: , Rfl:     azelastine (ASTELIN) 137 mcg (0.1 %) nasal  "spray, 1 spray (137 mcg total) by Nasal route 2 (two) times daily., Disp: 30 mL, Rfl: 11    blood-glucose meter kit, Use as instructed, Disp: 1 each, Rfl: 0    diclofenac sodium (VOLTAREN) 1 % Gel, Apply 2 g topically 4 (four) times daily., Disp: 450 g, Rfl: 0    methylphenidate (RITALIN LA) 40 MG 24 hr capsule, Take 40 mg by mouth daily as needed. Not on at this time, Disp: , Rfl:     methylphenidate HCl (RITALIN) 10 MG tablet, , Disp: , Rfl:     vibegron 75 mg Tab, Take 1 tablet by mouth once daily. (Patient not taking: Reported on 8/19/2022), Disp: 30 tablet, Rfl: 6  No current facility-administered medications for this visit.    Facility-Administered Medications Ordered in Other Visits:     lactated ringers infusion, , Intravenous, Continuous, Dejan Simmons MD, Stopped at 07/06/20 1008    Lab Results   Component Value Date    WBC 7.52 05/09/2022    HGB 14.7 05/09/2022    HCT 46.7 05/09/2022     05/09/2022    CHOL 176 05/09/2022    TRIG 99 05/09/2022    HDL 62 05/09/2022    ALT 27 05/09/2022    AST 18 05/09/2022     05/09/2022    K 4.7 05/09/2022     05/09/2022    CREATININE 1.0 05/09/2022    BUN 20 05/09/2022    CO2 27 05/09/2022    TSH 0.723 05/09/2022    HGBA1C 7.1 (H) 05/09/2022     Review of Systems    Objective:   /70 (BP Location: Right arm, Patient Position: Sitting, BP Method: Large (Manual))   Pulse 83   Temp 98.5 °F (36.9 °C) (Oral)   Ht 5' 5" (1.651 m)   Wt 120.3 kg (265 lb 3.4 oz)   SpO2 97%   BMI 44.13 kg/m²       Physical Exam    Assessment:       1. Type 2 diabetes mellitus with chronic kidney disease, without long-term current use of insulin, unspecified CKD stage    2. Gastroesophageal reflux disease without esophagitis    3. Chronic back pain greater than 3 months duration    4. Other specified hypothyroidism    5. Class 3 severe obesity with body mass index (BMI) of 40.0 to 44.9 in adult, unspecified obesity type, unspecified whether serious comorbidity " "present    6. Pure hypercholesterolemia        Plan:       1. Gastroesophageal reflux disease without esophagitis  - continue with protonix  - dietary precautions discussed    2. Chronic back pain greater than 3 months duration  - stable and continue with current regimen  - diclofenac sodium (VOLTAREN) 1 % Gel; Apply 2 g topically 4 (four) times daily.  Dispense: 450 g; Refill: 0    3. Type 2 diabetes mellitus with chronic kidney disease, without long-term current use of insulin, unspecified CKD stage  - last A1C 7.1 in 5/2022, malb 1773 in 11/2021  - continue with current regimen   - continue with ophto for eye exam    4. Other specified hypothyroidism  - last TSH nl  - continue with current regimen    5. Class 3 severe obesity with body mass index (BMI) of 40.0 to 44.9 in adult, unspecified obesity type, unspecified whether serious comorbidity present  - Counseled patient on his ideal body weight, health consequences of being obese and current recommendations including weekly exercise and a heart healthy diet.  Current BMI is:Estimated body mass index is 44.13 kg/m² as calculated from the following:    Height as of this encounter: 5' 5" (1.651 m).    Weight as of this encounter: 120.3 kg (265 lb 3.4 oz)..  Patient is aware that ideal BMI < 25 or Weight in (lb) to have BMI = 25: 149.9.      6. Pure hypercholesterolemia  - continue with diet and exercise      Patient with be reevaluated in as scheduled in 11/2022 or sooner reena Gambino NP    "

## 2022-08-24 ENCOUNTER — PATIENT MESSAGE (OUTPATIENT)
Dept: ADMINISTRATIVE | Facility: HOSPITAL | Age: 67
End: 2022-08-24
Payer: MEDICARE

## 2022-08-24 ENCOUNTER — HOSPITAL ENCOUNTER (OUTPATIENT)
Dept: RADIOLOGY | Facility: CLINIC | Age: 67
Discharge: HOME | End: 2022-08-24
Attending: FAMILY MEDICINE
Payer: MEDICARE

## 2022-08-24 DIAGNOSIS — Z12.31 ENCOUNTER FOR SCREENING MAMMOGRAM FOR MALIGNANT NEOPLASM OF BREAST: ICD-10-CM

## 2022-08-24 PROCEDURE — 77067 MAMMO DIGITAL SCREENING BILAT WITH TOMO: ICD-10-PCS | Mod: 26,,, | Performed by: RADIOLOGY

## 2022-08-24 PROCEDURE — 77067 SCR MAMMO BI INCL CAD: CPT | Mod: TC,PO

## 2022-08-24 PROCEDURE — 77063 BREAST TOMOSYNTHESIS BI: CPT | Mod: TC,PO

## 2022-08-24 PROCEDURE — 77067 SCR MAMMO BI INCL CAD: CPT | Mod: 26,,, | Performed by: RADIOLOGY

## 2022-08-24 PROCEDURE — 77063 MAMMO DIGITAL SCREENING BILAT WITH TOMO: ICD-10-PCS | Mod: 26,,, | Performed by: RADIOLOGY

## 2022-08-24 PROCEDURE — 77063 BREAST TOMOSYNTHESIS BI: CPT | Mod: 26,,, | Performed by: RADIOLOGY

## 2022-08-29 ENCOUNTER — HOSPITAL ENCOUNTER (OUTPATIENT)
Dept: RADIOLOGY | Facility: CLINIC | Age: 67
Discharge: HOME | End: 2022-08-29
Attending: FAMILY MEDICINE
Payer: MEDICARE

## 2022-08-29 DIAGNOSIS — N95.9 MENOPAUSAL AND POSTMENOPAUSAL DISORDER: ICD-10-CM

## 2022-08-29 PROCEDURE — 77080 DEXA BONE DENSITY SPINE HIP: ICD-10-PCS | Mod: 26,,, | Performed by: RADIOLOGY

## 2022-08-29 PROCEDURE — 77080 DXA BONE DENSITY AXIAL: CPT | Mod: 26,,, | Performed by: RADIOLOGY

## 2022-08-29 PROCEDURE — 77080 DXA BONE DENSITY AXIAL: CPT | Mod: TC,PO

## 2022-08-31 ENCOUNTER — PATIENT MESSAGE (OUTPATIENT)
Dept: FAMILY MEDICINE | Facility: CLINIC | Age: 67
End: 2022-08-31
Payer: MEDICARE

## 2022-08-31 DIAGNOSIS — M54.9 CHRONIC BACK PAIN GREATER THAN 3 MONTHS DURATION: Primary | ICD-10-CM

## 2022-08-31 DIAGNOSIS — G89.29 CHRONIC BACK PAIN GREATER THAN 3 MONTHS DURATION: Primary | ICD-10-CM

## 2022-08-31 RX ORDER — HYDROCODONE BITARTRATE AND ACETAMINOPHEN 7.5; 325 MG/1; MG/1
1 TABLET ORAL EVERY 6 HOURS PRN
Qty: 30 TABLET | Refills: 0 | Status: SHIPPED | OUTPATIENT
Start: 2022-08-31 | End: 2022-09-10

## 2022-08-31 NOTE — TELEPHONE ENCOUNTER
No new care gaps identified.  [facility] Embedded Care Gaps. Reference number: [id number]. 8/31/2022   10:52:49 AM GOYOT

## 2022-09-01 ENCOUNTER — OFFICE VISIT (OUTPATIENT)
Dept: UROGYNECOLOGY | Facility: CLINIC | Age: 67
End: 2022-09-01
Payer: MEDICARE

## 2022-09-01 VITALS
HEIGHT: 65 IN | HEART RATE: 83 BPM | DIASTOLIC BLOOD PRESSURE: 71 MMHG | SYSTOLIC BLOOD PRESSURE: 149 MMHG | BODY MASS INDEX: 44.18 KG/M2 | WEIGHT: 265.19 LBS

## 2022-09-01 DIAGNOSIS — N30.90 CYSTITIS: ICD-10-CM

## 2022-09-01 DIAGNOSIS — R35.0 URINARY FREQUENCY: Primary | ICD-10-CM

## 2022-09-01 DIAGNOSIS — R39.89 CYSTALGIA: ICD-10-CM

## 2022-09-01 DIAGNOSIS — N95.2 ATROPHIC VAGINITIS: ICD-10-CM

## 2022-09-01 LAB
BILIRUB SERPL-MCNC: NORMAL MG/DL
BLOOD URINE, POC: NORMAL
CLARITY, POC UA: CLEAR
COLOR, POC UA: YELLOW
GLUCOSE UR QL STRIP: NORMAL
KETONES UR QL STRIP: NORMAL
LEUKOCYTE ESTERASE URINE, POC: NORMAL
NITRITE, POC UA: NORMAL
PH, POC UA: 5
PROTEIN, POC: NORMAL
SPECIFIC GRAVITY, POC UA: 1.02
UROBILINOGEN, POC UA: NORMAL

## 2022-09-01 PROCEDURE — 99213 PR OFFICE/OUTPT VISIT, EST, LEVL III, 20-29 MIN: ICD-10-PCS | Mod: 25,S$GLB,, | Performed by: NURSE PRACTITIONER

## 2022-09-01 PROCEDURE — 3078F PR MOST RECENT DIASTOLIC BLOOD PRESSURE < 80 MM HG: ICD-10-PCS | Mod: CPTII,S$GLB,, | Performed by: NURSE PRACTITIONER

## 2022-09-01 PROCEDURE — 1160F RVW MEDS BY RX/DR IN RCRD: CPT | Mod: CPTII,S$GLB,, | Performed by: NURSE PRACTITIONER

## 2022-09-01 PROCEDURE — 3008F PR BODY MASS INDEX (BMI) DOCUMENTED: ICD-10-PCS | Mod: CPTII,S$GLB,, | Performed by: NURSE PRACTITIONER

## 2022-09-01 PROCEDURE — 3288F PR FALLS RISK ASSESSMENT DOCUMENTED: ICD-10-PCS | Mod: CPTII,S$GLB,, | Performed by: NURSE PRACTITIONER

## 2022-09-01 PROCEDURE — 3077F SYST BP >= 140 MM HG: CPT | Mod: CPTII,S$GLB,, | Performed by: NURSE PRACTITIONER

## 2022-09-01 PROCEDURE — 1160F PR REVIEW ALL MEDS BY PRESCRIBER/CLIN PHARMACIST DOCUMENTED: ICD-10-PCS | Mod: CPTII,S$GLB,, | Performed by: NURSE PRACTITIONER

## 2022-09-01 PROCEDURE — 3077F PR MOST RECENT SYSTOLIC BLOOD PRESSURE >= 140 MM HG: ICD-10-PCS | Mod: CPTII,S$GLB,, | Performed by: NURSE PRACTITIONER

## 2022-09-01 PROCEDURE — 1101F PR PT FALLS ASSESS DOC 0-1 FALLS W/OUT INJ PAST YR: ICD-10-PCS | Mod: CPTII,S$GLB,, | Performed by: NURSE PRACTITIONER

## 2022-09-01 PROCEDURE — 81002 POCT URINE DIPSTICK WITHOUT MICROSCOPE: ICD-10-PCS | Mod: S$GLB,,, | Performed by: NURSE PRACTITIONER

## 2022-09-01 PROCEDURE — 99213 OFFICE O/P EST LOW 20 MIN: CPT | Mod: 25,S$GLB,, | Performed by: NURSE PRACTITIONER

## 2022-09-01 PROCEDURE — 81002 URINALYSIS NONAUTO W/O SCOPE: CPT | Mod: S$GLB,,, | Performed by: NURSE PRACTITIONER

## 2022-09-01 PROCEDURE — 3051F PR MOST RECENT HEMOGLOBIN A1C LEVEL 7.0 - < 8.0%: ICD-10-PCS | Mod: CPTII,S$GLB,, | Performed by: NURSE PRACTITIONER

## 2022-09-01 PROCEDURE — 1159F MED LIST DOCD IN RCRD: CPT | Mod: CPTII,S$GLB,, | Performed by: NURSE PRACTITIONER

## 2022-09-01 PROCEDURE — 3288F FALL RISK ASSESSMENT DOCD: CPT | Mod: CPTII,S$GLB,, | Performed by: NURSE PRACTITIONER

## 2022-09-01 PROCEDURE — 99999 PR PBB SHADOW E&M-EST. PATIENT-LVL IV: CPT | Mod: PBBFAC,,, | Performed by: NURSE PRACTITIONER

## 2022-09-01 PROCEDURE — 4010F PR ACE/ARB THEARPY RXD/TAKEN: ICD-10-PCS | Mod: CPTII,S$GLB,, | Performed by: NURSE PRACTITIONER

## 2022-09-01 PROCEDURE — 3078F DIAST BP <80 MM HG: CPT | Mod: CPTII,S$GLB,, | Performed by: NURSE PRACTITIONER

## 2022-09-01 PROCEDURE — 99999 PR PBB SHADOW E&M-EST. PATIENT-LVL IV: ICD-10-PCS | Mod: PBBFAC,,, | Performed by: NURSE PRACTITIONER

## 2022-09-01 PROCEDURE — 1159F PR MEDICATION LIST DOCUMENTED IN MEDICAL RECORD: ICD-10-PCS | Mod: CPTII,S$GLB,, | Performed by: NURSE PRACTITIONER

## 2022-09-01 PROCEDURE — 3008F BODY MASS INDEX DOCD: CPT | Mod: CPTII,S$GLB,, | Performed by: NURSE PRACTITIONER

## 2022-09-01 PROCEDURE — 1101F PT FALLS ASSESS-DOCD LE1/YR: CPT | Mod: CPTII,S$GLB,, | Performed by: NURSE PRACTITIONER

## 2022-09-01 PROCEDURE — 4010F ACE/ARB THERAPY RXD/TAKEN: CPT | Mod: CPTII,S$GLB,, | Performed by: NURSE PRACTITIONER

## 2022-09-01 PROCEDURE — 3051F HG A1C>EQUAL 7.0%<8.0%: CPT | Mod: CPTII,S$GLB,, | Performed by: NURSE PRACTITIONER

## 2022-09-01 NOTE — PROGRESS NOTES
Subjective:       Patient ID: Edie Hernandez is a 67 y.o. female.    Chief Complaint: cystitis      Edie Hernandez is a 67 y.o. female.who presents today for follow up in regards to her bladder pain.  She was last seen in our office on 08/04/22.  At that visit we did an instillation and she felt that this was helpful, but didn't last.  She had previously started on myrbetriq, but it was too expensive and she was not able to continue with this medication.  She felt that it was helpful for about 2 weeks and then her symptoms returned.  At her last visit, we sent in gemtessa, but this again was too expensive for her and she was not able to try this.  We did a urine culture at her last visit that showed no growth. Her UA today is negative.  She has frequency x 4-5 during the day and nocturia x 3.  She has intense bladder pain that keeps her awake starting at about 4 AM.  This pain tends to subside somewhat as the day wears on.  She has cut out all sodas as she knows that this is somewhat of a trigger for her.  She states that she did very well for almost 9 years after she had a cysto with hydro with Dr. Nicholson on 04/23/13.  She was diagnosed with IC on this cysto.  She does try to watch her diet to avoid trigger foods, but she is anxious to discuss a repeat cysto with hydro.  She does not wish to do an instillation today.      Review of Systems   Constitutional:  Negative for activity change, fever and unexpected weight change.   HENT:  Negative for hearing loss.    Eyes:  Negative for visual disturbance.   Respiratory:  Negative for shortness of breath and wheezing.    Cardiovascular:  Negative for chest pain, palpitations and leg swelling.   Gastrointestinal:  Positive for abdominal pain. Negative for constipation and diarrhea.   Genitourinary:  Positive for dysuria, frequency and urgency. Negative for dyspareunia, vaginal bleeding and vaginal discharge.   Musculoskeletal:  Negative for gait problem and neck pain.    Skin:  Negative for rash and wound.   Allergic/Immunologic: Negative for immunocompromised state.   Neurological:  Negative for tremors, speech difficulty and weakness.   Hematological:  Does not bruise/bleed easily.   Psychiatric/Behavioral:  Negative for agitation and confusion.      Objective:      Physical Exam  Vitals reviewed. Exam conducted with a chaperone present.   Constitutional:       General: She is not in acute distress.     Appearance: She is well-developed.   HENT:      Head: Normocephalic and atraumatic.   Neck:      Thyroid: No thyromegaly.   Pulmonary:      Effort: Pulmonary effort is normal. No respiratory distress.   Abdominal:      Palpations: Abdomen is soft.      Tenderness: There is abdominal tenderness in the suprapubic area.      Hernia: No hernia is present.   Musculoskeletal:         General: Normal range of motion.      Cervical back: Normal range of motion.   Skin:     General: Skin is warm and dry.      Findings: No rash.   Neurological:      Mental Status: She is alert and oriented to person, place, and time.   Psychiatric:         Mood and Affect: Mood normal.         Behavior: Behavior normal.         Thought Content: Thought content normal.     Pelvic Exam:  deferred      Assessment:       1. Urinary frequency    2. Cystitis    3. Cystalgia    4. Atrophic vaginitis          Plan:       Urinary frequency-monitor  -     POCT URINE DIPSTICK WITHOUT MICROSCOPE    Cystitis- monitor    Cystalgia- pt will meet with Dr. Chavez to discuss options such as cysto with hydro    Atrophic vaginitis- continue estrace cream    RTC PRN

## 2022-09-02 ENCOUNTER — OFFICE VISIT (OUTPATIENT)
Dept: UROLOGY | Facility: CLINIC | Age: 67
End: 2022-09-02
Payer: MEDICARE

## 2022-09-02 VITALS — DIASTOLIC BLOOD PRESSURE: 70 MMHG | SYSTOLIC BLOOD PRESSURE: 131 MMHG | HEART RATE: 77 BPM

## 2022-09-02 DIAGNOSIS — N30.10 INTERSTITIAL CYSTITIS: Primary | ICD-10-CM

## 2022-09-02 DIAGNOSIS — N39.0 RECURRENT UTI: ICD-10-CM

## 2022-09-02 PROCEDURE — 3078F PR MOST RECENT DIASTOLIC BLOOD PRESSURE < 80 MM HG: ICD-10-PCS | Mod: CPTII,S$GLB,, | Performed by: UROLOGY

## 2022-09-02 PROCEDURE — 1125F PR PAIN SEVERITY QUANTIFIED, PAIN PRESENT: ICD-10-PCS | Mod: CPTII,S$GLB,, | Performed by: UROLOGY

## 2022-09-02 PROCEDURE — 1160F PR REVIEW ALL MEDS BY PRESCRIBER/CLIN PHARMACIST DOCUMENTED: ICD-10-PCS | Mod: CPTII,S$GLB,, | Performed by: UROLOGY

## 2022-09-02 PROCEDURE — 1125F AMNT PAIN NOTED PAIN PRSNT: CPT | Mod: CPTII,S$GLB,, | Performed by: UROLOGY

## 2022-09-02 PROCEDURE — 3078F DIAST BP <80 MM HG: CPT | Mod: CPTII,S$GLB,, | Performed by: UROLOGY

## 2022-09-02 PROCEDURE — 3075F SYST BP GE 130 - 139MM HG: CPT | Mod: CPTII,S$GLB,, | Performed by: UROLOGY

## 2022-09-02 PROCEDURE — 1160F RVW MEDS BY RX/DR IN RCRD: CPT | Mod: CPTII,S$GLB,, | Performed by: UROLOGY

## 2022-09-02 PROCEDURE — 3051F PR MOST RECENT HEMOGLOBIN A1C LEVEL 7.0 - < 8.0%: ICD-10-PCS | Mod: CPTII,S$GLB,, | Performed by: UROLOGY

## 2022-09-02 PROCEDURE — 99214 PR OFFICE/OUTPT VISIT, EST, LEVL IV, 30-39 MIN: ICD-10-PCS | Mod: S$GLB,,, | Performed by: UROLOGY

## 2022-09-02 PROCEDURE — 3051F HG A1C>EQUAL 7.0%<8.0%: CPT | Mod: CPTII,S$GLB,, | Performed by: UROLOGY

## 2022-09-02 PROCEDURE — 99999 PR PBB SHADOW E&M-EST. PATIENT-LVL III: CPT | Mod: PBBFAC,,, | Performed by: UROLOGY

## 2022-09-02 PROCEDURE — 3075F PR MOST RECENT SYSTOLIC BLOOD PRESS GE 130-139MM HG: ICD-10-PCS | Mod: CPTII,S$GLB,, | Performed by: UROLOGY

## 2022-09-02 PROCEDURE — 99214 OFFICE O/P EST MOD 30 MIN: CPT | Mod: S$GLB,,, | Performed by: UROLOGY

## 2022-09-02 PROCEDURE — 4010F PR ACE/ARB THEARPY RXD/TAKEN: ICD-10-PCS | Mod: CPTII,S$GLB,, | Performed by: UROLOGY

## 2022-09-02 PROCEDURE — 99999 PR PBB SHADOW E&M-EST. PATIENT-LVL III: ICD-10-PCS | Mod: PBBFAC,,, | Performed by: UROLOGY

## 2022-09-02 PROCEDURE — 1159F MED LIST DOCD IN RCRD: CPT | Mod: CPTII,S$GLB,, | Performed by: UROLOGY

## 2022-09-02 PROCEDURE — 1159F PR MEDICATION LIST DOCUMENTED IN MEDICAL RECORD: ICD-10-PCS | Mod: CPTII,S$GLB,, | Performed by: UROLOGY

## 2022-09-02 PROCEDURE — 4010F ACE/ARB THERAPY RXD/TAKEN: CPT | Mod: CPTII,S$GLB,, | Performed by: UROLOGY

## 2022-09-02 NOTE — PROGRESS NOTES
[facility] New Patient/H&P:    Edie Hernandez is a 67 y.o. female who presents for interstitial cystitis and recurrent urinary tract infection.    Patient with a history of DM and interstitial cystitis previously managed by Dr. Nicholson and now Urogynecology.     On review of records, she has a several year history of bladder pain. She reports frequency x 4 - 5, nocturia x 3 and significant suprapubic discomfort.     Patient has tried Ditropan, Vibegron and Myrbetriq in the past. States Myrbetriq helped, but was too expensive. She has also had instillations with Urogyn, but she did not have long-lasting relief. Also using estrace cream for atrophic vaginitis.     She is s/p cystoscopy with hydro-distention withi Dr. Nicholson in 4/2013. Reports significant improvement that lasted several years after her hydro-distention.     Does not drink anymore soda. Urine dipstick negative on 9/1/22.     Retired .     Denies any fever, chills, gross hematuria, flank pain, bone pain, unintentional weight loss,  trauma or history of  malignancy.       Urine culture  No growth 8/4/22  E. Coli  6/14/22  GBS  5/24/22  E. Coli  2/1/22  E. Coli  8/24/21    A1C  7.1  5/9/22     Past Medical History:   Diagnosis Date    Arthritis     Colon polyp, hyperplastic 1/13    recheck 5-10 years    Diabetes mellitus, type 2     Diverticulosis     Fatty liver     General anesthetics causing adverse effect in therapeutic use     woke up during tubal ligation    GERD (gastroesophageal reflux disease)     CLINT (obstructive sleep apnea)     C-pap    PONV (postoperative nausea and vomiting)     Thyroid disease     Wears partial dentures     UPPER       Past Surgical History:   Procedure Laterality Date    COLONOSCOPY  02/14/2013    Dr. Vogel: repeat in 5-10 years    COLONOSCOPY W/ BIOPSIES AND POLYPECTOMY  02/2013    hyperplastic, recheck 5-10 years    EXTERNAL EAR SURGERY      HYSTERECTOMY      INJECTION OF  ANESTHETIC AGENT AROUND MEDIAL BRANCH NERVES INNERVATING LUMBAR FACET JOINT Bilateral 8/3/2020    Procedure: Block-nerve-medial branch-lumbar left L2, L3, L4, L5;  Surgeon: Dejan Simmons MD;  Location: [facility] OR;  Service: Pain Management;  Laterality: Bilateral;    INJECTION OF ANESTHETIC AGENT AROUND MEDIAL BRANCH NERVES INNERVATING LUMBAR FACET JOINT Left 8/13/2020    Procedure: Block-nerve-medial branch-lumbar.  L2, L3, L4, and L5;  Surgeon: Dejan Simmons MD;  Location: [facility] OR;  Service: Pain Management;  Laterality: Left;    MINIMALLY INVASIVE FUSION OF SPINE Left 10/12/2021    Procedure: FUSION, SPINE, MINIMALLY INVASIVE;  Surgeon: Everton Godinez MD;  Location: [facility] OR;  Service: Orthopedics;  Laterality: Left;  SI-Bone    RADIOFREQUENCY ABLATION OF LUMBAR MEDIAL BRANCH NERVE AT SINGLE LEVEL Left 8/27/2020    Procedure: Radiofrequency Ablation, Nerve, Spinal, Lumbar, Medial Branch,  L2, L3, L4, L5;  Surgeon: Dejan Simmons MD;  Location: [facility] OR;  Service: Pain Management;  Laterality: Left;  L2,l3,l4,l5    THROAT SURGERY      for CLINT    TRANSFORAMINAL EPIDURAL INJECTION OF STEROID Left 3/6/2020    Procedure: Injection,steroid,epidural,transforaminal approach;  Surgeon: Harmeet Zapata MD;  Location: [facility] OR;  Service: Pain Management;  Laterality: Left;  L4-L5    TRANSFORAMINAL EPIDURAL INJECTION OF STEROID Left 6/10/2020    Procedure: Injection,steroid,epidural,transforaminal approach.L4 and L5;  Surgeon: Dejan Simmons MD;  Location: [facility] OR;  Service: Pain Management;  Laterality: Left;    TRANSFORAMINAL EPIDURAL INJECTION OF STEROID Left 7/6/2020    Procedure: Injection,steroid,epidural,transforaminal approach. left L4 and L5;  Surgeon: Dejan Simmons MD;  Location: [facility] OR;  Service: Pain Management;  Laterality: Left;    TRANSFORAMINAL EPIDURAL INJECTION OF STEROID Left 11/12/2021    Procedure: Injection,steroid,epidural,transforaminal approach Left L5-S1;  Surgeon: Dejan Simmons MD;  Location: [facility] OR;  Service:  Pain Management;  Laterality: Left;    UPPER GASTROINTESTINAL ENDOSCOPY         Family History   Problem Relation Age of Onset    Hypertension Mother     Breast cancer Mother 50    Heart disease Father     Hypertension Father     Ovarian cancer Paternal Aunt     Macular degeneration Neg Hx     Retinal detachment Neg Hx     Glaucoma Neg Hx     Colon cancer Neg Hx     Colon polyps Neg Hx     Crohn's disease Neg Hx     Ulcerative colitis Neg Hx        Review of patient's allergies indicates:   Allergen Reactions    Codeine      Other reaction(s): Nausea       Medications Reviewed: see MAR    PHYSICAL EXAM:    Vitals:    09/02/22 0927   BP: 131/70   Pulse: 77     There is no height or weight on file to calculate BMI.           General: Alert, cooperative, no distress, appears stated age  Abdomen: Soft, non-tender, no CVA tenderness, non-distended      LABS:    Recent Results (from the past 336 hour(s))   POCT URINE DIPSTICK WITHOUT MICROSCOPE    Collection Time: 09/01/22  2:07 PM   Result Value Ref Range    Color, UA Yellow     pH, UA 5     WBC, UA neg     Nitrite, UA neg     Protein, POC neg     Glucose, UA neg     Ketones, UA neg     Urobilinogen, UA neg     Bilirubin, POC neg     Blood, UA neg     Clarity, UA Clear     Spec Grav UA 1.020            Assessment/Diagnosis:    1. Interstitial cystitis        2. Recurrent UTI            Plans:    - I spent 30 minutes of the day of this encounter preparing for, treating and managing this patient. Extensive discussion with patient regarding the etiology and management of interstitial cystitis/bladder pain syndrome. We discussed that treatment options include conservative management first (e.g., behavioral modifications, diet, stress management). We discussed pelvic floor physical therapy as a second-line treatment which has grade A clinical evidence. We also discussed that there are several PO medications with weak evidence including Elmiron, Amitriptyline and Hyroxyzine.  In severe cases, hydro-distention with or without fulguration, intravesical Botox and neuro-stimulation have been shown to help. In refractory cases, there is a role for urinary diversion.  - Scheduled for cystoscopy with hydro-distention on 9/7/22 at the [facility] with anesthesia. All risks, benefits and alternatives discussed extensively. Urine dipstick negative yesterday.

## 2022-09-02 NOTE — H&P (VIEW-ONLY)
[facility] Urology New Patient/H&P:    Edie Hernandez is a 67 y.o. female who presents for interstitial cystitis and recurrent urinary tract infection.    Patient with a history of DM and interstitial cystitis previously managed by Dr. Nicholson and now Urogynecology.     On review of records, she has a several year history of bladder pain. She reports frequency x 4 - 5, nocturia x 3 and significant suprapubic discomfort.     Patient has tried Ditropan, Vibegron and Myrbetriq in the past. States Myrbetriq helped, but was too expensive. She has also had instillations with Urogyn, but she did not have long-lasting relief. Also using estrace cream for atrophic vaginitis.     She is s/p cystoscopy with hydro-distention withi Dr. Nicholson in 4/2013. Reports significant improvement that lasted several years after her hydro-distention.     Does not drink anymore soda. Urine dipstick negative on 9/1/22.     Retired .     Denies any fever, chills, gross hematuria, flank pain, bone pain, unintentional weight loss,  trauma or history of  malignancy.       Urine culture  No growth 8/4/22  E. Coli  6/14/22  GBS  5/24/22  E. Coli  2/1/22  E. Coli  8/24/21    A1C  7.1  5/9/22     Past Medical History:   Diagnosis Date    Arthritis     Colon polyp, hyperplastic 1/13    recheck 5-10 years    Diabetes mellitus, type 2     Diverticulosis     Fatty liver     General anesthetics causing adverse effect in therapeutic use     woke up during tubal ligation    GERD (gastroesophageal reflux disease)     CLINT (obstructive sleep apnea)     C-pap    PONV (postoperative nausea and vomiting)     Thyroid disease     Wears partial dentures     UPPER       Past Surgical History:   Procedure Laterality Date    COLONOSCOPY  02/14/2013    Dr. Vogel: repeat in 5-10 years    COLONOSCOPY W/ BIOPSIES AND POLYPECTOMY  02/2013    hyperplastic, recheck 5-10 years    EXTERNAL EAR SURGERY      HYSTERECTOMY      INJECTION OF  ANESTHETIC AGENT AROUND MEDIAL BRANCH NERVES INNERVATING LUMBAR FACET JOINT Bilateral 8/3/2020    Procedure: Block-nerve-medial branch-lumbar left L2, L3, L4, L5;  Surgeon: Dejan Simmons MD;  Location: [facility] OR;  Service: Pain Management;  Laterality: Bilateral;    INJECTION OF ANESTHETIC AGENT AROUND MEDIAL BRANCH NERVES INNERVATING LUMBAR FACET JOINT Left 8/13/2020    Procedure: Block-nerve-medial branch-lumbar.  L2, L3, L4, and L5;  Surgeon: Dejan Simmons MD;  Location: [facility] OR;  Service: Pain Management;  Laterality: Left;    MINIMALLY INVASIVE FUSION OF SPINE Left 10/12/2021    Procedure: FUSION, SPINE, MINIMALLY INVASIVE;  Surgeon: Everton Godinez MD;  Location: [facility] OR;  Service: Orthopedics;  Laterality: Left;  SI-Bone    RADIOFREQUENCY ABLATION OF LUMBAR MEDIAL BRANCH NERVE AT SINGLE LEVEL Left 8/27/2020    Procedure: Radiofrequency Ablation, Nerve, Spinal, Lumbar, Medial Branch,  L2, L3, L4, L5;  Surgeon: Dejan Simmons MD;  Location: [facility] OR;  Service: Pain Management;  Laterality: Left;  L2,l3,l4,l5    THROAT SURGERY      for CLINT    TRANSFORAMINAL EPIDURAL INJECTION OF STEROID Left 3/6/2020    Procedure: Injection,steroid,epidural,transforaminal approach;  Surgeon: Harmeet Zapata MD;  Location: [facility] OR;  Service: Pain Management;  Laterality: Left;  L4-L5    TRANSFORAMINAL EPIDURAL INJECTION OF STEROID Left 6/10/2020    Procedure: Injection,steroid,epidural,transforaminal approach.L4 and L5;  Surgeon: Dejan Simmons MD;  Location: [facility] OR;  Service: Pain Management;  Laterality: Left;    TRANSFORAMINAL EPIDURAL INJECTION OF STEROID Left 7/6/2020    Procedure: Injection,steroid,epidural,transforaminal approach. left L4 and L5;  Surgeon: Dejan Simmons MD;  Location: [facility] OR;  Service: Pain Management;  Laterality: Left;    TRANSFORAMINAL EPIDURAL INJECTION OF STEROID Left 11/12/2021    Procedure: Injection,steroid,epidural,transforaminal approach Left L5-S1;  Surgeon: Dejan Simmons MD;  Location: [facility] OR;  Service:  Pain Management;  Laterality: Left;    UPPER GASTROINTESTINAL ENDOSCOPY         Family History   Problem Relation Age of Onset    Hypertension Mother     Breast cancer Mother 50    Heart disease Father     Hypertension Father     Ovarian cancer Paternal Aunt     Macular degeneration Neg Hx     Retinal detachment Neg Hx     Glaucoma Neg Hx     Colon cancer Neg Hx     Colon polyps Neg Hx     Crohn's disease Neg Hx     Ulcerative colitis Neg Hx        Review of patient's allergies indicates:   Allergen Reactions    Codeine      Other reaction(s): Nausea       Medications Reviewed: see MAR    PHYSICAL EXAM:    Vitals:    09/02/22 0927   BP: 131/70   Pulse: 77     There is no height or weight on file to calculate BMI.           General: Alert, cooperative, no distress, appears stated age  Abdomen: Soft, non-tender, no CVA tenderness, non-distended      LABS:    Recent Results (from the past 336 hour(s))   POCT URINE DIPSTICK WITHOUT MICROSCOPE    Collection Time: 09/01/22  2:07 PM   Result Value Ref Range    Color, UA Yellow     pH, UA 5     WBC, UA neg     Nitrite, UA neg     Protein, POC neg     Glucose, UA neg     Ketones, UA neg     Urobilinogen, UA neg     Bilirubin, POC neg     Blood, UA neg     Clarity, UA Clear     Spec Grav UA 1.020            Assessment/Diagnosis:    1. Interstitial cystitis        2. Recurrent UTI            Plans:    - I spent 30 minutes of the day of this encounter preparing for, treating and managing this patient. Extensive discussion with patient regarding the etiology and management of interstitial cystitis/bladder pain syndrome. We discussed that treatment options include conservative management first (e.g., behavioral modifications, diet, stress management). We discussed pelvic floor physical therapy as a second-line treatment which has grade A clinical evidence. We also discussed that there are several PO medications with weak evidence including Elmiron, Amitriptyline and Hyroxyzine.  In severe cases, hydro-distention with or without fulguration, intravesical Botox and neuro-stimulation have been shown to help. In refractory cases, there is a role for urinary diversion.  - Scheduled for cystoscopy with hydro-distention on 9/7/22 at the [facility] with anesthesia. All risks, benefits and alternatives discussed extensively. Urine dipstick negative yesterday.

## 2022-09-06 NOTE — DISCHARGE INSTRUCTIONS
After the procedure    Drink plenty of fluids.  You may have burning or light bleeding when you urinate--this is normal.  Medications may be prescribed to ease any discomfort or prevent infection. Take these as directed.  Call your doctor if you have heavy bleeding or blood clots, burning that lasts more than a day, a fever over 100°F  (38° C), or trouble urinating.    After Surgery:  Always be aware that any surgery can cause these symptoms:    Pain- Medication can be prescribed for pain to decrease your pain but may not completely take your pain away.  Over the Counter pain medicine my be enough and you can always use Ice and rest to help ease pain.    Bleeding- a little bleeding after a surgery is usually within normal.  If there is a lot of blood you need to notify your MD.  Emergency treatments of bleeding are cold application, elevation of the bleeding site and compression.    Infection- Infection after surgery is NOT a normal occurrence.  Signs of infection are fever, swelling, hot to touch the incision.  If this occurs notify your MD immediately.    Nausea- this can be common after a surgery especially if you have had anesthesia medicine or are taking pain medicine.  Staying on clear liquids, bland foods, gingerale, or over the counter anti nausea medicines can help.  If you vomit more than once, notify your MD.  Anti Nausea medicines can be prescribed.

## 2022-09-07 ENCOUNTER — ANESTHESIA (OUTPATIENT)
Dept: SURGERY | Facility: AMBULARY SURGERY CENTER | Age: 67
End: 2022-09-07
Payer: MEDICARE

## 2022-09-07 ENCOUNTER — ANESTHESIA EVENT (OUTPATIENT)
Dept: SURGERY | Facility: AMBULARY SURGERY CENTER | Age: 67
End: 2022-09-07
Payer: MEDICARE

## 2022-09-07 DIAGNOSIS — N39.0 RECURRENT UTI: Primary | ICD-10-CM

## 2022-09-12 ENCOUNTER — OFFICE VISIT (OUTPATIENT)
Dept: ORTHOPEDICS | Facility: CLINIC | Age: 67
End: 2022-09-12
Payer: MEDICARE

## 2022-09-12 VITALS
BODY MASS INDEX: 44.15 KG/M2 | DIASTOLIC BLOOD PRESSURE: 78 MMHG | SYSTOLIC BLOOD PRESSURE: 124 MMHG | WEIGHT: 265 LBS | HEIGHT: 65 IN

## 2022-09-12 DIAGNOSIS — M51.36 DISC DEGENERATION, LUMBAR: ICD-10-CM

## 2022-09-12 DIAGNOSIS — M43.28 FUSION OF SACRAL REGION OF SPINE: ICD-10-CM

## 2022-09-12 DIAGNOSIS — M43.16 SPONDYLOLISTHESIS OF LUMBAR REGION: Primary | ICD-10-CM

## 2022-09-12 DIAGNOSIS — M48.062 LUMBAR STENOSIS WITH NEUROGENIC CLAUDICATION: ICD-10-CM

## 2022-09-12 DIAGNOSIS — M47.816 LUMBAR FACET ARTHROPATHY: ICD-10-CM

## 2022-09-12 DIAGNOSIS — M54.16 LUMBAR RADICULITIS: ICD-10-CM

## 2022-09-12 PROCEDURE — 1101F PT FALLS ASSESS-DOCD LE1/YR: CPT | Mod: CPTII,S$GLB,, | Performed by: ORTHOPAEDIC SURGERY

## 2022-09-12 PROCEDURE — 3008F BODY MASS INDEX DOCD: CPT | Mod: CPTII,S$GLB,, | Performed by: ORTHOPAEDIC SURGERY

## 2022-09-12 PROCEDURE — 1160F RVW MEDS BY RX/DR IN RCRD: CPT | Mod: CPTII,S$GLB,, | Performed by: ORTHOPAEDIC SURGERY

## 2022-09-12 PROCEDURE — 3008F PR BODY MASS INDEX (BMI) DOCUMENTED: ICD-10-PCS | Mod: CPTII,S$GLB,, | Performed by: ORTHOPAEDIC SURGERY

## 2022-09-12 PROCEDURE — 3078F PR MOST RECENT DIASTOLIC BLOOD PRESSURE < 80 MM HG: ICD-10-PCS | Mod: CPTII,S$GLB,, | Performed by: ORTHOPAEDIC SURGERY

## 2022-09-12 PROCEDURE — 99214 PR OFFICE/OUTPT VISIT, EST, LEVL IV, 30-39 MIN: ICD-10-PCS | Mod: S$GLB,,, | Performed by: ORTHOPAEDIC SURGERY

## 2022-09-12 PROCEDURE — 3074F SYST BP LT 130 MM HG: CPT | Mod: CPTII,S$GLB,, | Performed by: ORTHOPAEDIC SURGERY

## 2022-09-12 PROCEDURE — 3074F PR MOST RECENT SYSTOLIC BLOOD PRESSURE < 130 MM HG: ICD-10-PCS | Mod: CPTII,S$GLB,, | Performed by: ORTHOPAEDIC SURGERY

## 2022-09-12 PROCEDURE — 1125F PR PAIN SEVERITY QUANTIFIED, PAIN PRESENT: ICD-10-PCS | Mod: CPTII,S$GLB,, | Performed by: ORTHOPAEDIC SURGERY

## 2022-09-12 PROCEDURE — 99214 OFFICE O/P EST MOD 30 MIN: CPT | Mod: S$GLB,,, | Performed by: ORTHOPAEDIC SURGERY

## 2022-09-12 PROCEDURE — 3051F HG A1C>EQUAL 7.0%<8.0%: CPT | Mod: CPTII,S$GLB,, | Performed by: ORTHOPAEDIC SURGERY

## 2022-09-12 PROCEDURE — 4010F PR ACE/ARB THEARPY RXD/TAKEN: ICD-10-PCS | Mod: CPTII,S$GLB,, | Performed by: ORTHOPAEDIC SURGERY

## 2022-09-12 PROCEDURE — 3288F PR FALLS RISK ASSESSMENT DOCUMENTED: ICD-10-PCS | Mod: CPTII,S$GLB,, | Performed by: ORTHOPAEDIC SURGERY

## 2022-09-12 PROCEDURE — 1159F PR MEDICATION LIST DOCUMENTED IN MEDICAL RECORD: ICD-10-PCS | Mod: CPTII,S$GLB,, | Performed by: ORTHOPAEDIC SURGERY

## 2022-09-12 PROCEDURE — 4010F ACE/ARB THERAPY RXD/TAKEN: CPT | Mod: CPTII,S$GLB,, | Performed by: ORTHOPAEDIC SURGERY

## 2022-09-12 PROCEDURE — 3051F PR MOST RECENT HEMOGLOBIN A1C LEVEL 7.0 - < 8.0%: ICD-10-PCS | Mod: CPTII,S$GLB,, | Performed by: ORTHOPAEDIC SURGERY

## 2022-09-12 PROCEDURE — 3288F FALL RISK ASSESSMENT DOCD: CPT | Mod: CPTII,S$GLB,, | Performed by: ORTHOPAEDIC SURGERY

## 2022-09-12 PROCEDURE — 1125F AMNT PAIN NOTED PAIN PRSNT: CPT | Mod: CPTII,S$GLB,, | Performed by: ORTHOPAEDIC SURGERY

## 2022-09-12 PROCEDURE — 3078F DIAST BP <80 MM HG: CPT | Mod: CPTII,S$GLB,, | Performed by: ORTHOPAEDIC SURGERY

## 2022-09-12 PROCEDURE — 1101F PR PT FALLS ASSESS DOC 0-1 FALLS W/OUT INJ PAST YR: ICD-10-PCS | Mod: CPTII,S$GLB,, | Performed by: ORTHOPAEDIC SURGERY

## 2022-09-12 PROCEDURE — 1160F PR REVIEW ALL MEDS BY PRESCRIBER/CLIN PHARMACIST DOCUMENTED: ICD-10-PCS | Mod: CPTII,S$GLB,, | Performed by: ORTHOPAEDIC SURGERY

## 2022-09-12 PROCEDURE — 1159F MED LIST DOCD IN RCRD: CPT | Mod: CPTII,S$GLB,, | Performed by: ORTHOPAEDIC SURGERY

## 2022-09-12 RX ORDER — MIRABEGRON 50 MG/1
1 TABLET, FILM COATED, EXTENDED RELEASE ORAL DAILY
COMMUNITY
Start: 2022-09-02 | End: 2022-11-01

## 2022-09-12 NOTE — PROGRESS NOTES
Subjective:       Patient ID: Edie Hernandez is a 67 y.o. female.    Chief Complaint: Pain of the Lumbar Spine (Patient is here to discuss lumbar surgery.)      History of Present Illness    Prior to meeting with the patient I reviewed the medical chart in [facility]. This included reviewing the previous progress notes from our office, review of the patient's last appointment with their primary care provider, review of any visits to the emergency room, and review of any pain management appointments or procedures.   Patient returns follow-up with continued complaints of chronic back pain symptoms radiate to both legs left greater than right    Current Medications  Current Outpatient Medications   Medication Sig Dispense Refill    azelastine (ASTELIN) 137 mcg (0.1 %) nasal spray 1 spray (137 mcg total) by Nasal route 2 (two) times daily. 30 mL 11    blood-glucose meter kit Use as instructed 1 each 0    clobetasol (TEMOVATE) 0.05 % cream Apply 1 application topically 2 (two) times daily. Apply to affected area 30 g 2    cyanocobalamin-cobamamide (B12) 5,000-100 mcg Lozg Place 1 tablet under the tongue once daily. 90 tablet 3    cyclobenzaprine (FLEXERIL) 5 MG tablet TAKE 1 TO 2 TABLETS BY MOUTH NIGHTLY AT BEDTIME AS NEEDED FOR PAIN 90 tablet prn    diclofenac sodium (VOLTAREN) 1 % Gel Apply 2 g topically 4 (four) times daily. 450 g 0    ergocalciferol (ERGOCALCIFEROL) 50,000 unit Cap TAKE 1 CAPSULE BY MOUTH ONE TIME PER WEEK 12 capsule 1    estradioL (ESTRACE) 0.01 % (0.1 mg/gram) vaginal cream Apply 1 fingertipful to vaginal area nightly x 2 weeks then use on MWF 42.5 g 3    fluticasone propionate (FLONASE) 50 mcg/actuation nasal spray 1 spray (50 mcg total) by Each Nostril route daily as needed for Allergies. 16 g prn    furosemide (LASIX) 20 MG tablet Take 1 tablet (20 mg total) by mouth daily as needed (leg swelling). 60 tablet 0    indomethacin (INDOCIN) 25 MG capsule TAKE ONE CAPSULE BY MOUTH 3 TIMES A DAY WITH MEALS  90 capsule 3    lancets (ACCU-CHEK MULTICLIX LANCET) Misc Test 2 x day 200 each 3    levothyroxine (SYNTHROID, LEVOTHROID) 175 MCG tablet Take 1 tablet (175 mcg total) by mouth once daily. 90 tablet 3    losartan (COZAAR) 50 MG tablet Take 1 tablet (50 mg total) by mouth once daily. 90 tablet 3    metFORMIN (GLUCOPHAGE-XR) 500 MG ER 24hr tablet Take 2 tablets (1,000 mg total) by mouth 2 (two) times daily with meals. 360 tablet 3    MYRBETRIQ 50 mg Tb24 Take 1 tablet by mouth once daily.      ONETOUCH ULTRA BLUE TEST STRIP Strp USE TO TEST BLOOD SUGAR 100 strip 3    pantoprazole (PROTONIX) 40 MG tablet Take 1 tablet (40 mg total) by mouth once daily. 90 tablet 3    pregabalin (LYRICA) 150 MG capsule TAKE ONE CAPSULE BY MOUTH TWICE DAILY 60 capsule 2    aspirin (ECOTRIN) 81 MG EC tablet Take 81 mg by mouth once daily.      methylphenidate (RITALIN LA) 40 MG 24 hr capsule Take 40 mg by mouth daily as needed. Not on at this time      methylphenidate HCl (RITALIN) 10 MG tablet        No current facility-administered medications for this visit.     Facility-Administered Medications Ordered in Other Visits   Medication Dose Route Frequency Provider Last Rate Last Admin    lactated ringers infusion   Intravenous Continuous Dejan Simmons MD   Stopped at 07/06/20 1008       Allergies  Review of patient's allergies indicates:   Allergen Reactions    Codeine      Other reaction(s): Nausea       Past Medical History  Past Medical History:   Diagnosis Date    Arthritis     Colon polyp, hyperplastic 1/13    recheck 5-10 years    Diabetes mellitus, type 2     Diverticulosis     Fatty liver     General anesthetics causing adverse effect in therapeutic use     woke up during tubal ligation    GERD (gastroesophageal reflux disease)     CLINT (obstructive sleep apnea)     C-pap    PONV (postoperative nausea and vomiting)     Thyroid disease     Wears partial dentures     UPPER       Surgical History  Past Surgical History:   Procedure  Laterality Date    COLONOSCOPY  02/14/2013    Dr. Vogel: repeat in 5-10 years    COLONOSCOPY W/ BIOPSIES AND POLYPECTOMY  02/2013    hyperplastic, recheck 5-10 years    EXTERNAL EAR SURGERY      HYSTERECTOMY      INJECTION OF ANESTHETIC AGENT AROUND MEDIAL BRANCH NERVES INNERVATING LUMBAR FACET JOINT Bilateral 8/3/2020    Procedure: Block-nerve-medial branch-lumbar left L2, L3, L4, L5;  Surgeon: Dejan Simmons MD;  [facility];  Service: Pain Management;  Laterality: Bilateral;    INJECTION OF ANESTHETIC AGENT AROUND MEDIAL BRANCH NERVES INNERVATING LUMBAR FACET JOINT Left 8/13/2020    Procedure: Block-nerve-medial branch-lumbar.  L2, L3, L4, and L5;  Surgeon: Dejan Simmons MD;  [facility];  Service: Pain Management;  Laterality: Left;    MINIMALLY INVASIVE FUSION OF SPINE Left 10/12/2021    Procedure: FUSION, SPINE, MINIMALLY INVASIVE;  Surgeon: Everton Godinez MD;  Location: [facility] OR;  Service: Orthopedics;  Laterality: Left;  SI-Bone    RADIOFREQUENCY ABLATION OF LUMBAR MEDIAL BRANCH NERVE AT SINGLE LEVEL Left 8/27/2020    Procedure: Radiofrequency Ablation, Nerve, Spinal, Lumbar, Medial Branch,  L2, L3, L4, L5;  Surgeon: Dejan Simmons MD;  [facility];  Service: Pain Management;  Laterality: Left;  L2,l3,l4,l5    THROAT SURGERY      for CLINT    TRANSFORAMINAL EPIDURAL INJECTION OF STEROID Left 3/6/2020    Procedure: Injection,steroid,epidural,transforaminal approach;  Surgeon: Harmeet Zapata MD;  [facility];  Service: Pain Management;  Laterality: Left;  L4-L5    TRANSFORAMINAL EPIDURAL INJECTION OF STEROID Left 6/10/2020    Procedure: Injection,steroid,epidural,transforaminal approach.L4 and L5;  Surgeon: Dejan Simmons MD;  Location: [facility] OR;  Service: Pain Management;  Laterality: Left;    TRANSFORAMINAL EPIDURAL INJECTION OF STEROID Left 7/6/2020    Procedure: Injection,steroid,epidural,transforaminal approach. left L4 and L5;  Surgeon: Dejan Simmons MD;  [facility];  Service: Pain  Management;  Laterality: Left;    TRANSFORAMINAL EPIDURAL INJECTION OF STEROID Left 11/12/2021    Procedure: Injection,steroid,epidural,transforaminal approach Left L5-S1;  Surgeon: Dejan Simmons MD;  Location: [facility] OR;  Service: Pain Management;  Laterality: Left;    UPPER GASTROINTESTINAL ENDOSCOPY         Family History:   Family History   Problem Relation Age of Onset    Hypertension Mother     Breast cancer Mother 50    Heart disease Father     Hypertension Father     Ovarian cancer Paternal Aunt     Macular degeneration Neg Hx     Retinal detachment Neg Hx     Glaucoma Neg Hx     Colon cancer Neg Hx     Colon polyps Neg Hx     Crohn's disease Neg Hx     Ulcerative colitis Neg Hx        Social History:   Social History     Socioeconomic History    Marital status:    Occupational History     Employer: [employer]   Tobacco Use    Smoking status: Never    Smokeless tobacco: Never   Substance and Sexual Activity    Alcohol use: No    Drug use: No    Sexual activity: Yes     Partners: Male       Hospitalization/Major Diagnostic Procedure:     Review of Systems     General/Constitutional:  Chills denies. Fatigue denies. Fever denies. Weight gain denies. Weight loss denies.    Respiratory:  Shortness of breath denies.    Cardiovascular:  Chest pain denies.    Gastrointestinal:  Constipation denies. Diarrhea denies. Nausea denies. Vomiting denies.     Hematology:  Easy bruising denies. Prolonged bleeding denies.     Genitourinary:  Frequent urination denies. Pain in lower back denies. Painful urination denies.     Musculoskeletal:  See HPI for details    Skin:  Rash denies.    Neurologic:  Dizziness denies. Gait abnormalities denies. Seizures denies. Tingling/Numbess denies.    Psychiatric:  Anxiety denies. Depressed mood denies.     Objective:   Vital Signs:   Vitals:    09/12/22 1046   BP: 124/78        Physical Exam      General Examination:     Constitutional: The patient is alert and  oriented to lace person and time. Mood is pleasant.     Head/Face: Normal facial features normal eyebrows    Eyes: Normal extraocular motion bilaterally    Lungs: Respirations are equal and unlabored    Gait is coordinated.    Cardiovascular: There are no swelling or varicosities present.    Lymphatic: Negative for adenopathy    Skin: Normal    Neurological: Level of consciousness normal. Oriented to place person and time and situation    Psychiatric: Oriented to time place person and situation    Patient stand erect has tenderness mild spasm bilaterally L3-S1 left side greater than right range of motion shows flexion 20° extension 0° bending 10° he side limited by pain in the seated position straight leg raising maneuver is positive on the right side there is subjective numbness in L4 and L5 nerve root distributions bilaterally left side greater than right motor exam shows grade 4 5 weakness of the bilateral anterior tibials and left quad.  No edema adenopathy varicosities  XRAY Report/ Interpretation:  AP and lateral x-rays of lumbar spine will performed today. Indications low back pain. Findings:  For rudimentary L5-S1 disc.  3-4 mm spondylolisthesis at what I am calling L4-5 and L3-4.  Flexion-extension lateral x-rays do show a reduction of the spondylolisthesis to a neutral position at both levels with active extension of the lumbar spine.    Lumbar MRI performed February 7, 2020 at [facility] was personally reviewed as evidence of a 3 mm and spondylolisthesis L3-4 and a 2 mm spondylolisthesis at L4-5 at those levels there is evidence of disc degeneration at L3-4 and L4-5 as well as central and lateral disc herniations combined with 4 facet joint arthritis.  I look at the actual images and agree with those diagnoses please note that there is a rudimentary L5-S1 disc      Assessment:       1. Spondylolisthesis of lumbar region    2. Lumbar stenosis with neurogenic claudication    3. Lumbar  radiculitis    4. Disc degeneration, lumbar    5. Lumbar facet arthropathy    6. Fusion of sacral region of spine        Plan:       Edie was seen today for pain.    Diagnoses and all orders for this visit:    Spondylolisthesis of lumbar region  -     X-Ray Lumbar Spine Flexion And Extension Only    Lumbar stenosis with neurogenic claudication  -     X-Ray Lumbar Spine Flexion And Extension Only    Lumbar radiculitis  -     X-Ray Lumbar Spine Ap And Lateral  -     X-Ray Lumbar Spine Flexion And Extension Only    Disc degeneration, lumbar  -     X-Ray Lumbar Spine Flexion And Extension Only    Lumbar facet arthropathy  -     X-Ray Lumbar Spine Flexion And Extension Only    Fusion of sacral region of spine  -     X-Ray Lumbar Spine Ap And Lateral  -     X-Ray Lumbar Spine Flexion And Extension Only       Follow up for sched surg.    Patient is doing well today.  She has persistent stenotic symptoms from the spondylolisthesis at L3-4 and L4-5 .  I with disc degeneration.  It is noted that she has a degenerative L5-S1 disc that is auto fused thus we are talking about the 2 mobile segments above the degenerated L5-S1 disc which car spine to the MRI description of the levels involved she clearly has instability at that level with reduction of the spondylolisthesis on standing flexion-extension lateral x-rays MRIs are consistent with symptomatic stenosis at L3-4 and L4-5 with disc degeneration and facet arthritis      She wishes to to to definitively attempt to resolve this problem  Surgery would consist of laminectomies of L3 and L4 with combined transforaminal lumbar interbody fusion and posterolateral fusions at L4-5 and L3-4 combined with posterolateral fusions and posterior instrumentation from L3-S1 the procedure risks benefits complications alternatives were discussed and realistic expectations of surgery    The technical aspects of the surgery were discussed in detail with the patient today. The patient was able  to verbalize an understanding. The procedure risk benefits alternatives and possible complications of the surgical procedure was discussed including expected outcomes. No guarantees were given regards to outcomes. Consent forms were will be signed at a later date. All questions regarding the surgery itself were answered. The patient wishes to proceed with surgery and will be scheduled. The patient may require preoperative medical clearance.    Treatment options were discussed with regards to the nature of the medical condition. Conservative pain intervention and surgical options were discussed in detail. The probability of success of each separate treatment option was discussed. The patient expressed a clear understanding of the treatment options. With regards to surgery, the procedure risk, benefits, complications, and outcomes were discussed. No guarantees were given with regards to surgical outcome.   The risk of complications, morbidity, and mortality of patient management decisions have been made at the time of this visit. These are associated with the patient's problems, diagnostic procedures and treatment options. This includes the possible management options selected and those considered but not selected by the patient after shared medical decision making we discussed with the patient.     This note was created using Dragon voice recognition software that occasionally misinterpreted phrases or words.

## 2022-09-18 ENCOUNTER — LAB VISIT (OUTPATIENT)
Dept: PRIMARY CARE CLINIC | Facility: CLINIC | Age: 67
End: 2022-09-18
Payer: MEDICARE

## 2022-09-18 DIAGNOSIS — N39.0 RECURRENT UTI: ICD-10-CM

## 2022-09-18 LAB — SARS-COV-2 RNA RESP QL NAA+PROBE: NOT DETECTED

## 2022-09-18 PROCEDURE — U0005 INFEC AGEN DETEC AMPLI PROBE: HCPCS | Performed by: UROLOGY

## 2022-09-18 PROCEDURE — U0003 INFECTIOUS AGENT DETECTION BY NUCLEIC ACID (DNA OR RNA); SEVERE ACUTE RESPIRATORY SYNDROME CORONAVIRUS 2 (SARS-COV-2) (CORONAVIRUS DISEASE [COVID-19]), AMPLIFIED PROBE TECHNIQUE, MAKING USE OF HIGH THROUGHPUT TECHNOLOGIES AS DESCRIBED BY CMS-2020-01-R: HCPCS | Performed by: UROLOGY

## 2022-09-19 DIAGNOSIS — Z01.810 PRE-OPERATIVE CARDIOVASCULAR EXAMINATION: Primary | ICD-10-CM

## 2022-09-20 ENCOUNTER — HOSPITAL ENCOUNTER (OUTPATIENT)
Dept: CARDIOLOGY | Facility: HOSPITAL | Age: 67
Discharge: HOME | End: 2022-09-20
Attending: ANESTHESIOLOGY
Payer: MEDICARE

## 2022-09-20 DIAGNOSIS — Z01.810 PRE-OPERATIVE CARDIOVASCULAR EXAMINATION: ICD-10-CM

## 2022-09-20 PROCEDURE — 93005 ELECTROCARDIOGRAM TRACING: CPT

## 2022-09-20 PROCEDURE — 93010 ELECTROCARDIOGRAM REPORT: CPT | Mod: ,,, | Performed by: SPECIALIST

## 2022-09-20 PROCEDURE — 93010 EKG 12-LEAD: ICD-10-PCS | Mod: ,,, | Performed by: SPECIALIST

## 2022-09-21 ENCOUNTER — HOSPITAL ENCOUNTER (OUTPATIENT)
Facility: AMBULARY SURGERY CENTER | Age: 67
Discharge: HOME | End: 2022-09-21
Attending: UROLOGY | Admitting: UROLOGY
Payer: MEDICARE

## 2022-09-21 DIAGNOSIS — N30.10 INTERSTITIAL CYSTITIS: Primary | ICD-10-CM

## 2022-09-21 DIAGNOSIS — N39.0 RECURRENT UTI: ICD-10-CM

## 2022-09-21 LAB — POCT GLUCOSE: 137 MG/DL (ref 70–110)

## 2022-09-21 PROCEDURE — D9220A PRA ANESTHESIA: ICD-10-PCS | Mod: CRNA,,, | Performed by: NURSE ANESTHETIST, CERTIFIED REGISTERED

## 2022-09-21 PROCEDURE — 52260 CYSTOSCOPY AND TREATMENT: CPT | Mod: ,,, | Performed by: UROLOGY

## 2022-09-21 PROCEDURE — D9220A PRA ANESTHESIA: Mod: CRNA,,, | Performed by: NURSE ANESTHETIST, CERTIFIED REGISTERED

## 2022-09-21 PROCEDURE — D9220A PRA ANESTHESIA: Mod: ANES,,, | Performed by: ANESTHESIOLOGY

## 2022-09-21 PROCEDURE — D9220A PRA ANESTHESIA: ICD-10-PCS | Mod: ANES,,, | Performed by: ANESTHESIOLOGY

## 2022-09-21 PROCEDURE — 52260 CYSTOSCOPY AND TREATMENT: CPT | Performed by: UROLOGY

## 2022-09-21 PROCEDURE — 52260 PR CYSTOSCOPY,DIL BLADDER,GEN ANESTH: ICD-10-PCS | Mod: ,,, | Performed by: UROLOGY

## 2022-09-21 RX ORDER — LIDOCAINE HYDROCHLORIDE 20 MG/ML
JELLY TOPICAL
Status: DISCONTINUED
Start: 2022-09-21 | End: 2022-09-21 | Stop reason: HOSPADM

## 2022-09-21 RX ORDER — PROMETHAZINE HYDROCHLORIDE 25 MG/ML
INJECTION, SOLUTION INTRAMUSCULAR; INTRAVENOUS
Status: DISCONTINUED | OUTPATIENT
Start: 2022-09-21 | End: 2022-09-21

## 2022-09-21 RX ORDER — DEXAMETHASONE SODIUM PHOSPHATE 4 MG/ML
INJECTION, SOLUTION INTRA-ARTICULAR; INTRALESIONAL; INTRAMUSCULAR; INTRAVENOUS; SOFT TISSUE
Status: DISCONTINUED | OUTPATIENT
Start: 2022-09-21 | End: 2022-09-21

## 2022-09-21 RX ORDER — SEVOFLURANE 250 ML/250ML
LIQUID RESPIRATORY (INHALATION)
Status: DISCONTINUED
Start: 2022-09-21 | End: 2022-09-21 | Stop reason: HOSPADM

## 2022-09-21 RX ORDER — LIDOCAINE HYDROCHLORIDE 10 MG/ML
1 INJECTION, SOLUTION EPIDURAL; INFILTRATION; INTRACAUDAL; PERINEURAL ONCE
Status: DISCONTINUED | OUTPATIENT
Start: 2022-09-21 | End: 2022-09-21 | Stop reason: HOSPADM

## 2022-09-21 RX ORDER — LIDOCAINE HCL/PF 100 MG/5ML
SYRINGE (ML) INTRAVENOUS
Status: DISCONTINUED | OUTPATIENT
Start: 2022-09-21 | End: 2022-09-21

## 2022-09-21 RX ORDER — PROPOFOL 10 MG/ML
INJECTION, EMULSION INTRAVENOUS
Status: DISCONTINUED | OUTPATIENT
Start: 2022-09-21 | End: 2022-09-21

## 2022-09-21 RX ORDER — SODIUM CHLORIDE, SODIUM LACTATE, POTASSIUM CHLORIDE, CALCIUM CHLORIDE 600; 310; 30; 20 MG/100ML; MG/100ML; MG/100ML; MG/100ML
INJECTION, SOLUTION INTRAVENOUS CONTINUOUS
Status: DISCONTINUED | OUTPATIENT
Start: 2022-09-21 | End: 2022-09-21 | Stop reason: HOSPADM

## 2022-09-21 RX ORDER — ONDANSETRON 2 MG/ML
INJECTION INTRAMUSCULAR; INTRAVENOUS
Status: DISCONTINUED | OUTPATIENT
Start: 2022-09-21 | End: 2022-09-21

## 2022-09-21 RX ORDER — SUCCINYLCHOLINE CHLORIDE 20 MG/ML
INJECTION INTRAMUSCULAR; INTRAVENOUS
Status: DISCONTINUED | OUTPATIENT
Start: 2022-09-21 | End: 2022-09-21

## 2022-09-21 RX ORDER — LIDOCAINE HYDROCHLORIDE 20 MG/ML
JELLY TOPICAL
Status: DISCONTINUED | OUTPATIENT
Start: 2022-09-21 | End: 2022-09-21 | Stop reason: HOSPADM

## 2022-09-21 RX ORDER — HYDROMORPHONE HYDROCHLORIDE 1 MG/ML
0.2 INJECTION, SOLUTION INTRAMUSCULAR; INTRAVENOUS; SUBCUTANEOUS EVERY 5 MIN PRN
Status: DISCONTINUED | OUTPATIENT
Start: 2022-09-21 | End: 2022-09-21 | Stop reason: HOSPADM

## 2022-09-21 RX ORDER — MIDAZOLAM HYDROCHLORIDE 1 MG/ML
INJECTION INTRAMUSCULAR; INTRAVENOUS
Status: DISCONTINUED | OUTPATIENT
Start: 2022-09-21 | End: 2022-09-21

## 2022-09-21 RX ORDER — OXYCODONE HYDROCHLORIDE 5 MG/1
5 TABLET ORAL
Status: DISCONTINUED | OUTPATIENT
Start: 2022-09-21 | End: 2022-09-21 | Stop reason: HOSPADM

## 2022-09-21 RX ORDER — FENTANYL CITRATE 50 UG/ML
25 INJECTION, SOLUTION INTRAMUSCULAR; INTRAVENOUS EVERY 5 MIN PRN
Status: DISCONTINUED | OUTPATIENT
Start: 2022-09-21 | End: 2022-09-21 | Stop reason: HOSPADM

## 2022-09-21 RX ORDER — FENTANYL CITRATE 50 UG/ML
INJECTION, SOLUTION INTRAMUSCULAR; INTRAVENOUS
Status: DISCONTINUED | OUTPATIENT
Start: 2022-09-21 | End: 2022-09-21

## 2022-09-21 RX ORDER — ROCURONIUM BROMIDE 10 MG/ML
INJECTION, SOLUTION INTRAVENOUS
Status: DISCONTINUED | OUTPATIENT
Start: 2022-09-21 | End: 2022-09-21

## 2022-09-21 RX ORDER — CEPHALEXIN 500 MG/1
500 CAPSULE ORAL EVERY 8 HOURS
Qty: 9 CAPSULE | Refills: 0 | Status: SHIPPED | OUTPATIENT
Start: 2022-09-21 | End: 2022-09-24

## 2022-09-21 RX ORDER — WATER 1 ML/ML
IRRIGANT IRRIGATION
Status: DISCONTINUED | OUTPATIENT
Start: 2022-09-21 | End: 2022-09-21 | Stop reason: HOSPADM

## 2022-09-21 RX ADMIN — MIDAZOLAM HYDROCHLORIDE 2 MG: 1 INJECTION INTRAMUSCULAR; INTRAVENOUS at 01:09

## 2022-09-21 RX ADMIN — FENTANYL CITRATE 25 MCG: 50 INJECTION, SOLUTION INTRAMUSCULAR; INTRAVENOUS at 01:09

## 2022-09-21 RX ADMIN — ONDANSETRON 4 MG: 2 INJECTION INTRAMUSCULAR; INTRAVENOUS at 01:09

## 2022-09-21 RX ADMIN — PROPOFOL 160 MG: 10 INJECTION, EMULSION INTRAVENOUS at 01:09

## 2022-09-21 RX ADMIN — DEXAMETHASONE SODIUM PHOSPHATE 4 MG: 4 INJECTION, SOLUTION INTRA-ARTICULAR; INTRALESIONAL; INTRAMUSCULAR; INTRAVENOUS; SOFT TISSUE at 01:09

## 2022-09-21 RX ADMIN — SODIUM CHLORIDE, SODIUM LACTATE, POTASSIUM CHLORIDE, CALCIUM CHLORIDE: 600; 310; 30; 20 INJECTION, SOLUTION INTRAVENOUS at 12:09

## 2022-09-21 RX ADMIN — PROMETHAZINE HYDROCHLORIDE 6.25 MG: 25 INJECTION, SOLUTION INTRAMUSCULAR; INTRAVENOUS at 01:09

## 2022-09-21 RX ADMIN — FENTANYL CITRATE 50 MCG: 50 INJECTION, SOLUTION INTRAMUSCULAR; INTRAVENOUS at 01:09

## 2022-09-21 RX ADMIN — SUCCINYLCHOLINE CHLORIDE 160 MG: 20 INJECTION INTRAMUSCULAR; INTRAVENOUS at 01:09

## 2022-09-21 RX ADMIN — ROCURONIUM BROMIDE 5 MG: 10 INJECTION, SOLUTION INTRAVENOUS at 01:09

## 2022-09-21 RX ADMIN — Medication 100 MG: at 01:09

## 2022-09-21 NOTE — ANESTHESIA PROCEDURE NOTES
Intubation    Date/Time: 9/21/2022 1:17 PM  Performed by: Estela Amaro CRNA  Authorized by: Medhat Santo MD     Intubation:     Induction:  Intravenous    Intubated:  Postinduction    Mask Ventilation:  Easy mask    Attempts:  1    Attempted By:  CRNA    Method of Intubation:  Direct    Blade:  Mckeon 3    Laryngeal View Grade: Grade I - full view of cords      Difficult Airway Encountered?: No      Complications:  None    Airway Device:  Oral endotracheal tube    Airway Device Size:  7.0    Style/Cuff Inflation:  Cuffed (inflated to minimal occlusive pressure)    Tube secured:  21    Secured at:  The lips    Placement Verified By:  Capnometry    Complicating Factors:  Obesity, short neck and oropharyngeal edema or fat    Findings Post-Intubation:  BS equal bilateral and atraumatic/condition of teeth unchanged

## 2022-09-21 NOTE — PLAN OF CARE
Patient is being driven home by her . She reapplied her dental appliance in recovery. She was able to dress herself and walk. She states she is ready to go home.Dr Chavez spoke with her post procedure.

## 2022-09-21 NOTE — OP NOTE
[facility]  Operative/Discharge Note    Date: 09/21/2022    Pre-Op Diagnosis: Interstitial cystitis    Post-Op Diagnosis: Same    Procedure(s) Performed:   1.  Cystoscopy with hydro-distention    Specimen(s): Urine culture    Staff Surgeon: Keely Chavez MD    Assistant Surgeon: Keely Chavez Jr, MD    Anesthesia: General    Indications: Edie Hernandez is a 67 y.o. female with IC managed in the past with hydro-distention.     Findings: Bladder filled with approximately 500 cc sterile water and held for over 5 minutes.     Estimated Blood Loss: min    Drains: None    Procedure in Detail:  After risks, benefits and possible complications of cystoscopy were explained, the patient elected to undergo the procedure and informed consent was obtained. All questions were answered in the jacob-operative area. The patient was transferred to the cystoscopy suite and placed in the supine position.  SCDs were applied and working.  MAC anesthesia was administered.  Once the patient was adequately sedated, she was placed in the dorsal lithotomy position and prepped and draped in the usual sterile fashion.  Time out was performed, jacob-procedural antibiotics were confirmed.     A rigid cystoscope in a 22 Fr sheath was introduced into the bladder per urethra. This passed easily.  The entire urethra was visualized which showed no masses or strictures.  The right and left ureteral orifices were identified in the normal anatomic position and were seen effluxing clear urine.  Formal cystoscopy was performed which revealed no masses or lesions suspicious for malignancy, no trabeculations, no bladder stones and no bladder diverticula.  The bladder was drained, and the patient was removed from lithotomy. The bladder was filled with 500 cc sterile water which appeared to be slightly above her capacity. This was held in the bladder for over 5 minutes and then drained. Scope removed.    The patient tolerated the procedure well and was  transferred to recovery in stable condition.    Disposition:  The patient will follow up with MARC Howell with Urogynecology in 6 weeks for symptom check.     Discharge home today status post uncomplicated procedure as above  Diet - resume home diet  Follow up: The patient will follow up with MARC Howell with Urogynecology in 6 weeks for symptom check.   Instructions: Home with Keflex x 3 days.   Meds:     Medication List        START taking these medications      cephALEXin 500 MG capsule  Commonly known as: KEFLEX  Take 1 capsule (500 mg total) by mouth every 8 (eight) hours. for 3 days            CONTINUE taking these medications      aspirin 81 MG EC tablet  Commonly known as: ECOTRIN     azelastine 137 mcg (0.1 %) nasal spray  Commonly known as: ASTELIN  1 spray (137 mcg total) by Nasal route 2 (two) times daily.     B12 5,000-100 mcg Lozg  Generic drug: cyanocobalamin-cobamamide  Place 1 tablet under the tongue once daily.     blood-glucose meter kit  Use as instructed     clobetasoL 0.05 % cream  Commonly known as: TEMOVATE  Apply 1 application topically 2 (two) times daily. Apply to affected area     cyclobenzaprine 5 MG tablet  Commonly known as: FLEXERIL  TAKE 1 TO 2 TABLETS BY MOUTH NIGHTLY AT BEDTIME AS NEEDED FOR PAIN     diclofenac sodium 1 % Gel  Commonly known as: VOLTAREN  Apply 2 g topically 4 (four) times daily.     ergocalciferol 50,000 unit Cap  Commonly known as: ERGOCALCIFEROL  TAKE 1 CAPSULE BY MOUTH ONE TIME PER WEEK     estradioL 0.01 % (0.1 mg/gram) vaginal cream  Commonly known as: ESTRACE  Apply 1 fingertipful to vaginal area nightly x 2 weeks then use on MWF     fluticasone propionate 50 mcg/actuation nasal spray  Commonly known as: FLONASE  1 spray (50 mcg total) by Each Nostril route daily as needed for Allergies.     furosemide 20 MG tablet  Commonly known as: LASIX  Take 1 tablet (20 mg total) by mouth daily as needed (leg swelling).     indomethacin 25 MG capsule  Commonly known  as: INDOCIN  TAKE ONE CAPSULE BY MOUTH 3 TIMES A DAY WITH MEALS     lancets Misc  Commonly known as: ACCU-CHEK MULTICLIX LANCET  Test 2 x day     levothyroxine 175 MCG tablet  Commonly known as: SYNTHROID, LEVOTHROID  Take 1 tablet (175 mcg total) by mouth once daily.     losartan 50 MG tablet  Commonly known as: COZAAR  Take 1 tablet (50 mg total) by mouth once daily.     metFORMIN 500 MG ER 24hr tablet  Commonly known as: GLUCOPHAGE-XR  Take 2 tablets (1,000 mg total) by mouth 2 (two) times daily with meals.     * methylphenidate HCl 40 MG 24 hr capsule  Commonly known as: RITALIN LA     * methylphenidate HCl 10 MG tablet  Commonly known as: RITALIN     MYRBETRIQ 50 mg Tb24  Generic drug: mirabegron     ONETOUCH ULTRA BLUE TEST STRIP Strp  Generic drug: blood sugar diagnostic  USE TO TEST BLOOD SUGAR     pantoprazole 40 MG tablet  Commonly known as: PROTONIX  Take 1 tablet (40 mg total) by mouth once daily.     pregabalin 150 MG capsule  Commonly known as: LYRICA  TAKE ONE CAPSULE BY MOUTH TWICE DAILY           * This list has 2 medication(s) that are the same as other medications prescribed for you. Read the directions carefully, and ask your doctor or other care provider to review them with you.                   Where to Get Your Medications        These medications were sent to [facility] - [facility] [address]  [address] [address]      Phone: [phone]   cephALEXin 500 MG capsule         Keely Chavez Jr, MD

## 2022-09-21 NOTE — ANESTHESIA PREPROCEDURE EVALUATION
09/21/2022  Edie Hernandez is a 67 y.o., female.      Pre-op Assessment    I have reviewed the Patient Summary Reports.     I have reviewed the Nursing Notes. I have reviewed the NPO Status.   I have reviewed the Medications.     Review of Systems  Anesthesia Hx:  Denies Family Hx of Anesthesia complications.  Personal Hx of Anesthesia complications, Post-Operative Nausea/Vomiting, in the past, but not with recent anesthetics / prophylaxis   Pulmonary:   Asthma Sleep Apnea, CPAP    Education provided regarding risk of obstructive sleep apnea     Hepatic/GI:   GERD, poorly controlled Liver Disease, Current nausea   Musculoskeletal:   Arthritis   Spine Disorders: lumbar Chronic Pain    Neurological:   Chronic Pain Syndrome   Endocrine:   Diabetes, type 2 Hypothyroidism  Morbid Obesity / BMI > 40      Physical Exam  General: Cooperative, Alert, Oriented and Anxious    Airway:  Mallampati: III / II  Mouth Opening: Normal  TM Distance: Normal  Tongue: Normal  Neck ROM: Normal ROM  Neck: Girth Increased    Dental:  Dentures    Chest/Lungs:  Normal Respiratory Rate    Heart:  Rate: Normal  Rhythm: Regular Rhythm        Anesthesia Plan  Type of Anesthesia, risks & benefits discussed:    Anesthesia Type: Gen ETT  Intra-op Monitoring Plan: Standard ASA Monitors  Post Op Pain Control Plan: multimodal analgesia  Induction:  IV  Airway Plan: Video, Post-Induction  Informed Consent: Informed consent signed with the Patient and all parties understand the risks and agree with anesthesia plan.  All questions answered.   ASA Score: 3    Ready For Surgery From Anesthesia Perspective.     .

## 2022-09-21 NOTE — TRANSFER OF CARE
"Anesthesia Transfer of Care Note    Patient: Edie Hernandez    Procedure(s) Performed: Procedure(s) (LRB):  CYSTOSCOPY, WITH BLADDER HYDRODISTENSION (N/A)    Patient location: PACU    Anesthesia Type: general    Transport from OR: Transported from OR on room air with adequate spontaneous ventilation    Post pain: adequate analgesia    Post assessment: no apparent anesthetic complications and tolerated procedure well    Post vital signs: stable    Level of consciousness: awake, alert and oriented    Nausea/Vomiting: no nausea/vomiting    Complications: none    Transfer of care protocol was followed      Last vitals:   Visit Vitals  /60 (BP Location: Right arm, Patient Position: Sitting)   Pulse 84   Temp 36.4 °C (97.5 °F) (Skin)   Resp 18   Ht 5' 5" (1.651 m)   Wt 120.2 kg (265 lb)   SpO2 99%   Breastfeeding No   BMI 44.10 kg/m²     "

## 2022-09-22 NOTE — ANESTHESIA POSTPROCEDURE EVALUATION
Anesthesia Post Evaluation    Patient: Edie Hernandez    Procedure(s) Performed: Procedure(s) (LRB):  CYSTOSCOPY, WITH BLADDER HYDRODISTENSION (N/A)    Final Anesthesia Type: general      Patient location during evaluation: PACU  Patient participation: Yes- Able to Participate  Level of consciousness: awake and alert  Post-procedure vital signs: reviewed and stable  Pain management: adequate  Airway patency: patent    PONV status at discharge: No PONV  Anesthetic complications: no      Cardiovascular status: blood pressure returned to baseline  Respiratory status: unassisted  Hydration status: euvolemic  Follow-up not needed.          Vitals Value Taken Time   BP na 09/22/22 0949   Temp na 09/22/22 0949   Pulse na 09/22/22 0949   Resp na 09/22/22 0949   SpO2 na 09/22/22 0949         Event Time   Out of Recovery 14:34:00         Pain/[place] Score: [place] Score: 10 (9/21/2022  2:32 PM)

## 2022-09-23 VITALS
HEIGHT: 65 IN | TEMPERATURE: 98 F | OXYGEN SATURATION: 97 % | SYSTOLIC BLOOD PRESSURE: 136 MMHG | DIASTOLIC BLOOD PRESSURE: 66 MMHG | BODY MASS INDEX: 44.15 KG/M2 | WEIGHT: 265 LBS | RESPIRATION RATE: 15 BRPM | HEART RATE: 79 BPM

## 2022-09-28 ENCOUNTER — OFFICE VISIT (OUTPATIENT)
Dept: ORTHOPEDICS | Facility: CLINIC | Age: 67
End: 2022-09-28
Payer: MEDICARE

## 2022-09-28 VITALS
WEIGHT: 265 LBS | BODY MASS INDEX: 44.15 KG/M2 | SYSTOLIC BLOOD PRESSURE: 132 MMHG | HEIGHT: 65 IN | DIASTOLIC BLOOD PRESSURE: 56 MMHG

## 2022-09-28 DIAGNOSIS — M48.062 LUMBAR STENOSIS WITH NEUROGENIC CLAUDICATION: ICD-10-CM

## 2022-09-28 DIAGNOSIS — M43.16 SPONDYLOLISTHESIS OF LUMBAR REGION: Primary | ICD-10-CM

## 2022-09-28 DIAGNOSIS — M54.16 LUMBAR RADICULITIS: ICD-10-CM

## 2022-09-28 DIAGNOSIS — M51.36 DISC DEGENERATION, LUMBAR: ICD-10-CM

## 2022-09-28 DIAGNOSIS — M47.816 LUMBAR FACET ARTHROPATHY: ICD-10-CM

## 2022-09-28 PROCEDURE — 1125F PR PAIN SEVERITY QUANTIFIED, PAIN PRESENT: ICD-10-PCS | Mod: CPTII,S$GLB,, | Performed by: ORTHOPAEDIC SURGERY

## 2022-09-28 PROCEDURE — 3288F PR FALLS RISK ASSESSMENT DOCUMENTED: ICD-10-PCS | Mod: CPTII,S$GLB,, | Performed by: ORTHOPAEDIC SURGERY

## 2022-09-28 PROCEDURE — 99213 PR OFFICE/OUTPT VISIT, EST, LEVL III, 20-29 MIN: ICD-10-PCS | Mod: S$GLB,,, | Performed by: ORTHOPAEDIC SURGERY

## 2022-09-28 PROCEDURE — 3075F SYST BP GE 130 - 139MM HG: CPT | Mod: CPTII,S$GLB,, | Performed by: ORTHOPAEDIC SURGERY

## 2022-09-28 PROCEDURE — 3008F BODY MASS INDEX DOCD: CPT | Mod: CPTII,S$GLB,, | Performed by: ORTHOPAEDIC SURGERY

## 2022-09-28 PROCEDURE — 1159F MED LIST DOCD IN RCRD: CPT | Mod: CPTII,S$GLB,, | Performed by: ORTHOPAEDIC SURGERY

## 2022-09-28 PROCEDURE — 3288F FALL RISK ASSESSMENT DOCD: CPT | Mod: CPTII,S$GLB,, | Performed by: ORTHOPAEDIC SURGERY

## 2022-09-28 PROCEDURE — 3078F PR MOST RECENT DIASTOLIC BLOOD PRESSURE < 80 MM HG: ICD-10-PCS | Mod: CPTII,S$GLB,, | Performed by: ORTHOPAEDIC SURGERY

## 2022-09-28 PROCEDURE — 3051F HG A1C>EQUAL 7.0%<8.0%: CPT | Mod: CPTII,S$GLB,, | Performed by: ORTHOPAEDIC SURGERY

## 2022-09-28 PROCEDURE — 1160F PR REVIEW ALL MEDS BY PRESCRIBER/CLIN PHARMACIST DOCUMENTED: ICD-10-PCS | Mod: CPTII,S$GLB,, | Performed by: ORTHOPAEDIC SURGERY

## 2022-09-28 PROCEDURE — 1160F RVW MEDS BY RX/DR IN RCRD: CPT | Mod: CPTII,S$GLB,, | Performed by: ORTHOPAEDIC SURGERY

## 2022-09-28 PROCEDURE — 1125F AMNT PAIN NOTED PAIN PRSNT: CPT | Mod: CPTII,S$GLB,, | Performed by: ORTHOPAEDIC SURGERY

## 2022-09-28 PROCEDURE — 4010F ACE/ARB THERAPY RXD/TAKEN: CPT | Mod: CPTII,S$GLB,, | Performed by: ORTHOPAEDIC SURGERY

## 2022-09-28 PROCEDURE — 3008F PR BODY MASS INDEX (BMI) DOCUMENTED: ICD-10-PCS | Mod: CPTII,S$GLB,, | Performed by: ORTHOPAEDIC SURGERY

## 2022-09-28 PROCEDURE — 1159F PR MEDICATION LIST DOCUMENTED IN MEDICAL RECORD: ICD-10-PCS | Mod: CPTII,S$GLB,, | Performed by: ORTHOPAEDIC SURGERY

## 2022-09-28 PROCEDURE — 4010F PR ACE/ARB THEARPY RXD/TAKEN: ICD-10-PCS | Mod: CPTII,S$GLB,, | Performed by: ORTHOPAEDIC SURGERY

## 2022-09-28 PROCEDURE — 1101F PT FALLS ASSESS-DOCD LE1/YR: CPT | Mod: CPTII,S$GLB,, | Performed by: ORTHOPAEDIC SURGERY

## 2022-09-28 PROCEDURE — 3051F PR MOST RECENT HEMOGLOBIN A1C LEVEL 7.0 - < 8.0%: ICD-10-PCS | Mod: CPTII,S$GLB,, | Performed by: ORTHOPAEDIC SURGERY

## 2022-09-28 PROCEDURE — 1101F PR PT FALLS ASSESS DOC 0-1 FALLS W/OUT INJ PAST YR: ICD-10-PCS | Mod: CPTII,S$GLB,, | Performed by: ORTHOPAEDIC SURGERY

## 2022-09-28 PROCEDURE — 3075F PR MOST RECENT SYSTOLIC BLOOD PRESS GE 130-139MM HG: ICD-10-PCS | Mod: CPTII,S$GLB,, | Performed by: ORTHOPAEDIC SURGERY

## 2022-09-28 PROCEDURE — 99213 OFFICE O/P EST LOW 20 MIN: CPT | Mod: S$GLB,,, | Performed by: ORTHOPAEDIC SURGERY

## 2022-09-28 PROCEDURE — 3078F DIAST BP <80 MM HG: CPT | Mod: CPTII,S$GLB,, | Performed by: ORTHOPAEDIC SURGERY

## 2022-09-28 RX ORDER — GLIPIZIDE 5 MG/1
TABLET ORAL
COMMUNITY
Start: 2022-09-15 | End: 2022-10-05

## 2022-09-28 RX ORDER — DIAZEPAM 10 MG/1
TABLET ORAL
Qty: 1 TABLET | Refills: 0 | Status: SHIPPED | OUTPATIENT
Start: 2022-09-28 | End: 2022-11-01

## 2022-09-28 NOTE — PROGRESS NOTES
Subjective:       Patient ID: Edie Hernandez is a 67 y.o. female.    Chief Complaint: Pain of the Lumbar Spine (Patient is here to discuss back surgery.)      History of Present Illness    Prior to meeting with the patient I reviewed the medical chart in [facility]. This included reviewing the previous progress notes from our office, review of the patient's last appointment with their primary care provider, review of any visits to the emergency room, and review of any pain management appointments or procedures.  Patient returns for follow-up continued complaints of back pain and bilateral leg pain he discuss further surgery.  She has heard about the possibility of having surgery done from the side.  A back pain goes both legs.  The symptoms on a constant daily basis worse with activity.  The limit her physical activity.       Current Medications  Current Outpatient Medications   Medication Sig Dispense Refill    azelastine (ASTELIN) 137 mcg (0.1 %) nasal spray 1 spray (137 mcg total) by Nasal route 2 (two) times daily. 30 mL 11    blood-glucose meter kit Use as instructed 1 each 0    clobetasol (TEMOVATE) 0.05 % cream Apply 1 application topically 2 (two) times daily. Apply to affected area 30 g 2    cyanocobalamin-cobamamide (B12) 5,000-100 mcg Lozg Place 1 tablet under the tongue once daily. 90 tablet 3    cyclobenzaprine (FLEXERIL) 5 MG tablet TAKE 1 TO 2 TABLETS BY MOUTH NIGHTLY AT BEDTIME AS NEEDED FOR PAIN 90 tablet prn    diclofenac sodium (VOLTAREN) 1 % Gel Apply 2 g topically 4 (four) times daily. 450 g 0    ergocalciferol (ERGOCALCIFEROL) 50,000 unit Cap TAKE 1 CAPSULE BY MOUTH ONE TIME PER WEEK 12 capsule 1    estradioL (ESTRACE) 0.01 % (0.1 mg/gram) vaginal cream Apply 1 fingertipful to vaginal area nightly x 2 weeks then use on MWF 42.5 g 3    fluticasone propionate (FLONASE) 50 mcg/actuation nasal spray 1 spray (50 mcg total) by Each Nostril route daily as needed for Allergies. 16 g prn    furosemide  (LASIX) 20 MG tablet Take 1 tablet (20 mg total) by mouth daily as needed (leg swelling). 60 tablet 0    indomethacin (INDOCIN) 25 MG capsule TAKE ONE CAPSULE BY MOUTH 3 TIMES A DAY WITH MEALS 90 capsule 3    lancets (ACCU-CHEK MULTICLIX LANCET) Misc Test 2 x day 200 each 3    levothyroxine (SYNTHROID, LEVOTHROID) 175 MCG tablet Take 1 tablet (175 mcg total) by mouth once daily. 90 tablet 3    losartan (COZAAR) 50 MG tablet Take 1 tablet (50 mg total) by mouth once daily. 90 tablet 3    metFORMIN (GLUCOPHAGE-XR) 500 MG ER 24hr tablet TAKE TWO TABLETS BY MOUTH TWICE DAILY WITH MEALS 360 tablet 2    methylphenidate (RITALIN LA) 40 MG 24 hr capsule Take 40 mg by mouth daily as needed. Not on at this time      methylphenidate HCl (RITALIN) 10 MG tablet       MYRBETRIQ 50 mg Tb24 Take 1 tablet by mouth once daily.      ONETOUCH ULTRA BLUE TEST STRIP Strp USE TO TEST BLOOD SUGAR 100 strip 3    pantoprazole (PROTONIX) 40 MG tablet Take 1 tablet (40 mg total) by mouth once daily. 90 tablet 3    pregabalin (LYRICA) 150 MG capsule TAKE ONE CAPSULE BY MOUTH TWICE DAILY 60 capsule 2    aspirin (ECOTRIN) 81 MG EC tablet Take 81 mg by mouth once daily.      diazePAM (VALIUM) 10 MG Tab Take 1 tab po 30 minutes prior to test. 1 tablet 0    glipiZIDE (GLUCOTROL) 5 MG tablet        No current facility-administered medications for this visit.     Facility-Administered Medications Ordered in Other Visits   Medication Dose Route Frequency Provider Last Rate Last Admin    lactated ringers infusion   Intravenous Continuous Dejan Simmons MD   Stopped at 07/06/20 1008       Allergies  Review of patient's allergies indicates:   Allergen Reactions    Codeine      Other reaction(s): Nausea       Past Medical History  Past Medical History:   Diagnosis Date    Arthritis     Colon polyp, hyperplastic 1/13    recheck 5-10 years    Diabetes mellitus, type 2     Diverticulosis     Fatty liver     General anesthetics causing adverse effect in  therapeutic use     woke up during tubal ligation    GERD (gastroesophageal reflux disease)     CLINT (obstructive sleep apnea)     C-pap    PONV (postoperative nausea and vomiting)     Thyroid disease     Wears partial dentures     UPPER       Surgical History  Past Surgical History:   Procedure Laterality Date    COLONOSCOPY  02/14/2013    Dr. Vogel: repeat in 5-10 years    COLONOSCOPY W/ BIOPSIES AND POLYPECTOMY  02/2013    hyperplastic, recheck 5-10 years    CYSTOSCOPY WITH HYDRODISTENSION OF BLADDER N/A 9/21/2022    Procedure: CYSTOSCOPY, WITH BLADDER HYDRODISTENSION;  Surgeon: Keely Chavez Jr., MD;  Location: [facility] OR;  Service: Urology;  Laterality: N/A;    EXTERNAL EAR SURGERY      HYSTERECTOMY      INJECTION OF ANESTHETIC AGENT AROUND MEDIAL BRANCH NERVES INNERVATING LUMBAR FACET JOINT Bilateral 8/3/2020    Procedure: Block-nerve-medial branch-lumbar left L2, L3, L4, L5;  Surgeon: Dejan Simmons MD;  Location: [facility];  Service: Pain Management;  Laterality: Bilateral;    INJECTION OF ANESTHETIC AGENT AROUND MEDIAL BRANCH NERVES INNERVATING LUMBAR FACET JOINT Left 8/13/2020    Procedure: Block-nerve-medial branch-lumbar.  L2, L3, L4, and L5;  Surgeon: Dejan Simmons MD;  Location: [facility];  Service: Pain Management;  Laterality: Left;    MINIMALLY INVASIVE FUSION OF SPINE Left 10/12/2021    Procedure: FUSION, SPINE, MINIMALLY INVASIVE;  Surgeon: Everton Godinez MD;  Location: [facility] OR;  Service: Orthopedics;  Laterality: Left;  SI-Bone    RADIOFREQUENCY ABLATION OF LUMBAR MEDIAL BRANCH NERVE AT SINGLE LEVEL Left 8/27/2020    Procedure: Radiofrequency Ablation, Nerve, Spinal, Lumbar, Medial Branch,  L2, L3, L4, L5;  Surgeon: Dejan Simmons MD;  Location: [facility] OR;  Service: Pain Management;  Laterality: Left;  L2,l3,l4,l5    THROAT SURGERY      for CLINT    TRANSFORAMINAL EPIDURAL INJECTION OF STEROID Left 3/6/2020    Procedure: Injection,steroid,epidural,transforaminal approach;  Surgeon: Harmeet Zapata MD;  Location:  [facility] OR;  Service: Pain Management;  Laterality: Left;  L4-L5    TRANSFORAMINAL EPIDURAL INJECTION OF STEROID Left 6/10/2020    Procedure: Injection,steroid,epidural,transforaminal approach.L4 and L5;  Surgeon: Dejan Simmons MD;  Location: [facility] OR;  Service: Pain Management;  Laterality: Left;    TRANSFORAMINAL EPIDURAL INJECTION OF STEROID Left 7/6/2020    Procedure: Injection,steroid,epidural,transforaminal approach. left L4 and L5;  Surgeon: Dejan Simmons MD;  Location: [facility] OR;  Service: Pain Management;  Laterality: Left;    TRANSFORAMINAL EPIDURAL INJECTION OF STEROID Left 11/12/2021    Procedure: Injection,steroid,epidural,transforaminal approach Left L5-S1;  Surgeon: Dejan Simmons MD;  Location: [facility] OR;  Service: Pain Management;  Laterality: Left;    UPPER GASTROINTESTINAL ENDOSCOPY         Family History:   Family History   Problem Relation Age of Onset    Hypertension Mother     Breast cancer Mother 50    Heart disease Father     Hypertension Father     Ovarian cancer Paternal Aunt     Macular degeneration Neg Hx     Retinal detachment Neg Hx     Glaucoma Neg Hx     Colon cancer Neg Hx     Colon polyps Neg Hx     Crohn's disease Neg Hx     Ulcerative colitis Neg Hx        Social History:   Social History     Socioeconomic History    Marital status:    Occupational History     Employer: [employer]   Tobacco Use    Smoking status: Never    Smokeless tobacco: Never   Substance and Sexual Activity    Alcohol use: No    Drug use: No    Sexual activity: Yes     Partners: Male       Hospitalization/Major Diagnostic Procedure:     Review of Systems     General/Constitutional:  Chills denies. Fatigue denies. Fever denies. Weight gain denies. Weight loss denies.    Respiratory:  Shortness of breath denies.    Cardiovascular:  Chest pain denies.    Gastrointestinal:  Constipation denies. Diarrhea denies. Nausea denies. Vomiting denies.     Hematology:  Easy bruising denies. Prolonged  bleeding denies.     Genitourinary:  Frequent urination denies. Pain in lower back denies. Painful urination denies.     Musculoskeletal:  See HPI for details    Skin:  Rash denies.    Neurologic:  Dizziness denies. Gait abnormalities denies. Seizures denies. Tingling/Numbess denies.    Psychiatric:  Anxiety denies. Depressed mood denies.     Objective:   Vital Signs:   Vitals:    09/28/22 0907   BP: (!) 132/56        Physical Exam      General Examination:     Constitutional: The patient is alert and oriented to lace person and time. Mood is pleasant.     Head/Face: Normal facial features normal eyebrows    Eyes: Normal extraocular motion bilaterally    Lungs: Respirations are equal and unlabored    Gait is coordinated.    Cardiovascular: There are no swelling or varicosities present.    Lymphatic: Negative for adenopathy    Skin: Normal    Neurological: Level of consciousness normal. Oriented to place person and time and situation    Psychiatric: Oriented to time place person and situation        Her physical examination is unchanged from last visit.  Patient stand erect has tenderness mild spasm bilaterally L3-S1 left side greater than right range of motion shows flexion 20° extension 0° bending 10° he side limited by pain in the seated position straight leg raising maneuver is positive on the right side there is subjective numbness in L4 and L5 nerve root distributions bilaterally left side greater than right motor exam shows grade 4 5 weakness of the bilateral anterior tibials and left quad.  No edema adenopathy varicosities      XRAY Report/ Interpretation:    AP and lateral x-rays of lumbar spine will performed today. Indications low back pain. Findings:  For rudimentary L5-S1 disc.  3-4 mm spondylolisthesis at what I am calling L4-5 and L3-4.      Flexion-extension lateral x-rays do show a reduction of the spondylolisthesis to a neutral position at both levels with active extension of the lumbar spine.      Lumbar MRI performed February 7, 2020 at [facility] was personally reviewed as evidence of a 3 mm and spondylolisthesis L3-4 and a 2 mm spondylolisthesis at L4-5 at those levels there is evidence of disc degeneration at L3-4 and L4-5 as well as central and lateral disc herniations combined with 4 facet joint arthritis.  I look at the actual images and agree with those diagnoses please note that there is a rudimentary L5-S1 disc            Assessment:       1. Spondylolisthesis of lumbar region    2. Lumbar stenosis with neurogenic claudication    3. Lumbar radiculitis    4. Disc degeneration, lumbar    5. Lumbar facet arthropathy        Plan:       Edie was seen today for pain.    Diagnoses and all orders for this visit:    Spondylolisthesis of lumbar region  -     MRI Lumbar Spine Without Contrast; Future  -     diazePAM (VALIUM) 10 MG Tab; Take 1 tab po 30 minutes prior to test.    Lumbar stenosis with neurogenic claudication  -     MRI Lumbar Spine Without Contrast; Future  -     diazePAM (VALIUM) 10 MG Tab; Take 1 tab po 30 minutes prior to test.    Lumbar radiculitis  -     diazePAM (VALIUM) 10 MG Tab; Take 1 tab po 30 minutes prior to test.    Disc degeneration, lumbar  -     MRI Lumbar Spine Without Contrast; Future  -     diazePAM (VALIUM) 10 MG Tab; Take 1 tab po 30 minutes prior to test.    Lumbar facet arthropathy  -     diazePAM (VALIUM) 10 MG Tab; Take 1 tab po 30 minutes prior to test.       Follow up for MRI Lumbar Results, to discuss surgery.    I have explained the patient the pros and cons of posterior only surgery versus combined lateral and posterior surgery in her case after further consideration I think a combined procedure would give us a better fit for the interbody device and considering her body habitus technically I believe will be easier to get a properly sized interbody device.  I notice that her MRI is over 2 years old we recommended an updated MRI of the lumbar  spine to make sure that she has not developed any adjacent segment problems other than what we had discussed in the past.       Patient is doing well today.  She has persistent stenotic symptoms from the spondylolisthesis at L3-4 and L4-5 .  I with disc degeneration.  It is noted that she has a degenerative L5-S1 disc that is auto fused thus we are talking about the 2 mobile segments above the degenerated L5-S1 disc which car spine to the MRI description of the levels involved she clearly has instability at that level with reduction of the spondylolisthesis on standing flexion-extension lateral x-rays MRIs are consistent with symptomatic stenosis at L3-4 and L4-5 with disc degeneration and facet arthritis       She wishes to to to definitively attempt to resolve this problem    Surgery would consist consist of a combined procedure with the assistance of Dr. Steve Palm.  We would 1st do lateral interbody fusions and extra cavitary approach to the L3-4 and L4-5 levels with infuse bone morphogenic protein and interbody devices inserted laterally.  Next we would perform laminectomies of L3 and L4 to address the spondylolisthesis and with nerve compression followed by segmental instrumentation L3-L5 and posterolateral fusions L3-L5 with pedicle screws.    the procedure risks benefits complications alternatives were discussed and realistic expectations of surgery     The recommendation for this type of surgery Mace change depending on the updated MRI.    The technical aspects of the surgery were discussed in detail with the patient today. The patient was able to verbalize an understanding. The procedure risk benefits alternatives and possible complications of the surgical procedure was discussed including expected outcomes. No guarantees were given regards to outcomes. Consent forms were will be signed at a later date. All questions regarding the surgery itself were answered. The patient wishes to proceed with  surgery and will be scheduled. The patient may require preoperative medical clearance.  Treatment options were discussed with regards to the nature of the medical condition. Conservative pain intervention and surgical options were discussed in detail. The probability of success of each separate treatment option was discussed. The patient expressed a clear understanding of the treatment options. With regards to surgery, the procedure risk, benefits, complications, and outcomes were discussed. No guarantees were given with regards to surgical outcome.   The risk of complications, morbidity, and mortality of patient management decisions have been made at the time of this visit. These are associated with the patient's problems, diagnostic procedures and treatment options. This includes the possible management options selected and those considered but not selected by the patient after shared medical decision making we discussed with the patient.     This note was created using Dragon voice recognition software that occasionally misinterpreted phrases or words.

## 2022-10-03 ENCOUNTER — TELEPHONE (OUTPATIENT)
Dept: UROGYNECOLOGY | Facility: CLINIC | Age: 67
End: 2022-10-03
Payer: MEDICARE

## 2022-10-03 NOTE — TELEPHONE ENCOUNTER
Spoke to pt and she states that she has been taking the ditropan 15 mg daily  but does not have any refills. Informed I do not see this on the chart.  States the Myrbetriq and the Gemtessa was too expensive. But has been taking the ditropan daily and it has been helping would like refill sent to pharmacy

## 2022-10-04 ENCOUNTER — PATIENT MESSAGE (OUTPATIENT)
Dept: UROGYNECOLOGY | Facility: CLINIC | Age: 67
End: 2022-10-04
Payer: MEDICARE

## 2022-10-04 ENCOUNTER — PATIENT MESSAGE (OUTPATIENT)
Dept: FAMILY MEDICINE | Facility: CLINIC | Age: 67
End: 2022-10-04
Payer: MEDICARE

## 2022-10-04 DIAGNOSIS — G89.29 CHRONIC BACK PAIN GREATER THAN 3 MONTHS DURATION: ICD-10-CM

## 2022-10-04 DIAGNOSIS — M54.9 CHRONIC BACK PAIN GREATER THAN 3 MONTHS DURATION: ICD-10-CM

## 2022-10-04 RX ORDER — OXYBUTYNIN CHLORIDE 15 MG/1
15 TABLET, EXTENDED RELEASE ORAL DAILY
Qty: 30 TABLET | Refills: 6 | Status: SHIPPED | OUTPATIENT
Start: 2022-10-04 | End: 2023-04-06 | Stop reason: SDUPTHER

## 2022-10-05 RX ORDER — HYDROCODONE BITARTRATE AND ACETAMINOPHEN 7.5; 325 MG/1; MG/1
1 TABLET ORAL
Qty: 30 TABLET | Refills: 0 | Status: SHIPPED | OUTPATIENT
Start: 2022-10-05 | End: 2022-11-07 | Stop reason: SDUPTHER

## 2022-10-05 NOTE — TELEPHONE ENCOUNTER
LOV 08/2022  NOV 11/2022    Pt requests refill on Norco. Unable to pend. Please review and advise.

## 2022-10-11 ENCOUNTER — PATIENT MESSAGE (OUTPATIENT)
Dept: ADMINISTRATIVE | Facility: HOSPITAL | Age: 67
End: 2022-10-11
Payer: MEDICARE

## 2022-10-13 ENCOUNTER — HOSPITAL ENCOUNTER (OUTPATIENT)
Dept: RADIOLOGY | Facility: HOSPITAL | Age: 67
Discharge: HOME | End: 2022-10-13
Attending: ORTHOPAEDIC SURGERY
Payer: MEDICARE

## 2022-10-13 DIAGNOSIS — M51.36 DISC DEGENERATION, LUMBAR: ICD-10-CM

## 2022-10-13 DIAGNOSIS — M48.062 LUMBAR STENOSIS WITH NEUROGENIC CLAUDICATION: ICD-10-CM

## 2022-10-13 DIAGNOSIS — M43.16 SPONDYLOLISTHESIS OF LUMBAR REGION: ICD-10-CM

## 2022-10-13 PROCEDURE — 72148 MRI LUMBAR SPINE W/O DYE: CPT | Mod: TC,PO

## 2022-10-17 ENCOUNTER — OFFICE VISIT (OUTPATIENT)
Dept: ORTHOPEDICS | Facility: CLINIC | Age: 67
End: 2022-10-17
Payer: MEDICARE

## 2022-10-17 VITALS
DIASTOLIC BLOOD PRESSURE: 84 MMHG | HEIGHT: 65 IN | SYSTOLIC BLOOD PRESSURE: 128 MMHG | WEIGHT: 265 LBS | BODY MASS INDEX: 44.15 KG/M2

## 2022-10-17 DIAGNOSIS — M48.062 LUMBAR STENOSIS WITH NEUROGENIC CLAUDICATION: ICD-10-CM

## 2022-10-17 DIAGNOSIS — M51.36 DISC DEGENERATION, LUMBAR: ICD-10-CM

## 2022-10-17 DIAGNOSIS — M47.816 LUMBAR FACET ARTHROPATHY: ICD-10-CM

## 2022-10-17 DIAGNOSIS — M54.16 LUMBAR RADICULITIS: ICD-10-CM

## 2022-10-17 DIAGNOSIS — M43.16 SPONDYLOLISTHESIS OF LUMBAR REGION: Primary | ICD-10-CM

## 2022-10-17 DIAGNOSIS — M43.28 FUSION OF SACRAL REGION OF SPINE: ICD-10-CM

## 2022-10-17 PROCEDURE — 1125F PR PAIN SEVERITY QUANTIFIED, PAIN PRESENT: ICD-10-PCS | Mod: CPTII,S$GLB,, | Performed by: ORTHOPAEDIC SURGERY

## 2022-10-17 PROCEDURE — 3051F PR MOST RECENT HEMOGLOBIN A1C LEVEL 7.0 - < 8.0%: ICD-10-PCS | Mod: CPTII,S$GLB,, | Performed by: ORTHOPAEDIC SURGERY

## 2022-10-17 PROCEDURE — 99213 OFFICE O/P EST LOW 20 MIN: CPT | Mod: S$GLB,,, | Performed by: ORTHOPAEDIC SURGERY

## 2022-10-17 PROCEDURE — 3051F HG A1C>EQUAL 7.0%<8.0%: CPT | Mod: CPTII,S$GLB,, | Performed by: ORTHOPAEDIC SURGERY

## 2022-10-17 PROCEDURE — 99213 PR OFFICE/OUTPT VISIT, EST, LEVL III, 20-29 MIN: ICD-10-PCS | Mod: S$GLB,,, | Performed by: ORTHOPAEDIC SURGERY

## 2022-10-17 PROCEDURE — 1160F RVW MEDS BY RX/DR IN RCRD: CPT | Mod: CPTII,S$GLB,, | Performed by: ORTHOPAEDIC SURGERY

## 2022-10-17 PROCEDURE — 1159F PR MEDICATION LIST DOCUMENTED IN MEDICAL RECORD: ICD-10-PCS | Mod: CPTII,S$GLB,, | Performed by: ORTHOPAEDIC SURGERY

## 2022-10-17 PROCEDURE — 4010F PR ACE/ARB THEARPY RXD/TAKEN: ICD-10-PCS | Mod: CPTII,S$GLB,, | Performed by: ORTHOPAEDIC SURGERY

## 2022-10-17 PROCEDURE — 3079F PR MOST RECENT DIASTOLIC BLOOD PRESSURE 80-89 MM HG: ICD-10-PCS | Mod: CPTII,S$GLB,, | Performed by: ORTHOPAEDIC SURGERY

## 2022-10-17 PROCEDURE — 1101F PT FALLS ASSESS-DOCD LE1/YR: CPT | Mod: CPTII,S$GLB,, | Performed by: ORTHOPAEDIC SURGERY

## 2022-10-17 PROCEDURE — 1160F PR REVIEW ALL MEDS BY PRESCRIBER/CLIN PHARMACIST DOCUMENTED: ICD-10-PCS | Mod: CPTII,S$GLB,, | Performed by: ORTHOPAEDIC SURGERY

## 2022-10-17 PROCEDURE — 4010F ACE/ARB THERAPY RXD/TAKEN: CPT | Mod: CPTII,S$GLB,, | Performed by: ORTHOPAEDIC SURGERY

## 2022-10-17 PROCEDURE — 3074F PR MOST RECENT SYSTOLIC BLOOD PRESSURE < 130 MM HG: ICD-10-PCS | Mod: CPTII,S$GLB,, | Performed by: ORTHOPAEDIC SURGERY

## 2022-10-17 PROCEDURE — 1159F MED LIST DOCD IN RCRD: CPT | Mod: CPTII,S$GLB,, | Performed by: ORTHOPAEDIC SURGERY

## 2022-10-17 PROCEDURE — 1101F PR PT FALLS ASSESS DOC 0-1 FALLS W/OUT INJ PAST YR: ICD-10-PCS | Mod: CPTII,S$GLB,, | Performed by: ORTHOPAEDIC SURGERY

## 2022-10-17 PROCEDURE — 3079F DIAST BP 80-89 MM HG: CPT | Mod: CPTII,S$GLB,, | Performed by: ORTHOPAEDIC SURGERY

## 2022-10-17 PROCEDURE — 3288F PR FALLS RISK ASSESSMENT DOCUMENTED: ICD-10-PCS | Mod: CPTII,S$GLB,, | Performed by: ORTHOPAEDIC SURGERY

## 2022-10-17 PROCEDURE — 1125F AMNT PAIN NOTED PAIN PRSNT: CPT | Mod: CPTII,S$GLB,, | Performed by: ORTHOPAEDIC SURGERY

## 2022-10-17 PROCEDURE — 3074F SYST BP LT 130 MM HG: CPT | Mod: CPTII,S$GLB,, | Performed by: ORTHOPAEDIC SURGERY

## 2022-10-17 PROCEDURE — 3288F FALL RISK ASSESSMENT DOCD: CPT | Mod: CPTII,S$GLB,, | Performed by: ORTHOPAEDIC SURGERY

## 2022-10-17 NOTE — PROGRESS NOTES
Subjective:       Patient ID: Edie Hernandez is a 67 y.o. female.    Chief Complaint: Pain of the Lumbar Spine (Here to discuss MRI lumbar results, has pins/needles and numbness in legs and feet)      History of Present Illness    Prior to meeting with the patient I reviewed the medical chart in [facility]. This included reviewing the previous progress notes from our office, review of the patient's last appointment with their primary care provider, review of any visits to the emergency room, and review of any pain management appointments or procedures.   Patient is here follow-up for persistent/chronic back pain with bilateral lower extremity radiculitis dysesthesia.  She has an updated lumbar MRI to review.  Patient has persistent complaints of back pain that radiates to both legs with neurogenic claudication symptoms of increasing leg pain with prolonged standing walking she has undergone an updated MRI since the prior MRI was performed in 2020 it is noted that she has a sacralization of L5 for normal inclination purposes.  Current Medications  Current Outpatient Medications   Medication Sig Dispense Refill    aspirin (ECOTRIN) 81 MG EC tablet Take 81 mg by mouth once daily.      azelastine (ASTELIN) 137 mcg (0.1 %) nasal spray 1 spray (137 mcg total) by Nasal route 2 (two) times daily. 30 mL 11    clobetasol (TEMOVATE) 0.05 % cream Apply 1 application topically 2 (two) times daily. Apply to affected area 30 g 2    cyanocobalamin-cobamamide (B12) 5,000-100 mcg Lozg Place 1 tablet under the tongue once daily. 90 tablet 3    cyclobenzaprine (FLEXERIL) 5 MG tablet TAKE 1 TO 2 TABLETS BY MOUTH NIGHTLY AT BEDTIME AS NEEDED FOR PAIN 90 tablet prn    diazePAM (VALIUM) 10 MG Tab Take 1 tab po 30 minutes prior to test. 1 tablet 0    diclofenac sodium (VOLTAREN) 1 % Gel Apply 2 g topically 4 (four) times daily. 450 g 0    ergocalciferol (ERGOCALCIFEROL) 50,000 unit Cap TAKE 1 CAPSULE BY MOUTH ONE TIME PER WEEK 12 capsule 1     estradioL (ESTRACE) 0.01 % (0.1 mg/gram) vaginal cream Apply 1 fingertipful to vaginal area nightly x 2 weeks then use on MWF 42.5 g 3    fluticasone propionate (FLONASE) 50 mcg/actuation nasal spray 1 spray (50 mcg total) by Each Nostril route daily as needed for Allergies. 16 g prn    furosemide (LASIX) 20 MG tablet Take 1 tablet (20 mg total) by mouth daily as needed (leg swelling). 60 tablet 0    HYDROcodone-acetaminophen (NORCO) 7.5-325 mg per tablet Take 1 tablet by mouth every 24 hours as needed for Pain. 30 tablet 0    indomethacin (INDOCIN) 25 MG capsule TAKE ONE CAPSULE BY MOUTH 3 TIMES A DAY WITH MEALS 90 capsule 3    lancets (ACCU-CHEK MULTICLIX LANCET) Misc Test 2 x day 200 each 3    levothyroxine (SYNTHROID, LEVOTHROID) 175 MCG tablet Take 1 tablet (175 mcg total) by mouth once daily. 90 tablet 3    losartan (COZAAR) 50 MG tablet TAKE ONE TABLET (50 MG TOTAL) BY MOUTH ONCE DAILY 90 tablet 2    metFORMIN (GLUCOPHAGE-XR) 500 MG ER 24hr tablet TAKE TWO TABLETS BY MOUTH TWICE DAILY WITH MEALS 360 tablet 2    methylphenidate (RITALIN LA) 40 MG 24 hr capsule Take 40 mg by mouth daily as needed. Not on at this time      methylphenidate HCl (RITALIN) 10 MG tablet       MYRBETRIQ 50 mg Tb24 Take 1 tablet by mouth once daily.      ONETOUCH ULTRA BLUE TEST STRIP Strp USE TO TEST BLOOD SUGAR 100 strip 3    oxybutynin (DITROPAN XL) 15 MG TR24 Take 1 tablet (15 mg total) by mouth once daily. 30 tablet 06    pantoprazole (PROTONIX) 40 MG tablet Take 1 tablet (40 mg total) by mouth once daily. 90 tablet 3    pregabalin (LYRICA) 150 MG capsule TAKE ONE CAPSULE BY MOUTH TWICE DAILY 60 capsule 2    blood-glucose meter kit Use as instructed 1 each 0     No current facility-administered medications for this visit.     Facility-Administered Medications Ordered in Other Visits   Medication Dose Route Frequency Provider Last Rate Last Admin    lactated ringers infusion   Intravenous Continuous Dejan Simmons MD   Stopped at  07/06/20 1008       Allergies  Review of patient's allergies indicates:   Allergen Reactions    Codeine      Other reaction(s): Nausea       Past Medical History  Past Medical History:   Diagnosis Date    Arthritis     Colon polyp, hyperplastic 1/13    recheck 5-10 years    Diabetes mellitus, type 2     Diverticulosis     Fatty liver     General anesthetics causing adverse effect in therapeutic use     woke up during tubal ligation    GERD (gastroesophageal reflux disease)     CLINT (obstructive sleep apnea)     C-pap    PONV (postoperative nausea and vomiting)     Thyroid disease     Wears partial dentures     UPPER       Surgical History  Past Surgical History:   Procedure Laterality Date    COLONOSCOPY  02/14/2013    Dr. Vogel: repeat in 5-10 years    COLONOSCOPY W/ BIOPSIES AND POLYPECTOMY  02/2013    hyperplastic, recheck 5-10 years    CYSTOSCOPY WITH HYDRODISTENSION OF BLADDER N/A 9/21/2022    Procedure: CYSTOSCOPY, WITH BLADDER HYDRODISTENSION;  Surgeon: Keely Chavez Jr., MD;  Location: [facility] OR;  Service: Urology;  Laterality: N/A;    EXTERNAL EAR SURGERY      HYSTERECTOMY      INJECTION OF ANESTHETIC AGENT AROUND MEDIAL BRANCH NERVES INNERVATING LUMBAR FACET JOINT Bilateral 8/3/2020    Procedure: Block-nerve-medial branch-lumbar left L2, L3, L4, L5;  Surgeon: Dejan Simmons MD;  Location: [facility] OR;  Service: Pain Management;  Laterality: Bilateral;    INJECTION OF ANESTHETIC AGENT AROUND MEDIAL BRANCH NERVES INNERVATING LUMBAR FACET JOINT Left 8/13/2020    Procedure: Block-nerve-medial branch-lumbar.  L2, L3, L4, and L5;  Surgeon: Dejan Simmons MD;  Location: [facility] OR;  Service: Pain Management;  Laterality: Left;    MINIMALLY INVASIVE FUSION OF SPINE Left 10/12/2021    Procedure: FUSION, SPINE, MINIMALLY INVASIVE;  Surgeon: Everton Godinez MD;  Location: [facility] OR;  Service: Orthopedics;  Laterality: Left;  SI-Bone    RADIOFREQUENCY ABLATION OF LUMBAR MEDIAL BRANCH NERVE AT SINGLE LEVEL Left 8/27/2020     Procedure: Radiofrequency Ablation, Nerve, Spinal, Lumbar, Medial Branch,  L2, L3, L4, L5;  Surgeon: Dejan Simmons MD;  Location: [facility] OR;  Service: Pain Management;  Laterality: Left;  L2,l3,l4,l5    THROAT SURGERY      for CLINT    TRANSFORAMINAL EPIDURAL INJECTION OF STEROID Left 3/6/2020    Procedure: Injection,steroid,epidural,transforaminal approach;  Surgeon: Harmeet Zapata MD;  Location: [facility] OR;  Service: Pain Management;  Laterality: Left;  L4-L5    TRANSFORAMINAL EPIDURAL INJECTION OF STEROID Left 6/10/2020    Procedure: Injection,steroid,epidural,transforaminal approach.L4 and L5;  Surgeon: Dejan Simmons MD;  Location: [facility] OR;  Service: Pain Management;  Laterality: Left;    TRANSFORAMINAL EPIDURAL INJECTION OF STEROID Left 7/6/2020    Procedure: Injection,steroid,epidural,transforaminal approach. left L4 and L5;  Surgeon: Dejan Simmons MD;  Location: [facility] OR;  Service: Pain Management;  Laterality: Left;    TRANSFORAMINAL EPIDURAL INJECTION OF STEROID Left 11/12/2021    Procedure: Injection,steroid,epidural,transforaminal approach Left L5-S1;  Surgeon: Dejan Simmons MD;  Location: [facility] OR;  Service: Pain Management;  Laterality: Left;    UPPER GASTROINTESTINAL ENDOSCOPY         Family History:   Family History   Problem Relation Age of Onset    Hypertension Mother     Breast cancer Mother 50    Heart disease Father     Hypertension Father     Ovarian cancer Paternal Aunt     Macular degeneration Neg Hx     Retinal detachment Neg Hx     Glaucoma Neg Hx     Colon cancer Neg Hx     Colon polyps Neg Hx     Crohn's disease Neg Hx     Ulcerative colitis Neg Hx        Social History:   Social History     Socioeconomic History    Marital status:    Occupational History     Employer: [employer]   Tobacco Use    Smoking status: Never    Smokeless tobacco: Never   Substance and Sexual Activity    Alcohol use: No    Drug use: No    Sexual activity: Yes     Partners: Male        Hospitalization/Major Diagnostic Procedure:     Review of Systems     General/Constitutional:  Chills denies. Fatigue denies. Fever denies. Weight gain denies. Weight loss denies.    Respiratory:  Shortness of breath denies.    Cardiovascular:  Chest pain denies.    Gastrointestinal:  Constipation denies. Diarrhea denies. Nausea denies. Vomiting denies.     Hematology:  Easy bruising denies. Prolonged bleeding denies.     Genitourinary:  Frequent urination denies. Pain in lower back denies. Painful urination denies.     Musculoskeletal:  See HPI for details    Skin:  Rash denies.    Neurologic:  Dizziness denies. Gait abnormalities denies. Seizures denies. Tingling/Numbess denies.    Psychiatric:  Anxiety denies. Depressed mood denies.     Objective:   Vital Signs:   Vitals:    10/17/22 0955   BP: 128/84        Physical Exam      General Examination:     Constitutional: The patient is alert and oriented to lace person and time. Mood is pleasant.     Head/Face: Normal facial features normal eyebrows    Eyes: Normal extraocular motion bilaterally    Lungs: Respirations are equal and unlabored    Gait is coordinated.    Cardiovascular: There are no swelling or varicosities present.    Lymphatic: Negative for adenopathy    Skin: Normal    Neurological: Level of consciousness normal. Oriented to place person and time and situation    Psychiatric: Oriented to time place person and situation      Her physical examination is unchanged from last visit.  Patient stand erect has tenderness mild spasm bilaterally L3-S1 left side greater than right range of motion shows flexion 20° extension 0° bending 10° he side limited by pain in the seated position straight leg raising maneuver is positive on the right side there is subjective numbness in L4 and L5 nerve root distributions bilaterally left side greater than right motor exam shows grade 4 5 weakness of the bilateral anterior tibials and left quad.  No edema adenopathy  varicosities     XRAY Report/ Interpretation:   Lumbar MRI performed October 13th was purple personally reviewed.  For normal plate purposes it should be noted that they have tolerated only for lumbar vertebra with actuality this lady has 5 lumbar vertebra however L5 is fully sacralized.  This most recent MRI sites evidence of stenosis at the L2-3 and L3-4 levels where as per normal placement purposes as I have reviewed the prior x-rays and MRI studies I term knees levels L3-4 and L4-5.  Details    Reading Physician Reading Date Result Priority   Anh Johnson MD  [phone] 10/13/2022      Narrative & Impression  MRI of the lumbar spine     Clinical history low back pain     There is a transitional L5 vertebral body with sacralization. There are four nonrib-bearing vertebral bodies. The films are labeled accordingly a transitional L5 vertebral body.  The lumbar spine is in satisfactory alignment. The vertebral bodies are of normal height. The intervertebral disc spaces are maintained. There is no marrow edema.     The conus medullaris is normal in appearance and ends at T12-L1.     At T11-T12 there is a right lateral osteophyte. There is no central canal stenosis or foraminal narrowing.     At T12-L1 there is no significant abnormality     At L1 to their is facet hypertrophy resulting in mild left foraminal narrowing. There is no central canal stenosis.     At L2-3 there is broad-based disc bulge and facet hypertrophy resulting in mild central canal stenosis and bilateral foraminal narrowing     At L3-4 there is shallow disc bulge with facet hypertrophy resulting in mild central canal stenosis and bilateral foraminal narrowing, right greater than left. Correlation with right L3 radicular symptoms is recommended     At L4-5 there is facet hypertrophy without central canal stenosis or foraminal narrowing     At L5-S1 there is no significant abnormality     There is instrumented fusion of the left SI joints.  The visualized portion of the sacrum is unremarkable. The paraspinous soft tissues are normal.     IMPRESSION: Four nonrib-bearing vertebral bodies with sacralization of the L5 vertebral body. Films are labeled accordingly     Broad-based disc bulge with facet hypertrophy at L2-3 resulting in mild central canal stenosis and bilateral foraminal narrowing     Mild central canal stenosis and bilateral foraminal narrowing at L3-4, right greater than left. Correlation with right L3 radicular symptoms is recommended.     Mild left foraminal narrowing at L1-2     Electronically signed by:  Anh Johnson MD  10/13/2022 9:35 AM CDT Workstation: [id number]           Specimen Collected:                Assessment:       1. Spondylolisthesis of lumbar region    2. Lumbar stenosis with neurogenic claudication    3. Lumbar radiculitis    4. Disc degeneration, lumbar    5. Lumbar facet arthropathy    6. Fusion of sacral region of spine        Plan:       Edie was seen today for pain.    Diagnoses and all orders for this visit:    Spondylolisthesis of lumbar region    Lumbar stenosis with neurogenic claudication    Lumbar radiculitis    Disc degeneration, lumbar    Lumbar facet arthropathy    Fusion of sacral region of spine       No follow-ups on file.    Recommend proceeding with L3-4 and L4-5 APLIF as previously discussed.  She has failed conservative measures.  She does have a transitional L5 level but has stenosis at what I will term L3-4 and L4-5 with spondylolisthesis showing some dynamic movement.  We have talked about the posterior only procedure versus a combined lateral and posterior procedure in considering the her anatomy and body habitus I think we would get a better result from a lateral interbody fusion extra cavitary with the assistance of Dr. Steve Palm to insert interbody devices at L3-4 and L4-5 with use of bone morphogenic protein.  This will be followed by complete laminectomies of L3 and L4 posterior  segment instrumentation for L3-L5 posterolateral fusion from L3-L5.    The technical aspects of the surgery were discussed in detail with the patient today. The patient was able to verbalize an understanding. The procedure risk benefits alternatives and possible complications of the surgical procedure was discussed including expected outcomes. No guarantees were given regards to outcomes. Consent forms were will be signed at a later date. All questions regarding the surgery itself were answered. The patient wishes to proceed with surgery and will be scheduled. The patient may require preoperative medical clearance.    Treatment options were discussed with regards to the nature of the medical condition. Conservative pain intervention and surgical options were discussed in detail. The probability of success of each separate treatment option was discussed. The patient expressed a clear understanding of the treatment options. With regards to surgery, the procedure risk, benefits, complications, and outcomes were discussed. No guarantees were given with regards to surgical outcome.   The risk of complications, morbidity, and mortality of patient management decisions have been made at the time of this visit. These are associated with the patient's problems, diagnostic procedures and treatment options. This includes the possible management options selected and those considered but not selected by the patient after shared medical decision making we discussed with the patient.     This note was created using Dragon voice recognition software that occasionally misinterpreted phrases or words.

## 2022-10-17 NOTE — H&P (VIEW-ONLY)
Subjective:       Patient ID: Edie Hernandez is a 67 y.o. female.    Chief Complaint: Pain of the Lumbar Spine (Here to discuss MRI lumbar results, has pins/needles and numbness in legs and feet)      History of Present Illness    Prior to meeting with the patient I reviewed the medical chart in [facility]. This included reviewing the previous progress notes from our office, review of the patient's last appointment with their primary care provider, review of any visits to the emergency room, and review of any pain management appointments or procedures.   Patient is here follow-up for persistent/chronic back pain with bilateral lower extremity radiculitis dysesthesia.  She has an updated lumbar MRI to review.  Patient has persistent complaints of back pain that radiates to both legs with neurogenic claudication symptoms of increasing leg pain with prolonged standing walking she has undergone an updated MRI since the prior MRI was performed in 2020 it is noted that she has a sacralization of L5 for normal inclination purposes.  Current Medications  Current Outpatient Medications   Medication Sig Dispense Refill    aspirin (ECOTRIN) 81 MG EC tablet Take 81 mg by mouth once daily.      azelastine (ASTELIN) 137 mcg (0.1 %) nasal spray 1 spray (137 mcg total) by Nasal route 2 (two) times daily. 30 mL 11    clobetasol (TEMOVATE) 0.05 % cream Apply 1 application topically 2 (two) times daily. Apply to affected area 30 g 2    cyanocobalamin-cobamamide (B12) 5,000-100 mcg Lozg Place 1 tablet under the tongue once daily. 90 tablet 3    cyclobenzaprine (FLEXERIL) 5 MG tablet TAKE 1 TO 2 TABLETS BY MOUTH NIGHTLY AT BEDTIME AS NEEDED FOR PAIN 90 tablet prn    diazePAM (VALIUM) 10 MG Tab Take 1 tab po 30 minutes prior to test. 1 tablet 0    diclofenac sodium (VOLTAREN) 1 % Gel Apply 2 g topically 4 (four) times daily. 450 g 0    ergocalciferol (ERGOCALCIFEROL) 50,000 unit Cap TAKE 1 CAPSULE BY MOUTH ONE TIME PER WEEK 12 capsule 1     estradioL (ESTRACE) 0.01 % (0.1 mg/gram) vaginal cream Apply 1 fingertipful to vaginal area nightly x 2 weeks then use on MWF 42.5 g 3    fluticasone propionate (FLONASE) 50 mcg/actuation nasal spray 1 spray (50 mcg total) by Each Nostril route daily as needed for Allergies. 16 g prn    furosemide (LASIX) 20 MG tablet Take 1 tablet (20 mg total) by mouth daily as needed (leg swelling). 60 tablet 0    HYDROcodone-acetaminophen (NORCO) 7.5-325 mg per tablet Take 1 tablet by mouth every 24 hours as needed for Pain. 30 tablet 0    indomethacin (INDOCIN) 25 MG capsule TAKE ONE CAPSULE BY MOUTH 3 TIMES A DAY WITH MEALS 90 capsule 3    lancets (ACCU-CHEK MULTICLIX LANCET) Misc Test 2 x day 200 each 3    levothyroxine (SYNTHROID, LEVOTHROID) 175 MCG tablet Take 1 tablet (175 mcg total) by mouth once daily. 90 tablet 3    losartan (COZAAR) 50 MG tablet TAKE ONE TABLET (50 MG TOTAL) BY MOUTH ONCE DAILY 90 tablet 2    metFORMIN (GLUCOPHAGE-XR) 500 MG ER 24hr tablet TAKE TWO TABLETS BY MOUTH TWICE DAILY WITH MEALS 360 tablet 2    methylphenidate (RITALIN LA) 40 MG 24 hr capsule Take 40 mg by mouth daily as needed. Not on at this time      methylphenidate HCl (RITALIN) 10 MG tablet       MYRBETRIQ 50 mg Tb24 Take 1 tablet by mouth once daily.      ONETOUCH ULTRA BLUE TEST STRIP Strp USE TO TEST BLOOD SUGAR 100 strip 3    oxybutynin (DITROPAN XL) 15 MG TR24 Take 1 tablet (15 mg total) by mouth once daily. 30 tablet 06    pantoprazole (PROTONIX) 40 MG tablet Take 1 tablet (40 mg total) by mouth once daily. 90 tablet 3    pregabalin (LYRICA) 150 MG capsule TAKE ONE CAPSULE BY MOUTH TWICE DAILY 60 capsule 2    blood-glucose meter kit Use as instructed 1 each 0     No current facility-administered medications for this visit.     Facility-Administered Medications Ordered in Other Visits   Medication Dose Route Frequency Provider Last Rate Last Admin    lactated ringers infusion   Intravenous Continuous Dejan Simmons MD   Stopped at  07/06/20 1008       Allergies  Review of patient's allergies indicates:   Allergen Reactions    Codeine      Other reaction(s): Nausea       Past Medical History  Past Medical History:   Diagnosis Date    Arthritis     Colon polyp, hyperplastic 1/13    recheck 5-10 years    Diabetes mellitus, type 2     Diverticulosis     Fatty liver     General anesthetics causing adverse effect in therapeutic use     woke up during tubal ligation    GERD (gastroesophageal reflux disease)     CLINT (obstructive sleep apnea)     C-pap    PONV (postoperative nausea and vomiting)     Thyroid disease     Wears partial dentures     UPPER       Surgical History  Past Surgical History:   Procedure Laterality Date    COLONOSCOPY  02/14/2013    Dr. Vogel: repeat in 5-10 years    COLONOSCOPY W/ BIOPSIES AND POLYPECTOMY  02/2013    hyperplastic, recheck 5-10 years    CYSTOSCOPY WITH HYDRODISTENSION OF BLADDER N/A 9/21/2022    Procedure: CYSTOSCOPY, WITH BLADDER HYDRODISTENSION;  Surgeon: Keely Chavez Jr., MD;  Location: [facility] OR;  Service: Urology;  Laterality: N/A;    EXTERNAL EAR SURGERY      HYSTERECTOMY      INJECTION OF ANESTHETIC AGENT AROUND MEDIAL BRANCH NERVES INNERVATING LUMBAR FACET JOINT Bilateral 8/3/2020    Procedure: Block-nerve-medial branch-lumbar left L2, L3, L4, L5;  Surgeon: Dejan Simmons MD;  Location: [facility] OR;  Service: Pain Management;  Laterality: Bilateral;    INJECTION OF ANESTHETIC AGENT AROUND MEDIAL BRANCH NERVES INNERVATING LUMBAR FACET JOINT Left 8/13/2020    Procedure: Block-nerve-medial branch-lumbar.  L2, L3, L4, and L5;  Surgeon: Dejan Simmons MD;  Location: [facility] OR;  Service: Pain Management;  Laterality: Left;    MINIMALLY INVASIVE FUSION OF SPINE Left 10/12/2021    Procedure: FUSION, SPINE, MINIMALLY INVASIVE;  Surgeon: Everton Godinez MD;  Location: [facility] OR;  Service: Orthopedics;  Laterality: Left;  SI-Bone    RADIOFREQUENCY ABLATION OF LUMBAR MEDIAL BRANCH NERVE AT SINGLE LEVEL Left 8/27/2020     Procedure: Radiofrequency Ablation, Nerve, Spinal, Lumbar, Medial Branch,  L2, L3, L4, L5;  Surgeon: Dejan Simmons MD;  Location: [facility] OR;  Service: Pain Management;  Laterality: Left;  L2,l3,l4,l5    THROAT SURGERY      for CLINT    TRANSFORAMINAL EPIDURAL INJECTION OF STEROID Left 3/6/2020    Procedure: Injection,steroid,epidural,transforaminal approach;  Surgeon: Harmeet Zapata MD;  Location: [facility] OR;  Service: Pain Management;  Laterality: Left;  L4-L5    TRANSFORAMINAL EPIDURAL INJECTION OF STEROID Left 6/10/2020    Procedure: Injection,steroid,epidural,transforaminal approach.L4 and L5;  Surgeon: Dejan Simmons MD;  Location: [facility] OR;  Service: Pain Management;  Laterality: Left;    TRANSFORAMINAL EPIDURAL INJECTION OF STEROID Left 7/6/2020    Procedure: Injection,steroid,epidural,transforaminal approach. left L4 and L5;  Surgeon: Dejan Simmons MD;  Location: [facility] OR;  Service: Pain Management;  Laterality: Left;    TRANSFORAMINAL EPIDURAL INJECTION OF STEROID Left 11/12/2021    Procedure: Injection,steroid,epidural,transforaminal approach Left L5-S1;  Surgeon: Dejan Simmons MD;  Location: [facility] OR;  Service: Pain Management;  Laterality: Left;    UPPER GASTROINTESTINAL ENDOSCOPY         Family History:   Family History   Problem Relation Age of Onset    Hypertension Mother     Breast cancer Mother 50    Heart disease Father     Hypertension Father     Ovarian cancer Paternal Aunt     Macular degeneration Neg Hx     Retinal detachment Neg Hx     Glaucoma Neg Hx     Colon cancer Neg Hx     Colon polyps Neg Hx     Crohn's disease Neg Hx     Ulcerative colitis Neg Hx        Social History:   Social History     Socioeconomic History    Marital status:    Occupational History     Employer: [employer]   Tobacco Use    Smoking status: Never    Smokeless tobacco: Never   Substance and Sexual Activity    Alcohol use: No    Drug use: No    Sexual activity: Yes     Partners: Male        Hospitalization/Major Diagnostic Procedure:     Review of Systems     General/Constitutional:  Chills denies. Fatigue denies. Fever denies. Weight gain denies. Weight loss denies.    Respiratory:  Shortness of breath denies.    Cardiovascular:  Chest pain denies.    Gastrointestinal:  Constipation denies. Diarrhea denies. Nausea denies. Vomiting denies.     Hematology:  Easy bruising denies. Prolonged bleeding denies.     Genitourinary:  Frequent urination denies. Pain in lower back denies. Painful urination denies.     Musculoskeletal:  See HPI for details    Skin:  Rash denies.    Neurologic:  Dizziness denies. Gait abnormalities denies. Seizures denies. Tingling/Numbess denies.    Psychiatric:  Anxiety denies. Depressed mood denies.     Objective:   Vital Signs:   Vitals:    10/17/22 0955   BP: 128/84        Physical Exam      General Examination:     Constitutional: The patient is alert and oriented to lace person and time. Mood is pleasant.     Head/Face: Normal facial features normal eyebrows    Eyes: Normal extraocular motion bilaterally    Lungs: Respirations are equal and unlabored    Gait is coordinated.    Cardiovascular: There are no swelling or varicosities present.    Lymphatic: Negative for adenopathy    Skin: Normal    Neurological: Level of consciousness normal. Oriented to place person and time and situation    Psychiatric: Oriented to time place person and situation      Her physical examination is unchanged from last visit.  Patient stand erect has tenderness mild spasm bilaterally L3-S1 left side greater than right range of motion shows flexion 20° extension 0° bending 10° he side limited by pain in the seated position straight leg raising maneuver is positive on the right side there is subjective numbness in L4 and L5 nerve root distributions bilaterally left side greater than right motor exam shows grade 4 5 weakness of the bilateral anterior tibials and left quad.  No edema adenopathy  varicosities     XRAY Report/ Interpretation:   Lumbar MRI performed October 13th was purple personally reviewed.  For normal plate purposes it should be noted that they have tolerated only for lumbar vertebra with actuality this lady has 5 lumbar vertebra however L5 is fully sacralized.  This most recent MRI sites evidence of stenosis at the L2-3 and L3-4 levels where as per normal placement purposes as I have reviewed the prior x-rays and MRI studies I term knees levels L3-4 and L4-5.  Details    Reading Physician Reading Date Result Priority   Anh Johnson MD  [phone] 10/13/2022      Narrative & Impression  MRI of the lumbar spine     Clinical history low back pain     There is a transitional L5 vertebral body with sacralization. There are four nonrib-bearing vertebral bodies. The films are labeled accordingly a transitional L5 vertebral body.  The lumbar spine is in satisfactory alignment. The vertebral bodies are of normal height. The intervertebral disc spaces are maintained. There is no marrow edema.     The conus medullaris is normal in appearance and ends at T12-L1.     At T11-T12 there is a right lateral osteophyte. There is no central canal stenosis or foraminal narrowing.     At T12-L1 there is no significant abnormality     At L1 to their is facet hypertrophy resulting in mild left foraminal narrowing. There is no central canal stenosis.     At L2-3 there is broad-based disc bulge and facet hypertrophy resulting in mild central canal stenosis and bilateral foraminal narrowing     At L3-4 there is shallow disc bulge with facet hypertrophy resulting in mild central canal stenosis and bilateral foraminal narrowing, right greater than left. Correlation with right L3 radicular symptoms is recommended     At L4-5 there is facet hypertrophy without central canal stenosis or foraminal narrowing     At L5-S1 there is no significant abnormality     There is instrumented fusion of the left SI joints.  The visualized portion of the sacrum is unremarkable. The paraspinous soft tissues are normal.     IMPRESSION: Four nonrib-bearing vertebral bodies with sacralization of the L5 vertebral body. Films are labeled accordingly     Broad-based disc bulge with facet hypertrophy at L2-3 resulting in mild central canal stenosis and bilateral foraminal narrowing     Mild central canal stenosis and bilateral foraminal narrowing at L3-4, right greater than left. Correlation with right L3 radicular symptoms is recommended.     Mild left foraminal narrowing at L1-2     Electronically signed by:  Anh Johnson MD  10/13/2022 9:35 AM CDT Workstation: [id number]           Specimen Collected:                Assessment:       1. Spondylolisthesis of lumbar region    2. Lumbar stenosis with neurogenic claudication    3. Lumbar radiculitis    4. Disc degeneration, lumbar    5. Lumbar facet arthropathy    6. Fusion of sacral region of spine        Plan:       Edie was seen today for pain.    Diagnoses and all orders for this visit:    Spondylolisthesis of lumbar region    Lumbar stenosis with neurogenic claudication    Lumbar radiculitis    Disc degeneration, lumbar    Lumbar facet arthropathy    Fusion of sacral region of spine       No follow-ups on file.    Recommend proceeding with L3-4 and L4-5 APLIF as previously discussed.  She has failed conservative measures.  She does have a transitional L5 level but has stenosis at what I will term L3-4 and L4-5 with spondylolisthesis showing some dynamic movement.  We have talked about the posterior only procedure versus a combined lateral and posterior procedure in considering the her anatomy and body habitus I think we would get a better result from a lateral interbody fusion extra cavitary with the assistance of Dr. Steve Palm to insert interbody devices at L3-4 and L4-5 with use of bone morphogenic protein.  This will be followed by complete laminectomies of L3 and L4 posterior  segment instrumentation for L3-L5 posterolateral fusion from L3-L5.    The technical aspects of the surgery were discussed in detail with the patient today. The patient was able to verbalize an understanding. The procedure risk benefits alternatives and possible complications of the surgical procedure was discussed including expected outcomes. No guarantees were given regards to outcomes. Consent forms were will be signed at a later date. All questions regarding the surgery itself were answered. The patient wishes to proceed with surgery and will be scheduled. The patient may require preoperative medical clearance.    Treatment options were discussed with regards to the nature of the medical condition. Conservative pain intervention and surgical options were discussed in detail. The probability of success of each separate treatment option was discussed. The patient expressed a clear understanding of the treatment options. With regards to surgery, the procedure risk, benefits, complications, and outcomes were discussed. No guarantees were given with regards to surgical outcome.   The risk of complications, morbidity, and mortality of patient management decisions have been made at the time of this visit. These are associated with the patient's problems, diagnostic procedures and treatment options. This includes the possible management options selected and those considered but not selected by the patient after shared medical decision making we discussed with the patient.     This note was created using Dragon voice recognition software that occasionally misinterpreted phrases or words.

## 2022-10-28 DIAGNOSIS — M48.062 SPINAL STENOSIS OF LUMBAR REGION WITH NEUROGENIC CLAUDICATION: ICD-10-CM

## 2022-10-28 DIAGNOSIS — M43.16 SPONDYLOLISTHESIS OF LUMBAR REGION: ICD-10-CM

## 2022-10-28 DIAGNOSIS — M51.36 DISC DEGENERATION, LUMBAR: ICD-10-CM

## 2022-10-28 DIAGNOSIS — M48.062 LUMBAR STENOSIS WITH NEUROGENIC CLAUDICATION: Primary | ICD-10-CM

## 2022-10-31 ENCOUNTER — PATIENT MESSAGE (OUTPATIENT)
Dept: FAMILY MEDICINE | Facility: CLINIC | Age: 67
End: 2022-10-31
Payer: MEDICARE

## 2022-11-01 ENCOUNTER — HOSPITAL ENCOUNTER (OUTPATIENT)
Dept: RADIOLOGY | Facility: HOSPITAL | Age: 67
Discharge: HOME | End: 2022-11-01
Attending: ORTHOPAEDIC SURGERY
Payer: MEDICARE

## 2022-11-01 ENCOUNTER — HOSPITAL ENCOUNTER (OUTPATIENT)
Dept: PREADMISSION TESTING | Facility: HOSPITAL | Age: 67
Discharge: HOME | End: 2022-11-01
Attending: ORTHOPAEDIC SURGERY
Payer: MEDICARE

## 2022-11-01 VITALS — HEIGHT: 65 IN | BODY MASS INDEX: 44.15 KG/M2 | WEIGHT: 265 LBS

## 2022-11-01 DIAGNOSIS — M51.36 DISC DEGENERATION, LUMBAR: ICD-10-CM

## 2022-11-01 DIAGNOSIS — M48.062 LUMBAR STENOSIS WITH NEUROGENIC CLAUDICATION: ICD-10-CM

## 2022-11-01 DIAGNOSIS — M43.16 SPONDYLOLISTHESIS OF LUMBAR REGION: ICD-10-CM

## 2022-11-01 DIAGNOSIS — Z01.818 PRE-OPERATIVE EXAM: ICD-10-CM

## 2022-11-01 LAB
ABO + RH BLD: NORMAL
ALBUMIN SERPL BCP-MCNC: 3.7 G/DL (ref 3.5–5.2)
ALP SERPL-CCNC: 106 U/L (ref 55–135)
ALT SERPL W/O P-5'-P-CCNC: 46 U/L (ref 10–44)
ANION GAP SERPL CALC-SCNC: 9 MMOL/L (ref 8–16)
AST SERPL-CCNC: 32 U/L (ref 10–40)
BACTERIA #/AREA URNS HPF: ABNORMAL /HPF
BASOPHILS # BLD AUTO: 0.03 K/UL (ref 0–0.2)
BASOPHILS NFR BLD: 0.5 % (ref 0–1.9)
BILIRUB SERPL-MCNC: 1.3 MG/DL (ref 0.1–1)
BILIRUB UR QL STRIP: NEGATIVE
BLD GP AB SCN CELLS X3 SERPL QL: NORMAL
BUN SERPL-MCNC: 13 MG/DL (ref 8–23)
CALCIUM SERPL-MCNC: 9.4 MG/DL (ref 8.7–10.5)
CHLORIDE SERPL-SCNC: 104 MMOL/L (ref 95–110)
CLARITY UR: CLEAR
CO2 SERPL-SCNC: 26 MMOL/L (ref 23–29)
COLOR UR: YELLOW
CREAT SERPL-MCNC: 1 MG/DL (ref 0.5–1.4)
DIFFERENTIAL METHOD: NORMAL
EOSINOPHIL # BLD AUTO: 0.5 K/UL (ref 0–0.5)
EOSINOPHIL NFR BLD: 7.5 % (ref 0–8)
ERYTHROCYTE [DISTWIDTH] IN BLOOD BY AUTOMATED COUNT: 12.6 % (ref 11.5–14.5)
EST. GFR  (NO RACE VARIABLE): >60 ML/MIN/1.73 M^2
GLUCOSE SERPL-MCNC: 149 MG/DL (ref 70–110)
GLUCOSE UR QL STRIP: NEGATIVE
HCT VFR BLD AUTO: 42.7 % (ref 37–48.5)
HGB BLD-MCNC: 14.1 G/DL (ref 12–16)
HGB UR QL STRIP: NEGATIVE
IMM GRANULOCYTES # BLD AUTO: 0.01 K/UL (ref 0–0.04)
IMM GRANULOCYTES NFR BLD AUTO: 0.2 % (ref 0–0.5)
KETONES UR QL STRIP: NEGATIVE
LEUKOCYTE ESTERASE UR QL STRIP: ABNORMAL
LYMPHOCYTES # BLD AUTO: 1.3 K/UL (ref 1–4.8)
LYMPHOCYTES NFR BLD: 22.1 % (ref 18–48)
MCH RBC QN AUTO: 30 PG (ref 27–31)
MCHC RBC AUTO-ENTMCNC: 33 G/DL (ref 32–36)
MCV RBC AUTO: 91 FL (ref 82–98)
MICROSCOPIC COMMENT: ABNORMAL
MONOCYTES # BLD AUTO: 0.5 K/UL (ref 0.3–1)
MONOCYTES NFR BLD: 7.5 % (ref 4–15)
NEUTROPHILS # BLD AUTO: 3.7 K/UL (ref 1.8–7.7)
NEUTROPHILS NFR BLD: 62.2 % (ref 38–73)
NITRITE UR QL STRIP: NEGATIVE
NRBC BLD-RTO: 0 /100 WBC
PH UR STRIP: 6 [PH] (ref 5–8)
PLATELET # BLD AUTO: 273 K/UL (ref 150–450)
PMV BLD AUTO: 9.6 FL (ref 9.2–12.9)
POTASSIUM SERPL-SCNC: 4.1 MMOL/L (ref 3.5–5.1)
PROT SERPL-MCNC: 6.9 G/DL (ref 6–8.4)
PROT UR QL STRIP: NEGATIVE
RBC # BLD AUTO: 4.7 M/UL (ref 4–5.4)
RBC #/AREA URNS HPF: 5 /HPF (ref 0–4)
SODIUM SERPL-SCNC: 139 MMOL/L (ref 136–145)
SP GR UR STRIP: 1.01 (ref 1–1.03)
SQUAMOUS #/AREA URNS HPF: 3 /HPF
URN SPEC COLLECT METH UR: ABNORMAL
UROBILINOGEN UR STRIP-ACNC: NEGATIVE EU/DL
WBC # BLD AUTO: 6.01 K/UL (ref 3.9–12.7)
WBC #/AREA URNS HPF: 4 /HPF (ref 0–5)

## 2022-11-01 PROCEDURE — 99900103 DSU ONLY-NO CHARGE-INITIAL HR (STAT)

## 2022-11-01 PROCEDURE — 36415 COLL VENOUS BLD VENIPUNCTURE: CPT | Performed by: ORTHOPAEDIC SURGERY

## 2022-11-01 PROCEDURE — 81000 URINALYSIS NONAUTO W/SCOPE: CPT | Performed by: ORTHOPAEDIC SURGERY

## 2022-11-01 PROCEDURE — 71046 X-RAY EXAM CHEST 2 VIEWS: CPT | Mod: 26,,, | Performed by: RADIOLOGY

## 2022-11-01 PROCEDURE — 80053 COMPREHEN METABOLIC PANEL: CPT | Performed by: ORTHOPAEDIC SURGERY

## 2022-11-01 PROCEDURE — 71046 X-RAY EXAM CHEST 2 VIEWS: CPT | Mod: TC,FY

## 2022-11-01 PROCEDURE — 85025 COMPLETE CBC W/AUTO DIFF WBC: CPT | Performed by: ORTHOPAEDIC SURGERY

## 2022-11-01 PROCEDURE — 86901 BLOOD TYPING SEROLOGIC RH(D): CPT | Performed by: ORTHOPAEDIC SURGERY

## 2022-11-01 PROCEDURE — 71046 XR CHEST PA AND LATERAL: ICD-10-PCS | Mod: 26,,, | Performed by: RADIOLOGY

## 2022-11-01 PROCEDURE — 99900104 DSU ONLY-NO CHARGE-EA ADD'L HR (STAT)

## 2022-11-01 RX ORDER — CYANOCOBALAMIN (VITAMIN B-12) 500 MCG
TABLET ORAL NIGHTLY
COMMUNITY

## 2022-11-01 NOTE — DISCHARGE INSTRUCTIONS
To confirm, Your doctor has instructed you that surgery is scheduled for:     Please report to [facility], Registration the morning of surgery. You must check-in and receive a wristband before going to your procedure.    Pre-Op will call the afternoon prior to surgery between 1:00 and 6:00 PM with the final arrival time.  Phone number: [phone]    PLEASE NOTE:  The surgery schedule has many variables which may affect the time of your surgery case.  Family members should be available if your surgery time changes.  Plan to be here the day of your procedure between 4-6 hours.    MEDICATIONS:  TAKE ONLY THESE MEDICATIONS WITH A SMALL SIP OF WATER THE MORNING OF YOUR PROCEDURE:      DO NOT TAKE THESE MEDICATIONS 5-7 DAYS PRIOR to your procedure or per your surgeon's request:   ASPIRIN, ALEVE, ADVIL, IBUPROFEN, FISH OIL VITAMIN E, HERBALS  (May take Tylenol)    ONLY if you are prescribed any types of blood thinners such as:  Aspirin, Coumadin, Plavix, Pradaxa, Xarelto, Aggrenox, Effient, Eliquis, Savasya, Brilinta, or any other, ask your surgeon whether you should stop taking them and how long before surgery you should stop.  You may also need to verify with the prescribing physician if it is ok to stop your medication.      INSTRUCTIONS IMPORTANT!!  Do not eat or drink anything between midnight and the time of your procedure- this includes gum, mints, and candy.  Do not smoke or drink alcoholic beverages 24 hours prior to your procedure.  Shower the night before AND the morning of your procedure with a Chlorhexidine wash such as Hibiclens or Dial antibacterial soap from the neck down.  Do not get it on your face or in your eyes.  You may use your own shampoo and face wash. This helps your skin to be as bacteria free as possible.    If you wear contact lenses, dentures, hearing aids or glasses, bring a container to put them in during surgery and give to a family member for safe keeping.  Please  leave all jewelry, piercing's and valuables at home.   DO NOT remove hair from the surgery site.  Do not shave the incision site unless you are given specific instructions to do so.    ONLY if you have been diagnosed with sleep apnea please bring your C-PAP machine.  ONLY if you wear home oxygen please bring your portable oxygen tank the day of your procedure.  ONLY if you have a history of OPEN HEART SURGERY you will need a clearance from your Cardiologist per Anesthesia.      ONLY for patients requiring bowel prep, written instructions will be given by your doctor's office.  ONLY if you have a neuro stimulator, please bring the controller with you the morning of surgery  ONLY if a type and screen test is needed before surgery, please return:  If your doctor has scheduled you for an overnight stay, bring a small overnight bag with any personal items you need.  Make arrangements in advance for transportation home by a responsible adult.  It is not safe to drive a vehicle during the 24 hours after anesthesia.      [facility] Visitor Policy    Effective September 26, 2022    [facility] will resume routine visitation for COVID-19 negative patients, including inpatients, outpatients, and procedural areas, in accordance with local campus procedures.    All [facility] facilities and properties are tobacco free.  Smoking is NOT allowed.   If you have any questions about these instructions, call Pre-Op Admit  Nursing at [phone] or the Pre-Op Day Surgery Unit at [phone].

## 2022-11-02 ENCOUNTER — OFFICE VISIT (OUTPATIENT)
Dept: UROGYNECOLOGY | Facility: CLINIC | Age: 67
End: 2022-11-02
Payer: MEDICARE

## 2022-11-02 VITALS
DIASTOLIC BLOOD PRESSURE: 74 MMHG | WEIGHT: 265 LBS | RESPIRATION RATE: 18 BRPM | SYSTOLIC BLOOD PRESSURE: 142 MMHG | BODY MASS INDEX: 44.15 KG/M2 | HEART RATE: 87 BPM | HEIGHT: 65 IN

## 2022-11-02 DIAGNOSIS — R39.89 CYSTALGIA: ICD-10-CM

## 2022-11-02 DIAGNOSIS — R35.0 URINARY FREQUENCY: Primary | ICD-10-CM

## 2022-11-02 DIAGNOSIS — N30.90 CYSTITIS: ICD-10-CM

## 2022-11-02 DIAGNOSIS — N95.2 ATROPHIC VAGINITIS: ICD-10-CM

## 2022-11-02 LAB
BILIRUBIN, UA POC OHS: NEGATIVE
BLOOD, UA POC OHS: NEGATIVE
CLARITY, UA POC OHS: CLEAR
COLOR, UA POC OHS: YELLOW
GLUCOSE, UA POC OHS: NEGATIVE
KETONES, UA POC OHS: NEGATIVE
LEUKOCYTES, UA POC OHS: ABNORMAL
NITRITE, UA POC OHS: NEGATIVE
PH, UA POC OHS: 5.5
PROTEIN, UA POC OHS: NEGATIVE
SPECIFIC GRAVITY, UA POC OHS: 1.02
UROBILINOGEN, UA POC OHS: 0.2

## 2022-11-02 PROCEDURE — 81003 URINALYSIS AUTO W/O SCOPE: CPT | Mod: QW,S$GLB,, | Performed by: NURSE PRACTITIONER

## 2022-11-02 PROCEDURE — 51700 PR IRRIGATION, BLADDER: ICD-10-PCS | Mod: S$GLB,,, | Performed by: NURSE PRACTITIONER

## 2022-11-02 PROCEDURE — 51700 IRRIGATION OF BLADDER: CPT | Mod: S$GLB,,, | Performed by: NURSE PRACTITIONER

## 2022-11-02 PROCEDURE — 99999 PR PBB SHADOW E&M-EST. PATIENT-LVL V: CPT | Mod: PBBFAC,,, | Performed by: NURSE PRACTITIONER

## 2022-11-02 PROCEDURE — 3051F PR MOST RECENT HEMOGLOBIN A1C LEVEL 7.0 - < 8.0%: ICD-10-PCS | Mod: CPTII,S$GLB,, | Performed by: NURSE PRACTITIONER

## 2022-11-02 PROCEDURE — 1159F PR MEDICATION LIST DOCUMENTED IN MEDICAL RECORD: ICD-10-PCS | Mod: CPTII,S$GLB,, | Performed by: NURSE PRACTITIONER

## 2022-11-02 PROCEDURE — 3077F PR MOST RECENT SYSTOLIC BLOOD PRESSURE >= 140 MM HG: ICD-10-PCS | Mod: CPTII,S$GLB,, | Performed by: NURSE PRACTITIONER

## 2022-11-02 PROCEDURE — 3288F PR FALLS RISK ASSESSMENT DOCUMENTED: ICD-10-PCS | Mod: CPTII,S$GLB,, | Performed by: NURSE PRACTITIONER

## 2022-11-02 PROCEDURE — 3078F PR MOST RECENT DIASTOLIC BLOOD PRESSURE < 80 MM HG: ICD-10-PCS | Mod: CPTII,S$GLB,, | Performed by: NURSE PRACTITIONER

## 2022-11-02 PROCEDURE — 3077F SYST BP >= 140 MM HG: CPT | Mod: CPTII,S$GLB,, | Performed by: NURSE PRACTITIONER

## 2022-11-02 PROCEDURE — 99214 PR OFFICE/OUTPT VISIT, EST, LEVL IV, 30-39 MIN: ICD-10-PCS | Mod: 25,S$GLB,, | Performed by: NURSE PRACTITIONER

## 2022-11-02 PROCEDURE — 99999 PR PBB SHADOW E&M-EST. PATIENT-LVL V: ICD-10-PCS | Mod: PBBFAC,,, | Performed by: NURSE PRACTITIONER

## 2022-11-02 PROCEDURE — 1160F PR REVIEW ALL MEDS BY PRESCRIBER/CLIN PHARMACIST DOCUMENTED: ICD-10-PCS | Mod: CPTII,S$GLB,, | Performed by: NURSE PRACTITIONER

## 2022-11-02 PROCEDURE — 3078F DIAST BP <80 MM HG: CPT | Mod: CPTII,S$GLB,, | Performed by: NURSE PRACTITIONER

## 2022-11-02 PROCEDURE — 81003 POCT URINALYSIS(INSTRUMENT): ICD-10-PCS | Mod: QW,S$GLB,, | Performed by: NURSE PRACTITIONER

## 2022-11-02 PROCEDURE — 3008F PR BODY MASS INDEX (BMI) DOCUMENTED: ICD-10-PCS | Mod: CPTII,S$GLB,, | Performed by: NURSE PRACTITIONER

## 2022-11-02 PROCEDURE — 3051F HG A1C>EQUAL 7.0%<8.0%: CPT | Mod: CPTII,S$GLB,, | Performed by: NURSE PRACTITIONER

## 2022-11-02 PROCEDURE — 1101F PR PT FALLS ASSESS DOC 0-1 FALLS W/OUT INJ PAST YR: ICD-10-PCS | Mod: CPTII,S$GLB,, | Performed by: NURSE PRACTITIONER

## 2022-11-02 PROCEDURE — 3008F BODY MASS INDEX DOCD: CPT | Mod: CPTII,S$GLB,, | Performed by: NURSE PRACTITIONER

## 2022-11-02 PROCEDURE — 3288F FALL RISK ASSESSMENT DOCD: CPT | Mod: CPTII,S$GLB,, | Performed by: NURSE PRACTITIONER

## 2022-11-02 PROCEDURE — 99214 OFFICE O/P EST MOD 30 MIN: CPT | Mod: 25,S$GLB,, | Performed by: NURSE PRACTITIONER

## 2022-11-02 PROCEDURE — 1160F RVW MEDS BY RX/DR IN RCRD: CPT | Mod: CPTII,S$GLB,, | Performed by: NURSE PRACTITIONER

## 2022-11-02 PROCEDURE — 1126F AMNT PAIN NOTED NONE PRSNT: CPT | Mod: CPTII,S$GLB,, | Performed by: NURSE PRACTITIONER

## 2022-11-02 PROCEDURE — 4010F PR ACE/ARB THEARPY RXD/TAKEN: ICD-10-PCS | Mod: CPTII,S$GLB,, | Performed by: NURSE PRACTITIONER

## 2022-11-02 PROCEDURE — 4010F ACE/ARB THERAPY RXD/TAKEN: CPT | Mod: CPTII,S$GLB,, | Performed by: NURSE PRACTITIONER

## 2022-11-02 PROCEDURE — 1126F PR PAIN SEVERITY QUANTIFIED, NO PAIN PRESENT: ICD-10-PCS | Mod: CPTII,S$GLB,, | Performed by: NURSE PRACTITIONER

## 2022-11-02 PROCEDURE — 1101F PT FALLS ASSESS-DOCD LE1/YR: CPT | Mod: CPTII,S$GLB,, | Performed by: NURSE PRACTITIONER

## 2022-11-02 PROCEDURE — 1159F MED LIST DOCD IN RCRD: CPT | Mod: CPTII,S$GLB,, | Performed by: NURSE PRACTITIONER

## 2022-11-02 RX ORDER — LIDOCAINE HYDROCHLORIDE 10 MG/ML
20 INJECTION, SOLUTION EPIDURAL; INFILTRATION; INTRACAUDAL; PERINEURAL
Status: COMPLETED | OUTPATIENT
Start: 2022-11-02 | End: 2022-11-02

## 2022-11-02 RX ORDER — GENTAMICIN SULFATE 40 MG/ML
80 INJECTION, SOLUTION INTRAMUSCULAR; INTRAVENOUS ONCE
Status: COMPLETED | OUTPATIENT
Start: 2022-11-02 | End: 2022-11-02

## 2022-11-02 RX ORDER — HEPARIN SODIUM 10000 [USP'U]/ML
20000 INJECTION, SOLUTION INTRAVENOUS; SUBCUTANEOUS ONCE
Status: COMPLETED | OUTPATIENT
Start: 2022-11-02 | End: 2022-11-02

## 2022-11-02 RX ADMIN — GENTAMICIN SULFATE 80 MG: 40 INJECTION, SOLUTION INTRAMUSCULAR; INTRAVENOUS at 02:11

## 2022-11-02 RX ADMIN — LIDOCAINE HYDROCHLORIDE 200 MG: 10 INJECTION, SOLUTION EPIDURAL; INFILTRATION; INTRACAUDAL; PERINEURAL at 02:11

## 2022-11-02 RX ADMIN — HEPARIN SODIUM 20000 UNITS: 10000 INJECTION, SOLUTION INTRAVENOUS; SUBCUTANEOUS at 02:11

## 2022-11-02 NOTE — PROGRESS NOTES
Subjective:       Patient ID: Edie Hernandez is a 67 y.o. female.    Chief Complaint: Follow-up (6 wks)      Edie Hernandez is a 67 y.o. female.who presents today for follow up in regards to her cystoscopy.  She had a cysto with hydro on 09/21/22 with Dr. Chavez.  She feels that her frequency has remained about the same going every 2-3 hours during the day and nocturia x 1.  She denies any PVF.  She had 2 small episodes of incontinence when she sneezed hard.  The main reason for the cysto was to help with her cystitis and cystalgia.  She states that she was an 8-9 on her pain prior to the cysto.  She is now down to about a 2-3.  She was hoping to have complete resolution of her pain and tenderness, but does feel that it was very helpful.  She is open to trying a few instillations to see if we can continue to improve her symptoms.      Review of Systems   Constitutional:  Negative for activity change, fever and unexpected weight change.   HENT:  Negative for hearing loss.    Eyes:  Negative for visual disturbance.   Respiratory:  Negative for shortness of breath and wheezing.    Cardiovascular:  Negative for chest pain, palpitations and leg swelling.   Gastrointestinal:  Negative for abdominal pain, constipation and diarrhea.   Genitourinary:  Positive for frequency. Negative for dyspareunia, dysuria, urgency, vaginal bleeding and vaginal discharge.   Musculoskeletal:  Negative for gait problem and neck pain.   Skin:  Negative for rash and wound.   Allergic/Immunologic: Negative for immunocompromised state.   Neurological:  Negative for tremors, speech difficulty and weakness.   Hematological:  Does not bruise/bleed easily.   Psychiatric/Behavioral:  Negative for agitation and confusion.      Objective:      Physical Exam  Vitals reviewed. Exam conducted with a chaperone present.   Constitutional:       General: She is not in acute distress.     Appearance: She is well-developed.   HENT:      Head: Normocephalic  and atraumatic.   Neck:      Thyroid: No thyromegaly.   Pulmonary:      Effort: Pulmonary effort is normal. No respiratory distress.   Abdominal:      Palpations: Abdomen is soft.      Tenderness: There is abdominal tenderness in the suprapubic area.      Hernia: No hernia is present.   Musculoskeletal:         General: Normal range of motion.      Cervical back: Normal range of motion.   Skin:     General: Skin is warm and dry.      Findings: No rash.   Neurological:      Mental Status: She is alert and oriented to person, place, and time.   Psychiatric:         Mood and Affect: Mood normal.         Behavior: Behavior normal.         Thought Content: Thought content normal.     Pelvic Exam:  V: No lesions. No palpable nodes.   Meatus:No caruncle or stenosis  Urethra: Non tender. No suburethral masses.  BL: mildly tender  RV: No hemorrhoids.      Assessment:       1. Urinary frequency    2. Cystitis    3. Cystalgia    4. Atrophic vaginitis          Procedure note- After betadine irrigation of the urethra, lidocaine instilled via urojet.   A #14 Telugu red rubber tip catheter was inserted into the bladder.  60 mls of residual urine noted.  80mg of gentamicin,  20,000 units of heparin and 20 mls of 1% lidocaine instilled into bladder.  Pt instructed to hold mixture for at least 1 hour prior to voiding.  Pt verbalized understanding.        Plan:       Urinary frequency- monitor  -     POCT Urinalysis(Instrument)    Cystitis- instillation as noted above  -     LIDOcaine (PF) 10 mg/ml (1%) injection 200 mg  -     heparin (porcine) injection 20,000 Units  -     gentamicin injection 80 mg    Cystalgia- instillation as noted above    Atrophic vaginitis- monitor    RTC 2-3 weeks for a series of instillations.

## 2022-11-04 ENCOUNTER — PATIENT MESSAGE (OUTPATIENT)
Dept: FAMILY MEDICINE | Facility: CLINIC | Age: 67
End: 2022-11-04
Payer: MEDICARE

## 2022-11-04 NOTE — TELEPHONE ENCOUNTER
Called patient and scheduled labs and follow up appointment per patient request. Appointment successfully scheduled.

## 2022-11-07 ENCOUNTER — ANESTHESIA EVENT (OUTPATIENT)
Dept: SURGERY | Facility: HOSPITAL | Age: 67
DRG: 454 | End: 2022-11-07
Payer: MEDICARE

## 2022-11-07 DIAGNOSIS — G89.29 CHRONIC BACK PAIN GREATER THAN 3 MONTHS DURATION: ICD-10-CM

## 2022-11-07 DIAGNOSIS — M54.9 CHRONIC BACK PAIN GREATER THAN 3 MONTHS DURATION: ICD-10-CM

## 2022-11-07 RX ORDER — HYDROCODONE BITARTRATE AND ACETAMINOPHEN 7.5; 325 MG/1; MG/1
1 TABLET ORAL
Qty: 30 TABLET | Refills: 0 | Status: ON HOLD | OUTPATIENT
Start: 2022-11-07 | End: 2022-11-08 | Stop reason: HOSPADM

## 2022-11-07 NOTE — TELEPHONE ENCOUNTER
Care Due:                  Date            Visit Type   Department     Provider  --------------------------------------------------------------------------------                                EP -                              PRIMARY      [facility]  Last Visit: 05-      CARE ([facility])   GHASSAN Eugene                              EP -                              PRIMARY      [facility]  Next Visit: 02-      CARE ([facility])   GHASSAN Eugene                                                            Last  Test          Frequency    Reason                     Performed    Due Date  --------------------------------------------------------------------------------    HBA1C.......  6 months...  metFORMIN................  05-   11-    [employer] Embedded Care Gaps. Reference number: [id number]. 11/07/2022   2:27:26 PM CST

## 2022-11-08 ENCOUNTER — HOSPITAL ENCOUNTER (INPATIENT)
Facility: HOSPITAL | Age: 67
LOS: 3 days | Discharge: HOME HEALTH | DRG: 454 | End: 2022-11-11
Attending: ORTHOPAEDIC SURGERY | Admitting: ORTHOPAEDIC SURGERY
Payer: MEDICARE

## 2022-11-08 ENCOUNTER — ANESTHESIA (OUTPATIENT)
Dept: SURGERY | Facility: HOSPITAL | Age: 67
DRG: 454 | End: 2022-11-08
Payer: MEDICARE

## 2022-11-08 DIAGNOSIS — M51.36 DISC DEGENERATION, LUMBAR: ICD-10-CM

## 2022-11-08 DIAGNOSIS — M48.062 SPINAL STENOSIS OF LUMBAR REGION WITH NEUROGENIC CLAUDICATION: ICD-10-CM

## 2022-11-08 DIAGNOSIS — M43.16 SPONDYLOLISTHESIS OF LUMBAR REGION: ICD-10-CM

## 2022-11-08 DIAGNOSIS — M48.062 LUMBAR STENOSIS WITH NEUROGENIC CLAUDICATION: ICD-10-CM

## 2022-11-08 LAB
ANION GAP SERPL CALC-SCNC: 14 MMOL/L (ref 8–16)
BASOPHILS # BLD AUTO: 0.04 K/UL (ref 0–0.2)
BASOPHILS NFR BLD: 0.4 % (ref 0–1.9)
BUN SERPL-MCNC: 15 MG/DL (ref 8–23)
CALCIUM SERPL-MCNC: 8.7 MG/DL (ref 8.7–10.5)
CHLORIDE SERPL-SCNC: 104 MMOL/L (ref 95–110)
CO2 SERPL-SCNC: 22 MMOL/L (ref 23–29)
CREAT SERPL-MCNC: 1 MG/DL (ref 0.5–1.4)
DIFFERENTIAL METHOD: ABNORMAL
EOSINOPHIL # BLD AUTO: 0.1 K/UL (ref 0–0.5)
EOSINOPHIL NFR BLD: 0.6 % (ref 0–8)
ERYTHROCYTE [DISTWIDTH] IN BLOOD BY AUTOMATED COUNT: 12.5 % (ref 11.5–14.5)
EST. GFR  (NO RACE VARIABLE): >60 ML/MIN/1.73 M^2
GLUCOSE SERPL-MCNC: 188 MG/DL (ref 70–110)
HCT VFR BLD AUTO: 41.1 % (ref 37–48.5)
HGB BLD-MCNC: 13.4 G/DL (ref 12–16)
IMM GRANULOCYTES # BLD AUTO: 0.11 K/UL (ref 0–0.04)
IMM GRANULOCYTES NFR BLD AUTO: 1 % (ref 0–0.5)
LYMPHOCYTES # BLD AUTO: 0.8 K/UL (ref 1–4.8)
LYMPHOCYTES NFR BLD: 7.5 % (ref 18–48)
MCH RBC QN AUTO: 29.9 PG (ref 27–31)
MCHC RBC AUTO-ENTMCNC: 32.6 G/DL (ref 32–36)
MCV RBC AUTO: 92 FL (ref 82–98)
MONOCYTES # BLD AUTO: 0.3 K/UL (ref 0.3–1)
MONOCYTES NFR BLD: 2.6 % (ref 4–15)
NEUTROPHILS # BLD AUTO: 9.8 K/UL (ref 1.8–7.7)
NEUTROPHILS NFR BLD: 87.9 % (ref 38–73)
NRBC BLD-RTO: 0 /100 WBC
PLATELET # BLD AUTO: 241 K/UL (ref 150–450)
PMV BLD AUTO: 9.6 FL (ref 9.2–12.9)
POCT GLUCOSE: 219 MG/DL (ref 70–110)
POTASSIUM SERPL-SCNC: 4.6 MMOL/L (ref 3.5–5.1)
RBC # BLD AUTO: 4.48 M/UL (ref 4–5.4)
SODIUM SERPL-SCNC: 140 MMOL/L (ref 136–145)
WBC # BLD AUTO: 11.16 K/UL (ref 3.9–12.7)

## 2022-11-08 PROCEDURE — 94799 UNLISTED PULMONARY SVC/PX: CPT

## 2022-11-08 PROCEDURE — 37000009 HC ANESTHESIA EA ADD 15 MINS: Performed by: ORTHOPAEDIC SURGERY

## 2022-11-08 PROCEDURE — 63600175 PHARM REV CODE 636 W HCPCS: Performed by: ANESTHESIOLOGY

## 2022-11-08 PROCEDURE — 85025 COMPLETE CBC W/AUTO DIFF WBC: CPT | Performed by: PHYSICIAN ASSISTANT

## 2022-11-08 PROCEDURE — 88304 TISSUE EXAM BY PATHOLOGIST: CPT | Performed by: PATHOLOGY

## 2022-11-08 PROCEDURE — 99900103 DSU ONLY-NO CHARGE-INITIAL HR (STAT): Performed by: ORTHOPAEDIC SURGERY

## 2022-11-08 PROCEDURE — 88311 DECALCIFY TISSUE: CPT | Performed by: PATHOLOGY

## 2022-11-08 PROCEDURE — 88311 PR  DECALCIFY TISSUE: ICD-10-PCS | Mod: 26,,, | Performed by: PATHOLOGY

## 2022-11-08 PROCEDURE — 25000003 PHARM REV CODE 250: Performed by: ANESTHESIOLOGY

## 2022-11-08 PROCEDURE — 63600175 PHARM REV CODE 636 W HCPCS: Performed by: PHYSICIAN ASSISTANT

## 2022-11-08 PROCEDURE — 63600175 PHARM REV CODE 636 W HCPCS: Performed by: ORTHOPAEDIC SURGERY

## 2022-11-08 PROCEDURE — D9220A PRA ANESTHESIA: Mod: CRNA,,, | Performed by: NURSE ANESTHETIST, CERTIFIED REGISTERED

## 2022-11-08 PROCEDURE — 63600175 PHARM REV CODE 636 W HCPCS: Performed by: NURSE ANESTHETIST, CERTIFIED REGISTERED

## 2022-11-08 PROCEDURE — 27800903 OPTIME MED/SURG SUP & DEVICES OTHER IMPLANTS: Performed by: ORTHOPAEDIC SURGERY

## 2022-11-08 PROCEDURE — 63600175 PHARM REV CODE 636 W HCPCS

## 2022-11-08 PROCEDURE — 25000003 PHARM REV CODE 250: Performed by: ORTHOPAEDIC SURGERY

## 2022-11-08 PROCEDURE — 36000711: Performed by: ORTHOPAEDIC SURGERY

## 2022-11-08 PROCEDURE — 27000221 HC OXYGEN, UP TO 24 HOURS

## 2022-11-08 PROCEDURE — 36000710: Performed by: ORTHOPAEDIC SURGERY

## 2022-11-08 PROCEDURE — 25000003 PHARM REV CODE 250: Performed by: NURSE ANESTHETIST, CERTIFIED REGISTERED

## 2022-11-08 PROCEDURE — C1734 ORTH/DEVIC/DRUG BN/BN,TIS/BN: HCPCS | Performed by: ORTHOPAEDIC SURGERY

## 2022-11-08 PROCEDURE — 27201423 OPTIME MED/SURG SUP & DEVICES STERILE SUPPLY: Performed by: ORTHOPAEDIC SURGERY

## 2022-11-08 PROCEDURE — 71000039 HC RECOVERY, EACH ADD'L HOUR: Performed by: ORTHOPAEDIC SURGERY

## 2022-11-08 PROCEDURE — D9220A PRA ANESTHESIA: ICD-10-PCS | Mod: CRNA,,, | Performed by: NURSE ANESTHETIST, CERTIFIED REGISTERED

## 2022-11-08 PROCEDURE — 80048 BASIC METABOLIC PNL TOTAL CA: CPT | Performed by: PHYSICIAN ASSISTANT

## 2022-11-08 PROCEDURE — D9220A PRA ANESTHESIA: Mod: ANES,,, | Performed by: ANESTHESIOLOGY

## 2022-11-08 PROCEDURE — 71000033 HC RECOVERY, INTIAL HOUR: Performed by: ORTHOPAEDIC SURGERY

## 2022-11-08 PROCEDURE — C1713 ANCHOR/SCREW BN/BN,TIS/BN: HCPCS | Performed by: ORTHOPAEDIC SURGERY

## 2022-11-08 PROCEDURE — 25000003 PHARM REV CODE 250: Performed by: PHYSICIAN ASSISTANT

## 2022-11-08 PROCEDURE — 88304 TISSUE EXAM BY PATHOLOGIST: CPT | Mod: 26,,, | Performed by: PATHOLOGY

## 2022-11-08 PROCEDURE — D9220A PRA ANESTHESIA: ICD-10-PCS | Mod: ANES,,, | Performed by: ANESTHESIOLOGY

## 2022-11-08 PROCEDURE — C1729 CATH, DRAINAGE: HCPCS | Performed by: ORTHOPAEDIC SURGERY

## 2022-11-08 PROCEDURE — 99900104 DSU ONLY-NO CHARGE-EA ADD'L HR (STAT): Performed by: ORTHOPAEDIC SURGERY

## 2022-11-08 PROCEDURE — 99900035 HC TECH TIME PER 15 MIN (STAT)

## 2022-11-08 PROCEDURE — 36415 COLL VENOUS BLD VENIPUNCTURE: CPT | Performed by: PHYSICIAN ASSISTANT

## 2022-11-08 PROCEDURE — C9290 INJ, BUPIVACAINE LIPOSOME: HCPCS | Performed by: ORTHOPAEDIC SURGERY

## 2022-11-08 PROCEDURE — 88304 PR  SURG PATH,LEVEL III: ICD-10-PCS | Mod: 26,,, | Performed by: PATHOLOGY

## 2022-11-08 PROCEDURE — 94761 N-INVAS EAR/PLS OXIMETRY MLT: CPT

## 2022-11-08 PROCEDURE — 12000002 HC ACUTE/MED SURGE SEMI-PRIVATE ROOM

## 2022-11-08 PROCEDURE — 37000008 HC ANESTHESIA 1ST 15 MINUTES: Performed by: ORTHOPAEDIC SURGERY

## 2022-11-08 PROCEDURE — 88311 DECALCIFY TISSUE: CPT | Mod: 26,,, | Performed by: PATHOLOGY

## 2022-11-08 DEVICE — CAGE 4986245 CDALE PTC 18MM 6 DEG 12X45
Type: IMPLANTABLE DEVICE | Site: SPINE LUMBAR | Status: FUNCTIONAL
Brand: CLYDESDALE PTC™ SPINAL SYSTEM

## 2022-11-08 DEVICE — SET SCREW HORIZON SOLERA 5.5-6: Type: IMPLANTABLE DEVICE | Site: SPINE LUMBAR | Status: FUNCTIONAL

## 2022-11-08 DEVICE — ROD HORIZON CURV 5.5CCM 70MM: Type: IMPLANTABLE DEVICE | Site: SPINE LUMBAR | Status: FUNCTIONAL

## 2022-11-08 DEVICE — BONE GRAFT KIT 7510600 INFUSE LARGE
Type: IMPLANTABLE DEVICE | Site: SPINE LUMBAR | Status: FUNCTIONAL
Brand: INFUSE® BONE GRAFT

## 2022-11-08 DEVICE — IMPLANTABLE DEVICE: Type: IMPLANTABLE DEVICE | Site: SPINE LUMBAR | Status: FUNCTIONAL

## 2022-11-08 RX ORDER — PREGABALIN 75 MG/1
150 CAPSULE ORAL 2 TIMES DAILY
Status: DISCONTINUED | OUTPATIENT
Start: 2022-11-08 | End: 2022-11-11 | Stop reason: HOSPADM

## 2022-11-08 RX ORDER — OXYCODONE HYDROCHLORIDE 5 MG/1
5 TABLET ORAL EVERY 4 HOURS PRN
Status: DISCONTINUED | OUTPATIENT
Start: 2022-11-08 | End: 2022-11-11 | Stop reason: HOSPADM

## 2022-11-08 RX ORDER — CLOBETASOL PROPIONATE 0.5 MG/G
1 CREAM TOPICAL 2 TIMES DAILY
Status: DISCONTINUED | OUTPATIENT
Start: 2022-11-08 | End: 2022-11-11 | Stop reason: HOSPADM

## 2022-11-08 RX ORDER — GLUCAGON 1 MG
1 KIT INJECTION
Status: DISCONTINUED | OUTPATIENT
Start: 2022-11-08 | End: 2022-11-11 | Stop reason: HOSPADM

## 2022-11-08 RX ORDER — ONDANSETRON 2 MG/ML
INJECTION INTRAMUSCULAR; INTRAVENOUS
Status: DISCONTINUED | OUTPATIENT
Start: 2022-11-08 | End: 2022-11-08

## 2022-11-08 RX ORDER — MUPIROCIN 20 MG/G
1 OINTMENT TOPICAL 2 TIMES DAILY
Status: DISCONTINUED | OUTPATIENT
Start: 2022-11-08 | End: 2022-11-11 | Stop reason: HOSPADM

## 2022-11-08 RX ORDER — MAG HYDROX/ALUMINUM HYD/SIMETH 200-200-20
30 SUSPENSION, ORAL (FINAL DOSE FORM) ORAL EVERY 4 HOURS PRN
Status: DISCONTINUED | OUTPATIENT
Start: 2022-11-08 | End: 2022-11-11 | Stop reason: HOSPADM

## 2022-11-08 RX ORDER — ACETAMINOPHEN 10 MG/ML
INJECTION, SOLUTION INTRAVENOUS
Status: DISCONTINUED | OUTPATIENT
Start: 2022-11-08 | End: 2022-11-08

## 2022-11-08 RX ORDER — SODIUM CHLORIDE, SODIUM LACTATE, POTASSIUM CHLORIDE, CALCIUM CHLORIDE 600; 310; 30; 20 MG/100ML; MG/100ML; MG/100ML; MG/100ML
INJECTION, SOLUTION INTRAVENOUS CONTINUOUS
Status: DISCONTINUED | OUTPATIENT
Start: 2022-11-08 | End: 2022-11-08

## 2022-11-08 RX ORDER — FUROSEMIDE 20 MG/1
20 TABLET ORAL DAILY PRN
Status: DISCONTINUED | OUTPATIENT
Start: 2022-11-08 | End: 2022-11-11 | Stop reason: HOSPADM

## 2022-11-08 RX ORDER — HYDROMORPHONE HCL IN 0.9% NACL 6 MG/30 ML
PATIENT CONTROLLED ANALGESIA SYRINGE INTRAVENOUS CONTINUOUS
Status: DISCONTINUED | OUTPATIENT
Start: 2022-11-08 | End: 2022-11-09

## 2022-11-08 RX ORDER — DOCUSATE SODIUM 100 MG/1
100 CAPSULE, LIQUID FILLED ORAL DAILY
Status: DISCONTINUED | OUTPATIENT
Start: 2022-11-08 | End: 2022-11-11 | Stop reason: HOSPADM

## 2022-11-08 RX ORDER — ERGOCALCIFEROL 1.25 MG/1
50000 CAPSULE ORAL
Status: DISCONTINUED | OUTPATIENT
Start: 2022-11-09 | End: 2022-11-11 | Stop reason: HOSPADM

## 2022-11-08 RX ORDER — DIPHENHYDRAMINE HYDROCHLORIDE 50 MG/ML
12.5 INJECTION INTRAMUSCULAR; INTRAVENOUS EVERY 4 HOURS PRN
Status: DISCONTINUED | OUTPATIENT
Start: 2022-11-08 | End: 2022-11-08 | Stop reason: SDUPTHER

## 2022-11-08 RX ORDER — PROMETHAZINE HYDROCHLORIDE 25 MG/ML
INJECTION, SOLUTION INTRAMUSCULAR; INTRAVENOUS
Status: DISCONTINUED | OUTPATIENT
Start: 2022-11-08 | End: 2022-11-08

## 2022-11-08 RX ORDER — SODIUM CHLORIDE, SODIUM LACTATE, POTASSIUM CHLORIDE, CALCIUM CHLORIDE 600; 310; 30; 20 MG/100ML; MG/100ML; MG/100ML; MG/100ML
INJECTION, SOLUTION INTRAVENOUS CONTINUOUS
Status: DISCONTINUED | OUTPATIENT
Start: 2022-11-08 | End: 2022-11-11 | Stop reason: HOSPADM

## 2022-11-08 RX ORDER — CEFAZOLIN SODIUM 2 G/50ML
2 SOLUTION INTRAVENOUS
Status: COMPLETED | OUTPATIENT
Start: 2022-11-08 | End: 2022-11-09

## 2022-11-08 RX ORDER — HYDROMORPHONE HYDROCHLORIDE 2 MG/ML
2 INJECTION, SOLUTION INTRAMUSCULAR; INTRAVENOUS; SUBCUTANEOUS
Status: DISCONTINUED | OUTPATIENT
Start: 2022-11-08 | End: 2022-11-11 | Stop reason: HOSPADM

## 2022-11-08 RX ORDER — PROPOFOL 10 MG/ML
VIAL (ML) INTRAVENOUS CONTINUOUS PRN
Status: DISCONTINUED | OUTPATIENT
Start: 2022-11-08 | End: 2022-11-08

## 2022-11-08 RX ORDER — METFORMIN HYDROCHLORIDE 500 MG/1
1000 TABLET, EXTENDED RELEASE ORAL 2 TIMES DAILY WITH MEALS
Status: DISCONTINUED | OUTPATIENT
Start: 2022-11-08 | End: 2022-11-08

## 2022-11-08 RX ORDER — PROCHLORPERAZINE EDISYLATE 5 MG/ML
5 INJECTION INTRAMUSCULAR; INTRAVENOUS EVERY 6 HOURS PRN
Status: DISCONTINUED | OUTPATIENT
Start: 2022-11-08 | End: 2022-11-11 | Stop reason: HOSPADM

## 2022-11-08 RX ORDER — METOCLOPRAMIDE HYDROCHLORIDE 5 MG/ML
10 INJECTION INTRAMUSCULAR; INTRAVENOUS EVERY 8 HOURS
Status: COMPLETED | OUTPATIENT
Start: 2022-11-08 | End: 2022-11-09

## 2022-11-08 RX ORDER — ONDANSETRON 8 MG/1
8 TABLET, ORALLY DISINTEGRATING ORAL EVERY 8 HOURS PRN
Status: DISCONTINUED | OUTPATIENT
Start: 2022-11-08 | End: 2022-11-08 | Stop reason: SDUPTHER

## 2022-11-08 RX ORDER — LOSARTAN POTASSIUM 25 MG/1
50 TABLET ORAL NIGHTLY
Status: DISCONTINUED | OUTPATIENT
Start: 2022-11-08 | End: 2022-11-11 | Stop reason: HOSPADM

## 2022-11-08 RX ORDER — CEFAZOLIN SODIUM 2 G/50ML
2 SOLUTION INTRAVENOUS
Status: COMPLETED | OUTPATIENT
Start: 2022-11-08 | End: 2022-11-08

## 2022-11-08 RX ORDER — DIPHENHYDRAMINE HCL 25 MG
50 CAPSULE ORAL EVERY 6 HOURS PRN
Status: DISCONTINUED | OUTPATIENT
Start: 2022-11-08 | End: 2022-11-11 | Stop reason: HOSPADM

## 2022-11-08 RX ORDER — ROCURONIUM BROMIDE 10 MG/ML
INJECTION, SOLUTION INTRAVENOUS
Status: DISCONTINUED | OUTPATIENT
Start: 2022-11-08 | End: 2022-11-08

## 2022-11-08 RX ORDER — AZELASTINE 1 MG/ML
1 SPRAY, METERED NASAL 2 TIMES DAILY
Status: DISCONTINUED | OUTPATIENT
Start: 2022-11-08 | End: 2022-11-11 | Stop reason: HOSPADM

## 2022-11-08 RX ORDER — AMOXICILLIN 250 MG
2 CAPSULE ORAL NIGHTLY PRN
Status: DISCONTINUED | OUTPATIENT
Start: 2022-11-08 | End: 2022-11-11 | Stop reason: HOSPADM

## 2022-11-08 RX ORDER — PANTOPRAZOLE SODIUM 40 MG/1
40 TABLET, DELAYED RELEASE ORAL DAILY
Status: DISCONTINUED | OUTPATIENT
Start: 2022-11-08 | End: 2022-11-11 | Stop reason: HOSPADM

## 2022-11-08 RX ORDER — CYCLOBENZAPRINE HCL 5 MG
10 TABLET ORAL NIGHTLY
Status: DISCONTINUED | OUTPATIENT
Start: 2022-11-08 | End: 2022-11-11 | Stop reason: HOSPADM

## 2022-11-08 RX ORDER — IBUPROFEN 200 MG
16 TABLET ORAL
Status: DISCONTINUED | OUTPATIENT
Start: 2022-11-08 | End: 2022-11-11 | Stop reason: HOSPADM

## 2022-11-08 RX ORDER — MIDAZOLAM HYDROCHLORIDE 1 MG/ML
INJECTION, SOLUTION INTRAMUSCULAR; INTRAVENOUS
Status: DISCONTINUED | OUTPATIENT
Start: 2022-11-08 | End: 2022-11-08

## 2022-11-08 RX ORDER — OXYBUTYNIN CHLORIDE 5 MG/1
15 TABLET, EXTENDED RELEASE ORAL DAILY
Status: DISCONTINUED | OUTPATIENT
Start: 2022-11-08 | End: 2022-11-10

## 2022-11-08 RX ORDER — OXYCODONE HYDROCHLORIDE 10 MG/1
10 TABLET ORAL EVERY 4 HOURS PRN
Status: DISCONTINUED | OUTPATIENT
Start: 2022-11-08 | End: 2022-11-11 | Stop reason: HOSPADM

## 2022-11-08 RX ORDER — ONDANSETRON 2 MG/ML
8 INJECTION INTRAMUSCULAR; INTRAVENOUS EVERY 6 HOURS PRN
Status: DISCONTINUED | OUTPATIENT
Start: 2022-11-08 | End: 2022-11-11 | Stop reason: HOSPADM

## 2022-11-08 RX ORDER — NALOXONE HCL 0.4 MG/ML
0.04 VIAL (ML) INJECTION CONTINUOUS PRN
Status: ACTIVE | OUTPATIENT
Start: 2022-11-08 | End: 2022-11-09

## 2022-11-08 RX ORDER — SCOLOPAMINE TRANSDERMAL SYSTEM 1 MG/1
1 PATCH, EXTENDED RELEASE TRANSDERMAL
Status: DISCONTINUED | OUTPATIENT
Start: 2022-11-08 | End: 2022-11-08 | Stop reason: HOSPADM

## 2022-11-08 RX ORDER — METHYLPHENIDATE HYDROCHLORIDE 5 MG/1
20 TABLET ORAL 2 TIMES DAILY WITH MEALS
Status: DISCONTINUED | OUTPATIENT
Start: 2022-11-08 | End: 2022-11-08

## 2022-11-08 RX ORDER — HYDROMORPHONE HYDROCHLORIDE 1 MG/ML
1 INJECTION, SOLUTION INTRAMUSCULAR; INTRAVENOUS; SUBCUTANEOUS
Status: DISCONTINUED | OUTPATIENT
Start: 2022-11-08 | End: 2022-11-08 | Stop reason: SDUPTHER

## 2022-11-08 RX ORDER — HYDROMORPHONE HYDROCHLORIDE 2 MG/ML
0.2 INJECTION, SOLUTION INTRAMUSCULAR; INTRAVENOUS; SUBCUTANEOUS EVERY 5 MIN PRN
Status: COMPLETED | OUTPATIENT
Start: 2022-11-08 | End: 2022-11-08

## 2022-11-08 RX ORDER — OXYCODONE HYDROCHLORIDE 5 MG/1
5 TABLET ORAL
Status: DISCONTINUED | OUTPATIENT
Start: 2022-11-08 | End: 2022-11-08 | Stop reason: HOSPADM

## 2022-11-08 RX ORDER — IBUPROFEN 200 MG
24 TABLET ORAL
Status: DISCONTINUED | OUTPATIENT
Start: 2022-11-08 | End: 2022-11-11 | Stop reason: HOSPADM

## 2022-11-08 RX ORDER — OXYCODONE AND ACETAMINOPHEN 10; 325 MG/1; MG/1
1 TABLET ORAL EVERY 4 HOURS PRN
Qty: 40 TABLET | Refills: 0 | Status: SHIPPED | OUTPATIENT
Start: 2022-11-08 | End: 2022-11-15

## 2022-11-08 RX ORDER — TALC
9 POWDER (GRAM) TOPICAL NIGHTLY PRN
Status: DISCONTINUED | OUTPATIENT
Start: 2022-11-08 | End: 2022-11-11 | Stop reason: HOSPADM

## 2022-11-08 RX ORDER — BUPIVACAINE HYDROCHLORIDE 5 MG/ML
INJECTION, SOLUTION EPIDURAL; INTRACAUDAL CODE/TRAUMA/SEDATION MEDICATION
Status: DISCONTINUED | OUTPATIENT
Start: 2022-11-08 | End: 2022-11-08 | Stop reason: HOSPADM

## 2022-11-08 RX ORDER — PNV NO.95/FERROUS FUM/FOLIC AC 28MG-0.8MG
100 TABLET ORAL DAILY
COMMUNITY

## 2022-11-08 RX ORDER — BISACODYL 10 MG
10 SUPPOSITORY, RECTAL RECTAL DAILY
Status: DISCONTINUED | OUTPATIENT
Start: 2022-11-08 | End: 2022-11-11 | Stop reason: HOSPADM

## 2022-11-08 RX ORDER — LIDOCAINE HYDROCHLORIDE 10 MG/ML
1 INJECTION, SOLUTION EPIDURAL; INFILTRATION; INTRACAUDAL; PERINEURAL ONCE
Status: DISCONTINUED | OUTPATIENT
Start: 2022-11-08 | End: 2022-11-08 | Stop reason: HOSPADM

## 2022-11-08 RX ORDER — ACETAMINOPHEN 325 MG/1
650 TABLET ORAL EVERY 6 HOURS PRN
Status: DISCONTINUED | OUTPATIENT
Start: 2022-11-09 | End: 2022-11-11 | Stop reason: HOSPADM

## 2022-11-08 RX ORDER — KETOROLAC TROMETHAMINE 30 MG/ML
INJECTION, SOLUTION INTRAMUSCULAR; INTRAVENOUS
Status: DISCONTINUED | OUTPATIENT
Start: 2022-11-08 | End: 2022-11-08

## 2022-11-08 RX ORDER — DEXAMETHASONE SODIUM PHOSPHATE 4 MG/ML
INJECTION, SOLUTION INTRA-ARTICULAR; INTRALESIONAL; INTRAMUSCULAR; INTRAVENOUS; SOFT TISSUE
Status: DISCONTINUED | OUTPATIENT
Start: 2022-11-08 | End: 2022-11-08

## 2022-11-08 RX ORDER — FENTANYL CITRATE 50 UG/ML
25 INJECTION, SOLUTION INTRAMUSCULAR; INTRAVENOUS EVERY 5 MIN PRN
Status: DISCONTINUED | OUTPATIENT
Start: 2022-11-08 | End: 2022-11-08 | Stop reason: HOSPADM

## 2022-11-08 RX ORDER — KETAMINE HYDROCHLORIDE 10 MG/ML
INJECTION, SOLUTION INTRAMUSCULAR; INTRAVENOUS
Status: DISCONTINUED | OUTPATIENT
Start: 2022-11-08 | End: 2022-11-08

## 2022-11-08 RX ORDER — FENTANYL CITRATE 50 UG/ML
INJECTION, SOLUTION INTRAMUSCULAR; INTRAVENOUS
Status: DISCONTINUED | OUTPATIENT
Start: 2022-11-08 | End: 2022-11-08

## 2022-11-08 RX ORDER — PROPOFOL 10 MG/ML
VIAL (ML) INTRAVENOUS
Status: DISCONTINUED | OUTPATIENT
Start: 2022-11-08 | End: 2022-11-08

## 2022-11-08 RX ORDER — PNV NO.95/FERROUS FUM/FOLIC AC 28MG-0.8MG
100 TABLET ORAL DAILY
Status: DISCONTINUED | OUTPATIENT
Start: 2022-11-08 | End: 2022-11-11 | Stop reason: HOSPADM

## 2022-11-08 RX ORDER — INSULIN ASPART 100 [IU]/ML
1-10 INJECTION, SOLUTION INTRAVENOUS; SUBCUTANEOUS
Status: DISCONTINUED | OUTPATIENT
Start: 2022-11-08 | End: 2022-11-11 | Stop reason: HOSPADM

## 2022-11-08 RX ORDER — HEPARIN SODIUM 10000 [USP'U]/ML
INJECTION, SOLUTION INTRAVENOUS; SUBCUTANEOUS CODE/TRAUMA/SEDATION MEDICATION
Status: DISCONTINUED | OUTPATIENT
Start: 2022-11-08 | End: 2022-11-08 | Stop reason: HOSPADM

## 2022-11-08 RX ORDER — PHENYLEPHRINE HYDROCHLORIDE 10 MG/ML
INJECTION INTRAVENOUS
Status: DISCONTINUED | OUTPATIENT
Start: 2022-11-08 | End: 2022-11-08

## 2022-11-08 RX ORDER — LIDOCAINE HYDROCHLORIDE 20 MG/ML
INJECTION INTRAVENOUS
Status: DISCONTINUED | OUTPATIENT
Start: 2022-11-08 | End: 2022-11-08

## 2022-11-08 RX ORDER — SUCCINYLCHOLINE CHLORIDE 20 MG/ML
INJECTION INTRAMUSCULAR; INTRAVENOUS
Status: DISCONTINUED | OUTPATIENT
Start: 2022-11-08 | End: 2022-11-08

## 2022-11-08 RX ORDER — FLUTICASONE PROPIONATE 50 MCG
1 SPRAY, SUSPENSION (ML) NASAL DAILY
Status: DISCONTINUED | OUTPATIENT
Start: 2022-11-09 | End: 2022-11-11 | Stop reason: HOSPADM

## 2022-11-08 RX ADMIN — ROCURONIUM BROMIDE 5 MG: 10 INJECTION, SOLUTION INTRAVENOUS at 09:11

## 2022-11-08 RX ADMIN — KETAMINE HYDROCHLORIDE 25 MG: 10 INJECTION, SOLUTION INTRAMUSCULAR; INTRAVENOUS at 10:11

## 2022-11-08 RX ADMIN — MUPIROCIN 1 G: 20 OINTMENT TOPICAL at 09:11

## 2022-11-08 RX ADMIN — PROPOFOL 160 MG: 10 INJECTION, EMULSION INTRAVENOUS at 09:11

## 2022-11-08 RX ADMIN — GLYCOPYRROLATE 0.2 MG: 0.2 INJECTION, SOLUTION INTRAMUSCULAR; INTRAVITREAL at 09:11

## 2022-11-08 RX ADMIN — HYDROMORPHONE HYDROCHLORIDE 0.2 MG: 2 INJECTION INTRAMUSCULAR; INTRAVENOUS; SUBCUTANEOUS at 02:11

## 2022-11-08 RX ADMIN — PHENYLEPHRINE HYDROCHLORIDE 0.13 MCG/KG/MIN: 10 INJECTION INTRAVENOUS at 11:11

## 2022-11-08 RX ADMIN — ROCURONIUM BROMIDE 10 MG: 10 INJECTION, SOLUTION INTRAVENOUS at 10:11

## 2022-11-08 RX ADMIN — METOCLOPRAMIDE 10 MG: 5 INJECTION, SOLUTION INTRAMUSCULAR; INTRAVENOUS at 09:11

## 2022-11-08 RX ADMIN — ONDANSETRON 8 MG: 2 INJECTION INTRAMUSCULAR; INTRAVENOUS at 10:11

## 2022-11-08 RX ADMIN — Medication: at 03:11

## 2022-11-08 RX ADMIN — ROCURONIUM BROMIDE 35 MG: 10 INJECTION, SOLUTION INTRAVENOUS at 10:11

## 2022-11-08 RX ADMIN — KETAMINE HYDROCHLORIDE 25 MG: 10 INJECTION, SOLUTION INTRAMUSCULAR; INTRAVENOUS at 11:11

## 2022-11-08 RX ADMIN — CEFAZOLIN SODIUM 2 G: 2 SOLUTION INTRAVENOUS at 06:11

## 2022-11-08 RX ADMIN — OXYCODONE 5 MG: 5 TABLET ORAL at 02:11

## 2022-11-08 RX ADMIN — INSULIN ASPART 2 UNITS: 100 INJECTION, SOLUTION INTRAVENOUS; SUBCUTANEOUS at 10:11

## 2022-11-08 RX ADMIN — FENTANYL CITRATE 50 MCG: 50 INJECTION, SOLUTION INTRAMUSCULAR; INTRAVENOUS at 01:11

## 2022-11-08 RX ADMIN — CEFAZOLIN SODIUM 3 G: 2 SOLUTION INTRAVENOUS at 10:11

## 2022-11-08 RX ADMIN — PROPOFOL 75 MCG/KG/MIN: 10 INJECTION, EMULSION INTRAVENOUS at 11:11

## 2022-11-08 RX ADMIN — PROMETHAZINE HYDROCHLORIDE 12.5 MG: 25 INJECTION INTRAMUSCULAR; INTRAVENOUS at 10:11

## 2022-11-08 RX ADMIN — LIDOCAINE HYDROCHLORIDE 100 MG: 20 INJECTION, SOLUTION INTRAVENOUS at 09:11

## 2022-11-08 RX ADMIN — LOSARTAN POTASSIUM 50 MG: 25 TABLET, FILM COATED ORAL at 09:11

## 2022-11-08 RX ADMIN — MIDAZOLAM 2 MG: 1 INJECTION INTRAMUSCULAR; INTRAVENOUS at 09:11

## 2022-11-08 RX ADMIN — PHENYLEPHRINE HYDROCHLORIDE 100 MCG: 10 INJECTION INTRAVENOUS at 01:11

## 2022-11-08 RX ADMIN — OXYCODONE HYDROCHLORIDE 10 MG: 10 TABLET ORAL at 11:11

## 2022-11-08 RX ADMIN — KETOROLAC TROMETHAMINE 30 MG: 30 INJECTION, SOLUTION INTRAMUSCULAR; INTRAVENOUS at 01:11

## 2022-11-08 RX ADMIN — SUCCINYLCHOLINE CHLORIDE 160 MG: 20 INJECTION, SOLUTION INTRAMUSCULAR; INTRAVENOUS; PARENTERAL at 09:11

## 2022-11-08 RX ADMIN — HYDROMORPHONE HYDROCHLORIDE 0.2 MG: 2 INJECTION INTRAMUSCULAR; INTRAVENOUS; SUBCUTANEOUS at 03:11

## 2022-11-08 RX ADMIN — FENTANYL CITRATE 50 MCG: 50 INJECTION, SOLUTION INTRAMUSCULAR; INTRAVENOUS at 10:11

## 2022-11-08 RX ADMIN — PREGABALIN 150 MG: 75 CAPSULE ORAL at 09:11

## 2022-11-08 RX ADMIN — ONDANSETRON 4 MG: 2 INJECTION INTRAMUSCULAR; INTRAVENOUS at 01:11

## 2022-11-08 RX ADMIN — DEXAMETHASONE SODIUM PHOSPHATE 8 MG: 4 INJECTION, SOLUTION INTRA-ARTICULAR; INTRALESIONAL; INTRAMUSCULAR; INTRAVENOUS; SOFT TISSUE at 10:11

## 2022-11-08 RX ADMIN — FENTANYL CITRATE 100 MCG: 50 INJECTION, SOLUTION INTRAMUSCULAR; INTRAVENOUS at 09:11

## 2022-11-08 RX ADMIN — SCOPALAMINE 1 PATCH: 1 PATCH, EXTENDED RELEASE TRANSDERMAL at 09:11

## 2022-11-08 RX ADMIN — SODIUM CHLORIDE, SODIUM GLUCONATE, SODIUM ACETATE, POTASSIUM CHLORIDE, MAGNESIUM CHLORIDE, SODIUM PHOSPHATE, DIBASIC, AND POTASSIUM PHOSPHATE: .53; .5; .37; .037; .03; .012; .00082 INJECTION, SOLUTION INTRAVENOUS at 08:11

## 2022-11-08 RX ADMIN — KETAMINE HYDROCHLORIDE 25 MG: 10 INJECTION, SOLUTION INTRAMUSCULAR; INTRAVENOUS at 09:11

## 2022-11-08 RX ADMIN — CYCLOBENZAPRINE HYDROCHLORIDE 10 MG: 5 TABLET, FILM COATED ORAL at 09:11

## 2022-11-08 RX ADMIN — ACETAMINOPHEN 1000 MG: 10 INJECTION, SOLUTION INTRAVENOUS at 10:11

## 2022-11-08 NOTE — ASSESSMENT & PLAN NOTE
Patient has chronic hypothyroidism. TFTs reviewed-   Lab Results   Component Value Date    TSH 0.723 05/09/2022   . Will continue chronic levothyroxine and adjust for and clinical changes.

## 2022-11-08 NOTE — ANESTHESIA PROCEDURE NOTES
Intubation    Date/Time: 11/8/2022 9:53 AM  Performed by: Lauren Daily CRNA  Authorized by: Isra Martínez MD     Intubation:     Induction:  Intravenous    Intubated:  Postinduction    Mask Ventilation:  Easy with oral airway    Attempts:  1    Attempted By:  CRNA    Method of Intubation:  Video laryngoscopy    Blade:  Mckeon 3    Laryngeal View Grade: Grade I - full view of cords      Difficult Airway Encountered?: No      Complications:  None    Airway Device:  Oral endotracheal tube    Airway Device Size:  7.5    Style/Cuff Inflation:  Cuffed (inflated to minimal occlusive pressure)    Tube secured:  22    Secured at:  The lips    Placement Verified By:  Capnometry and Revisualization with laryngoscopy    Complicating Factors:  Small mouth, large/floppy epiglottis, obesity, short neck and oropharyngeal edema or fat    Findings Post-Intubation:  BS equal bilateral and atraumatic/condition of teeth unchanged

## 2022-11-08 NOTE — ASSESSMENT & PLAN NOTE
Acute:  - s/p lumbar spinal fusion and ALIF L3-L4, L4-L5 with Dr. Godinez on 11/8  - diet per ortho recommendations  - pain medications per ortho  - prn antiemetics  - aggressive IS  - PT/OT  - follow H/H closely

## 2022-11-08 NOTE — PLAN OF CARE
When leaving room  voiced concerns wheather pt can go home or not has lots of STAIRS TO GO UP  explained to pt  that PT would work with her tommr and assess that and to be working on alternate plan if she cannot may need to have  see them

## 2022-11-08 NOTE — BRIEF OP NOTE
[facility]  Brief Operative Note    SUMMARY     Surgery Date: 11/8/2022     Surgeon(s) and Role:     * Randy Nelson MD - Primary     * Steve Palm MD - co-surgeon for anterior lumbar fusion  Steve Jackson-surgical assistant for anterior and posterior procedures        Pre-op Diagnosis:  Lumbar stenosis with neurogenic claudication [M48.062]  Disc degeneration, lumbar [M51.36]  Spondylolisthesis of lumbar region [M43.16]    Post-op Diagnosis:  Post-Op Diagnosis Codes:     * Lumbar stenosis with neurogenic claudication [M48.062]     * Disc degeneration, lumbar [M51.36]     * Spondylolisthesis of lumbar region [M43.16]    Procedure(s) (LRB):  FUSION, SPINE, LUMBAR, ALIF L3-4, L4-5 (N/A)  FUSION,SPINE,LUMBAR,POSTERIOR APPROACH L3-4, L4-5 (N/A)    Anesthesia: General    Operative Findings:  Lumbar spinal stenosis degenerative disc disease and spondylolisthesis    Estimated Blood Loss: 300 mL    Estimated Blood Loss has been documented.         Specimens:   Specimen (24h ago, onward)       Start     Ordered    11/08/22 1311  Specimen to Pathology, Surgery Other (SPINE)  Once        Comments: Pre-op Diagnosis: Lumbar stenosis with neurogenic claudication [M48.062]Disc degeneration, lumbar [M51.36]Spondylolisthesis of lumbar region [M43.16]Procedure(s):FUSION, SPINE, LUMBAR, ALIF L3-4, L4-5FUSION,SPINE,LUMBAR,POSTERIOR APPROACH L3-4, L4-5 Number of specimens: 1Name of specimens: 1. L3-5 DISC     References:    Click here for ordering Quick Tip   Question Answer Comment   Procedure Type: Other SPINE   Which provider would you like to cc? RANDY NELSON    Release to patient Immediate        11/08/22 1310                    [id number]

## 2022-11-08 NOTE — PLAN OF CARE
Pt wears cpap at home order placed for 02 in room and cpap titration of home machine 02 sat 96 on 3 l nc at this time

## 2022-11-08 NOTE — ANESTHESIA PREPROCEDURE EVALUATION
11/08/2022  Edie Hernandez is a 67 y.o., female.      Pre-op Assessment    I have reviewed the Patient Summary Reports.     I have reviewed the Nursing Notes. I have reviewed the NPO Status.   I have reviewed the Medications.     Review of Systems  Anesthesia Hx:  No problems with previous Anesthesia Denies Hx of Anesthetic complications    Social:  Non-Smoker    Cardiovascular:   Denies Hypertension.  Denies MI.  Denies CAD.    Denies CABG/stent.   Denies Angina.    Pulmonary:   Denies COPD.  Denies Asthma.  Denies Recent URI.    Renal/:   Denies Chronic Renal Disease.     Hepatic/GI:   Denies GERD. Denies Liver Disease.    Neurological:   Denies TIA. Denies CVA. Denies Seizures.    Endocrine:   Denies Diabetes. Denies Hypothyroidism.    Psych:   Denies Psychiatric History.          Physical Exam  General: Well nourished, Cooperative, Alert and Oriented    Airway:  Mallampati: II / II  Mouth Opening: Normal  TM Distance: 4 - 6 cm  Tongue: Normal    Dental:  Intact    Chest/Lungs:  Clear to auscultation, Normal Respiratory Rate    Heart:  Rate: Normal  Rhythm: Regular Rhythm  Sounds: Normal        Anesthesia Plan  Type of Anesthesia, risks & benefits discussed:    Anesthesia Type: Gen ETT  Intra-op Monitoring Plan: Standard ASA Monitors  Induction:  IV  Informed Consent: Informed consent signed with the Patient and all parties understand the risks and agree with anesthesia plan.  All questions answered.   ASA Score: 3    Ready For Surgery From Anesthesia Perspective.     .

## 2022-11-08 NOTE — H&P
[facility]  History & Physical    Patient Name: Edie Hernandez  MRN: [id number]  Patient Class: IP- Inpatient  Admission Date: 11/8/2022  Attending Physician: Everton Godinez MD   Primary Care Provider: Lydia Eugene MD         Patient information was obtained from patient, past medical records and ER records.     Subjective:     Principal Problem:Lumbar stenosis with neurogenic claudication    Chief Complaint:   Chief Complaint   Patient presents with    Back Pain        HPI: Edie Hernandez is a 67 y.o. y.o. female with a PMHx of arthritis, T2DM, GERD, CLINT on CPAP, and hypothyroidism who is seen s/p lumbar spinal fusion and ALIF L3-L4, L4-L5 with Dr. Godinez on 11/8. Patient was seen by Dr. Godinez in clinic on 10/17 for chronic back pain which failed conservative management and planned for surgery. Post-operatively, patient is sedated from anesthesia. She reports back pain. Preoperative labs were reviewed. Hospital Medicine was consulted for further workup and management of the patient.             Past Medical History:   Diagnosis Date    Arthritis     Colon polyp, hyperplastic 01/2013    recheck 5-10 years    Diabetes mellitus, type 2     Diverticulosis     Fatty liver     General anesthetics causing adverse effect in therapeutic use     woke up during tubal ligation    GERD (gastroesophageal reflux disease)     Gout, unspecified     Malignant melanoma of skin, unspecified     LEFT NECK    CLINT (obstructive sleep apnea)     C-pap    PONV (postoperative nausea and vomiting)     RLS (restless legs syndrome)     Thyroid disease     Wears glasses     Wears partial dentures     UPPER       Past Surgical History:   Procedure Laterality Date    COLONOSCOPY  02/14/2013    Dr. Vogel: repeat in 5-10 years    COLONOSCOPY W/ BIOPSIES AND POLYPECTOMY  02/2013    hyperplastic, recheck 5-10 years    CYSTOSCOPY WITH HYDRODISTENSION OF BLADDER N/A 9/21/2022     Procedure: CYSTOSCOPY, WITH BLADDER HYDRODISTENSION;  Surgeon: Keely Chavez Jr., MD;  Location: [facility] OR;  Service: Urology;  Laterality: N/A;    EXTERNAL EAR SURGERY      HYSTERECTOMY      INJECTION OF ANESTHETIC AGENT AROUND MEDIAL BRANCH NERVES INNERVATING LUMBAR FACET JOINT Bilateral 8/3/2020    Procedure: Block-nerve-medial branch-lumbar left L2, L3, L4, L5;  Surgeon: Dejan Simmons MD;  Location: [facility] OR;  Service: Pain Management;  Laterality: Bilateral;    INJECTION OF ANESTHETIC AGENT AROUND MEDIAL BRANCH NERVES INNERVATING LUMBAR FACET JOINT Left 8/13/2020    Procedure: Block-nerve-medial branch-lumbar.  L2, L3, L4, and L5;  Surgeon: Dejan Simmons MD;  Location: [facility] OR;  Service: Pain Management;  Laterality: Left;    MINIMALLY INVASIVE FUSION OF SPINE Left 10/12/2021    Procedure: FUSION, SPINE, MINIMALLY INVASIVE;  Surgeon: Everton Godinez MD;  Location: [facility] OR;  Service: Orthopedics;  Laterality: Left;  SI-Bone    RADIOFREQUENCY ABLATION OF LUMBAR MEDIAL BRANCH NERVE AT SINGLE LEVEL Left 8/27/2020    Procedure: Radiofrequency Ablation, Nerve, Spinal, Lumbar, Medial Branch,  L2, L3, L4, L5;  Surgeon: Dejan Simmons MD;  Location: [facility] OR;  Service: Pain Management;  Laterality: Left;  L2,l3,l4,l5    THROAT SURGERY      for CLINT    TRANSFORAMINAL EPIDURAL INJECTION OF STEROID Left 3/6/2020    Procedure: Injection,steroid,epidural,transforaminal approach;  Surgeon: Harmeet Zapata MD;  Location: [facility] OR;  Service: Pain Management;  Laterality: Left;  L4-L5    TRANSFORAMINAL EPIDURAL INJECTION OF STEROID Left 6/10/2020    Procedure: Injection,steroid,epidural,transforaminal approach.L4 and L5;  Surgeon: Dejan Simmons MD;  Location: [facility] OR;  Service: Pain Management;  Laterality: Left;    TRANSFORAMINAL EPIDURAL INJECTION OF STEROID Left 7/6/2020    Procedure: Injection,steroid,epidural,transforaminal approach. left L4 and L5;  Surgeon: Dejan Simmons MD;  Location: [facility] OR;  Service: Pain Management;   Laterality: Left;    TRANSFORAMINAL EPIDURAL INJECTION OF STEROID Left 11/12/2021    Procedure: Injection,steroid,epidural,transforaminal approach Left L5-S1;  Surgeon: Dejan Simmons MD;  Location: [facility];  Service: Pain Management;  Laterality: Left;    UPPER GASTROINTESTINAL ENDOSCOPY         Review of patient's allergies indicates:   Allergen Reactions    Codeine Nausea And Vomiting     Other reaction(s): Nausea, dizziness       Current Facility-Administered Medications on File Prior to Encounter   Medication    lactated ringers infusion     Current Outpatient Medications on File Prior to Encounter   Medication Sig    azelastine (ASTELIN) 137 mcg (0.1 %) nasal spray 1 spray (137 mcg total) by Nasal route 2 (two) times daily. (Patient taking differently: 1 spray by Nasal route 2 (two) times daily. PRN)    clobetasol (TEMOVATE) 0.05 % cream Apply 1 application topically 2 (two) times daily. Apply to affected area    cyanocobalamin (VITAMIN B-12) 100 MCG tablet Take 100 mcg by mouth once daily.    cyclobenzaprine (FLEXERIL) 5 MG tablet TAKE 1 TO 2 TABLETS BY MOUTH NIGHTLY AT BEDTIME AS NEEDED FOR PAIN    diclofenac sodium (VOLTAREN) 1 % Gel Apply 2 g topically 4 (four) times daily.    ergocalciferol (ERGOCALCIFEROL) 50,000 unit Cap TAKE 1 CAPSULE BY MOUTH ONE TIME PER WEEK    estradioL (ESTRACE) 0.01 % (0.1 mg/gram) vaginal cream Apply 1 fingertipful to vaginal area nightly x 2 weeks then use on MWF    fluticasone propionate (FLONASE) 50 mcg/actuation nasal spray 1 spray (50 mcg total) by Each Nostril route daily as needed for Allergies.    levothyroxine (SYNTHROID, LEVOTHROID) 175 MCG tablet TAKE ONE TABLET BY MOUTH EVERY DAY    losartan (COZAAR) 50 MG tablet TAKE ONE TABLET (50 MG TOTAL) BY MOUTH ONCE DAILY (Patient taking differently: every evening. FOR RLS)    metFORMIN (GLUCOPHAGE-XR) 500 MG ER 24hr tablet TAKE TWO TABLETS BY MOUTH TWICE DAILY WITH MEALS    oxybutynin (DITROPAN XL) 15 MG TR24  Take 1 tablet (15 mg total) by mouth once daily.    pantoprazole (PROTONIX) 40 MG tablet Take 1 tablet (40 mg total) by mouth once daily.    pregabalin (LYRICA) 150 MG capsule TAKE ONE CAPSULE BY MOUTH TWICE DAILY (Patient taking differently: 2 (two) times daily.)    blood-glucose meter kit Use as instructed    furosemide (LASIX) 20 MG tablet Take 1 tablet (20 mg total) by mouth daily as needed (leg swelling).    lancets (ACCU-CHEK MULTICLIX LANCET) Misc Test 2 x day    methylphenidate (RITALIN LA) 40 MG 24 hr capsule Take 40 mg by mouth daily as needed. Not on at this time    methylphenidate HCl (RITALIN) 10 MG tablet     ONETOUCH ULTRA BLUE TEST STRIP Strp USE TO TEST BLOOD SUGAR    [DISCONTINUED] indomethacin (INDOCIN) 25 MG capsule TAKE ONE CAPSULE BY MOUTH 3 TIMES A DAY WITH MEALS (Patient taking differently: TAKE ONE CAPSULE BY MOUTH 3 TIMES A DAY WITH MEALS PRN)     Family History       Problem Relation (Age of Onset)    Breast cancer Mother (50)    Heart disease Father    Hypertension Mother, Father    Ovarian cancer Paternal Aunt          Tobacco Use    Smoking status: Never    Smokeless tobacco: Never   Substance and Sexual Activity    Alcohol use: No    Drug use: No    Sexual activity: Yes     Partners: Male     Review of Systems   Unable to perform ROS: Other (sedated post-op)   Objective:     Vital Signs (Most Recent):  Temp: 98.1 °F (36.7 °C) (11/08/22 1450)  Pulse: 78 (11/08/22 1450)  Resp: 14 (11/08/22 1510)  BP: 138/65 (11/08/22 1450)  SpO2: (!) 92 % (11/08/22 1450)   Vital Signs (24h Range):  Temp:  [97.4 °F (36.3 °C)-98.1 °F (36.7 °C)] 98.1 °F (36.7 °C)  Pulse:  [68-91] 78  Resp:  [12-22] 14  SpO2:  [92 %-100 %] 92 %  BP: (102-139)/(51-65) 138/65     Weight: 118.7 kg (261 lb 11 oz)  Body mass index is 43.55 kg/m².    Physical Exam  Vitals and nursing note reviewed.   Constitutional:       General: She is not in acute distress.     Appearance: Normal appearance. She is obese.       Comments: Sedated from anesthesia   HENT:      Head: Normocephalic and atraumatic.      Mouth/Throat:      Mouth: Mucous membranes are moist.      Pharynx: Oropharynx is clear.   Cardiovascular:      Rate and Rhythm: Normal rate and regular rhythm.      Pulses: Normal pulses.      Heart sounds: Normal heart sounds.   Pulmonary:      Effort: Pulmonary effort is normal.      Breath sounds: Normal breath sounds.   Abdominal:      General: Abdomen is flat. Bowel sounds are normal.      Palpations: Abdomen is soft.      Tenderness: There is no abdominal tenderness. There is no guarding or rebound.      Comments: Surgical dressing noted abdomen C/D/I.   Skin:     General: Skin is warm.      Capillary Refill: Capillary refill takes 2 to 3 seconds.   Neurological:      Comments: Sedated from anesthesia           Significant Labs: All pertinent labs within the past 24 hours have been reviewed.  BMP:   Recent Labs   Lab 11/08/22  1402   *      K 4.6      CO2 22*   BUN 15   CREATININE 1.0   CALCIUM 8.7     CBC:   Recent Labs   Lab 11/08/22  1402   WBC 11.16   HGB 13.4   HCT 41.1          Significant Imaging: I have reviewed all pertinent imaging results/findings within the past 24 hours.    Assessment/Plan:     * Lumbar stenosis with neurogenic claudication  Acute:  - s/p lumbar spinal fusion and ALIF L3-L4, L4-L5 with Dr. Godinez on 11/8  - diet per ortho recommendations  - pain medications per ortho  - prn antiemetics  - aggressive IS  - PT/OT  - follow H/H closely     Type 2 diabetes mellitus  Patient's FSGs are controlled on current medication regimen.  Last A1c reviewed-   Lab Results   Component Value Date    HGBA1C 7.1 (H) 05/09/2022     Most recent fingerstick glucose reviewed- No results for input(s): POCTGLUCOSE in the last 24 hours.  Current correctional scale  Medium  Maintain anti-hyperglycemic dose as follows-   Antihyperglycemics (From admission, onward)    Start     Stop Route Frequency  Ordered    11/08/22 1722  insulin aspart U-100 pen 1-10 Units         -- SubQ Before meals & nightly PRN 11/08/22 1622            Hypothyroid  Patient has chronic hypothyroidism. TFTs reviewed-   Lab Results   Component Value Date    TSH 0.723 05/09/2022   . Will continue chronic levothyroxine and adjust for and clinical changes.        CLINT (obstructive sleep apnea)  CPAP ordered    GERD (gastroesophageal reflux disease)  PPI ordered        VTE Risk Mitigation (From admission, onward)         Ordered     IP VTE LOW RISK PATIENT  Once         11/08/22 0743     Place SHELLIE hose  Until discontinued         11/08/22 0743     Place sequential compression device  Until discontinued         11/08/22 0743                   DEEPA ZazuetaC  Department of Hospital Medicine   [facility]

## 2022-11-08 NOTE — PLAN OF CARE
Released from Pacu when criteria met pain controlled   skin w+d No nausea No emesis dsg dry intact aaox4 encouraged deep breaths unable to do Is too sleepy  rouses at  intervals tearful but then falls asleep   has pca button in hand    Pt has all belongings with   including cpap and back brace     on pca an when not talking to her falls asleep easily needed 02 at 3 liters to keep sats up 96 % now on 3liters nc no emesis has scope patch  on labs results given to Pawan  np /pa   glucose 188  at recent labs  had an oxy po and dilaudid iv  5 cc emptied out of accord drain dsg on L front abd  dsg intact  and  back intact abernathy cath 250 out

## 2022-11-08 NOTE — ANESTHESIA POSTPROCEDURE EVALUATION
Anesthesia Post Evaluation    Patient: Edie Hernandez    Procedure(s) Performed: Procedure(s) (LRB):  FUSION, SPINE, LUMBAR, ALIF L3-4, L4-5 (N/A)  FUSION,SPINE,LUMBAR,POSTERIOR APPROACH L3-4, L4-5 (N/A)    Final Anesthesia Type: general      Patient location during evaluation: PACU  Patient participation: Yes- Able to Participate  Level of consciousness: awake and alert and oriented  Post-procedure vital signs: reviewed and stable  Pain management: adequate  Airway patency: patent    PONV status at discharge: No PONV  Anesthetic complications: no      Cardiovascular status: blood pressure returned to baseline  Respiratory status: unassisted, spontaneous ventilation and room air  Hydration status: euvolemic  Follow-up not needed.          Vitals Value Taken Time   /64 11/08/22 1412   Temp 36.6 °C (97.9 °F) 11/08/22 1355   Pulse 76 11/08/22 1416   Resp 23 11/08/22 1416   SpO2 100 % 11/08/22 1416   Vitals shown include unvalidated device data.      No case tracking events are documented in the log.      Pain/[place] Score: Pain Rating Prior to Med Admin: 7 (11/8/2022  2:13 PM)  [place] Score: 3 (11/8/2022  1:49 PM)

## 2022-11-08 NOTE — ASSESSMENT & PLAN NOTE
Patient's FSGs are controlled on current medication regimen.  Last A1c reviewed-   Lab Results   Component Value Date    HGBA1C 7.1 (H) 05/09/2022     Most recent fingerstick glucose reviewed- No results for input(s): POCTGLUCOSE in the last 24 hours.  Current correctional scale  Medium  Maintain anti-hyperglycemic dose as follows-   Antihyperglycemics (From admission, onward)    Start     Stop Route Frequency Ordered    11/08/22 1722  insulin aspart U-100 pen 1-10 Units         -- SubQ Before meals & nightly PRN 11/08/22 1622

## 2022-11-08 NOTE — TRANSFER OF CARE
"Anesthesia Transfer of Care Note    Patient: Edie Hernandez    Procedure(s) Performed: Procedure(s) (LRB):  FUSION, SPINE, LUMBAR, ALIF L3-4, L4-5 (N/A)  FUSION,SPINE,LUMBAR,POSTERIOR APPROACH L3-4, L4-5 (N/A)    Patient location: PACU    Anesthesia Type: general    Transport from OR: Transported from OR on 6-10 L/min O2 by face mask with adequate spontaneous ventilation    Post pain: adequate analgesia    Post assessment: no apparent anesthetic complications    Post vital signs: stable    Level of consciousness: sedated    Nausea/Vomiting: no nausea/vomiting    Complications: none    Transfer of care protocol was followed      Last vitals:   Visit Vitals  /64   Pulse 75   Temp 36.3 °C (97.4 °F)   Resp 16   Ht 5' 5" (1.651 m)   Wt 118.7 kg (261 lb 11 oz)   SpO2 97%   Breastfeeding No   BMI 43.55 kg/m²     "

## 2022-11-08 NOTE — PLAN OF CARE
Pre-op complete. Incentive spirometer teaching done per RRT. Rory heel foam dressings applied. Pt belongings sent with pt's spouse. Safety maintained.

## 2022-11-08 NOTE — PLAN OF CARE
POC/Meds reviewed, pt verbalized understanding. Vitals stable. Afebrile. Remains on 2L O2. IVPB abx administered. Rodriguez in place, good UOP. IS at bedside, instructed on use and return demonstration performed. PCA pump in use. Repositions self. Hourly/Q2hr rounding performed, safety maintained. Bed in lowest position, wheels locked, SR up x2, call light in easy reach. No complaints at this time. Will continue to monitor.

## 2022-11-08 NOTE — HPI
Edie Hernandez is a 67 y.o. y.o. female with a PMHx of arthritis, T2DM, GERD, CLINT on CPAP, and hypothyroidism who is seen s/p lumbar spinal fusion and ALIF L3-L4, L4-L5 with Dr. Godinez on 11/8. Patient was seen by Dr. Godinez in clinic on 10/17 for chronic back pain which failed conservative management and planned for surgery. Post-operatively, patient is sedated from anesthesia. She reports back pain. Preoperative labs were reviewed. Hospital Medicine was consulted for further workup and management of the patient.

## 2022-11-08 NOTE — OP NOTE
[facility]  Orthopedic  Operative Note    SUMMARY     Date of Procedure: 11/8/2022     Procedure:   1. Anterior lumbar interbody fusion L3-4 CPT code 23432-31  2. Anterior lumbar interbody fusion L4-5 CPT code 41691-43  3. Insertion interbody device L3-4 CPT code 37336  4. Insertion interbody device L4-5 CPT code 35562  5. Posterior segmental instrumentation L3-L5 with Medtronic titanium plate and screws CPT code 42857  6. L3 laminectomy for spinal stenosis CPT code 96082  7. L4 laminectomy for spinal stenosis CPT code 78937  8. Harvesting local autograft for spinal fusion posterior CPT code 23774  9. Posterolateral fusion L3-4 CPT code 07376  10. Posterolateral fusion L4-5 CPT code 61846  11. Computer assisted navigation for insertion of spinal pedicle screws CPT code 08417    Surgeon(s) and Role:     * Everton Godinez MD - Primary     * Steve Palm MD - co-surgeon for anterior lumbar interbody fusion CPT code 59834-57 and 73036-80  Steve Jackson-surgical assistant for anterior and posterior procedures        Pre-Operative Diagnosis: Lumbar stenosis with neurogenic claudication [M48.062]  Disc degeneration, lumbar [M51.36]  Spondylolisthesis of lumbar region [M43.16]    Post-Operative Diagnosis: Post-Op Diagnosis Codes:     * Lumbar stenosis with neurogenic claudication [M48.062]     * Disc degeneration, lumbar [M51.36]     * Spondylolisthesis of lumbar region [M43.16]    Anesthesia: General    Procedure in General:  After consents were signed the patient was brought to the operating room and was intubated.  A catheter was placed in the bladder she was turned to the right lateral decubitus position with the left side up.  Bony prominence were padded and she was position laterally.  Fluoroscopy was utilized to confirm visualization of the L3-4 and L4-5 disc spaces.  The left flank and abdomen was cleansed with alcohol then prepped with ChloraPrep solution and draped in the usual  fashion.  A time-out was performed confirming the procedure.  Dr. Palm will dictate his portion of the procedure then made a left flank retroperitoneal approach to the spine identifying the psoas muscle.  A psoas splitting approach was made directly looking at the disc and and a spinal needle was placed into the L3-4 disc space.  Protecting the vascular structures and neural elements we then incised the disc annulus from left-sided entered the disc space.  Curettes disc Shay and pituitary Padgett were used to removed the disc in a piecemeal fashion.  Trial sizers were inserted and a 14 mm trial gave a good fit.  Infuse bone morphogenic protein was prepared in routine fashion.  A size 14 Medtronic Louis still implant was brought onto the field and filled with infuse.  Under direct visualization the implant containing infuse was impacted into the L3-4 disc space.  Position was confirmed with fluoroscopy.  Next we made a separate the psoas splitting approach down to the L4-5 disc confirming our level with fluoroscopy.  Again protecting the neural elements we incised the disc annulus and then into the L4-5 disc.  Curettes disc Shay and Padgett were utilized to remove the disc and then a 12 mm trial was inserted and gave a good fit.  A Medtronic 12 mm Louis still implant was brought onto the field and filled with bone morphogenic protein then impacted into the L4-5 disc space.  Position of both implants look satisfactory.  Dr. Palm then closed the wound in layers with staples on the skin without a drain.  Sterile dressings were applied.    Next the patient was temporarily placed on the stretcher transverse rolls placed on operating table and then the patient was returned to the table now in a prone position with the abdomen allowed to hang free.  Bony prominence were padded her back was cleansed with alcohol and prepped with ChloraPrep solution.  An incision was made from L2-L5 elevating paraspinous muscles  out to the facet joints and then identified the transverse processes of L3-L4 and L5 bilaterally.  Soft tissues were removed from the facet joints.  The interspinous ligaments were removed.  We placed the reference arc on L2 and then brought the O arm navigation device onto the field.  We visualized the pedicles of L3-L4 and L5 bilaterally.  An intraoperative CT scan was then performed and interpreted.  Using computer assisted navigation and normal anatomic landmarks we then inserted pedicle screws from Medtronic company into the pedicles of L3-L4 and L5.  Titanium pedicle screws measured 6.5 mm in diameter varying lengths were utilized.  Intraoperative EMG pedicle screw testing was then performed in all 6 screws tested greater than 30 milliamps.  Now we proceeded to our laminectomy.  We removed the spinous process of L3 and performed a midline laminectomy then facetectomy foraminotomies to decompress the nerve roots.  All bone graft harvested in the decompression was morselized later be used for the posterolateral fusion.  We removed the spinous process of L4 and then performed a midline laminectomy with this for at facetectomy and foraminotomy.    Next we decorticate the transverse processes and placed the morselized bone graft in the posterolateral gutter from L3-L5.  A Medtronic 80 mm titanium paulette was then placed connecting the 3 screws on each side and locking nuts were inserted and tightened to proper torque.  The entire construct was visualized and looked satisfactory.  We then covered the exposed dura and nerve roots with Gelfoam.  The lumbodorsal fascia was closed and then we visualized the entire construct with fluoroscopy.  A drain was brought through a separate stab incision and wound was closed in layers with staples on the skin posteriorly.  Sterile dressings were applied and the patient was turned supine and extubated.      Complications: None    Estimated Blood Loss (EBL):            Implants:    Implant Name Type Inv. Item Serial No.  Lot No. LRB No. Used Action   KIT BONE GRAFT INFUSE LG 8MM - [id number]  KIT BONE GRAFT INFUSE LG 8MM  [employer] [facility] [id number] N/A 1 Implanted   [id number]   [id number] [employer]IM[employer]  N/A 1 Implanted   SPACER CLYDESDALE 6DEG 14X45 - [id number]  SPACER CLYDESDALE 6DEG 14X45  [employer] [facility] [id number] N/A 1 Implanted   SPACER CLYDESDALE 6DEG [id number] - [id number]  SPACER CLYDESDALE 6DEG [id number]  [employer] [facility] [id number] N/A 1 Implanted   SET SCREW HORIZON SOLERA 5.5-6 - [id number]  SET SCREW HORIZON SOLERA 5.5-6  [employer] [facility]  N/A 6 Implanted   6.5 X 45 SCREW    [employer]  N/A 4 Implanted   JANE HORIZON CURV 5.5CCM 70MM - [id number]  JANE HORIZON CURV 5.5CCM 70MM  [employer] [facility]  N/A 2 Implanted   6.5 X 40 SCREW    [employer]  N/A 2 Implanted       Specimens:   Specimen (24h ago, onward)       Start     Ordered    11/08/22 1311  Specimen to Pathology, Surgery Other (SPINE)  Once        Comments: Pre-op Diagnosis: Lumbar stenosis with neurogenic claudication [M48.062]Disc degeneration, lumbar [M51.36]Spondylolisthesis of lumbar region [M43.16]Procedure(s):FUSION, SPINE, LUMBAR, ALIF L3-4, L4-5FUSION,SPINE,LUMBAR,POSTERIOR APPROACH L3-4, L4-5 Number of specimens: 1Name of specimens: 1. L3-5 DISC     References:    Click here for ordering Quick Tip   Question Answer Comment   Procedure Type: Other SPINE   Which provider would you like to cc? RANDY NELSON    Release to patient Immediate        11/08/22 1310                            Condition: Good    Disposition: PACU - hemodynamically stable.    Attestation: I was present and scrubbed for the entire procedure.

## 2022-11-09 PROBLEM — N17.9 AKI (ACUTE KIDNEY INJURY): Status: ACTIVE | Noted: 2022-11-09

## 2022-11-09 LAB
ANION GAP SERPL CALC-SCNC: 13 MMOL/L (ref 8–16)
ANION GAP SERPL CALC-SCNC: 9 MMOL/L (ref 8–16)
BASOPHILS # BLD AUTO: 0.02 K/UL (ref 0–0.2)
BASOPHILS NFR BLD: 0.2 % (ref 0–1.9)
BUN SERPL-MCNC: 22 MG/DL (ref 8–23)
BUN SERPL-MCNC: 22 MG/DL (ref 8–23)
CALCIUM SERPL-MCNC: 8 MG/DL (ref 8.7–10.5)
CALCIUM SERPL-MCNC: 8.1 MG/DL (ref 8.7–10.5)
CHLORIDE SERPL-SCNC: 105 MMOL/L (ref 95–110)
CHLORIDE SERPL-SCNC: 106 MMOL/L (ref 95–110)
CO2 SERPL-SCNC: 17 MMOL/L (ref 23–29)
CO2 SERPL-SCNC: 25 MMOL/L (ref 23–29)
CREAT SERPL-MCNC: 1.5 MG/DL (ref 0.5–1.4)
CREAT SERPL-MCNC: 1.6 MG/DL (ref 0.5–1.4)
DIFFERENTIAL METHOD: ABNORMAL
EOSINOPHIL # BLD AUTO: 0.1 K/UL (ref 0–0.5)
EOSINOPHIL NFR BLD: 0.6 % (ref 0–8)
ERYTHROCYTE [DISTWIDTH] IN BLOOD BY AUTOMATED COUNT: 12.8 % (ref 11.5–14.5)
EST. GFR  (NO RACE VARIABLE): 35 ML/MIN/1.73 M^2
EST. GFR  (NO RACE VARIABLE): 38 ML/MIN/1.73 M^2
GLUCOSE SERPL-MCNC: 164 MG/DL (ref 70–110)
GLUCOSE SERPL-MCNC: 196 MG/DL (ref 70–110)
HCT VFR BLD AUTO: 34.1 % (ref 37–48.5)
HGB BLD-MCNC: 11.1 G/DL (ref 12–16)
IMM GRANULOCYTES # BLD AUTO: 0.03 K/UL (ref 0–0.04)
IMM GRANULOCYTES NFR BLD AUTO: 0.4 % (ref 0–0.5)
LYMPHOCYTES # BLD AUTO: 1.1 K/UL (ref 1–4.8)
LYMPHOCYTES NFR BLD: 13.2 % (ref 18–48)
MCH RBC QN AUTO: 29.8 PG (ref 27–31)
MCHC RBC AUTO-ENTMCNC: 32.6 G/DL (ref 32–36)
MCV RBC AUTO: 92 FL (ref 82–98)
MONOCYTES # BLD AUTO: 0.7 K/UL (ref 0.3–1)
MONOCYTES NFR BLD: 7.8 % (ref 4–15)
NEUTROPHILS # BLD AUTO: 6.5 K/UL (ref 1.8–7.7)
NEUTROPHILS NFR BLD: 77.8 % (ref 38–73)
NRBC BLD-RTO: 0 /100 WBC
PLATELET # BLD AUTO: 309 K/UL (ref 150–450)
PMV BLD AUTO: 10 FL (ref 9.2–12.9)
POCT GLUCOSE: 185 MG/DL (ref 70–110)
POCT GLUCOSE: 197 MG/DL (ref 70–110)
POTASSIUM SERPL-SCNC: 4.4 MMOL/L (ref 3.5–5.1)
POTASSIUM SERPL-SCNC: 4.5 MMOL/L (ref 3.5–5.1)
RBC # BLD AUTO: 3.72 M/UL (ref 4–5.4)
SODIUM SERPL-SCNC: 135 MMOL/L (ref 136–145)
SODIUM SERPL-SCNC: 140 MMOL/L (ref 136–145)
WBC # BLD AUTO: 8.35 K/UL (ref 3.9–12.7)

## 2022-11-09 PROCEDURE — 94660 CPAP INITIATION&MGMT: CPT

## 2022-11-09 PROCEDURE — 63600175 PHARM REV CODE 636 W HCPCS

## 2022-11-09 PROCEDURE — 36415 COLL VENOUS BLD VENIPUNCTURE: CPT

## 2022-11-09 PROCEDURE — 25000003 PHARM REV CODE 250

## 2022-11-09 PROCEDURE — 25000003 PHARM REV CODE 250: Performed by: PHYSICIAN ASSISTANT

## 2022-11-09 PROCEDURE — 85025 COMPLETE CBC W/AUTO DIFF WBC: CPT | Performed by: PHYSICIAN ASSISTANT

## 2022-11-09 PROCEDURE — 80048 BASIC METABOLIC PNL TOTAL CA: CPT | Mod: 91

## 2022-11-09 PROCEDURE — 12000002 HC ACUTE/MED SURGE SEMI-PRIVATE ROOM

## 2022-11-09 PROCEDURE — 36415 COLL VENOUS BLD VENIPUNCTURE: CPT | Performed by: PHYSICIAN ASSISTANT

## 2022-11-09 PROCEDURE — 97116 GAIT TRAINING THERAPY: CPT

## 2022-11-09 PROCEDURE — 80048 BASIC METABOLIC PNL TOTAL CA: CPT | Performed by: PHYSICIAN ASSISTANT

## 2022-11-09 PROCEDURE — 99900035 HC TECH TIME PER 15 MIN (STAT)

## 2022-11-09 PROCEDURE — 97165 OT EVAL LOW COMPLEX 30 MIN: CPT

## 2022-11-09 PROCEDURE — 63600175 PHARM REV CODE 636 W HCPCS: Performed by: PHYSICIAN ASSISTANT

## 2022-11-09 PROCEDURE — 25000242 PHARM REV CODE 250 ALT 637 W/ HCPCS: Performed by: PHYSICIAN ASSISTANT

## 2022-11-09 PROCEDURE — 97161 PT EVAL LOW COMPLEX 20 MIN: CPT

## 2022-11-09 PROCEDURE — 97530 THERAPEUTIC ACTIVITIES: CPT

## 2022-11-09 PROCEDURE — 94761 N-INVAS EAR/PLS OXIMETRY MLT: CPT

## 2022-11-09 PROCEDURE — 51701 INSERT BLADDER CATHETER: CPT

## 2022-11-09 PROCEDURE — 27000221 HC OXYGEN, UP TO 24 HOURS

## 2022-11-09 PROCEDURE — 51798 US URINE CAPACITY MEASURE: CPT

## 2022-11-09 PROCEDURE — 94799 UNLISTED PULMONARY SVC/PX: CPT

## 2022-11-09 RX ORDER — TAMSULOSIN HYDROCHLORIDE 0.4 MG/1
0.4 CAPSULE ORAL ONCE
Status: COMPLETED | OUTPATIENT
Start: 2022-11-09 | End: 2022-11-09

## 2022-11-09 RX ADMIN — CYCLOBENZAPRINE HYDROCHLORIDE 10 MG: 5 TABLET, FILM COATED ORAL at 11:11

## 2022-11-09 RX ADMIN — METOCLOPRAMIDE 10 MG: 5 INJECTION, SOLUTION INTRAMUSCULAR; INTRAVENOUS at 06:11

## 2022-11-09 RX ADMIN — CLOBETASOL PROPIONATE CREAM USP, 0.05% 1 APPLICATION: 0.5 CREAM TOPICAL at 08:11

## 2022-11-09 RX ADMIN — FLUTICASONE PROPIONATE 50 MCG: 50 SPRAY, METERED NASAL at 08:11

## 2022-11-09 RX ADMIN — LOSARTAN POTASSIUM 50 MG: 25 TABLET, FILM COATED ORAL at 11:11

## 2022-11-09 RX ADMIN — OXYBUTYNIN CHLORIDE 15 MG: 5 TABLET, EXTENDED RELEASE ORAL at 08:11

## 2022-11-09 RX ADMIN — OXYCODONE HYDROCHLORIDE 10 MG: 10 TABLET ORAL at 11:11

## 2022-11-09 RX ADMIN — LEVOTHYROXINE SODIUM 175 MCG: 0.03 TABLET ORAL at 06:11

## 2022-11-09 RX ADMIN — TAMSULOSIN HYDROCHLORIDE 0.4 MG: 0.4 CAPSULE ORAL at 05:11

## 2022-11-09 RX ADMIN — OXYCODONE HYDROCHLORIDE 10 MG: 10 TABLET ORAL at 10:11

## 2022-11-09 RX ADMIN — VITAM B12 100 MCG: 100 TAB at 08:11

## 2022-11-09 RX ADMIN — CEFAZOLIN SODIUM 2 G: 2 SOLUTION INTRAVENOUS at 10:11

## 2022-11-09 RX ADMIN — METOCLOPRAMIDE 10 MG: 5 INJECTION, SOLUTION INTRAMUSCULAR; INTRAVENOUS at 01:11

## 2022-11-09 RX ADMIN — OXYCODONE HYDROCHLORIDE 10 MG: 10 TABLET ORAL at 04:11

## 2022-11-09 RX ADMIN — AZELASTINE HYDROCHLORIDE 137 MCG: 137 SPRAY, METERED NASAL at 08:11

## 2022-11-09 RX ADMIN — INSULIN ASPART 2 UNITS: 100 INJECTION, SOLUTION INTRAVENOUS; SUBCUTANEOUS at 04:11

## 2022-11-09 RX ADMIN — PREGABALIN 150 MG: 75 CAPSULE ORAL at 08:11

## 2022-11-09 RX ADMIN — AZELASTINE HYDROCHLORIDE 137 MCG: 137 SPRAY, METERED NASAL at 11:11

## 2022-11-09 RX ADMIN — INSULIN ASPART 2 UNITS: 100 INJECTION, SOLUTION INTRAVENOUS; SUBCUTANEOUS at 08:11

## 2022-11-09 RX ADMIN — DOCUSATE SODIUM 100 MG: 100 CAPSULE, LIQUID FILLED ORAL at 08:11

## 2022-11-09 RX ADMIN — MUPIROCIN 1 G: 20 OINTMENT TOPICAL at 11:11

## 2022-11-09 RX ADMIN — INSULIN ASPART 2 UNITS: 100 INJECTION, SOLUTION INTRAVENOUS; SUBCUTANEOUS at 11:11

## 2022-11-09 RX ADMIN — PANTOPRAZOLE SODIUM 40 MG: 40 TABLET, DELAYED RELEASE ORAL at 08:11

## 2022-11-09 RX ADMIN — MUPIROCIN 1 G: 20 OINTMENT TOPICAL at 08:11

## 2022-11-09 RX ADMIN — INSULIN ASPART 2 UNITS: 100 INJECTION, SOLUTION INTRAVENOUS; SUBCUTANEOUS at 01:11

## 2022-11-09 RX ADMIN — OXYCODONE HYDROCHLORIDE 10 MG: 10 TABLET ORAL at 06:11

## 2022-11-09 RX ADMIN — CEFAZOLIN SODIUM 2 G: 2 SOLUTION INTRAVENOUS at 02:11

## 2022-11-09 RX ADMIN — PREGABALIN 150 MG: 75 CAPSULE ORAL at 11:11

## 2022-11-09 NOTE — PLAN OF CARE
Problem: Occupational Therapy  Goal: Occupational Therapy Goal  Description: Goals to be met by: 11/23/2022     Patient will increase functional independence with ADLs by performing:    UE Dressing with Modified Steele.  LE Dressing with Supervision.  Grooming with Modified Steele.  Toileting from toilet with Supervision for hygiene and clothing management.   Toilet transfer to toilet with Supervision.    Outcome: Ongoing, Progressing     OT evaluation completed. POC established.

## 2022-11-09 NOTE — SUBJECTIVE & OBJECTIVE
Principal Problem:Lumbar stenosis with neurogenic claudication    Principal Orthopedic Problem:  Lumbar stenosis and degenerative disc disease    Interval History:  Postoperative day 1.    Review of patient's allergies indicates:   Allergen Reactions    Codeine Nausea And Vomiting     Other reaction(s): Nausea, dizziness       Current Facility-Administered Medications   Medication    acetaminophen tablet 650 mg    aluminum-magnesium hydroxide-simethicone 200-200-20 mg/5 mL suspension 30 mL    azelastine 137 mcg (0.1 %) nasal spray 137 mcg    bisacodyL suppository 10 mg    cefazolin (ANCEF) 2 gram in dextrose 5% 50 mL IVPB (premix)    clobetasoL 0.05 % cream 1 application    cyanocobalamin tablet 100 mcg    cyclobenzaprine tablet 10 mg    dextrose 10% bolus 125 mL    dextrose 10% bolus 250 mL    diphenhydrAMINE capsule 50 mg    docusate sodium capsule 100 mg    ergocalciferol capsule 50,000 Units    fluticasone propionate 50 mcg/actuation nasal spray 50 mcg    furosemide tablet 20 mg    glucagon (human recombinant) injection 1 mg    glucose chewable tablet 16 g    glucose chewable tablet 24 g    HYDROmorphone (PF) injection 2 mg    insulin aspart U-100 pen 1-10 Units    lactated ringers infusion    levothyroxine tablet 175 mcg    losartan tablet 50 mg    melatonin tablet 9 mg    metoclopramide HCl injection 10 mg    mupirocin 2 % ointment 1 g    naloxone 0.4 mg/mL injection 0.04 mg    ondansetron injection 8 mg    oxybutynin 24 hr tablet 15 mg    oxyCODONE immediate release tablet 5 mg    oxyCODONE immediate release tablet Tab 10 mg    pantoprazole EC tablet 40 mg    pregabalin capsule 150 mg    prochlorperazine injection Soln 5 mg    senna-docusate 8.6-50 mg per tablet 2 tablet     Facility-Administered Medications Ordered in Other Encounters   Medication    lactated ringers infusion     Objective:     Vital Signs (Most Recent):  Temp: 97.1 °F (36.2 °C) (11/09/22 0314)  Pulse: 84 (11/09/22 0705)  Resp: 16 (11/09/22  "0705)  BP: (!) 98/48 (11/09/22 0320)  SpO2: 96 % (11/09/22 0705)   Vital Signs (24h Range):  Temp:  [97 °F (36.1 °C)-98.1 °F (36.7 °C)] 97.1 °F (36.2 °C)  Pulse:  [68-92] 84  Resp:  [8-22] 16  SpO2:  [92 %-100 %] 96 %  BP: ()/(47-73) 98/48     Weight: 118.7 kg (261 lb 11 oz)  Height: 5' 5" (165.1 cm)  Body mass index is 43.55 kg/m².      Intake/Output Summary (Last 24 hours) at 11/9/2022 0711  Last data filed at 11/9/2022 0612  Gross per 24 hour   Intake 1892 ml   Output 1895 ml   Net -3 ml       General    Nursing note and vitals reviewed.  Constitutional: She is oriented to person, place, and time.   Pulmonary/Chest: Effort normal. She has rales. She exhibits tenderness.   Abdominal: Soft. She exhibits no distension.   Neurological: She is alert and oriented to person, place, and time.   Psychiatric: She has a normal mood and affect. Her behavior is normal. Thought content normal.         Back (L-Spine & T-Spine) / Neck (C-Spine) Exam     Comments:  Motor function grossly intact all major muscle groups both lower extremities      Significant Labs: All pertinent labs within the past 24 hours have been reviewed.    Significant Imaging: None  "

## 2022-11-09 NOTE — PT/OT/SLP EVAL
Physical Therapy Evaluation    Patient Name:  Edie Hernandez   MRN:  [id number]    Recommendations:     Discharge Recommendations:  home, home health PT   Discharge Equipment Recommendations: none   Barriers to discharge: None    Assessment:     Edie Hernandez is a 67 y.o. female admitted with a medical diagnosis of Lumbar stenosis with neurogenic claudication.  She presents with the following impairments/functional limitations:  weakness, impaired endurance, impaired functional mobility, gait instability, impaired balance, decreased lower extremity function, pain, decreased ROM .  Patient agreeable to PT evaluation this morning.  Patient presented supine in bed and required min assist to transfer to sitting and CGA to stand. Patient then ambulated x 150 feet with RW with CGA and TLSO brace in place.     Rehab Prognosis: Good; patient would benefit from acute skilled PT services to address these deficits and reach maximum level of function.    Recent Surgery: Procedure(s) (LRB):  FUSION, SPINE, LUMBAR, ALIF L3-4, L4-5 (N/A)  FUSION,SPINE,LUMBAR,POSTERIOR APPROACH L3-4, L4-5 (N/A) 1 Day Post-Op    Plan:     During this hospitalization, patient to be seen BID to address the identified rehab impairments via gait training, therapeutic activities, therapeutic exercises and progress toward the following goals:    Plan of Care Expires:  11/23/22    Subjective     Chief Complaint: pain  Patient/Family Comments/goals: go home  Pain/Comfort:  Pain Rating 1: 2/10  Location - Side 1: Bilateral  Location - Orientation 1: posterior  Location 1: lumbar spine  Pain Addressed 1: Cessation of Activity, Reposition  Pain Rating Post-Intervention 1: 4/10    Patients cultural, spiritual, Moravian conflicts given the current situation:      Living Environment:  Currently lives with family 1 in May home with 22 step entry.  Prior to admission, patients level of function was independent.  Equipment used at home: none.  DME owned (not  currently used): rolling walker.  Upon discharge, patient will have assistance from family.    Objective:     Communicated with nurse prior to session.  Patient found supine with bed alarm, abernathy catheter  upon PT entry to room.    General Precautions: Standard, fall   Orthopedic Precautions:N/A   Braces: TLSO  Respiratory Status: Room air    Exams:  RLE ROM: WFL  RLE Strength: WFL  LLE ROM: WFL  LLE Strength: WFL    Functional Mobility:  Bed Mobility:     Supine to Sit: minimum assistance  Transfers:     Sit to Stand:  contact guard assistance with rolling walker  Gait: x 150 feet rW CGA      AM-PAC 6 CLICK MOBILITY  Total Score:        Treatment & Education:  Gait training x 150 feet with RW with CGA but with several standing rest breaks    Patient left up in chair with call button in reach, chair alarm on, and nurse notified.    GOALS:   Multidisciplinary Problems       Physical Therapy Goals          Problem: Physical Therapy    Goal Priority Disciplines Outcome Goal Variances Interventions   Physical Therapy Goal     PT, PT/OT Ongoing, Progressing     Description: Goals to be met by: 22     Patient will increase functional independence with mobility by performin. Supine to sit with Dike  2. Sit to supine with Dike  3. Sit to stand transfer with Supervision  4. Bed to chair transfer with Supervision using Rolling Walker  5. Gait  x 250 feet with Supervision using Rolling Walker.                          History:     Past Medical History:   Diagnosis Date    Arthritis     Colon polyp, hyperplastic 2013    recheck 5-10 years    Diabetes mellitus, type 2     Diverticulosis     Fatty liver     General anesthetics causing adverse effect in therapeutic use     woke up during tubal ligation    GERD (gastroesophageal reflux disease)     Gout, unspecified     Malignant melanoma of skin, unspecified     LEFT NECK    CLINT (obstructive sleep apnea)     C-pap    PONV (postoperative nausea and  vomiting)     RLS (restless legs syndrome)     Thyroid disease     Wears glasses     Wears partial dentures     UPPER       Past Surgical History:   Procedure Laterality Date    COLONOSCOPY  02/14/2013    Dr. Vogel: repeat in 5-10 years    COLONOSCOPY W/ BIOPSIES AND POLYPECTOMY  02/2013    hyperplastic, recheck 5-10 years    CYSTOSCOPY WITH HYDRODISTENSION OF BLADDER N/A 9/21/2022    Procedure: CYSTOSCOPY, WITH BLADDER HYDRODISTENSION;  Surgeon: Keely Chavez Jr., MD;  Location: [facility] OR;  Service: Urology;  Laterality: N/A;    EXTERNAL EAR SURGERY      HYSTERECTOMY      INJECTION OF ANESTHETIC AGENT AROUND MEDIAL BRANCH NERVES INNERVATING LUMBAR FACET JOINT Bilateral 8/3/2020    Procedure: Block-nerve-medial branch-lumbar left L2, L3, L4, L5;  Surgeon: Dejan Simmons MD;  Location: [facility] OR;  Service: Pain Management;  Laterality: Bilateral;    INJECTION OF ANESTHETIC AGENT AROUND MEDIAL BRANCH NERVES INNERVATING LUMBAR FACET JOINT Left 8/13/2020    Procedure: Block-nerve-medial branch-lumbar.  L2, L3, L4, and L5;  Surgeon: Dejan Simmons MD;  Location: [facility] OR;  Service: Pain Management;  Laterality: Left;    MINIMALLY INVASIVE FUSION OF SPINE Left 10/12/2021    Procedure: FUSION, SPINE, MINIMALLY INVASIVE;  Surgeon: Everton Godinez MD;  Location: [facility] OR;  Service: Orthopedics;  Laterality: Left;  SI-Bone    RADIOFREQUENCY ABLATION OF LUMBAR MEDIAL BRANCH NERVE AT SINGLE LEVEL Left 8/27/2020    Procedure: Radiofrequency Ablation, Nerve, Spinal, Lumbar, Medial Branch,  L2, L3, L4, L5;  Surgeon: Dejan Simmons MD;  Location: [facility] OR;  Service: Pain Management;  Laterality: Left;  L2,l3,l4,l5    THROAT SURGERY      for CLINT    TRANSFORAMINAL EPIDURAL INJECTION OF STEROID Left 3/6/2020    Procedure: Injection,steroid,epidural,transforaminal approach;  Surgeon: Harmeet Zapata MD;  Location: [facility] OR;  Service: Pain Management;  Laterality: Left;  L4-L5    TRANSFORAMINAL EPIDURAL INJECTION OF STEROID Left 6/10/2020     Procedure: Injection,steroid,epidural,transforaminal approach.L4 and L5;  Surgeon: Dejan Simmons MD;  Location: [facility] OR;  Service: Pain Management;  Laterality: Left;    TRANSFORAMINAL EPIDURAL INJECTION OF STEROID Left 7/6/2020    Procedure: Injection,steroid,epidural,transforaminal approach. left L4 and L5;  Surgeon: Dejan Simmons MD;  Location: [facility] OR;  Service: Pain Management;  Laterality: Left;    TRANSFORAMINAL EPIDURAL INJECTION OF STEROID Left 11/12/2021    Procedure: Injection,steroid,epidural,transforaminal approach Left L5-S1;  Surgeon: Dejan Simmons MD;  Location: [facility] OR;  Service: Pain Management;  Laterality: Left;    UPPER GASTROINTESTINAL ENDOSCOPY         Time Tracking:     PT Received On: 11/09/22  PT Start Time: 0821     PT Stop Time: 0858  PT Total Time (min): 37 min     Billable Minutes: Evaluation 20 and Gait Training 17      11/09/2022

## 2022-11-09 NOTE — ASSESSMENT & PLAN NOTE
Body mass index is 43.55 kg/m². Morbid obesity complicates all aspects of disease management from diagnostic modalities to treatment. Weight loss encouraged and health benefits explained to patient.

## 2022-11-09 NOTE — PLAN OF CARE
KENYA gave pt a list of HH agencies and pt selected [facility].  KENYA sent HH order to [facility] via [employer].     11/09/22 1216   Post-Acute Status   Post-Acute Authorization Home Health   Home Health Status Referrals Sent   Patient choice form signed by patient/caregiver List from [employer]

## 2022-11-09 NOTE — PROGRESS NOTES
[facility]  Orthopedics  Progress Note    Patient Name: Edie Hernandez  MRN: [id number]  Admission Date: 11/8/2022  Hospital Length of Stay: 1 days  Attending Provider: Everton Godinez MD  Primary Care Provider: Lydia Eugene MD  Follow-up For: Procedure(s) (LRB):  FUSION, SPINE, LUMBAR, ALIF L3-4, L4-5 (N/A)  FUSION,SPINE,LUMBAR,POSTERIOR APPROACH L3-4, L4-5 (N/A)    Post-Operative Day: 1 Day Post-Op  Subjective:     Principal Problem:Lumbar stenosis with neurogenic claudication    Principal Orthopedic Problem:  Lumbar stenosis and degenerative disc disease    Interval History:  Postoperative day 1.    Review of patient's allergies indicates:   Allergen Reactions    Codeine Nausea And Vomiting     Other reaction(s): Nausea, dizziness       Current Facility-Administered Medications   Medication    acetaminophen tablet 650 mg    aluminum-magnesium hydroxide-simethicone 200-200-20 mg/5 mL suspension 30 mL    azelastine 137 mcg (0.1 %) nasal spray 137 mcg    bisacodyL suppository 10 mg    cefazolin (ANCEF) 2 gram in dextrose 5% 50 mL IVPB (premix)    clobetasoL 0.05 % cream 1 application    cyanocobalamin tablet 100 mcg    cyclobenzaprine tablet 10 mg    dextrose 10% bolus 125 mL    dextrose 10% bolus 250 mL    diphenhydrAMINE capsule 50 mg    docusate sodium capsule 100 mg    ergocalciferol capsule 50,000 Units    fluticasone propionate 50 mcg/actuation nasal spray 50 mcg    furosemide tablet 20 mg    glucagon (human recombinant) injection 1 mg    glucose chewable tablet 16 g    glucose chewable tablet 24 g    HYDROmorphone (PF) injection 2 mg    insulin aspart U-100 pen 1-10 Units    lactated ringers infusion    levothyroxine tablet 175 mcg    losartan tablet 50 mg    melatonin tablet 9 mg    metoclopramide HCl injection 10 mg    mupirocin 2 % ointment 1 g    naloxone 0.4 mg/mL injection 0.04 mg    ondansetron injection 8 mg    oxybutynin 24 hr tablet 15 mg     "oxyCODONE immediate release tablet 5 mg    oxyCODONE immediate release tablet Tab 10 mg    pantoprazole EC tablet 40 mg    pregabalin capsule 150 mg    prochlorperazine injection Soln 5 mg    senna-docusate 8.6-50 mg per tablet 2 tablet     Facility-Administered Medications Ordered in Other Encounters   Medication    lactated ringers infusion     Objective:     Vital Signs (Most Recent):  Temp: 97.1 °F (36.2 °C) (11/09/22 0314)  Pulse: 84 (11/09/22 0705)  Resp: 16 (11/09/22 0705)  BP: (!) 98/48 (11/09/22 0320)  SpO2: 96 % (11/09/22 0705)   Vital Signs (24h Range):  Temp:  [97 °F (36.1 °C)-98.1 °F (36.7 °C)] 97.1 °F (36.2 °C)  Pulse:  [68-92] 84  Resp:  [8-22] 16  SpO2:  [92 %-100 %] 96 %  BP: ()/(47-73) 98/48     Weight: 118.7 kg (261 lb 11 oz)  Height: 5' 5" (165.1 cm)  Body mass index is 43.55 kg/m².      Intake/Output Summary (Last 24 hours) at 11/9/2022 0711  Last data filed at 11/9/2022 0612  Gross per 24 hour   Intake 1892 ml   Output 1895 ml   Net -3 ml       General    Nursing note and vitals reviewed.  Constitutional: She is oriented to person, place, and time.   Pulmonary/Chest: Effort normal. She has rales. She exhibits tenderness.   Abdominal: Soft. She exhibits no distension.   Neurological: She is alert and oriented to person, place, and time.   Psychiatric: She has a normal mood and affect. Her behavior is normal. Thought content normal.         Back (L-Spine & T-Spine) / Neck (C-Spine) Exam     Comments:  Motor function grossly intact all major muscle groups both lower extremities      Significant Labs: All pertinent labs within the past 24 hours have been reviewed.    Significant Imaging: None    Assessment/Plan:     * Lumbar stenosis with neurogenic claudication  Impression:  Stable postoperative day 1.  Plan:  Discontinue catheter, progress diet as tolerated, assisted ambulation with physical therapy          Everton Godinez MD  Orthopedics  [facility]  "

## 2022-11-09 NOTE — CARE UPDATE
11/08/22 1931   Patient Assessment/Suction   Level of Consciousness (AVPU) alert   Respiratory Effort Unlabored   Expansion/Accessory Muscles/Retractions no use of accessory muscles;no retractions;expansion symmetric   Rhythm/Pattern, Respiratory unlabored   PRE-TX-O2   O2 Device (Oxygen Therapy) nasal cannula   $ Is the patient on Low Flow Oxygen? Yes   Flow (L/min) 4  (decreased to 2 LPM)   SpO2 98 %   Pulse Oximetry Type Intermittent   $ Pulse Oximetry - Multiple Charge Pulse Oximetry - Multiple   Pulse 92   Resp 17   ETCO2   $ ETCO2 Usage Currently wearing   ETCO2 (mmHg) 37 mmHg   ETCO2 Device Type Bedside Monitor   Incentive Spirometer   $ Incentive Spirometer Charges done with encouragement   Incentive Spirometer Predicted Level (mL) 1800   Administration (IS) instruction provided, follow-up   Number of Repetitions (IS) 2000   Level Incentive Spirometer (mL) 10   Patient Tolerance (IS) good

## 2022-11-09 NOTE — SUBJECTIVE & OBJECTIVE
Interval History:  Patient seen and examined.  Acute events overnight.  Patient reports back pain is well controlled with p.r.n. pain medication and rates pain to attend.  She denies chest pain, shortness a breath, abdominal pain, nausea, vomiting.  Patient's creatinine was noted to be elevated on a.m. labs.  Fluids were initiated and repeat BMP ordered.    Review of Systems   Constitutional:  Negative for fever.   Respiratory:  Negative for shortness of breath.    Cardiovascular:  Negative for chest pain and palpitations.   Gastrointestinal:  Negative for abdominal pain, nausea and vomiting.   Musculoskeletal:  Positive for back pain.   Objective:     Vital Signs (Most Recent):  Temp: 98.2 °F (36.8 °C) (11/09/22 0749)  Pulse: 88 (11/09/22 0749)  Resp: 18 (11/09/22 0749)  BP: (!) 104/53 (11/09/22 0749)  SpO2: 96 % (11/09/22 0749)   Vital Signs (24h Range):  Temp:  [97 °F (36.1 °C)-98.2 °F (36.8 °C)] 98.2 °F (36.8 °C)  Pulse:  [68-92] 88  Resp:  [8-22] 18  SpO2:  [92 %-100 %] 96 %  BP: ()/(47-73) 104/53     Weight: 118.7 kg (261 lb 11 oz)  Body mass index is 43.55 kg/m².    Intake/Output Summary (Last 24 hours) at 11/9/2022 1020  Last data filed at 11/9/2022 0942  Gross per 24 hour   Intake 1292 ml   Output 2095 ml   Net -803 ml      Physical Exam  Vitals and nursing note reviewed.   Constitutional:       General: She is not in acute distress.     Appearance: Normal appearance. She is normal weight.   HENT:      Head: Normocephalic and atraumatic.      Nose: Nose normal.      Mouth/Throat:      Mouth: Mucous membranes are moist.      Pharynx: Oropharynx is clear.   Eyes:      Extraocular Movements: Extraocular movements intact.      Pupils: Pupils are equal, round, and reactive to light.   Cardiovascular:      Rate and Rhythm: Normal rate and regular rhythm.      Pulses: Normal pulses.      Heart sounds: Normal heart sounds.   Pulmonary:      Effort: Pulmonary effort is normal.      Breath sounds: Normal breath  sounds.   Abdominal:      General: Abdomen is flat. Bowel sounds are normal. There is no distension.      Palpations: Abdomen is soft.      Tenderness: There is no abdominal tenderness. There is no guarding or rebound.   Musculoskeletal:      Cervical back: Normal range of motion and neck supple.      Comments: Brace in place.    Skin:     General: Skin is warm.      Capillary Refill: Capillary refill takes 2 to 3 seconds.   Neurological:      General: No focal deficit present.      Mental Status: She is alert and oriented to person, place, and time. Mental status is at baseline.   Psychiatric:         Mood and Affect: Mood normal.         Behavior: Behavior normal.       Significant Labs: All pertinent labs within the past 24 hours have been reviewed.  CBC:   Recent Labs   Lab 11/08/22  1402 11/09/22  0555   WBC 11.16 8.35   HGB 13.4 11.1*   HCT 41.1 34.1*    309     CMP:   Recent Labs   Lab 11/08/22  1402 11/09/22  0555    140   K 4.6 4.4    106   CO2 22* 25   * 164*   BUN 15 22   CREATININE 1.0 1.5*   CALCIUM 8.7 8.0*   ANIONGAP 14 9       Significant Imaging: I have reviewed all pertinent imaging results/findings within the past 24 hours.

## 2022-11-09 NOTE — PT/OT/SLP PROGRESS
Physical Therapy Treatment    Patient Name:  Edie Hernandez   MRN:  [id number]    Recommendations:     Discharge Recommendations:  home, home health PT   Discharge Equipment Recommendations: none   Barriers to discharge: None    Assessment:     Edie Hernandez is a 67 y.o. female admitted with a medical diagnosis of Lumbar stenosis with neurogenic claudication.  She presents with the following impairments/functional limitations:  weakness, impaired endurance, impaired functional mobility, gait instability, impaired balance, decreased lower extremity function, pain, decreased ROM, edema, orthopedic precautions .  Patient agreeable to PT treatment again this morning. Patient presented sitting in chair at bedside and was able to stand with min assist.  Patient then ambulated x 250 feet with RW with Cga and numerous standing rest breaks.  Patient did report feeling light headed by end of gait like she may pass out.  Patient then transferred from sit to supine with mod assist and scoot to HOB with max assist.    Rehab Prognosis: Good; patient would benefit from acute skilled PT services to address these deficits and reach maximum level of function.    Recent Surgery: Procedure(s) (LRB):  FUSION, SPINE, LUMBAR, ALIF L3-4, L4-5 (N/A)  FUSION,SPINE,LUMBAR,POSTERIOR APPROACH L3-4, L4-5 (N/A) 1 Day Post-Op    Plan:     During this hospitalization, patient to be seen BID to address the identified rehab impairments via therapeutic activities, therapeutic exercises, gait training and progress toward the following goals:    Plan of Care Expires:  11/23/22    Subjective     Chief Complaint: feeling dizzy and light headed  Patient/Family Comments/goals: go home  Pain/Comfort:  Pain Rating 1: 4/10  Location - Side 1: Bilateral  Location - Orientation 1: posterior  Location 1: lumbar spine  Pain Addressed 1: Reposition, Cessation of Activity  Pain Rating Post-Intervention 1: 5/10      Objective:     Communicated with nurse prior to  session.  Patient found up in chair with chair check, peripheral IV upon PT entry to room.     General Precautions: Standard, fall   Orthopedic Precautions:N/A   Braces: TLSO  Respiratory Status: Room air     Functional Mobility:  Bed Mobility:     Scooting: maximal assistance  Sit to Supine: moderate assistance  Transfers:     Sit to Stand:  minimum assistance with rolling walker  Gait: x 250 feet RW CGA      AM-PAC 6 CLICK MOBILITY          Treatment & Education:  Transfer training sit to stand min assist, sit to supine mod assist  Bed mobility to scoot HOB max assist  Gait training x 250 feet RW CGA    Patient left supine with call button in reach, bed alarm on, nurse notified, and spouse present..    GOALS:   Multidisciplinary Problems       Physical Therapy Goals          Problem: Physical Therapy    Goal Priority Disciplines Outcome Goal Variances Interventions   Physical Therapy Goal     PT, PT/OT Ongoing, Progressing     Description: Goals to be met by: 22     Patient will increase functional independence with mobility by performin. Supine to sit with [place]  2. Sit to supine with [place]  3. Sit to stand transfer with Supervision  4. Bed to chair transfer with Supervision using Rolling Walker  5. Gait  x 250 feet with Supervision using Rolling Walker.                          Time Tracking:     PT Received On: 22  PT Start Time: 1111     PT Stop Time: 1138  PT Total Time (min): 27 min     Billable Minutes: Gait Training 18 and Therapeutic Activity 9    Treatment Type: Treatment  PT/PTA: PT     PTA Visit Number: 0     2022

## 2022-11-09 NOTE — HOSPITAL COURSE
The patient was admitted after undergoing lumbar spinal fusion and ALIF L3-L4, L4-L5 with Dr. Godinez on 11/8.  Patient was monitored closely throughout her stay.  Orthopedic surgery was consulted.  PT/OT worked with the patient postoperatively who recommended home health.  Patient's pain was well controlled with p.r.n. pain medications per surgeon. On a.m. labs, patient was noted to have increase in her creatinine at 1.5.  Fluids and repeat BMP were ordered. Noted to have urinary retention following abernathy removal. Improved following IVFs. Spontaneously voiding on day of discharge. Creatinine back to baseline on day of discharge. Patient to be discharged home with home health and prn pain medication. Patient scheduled for orthopedic follow up. Patient verbalized understanding of discharge instructions and return precautions.     Physical Exam:  General- Patient alert and oriented x3 in NAD  HEENT- PERRLA, EOMI  CV- Regular rate and rhythm, No Murmur/lorenzo/rubs  Resp- Lungs CTA Bilaterally, No increased WOB  GI- Non tender/non-distended, BS normoactive x4 quads, no HSM  Extrem- No cyanosis, clubbing, edema. Pulses 2+ and symmetric. Lumbar dressings CDI  Neuro- Strength 5/5 flexors/extensors, DTRs 2+ and symmetric, Intact sensation to light touch grossly

## 2022-11-09 NOTE — ASSESSMENT & PLAN NOTE
Impression:  Stable postoperative day 1.  Plan:  Discontinue catheter, progress diet as tolerated, assisted ambulation with physical therapy

## 2022-11-09 NOTE — PROGRESS NOTES
[facility] Hospital Medicine  Progress Note    Patient Name: Edie Hernandez  MRN: [id number]  Patient Class: IP- Inpatient   Admission Date: 11/8/2022  Length of Stay: 1 days  Attending Physician: Everton Godinez MD  Primary Care Provider: Lydia Eugene MD        Subjective:     Principal Problem:Lumbar stenosis with neurogenic claudication        HPI:  Edie Hernandez is a 67 y.o. y.o. female with a PMHx of arthritis, T2DM, GERD, CLINT on CPAP, and hypothyroidism who is seen s/p lumbar spinal fusion and ALIF L3-L4, L4-L5 with Dr. Godinez on 11/8. Patient was seen by Dr. Godinez in clinic on 10/17 for chronic back pain which failed conservative management and planned for surgery. Post-operatively, patient is sedated from anesthesia. She reports back pain. Preoperative labs were reviewed. Hospital Medicine was consulted for further workup and management of the patient.             Overview/Hospital Course:  The patient was admitted after undergoing lumbar spinal fusion and ALIF L3-L4, L4-L5 with Dr. Godinez on 11/8.  Patient was monitored closely throughout her stay.  Orthopedic surgery was consulted.  PT/OT worked with the patient postoperatively who recommended home health.  Patient's pain was well controlled with p.r.n. pain medications per surgeon. On a.m. labs, patient was noted to have increase in her creatinine at 1.5.  Fluids and repeat BMP were ordered.      Interval History:  Patient seen and examined.  Acute events overnight.  Patient reports back pain is well controlled with p.r.n. pain medication and rates pain to attend.  She denies chest pain, shortness a breath, abdominal pain, nausea, vomiting.  Patient's creatinine was noted to be elevated on a.m. labs.  Fluids were initiated and repeat BMP ordered.    Review of Systems   Constitutional:  Negative for fever.   Respiratory:  Negative for shortness of breath.    Cardiovascular:  Negative for chest pain and palpitations.    Gastrointestinal:  Negative for abdominal pain, nausea and vomiting.   Musculoskeletal:  Positive for back pain.   Objective:     Vital Signs (Most Recent):  Temp: 98.2 °F (36.8 °C) (11/09/22 0749)  Pulse: 88 (11/09/22 0749)  Resp: 18 (11/09/22 0749)  BP: (!) 104/53 (11/09/22 0749)  SpO2: 96 % (11/09/22 0749)   Vital Signs (24h Range):  Temp:  [97 °F (36.1 °C)-98.2 °F (36.8 °C)] 98.2 °F (36.8 °C)  Pulse:  [68-92] 88  Resp:  [8-22] 18  SpO2:  [92 %-100 %] 96 %  BP: ()/(47-73) 104/53     Weight: 118.7 kg (261 lb 11 oz)  Body mass index is 43.55 kg/m².    Intake/Output Summary (Last 24 hours) at 11/9/2022 1020  Last data filed at 11/9/2022 0942  Gross per 24 hour   Intake 1292 ml   Output 2095 ml   Net -803 ml      Physical Exam  Vitals and nursing note reviewed.   Constitutional:       General: She is not in acute distress.     Appearance: Normal appearance. She is normal weight.   HENT:      Head: Normocephalic and atraumatic.      Nose: Nose normal.      Mouth/Throat:      Mouth: Mucous membranes are moist.      Pharynx: Oropharynx is clear.   Eyes:      Extraocular Movements: Extraocular movements intact.      Pupils: Pupils are equal, round, and reactive to light.   Cardiovascular:      Rate and Rhythm: Normal rate and regular rhythm.      Pulses: Normal pulses.      Heart sounds: Normal heart sounds.   Pulmonary:      Effort: Pulmonary effort is normal.      Breath sounds: Normal breath sounds.   Abdominal:      General: Abdomen is flat. Bowel sounds are normal. There is no distension.      Palpations: Abdomen is soft.      Tenderness: There is no abdominal tenderness. There is no guarding or rebound.   Musculoskeletal:      Cervical back: Normal range of motion and neck supple.      Comments: Brace in place.    Skin:     General: Skin is warm.      Capillary Refill: Capillary refill takes 2 to 3 seconds.   Neurological:      General: No focal deficit present.      Mental Status: She is alert and oriented  to person, place, and time. Mental status is at baseline.   Psychiatric:         Mood and Affect: Mood normal.         Behavior: Behavior normal.       Significant Labs: All pertinent labs within the past 24 hours have been reviewed.  CBC:   Recent Labs   Lab 11/08/22  1402 11/09/22  0555   WBC 11.16 8.35   HGB 13.4 11.1*   HCT 41.1 34.1*    309     CMP:   Recent Labs   Lab 11/08/22  1402 11/09/22  0555    140   K 4.6 4.4    106   CO2 22* 25   * 164*   BUN 15 22   CREATININE 1.0 1.5*   CALCIUM 8.7 8.0*   ANIONGAP 14 9       Significant Imaging: I have reviewed all pertinent imaging results/findings within the past 24 hours.      Assessment/Plan:      * Lumbar stenosis with neurogenic claudication  Acute:  - s/p lumbar spinal fusion and ALIF L3-L4, L4-L5 with Dr. Godinez on 11/8  - diet per ortho recommendations  - pain medications per ortho  - prn antiemetics  - aggressive IS  - PT/OT  - follow H/H closely     JANETTE (acute kidney injury)  Patient with acute kidney injury likely due to IVVD/dehydration JANETTE is currently stable. Labs reviewed- Renal function/electrolytes with Estimated Creatinine Clearance: 47.6 mL/min (A) (based on SCr of 1.5 mg/dL (H)). according to latest data. Monitor urine output and serial BMP and adjust therapy as needed. Avoid nephrotoxins and renally dose meds for GFR listed above.  IV FH ordered.  Repeat BMP ordered.  Monitor closely.      Type 2 diabetes mellitus  Patient's FSGs are controlled on current medication regimen.  Last A1c reviewed-   Lab Results   Component Value Date    HGBA1C 7.1 (H) 05/09/2022     Most recent fingerstick glucose reviewed- No results for input(s): POCTGLUCOSE in the last 24 hours.  Current correctional scale  Medium  Maintain anti-hyperglycemic dose as follows-   Antihyperglycemics (From admission, onward)    Start     Stop Route Frequency Ordered    11/08/22 1722  insulin aspart U-100 pen 1-10 Units         -- SubQ Before meals & nightly  PRN 11/08/22 1622            Severe obesity (BMI >= 40)  Body mass index is 43.55 kg/m². Morbid obesity complicates all aspects of disease management from diagnostic modalities to treatment. Weight loss encouraged and health benefits explained to patient.         Hypothyroid  Patient has chronic hypothyroidism. TFTs reviewed-   Lab Results   Component Value Date    TSH 0.723 05/09/2022   . Will continue chronic levothyroxine and adjust for and clinical changes.        CLINT (obstructive sleep apnea)  CPAP ordered    GERD (gastroesophageal reflux disease)  PPI ordered        VTE Risk Mitigation (From admission, onward)         Ordered     IP VTE LOW RISK PATIENT  Once         11/08/22 0743     Place SHELLIE hose  Until discontinued         11/08/22 0743     Place sequential compression device  Until discontinued         11/08/22 0743                Discharge Planning   SIDNEY: 11/9/2022     Code Status: Full Code   Is the patient medically ready for discharge?:     Reason for patient still in hospital (select all that apply): Patient trending condition, Laboratory test, Treatment, Consult recommendations and PT / OT recommendations                     Nidia Lim PA-C  Department of Hospital Medicine   [facility]

## 2022-11-09 NOTE — NURSING
Patient PCA continues to alarm due to sleep apnea. Patient awakens easily when spoke too. Request PCA to be discontinued and start on oral pain medications. Respiratory notified of need for CPAP to be set up...

## 2022-11-09 NOTE — PLAN OF CARE
POC discussed with patient, verbalized understanding. Patient with uneventful night. Slept off and on between care. Pain managed well with oral pain medication. IVF infusing @ KVO rate, tolerating liquids. NV wnl. Surgical dressing to abdomin  and back CDI with HV. Wore home CPAP throughout the night. Call light at bedside. .

## 2022-11-09 NOTE — PLAN OF CARE
Problem: Physical Therapy  Goal: Physical Therapy Goal  Description: Goals to be met by: 22     Patient will increase functional independence with mobility by performin. Supine to sit with [place]  2. Sit to supine with [place]  3. Sit to stand transfer with [place]  4. Bed to chair transfer with [place] using Rolling Walker  5. Gait  x 250 feet with Supervision using Rolling Walker.     Outcome: Ongoing, Progressing

## 2022-11-09 NOTE — PLAN OF CARE
[facility]  Initial Discharge Assessment       Primary Care Provider: Lydia Eugene MD    Admission Diagnosis: Lumbar stenosis with neurogenic claudication [M48.062]  Disc degeneration, lumbar [M51.36]  Spondylolisthesis of lumbar region [M43.16]  Spinal stenosis of lumbar region with neurogenic claudication [M48.062]    Admission Date: 11/8/2022  Expected Discharge Date:  11/10/2022    SW met with pt at bedside to complete discharge assessment, verified PCP, pharmacy and information on facesheet.  No HH or dialysis.  Pt has RW, BSC and brace.  Spouse will drive pt home and pt will need HH arranged.    Discharge Barriers Identified: None    Payor: [employer] MANAGED MEDICARE / Plan: [employer] MEDICARE HMO / Product Type: Capitation /     Extended Emergency Contact Information  Primary Emergency Contact: Xavier Hernandez  Address: [address] [place] [address] [place] [address] [facility]  Home Phone: [phone]  Mobile Phone: [phone]  Relation: Spouse  Preferred language: English   needed? No    Discharge Plan A: Home Health  Discharge Plan B: Home Health      [facility] - [place] - [address] [facility]  [address] [place] [address]  Phone: [phone] Fax: [phone]      Initial Assessment (most recent)       Adult Discharge Assessment - 11/09/22 1206          Discharge Assessment    Assessment Type Discharge Planning Assessment     Confirmed/corrected address, phone number and insurance Yes     Confirmed Demographics Correct on Facesheet     Source of Information patient     Lives With spouse     Do you expect to return to your current living situation? Yes     Prior to hospitilization cognitive status: Alert/Oriented     Current cognitive status: Alert/Oriented     Equipment Currently Used at Home bedside commode;walker, rolling   brace    Readmission within 30 days? No     Patient currently being followed by outpatient case management? No     Do you  currently have service(s) that help you manage your care at home? No     Do you take prescription medications? Yes     Do you have prescription coverage? Yes     Coverage [employer]     Do you have any problems affording any of your prescribed medications? No     Is the patient taking medications as prescribed? yes     Who is going to help you get home at discharge? spouse     How do you get to doctors appointments? car, drives self     Are you on dialysis? No     Do you take coumadin? No     Discharge Plan A Home Health     Discharge Plan B Home Health     DME Needed Upon Discharge  none     Discharge Plan discussed with: Patient     Discharge Barriers Identified None

## 2022-11-09 NOTE — PT/OT/SLP EVAL
Occupational Therapy   Evaluation    Name: Edie Hernandez  MRN: [id number]  Admitting Diagnosis:  Lumbar stenosis with neurogenic claudication  Recent Surgery: Procedure(s) (LRB):  FUSION, SPINE, LUMBAR, ALIF L3-4, L4-5 (N/A)  FUSION,SPINE,LUMBAR,POSTERIOR APPROACH L3-4, L4-5 (N/A) 1 Day Post-Op    Recommendations:     Discharge Recommendations: home health OT  Discharge Equipment Recommendations:  shower chair  Barriers to discharge:  None    Assessment:     Edie Hernandez is a 67 y.o. female with a medical diagnosis of Lumbar stenosis with neurogenic claudication.  She presents with performance deficits affecting function: impaired endurance, impaired self care skills, impaired functional mobility and gait instability.      Rehab Prognosis: Good; patient would benefit from acute skilled OT services to address these deficits and reach maximum level of function.       Plan:     Patient to be seen 3 x/week to address the above listed problems via self-care/home management, therapeutic activities, therapeutic exercises  Plan of Care Expires: 11/23/22  Plan of Care Reviewed with: patient    Subjective     Chief Complaint: None stated  Patient/Family Comments/goals: To improve her independence with ADLs    Occupational Profile:  Living Environment: Pt lives with her family in a raised house with 22 steps to enter.  Previous level of function: Independent  Equipment Used at Home:  none  Assistance upon Discharge: Pt will have assistance from her family.     Pain/Comfort:  Pain Rating 1: 2/10  Location 1: back    Patients cultural, spiritual, Christianity conflicts given the current situation: yes    Objective:     Communicated with: nurse prior to session.  Patient found up in chair upon OT entry to room.    General Precautions: Standard, fall   Orthopedic Precautions:   Braces: TLSO  Respiratory Status: Room air    Occupational Performance:    Bed Mobility:    Patient completed Scooting/Bridging with minimum  assistance  Patient completed Supine to Sit with minimum assistance  Patient completed Sit to Supine with minimum assistance    Functional Mobility/Transfers:  Pt declined    Activities of Daily Living:  Feeding:  independence  Grooming: set up A/stand by assistance  Upper Body Dressing: stand by assistance  Lower Body Dressing: maximal assistance  Toileting: TBA    Cognitive/Visual Perceptual:  Cognitive/Psychosocial Skills:  -       Oriented to: Person, Place, Time, and Situation   -       Follows Commands/attention: Follows multistep  commands  -       Communication: clear/fluent    Physical Exam:  Upper Extremity Range of Motion:  -       Right Upper Extremity: WFL  -       Left Upper Extremity: WFL  Upper Extremity Strength: -       Right Upper Extremity: WFL  -       Left Upper Extremity: WFL    AMPAC 6 Click ADL:  AMPAC Total Score: 16    Treatment & Education:  Pt was given education on role of OT, POC and calling for assistance for OOB mobility. Pt verbalized understanding.     Patient left HOB elevated with all lines intact, call button in reach and patient's family present.    GOALS:   Multidisciplinary Problems       Occupational Therapy Goals          Problem: Occupational Therapy    Goal Priority Disciplines Outcome Interventions   Occupational Therapy Goal     OT, PT/OT Ongoing, Progressing    Description: Goals to be met by: 11/23/2022     Patient will increase functional independence with ADLs by performing:    UE Dressing with Modified [place].  LE Dressing with Supervision.  Grooming with Modified [place].  Toileting from toilet with Supervision for hygiene and clothing management.   Toilet transfer to toilet with Supervision.                         History:     Past Medical History:   Diagnosis Date    Arthritis     Colon polyp, hyperplastic 01/2013    recheck 5-10 years    Diabetes mellitus, type 2     Diverticulosis     Fatty liver     General anesthetics causing adverse effect in  therapeutic use     woke up during tubal ligation    GERD (gastroesophageal reflux disease)     Gout, unspecified     Malignant melanoma of skin, unspecified     LEFT NECK    CLINT (obstructive sleep apnea)     C-pap    PONV (postoperative nausea and vomiting)     RLS (restless legs syndrome)     Thyroid disease     Wears glasses     Wears partial dentures     UPPER         Past Surgical History:   Procedure Laterality Date    ANTERIOR LUMBAR INTERBODY FUSION (ALIF) N/A 11/8/2022    Procedure: FUSION, SPINE, LUMBAR, ALIF L3-4, L4-5;  Surgeon: Everton Godinez MD;  Location: [facility] OR;  Service: Orthopedics;  Laterality: N/A;  [facility], [employer], DR SANDI ENRIQUEZ, CO-SURGEON    COLONOSCOPY  02/14/2013    Dr. Vogel: repeat in 5-10 years    COLONOSCOPY W/ BIOPSIES AND POLYPECTOMY  02/2013    hyperplastic, recheck 5-10 years    CYSTOSCOPY WITH HYDRODISTENSION OF BLADDER N/A 9/21/2022    Procedure: CYSTOSCOPY, WITH BLADDER HYDRODISTENSION;  Surgeon: Keely Chavez Jr., MD;  Location: [facility] OR;  Service: Urology;  Laterality: N/A;    EXTERNAL EAR SURGERY      FUSION, SPINE, LUMBAR, POSTERIOR APPROACH N/A 11/8/2022    Procedure: FUSION,SPINE,LUMBAR,POSTERIOR APPROACH L3-4, L4-5;  Surgeon: Everton Godinez MD;  Location: [facility] OR;  Service: Orthopedics;  Laterality: N/A;  NTI, [employer], DR SANDI ENRIQUEZ, CO-SURGEON    HYSTERECTOMY      INJECTION OF ANESTHETIC AGENT AROUND MEDIAL BRANCH NERVES INNERVATING LUMBAR FACET JOINT Bilateral 8/3/2020    Procedure: Block-nerve-medial branch-lumbar left L2, L3, L4, L5;  Surgeon: Dejan Simmons MD;  Location: [facility] OR;  Service: Pain Management;  Laterality: Bilateral;    INJECTION OF ANESTHETIC AGENT AROUND MEDIAL BRANCH NERVES INNERVATING LUMBAR FACET JOINT Left 8/13/2020    Procedure: Block-nerve-medial branch-lumbar.  L2, L3, L4, and L5;  Surgeon: Dejan Simmons MD;  Location: [facility] OR;  Service: Pain Management;  Laterality: Left;    MINIMALLY INVASIVE FUSION OF SPINE Left 10/12/2021     Procedure: FUSION, SPINE, MINIMALLY INVASIVE;  Surgeon: Everton Godinez MD;  Location: [facility] OR;  Service: Orthopedics;  Laterality: Left;  SI-Bone    RADIOFREQUENCY ABLATION OF LUMBAR MEDIAL BRANCH NERVE AT SINGLE LEVEL Left 8/27/2020    Procedure: Radiofrequency Ablation, Nerve, Spinal, Lumbar, Medial Branch,  L2, L3, L4, L5;  Surgeon: Dejan Simmons MD;  Location: [facility] OR;  Service: Pain Management;  Laterality: Left;  L2,l3,l4,l5    THROAT SURGERY      for CLINT    TRANSFORAMINAL EPIDURAL INJECTION OF STEROID Left 3/6/2020    Procedure: Injection,steroid,epidural,transforaminal approach;  Surgeon: Harmeet Zapata MD;  Location: [facility] OR;  Service: Pain Management;  Laterality: Left;  L4-L5    TRANSFORAMINAL EPIDURAL INJECTION OF STEROID Left 6/10/2020    Procedure: Injection,steroid,epidural,transforaminal approach.L4 and L5;  Surgeon: Dejan Simmons MD;  Location: [facility] OR;  Service: Pain Management;  Laterality: Left;    TRANSFORAMINAL EPIDURAL INJECTION OF STEROID Left 7/6/2020    Procedure: Injection,steroid,epidural,transforaminal approach. left L4 and L5;  Surgeon: Dejan Simmons MD;  Location: [facility] OR;  Service: Pain Management;  Laterality: Left;    TRANSFORAMINAL EPIDURAL INJECTION OF STEROID Left 11/12/2021    Procedure: Injection,steroid,epidural,transforaminal approach Left L5-S1;  Surgeon: Dejan Simmons MD;  Location: [facility] OR;  Service: Pain Management;  Laterality: Left;    UPPER GASTROINTESTINAL ENDOSCOPY         Time Tracking:     OT Date of Treatment: 11/09/22  OT Start Time: 1220  OT Stop Time: 1239  OT Total Time (min): 19 min    Billable Minutes:Evaluation 19 11/9/2022

## 2022-11-09 NOTE — CARE UPDATE
11/09/22 0705   Patient Assessment/Suction   Level of Consciousness (AVPU) alert   Respiratory Effort Normal;Unlabored   Expansion/Accessory Muscles/Retractions no use of accessory muscles;no retractions;expansion symmetric   All Lung Fields Breath Sounds clear;equal bilaterally   Rhythm/Pattern, Respiratory unlabored;pattern regular;depth regular   Cough Frequency no cough   Skin Integrity   $ Wound Care Tech Time 15 min   Area Observed Bridge of nose;Cheek   Skin Appearance without discoloration   PRE-TX-O2   O2 Device (Oxygen Therapy) room air   SpO2 96 %   Pulse Oximetry Type Intermittent   $ Pulse Oximetry - Multiple Charge Pulse Oximetry - Multiple   Pulse 84   Resp 16   Positioning HOB elevated 45 degrees   Incentive Spirometer   $ Incentive Spirometer Charges done with encouragement   Incentive Spirometer Predicted Level (mL) 1800   Administration (IS) proper technique demonstrated   Number of Repetitions (IS) 10   Level Incentive Spirometer (mL) 1100   Patient Tolerance (IS) good

## 2022-11-09 NOTE — ASSESSMENT & PLAN NOTE
Patient with acute kidney injury likely due to IVVD/dehydration JANETTE is currently stable. Labs reviewed- Renal function/electrolytes with Estimated Creatinine Clearance: 47.6 mL/min (A) (based on SCr of 1.5 mg/dL (H)). according to latest data. Monitor urine output and serial BMP and adjust therapy as needed. Avoid nephrotoxins and renally dose meds for GFR listed above.  IV FH ordered.  Repeat BMP ordered.  Monitor closely.

## 2022-11-10 PROBLEM — R33.9 URINARY RETENTION: Status: ACTIVE | Noted: 2022-11-10

## 2022-11-10 LAB
ANION GAP SERPL CALC-SCNC: 8 MMOL/L (ref 8–16)
BACTERIA #/AREA URNS HPF: NORMAL /HPF
BASOPHILS # BLD AUTO: 0.03 K/UL (ref 0–0.2)
BASOPHILS NFR BLD: 0.3 % (ref 0–1.9)
BILIRUB UR QL STRIP: NEGATIVE
BUN SERPL-MCNC: 18 MG/DL (ref 8–23)
CALCIUM SERPL-MCNC: 7.8 MG/DL (ref 8.7–10.5)
CHLORIDE SERPL-SCNC: 104 MMOL/L (ref 95–110)
CLARITY UR: CLEAR
CO2 SERPL-SCNC: 24 MMOL/L (ref 23–29)
COLOR UR: YELLOW
CREAT SERPL-MCNC: 1.2 MG/DL (ref 0.5–1.4)
DIFFERENTIAL METHOD: ABNORMAL
EOSINOPHIL # BLD AUTO: 0.2 K/UL (ref 0–0.5)
EOSINOPHIL NFR BLD: 1.9 % (ref 0–8)
ERYTHROCYTE [DISTWIDTH] IN BLOOD BY AUTOMATED COUNT: 13 % (ref 11.5–14.5)
EST. GFR  (NO RACE VARIABLE): 50 ML/MIN/1.73 M^2
GLUCOSE SERPL-MCNC: 227 MG/DL (ref 70–110)
GLUCOSE UR QL STRIP: ABNORMAL
HCT VFR BLD AUTO: 32.1 % (ref 37–48.5)
HGB BLD-MCNC: 10.6 G/DL (ref 12–16)
HGB UR QL STRIP: ABNORMAL
IMM GRANULOCYTES # BLD AUTO: 0.04 K/UL (ref 0–0.04)
IMM GRANULOCYTES NFR BLD AUTO: 0.5 % (ref 0–0.5)
KETONES UR QL STRIP: NEGATIVE
LEUKOCYTE ESTERASE UR QL STRIP: NEGATIVE
LYMPHOCYTES # BLD AUTO: 0.9 K/UL (ref 1–4.8)
LYMPHOCYTES NFR BLD: 10.1 % (ref 18–48)
MCH RBC QN AUTO: 29.9 PG (ref 27–31)
MCHC RBC AUTO-ENTMCNC: 33 G/DL (ref 32–36)
MCV RBC AUTO: 91 FL (ref 82–98)
MICROSCOPIC COMMENT: NORMAL
MONOCYTES # BLD AUTO: 0.8 K/UL (ref 0.3–1)
MONOCYTES NFR BLD: 8.6 % (ref 4–15)
NEUTROPHILS # BLD AUTO: 6.9 K/UL (ref 1.8–7.7)
NEUTROPHILS NFR BLD: 78.6 % (ref 38–73)
NITRITE UR QL STRIP: NEGATIVE
NRBC BLD-RTO: 0 /100 WBC
PH UR STRIP: 6 [PH] (ref 5–8)
PLATELET # BLD AUTO: 230 K/UL (ref 150–450)
PMV BLD AUTO: 10 FL (ref 9.2–12.9)
POCT GLUCOSE: 213 MG/DL (ref 70–110)
POCT GLUCOSE: 218 MG/DL (ref 70–110)
POCT GLUCOSE: 236 MG/DL (ref 70–110)
POCT GLUCOSE: 264 MG/DL (ref 70–110)
POTASSIUM SERPL-SCNC: 4.3 MMOL/L (ref 3.5–5.1)
PROT UR QL STRIP: ABNORMAL
RBC # BLD AUTO: 3.54 M/UL (ref 4–5.4)
RBC #/AREA URNS HPF: 1 /HPF (ref 0–4)
SODIUM SERPL-SCNC: 136 MMOL/L (ref 136–145)
SP GR UR STRIP: 1.01 (ref 1–1.03)
SQUAMOUS #/AREA URNS HPF: 1 /HPF
URN SPEC COLLECT METH UR: ABNORMAL
UROBILINOGEN UR STRIP-ACNC: NEGATIVE EU/DL
WBC # BLD AUTO: 8.79 K/UL (ref 3.9–12.7)
WBC #/AREA URNS HPF: 0 /HPF (ref 0–5)
YEAST URNS QL MICRO: NORMAL

## 2022-11-10 PROCEDURE — 36415 COLL VENOUS BLD VENIPUNCTURE: CPT | Performed by: PHYSICIAN ASSISTANT

## 2022-11-10 PROCEDURE — 25000003 PHARM REV CODE 250: Performed by: PHYSICIAN ASSISTANT

## 2022-11-10 PROCEDURE — 94799 UNLISTED PULMONARY SVC/PX: CPT

## 2022-11-10 PROCEDURE — 80048 BASIC METABOLIC PNL TOTAL CA: CPT | Performed by: PHYSICIAN ASSISTANT

## 2022-11-10 PROCEDURE — 85025 COMPLETE CBC W/AUTO DIFF WBC: CPT | Performed by: PHYSICIAN ASSISTANT

## 2022-11-10 PROCEDURE — 97530 THERAPEUTIC ACTIVITIES: CPT | Mod: CQ

## 2022-11-10 PROCEDURE — 12000002 HC ACUTE/MED SURGE SEMI-PRIVATE ROOM

## 2022-11-10 PROCEDURE — 94761 N-INVAS EAR/PLS OXIMETRY MLT: CPT

## 2022-11-10 PROCEDURE — 81000 URINALYSIS NONAUTO W/SCOPE: CPT | Performed by: NURSE PRACTITIONER

## 2022-11-10 PROCEDURE — 97116 GAIT TRAINING THERAPY: CPT | Mod: CQ

## 2022-11-10 RX ADMIN — AZELASTINE HYDROCHLORIDE 137 MCG: 137 SPRAY, METERED NASAL at 09:11

## 2022-11-10 RX ADMIN — INSULIN ASPART 4 UNITS: 100 INJECTION, SOLUTION INTRAVENOUS; SUBCUTANEOUS at 12:11

## 2022-11-10 RX ADMIN — INSULIN ASPART 4 UNITS: 100 INJECTION, SOLUTION INTRAVENOUS; SUBCUTANEOUS at 05:11

## 2022-11-10 RX ADMIN — VITAM B12 100 MCG: 100 TAB at 09:11

## 2022-11-10 RX ADMIN — OXYBUTYNIN CHLORIDE 15 MG: 5 TABLET, EXTENDED RELEASE ORAL at 09:11

## 2022-11-10 RX ADMIN — OXYCODONE HYDROCHLORIDE 10 MG: 10 TABLET ORAL at 06:11

## 2022-11-10 RX ADMIN — LOSARTAN POTASSIUM 50 MG: 25 TABLET, FILM COATED ORAL at 08:11

## 2022-11-10 RX ADMIN — LEVOTHYROXINE SODIUM 175 MCG: 0.03 TABLET ORAL at 05:11

## 2022-11-10 RX ADMIN — CYCLOBENZAPRINE HYDROCHLORIDE 10 MG: 5 TABLET, FILM COATED ORAL at 08:11

## 2022-11-10 RX ADMIN — INSULIN ASPART 3 UNITS: 100 INJECTION, SOLUTION INTRAVENOUS; SUBCUTANEOUS at 09:11

## 2022-11-10 RX ADMIN — PANTOPRAZOLE SODIUM 40 MG: 40 TABLET, DELAYED RELEASE ORAL at 09:11

## 2022-11-10 RX ADMIN — FLUTICASONE PROPIONATE 50 MCG: 50 SPRAY, METERED NASAL at 09:11

## 2022-11-10 RX ADMIN — MUPIROCIN 1 G: 20 OINTMENT TOPICAL at 08:11

## 2022-11-10 RX ADMIN — DOCUSATE SODIUM 100 MG: 100 CAPSULE, LIQUID FILLED ORAL at 09:11

## 2022-11-10 RX ADMIN — PREGABALIN 150 MG: 75 CAPSULE ORAL at 09:11

## 2022-11-10 RX ADMIN — PREGABALIN 150 MG: 75 CAPSULE ORAL at 08:11

## 2022-11-10 RX ADMIN — MUPIROCIN 1 G: 20 OINTMENT TOPICAL at 09:11

## 2022-11-10 NOTE — SUBJECTIVE & OBJECTIVE
Principal Problem:Lumbar stenosis with neurogenic claudication    Principal Orthopedic Problem: S/P APLIF    Interval History: urinary retention    Review of patient's allergies indicates:   Allergen Reactions    Codeine Nausea And Vomiting     Other reaction(s): Nausea, dizziness       Current Facility-Administered Medications   Medication    acetaminophen tablet 650 mg    aluminum-magnesium hydroxide-simethicone 200-200-20 mg/5 mL suspension 30 mL    azelastine 137 mcg (0.1 %) nasal spray 137 mcg    bisacodyL suppository 10 mg    clobetasoL 0.05 % cream 1 application    cyanocobalamin tablet 100 mcg    cyclobenzaprine tablet 10 mg    dextrose 10% bolus 125 mL    dextrose 10% bolus 250 mL    diphenhydrAMINE capsule 50 mg    docusate sodium capsule 100 mg    ergocalciferol capsule 50,000 Units    fluticasone propionate 50 mcg/actuation nasal spray 50 mcg    furosemide tablet 20 mg    glucagon (human recombinant) injection 1 mg    glucose chewable tablet 16 g    glucose chewable tablet 24 g    HYDROmorphone (PF) injection 2 mg    insulin aspart U-100 pen 1-10 Units    lactated ringers infusion    levothyroxine tablet 175 mcg    losartan tablet 50 mg    melatonin tablet 9 mg    mupirocin 2 % ointment 1 g    ondansetron injection 8 mg    oxybutynin 24 hr tablet 15 mg    oxyCODONE immediate release tablet 5 mg    oxyCODONE immediate release tablet Tab 10 mg    pantoprazole EC tablet 40 mg    pregabalin capsule 150 mg    prochlorperazine injection Soln 5 mg    senna-docusate 8.6-50 mg per tablet 2 tablet     Facility-Administered Medications Ordered in Other Encounters   Medication    lactated ringers infusion     Objective:     Vital Signs (Most Recent):  Temp: 99 °F (37.2 °C) (11/10/22 0828)  Pulse: 107 (11/10/22 0828)  Resp: 18 (11/10/22 0828)  BP: (!) 125/53 (11/10/22 0828)  SpO2: (!) 90 % (11/10/22 0828)   Vital Signs (24h Range):  Temp:  [98 °F (36.7 °C)-99 °F (37.2 °C)] 99 °F (37.2 °C)  Pulse:  [] 107  Resp:  " [15-18] 18  SpO2:  [90 %-97 %] 90 %  BP: (101-125)/(46-57) 125/53     Weight: 118.7 kg (261 lb 11 oz)  Height: 5' 5" (165.1 cm)  Body mass index is 43.55 kg/m².      Intake/Output Summary (Last 24 hours) at 11/10/2022 0922  Last data filed at 11/10/2022 0638  Gross per 24 hour   Intake 1825 ml   Output 1550 ml   Net 275 ml       General    Nursing note and vitals reviewed.  Constitutional: She is oriented to person, place, and time.   Morbidly obese; this inhibits her bed mobility and functional mobility in general   Pulmonary/Chest: Effort normal.   Abdominal: Soft. Bowel sounds are normal.   Abd incision C/D/I   Neurological: She is alert and oriented to person, place, and time.   Psychiatric: She has a normal mood and affect. Her behavior is normal.         Back (L-Spine & T-Spine) / Neck (C-Spine) Exam     Comments:  BLEs DNVI. Post lumbar dressing C/D/I      Significant Labs: CBC:   Recent Labs   Lab 11/08/22  1402 11/09/22  0555 11/10/22  0346   WBC 11.16 8.35 8.79   HGB 13.4 11.1* 10.6*   HCT 41.1 34.1* 32.1*    309 230     CMP:   Recent Labs   Lab 11/09/22  0555 11/09/22  1217 11/10/22  0346    135* 136   K 4.4 4.5 4.3    105 104   CO2 25 17* 24   * 196* 227*   BUN 22 22 18   CREATININE 1.5* 1.6* 1.2   CALCIUM 8.0* 8.1* 7.8*   ANIONGAP 9 13 8     All pertinent labs within the past 24 hours have been reviewed.    Significant Imaging: None  "

## 2022-11-10 NOTE — CARE UPDATE
11/09/22 1910   Patient Assessment/Suction   Level of Consciousness (AVPU) alert   Respiratory Effort Unlabored;Normal   Expansion/Accessory Muscles/Retractions no use of accessory muscles;no retractions;expansion symmetric   Rhythm/Pattern, Respiratory unlabored;depth regular;pattern regular   PRE-TX-O2   O2 Device (Oxygen Therapy) room air   SpO2 96 %   Pulse Oximetry Type Intermittent   $ Pulse Oximetry - Multiple Charge Pulse Oximetry - Multiple   Pulse 92   Resp 16   Incentive Spirometer   $ Incentive Spirometer Charges done with encouragement   Incentive Spirometer Predicted Level (mL) 1800   Administration (IS) proper technique demonstrated   Number of Repetitions (IS) 10   Level Incentive Spirometer (mL) 1600   Patient Tolerance (IS) good   Preset CPAP/BiPAP Settings   Mode Of Delivery Standby;CPAP  (home unit)   Equipment Type Other (see comment)  (homeb unit)

## 2022-11-10 NOTE — PROGRESS NOTES
[facility]  Orthopedics  Progress Note    Patient Name: Edie Hernandez  MRN: [id number]  Admission Date: 11/8/2022  Hospital Length of Stay: 2 days  Attending Provider: Everton Godinez MD  Primary Care Provider: Lydia Eugene MD  Follow-up For: Procedure(s) (LRB):  FUSION, SPINE, LUMBAR, ALIF L3-4, L4-5 (N/A)  FUSION,SPINE,LUMBAR,POSTERIOR APPROACH L3-4, L4-5 (N/A)    Post-Operative Day: 2 Days Post-Op  Subjective:     Principal Problem:Lumbar stenosis with neurogenic claudication    Principal Orthopedic Problem: S/P APLIF    Interval History: urinary retention    Review of patient's allergies indicates:   Allergen Reactions    Codeine Nausea And Vomiting     Other reaction(s): Nausea, dizziness       Current Facility-Administered Medications   Medication    acetaminophen tablet 650 mg    aluminum-magnesium hydroxide-simethicone 200-200-20 mg/5 mL suspension 30 mL    azelastine 137 mcg (0.1 %) nasal spray 137 mcg    bisacodyL suppository 10 mg    clobetasoL 0.05 % cream 1 application    cyanocobalamin tablet 100 mcg    cyclobenzaprine tablet 10 mg    dextrose 10% bolus 125 mL    dextrose 10% bolus 250 mL    diphenhydrAMINE capsule 50 mg    docusate sodium capsule 100 mg    ergocalciferol capsule 50,000 Units    fluticasone propionate 50 mcg/actuation nasal spray 50 mcg    furosemide tablet 20 mg    glucagon (human recombinant) injection 1 mg    glucose chewable tablet 16 g    glucose chewable tablet 24 g    HYDROmorphone (PF) injection 2 mg    insulin aspart U-100 pen 1-10 Units    lactated ringers infusion    levothyroxine tablet 175 mcg    losartan tablet 50 mg    melatonin tablet 9 mg    mupirocin 2 % ointment 1 g    ondansetron injection 8 mg    oxybutynin 24 hr tablet 15 mg    oxyCODONE immediate release tablet 5 mg    oxyCODONE immediate release tablet Tab 10 mg    pantoprazole EC tablet 40 mg    pregabalin capsule 150 mg    prochlorperazine injection Soln 5  "mg    senna-docusate 8.6-50 mg per tablet 2 tablet     Facility-Administered Medications Ordered in Other Encounters   Medication    lactated ringers infusion     Objective:     Vital Signs (Most Recent):  Temp: 99 °F (37.2 °C) (11/10/22 0828)  Pulse: 107 (11/10/22 0828)  Resp: 18 (11/10/22 0828)  BP: (!) 125/53 (11/10/22 0828)  SpO2: (!) 90 % (11/10/22 0828)   Vital Signs (24h Range):  Temp:  [98 °F (36.7 °C)-99 °F (37.2 °C)] 99 °F (37.2 °C)  Pulse:  [] 107  Resp:  [15-18] 18  SpO2:  [90 %-97 %] 90 %  BP: (101-125)/(46-57) 125/53     Weight: 118.7 kg (261 lb 11 oz)  Height: 5' 5" (165.1 cm)  Body mass index is 43.55 kg/m².      Intake/Output Summary (Last 24 hours) at 11/10/2022 0922  Last data filed at 11/10/2022 0638  Gross per 24 hour   Intake 1825 ml   Output 1550 ml   Net 275 ml       General    Nursing note and vitals reviewed.  Constitutional: She is oriented to person, place, and time.   Morbidly obese; this inhibits her bed mobility and functional mobility in general   Pulmonary/Chest: Effort normal.   Abdominal: Soft. Bowel sounds are normal.   Abd incision C/D/I   Neurological: She is alert and oriented to person, place, and time.   Psychiatric: She has a normal mood and affect. Her behavior is normal.         Back (L-Spine & T-Spine) / Neck (C-Spine) Exam     Comments:  BLEs DNVI. Post lumbar dressing C/D/I      Significant Labs: CBC:   Recent Labs   Lab 11/08/22  1402 11/09/22  0555 11/10/22  0346   WBC 11.16 8.35 8.79   HGB 13.4 11.1* 10.6*   HCT 41.1 34.1* 32.1*    309 230     CMP:   Recent Labs   Lab 11/09/22  0555 11/09/22  1217 11/10/22  0346    135* 136   K 4.4 4.5 4.3    105 104   CO2 25 17* 24   * 196* 227*   BUN 22 22 18   CREATININE 1.5* 1.6* 1.2   CALCIUM 8.0* 8.1* 7.8*   ANIONGAP 9 13 8     All pertinent labs within the past 24 hours have been reviewed.    Significant Imaging: None    Assessment/Plan:     * Lumbar stenosis with neurogenic " claudication  Hospitalist to manage urinary retention  Cont OOB activity with PT and nursing  We would like for her to be DC'd home with HH if she does well with PT and the urinary retention improves          WILFRID AGUAYO  Orthopedics  [facility]

## 2022-11-10 NOTE — SUBJECTIVE & OBJECTIVE
Interval History: urinary retention overnight.  States she has had issues emptying her bladder in the past.  Hopefully can return to her baseline today.    Review of Systems   Constitutional:  Negative for fever.   Respiratory:  Negative for shortness of breath.    Cardiovascular:  Negative for chest pain and palpitations.   Gastrointestinal:  Negative for abdominal pain, nausea and vomiting.   Genitourinary:  Positive for difficulty urinating. Negative for frequency and hematuria.   Musculoskeletal:  Positive for back pain.   Skin:  Negative for pallor and rash.   Psychiatric/Behavioral:  Negative for agitation and confusion.    Objective:     Vital Signs (Most Recent):  Temp: 98.3 °F (36.8 °C) (11/10/22 1235)  Pulse: 99 (11/10/22 1235)  Resp: 16 (11/10/22 1235)  BP: (!) 117/52 (11/10/22 1235)  SpO2: (!) 93 % (11/10/22 1258)   Vital Signs (24h Range):  Temp:  [98 °F (36.7 °C)-99 °F (37.2 °C)] 98.3 °F (36.8 °C)  Pulse:  [] 99  Resp:  [15-18] 16  SpO2:  [90 %-97 %] 93 %  BP: (107-125)/(49-57) 117/52     Weight: 118.7 kg (261 lb 11 oz)  Body mass index is 43.55 kg/m².    Intake/Output Summary (Last 24 hours) at 11/10/2022 1512  Last data filed at 11/10/2022 0638  Gross per 24 hour   Intake 1825 ml   Output 1300 ml   Net 525 ml      Physical Exam  Vitals and nursing note reviewed.   Constitutional:       General: She is not in acute distress.     Appearance: Normal appearance. She is normal weight.   HENT:      Head: Normocephalic and atraumatic.      Nose: Nose normal.      Mouth/Throat:      Mouth: Mucous membranes are moist.      Pharynx: Oropharynx is clear.   Eyes:      Extraocular Movements: Extraocular movements intact.      Pupils: Pupils are equal, round, and reactive to light.   Cardiovascular:      Rate and Rhythm: Normal rate and regular rhythm.      Pulses: Normal pulses.      Heart sounds: Normal heart sounds.   Pulmonary:      Effort: Pulmonary effort is normal.      Breath sounds: Normal breath  sounds.   Abdominal:      General: Abdomen is flat. Bowel sounds are normal. There is no distension.      Palpations: Abdomen is soft.      Tenderness: There is no abdominal tenderness. There is no guarding or rebound.   Musculoskeletal:      Cervical back: Normal range of motion and neck supple.      Comments: Brace in place.    Skin:     General: Skin is warm and dry.      Capillary Refill: Capillary refill takes 2 to 3 seconds.   Neurological:      General: No focal deficit present.      Mental Status: She is alert and oriented to person, place, and time. Mental status is at baseline.   Psychiatric:         Mood and Affect: Mood normal.         Behavior: Behavior normal.       Significant Labs: All pertinent labs within the past 24 hours have been reviewed.  CBC:   Recent Labs   Lab 11/09/22  0555 11/10/22  0346   WBC 8.35 8.79   HGB 11.1* 10.6*   HCT 34.1* 32.1*    230     CMP:   Recent Labs   Lab 11/09/22  0555 11/09/22  1217 11/10/22  0346    135* 136   K 4.4 4.5 4.3    105 104   CO2 25 17* 24   * 196* 227*   BUN 22 22 18   CREATININE 1.5* 1.6* 1.2   CALCIUM 8.0* 8.1* 7.8*   ANIONGAP 9 13 8       Significant Imaging: I have reviewed all pertinent imaging results/findings within the past 24 hours.

## 2022-11-10 NOTE — ASSESSMENT & PLAN NOTE
Hospitalist to manage urinary retention  Cont OOB activity with PT and nursing  We would like for her to be DC'd home with HH if she does well with PT and the urinary retention improves

## 2022-11-10 NOTE — PLAN OF CARE
POC discussed with pt, pt verbalized understanding. Oriented x4. PIV cdi, infusing. Pt voided post In and out. Blood glucose monitored, insulin given per orders. Dressing to back cdi. Pulses 2+, pt denies numbness and tingling. Complaints of pain to back controlled with prn's. Up to bedside commode with 2 person assist. Pt reports she still having trouble maneuvering the left leg. Educated on the importance of ambulation, incentive spirometer use, and coughing/deep breathing post op. Call light in reach, bed alarm set, safety maintained. No complaints or requests at this time, will continue to monitor.

## 2022-11-10 NOTE — ASSESSMENT & PLAN NOTE
Patient with acute kidney injury likely due to IVVD/dehydration JANETTE is currently improving. Labs reviewed- Renal function/electrolytes with Estimated Creatinine Clearance: 59.5 mL/min (based on SCr of 1.2 mg/dL). according to latest data. Monitor urine output and serial BMP and adjust therapy as needed. Avoid nephrotoxins and renally dose meds for GFR listed above.  IV FH ordered.  Repeat BMP ordered.  Monitor closely.

## 2022-11-10 NOTE — PT/OT/SLP PROGRESS
Physical Therapy Treatment    Patient Name:  Edie Hernandez   MRN:  [id number]    Recommendations:     Discharge Recommendations:  home, home health PT   Discharge Equipment Recommendations: none   Barriers to discharge: None    Assessment:     Edie Hernandez is a 67 y.o. female admitted with a medical diagnosis of Lumbar stenosis with neurogenic claudication.  She presents with the following impairments/functional limitations:  weakness, impaired endurance, impaired self care skills, impaired functional mobility, gait instability, decreased lower extremity function, pain, orthopedic precautions. Seated in chair with TLSO on place and spouse at bedside.  Agreed to mobilize. Reports 8/10 pain with activity . Encouraged to move slowly and verbal cues give for safe technique.  Required Max A sit > stand. Ambulated 30' with rw and Min A with chair follow as patient reports LLE weakness ( teary with 2 standing rests taken). Ascended / Descended 5 steps with Mod A and verbal cues using B handrails ( spouse present to observe). Ambulated 30' additional  to room,sat up in chair. Patient requested pain medicine following, nurse informed. Presents with increased pain , decreased endurance and LE weakness requiring extra time and assistance for safety with mobility.     Rehab Prognosis: Good; patient would benefit from acute skilled PT services to address these deficits and reach maximum level of function.    Recent Surgery: Procedure(s) (LRB):  FUSION, SPINE, LUMBAR, ALIF L3-4, L4-5 (N/A)  FUSION,SPINE,LUMBAR,POSTERIOR APPROACH L3-4, L4-5 (N/A) 2 Days Post-Op    Plan:     During this hospitalization, patient to be seen BID to address the identified rehab impairments via gait training, therapeutic activities, therapeutic exercises and progress toward the following goals:    Plan of Care Expires:  11/23/22    Subjective     Chief Complaint: pain with movement/ LLE weakness.  Patient/Family Comments/goals: to return home with  spouse  Pain/Comfort:  Pain Rating 1: 8/10  Location - Orientation 1: generalized  Location 1: back  Pain Addressed 1: Reposition, Nurse notified      Objective:     Communicated with nurse Vann prior to session.  Patient found up in chair with chair check upon PT entry to room.     General Precautions: Standard, fall   Orthopedic Precautions:spinal precautions   Braces: TLSO  Respiratory Status: Room air     Functional Mobility:  Transfers:     Sit to Stand:  maximal assistance with rolling walker  Gait: 30' x 2 with rw and Min A, TLSO in place . Performed stair climbing mid way.       AM-PAC 6 CLICK MOBILITY          Treatment & Education:  Sit > stand with rw Max A.  Ambulated 30', (up / down 5 steps with Mod A) , 30' additional with rw and Min A.    Patient left up in chair with all lines intact, call button in reach, chair alarm on, nurse Vann notified, and spouse present..    GOALS:   Multidisciplinary Problems       Physical Therapy Goals          Problem: Physical Therapy    Goal Priority Disciplines Outcome Goal Variances Interventions   Physical Therapy Goal     PT, PT/OT Ongoing, Progressing     Description: Goals to be met by: 22     Patient will increase functional independence with mobility by performin. Supine to sit with [place]  2. Sit to supine with [place]  3. Sit to stand transfer with Supervision  4. Bed to chair transfer with Supervision using Rolling Walker  5. Gait  x 250 feet with Supervision using Rolling Walker.                          Time Tracking:     PT Received On: 11/10/22  PT Start Time: 1247     PT Stop Time: 1312  PT Total Time (min): 25 min     Billable Minutes: Gait Training 15min and Therapeutic Activity 10min    Treatment Type: Treatment  PT/PTA: PTA     PTA Visit Number: 1     11/10/2022

## 2022-11-10 NOTE — PROGRESS NOTES
[facility] Hospital Medicine  Progress Note    Patient Name: Edie Hernandez  MRN: [id number]  Patient Class: IP- Inpatient   Admission Date: 11/8/2022  Length of Stay: 2 days  Attending Physician: Everton Godinez MD  Primary Care Provider: Lydia Eugene MD        Subjective:     Principal Problem:Lumbar stenosis with neurogenic claudication        HPI:  Edie Hernandez is a 67 y.o. y.o. female with a PMHx of arthritis, T2DM, GERD, CLINT on CPAP, and hypothyroidism who is seen s/p lumbar spinal fusion and ALIF L3-L4, L4-L5 with Dr. Godinez on 11/8. Patient was seen by Dr. Godienz in clinic on 10/17 for chronic back pain which failed conservative management and planned for surgery. Post-operatively, patient is sedated from anesthesia. She reports back pain. Preoperative labs were reviewed. Hospital Medicine was consulted for further workup and management of the patient.             Overview/Hospital Course:  The patient was admitted after undergoing lumbar spinal fusion and ALIF L3-L4, L4-L5 with Dr. Godinez on 11/8.  Patient was monitored closely throughout her stay.  Orthopedic surgery was consulted.  PT/OT worked with the patient postoperatively who recommended home health.  Patient's pain was well controlled with p.r.n. pain medications per surgeon. On a.m. labs, patient was noted to have increase in her creatinine at 1.5.  Fluids and repeat BMP were ordered.      Interval History: urinary retention overnight.  States she has had issues emptying her bladder in the past.  Hopefully can return to her baseline today.    Review of Systems   Constitutional:  Negative for fever.   Respiratory:  Negative for shortness of breath.    Cardiovascular:  Negative for chest pain and palpitations.   Gastrointestinal:  Negative for abdominal pain, nausea and vomiting.   Genitourinary:  Positive for difficulty urinating. Negative for frequency and hematuria.   Musculoskeletal:  Positive for back pain.   Skin:   Negative for pallor and rash.   Psychiatric/Behavioral:  Negative for agitation and confusion.    Objective:     Vital Signs (Most Recent):  Temp: 98.3 °F (36.8 °C) (11/10/22 1235)  Pulse: 99 (11/10/22 1235)  Resp: 16 (11/10/22 1235)  BP: (!) 117/52 (11/10/22 1235)  SpO2: (!) 93 % (11/10/22 1258)   Vital Signs (24h Range):  Temp:  [98 °F (36.7 °C)-99 °F (37.2 °C)] 98.3 °F (36.8 °C)  Pulse:  [] 99  Resp:  [15-18] 16  SpO2:  [90 %-97 %] 93 %  BP: (107-125)/(49-57) 117/52     Weight: 118.7 kg (261 lb 11 oz)  Body mass index is 43.55 kg/m².    Intake/Output Summary (Last 24 hours) at 11/10/2022 1512  Last data filed at 11/10/2022 0638  Gross per 24 hour   Intake 1825 ml   Output 1300 ml   Net 525 ml      Physical Exam  Vitals and nursing note reviewed.   Constitutional:       General: She is not in acute distress.     Appearance: Normal appearance. She is normal weight.   HENT:      Head: Normocephalic and atraumatic.      Nose: Nose normal.      Mouth/Throat:      Mouth: Mucous membranes are moist.      Pharynx: Oropharynx is clear.   Eyes:      Extraocular Movements: Extraocular movements intact.      Pupils: Pupils are equal, round, and reactive to light.   Cardiovascular:      Rate and Rhythm: Normal rate and regular rhythm.      Pulses: Normal pulses.      Heart sounds: Normal heart sounds.   Pulmonary:      Effort: Pulmonary effort is normal.      Breath sounds: Normal breath sounds.   Abdominal:      General: Abdomen is flat. Bowel sounds are normal. There is no distension.      Palpations: Abdomen is soft.      Tenderness: There is no abdominal tenderness. There is no guarding or rebound.   Musculoskeletal:      Cervical back: Normal range of motion and neck supple.      Comments: Brace in place.    Skin:     General: Skin is warm and dry.      Capillary Refill: Capillary refill takes 2 to 3 seconds.   Neurological:      General: No focal deficit present.      Mental Status: She is alert and oriented to  person, place, and time. Mental status is at baseline.   Psychiatric:         Mood and Affect: Mood normal.         Behavior: Behavior normal.       Significant Labs: All pertinent labs within the past 24 hours have been reviewed.  CBC:   Recent Labs   Lab 11/09/22  0555 11/10/22  0346   WBC 8.35 8.79   HGB 11.1* 10.6*   HCT 34.1* 32.1*    230     CMP:   Recent Labs   Lab 11/09/22  0555 11/09/22  1217 11/10/22  0346    135* 136   K 4.4 4.5 4.3    105 104   CO2 25 17* 24   * 196* 227*   BUN 22 22 18   CREATININE 1.5* 1.6* 1.2   CALCIUM 8.0* 8.1* 7.8*   ANIONGAP 9 13 8       Significant Imaging: I have reviewed all pertinent imaging results/findings within the past 24 hours.      Assessment/Plan:      * Lumbar stenosis with neurogenic claudication  Acute:  - s/p lumbar spinal fusion and ALIF L3-L4, L4-L5 with Dr. Godinez on 11/8  - diet per ortho recommendations  - pain medications per ortho  - prn antiemetics  - aggressive IS  - PT/OT  - follow H/H closely     Urinary retention  Check PVR.  May need to replace abernathy catheter with OP urology follow up.        JANETTE (acute kidney injury)  Patient with acute kidney injury likely due to IVVD/dehydration JANETTE is currently improving. Labs reviewed- Renal function/electrolytes with Estimated Creatinine Clearance: 59.5 mL/min (based on SCr of 1.2 mg/dL). according to latest data. Monitor urine output and serial BMP and adjust therapy as needed. Avoid nephrotoxins and renally dose meds for GFR listed above.  IV FH ordered.  Repeat BMP ordered.  Monitor closely.      Type 2 diabetes mellitus  Patient's FSGs are controlled on current medication regimen.  Last A1c reviewed-   Lab Results   Component Value Date    HGBA1C 7.1 (H) 05/09/2022     Most recent fingerstick glucose reviewed- No results for input(s): POCTGLUCOSE in the last 24 hours.  Current correctional scale  Medium  Maintain anti-hyperglycemic dose as follows-   Antihyperglycemics (From  admission, onward)    Start     Stop Route Frequency Ordered    11/08/22 1722  insulin aspart U-100 pen 1-10 Units         -- SubQ Before meals & nightly PRN 11/08/22 1622            Severe obesity (BMI >= 40)  Body mass index is 43.55 kg/m². Morbid obesity complicates all aspects of disease management from diagnostic modalities to treatment. Weight loss encouraged and health benefits explained to patient.         Hypothyroid  Patient has chronic hypothyroidism. TFTs reviewed-   Lab Results   Component Value Date    TSH 0.723 05/09/2022   . Will continue chronic levothyroxine and adjust for and clinical changes.        CLINT (obstructive sleep apnea)  CPAP ordered    GERD (gastroesophageal reflux disease)  PPI ordered        VTE Risk Mitigation (From admission, onward)         Ordered     IP VTE LOW RISK PATIENT  Once         11/08/22 0743     Place SHELLIE hose  Until discontinued         11/08/22 0743     Place sequential compression device  Until discontinued         11/08/22 0743                Discharge Planning   SIDNEY: 11/10/2022     Code Status: Full Code   Is the patient medically ready for discharge?:     Reason for patient still in hospital (select all that apply): Patient new problem, Patient trending condition and Treatment  Discharge Plan A: Home Health                  LYNETTE Torres  Department of Hospital Medicine   [facility]

## 2022-11-10 NOTE — PT/OT/SLP PROGRESS
Physical Therapy Treatment    Patient Name:  Edie Hernandez   MRN:  [id number]    Recommendations:     Discharge Recommendations:  home, home health PT   Discharge Equipment Recommendations: none   Barriers to discharge: Inaccessible home    Assessment:     Edie Hernandez is a 67 y.o. female admitted with a medical diagnosis of Lumbar stenosis with neurogenic claudication.  She presents with the following impairments/functional limitations:  impaired endurance, impaired self care skills, impaired functional mobility, gait instability, pain, decreased lower extremity function, impaired cardiopulmonary response to activity.  Pt sleeping heavily but roused with loud voice and conversation. Drowsy; O2 sats 94% on 1.5 L,  bpm at rest. TLSO donned at EOB.    Rated pain as 4/10, but cried out in pain with all transfers; during ambulation took frequent standing rest breaks in which pt reached out for support from walls or counter (attributed to pain not dizziness). Seated rest breaks after gait trials of 100' and 75' with RW, [place], mild unsteadiness, frequent standing rest breaks, and chair follow. HR to 111 bpm with gait.     Did not attempt stairs with AM session due to pain, mild unsteadiness with gait, and -111 bpm. Spouse reports home environment requires 21 stairs for entry and expressed some concern about safe entry following discharge.     Pt remained up in chair with family present, needs at hand, and nurse Soo aware of status. Will plan to see for afternoon physical therapy session.       Rehab Prognosis: Good; patient would benefit from acute skilled PT services to address these deficits and reach maximum level of function.    Recent Surgery: Procedure(s) (LRB):  FUSION, SPINE, LUMBAR, ALIF L3-4, L4-5 (N/A)  FUSION,SPINE,LUMBAR,POSTERIOR APPROACH L3-4, L4-5 (N/A) 2 Days Post-Op    Plan:     During this hospitalization, patient to be seen BID to address the identified rehab impairments via  therapeutic activities, therapeutic exercises, gait training and progress toward the following goals:    Plan of Care Expires:  11/23/22    Subjective     Chief Complaint: pain with movement  Patient/Family Comments/goals: go home asap  Pain/Comfort:  Pain Rating 1: 4/10  Location - Side 1: Bilateral  Location - Orientation 1: posterior  Location 1: back  Pain Addressed 1: Reposition, Distraction, Nurse notified, Cessation of Activity  Pain Rating Post-Intervention 1: other (see comments) (resting comfortably in chair at conclusion of session)      Objective:     Communicated with nurse Vann prior to session.  Patient found HOB elevated with bed alarm, oxygen, peripheral IV upon PT entry to room.     General Precautions: Standard, fall   Orthopedic Precautions:N/A   Braces: TLSO  Respiratory Status: Nasal cannula, flow 2 L/min     Functional Mobility:  Bed Mobility:     Supine to Sit: minimum assistance  Transfers:     Sit to Stand:  initial attempt with modA; remainder with [place] but cries out in pain with rolling walker  Gait: 150', 75' RW, [place], chair follow; multiple standing rest breaks with each gait trial; reaches for support from walls and counters when pain increases      AM-PAC 6 CLICK MOBILITY          Treatment & Education:  -Pt education in donning and use of TLSO, log roll technique, body mechanics for improved transfers  -Supine Heel slides 2x10 with poor execution due to body habitus and continued deficits of LLE  -Supine and seated AP     Patient left up in chair with all lines intact, call button in reach, chair alarm on, Soo notified, and spouse and grandson present..    GOALS:   Multidisciplinary Problems       Physical Therapy Goals          Problem: Physical Therapy    Goal Priority Disciplines Outcome Goal Variances Interventions   Physical Therapy Goal     PT, PT/OT Ongoing, Progressing     Description: Goals to be met by: 11/23/22     Patient will increase functional independence with  mobility by performin. Supine to sit with [place]  2. Sit to supine with [place]  3. Sit to stand transfer with Supervision  4. Bed to chair transfer with Supervision using Rolling Walker  5. Gait  x 250 feet with Supervision using Rolling Walker.                          Time Tracking:     PT Received On: 11/10/22  PT Start Time: 1038     PT Stop Time: 1125  PT Total Time (min): 47 min     Billable Minutes: Gait Training 30 and Therapeutic Activity 17    Treatment Type: Treatment  PT/PTA: PTA     PTA Visit Number: 1     11/10/2022

## 2022-11-10 NOTE — NURSING
During the night pt voided twice, but she did not empty her bladder. She has 783 in her bladder this morning after urinating, and she says she doesn't feel like she can pee anymore. Notified NP, straight cath completed. 800 cc from straight cath. Pt due to void.

## 2022-11-10 NOTE — PLAN OF CARE
Pt AAOx4. VSS. Pain to surgical site treated adequately with PO PRN medications. Medications administered as ordered. PIV intact, LR infusing. Hourly rounding completed. Pt instructed to call for assistance. POC reviewed. Pt verbalized understanding. \      Problem: Adult Inpatient Plan of Care  Goal: Plan of Care Review  Outcome: Ongoing, Progressing     Problem: Diabetes Comorbidity  Goal: Blood Glucose Level Within Targeted Range  Outcome: Ongoing, Progressing     Problem: Bariatric Environmental Safety  Goal: Safety Maintained with Care  Outcome: Ongoing, Progressing     Problem: Fall Injury Risk  Goal: Absence of Fall and Fall-Related Injury  Outcome: Ongoing, Progressing

## 2022-11-10 NOTE — PLAN OF CARE
Problem: Physical Therapy  Goal: Physical Therapy Goal  Description: Goals to be met by: 22     Patient will increase functional independence with mobility by performin. Supine to sit with [place]  2. Sit to supine with [place]  3. Sit to stand transfer with Supervision  4. Bed to chair transfer with Supervision using Rolling Walker  5. Gait  x 250 feet with Supervision using Rolling Walker.     Outcome: Ongoing, Progressing   Ambulate with rw, ascend and descend stairs with assistance for safety,.

## 2022-11-10 NOTE — PLAN OF CARE
Pt cleared for discharge from case management.        11/10/22 1050   Final Note   Assessment Type Final Discharge Note   Anticipated Discharge Disposition Home-Health  ([facility] )   What phone number can be called within the next 1-3 days to see how you are doing after discharge?   ([phone])   Hospital Resources/Appts/Education Provided Appointments scheduled and added to AVS   Post-Acute Status   Post-Acute Authorization Home Health   Home Health Status Set-up Complete/Auth obtained

## 2022-11-11 VITALS
BODY MASS INDEX: 43.6 KG/M2 | SYSTOLIC BLOOD PRESSURE: 105 MMHG | HEART RATE: 91 BPM | WEIGHT: 261.69 LBS | DIASTOLIC BLOOD PRESSURE: 58 MMHG | RESPIRATION RATE: 18 BRPM | HEIGHT: 65 IN | TEMPERATURE: 98 F | OXYGEN SATURATION: 93 %

## 2022-11-11 LAB
ANION GAP SERPL CALC-SCNC: 9 MMOL/L (ref 8–16)
BASOPHILS # BLD AUTO: 0.03 K/UL (ref 0–0.2)
BASOPHILS NFR BLD: 0.4 % (ref 0–1.9)
BUN SERPL-MCNC: 14 MG/DL (ref 8–23)
CALCIUM SERPL-MCNC: 8.3 MG/DL (ref 8.7–10.5)
CHLORIDE SERPL-SCNC: 105 MMOL/L (ref 95–110)
CO2 SERPL-SCNC: 22 MMOL/L (ref 23–29)
CREAT SERPL-MCNC: 0.9 MG/DL (ref 0.5–1.4)
DIFFERENTIAL METHOD: ABNORMAL
EOSINOPHIL # BLD AUTO: 0.4 K/UL (ref 0–0.5)
EOSINOPHIL NFR BLD: 5 % (ref 0–8)
ERYTHROCYTE [DISTWIDTH] IN BLOOD BY AUTOMATED COUNT: 13.2 % (ref 11.5–14.5)
EST. GFR  (NO RACE VARIABLE): >60 ML/MIN/1.73 M^2
GLUCOSE SERPL-MCNC: 217 MG/DL (ref 70–110)
HCT VFR BLD AUTO: 31.1 % (ref 37–48.5)
HGB BLD-MCNC: 10.3 G/DL (ref 12–16)
IMM GRANULOCYTES # BLD AUTO: 0.03 K/UL (ref 0–0.04)
IMM GRANULOCYTES NFR BLD AUTO: 0.4 % (ref 0–0.5)
LYMPHOCYTES # BLD AUTO: 1.1 K/UL (ref 1–4.8)
LYMPHOCYTES NFR BLD: 13.3 % (ref 18–48)
MCH RBC QN AUTO: 29.9 PG (ref 27–31)
MCHC RBC AUTO-ENTMCNC: 33.1 G/DL (ref 32–36)
MCV RBC AUTO: 90 FL (ref 82–98)
MONOCYTES # BLD AUTO: 0.7 K/UL (ref 0.3–1)
MONOCYTES NFR BLD: 8.2 % (ref 4–15)
NEUTROPHILS # BLD AUTO: 6.1 K/UL (ref 1.8–7.7)
NEUTROPHILS NFR BLD: 72.7 % (ref 38–73)
NRBC BLD-RTO: 0 /100 WBC
PLATELET # BLD AUTO: 254 K/UL (ref 150–450)
PMV BLD AUTO: 10.2 FL (ref 9.2–12.9)
POTASSIUM SERPL-SCNC: 3.7 MMOL/L (ref 3.5–5.1)
RBC # BLD AUTO: 3.44 M/UL (ref 4–5.4)
SODIUM SERPL-SCNC: 136 MMOL/L (ref 136–145)
WBC # BLD AUTO: 8.42 K/UL (ref 3.9–12.7)

## 2022-11-11 PROCEDURE — 63600175 PHARM REV CODE 636 W HCPCS: Performed by: PHYSICIAN ASSISTANT

## 2022-11-11 PROCEDURE — 80048 BASIC METABOLIC PNL TOTAL CA: CPT | Performed by: PHYSICIAN ASSISTANT

## 2022-11-11 PROCEDURE — 25000003 PHARM REV CODE 250: Performed by: PHYSICIAN ASSISTANT

## 2022-11-11 PROCEDURE — 97116 GAIT TRAINING THERAPY: CPT | Mod: CQ

## 2022-11-11 PROCEDURE — 85025 COMPLETE CBC W/AUTO DIFF WBC: CPT | Performed by: PHYSICIAN ASSISTANT

## 2022-11-11 PROCEDURE — 36415 COLL VENOUS BLD VENIPUNCTURE: CPT | Performed by: PHYSICIAN ASSISTANT

## 2022-11-11 PROCEDURE — 97530 THERAPEUTIC ACTIVITIES: CPT | Mod: CQ

## 2022-11-11 RX ADMIN — LEVOTHYROXINE SODIUM 175 MCG: 0.03 TABLET ORAL at 05:11

## 2022-11-11 RX ADMIN — INSULIN ASPART 4 UNITS: 100 INJECTION, SOLUTION INTRAVENOUS; SUBCUTANEOUS at 07:11

## 2022-11-11 RX ADMIN — PANTOPRAZOLE SODIUM 40 MG: 40 TABLET, DELAYED RELEASE ORAL at 08:11

## 2022-11-11 RX ADMIN — OXYCODONE HYDROCHLORIDE 10 MG: 10 TABLET ORAL at 05:11

## 2022-11-11 RX ADMIN — BISACODYL 10 MG: 10 SUPPOSITORY RECTAL at 08:11

## 2022-11-11 RX ADMIN — MUPIROCIN 1 G: 20 OINTMENT TOPICAL at 08:11

## 2022-11-11 RX ADMIN — AZELASTINE HYDROCHLORIDE 137 MCG: 137 SPRAY, METERED NASAL at 08:11

## 2022-11-11 RX ADMIN — VITAM B12 100 MCG: 100 TAB at 09:11

## 2022-11-11 RX ADMIN — DOCUSATE SODIUM 100 MG: 100 CAPSULE, LIQUID FILLED ORAL at 08:11

## 2022-11-11 RX ADMIN — ONDANSETRON 8 MG: 2 INJECTION INTRAMUSCULAR; INTRAVENOUS at 09:11

## 2022-11-11 RX ADMIN — PREGABALIN 150 MG: 75 CAPSULE ORAL at 08:11

## 2022-11-11 NOTE — PLAN OF CARE
Plan of care reviewed with patient, verbalized understanding. Worked with PT. Up in chair throughout shift. Pain controlled with oral prn medication. Voided x 3 , 2 measured occurrences and 1 unmeasured. Call light within reach. Family at bedside.

## 2022-11-11 NOTE — PLAN OF CARE
Problem: Physical Therapy  Goal: Physical Therapy Goal  Description: Goals to be met by: 22     Patient will increase functional independence with mobility by performin. Supine to sit with [place]  2. Sit to supine with [place]  3. Sit to stand transfer with Supervision  4. Bed to chair transfer with Supervision using Rolling Walker  5. Gait  x 250 feet with Supervision using Rolling Walker.     Outcome: Ongoing, Progressing   Ambulate with rw and assistance for safety.

## 2022-11-11 NOTE — PROGRESS NOTES
[facility]  Orthopedics  Progress Note    Patient Name: Edie Hernandez  MRN: [id number]  Admission Date: 11/8/2022  Hospital Length of Stay: 3 days  Attending Provider: Jody Fernandez MD  Primary Care Provider: Lydia Eugene MD  Follow-up For: Procedure(s) (LRB):  FUSION, SPINE, LUMBAR, ALIF L3-4, L4-5 (N/A)  FUSION,SPINE,LUMBAR,POSTERIOR APPROACH L3-4, L4-5 (N/A)    Post-Operative Day: 3 Days Post-Op  Subjective:     Principal Problem:Lumbar stenosis with neurogenic claudication    Principal Orthopedic Problem: S/P APLIF    Interval History: improving    Review of patient's allergies indicates:   Allergen Reactions    Codeine Nausea And Vomiting     Other reaction(s): Nausea, dizziness       Current Facility-Administered Medications   Medication    acetaminophen tablet 650 mg    aluminum-magnesium hydroxide-simethicone 200-200-20 mg/5 mL suspension 30 mL    azelastine 137 mcg (0.1 %) nasal spray 137 mcg    bisacodyL suppository 10 mg    clobetasoL 0.05 % cream 1 application    cyanocobalamin tablet 100 mcg    cyclobenzaprine tablet 10 mg    dextrose 10% bolus 125 mL    dextrose 10% bolus 250 mL    diphenhydrAMINE capsule 50 mg    docusate sodium capsule 100 mg    ergocalciferol capsule 50,000 Units    fluticasone propionate 50 mcg/actuation nasal spray 50 mcg    furosemide tablet 20 mg    glucagon (human recombinant) injection 1 mg    glucose chewable tablet 16 g    glucose chewable tablet 24 g    HYDROmorphone (PF) injection 2 mg    insulin aspart U-100 pen 1-10 Units    lactated ringers infusion    levothyroxine tablet 175 mcg    losartan tablet 50 mg    melatonin tablet 9 mg    mupirocin 2 % ointment 1 g    ondansetron injection 8 mg    oxyCODONE immediate release tablet 5 mg    oxyCODONE immediate release tablet Tab 10 mg    pantoprazole EC tablet 40 mg    pregabalin capsule 150 mg    prochlorperazine injection Soln 5 mg    senna-docusate 8.6-50 mg per tablet 2  "tablet     Facility-Administered Medications Ordered in Other Encounters   Medication    lactated ringers infusion     Objective:     Vital Signs (Most Recent):  Temp: 98 °F (36.7 °C) (11/11/22 0736)  Pulse: 91 (11/11/22 0736)  Resp: 18 (11/11/22 0736)  BP: (!) 105/58 (11/11/22 0736)  SpO2: (!) 93 % (11/11/22 0736)   Vital Signs (24h Range):  Temp:  [97.7 °F (36.5 °C)-99 °F (37.2 °C)] 98 °F (36.7 °C)  Pulse:  [] 91  Resp:  [14-20] 18  SpO2:  [90 %-96 %] 93 %  BP: (105-129)/(52-58) 105/58     Weight: 118.7 kg (261 lb 11 oz)  Height: 5' 5" (165.1 cm)  Body mass index is 43.55 kg/m².      Intake/Output Summary (Last 24 hours) at 11/11/2022 0738  Last data filed at 11/11/2022 0731  Gross per 24 hour   Intake 1030 ml   Output 1375 ml   Net -345 ml       General    Nursing note and vitals reviewed.  Constitutional: She is oriented to person, place, and time.   Obese   Pulmonary/Chest: Effort normal.   Abdominal: Soft. Bowel sounds are normal.   + flatus   Neurological: She is alert and oriented to person, place, and time.   Psychiatric: She has a normal mood and affect. Her behavior is normal.         Back (L-Spine & T-Spine) / Neck (C-Spine) Exam     Comments:  Dressings have been changed on both incisions. There are some macerated areas due to the adhesives of tape and dressings. BLE DNVI. Bed mobility improved.      Significant Labs: CBC:   Recent Labs   Lab 11/10/22  0346 11/11/22  0401   WBC 8.79 8.42   HGB 10.6* 10.3*   HCT 32.1* 31.1*    254     CMP:   Recent Labs   Lab 11/09/22  1217 11/10/22  0346 11/11/22  0400   * 136 136   K 4.5 4.3 3.7    104 105   CO2 17* 24 22*   * 227* 217*   BUN 22 18 14   CREATININE 1.6* 1.2 0.9   CALCIUM 8.1* 7.8* 8.3*   ANIONGAP 13 8 9     All pertinent labs within the past 24 hours have been reviewed.    Significant Imaging: None    Assessment/Plan:     * Lumbar stenosis with neurogenic claudication  Cont OOB activity with PT and nursing  I expect her " to be DC'd home today with WILFRID PRINGLE  Orthopedics  [facility]

## 2022-11-11 NOTE — PLAN OF CARE
Problem: Adult Inpatient Plan of Care  Goal: Plan of Care Review  Outcome: Met  Goal: Patient-Specific Goal (Individualized)  Outcome: Met  Goal: Absence of Hospital-Acquired Illness or Injury  Outcome: Met  Goal: Optimal Comfort and Wellbeing  Outcome: Met  Goal: Readiness for Transition of Care  Outcome: Met     Problem: Diabetes Comorbidity  Goal: Blood Glucose Level Within Targeted Range  Outcome: Met     Problem: Bariatric Environmental Safety  Goal: Safety Maintained with Care  Outcome: Met     Problem: Infection  Goal: Absence of Infection Signs and Symptoms  Outcome: Met     Problem: Fluid and Electrolyte Imbalance (Acute Kidney Injury/Impairment)  Goal: Fluid and Electrolyte Balance  Outcome: Met     Problem: Oral Intake Inadequate (Acute Kidney Injury/Impairment)  Goal: Optimal Nutrition Intake  Outcome: Met     Problem: Renal Function Impairment (Acute Kidney Injury/Impairment)  Goal: Effective Renal Function  Outcome: Met     Problem: Fall Injury Risk  Goal: Absence of Fall and Fall-Related Injury  Outcome: Met   Patient adequate for discharge.

## 2022-11-11 NOTE — PLAN OF CARE
Pt accepted with [facility]     Ortho follow up scheduled and on AVS    Pt clear for DC from CM standpoint. Discharging to home       11/11/22 0855   Final Note   Assessment Type Final Discharge Note   Anticipated Discharge Disposition Home-Health

## 2022-11-11 NOTE — DISCHARGE INSTRUCTIONS
Discharge Instructions, [facility] Medicine    Follow up with Dr. Godinez in 2 weeks for post op follow up. Continue to use Incentive spirometer throughout the day.     Thank you for choosing [facility] for your medical care. The primary doctor who is taking care of you at the time of your discharge is Jody Fernandez MD.     You were admitted to the hospital with Lumbar stenosis with neurogenic claudication.     Please note your discharge instructions, including diet/activity restrictions, follow-up appointments, and medication changes.  If you have any questions about your medical issues, prescriptions, or any other questions, please feel free to contact the [facility] Medicine Dept at [phone] and we will help.    If you are previously with Home health, outpatient PT/OT or under a therapy program, you are cleared to return to those programs.    Please direct all long term medication refills and follow up to your primary care provider, Lydia Eugene MD. Thank you again for letting us take care of your health care needs.    Please note the following discharge instructions per your discharging physician-  Jeff Erickson PA-C

## 2022-11-11 NOTE — SUBJECTIVE & OBJECTIVE
Principal Problem:Lumbar stenosis with neurogenic claudication    Principal Orthopedic Problem: S/P APLIF    Interval History: improving    Review of patient's allergies indicates:   Allergen Reactions    Codeine Nausea And Vomiting     Other reaction(s): Nausea, dizziness       Current Facility-Administered Medications   Medication    acetaminophen tablet 650 mg    aluminum-magnesium hydroxide-simethicone 200-200-20 mg/5 mL suspension 30 mL    azelastine 137 mcg (0.1 %) nasal spray 137 mcg    bisacodyL suppository 10 mg    clobetasoL 0.05 % cream 1 application    cyanocobalamin tablet 100 mcg    cyclobenzaprine tablet 10 mg    dextrose 10% bolus 125 mL    dextrose 10% bolus 250 mL    diphenhydrAMINE capsule 50 mg    docusate sodium capsule 100 mg    ergocalciferol capsule 50,000 Units    fluticasone propionate 50 mcg/actuation nasal spray 50 mcg    furosemide tablet 20 mg    glucagon (human recombinant) injection 1 mg    glucose chewable tablet 16 g    glucose chewable tablet 24 g    HYDROmorphone (PF) injection 2 mg    insulin aspart U-100 pen 1-10 Units    lactated ringers infusion    levothyroxine tablet 175 mcg    losartan tablet 50 mg    melatonin tablet 9 mg    mupirocin 2 % ointment 1 g    ondansetron injection 8 mg    oxyCODONE immediate release tablet 5 mg    oxyCODONE immediate release tablet Tab 10 mg    pantoprazole EC tablet 40 mg    pregabalin capsule 150 mg    prochlorperazine injection Soln 5 mg    senna-docusate 8.6-50 mg per tablet 2 tablet     Facility-Administered Medications Ordered in Other Encounters   Medication    lactated ringers infusion     Objective:     Vital Signs (Most Recent):  Temp: 98 °F (36.7 °C) (11/11/22 0736)  Pulse: 91 (11/11/22 0736)  Resp: 18 (11/11/22 0736)  BP: (!) 105/58 (11/11/22 0736)  SpO2: (!) 93 % (11/11/22 0736)   Vital Signs (24h Range):  Temp:  [97.7 °F (36.5 °C)-99 °F (37.2 °C)] 98 °F (36.7 °C)  Pulse:  [] 91  Resp:  [14-20] 18  SpO2:  [90 %-96 %] 93  "%  BP: (105-129)/(52-58) 105/58     Weight: 118.7 kg (261 lb 11 oz)  Height: 5' 5" (165.1 cm)  Body mass index is 43.55 kg/m².      Intake/Output Summary (Last 24 hours) at 11/11/2022 0738  Last data filed at 11/11/2022 0731  Gross per 24 hour   Intake 1030 ml   Output 1375 ml   Net -345 ml       General    Nursing note and vitals reviewed.  Constitutional: She is oriented to person, place, and time.   Obese   Pulmonary/Chest: Effort normal.   Abdominal: Soft. Bowel sounds are normal.   + flatus   Neurological: She is alert and oriented to person, place, and time.   Psychiatric: She has a normal mood and affect. Her behavior is normal.         Back (L-Spine & T-Spine) / Neck (C-Spine) Exam     Comments:  Dressings have been changed on both incisions. There are some macerated areas due to the adhesives of tape and dressings. BLE DNVI. Bed mobility improved.      Significant Labs: CBC:   Recent Labs   Lab 11/10/22  0346 11/11/22  0401   WBC 8.79 8.42   HGB 10.6* 10.3*   HCT 32.1* 31.1*    254     CMP:   Recent Labs   Lab 11/09/22  1217 11/10/22  0346 11/11/22  0400   * 136 136   K 4.5 4.3 3.7    104 105   CO2 17* 24 22*   * 227* 217*   BUN 22 18 14   CREATININE 1.6* 1.2 0.9   CALCIUM 8.1* 7.8* 8.3*   ANIONGAP 13 8 9     All pertinent labs within the past 24 hours have been reviewed.    Significant Imaging: None  "

## 2022-11-11 NOTE — PT/OT/SLP PROGRESS
Physical Therapy Treatment    Patient Name:  Edie Hernandez   MRN:  [id number]    Recommendations:     Discharge Recommendations:  home, home health PT   Discharge Equipment Recommendations: none   Barriers to discharge: None    Assessment:     Edie Hernandez is a 67 y.o. female admitted with a medical diagnosis of Lumbar stenosis with neurogenic claudication.  She presents with the following impairments/functional limitations:  impaired self care skills, impaired functional mobility, gait instability, decreased lower extremity function, pain, orthopedic precautions . Awake, alert, supine in bed. Agreed to participate in therapy , anticipating discharge. TLSO applied in sitting. Ambulated 80' with rw and Min > CGA.( Taken to stairs in w/c),  Ascended / descended 5 steps x 2 trials with CGA using B handrails. Ambulated 150' additional.  Mobility much improved and patient reports pain and LLE weakness not as bad as yesterday. Reviewed spinal precautions with patient.     Rehab Prognosis: Good; patient would benefit from acute skilled PT services to address these deficits and reach maximum level of function.    Recent Surgery: Procedure(s) (LRB):  FUSION, SPINE, LUMBAR, ALIF L3-4, L4-5 (N/A)  FUSION,SPINE,LUMBAR,POSTERIOR APPROACH L3-4, L4-5 (N/A) 3 Days Post-Op    Plan:     During this hospitalization, patient to be seen BID to address the identified rehab impairments via gait training, therapeutic activities, therapeutic exercises and progress toward the following goals:    Plan of Care Expires:  11/23/22    Subjective     Chief Complaint: back discomfort and UE fatigue with ambulation.   Patient/Family Comments/goals: to return home.  Pain/Comfort:  Pain Rating 1: other (see comments) (did not rate)  Location - Orientation 1: generalized  Location 1: back  Pain Addressed 1: Pre-medicate for activity, Reposition, Nurse notified      Objective:     Communicated with nurse Nuno prior to session.  Patient found  supine with bed alarm upon PT entry to room.     General Precautions: Standard, fall   Orthopedic Precautions:spinal precautions   Braces: TLSO  Respiratory Status: Room air     Functional Mobility:  Bed Mobility:     Rolling Left:  contact guard assistance  Supine to Sit: minimum assistance  Transfers:     Sit to Stand:  minimum assistance with rolling walker  Gait: 80' + 150' with rw and Min > CGA, with TLSO in place.       AM-PAC 6 CLICK MOBILITY          Treatment & Education:  Transferred EOB with Min A. TLSO applied.  Ambulated with rw and Min > CGA.  Up / down 5 steps x 2 trials with CGA.  Ambulated in hallway with rw and CGA.        Patient left up in chair with all lines intact, call button in reach, chair alarm on, and nurse Yaakov notified..    GOALS:   Multidisciplinary Problems       Physical Therapy Goals          Problem: Physical Therapy    Goal Priority Disciplines Outcome Goal Variances Interventions   Physical Therapy Goal     PT, PT/OT Ongoing, Progressing     Description: Goals to be met by: 22     Patient will increase functional independence with mobility by performin. Supine to sit with [place]  2. Sit to supine with [place]  3. Sit to stand transfer with Supervision  4. Bed to chair transfer with Supervision using Rolling Walker  5. Gait  x 250 feet with Supervision using Rolling Walker.                          Time Tracking:     PT Received On: 22  PT Start Time: 835     PT Stop Time: 913  PT Total Time (min): 38 min     Billable Minutes: Gait Training 23min and Therapeutic Activity 15min    Treatment Type: Treatment  PT/PTA: PTA     PTA Visit Number: 2     2022

## 2022-11-11 NOTE — CARE UPDATE
11/10/22 1941   Patient Assessment/Suction   Level of Consciousness (AVPU) alert   Respiratory Effort Normal;Unlabored   Expansion/Accessory Muscles/Retractions expansion symmetric;no retractions;no use of accessory muscles   PRE-TX-O2   O2 Device (Oxygen Therapy) room air   SpO2 (!) 93 %   Pulse Oximetry Type Intermittent   Pulse 92   Resp 18   Incentive Spirometer   $ Incentive Spirometer Charges done with encouragement   Administration (IS) proper technique demonstrated   Number of Repetitions (IS) 10   Level Incentive Spirometer (mL) 1500   Patient Tolerance (IS) good

## 2022-11-11 NOTE — DISCHARGE SUMMARY
[facility] Medicine  Discharge Summary      Patient Name: Edie Hernandez  MRN: [id number]  DASH: [id number]  Patient Class: IP- Inpatient  Admission Date: 11/8/2022  Hospital Length of Stay: 3 days  Discharge Date and Time: 11/11/2022 12:09 PM  Attending Physician: Jody Fernandez M.D.  Discharging Provider: Jeff Erickson PA-C  Primary Care Provider: Lydia Eugene MD    Primary Care Team: Networked reference to record PCT     HPI:   Edie Hernandez is a 67 y.o. y.o. female with a PMHx of arthritis, T2DM, GERD, CLINT on CPAP, and hypothyroidism who is seen s/p lumbar spinal fusion and ALIF L3-L4, L4-L5 with Dr. Godinez on 11/8. Patient was seen by Dr. Godinez in clinic on 10/17 for chronic back pain which failed conservative management and planned for surgery. Post-operatively, patient is sedated from anesthesia. She reports back pain. Preoperative labs were reviewed. Hospital Medicine was consulted for further workup and management of the patient.             Procedure(s) (LRB):  FUSION, SPINE, LUMBAR, ALIF L3-4, L4-5 (N/A)  FUSION,SPINE,LUMBAR,POSTERIOR APPROACH L3-4, L4-5 (N/A)      Hospital Course:   The patient was admitted after undergoing lumbar spinal fusion and ALIF L3-L4, L4-L5 with Dr. Godinez on 11/8.  Patient was monitored closely throughout her stay.  Orthopedic surgery was consulted.  PT/OT worked with the patient postoperatively who recommended home health.  Patient's pain was well controlled with p.r.n. pain medications per surgeon. On a.m. labs, patient was noted to have increase in her creatinine at 1.5.  Fluids and repeat BMP were ordered. Noted to have urinary retention following abernathy removal. Improved following IVFs. Spontaneously voiding on day of discharge. Creatinine back to baseline on day of discharge. Patient to be discharged home with home health and prn pain medication. Patient scheduled for orthopedic follow up. Patient verbalized understanding of discharge  instructions and return precautions.     Physical Exam:  General- Patient alert and oriented x3 in NAD  HEENT- PERRLA, EOMI  CV- Regular rate and rhythm, No Murmur/lorenzo/rubs  Resp- Lungs CTA Bilaterally, No increased WOB  GI- Non tender/non-distended, BS normoactive x4 quads, no HSM  Extrem- No cyanosis, clubbing, edema. Pulses 2+ and symmetric. Lumbar dressings CDI  Neuro- Strength 5/5 flexors/extensors, DTRs 2+ and symmetric, Intact sensation to light touch grossly           Goals of Care Treatment Preferences:  Code Status: Full Code      Consults:   Consults (From admission, onward)        Status Ordering Provider     Inpatient consult to Social Work  Once        Provider:  (Not yet assigned)    SANDI Carrillo new Assessment & Plan notes have been filed under this hospital service since the last note was generated.  Service: Hospital Medicine    Final Active Diagnoses:    Diagnosis Date Noted POA    PRINCIPAL PROBLEM:  Lumbar stenosis with neurogenic claudication [M48.062] 11/08/2022 Yes    Urinary retention [R33.9] 11/10/2022 Yes    JAENTTE (acute kidney injury) [N17.9] 11/09/2022 No    Type 2 diabetes mellitus [E11.9] 10/04/2019 Yes    Severe obesity (BMI >= 40) [E66.01] 12/24/2013 Yes    Hypothyroid [E03.9] 05/31/2012 Yes    CLINT (obstructive sleep apnea) [G47.33]  Yes    GERD (gastroesophageal reflux disease) [K21.9] 01/11/2012 Yes      Problems Resolved During this Admission:       Discharged Condition: fair    Disposition: Home-Health Care [facility]    Follow Up:   Follow-up Information     [facility] Follow up.    Specialties: Home Health Services, Home Therapy Services, Home Living Aide Services  Why: Home Health  Contact information:  [address] [place] [id number]  [phone]           Everton Godinez MD. Go to.    Specialties: Orthopedic Surgery, Surgery  Why: APPOINTMENT:  November 21, 2022 at 9:30am  Post Op Followup  Contact  information:  [address]  [address]  [facility] [address]  [phone]                       Patient Instructions:      Diet diabetic     Diet diabetic     Diet Cardiac     Other restrictions (specify):   Order Comments: Follow surgical recommendations     Notify your health care provider if you experience any of the following:  temperature >100.4     Notify your health care provider if you experience any of the following:  persistent nausea and vomiting or diarrhea     Notify your health care provider if you experience any of the following:  severe uncontrolled pain     Notify your health care provider if you experience any of the following:  redness, tenderness, or signs of infection (pain, swelling, redness, odor or green/yellow discharge around incision site)     Notify your health care provider if you experience any of the following:  difficulty breathing or increased cough     Notify your health care provider if you experience any of the following:  increased confusion or weakness     Notify your health care provider if you experience any of the following:  temperature >100.4     Notify your health care provider if you experience any of the following:  persistent nausea and vomiting or diarrhea     Notify your health care provider if you experience any of the following:  severe uncontrolled pain     Notify your health care provider if you experience any of the following:  redness, tenderness, or signs of infection (pain, swelling, redness, odor or green/yellow discharge around incision site)     Notify your health care provider if you experience any of the following:  difficulty breathing or increased cough     Notify your health care provider if you experience any of the following:  severe persistent headache     Notify your health care provider if you experience any of the following:  persistent dizziness, light-headedness, or visual disturbances     Notify your health care provider if you experience any  of the following:  increased confusion or weakness     Activity as tolerated       Significant Diagnostic Studies: Labs:   CMP   Recent Labs   Lab 11/10/22  0346 11/11/22  0400    136   K 4.3 3.7    105   CO2 24 22*   * 217*   BUN 18 14   CREATININE 1.2 0.9   CALCIUM 7.8* 8.3*   ANIONGAP 8 9    and CBC   Recent Labs   Lab 11/10/22  0346 11/11/22  0401   WBC 8.79 8.42   HGB 10.6* 10.3*   HCT 32.1* 31.1*    254       Pending Diagnostic Studies:     Procedure Component Value Units Date/Time    Specimen to Pathology, Surgery Other (SPINE) [[id number]] Collected: 11/08/22 1310    Order Status: Sent Lab Status: In process Updated: 11/08/22 1504    Specimen: Tissue          Medications:  Reconciled Home Medications:      Medication List      START taking these medications    oxyCODONE-acetaminophen  mg per tablet  Commonly known as: PERCOCET  Take 1 tablet by mouth every 4 (four) hours as needed for Pain.        CHANGE how you take these medications    losartan 50 MG tablet  Commonly known as: COZAAR  TAKE ONE TABLET (50 MG TOTAL) BY MOUTH ONCE DAILY  What changed: See the new instructions.     pregabalin 150 MG capsule  Commonly known as: LYRICA  TAKE ONE CAPSULE BY MOUTH TWICE DAILY  What changed:   · how much to take  · how to take this        CONTINUE taking these medications    blood-glucose meter kit  Use as instructed     clobetasoL 0.05 % cream  Commonly known as: TEMOVATE  Apply 1 application topically 2 (two) times daily. Apply to affected area     cyclobenzaprine 5 MG tablet  Commonly known as: FLEXERIL  TAKE 1 TO 2 TABLETS BY MOUTH NIGHTLY AT BEDTIME AS NEEDED FOR PAIN     diclofenac sodium 1 % Gel  Commonly known as: VOLTAREN  Apply 2 g topically 4 (four) times daily.     ergocalciferol 50,000 unit Cap  Commonly known as: ERGOCALCIFEROL  TAKE 1 CAPSULE BY MOUTH ONE TIME PER WEEK     estradioL 0.01 % (0.1 mg/gram) vaginal cream  Commonly known as: ESTRACE  Apply 1 fingertipful to  vaginal area nightly x 2 weeks then use on MWF     fluticasone propionate 50 mcg/actuation nasal spray  Commonly known as: FLONASE  1 spray (50 mcg total) by Each Nostril route daily as needed for Allergies.     furosemide 20 MG tablet  Commonly known as: LASIX  Take 1 tablet (20 mg total) by mouth daily as needed (leg swelling).     lancets Misc  Commonly known as: ACCU-CHEK MULTICLIX LANCET  Test 2 x day     levothyroxine 175 MCG tablet  Commonly known as: SYNTHROID, LEVOTHROID  TAKE ONE TABLET BY MOUTH EVERY DAY     melatonin  Commonly known as: MELATIN  Take by mouth every evening.     metFORMIN 500 MG ER 24hr tablet  Commonly known as: GLUCOPHAGE-XR  TAKE TWO TABLETS BY MOUTH TWICE DAILY WITH MEALS     * methylphenidate HCl 40 MG 24 hr capsule  Commonly known as: RITALIN LA  Take 40 mg by mouth daily as needed. Not on at this time     * methylphenidate HCl 10 MG tablet  Commonly known as: RITALIN     ONETOUCH ULTRA BLUE TEST STRIP Strp  Generic drug: blood sugar diagnostic  USE TO TEST BLOOD SUGAR     oxybutynin 15 MG Tr24  Commonly known as: DITROPAN XL  Take 1 tablet (15 mg total) by mouth once daily.     pantoprazole 40 MG tablet  Commonly known as: PROTONIX  Take 1 tablet (40 mg total) by mouth once daily.     VITAMIN B-12 100 MCG tablet  Generic drug: cyanocobalamin  Take 100 mcg by mouth once daily.         * This list has 2 medication(s) that are the same as other medications prescribed for you. Read the directions carefully, and ask your doctor or other care provider to review them with you.            STOP taking these medications    HYDROcodone-acetaminophen 7.5-325 mg per tablet  Commonly known as: NORCO     indomethacin 25 MG capsule  Commonly known as: INDOCIN        ASK your doctor about these medications    azelastine 137 mcg (0.1 %) nasal spray  Commonly known as: ASTELIN  1 spray (137 mcg total) by Nasal route 2 (two) times daily.            Indwelling Lines/Drains at time of discharge:    Lines/Drains/Airways     None                 Time spent on the discharge of patient: 42 minutes         Jeff Erickson PA-C  Department of Hospital Medicine  [facility]

## 2022-11-11 NOTE — PLAN OF CARE
POC discussed with pt, pt verbalized understanding. Oriented x4. PIV cdi. Pt voided throughout the night. Blood glucose monitored, insulin given per orders. Dressing to back cdi and side cdi. Pulses 2+, pt denies numbness and tingling. Complaints of pain to back controlled with prn's. Ambulated to the restroom with walker and stand by assist, much improved from previous night. Educated on the importance of ambulation, incentive spirometer use, and coughing/deep breathing post op. Call light in reach, bed alarm set, safety maintained. No complaints or requests at this time, will continue to monitor.

## 2022-11-12 LAB — POCT GLUCOSE: 201 MG/DL (ref 70–110)

## 2022-11-12 PROCEDURE — G0180 MD CERTIFICATION HHA PATIENT: HCPCS | Mod: ,,, | Performed by: ORTHOPAEDIC SURGERY

## 2022-11-12 PROCEDURE — G0180 PR HOME HEALTH MD CERTIFICATION: ICD-10-PCS | Mod: ,,, | Performed by: ORTHOPAEDIC SURGERY

## 2022-11-13 ENCOUNTER — TELEPHONE (OUTPATIENT)
Dept: ADMINISTRATIVE | Facility: CLINIC | Age: 67
End: 2022-11-13
Payer: MEDICARE

## 2022-11-13 NOTE — TELEPHONE ENCOUNTER
Pt is calling d/t being unable to find a pharmacy to fill her pain meds. I called [facility] per pt request on  and spoke with pharmacy employee who states that she has in stock. Pt called back and notified.

## 2022-11-14 ENCOUNTER — TELEPHONE (OUTPATIENT)
Dept: MEDSURG UNIT | Facility: HOSPITAL | Age: 67
End: 2022-11-14
Payer: MEDICARE

## 2022-11-15 LAB
FINAL PATHOLOGIC DIAGNOSIS: NORMAL
GROSS: NORMAL
Lab: NORMAL

## 2022-11-17 ENCOUNTER — PATIENT OUTREACH (OUTPATIENT)
Dept: ADMINISTRATIVE | Facility: CLINIC | Age: 67
End: 2022-11-17
Payer: MEDICARE

## 2022-11-17 NOTE — TELEPHONE ENCOUNTER
oxyCODONE-acetaminophen  mg per tablet  Commonly known as: PERCOCET  Take 1 tablet by mouth every 4 (four) hours as needed for Pain

## 2022-11-17 NOTE — PROGRESS NOTES
C3 nurse spoke with Edie Hernandez for a TCC post hospital discharge follow up call. The patient has a scheduled HOSFU appointment with Anand Gary NP on 11/22/22 @ 9056.

## 2022-11-20 ENCOUNTER — TELEPHONE (OUTPATIENT)
Dept: ADMINISTRATIVE | Facility: CLINIC | Age: 67
End: 2022-11-20
Payer: MEDICARE

## 2022-11-21 ENCOUNTER — OFFICE VISIT (OUTPATIENT)
Dept: ORTHOPEDICS | Facility: CLINIC | Age: 67
End: 2022-11-21
Payer: MEDICARE

## 2022-11-21 VITALS — BODY MASS INDEX: 43.49 KG/M2 | WEIGHT: 261 LBS | HEIGHT: 65 IN

## 2022-11-21 DIAGNOSIS — M75.42 ROTATOR CUFF IMPINGEMENT SYNDROME OF LEFT SHOULDER: ICD-10-CM

## 2022-11-21 DIAGNOSIS — M19.012 PRIMARY OSTEOARTHRITIS OF LEFT SHOULDER: ICD-10-CM

## 2022-11-21 DIAGNOSIS — Z98.1 S/P LUMBAR FUSION: Primary | ICD-10-CM

## 2022-11-21 PROCEDURE — 99024 POSTOP FOLLOW-UP VISIT: CPT | Mod: 25,S$GLB,, | Performed by: ORTHOPAEDIC SURGERY

## 2022-11-21 PROCEDURE — 4010F ACE/ARB THERAPY RXD/TAKEN: CPT | Mod: CPTII,S$GLB,, | Performed by: ORTHOPAEDIC SURGERY

## 2022-11-21 PROCEDURE — 3288F PR FALLS RISK ASSESSMENT DOCUMENTED: ICD-10-PCS | Mod: CPTII,S$GLB,, | Performed by: ORTHOPAEDIC SURGERY

## 2022-11-21 PROCEDURE — 1125F PR PAIN SEVERITY QUANTIFIED, PAIN PRESENT: ICD-10-PCS | Mod: CPTII,S$GLB,, | Performed by: ORTHOPAEDIC SURGERY

## 2022-11-21 PROCEDURE — 3051F HG A1C>EQUAL 7.0%<8.0%: CPT | Mod: CPTII,S$GLB,, | Performed by: ORTHOPAEDIC SURGERY

## 2022-11-21 PROCEDURE — 20610 DRAIN/INJ JOINT/BURSA W/O US: CPT | Mod: 79,LT,S$GLB, | Performed by: ORTHOPAEDIC SURGERY

## 2022-11-21 PROCEDURE — 1101F PT FALLS ASSESS-DOCD LE1/YR: CPT | Mod: CPTII,S$GLB,, | Performed by: ORTHOPAEDIC SURGERY

## 2022-11-21 PROCEDURE — 3051F PR MOST RECENT HEMOGLOBIN A1C LEVEL 7.0 - < 8.0%: ICD-10-PCS | Mod: CPTII,S$GLB,, | Performed by: ORTHOPAEDIC SURGERY

## 2022-11-21 PROCEDURE — 3288F FALL RISK ASSESSMENT DOCD: CPT | Mod: CPTII,S$GLB,, | Performed by: ORTHOPAEDIC SURGERY

## 2022-11-21 PROCEDURE — 1159F PR MEDICATION LIST DOCUMENTED IN MEDICAL RECORD: ICD-10-PCS | Mod: CPTII,S$GLB,, | Performed by: ORTHOPAEDIC SURGERY

## 2022-11-21 PROCEDURE — 99024 PR POST-OP FOLLOW-UP VISIT: ICD-10-PCS | Mod: 25,S$GLB,, | Performed by: ORTHOPAEDIC SURGERY

## 2022-11-21 PROCEDURE — 1160F RVW MEDS BY RX/DR IN RCRD: CPT | Mod: CPTII,S$GLB,, | Performed by: ORTHOPAEDIC SURGERY

## 2022-11-21 PROCEDURE — 3008F PR BODY MASS INDEX (BMI) DOCUMENTED: ICD-10-PCS | Mod: CPTII,S$GLB,, | Performed by: ORTHOPAEDIC SURGERY

## 2022-11-21 PROCEDURE — 3008F BODY MASS INDEX DOCD: CPT | Mod: CPTII,S$GLB,, | Performed by: ORTHOPAEDIC SURGERY

## 2022-11-21 PROCEDURE — 1101F PR PT FALLS ASSESS DOC 0-1 FALLS W/OUT INJ PAST YR: ICD-10-PCS | Mod: CPTII,S$GLB,, | Performed by: ORTHOPAEDIC SURGERY

## 2022-11-21 PROCEDURE — 1159F MED LIST DOCD IN RCRD: CPT | Mod: CPTII,S$GLB,, | Performed by: ORTHOPAEDIC SURGERY

## 2022-11-21 PROCEDURE — 1125F AMNT PAIN NOTED PAIN PRSNT: CPT | Mod: CPTII,S$GLB,, | Performed by: ORTHOPAEDIC SURGERY

## 2022-11-21 PROCEDURE — 20610 LARGE JOINT ASPIRATION/INJECTION: L SUBACROMIAL BURSA: ICD-10-PCS | Mod: 79,LT,S$GLB, | Performed by: ORTHOPAEDIC SURGERY

## 2022-11-21 PROCEDURE — 1160F PR REVIEW ALL MEDS BY PRESCRIBER/CLIN PHARMACIST DOCUMENTED: ICD-10-PCS | Mod: CPTII,S$GLB,, | Performed by: ORTHOPAEDIC SURGERY

## 2022-11-21 PROCEDURE — 4010F PR ACE/ARB THEARPY RXD/TAKEN: ICD-10-PCS | Mod: CPTII,S$GLB,, | Performed by: ORTHOPAEDIC SURGERY

## 2022-11-21 RX ORDER — HYDROCODONE BITARTRATE AND ACETAMINOPHEN 7.5; 325 MG/1; MG/1
TABLET ORAL
COMMUNITY
Start: 2022-11-08 | End: 2022-12-05

## 2022-11-21 RX ORDER — TRIAMCINOLONE ACETONIDE 40 MG/ML
40 INJECTION, SUSPENSION INTRA-ARTICULAR; INTRAMUSCULAR
Status: DISCONTINUED | OUTPATIENT
Start: 2022-11-21 | End: 2022-11-21 | Stop reason: HOSPADM

## 2022-11-21 RX ORDER — OXYCODONE AND ACETAMINOPHEN 10; 325 MG/1; MG/1
1 TABLET ORAL EVERY 6 HOURS PRN
Qty: 28 TABLET | Refills: 0 | Status: SHIPPED | OUTPATIENT
Start: 2022-11-21 | End: 2022-11-28

## 2022-11-21 RX ADMIN — TRIAMCINOLONE ACETONIDE 40 MG: 40 INJECTION, SUSPENSION INTRA-ARTICULAR; INTRAMUSCULAR at 09:11

## 2022-11-21 NOTE — PROCEDURES
Large Joint Aspiration/Injection: L subacromial bursa    Date/Time: 11/21/2022 9:30 AM  Performed by: Everton Godinez MD  Authorized by: Everton Godinez MD     Consent Done?:  Yes (Verbal)  Indications:  Pain and arthritis  Site marked: the procedure site was marked    Timeout: prior to procedure the correct patient, procedure, and site was verified    Prep: patient was prepped and draped in usual sterile fashion      Local anesthesia used?: Yes    Local anesthetic:  Lidocaine 1% without epinephrine    Details:  Needle Size:  25 G  Ultrasonic Guidance for needle placement?: No    Location:  Shoulder  Site:  L subacromial bursa  Medications:  40 mg triamcinolone acetonide 40 mg/mL  Patient tolerance:  Patient tolerated the procedure well with no immediate complications

## 2022-11-21 NOTE — PROGRESS NOTES
Subjective:    Patient ID: Edie Hernandez is a 67 y.o. female.    Chief Complaint: Post-op Evaluation of the Lumbar Spine (PO L3-4, L4-5 APLIF 11/08/22. Here for staple removal. States she is doing well. )      History of Present Illness    Prior to meeting with the patient I reviewed the medical chart in [facility]. This included reviewing the previous progress notes from our office, review of the patient's last appointment with their primary care provider, review of any visits to the emergency room, and review of any pain management appointments or procedures.  Patient is here for her initial postoperative appointment status post L3-4, L4-5 anterior lumbar interbody fusion with posterior decompression and posterolateral fusion using instrumentation.  Overall she is doing well.  Continues to have some postoperative low back pain but her functional mobility is improving.  Is also complaining of some left shoulder pain using the walk and ambulate.    Current Medications  Current Outpatient Medications   Medication Sig Dispense Refill    azelastine (ASTELIN) 137 mcg (0.1 %) nasal spray 1 spray (137 mcg total) by Nasal route 2 (two) times daily. (Patient taking differently: 1 spray by Nasal route 2 (two) times daily. PRN) 30 mL 11    clobetasol (TEMOVATE) 0.05 % cream Apply 1 application topically 2 (two) times daily. Apply to affected area 30 g 2    cyanocobalamin (VITAMIN B-12) 100 MCG tablet Take 100 mcg by mouth once daily.      cyclobenzaprine (FLEXERIL) 5 MG tablet TAKE 1 TO 2 TABLETS BY MOUTH NIGHTLY AT BEDTIME AS NEEDED FOR PAIN 90 tablet prn    diclofenac sodium (VOLTAREN) 1 % Gel Apply 2 g topically 4 (four) times daily. 450 g 0    ergocalciferol (ERGOCALCIFEROL) 50,000 unit Cap TAKE 1 CAPSULE BY MOUTH ONE TIME PER WEEK 12 capsule 1    estradioL (ESTRACE) 0.01 % (0.1 mg/gram) vaginal cream Apply 1 fingertipful to vaginal area nightly x 2 weeks then use on MWF 42.5 g 3    fluticasone propionate (FLONASE) 50  mcg/actuation nasal spray 1 spray (50 mcg total) by Each Nostril route daily as needed for Allergies. 16 g prn    furosemide (LASIX) 20 MG tablet Take 1 tablet (20 mg total) by mouth daily as needed (leg swelling). 60 tablet 0    lancets (ACCU-CHEK MULTICLIX LANCET) Misc Test 2 x day 200 each 3    levothyroxine (SYNTHROID, LEVOTHROID) 175 MCG tablet TAKE ONE TABLET BY MOUTH EVERY DAY 90 tablet 1    losartan (COZAAR) 50 MG tablet TAKE ONE TABLET (50 MG TOTAL) BY MOUTH ONCE DAILY (Patient taking differently: every evening. FOR RLS) 90 tablet 2    melatonin (MELATIN) Take by mouth every evening.      metFORMIN (GLUCOPHAGE-XR) 500 MG ER 24hr tablet TAKE TWO TABLETS BY MOUTH TWICE DAILY WITH MEALS 360 tablet 2    methylphenidate (RITALIN LA) 40 MG 24 hr capsule Take 40 mg by mouth daily as needed. Not on at this time      methylphenidate HCl (RITALIN) 10 MG tablet       ONETOUCH ULTRA BLUE TEST STRIP Strp USE TO TEST BLOOD SUGAR 100 strip 3    pantoprazole (PROTONIX) 40 MG tablet Take 1 tablet (40 mg total) by mouth once daily. 90 tablet 3    pregabalin (LYRICA) 150 MG capsule TAKE ONE CAPSULE BY MOUTH TWICE DAILY (Patient taking differently: 2 (two) times daily.) 60 capsule 2    blood-glucose meter kit Use as instructed 1 each 0    HYDROcodone-acetaminophen (NORCO) 7.5-325 mg per tablet TAKE one TABLET BY MOUTH EVERY DAY AS NEEDED PAIN      oxybutynin (DITROPAN XL) 15 MG TR24 Take 1 tablet (15 mg total) by mouth once daily. 30 tablet 06     No current facility-administered medications for this visit.     Facility-Administered Medications Ordered in Other Visits   Medication Dose Route Frequency Provider Last Rate Last Admin    lactated ringers infusion   Intravenous Continuous Dejan Simmons MD   Stopped at 07/06/20 1008       Allergies  Review of patient's allergies indicates:   Allergen Reactions    Codeine Nausea And Vomiting     Other reaction(s): Nausea, dizziness       Past Medical History  Past Medical History:    Diagnosis Date    Arthritis     Colon polyp, hyperplastic 01/2013    recheck 5-10 years    Diabetes mellitus, type 2     Diverticulosis     Fatty liver     General anesthetics causing adverse effect in therapeutic use     woke up during tubal ligation    GERD (gastroesophageal reflux disease)     Gout, unspecified     Malignant melanoma of skin, unspecified     LEFT NECK    CLINT (obstructive sleep apnea)     C-pap    PONV (postoperative nausea and vomiting)     RLS (restless legs syndrome)     Thyroid disease     Wears glasses     Wears partial dentures     UPPER       Surgical History  Past Surgical History:   Procedure Laterality Date    ANTERIOR LUMBAR INTERBODY FUSION (ALIF) N/A 11/8/2022    Procedure: FUSION, SPINE, LUMBAR, ALIF L3-4, L4-5;  Surgeon: Everton Godinez MD;  Location: [facility] OR;  Service: Orthopedics;  Laterality: N/A;  [facility], [employer], DR SANDI ENRIQUEZ, CO-SURGEON    COLONOSCOPY  02/14/2013    Dr. Vogel: repeat in 5-10 years    COLONOSCOPY W/ BIOPSIES AND POLYPECTOMY  02/2013    hyperplastic, recheck 5-10 years    CYSTOSCOPY WITH HYDRODISTENSION OF BLADDER N/A 9/21/2022    Procedure: CYSTOSCOPY, WITH BLADDER HYDRODISTENSION;  Surgeon: Keely Chavez Jr., MD;  [facility];  Service: Urology;  Laterality: N/A;    EXTERNAL EAR SURGERY      FUSION, SPINE, LUMBAR, POSTERIOR APPROACH N/A 11/8/2022    Procedure: FUSION,SPINE,LUMBAR,POSTERIOR APPROACH L3-4, L4-5;  Surgeon: Everton Godinez MD;  Location: [facility] OR;  Service: Orthopedics;  Laterality: N/A;  [facility], [employer], DR SANDI ENRIQUEZ, CO-SURGEON    HYSTERECTOMY      INJECTION OF ANESTHETIC AGENT AROUND MEDIAL BRANCH NERVES INNERVATING LUMBAR FACET JOINT Bilateral 8/3/2020    Procedure: Block-nerve-medial branch-lumbar left L2, L3, L4, L5;  Surgeon: Dejan Simmons MD;  [facility];  Service: Pain Management;  Laterality: Bilateral;    INJECTION OF ANESTHETIC AGENT AROUND MEDIAL BRANCH NERVES INNERVATING LUMBAR FACET JOINT Left 8/13/2020     Procedure: Block-nerve-medial branch-lumbar.  L2, L3, L4, and L5;  Surgeon: Dejan Simmons MD;  Location: [facility] OR;  Service: Pain Management;  Laterality: Left;    MINIMALLY INVASIVE FUSION OF SPINE Left 10/12/2021    Procedure: FUSION, SPINE, MINIMALLY INVASIVE;  Surgeon: Everton Godinez MD;  Location: [facility] OR;  Service: Orthopedics;  Laterality: Left;  SI-Bone    RADIOFREQUENCY ABLATION OF LUMBAR MEDIAL BRANCH NERVE AT SINGLE LEVEL Left 8/27/2020    Procedure: Radiofrequency Ablation, Nerve, Spinal, Lumbar, Medial Branch,  L2, L3, L4, L5;  Surgeon: Dejan Simmons MD;  Location: [facility] OR;  Service: Pain Management;  Laterality: Left;  L2,l3,l4,l5    THROAT SURGERY      for CLINT    TRANSFORAMINAL EPIDURAL INJECTION OF STEROID Left 3/6/2020    Procedure: Injection,steroid,epidural,transforaminal approach;  Surgeon: Harmeet Zapata MD;  Location: [facility] OR;  Service: Pain Management;  Laterality: Left;  L4-L5    TRANSFORAMINAL EPIDURAL INJECTION OF STEROID Left 6/10/2020    Procedure: Injection,steroid,epidural,transforaminal approach.L4 and L5;  Surgeon: Dejan Simmons MD;  Location: [facility] OR;  Service: Pain Management;  Laterality: Left;    TRANSFORAMINAL EPIDURAL INJECTION OF STEROID Left 7/6/2020    Procedure: Injection,steroid,epidural,transforaminal approach. left L4 and L5;  Surgeon: Dejan Simmons MD;  Location: [facility] OR;  Service: Pain Management;  Laterality: Left;    TRANSFORAMINAL EPIDURAL INJECTION OF STEROID Left 11/12/2021    Procedure: Injection,steroid,epidural,transforaminal approach Left L5-S1;  Surgeon: Dejan Simmons MD;  Location: [facility] OR;  Service: Pain Management;  Laterality: Left;    UPPER GASTROINTESTINAL ENDOSCOPY         Family History:   Family History   Problem Relation Age of Onset    Hypertension Mother     Breast cancer Mother 50    Heart disease Father     Hypertension Father     Ovarian cancer Paternal Aunt     Macular degeneration Neg Hx     Retinal detachment Neg Hx     Glaucoma Neg Hx     Colon  cancer Neg Hx     Colon polyps Neg Hx     Crohn's disease Neg Hx     Ulcerative colitis Neg Hx        Social History:   Social History     Socioeconomic History    Marital status:    Occupational History     Employer: [employer]   Tobacco Use    Smoking status: Never    Smokeless tobacco: Never   Substance and Sexual Activity    Alcohol use: No    Drug use: No    Sexual activity: Yes     Partners: Male       Date of surgery:  11/08/2022    Review of Systems     General/Constitutional: Chills denies. Fatigue denies. Fever denies. Weight gain denies. Weight loss denies.    Musculoskeletal: Comments: See HPI for details    Skin: Rash denies.    Objective:   Vital Signs: There were no vitals filed for this visit.     Physical Exam    This a well-developed, well nourished patient in no acute distress.  They are alert and oriented and cooperative to examination.  Pt. walks without an antalgic gait.      General Examination:     Constitutional: The patient is alert and oriented to lace person and time. Mood is pleasant.     Head/Face: Normal facial features normal eyebrows    Eyes: Normal extraocular motion bilaterally    Lungs: Respirations are equal and unlabored    Gait is coordinated.    Cardiovascular: There are no swelling or varicosities present.    Lymphatic: Negative for adenopathy    Skin: Normal    Neurological: Level of consciousness normal. Oriented to place person and time and situation    Psychiatric: Oriented to time place person and situation    Lumbar posterior incision and left lateral abdominal incision both healing well with no signs or symptoms of infection.  The lateral abdominal incision does have some macerated areas at the most inferior aspect of the incision.  There is also some areas of erythema secondary to irritation from the tape adhesives.  She has a mild antalgic gait with a rolling walker.  Sit to stand is normal.  Lumbar range of motion limited secondary to pain  and guarding.  Bilateral lower extremities are distal neurovascular intact.    Left shoulder positive Neer's and positive Barron with decreased active range of motion flexion and abduction.    XRAY Report/ Interpretation:  Postop lumbar AP and lateral x-rays taken in the office today and reviewed the patient demonstrate interbody cages at L3-4 and L4-5.  Posterolateral instrumentation L3-4 and L4-5.      Assessment:       1. S/P lumbar fusion    2. Rotator cuff impingement syndrome of left shoulder          Plan:       Edie was seen today for post-op evaluation.    Diagnoses and all orders for this visit:    S/P lumbar fusion  -     X-Ray Lumbar Spine Ap And Lateral    Rotator cuff impingement syndrome of left shoulder       No follow-ups on file.  This is to attest that the physician's assistant Steve Jackson served in the capacity as a scribe for this patient's encounter.  This is also to verify that I have reviewed the patient's history and helped formulate the treatment plan and discussed it with the physician's assistant.  I have actively participated  in the evaluation and treatment plan for this patient visit.  The treatment plan and medical decision-making is as outlined below:  Continue to increase activity as tolerated but avoid lifting.  Today she was given a left shoulder subacromial injection with 40 mg of Kenalog and 3 cc lidocaine.  Please see procedure report for details.  I refilled her pain medications.  Follow-up in 6 weeks or sooner if needed.    Treatment options were discussed with regards to the nature of the medical condition. Conservative pain intervention and surgical options were discussed in detail. The probability of success of each separate treatment option was discussed. The patient expressed a clear understanding of the treatment options. With regards to surgery, the procedure risk, benefits, complications, and outcomes were discussed. No guarantees were given with regards to  surgical outcome.   The risk of complications, morbidity, and mortality of patient management decisions have been made at the time of this visit. These are associated with the patient's problems, diagnostic procedures and treatment options. This includes the possible management options selected and those considered but not selected by the patient after shared medical decision making we discussed with the patient.     This note was created using Dragon voice recognition software that occasionally misinterpreted phrases or words.

## 2022-11-30 ENCOUNTER — PATIENT MESSAGE (OUTPATIENT)
Dept: ORTHOPEDICS | Facility: CLINIC | Age: 67
End: 2022-11-30

## 2022-12-05 ENCOUNTER — PATIENT MESSAGE (OUTPATIENT)
Dept: ORTHOPEDICS | Facility: CLINIC | Age: 67
End: 2022-12-05

## 2022-12-05 DIAGNOSIS — Z98.1 S/P LUMBAR FUSION: Primary | ICD-10-CM

## 2022-12-05 RX ORDER — OXYCODONE AND ACETAMINOPHEN 10; 325 MG/1; MG/1
1 TABLET ORAL EVERY 6 HOURS PRN
Qty: 28 TABLET | Refills: 0 | Status: SHIPPED | OUTPATIENT
Start: 2022-12-05 | End: 2023-02-08

## 2022-12-05 NOTE — TELEPHONE ENCOUNTER
S/P APLIF L3-4, L4-5 11/8/2022. Requesting pain med refill. Last refill given was percocet 10/325 q6. On 11/21.

## 2022-12-08 ENCOUNTER — DOCUMENT SCAN (OUTPATIENT)
Dept: HOME HEALTH SERVICES | Facility: HOSPITAL | Age: 67
End: 2022-12-08
Payer: MEDICARE

## 2022-12-12 ENCOUNTER — PATIENT MESSAGE (OUTPATIENT)
Dept: ADMINISTRATIVE | Facility: HOSPITAL | Age: 67
End: 2022-12-12
Payer: MEDICARE

## 2022-12-19 ENCOUNTER — DOCUMENT SCAN (OUTPATIENT)
Dept: HOME HEALTH SERVICES | Facility: HOSPITAL | Age: 67
End: 2022-12-19
Payer: MEDICARE

## 2023-01-04 ENCOUNTER — OFFICE VISIT (OUTPATIENT)
Dept: ORTHOPEDICS | Facility: CLINIC | Age: 68
End: 2023-01-04
Payer: MEDICARE

## 2023-01-04 VITALS — BODY MASS INDEX: 43.49 KG/M2 | HEIGHT: 65 IN | WEIGHT: 261 LBS

## 2023-01-04 DIAGNOSIS — M54.16 LUMBAR RADICULITIS: ICD-10-CM

## 2023-01-04 DIAGNOSIS — Z98.1 S/P LUMBAR FUSION: Primary | ICD-10-CM

## 2023-01-04 PROCEDURE — 1101F PR PT FALLS ASSESS DOC 0-1 FALLS W/OUT INJ PAST YR: ICD-10-PCS | Mod: CPTII,S$GLB,, | Performed by: ORTHOPAEDIC SURGERY

## 2023-01-04 PROCEDURE — 99024 PR POST-OP FOLLOW-UP VISIT: ICD-10-PCS | Mod: S$GLB,,, | Performed by: ORTHOPAEDIC SURGERY

## 2023-01-04 PROCEDURE — 1125F PR PAIN SEVERITY QUANTIFIED, PAIN PRESENT: ICD-10-PCS | Mod: CPTII,S$GLB,, | Performed by: ORTHOPAEDIC SURGERY

## 2023-01-04 PROCEDURE — 3288F PR FALLS RISK ASSESSMENT DOCUMENTED: ICD-10-PCS | Mod: CPTII,S$GLB,, | Performed by: ORTHOPAEDIC SURGERY

## 2023-01-04 PROCEDURE — 1125F AMNT PAIN NOTED PAIN PRSNT: CPT | Mod: CPTII,S$GLB,, | Performed by: ORTHOPAEDIC SURGERY

## 2023-01-04 PROCEDURE — 1160F PR REVIEW ALL MEDS BY PRESCRIBER/CLIN PHARMACIST DOCUMENTED: ICD-10-PCS | Mod: CPTII,S$GLB,, | Performed by: ORTHOPAEDIC SURGERY

## 2023-01-04 PROCEDURE — 99024 POSTOP FOLLOW-UP VISIT: CPT | Mod: S$GLB,,, | Performed by: ORTHOPAEDIC SURGERY

## 2023-01-04 PROCEDURE — 3008F PR BODY MASS INDEX (BMI) DOCUMENTED: ICD-10-PCS | Mod: CPTII,S$GLB,, | Performed by: ORTHOPAEDIC SURGERY

## 2023-01-04 PROCEDURE — 1159F MED LIST DOCD IN RCRD: CPT | Mod: CPTII,S$GLB,, | Performed by: ORTHOPAEDIC SURGERY

## 2023-01-04 PROCEDURE — 1101F PT FALLS ASSESS-DOCD LE1/YR: CPT | Mod: CPTII,S$GLB,, | Performed by: ORTHOPAEDIC SURGERY

## 2023-01-04 PROCEDURE — 1160F RVW MEDS BY RX/DR IN RCRD: CPT | Mod: CPTII,S$GLB,, | Performed by: ORTHOPAEDIC SURGERY

## 2023-01-04 PROCEDURE — 3008F BODY MASS INDEX DOCD: CPT | Mod: CPTII,S$GLB,, | Performed by: ORTHOPAEDIC SURGERY

## 2023-01-04 PROCEDURE — 3288F FALL RISK ASSESSMENT DOCD: CPT | Mod: CPTII,S$GLB,, | Performed by: ORTHOPAEDIC SURGERY

## 2023-01-04 PROCEDURE — 1159F PR MEDICATION LIST DOCUMENTED IN MEDICAL RECORD: ICD-10-PCS | Mod: CPTII,S$GLB,, | Performed by: ORTHOPAEDIC SURGERY

## 2023-01-04 RX ORDER — PREGABALIN 100 MG/1
100 CAPSULE ORAL NIGHTLY
Qty: 30 CAPSULE | Refills: 2 | Status: SHIPPED | OUTPATIENT
Start: 2023-01-04 | End: 2023-04-04

## 2023-01-04 RX ORDER — HYDROCODONE BITARTRATE AND ACETAMINOPHEN 10; 325 MG/1; MG/1
1 TABLET ORAL EVERY 6 HOURS PRN
Qty: 28 TABLET | Refills: 0 | Status: SHIPPED | OUTPATIENT
Start: 2023-01-04 | End: 2023-01-11

## 2023-01-04 NOTE — PROGRESS NOTES
Subjective:       Patient ID: Edie Hernandez is a 67 y.o. female.    Chief Complaint: Post-op Evaluation of the Lumbar Spine ( L3-4, L4-5 ALIF 11/08/22, patient has good and bad days, she still has pain down her leg from her knee to foot.) and Pain of the Left Shoulder (Left shoulder injection 11/21/22, patient got complete relief from the injection.)      History of Present Illness    Prior to meeting with the patient I reviewed the medical chart in [facility]. This included reviewing the previous progress notes from our office, review of the patient's last appointment with their primary care provider, review of any visits to the emergency room, and review of any pain management appointments or procedures.   Patient is here for her initial postoperative appointment status post L3-4, L4-5 anterior lumbar interbody fusion with posterior decompression and posterolateral fusion using instrumentation.  She is also here follow-up for some left shoulder pain.  When she was last here she received an subacromial corticosteroid injection which relieved all of her left shoulder symptoms.    Overall her back pain and her left lower extremity radicular pain is better than was before surgery but not resolved.  Her and her  state that she has to take about 2-3 breaks today and laid supine to relieve her symptoms which are exacerbated secondary to out of bed activity and ambulation.  She is about 2 months postop.    Current Medications  Current Outpatient Medications   Medication Sig Dispense Refill    azelastine (ASTELIN) 137 mcg (0.1 %) nasal spray 1 spray (137 mcg total) by Nasal route 2 (two) times daily. (Patient taking differently: 1 spray by Nasal route 2 (two) times daily. PRN) 30 mL 11    blood-glucose meter kit Use as instructed 1 each 0    clobetasol (TEMOVATE) 0.05 % cream Apply 1 application topically 2 (two) times daily. Apply to affected area 30 g 2    cyanocobalamin (VITAMIN B-12) 100 MCG tablet Take 100 mcg by  mouth once daily.      cyclobenzaprine (FLEXERIL) 5 MG tablet TAKE 1 TO 2 TABLETS BY MOUTH NIGHTLY AT BEDTIME AS NEEDED FOR PAIN 90 tablet prn    diclofenac sodium (VOLTAREN) 1 % Gel Apply 2 g topically 4 (four) times daily. 450 g 0    ergocalciferol (ERGOCALCIFEROL) 50,000 unit Cap TAKE 1 CAPSULE BY MOUTH ONE TIME PER WEEK 12 capsule 1    estradioL (ESTRACE) 0.01 % (0.1 mg/gram) vaginal cream Apply 1 fingertipful to vaginal area nightly x 2 weeks then use on MWF 42.5 g 3    fluticasone propionate (FLONASE) 50 mcg/actuation nasal spray 1 spray (50 mcg total) by Each Nostril route daily as needed for Allergies. 16 g prn    furosemide (LASIX) 20 MG tablet Take 1 tablet (20 mg total) by mouth daily as needed (leg swelling). 60 tablet 0    lancets (ACCU-CHEK MULTICLIX LANCET) Misc Test 2 x day 200 each 3    levothyroxine (SYNTHROID, LEVOTHROID) 175 MCG tablet TAKE ONE TABLET BY MOUTH EVERY DAY 90 tablet 1    losartan (COZAAR) 50 MG tablet TAKE ONE TABLET (50 MG TOTAL) BY MOUTH ONCE DAILY (Patient taking differently: every evening. FOR RLS) 90 tablet 2    melatonin (MELATIN) Take by mouth every evening.      metFORMIN (GLUCOPHAGE-XR) 500 MG ER 24hr tablet TAKE TWO TABLETS BY MOUTH TWICE DAILY WITH MEALS 360 tablet 2    methylphenidate (RITALIN LA) 40 MG 24 hr capsule Take 40 mg by mouth daily as needed. Not on at this time      methylphenidate HCl (RITALIN) 10 MG tablet       ONETOUCH ULTRA BLUE TEST STRIP Strp USE TO TEST BLOOD SUGAR 100 strip 3    oxybutynin (DITROPAN XL) 15 MG TR24 Take 1 tablet (15 mg total) by mouth once daily. 30 tablet 06    oxyCODONE-acetaminophen (PERCOCET)  mg per tablet Take 1 tablet by mouth every 6 (six) hours as needed for Pain. 28 tablet 0    pantoprazole (PROTONIX) 40 MG tablet Take 1 tablet (40 mg total) by mouth once daily. 90 tablet 3    pregabalin (LYRICA) 150 MG capsule TAKE ONE CAPSULE BY MOUTH TWICE DAILY (Patient taking differently: 2 (two) times daily.) 60 capsule 2     No  current facility-administered medications for this visit.     Facility-Administered Medications Ordered in Other Visits   Medication Dose Route Frequency Provider Last Rate Last Admin    lactated ringers infusion   Intravenous Continuous Dejan Simmons MD   Stopped at 07/06/20 1008       Allergies  Review of patient's allergies indicates:   Allergen Reactions    Codeine Nausea And Vomiting     Other reaction(s): Nausea, dizziness       Past Medical History  Past Medical History:   Diagnosis Date    Arthritis     Colon polyp, hyperplastic 01/2013    recheck 5-10 years    Diabetes mellitus, type 2     Diverticulosis     Fatty liver     General anesthetics causing adverse effect in therapeutic use     woke up during tubal ligation    GERD (gastroesophageal reflux disease)     Gout, unspecified     Malignant melanoma of skin, unspecified     LEFT NECK    CLINT (obstructive sleep apnea)     C-pap    PONV (postoperative nausea and vomiting)     RLS (restless legs syndrome)     Thyroid disease     Wears glasses     Wears partial dentures     UPPER       Surgical History  Past Surgical History:   Procedure Laterality Date    ANTERIOR LUMBAR INTERBODY FUSION (ALIF) N/A 11/8/2022    Procedure: FUSION, SPINE, LUMBAR, ALIF L3-4, L4-5;  Surgeon: Everton Godinez MD;  Location: [facility] OR;  Service: Orthopedics;  Laterality: N/A;  [facility], [employer], DR SANDI ENRIQUEZ, CO-SURGEON    COLONOSCOPY  02/14/2013    Dr. Vogel: repeat in 5-10 years    COLONOSCOPY W/ BIOPSIES AND POLYPECTOMY  02/2013    hyperplastic, recheck 5-10 years    CYSTOSCOPY WITH HYDRODISTENSION OF BLADDER N/A 9/21/2022    Procedure: CYSTOSCOPY, WITH BLADDER HYDRODISTENSION;  Surgeon: Keely Chavez Jr., MD;  Location: [facility] OR;  Service: Urology;  Laterality: N/A;    EXTERNAL EAR SURGERY      FUSION, SPINE, LUMBAR, POSTERIOR APPROACH N/A 11/8/2022    Procedure: FUSION,SPINE,LUMBAR,POSTERIOR APPROACH L3-4, L4-5;  Surgeon: Everton Godinez MD;  Location: [facility] OR;  Service:  Orthopedics;  Laterality: N/A;  [facility], [employer], DR SANDI ENRIQUEZ, CO-SURGEON    HYSTERECTOMY      INJECTION OF ANESTHETIC AGENT AROUND MEDIAL BRANCH NERVES INNERVATING LUMBAR FACET JOINT Bilateral 8/3/2020    Procedure: Block-nerve-medial branch-lumbar left L2, L3, L4, L5;  Surgeon: Dejan Simmons MD;  Location: [facility] OR;  Service: Pain Management;  Laterality: Bilateral;    INJECTION OF ANESTHETIC AGENT AROUND MEDIAL BRANCH NERVES INNERVATING LUMBAR FACET JOINT Left 8/13/2020    Procedure: Block-nerve-medial branch-lumbar.  L2, L3, L4, and L5;  Surgeon: Dejan Simmons MD;  Location: [facility] OR;  Service: Pain Management;  Laterality: Left;    MINIMALLY INVASIVE FUSION OF SPINE Left 10/12/2021    Procedure: FUSION, SPINE, MINIMALLY INVASIVE;  Surgeon: Everton Godinez MD;  Location: [facility] OR;  Service: Orthopedics;  Laterality: Left;  SI-Bone    RADIOFREQUENCY ABLATION OF LUMBAR MEDIAL BRANCH NERVE AT SINGLE LEVEL Left 8/27/2020    Procedure: Radiofrequency Ablation, Nerve, Spinal, Lumbar, Medial Branch,  L2, L3, L4, L5;  Surgeon: Dejan Simmons MD;  Location: [facility] OR;  Service: Pain Management;  Laterality: Left;  L2,l3,l4,l5    THROAT SURGERY      for CLINT    TRANSFORAMINAL EPIDURAL INJECTION OF STEROID Left 3/6/2020    Procedure: Injection,steroid,epidural,transforaminal approach;  Surgeon: Harmeet Zapata MD;  Location: [facility] OR;  Service: Pain Management;  Laterality: Left;  L4-L5    TRANSFORAMINAL EPIDURAL INJECTION OF STEROID Left 6/10/2020    Procedure: Injection,steroid,epidural,transforaminal approach.L4 and L5;  Surgeon: Dejan Simmons MD;  Location: [facility] OR;  Service: Pain Management;  Laterality: Left;    TRANSFORAMINAL EPIDURAL INJECTION OF STEROID Left 7/6/2020    Procedure: Injection,steroid,epidural,transforaminal approach. left L4 and L5;  Surgeon: Dejan Simmons MD;  Location: [facility] OR;  Service: Pain Management;  Laterality: Left;    TRANSFORAMINAL EPIDURAL INJECTION OF STEROID Left 11/12/2021    Procedure:  Injection,steroid,epidural,transforaminal approach Left L5-S1;  Surgeon: Dejan Simmons MD;  [facility]  Service: Pain Management;  Laterality: Left;    UPPER GASTROINTESTINAL ENDOSCOPY         Family History:   Family History   Problem Relation Age of Onset    Hypertension Mother     Breast cancer Mother 50    Heart disease Father     Hypertension Father     Ovarian cancer Paternal Aunt     Macular degeneration Neg Hx     Retinal detachment Neg Hx     Glaucoma Neg Hx     Colon cancer Neg Hx     Colon polyps Neg Hx     Crohn's disease Neg Hx     Ulcerative colitis Neg Hx        Social History:   Social History     Socioeconomic History    Marital status:    Occupational History     Employer: [employer]   Tobacco Use    Smoking status: Never    Smokeless tobacco: Never   Substance and Sexual Activity    Alcohol use: No    Drug use: No    Sexual activity: Yes     Partners: Male       Hospitalization/Major Diagnostic Procedure:     Review of Systems     General/Constitutional:  Chills denies. Fatigue denies. Fever denies. Weight gain denies. Weight loss denies.    Respiratory:  Shortness of breath denies.    Cardiovascular:  Chest pain denies.    Gastrointestinal:  Constipation denies. Diarrhea denies. Nausea denies. Vomiting denies.     Hematology:  Easy bruising denies. Prolonged bleeding denies.     Genitourinary:  Frequent urination denies. Pain in lower back denies. Painful urination denies.     Musculoskeletal:  See HPI for details    Skin:  Rash denies.    Neurologic:  Dizziness denies. Gait abnormalities denies. Seizures denies. Tingling/Numbess denies.    Psychiatric:  Anxiety denies. Depressed mood denies.     Objective:   Vital Signs: There were no vitals filed for this visit.     Physical Exam      General Examination:     Constitutional: The patient is alert and oriented to lace person and time. Mood is pleasant.     Head/Face: Normal facial features normal  eyebrows    Eyes: Normal extraocular motion bilaterally    Lungs: Respirations are equal and unlabored    Gait is coordinated.    Cardiovascular: There are no swelling or varicosities present.    Lymphatic: Negative for adenopathy    Skin: Normal    Neurological: Level of consciousness normal. Oriented to place person and time and situation    Psychiatric: Oriented to time place person and situation    Presents wearing TLSO.  Nonantalgic gait.  Nonantalgic sit to stand.  Lumbar range of motion is great about 90% normal flexion and extension.  No significant lumbar or lumbosacral tenderness palpation.  Bilateral lower extremities are distal neurovascular intact with full active range of motion, normal strength, equal symmetric DTRs, and negative straight leg raise maneuver.  I did note generalized deconditioning and decreased endurance in all muscle groups.    XRAY Report/ Interpretation:  Postop lumbar AP and lateral x-rays taken in the office today and reviewed the patient demonstrate a normal postoperative appearance with anterior interbody cages at L3-4 and L4-5.  There is also posterior instrumentation at both levels.  Everything looks great.      Assessment:       1. S/P lumbar fusion    2. Lumbar radiculitis          Plan:       Edie was seen today for post-op evaluation and pain.    Diagnoses and all orders for this visit:    S/P lumbar fusion  -     X-Ray Lumbar Spine Ap And Lateral  -     Cancel: X-Ray Pelvis Routine AP  -     Ambulatory referral/consult to Physical/Occupational Therapy; Future    Lumbar radiculitis         Follow up in about 6 weeks (around 2/15/2023) for L3-4, L4-5 ALIF 11/08/22.  This is to attest that the physician's assistant Steve Billingsley served in the capacity as scribe for this patient's encounter.  This is also to verify that I have reviewed the patient's history and helped formulate the treatment plan and discussed it with the physician's assistant.  I have actively  participated in the evaluation and treatment plan for this patient visit.  The treatment plan and medical decision-making is outlined below  Unfortunately she has some poor or limited endurance of her lumbar spine as well as her lower extremities in general.  She will be referred to physical therapy to work on this to 3 times a week for 4-6 weeks.  We will again try Lyrica 100 mg q.h.s. to help with the lower extremity radicular pain at night.  Follow-up with us in 6 weeks or sooner if needed.  Discontinue Percocet and switch to Norco 10/325 q.6 hours p.r.n..    Treatment options were discussed with regards to the nature of the medical condition. Conservative pain intervention and surgical options were discussed in detail. The probability of success of each separate treatment option was discussed. The patient expressed a clear understanding of the treatment options. With regards to surgery, the procedure risk, benefits, complications, and outcomes were discussed. No guarantees were given with regards to surgical outcome.   The risk of complications, morbidity, and mortality of patient management decisions have been made at the time of this visit. These are associated with the patient's problems, diagnostic procedures and treatment options. This includes the possible management options selected and those considered but not selected by the patient after shared medical decision making we discussed with the patient.     This note was created using Dragon voice recognition software that occasionally misinterpreted phrases or words.

## 2023-01-09 ENCOUNTER — CLINICAL SUPPORT (OUTPATIENT)
Dept: REHABILITATION | Facility: HOSPITAL | Age: 68
End: 2023-01-09
Attending: ORTHOPAEDIC SURGERY
Payer: MEDICARE

## 2023-01-09 DIAGNOSIS — Z98.1 S/P LUMBAR FUSION: ICD-10-CM

## 2023-01-09 PROCEDURE — 97110 THERAPEUTIC EXERCISES: CPT | Mod: HCNC,PN | Performed by: PHYSICAL THERAPIST

## 2023-01-09 PROCEDURE — 97161 PT EVAL LOW COMPLEX 20 MIN: CPT | Mod: HCNC,PN | Performed by: PHYSICAL THERAPIST

## 2023-01-10 NOTE — PLAN OF CARE
"[facility]   Physical Therapy Initial Evaluation     Date: 1/9/2023   Name: Edie [facility] Number: [id number]    Therapy Diagnosis:   Encounter Diagnosis   Name Primary?    S/P lumbar fusion      Physician: Everton Godinez MD    Physician Orders: PT Eval and Treat   Medical Diagnosis from Referral: S/P lumbar fusion  Evaluation Date: 1/9/2023  Authorization Period Expiration: 6/7/2023  Plan of Care Expiration: 3/3/2023  Visit # / Visits authorized: 1/ 1   (POC 1/12)   FOTO Due visit 5  FOTO Due visit 10    Precautions: Standard  Insurance: Payor: [employer] MEDICARE / Plan: [employer] MEDICARE HMO / Product Type: Capitation /     Time In: 1100  Time Out: 1200  Total Appointment Time (timed & untimed codes): 60 minutes      SUBJECTIVE   Date of onset: Date of surgery: 11/8/2022    History of current condition - Pat reports a chronic history of left lumbar hip and lower extremity pain. She states she was limited with the ability to stand and walk. She underwent an ALIF at L3-L4 & L4-L5 with insertion of interbody devices. She continues to display limitation with prolonged standing and walking. She notes increased pain late in the day. She also notes pain in the left lower extremity but is better than prior to surgery.    Falls: 0    Imaging, MRI studies: "the L5 vertebral body. Films are labeled accordingly Broad-based disc bulge with facet hypertrophy at L2-3 resulting in mild central canal stenosis and bilateral foraminal narrowing Mild central canal stenosis and bilateral foraminal narrowing at L3-4, right greater than left. Correlation with right L3 radicular symptoms is recommended. Mild left foraminal narrowing at L1-2"    Prior Therapy: PT  Social History:  lives with their family  Occupation: Not working  Prior Level of Function: Independent  Current Level of Function: Decreased ability to perform ADL and limited tolerance to standing and " walking.      Pain:  Current 5/10, worst 10/10, best 5/10   Location: left lumbar and lower extremity     Description: Aching  Aggravating Factors: Standing and Walking  Easing Factors: pain medication    Patients goals: Increased tolerance to standing and walking     Medical History:   Past Medical History:   Diagnosis Date    Arthritis     Colon polyp, hyperplastic 01/2013    recheck 5-10 years    Diabetes mellitus, type 2     Diverticulosis     Fatty liver     General anesthetics causing adverse effect in therapeutic use     woke up during tubal ligation    GERD (gastroesophageal reflux disease)     Gout, unspecified     Malignant melanoma of skin, unspecified     LEFT NECK    CLINT (obstructive sleep apnea)     C-pap    PONV (postoperative nausea and vomiting)     RLS (restless legs syndrome)     Thyroid disease     Wears glasses     Wears partial dentures     UPPER       Surgical History:   Edie Hernandez  has a past surgical history that includes Throat surgery; External ear surgery; Hysterectomy; Colonoscopy w/ biopsies and polypectomy (02/2013); Colonoscopy (02/14/2013); Upper gastrointestinal endoscopy; Transforaminal epidural injection of steroid (Left, 3/6/2020); Transforaminal epidural injection of steroid (Left, 6/10/2020); Transforaminal epidural injection of steroid (Left, 7/6/2020); Injection of anesthetic agent around medial branch nerves innervating lumbar facet joint (Bilateral, 8/3/2020); Injection of anesthetic agent around medial branch nerves innervating lumbar facet joint (Left, 8/13/2020); Radiofrequency ablation of lumbar medial branch nerve at single level (Left, 8/27/2020); Minimally invasive fusion of spine (Left, 10/12/2021); Transforaminal epidural injection of steroid (Left, 11/12/2021); Cystoscopy with hydrodistension of bladder (N/A, 9/21/2022); Anterior lumbar interbody fusion (ALIF) (N/A, 11/8/2022); and fusion, spine, lumbar, posterior approach (N/A, 11/8/2022).    Medications:    Edie has a current medication list which includes the following prescription(s): azelastine, blood-glucose meter, clobetasol, cyanocobalamin, cyclobenzaprine, diclofenac sodium, ergocalciferol, estradiol, fluticasone propionate, furosemide, hydrocodone-acetaminophen, lancets, levothyroxine, losartan, melatonin, metformin, methylphenidate hcl, methylphenidate hcl, onetouch ultra blue test strip, oxybutynin, oxycodone-acetaminophen, pantoprazole, pregabalin, and pregabalin, and the following Facility-Administered Medications: lactated ringers.    Allergies:   Review of patient's allergies indicates:   Allergen Reactions    Codeine Nausea And Vomiting     Other reaction(s): Nausea, dizziness          OBJECTIVE     Posture: Decreased lumbar lordosis and forward head in standing  Palpation: Severe point tenderness noted with palpation of the left piriformis  Sensation: Intact      Lumbar AROM: ROM-   Response to repeated movements   Flexion NT degrees - secondary to post op status   Extension NT degrees - secondary to post op status   Right side bending NT degrees   Left side bending NT degrees     L/E MMT Left Right   Hip Flexion 4/5. 5/5.   Hip Extension 4/5. 5/5.   Hip Abduction 4/5. 5/5.   Hip Adduction 4/5. 5/5.   Hip IR 4/5. 5/5.   Hip ER 4-/5. 5/5.   Knee Flexion 5/5. 5/5.   Knee Extension 5/5. 5/5.   Ankle DF 5/5. 5/5.   Ankle PF 5/5. 5/5.   Abdominals 3/5. 3/5.       Flexibility Left Right   Hamstrings 30 degrees 36 degrees   Achilles 0 degrees 0 degrees       Gait Without AD   Analysis The patient displays decreased stance time on the left lower extremity        Limitation/Restriction for FOTO  Survey    Therapist reviewed FOTO scores for Edie Hernandez on 1/9/2023.   FOTO documents entered into EPIC - see Media section.    Limitation Score: 48%         TREATMENT     Total Treatment time (time-based codes) separate from Evaluation: 30 minutes     Isaura received the treatments listed below:      THERAPEUTIC  EXERCISES to develop strength, ROM, and flexibility for 30 minutes including :     Seated Hamstring stretch 3 x 30 sec B  Seated piriformis stretch 3 x 30 sec B  Seated Gastroc-soleus stretch 3 x 30 sec B  Seated adductor squeeze 3 x 10  Seated hip abduction 3 x 10 Green theraband  Seated Physioball crunch 3 x 10  Seated Physioball oblique crunch 3 x 10  Seated sciatic nerve glide 2 x 10      PATIENT EDUCATION AND HOME EXERCISES     Education provided:   - Importance of improving lower extremity flexibility    Written Home Exercises Provided: yes. Exercises were reviewed and Pat was able to demonstrate them prior to the end of the session.  Pat demonstrated good  understanding of the education provided. See EMR under Patient Instructions for exercises provided during therapy sessions.    ASSESSMENT     Edie is a 67 y.o. female referred to outpatient Physical Therapy with a medical diagnosis of S/P lumbar fusion. Patient presents with :    Lumbar pain  Decreased lower extremity flexibility   Decreased lower extremity strength  Decreased core strength  Decreased ability to perform ADL and limited tolerance to standing and walking.      Patient prognosis is Good.   Patientt will benefit from skilled outpatient Physical Therapy to address the deficits stated above and in the chart below, provide patient /family education, and to maximize patientt's level of independence.     Plan of care discussed with patient: Yes  Patient's spiritual, cultural and educational needs considered and patient is agreeable to the plan of care and goals as stated below:     Anticipated Barriers for therapy: none    Medical Necessity is demonstrated by the following  History  Co-morbidities and personal factors that may impact the plan of care Co-morbidities:   high BMI and prior lumbar surgery    Personal Factors:   no deficits     moderate   Examination  Body Structures and Functions, activity limitations and participation restrictions  that may impact the plan of care Body Regions:   back  lower extremities    Body Systems:    ROM  strength    Participation Restrictions:   none    Activity limitations:   Learning and applying knowledge  no deficits    General Tasks and Commands  no deficits    Communication  no deficits    Mobility  walking    Self care  no deficits    Domestic Life  no deficits    Interactions/Relationships  no deficits    Life Areas  no deficits    Community and Social Life  no deficits         moderate   Clinical Presentation stable and uncomplicated low   Decision Making/ Complexity Score: low       Goals:  Short Term Goals (STG) # weeks Goal Review Date Reviewed Date Met   1. The patient will begin a written HEP 1 Initial 1/9/2023    2. Increase hamstring flexibility to 45* 3 Initial 1/9/2023    3. Decrease soft tissue tenderness to moderate 3 Initial 1/9/2023        Long Term Goals (LTG) # weeks Goal Review Date Reviewed Date Met   1. The patient will be independent with his HEP for maintenance 6 Initial 1/9/2023    2. Increase hamstring flexibility to 60* 6 Initial 1/9/2023    3. Decrease FOTO score to 38% 6 Initial 1/9/2023    4. Decrease soft tissue tenderness to mild 6 Inital 1/9/2023    5. The patient will be able to ascend/descend 20 step/stairs without onset of symptoms.   6 Initial 1/9/2023        PLAN   Plan of care Certification: 1/9/2023 to 3/3/2023.    Outpatient Physical Therapy 2 times weekly for 6 weeks to include the following interventions: Electrical Stimulation IFC/IMS, Manual Therapy, Neuromuscular Re-ed, Patient Education, Therapeutic Activities, Therapeutic Exercise, and Dry needling.     Dejan Fabian, PT      I CERTIFY THE NEED FOR THESE SERVICES FURNISHED UNDER THIS PLAN OF TREATMENT AND WHILE UNDER MY CARE   Physician's comments:     Physician's Signature: ___________________________________________________

## 2023-01-11 DIAGNOSIS — E11.9 TYPE 2 DIABETES MELLITUS WITHOUT COMPLICATION: ICD-10-CM

## 2023-01-17 ENCOUNTER — CLINICAL SUPPORT (OUTPATIENT)
Dept: REHABILITATION | Facility: HOSPITAL | Age: 68
End: 2023-01-17
Attending: ORTHOPAEDIC SURGERY
Payer: MEDICARE

## 2023-01-17 ENCOUNTER — PATIENT MESSAGE (OUTPATIENT)
Dept: ADMINISTRATIVE | Facility: HOSPITAL | Age: 68
End: 2023-01-17
Payer: MEDICARE

## 2023-01-17 DIAGNOSIS — R29.898 IMPAIRED FLEXIBILITY OF LOWER EXTREMITY: ICD-10-CM

## 2023-01-17 DIAGNOSIS — Z98.1 S/P LUMBAR FUSION: Primary | ICD-10-CM

## 2023-01-17 PROCEDURE — 97110 THERAPEUTIC EXERCISES: CPT | Mod: HCNC,PN | Performed by: PHYSICAL THERAPIST

## 2023-01-17 PROCEDURE — 97112 NEUROMUSCULAR REEDUCATION: CPT | Mod: HCNC,PN | Performed by: PHYSICAL THERAPIST

## 2023-01-17 NOTE — PROGRESS NOTES
[facility]   Physical Therapy Treatment Note     Name: Edie RICCI [facility] Number: [id number]    Therapy Diagnosis:   Encounter Diagnoses   Name Primary?    S/P lumbar fusion Yes    Impaired flexibility of lower extremity      Physician: Everton Godinez MD    Visit Date: 1/17/2023    Physician Orders: PT Eval and Treat   Medical Diagnosis from Referral: S/P lumbar fusion  Evaluation Date: 1/9/2023  Authorization Period Expiration: 6/7/2023  Plan of Care Expiration: 3/3/2023  Visit # / Visits authorized: 1/ 20   (POC 2/12)   FOTO Due visit 5  FOTO Due visit 10      Time In: 1000  Time Out: 1100  Total Billable Time: 30 minutes    PTA Visit #: 0/5     Precautions: Standard and S/P lumbar fusion    Insurance: Payor: [employer] MANAGED MEDICARE / Plan: [employer] MEDICARE HMO / Product Type: Capitation /     SUBJECTIVE     Pt reports: stiffness and soreness today. Had soreness after evaluation  She was compliant with home exercise program.  Response to previous treatment: soreness 2  Functional change: not at this time    Pain: 4/10  Location: bilateral lumbar      OBJECTIVE     Objective Measures updated at progress report unless specified.     Treatment       Pat received the treatments listed below:      THERAPEUTIC EXERCISES to develop strength, endurance, ROM, flexibility, and core stabilization for 30 minutes including :    Seated Hamstring stretch 3 x 30 sec B  Seated piriformis stretch 3 x 30 sec B  Seated Gastroc-soleus stretch 3 x 30 sec B  Seated adductor squeeze 3 x 10  Seated hip abduction 3 x 10 Green theraband  Seated Physioball crunch 3 x 10  Seated Physioball oblique crunch 3 x 10  Seated sciatic nerve glide 2 x 10    Stationary bike 5 min level 1 seat 10    NEUROMUSCULAR RE-EDUCATION ACTIVITIES to improve Kinesthetic, Sense, Proprioception, and Posture for 30 minutes.  The following were included: :.     Standing hip abduction 3 x 10 on 2 inch step B  Standing hip extension 3 x  10 B  Standing hip flexion 3 x 10 B  Hip hikes 3 x 10 B  Lateral step ups 3 x 10 B  Forward step ups 3 x 10    Patient Education and Home Exercises     Home Exercises Provided and Patient Education Provided     Education provided:   - Flexibility exercises be performed daily    Written Home Exercises Provided: Patient instructed to cont prior HEP. Exercises were reviewed and Pat was able to demonstrate them prior to the end of the session.  Pat demonstrated good  understanding of the education provided. See EMR under Patient Instructions for exercises provided during therapy sessions    ASSESSMENT     The patient displayed good effort with all exercises. She required verbal cues with hip hikes to maintain knee extension    Pat Is progressing well towards her goals.   Pt prognosis is Good.     Pt will continue to benefit from skilled outpatient physical therapy to address the deficits listed in the problem list box on initial evaluation, provide pt/family education and to maximize pt's level of independence in the home and community environment.     Pt's spiritual, cultural and educational needs considered and pt agreeable to plan of care and goals.     Anticipated barriers to physical therapy: none    Goals:   Short Term Goals (STG) # weeks Goal Review Date Reviewed Date Met   1. The patient will begin a written HEP 1 progressing 1/17/2023       2. Increase hamstring flexibility to 45* 3 progressing 1/17/2023       3. Decrease soft tissue tenderness to moderate 3 progressing 1/17/2023             Long Term Goals (LTG) # weeks Goal Review Date Reviewed Date Met   1. The patient will be independent with his HEP for maintenance 6 progressing 1/17/2023       2. Increase hamstring flexibility to 60* 6 progressing 1/17/2023       3. Decrease FOTO score to 38% 6 progressing 1/17/2023       4. Decrease soft tissue tenderness to mild 6 progressing 1/17/2023       5. The patient will be able to ascend/descend 20 step/stairs  without onset of symptoms.    6 progressing 1/17/2023           PLAN     Continue with physical therapy as per plan of care      Dejan Fabian, PT

## 2023-01-19 ENCOUNTER — CLINICAL SUPPORT (OUTPATIENT)
Dept: REHABILITATION | Facility: HOSPITAL | Age: 68
End: 2023-01-19
Attending: ORTHOPAEDIC SURGERY
Payer: MEDICARE

## 2023-01-19 DIAGNOSIS — Z98.1 S/P LUMBAR FUSION: Primary | ICD-10-CM

## 2023-01-19 DIAGNOSIS — R29.898 IMPAIRED FLEXIBILITY OF LOWER EXTREMITY: ICD-10-CM

## 2023-01-19 PROCEDURE — 97110 THERAPEUTIC EXERCISES: CPT | Mod: HCNC,PN,CQ

## 2023-01-19 PROCEDURE — 97112 NEUROMUSCULAR REEDUCATION: CPT | Mod: HCNC,PN,CQ

## 2023-01-19 NOTE — PROGRESS NOTES
"[facility]   Physical Therapy Treatment Note     Name: Edie RICCI [facility] Number: [id number]    Therapy Diagnosis:   Encounter Diagnoses   Name Primary?    S/P lumbar fusion Yes    Impaired flexibility of lower extremity      Physician: Everton Godinez MD    Visit Date: 1/19/2023    Physician Orders: PT Eval and Treat   Medical Diagnosis from Referral: S/P lumbar fusion  Evaluation Date: 1/9/2023  Authorization Period Expiration: 6/7/2023  Plan of Care Expiration: 3/3/2023  Visit # / Visits authorized: 2/ 20   (POC 3/12)   FOTO Due visit 5  FOTO Due visit 10      Time In: 1002  Time Out: 1055  Total Billable Time: 53 minutes       Precautions: Standard and S/P lumbar fusion    Insurance: Payor: [employer] MANAGED MEDICARE / Plan: [employer] MEDICARE HMO / Product Type: Capitation /     SUBJECTIVE     Pt reports: pain down L LE, "it's that sciatica"  She was compliant with home exercise program.  Response to previous treatment: sore  Functional change: none today    Pain: 4/10  Location: bilateral lumbar      OBJECTIVE     Objective Measures updated at progress report unless specified.     Treatment     Pat received the treatments listed below:      THERAPEUTIC EXERCISES to develop strength, endurance, Range of Motion, flexibility, and core stabilization for 28 minutes including:     Seated Hamstring stretch 3 x 30 sec Bilateral   Seated piriformis stretch 3 x 30 sec Bilateral   Seated Gastroc-soleus stretch 3 x 30 sec Bilateral   Seated adductor squeeze 3 x 10  Seated hip abduction 3 x 10 Green theraband  Seated Physioball crunch 3 x 10  Seated Physioball oblique crunch 3 x 10  Seated sciatic nerve glide 3 x 10 Left     Stationary bike 5 min level 1 seat 9    NEUROMUSCULAR RE-EDUCATION ACTIVITIES to improve Kinesthetic, Sense, Proprioception, and posture for 25 minutes:    Standing hip abduction 2 x 10 on 2 inch step Bilateral   Standing hip extension 2 x 10 Bilateral   Standing hip flexion " 2 x 10 Bilateral   Hip hikes 2 x 10 Bilateral   Lateral step ups 3 x 10 B  Forward step ups 2 x 10 Bilateral     Patient Education and Home Exercises     Home Exercises Provided and Patient Education Provided     Education provided:   - Flexibility exercises be performed daily    Written Home Exercises Provided: Patient instructed to cont prior HEP. Exercises were reviewed and Pat was able to demonstrate them prior to the end of the session.  Pat demonstrated good  understanding of the education provided. See EMR under Patient Instructions for exercises provided during therapy sessions    ASSESSMENT     Pat present with decreased core, Low Back, and hip strength which contributes to her Low back pain and dysfunction.  She maintains a flexed trunk posture decreased endurance, rest breaks needed.  Continued posture education, core and Low Back strength.     Pat Is progressing well towards her goals.   Pt prognosis is Good.     Pt will continue to benefit from skilled outpatient physical therapy to address the deficits listed in the problem list box on initial evaluation, provide pt/family education and to maximize pt's level of independence in the home and community environment.     Pt's spiritual, cultural and educational needs considered and pt agreeable to plan of care and goals.     Anticipated barriers to physical therapy: none    Goals:   Short Term Goals (STG) # weeks Goal Review Date Reviewed Date Met   1. The patient will begin a written HEP 1 progressing 1/19/2023       2. Increase hamstring flexibility to 45* 3 progressing 1/19/2023       3. Decrease soft tissue tenderness to moderate 3 progressing 1/19/2023             Long Term Goals (LTG) # weeks Goal Review Date Reviewed Date Met   1. The patient will be independent with his HEP for maintenance 6 progressing 1/19/2023       2. Increase hamstring flexibility to 60* 6 progressing 1/19/2023       3. Decrease FOTO score to 38% 6 progressing 1/19/2023        4. Decrease soft tissue tenderness to mild 6 progressing 1/19/2023       5. The patient will be able to ascend/descend 20 step/stairs without onset of symptoms.    6 progressing 1/19/2023           PLAN     Continue with physical therapy as per plan of care      Lyla Ibanez, PTA

## 2023-01-20 ENCOUNTER — DOCUMENTATION ONLY (OUTPATIENT)
Dept: REHABILITATION | Facility: HOSPITAL | Age: 68
End: 2023-01-20
Payer: MEDICARE

## 2023-01-20 NOTE — PROGRESS NOTES
30 day visit PT-PTA face-face discussion with LUIS Fabian re: patient status, POC, and plan for progression done 1/19/23.  Lyla Ibanez, PTA

## 2023-01-26 ENCOUNTER — OFFICE VISIT (OUTPATIENT)
Dept: UROGYNECOLOGY | Facility: CLINIC | Age: 68
End: 2023-01-26
Payer: MEDICARE

## 2023-01-26 VITALS
DIASTOLIC BLOOD PRESSURE: 72 MMHG | HEIGHT: 65 IN | SYSTOLIC BLOOD PRESSURE: 145 MMHG | WEIGHT: 261 LBS | BODY MASS INDEX: 43.49 KG/M2 | RESPIRATION RATE: 18 BRPM | HEART RATE: 68 BPM

## 2023-01-26 DIAGNOSIS — R35.0 URINARY FREQUENCY: Primary | ICD-10-CM

## 2023-01-26 DIAGNOSIS — R39.89 CYSTALGIA: ICD-10-CM

## 2023-01-26 DIAGNOSIS — N95.2 ATROPHIC VAGINITIS: ICD-10-CM

## 2023-01-26 DIAGNOSIS — N30.90 CYSTITIS: ICD-10-CM

## 2023-01-26 LAB
BILIRUBIN, UA POC OHS: NEGATIVE
BLOOD, UA POC OHS: NEGATIVE
CLARITY, UA POC OHS: CLEAR
COLOR, UA POC OHS: YELLOW
GLUCOSE, UA POC OHS: NEGATIVE
KETONES, UA POC OHS: NEGATIVE
LEUKOCYTES, UA POC OHS: ABNORMAL
NITRITE, UA POC OHS: NEGATIVE
PH, UA POC OHS: 7.5
PROTEIN, UA POC OHS: NEGATIVE
SPECIFIC GRAVITY, UA POC OHS: 1.01
UROBILINOGEN, UA POC OHS: 0.2

## 2023-01-26 PROCEDURE — 81003 URINALYSIS AUTO W/O SCOPE: CPT | Mod: QW,HCNC,S$GLB, | Performed by: NURSE PRACTITIONER

## 2023-01-26 PROCEDURE — 99214 OFFICE O/P EST MOD 30 MIN: CPT | Mod: HCNC,25,S$GLB, | Performed by: NURSE PRACTITIONER

## 2023-01-26 PROCEDURE — 1101F PR PT FALLS ASSESS DOC 0-1 FALLS W/OUT INJ PAST YR: ICD-10-PCS | Mod: HCNC,CPTII,S$GLB, | Performed by: NURSE PRACTITIONER

## 2023-01-26 PROCEDURE — 3078F PR MOST RECENT DIASTOLIC BLOOD PRESSURE < 80 MM HG: ICD-10-PCS | Mod: HCNC,CPTII,S$GLB, | Performed by: NURSE PRACTITIONER

## 2023-01-26 PROCEDURE — 81003 POCT URINALYSIS(INSTRUMENT): ICD-10-PCS | Mod: QW,HCNC,S$GLB, | Performed by: NURSE PRACTITIONER

## 2023-01-26 PROCEDURE — 3077F PR MOST RECENT SYSTOLIC BLOOD PRESSURE >= 140 MM HG: ICD-10-PCS | Mod: HCNC,CPTII,S$GLB, | Performed by: NURSE PRACTITIONER

## 2023-01-26 PROCEDURE — 1125F PR PAIN SEVERITY QUANTIFIED, PAIN PRESENT: ICD-10-PCS | Mod: HCNC,CPTII,S$GLB, | Performed by: NURSE PRACTITIONER

## 2023-01-26 PROCEDURE — 3008F BODY MASS INDEX DOCD: CPT | Mod: HCNC,CPTII,S$GLB, | Performed by: NURSE PRACTITIONER

## 2023-01-26 PROCEDURE — 3077F SYST BP >= 140 MM HG: CPT | Mod: HCNC,CPTII,S$GLB, | Performed by: NURSE PRACTITIONER

## 2023-01-26 PROCEDURE — 99214 PR OFFICE/OUTPT VISIT, EST, LEVL IV, 30-39 MIN: ICD-10-PCS | Mod: HCNC,25,S$GLB, | Performed by: NURSE PRACTITIONER

## 2023-01-26 PROCEDURE — 3078F DIAST BP <80 MM HG: CPT | Mod: HCNC,CPTII,S$GLB, | Performed by: NURSE PRACTITIONER

## 2023-01-26 PROCEDURE — 4010F ACE/ARB THERAPY RXD/TAKEN: CPT | Mod: HCNC,CPTII,S$GLB, | Performed by: NURSE PRACTITIONER

## 2023-01-26 PROCEDURE — 51700 PR IRRIGATION, BLADDER: ICD-10-PCS | Mod: HCNC,S$GLB,, | Performed by: NURSE PRACTITIONER

## 2023-01-26 PROCEDURE — 51700 IRRIGATION OF BLADDER: CPT | Mod: HCNC,S$GLB,, | Performed by: NURSE PRACTITIONER

## 2023-01-26 PROCEDURE — 4010F PR ACE/ARB THEARPY RXD/TAKEN: ICD-10-PCS | Mod: HCNC,CPTII,S$GLB, | Performed by: NURSE PRACTITIONER

## 2023-01-26 PROCEDURE — 3008F PR BODY MASS INDEX (BMI) DOCUMENTED: ICD-10-PCS | Mod: HCNC,CPTII,S$GLB, | Performed by: NURSE PRACTITIONER

## 2023-01-26 PROCEDURE — 3288F FALL RISK ASSESSMENT DOCD: CPT | Mod: HCNC,CPTII,S$GLB, | Performed by: NURSE PRACTITIONER

## 2023-01-26 PROCEDURE — 1125F AMNT PAIN NOTED PAIN PRSNT: CPT | Mod: HCNC,CPTII,S$GLB, | Performed by: NURSE PRACTITIONER

## 2023-01-26 PROCEDURE — 1160F RVW MEDS BY RX/DR IN RCRD: CPT | Mod: HCNC,CPTII,S$GLB, | Performed by: NURSE PRACTITIONER

## 2023-01-26 PROCEDURE — 99999 PR PBB SHADOW E&M-EST. PATIENT-LVL V: CPT | Mod: PBBFAC,HCNC,, | Performed by: NURSE PRACTITIONER

## 2023-01-26 PROCEDURE — 1159F MED LIST DOCD IN RCRD: CPT | Mod: HCNC,CPTII,S$GLB, | Performed by: NURSE PRACTITIONER

## 2023-01-26 PROCEDURE — 1159F PR MEDICATION LIST DOCUMENTED IN MEDICAL RECORD: ICD-10-PCS | Mod: HCNC,CPTII,S$GLB, | Performed by: NURSE PRACTITIONER

## 2023-01-26 PROCEDURE — 1101F PT FALLS ASSESS-DOCD LE1/YR: CPT | Mod: HCNC,CPTII,S$GLB, | Performed by: NURSE PRACTITIONER

## 2023-01-26 PROCEDURE — 1160F PR REVIEW ALL MEDS BY PRESCRIBER/CLIN PHARMACIST DOCUMENTED: ICD-10-PCS | Mod: HCNC,CPTII,S$GLB, | Performed by: NURSE PRACTITIONER

## 2023-01-26 PROCEDURE — 99999 PR PBB SHADOW E&M-EST. PATIENT-LVL V: ICD-10-PCS | Mod: PBBFAC,HCNC,, | Performed by: NURSE PRACTITIONER

## 2023-01-26 PROCEDURE — 3288F PR FALLS RISK ASSESSMENT DOCUMENTED: ICD-10-PCS | Mod: HCNC,CPTII,S$GLB, | Performed by: NURSE PRACTITIONER

## 2023-01-26 RX ORDER — GENTAMICIN SULFATE 40 MG/ML
80 INJECTION, SOLUTION INTRAMUSCULAR; INTRAVENOUS ONCE
Status: COMPLETED | OUTPATIENT
Start: 2023-01-26 | End: 2023-01-26

## 2023-01-26 RX ORDER — LIDOCAINE HYDROCHLORIDE 20 MG/ML
20 INJECTION, SOLUTION INFILTRATION; PERINEURAL
Status: COMPLETED | OUTPATIENT
Start: 2023-01-26 | End: 2023-01-26

## 2023-01-26 RX ORDER — LIDOCAINE HYDROCHLORIDE 20 MG/ML
20 INJECTION, SOLUTION EPIDURAL; INFILTRATION; INTRACAUDAL; PERINEURAL
Status: DISCONTINUED | OUTPATIENT
Start: 2023-01-26 | End: 2023-01-26

## 2023-01-26 RX ORDER — HEPARIN SODIUM 10000 [USP'U]/ML
20000 INJECTION, SOLUTION INTRAVENOUS; SUBCUTANEOUS ONCE
Status: COMPLETED | OUTPATIENT
Start: 2023-01-26 | End: 2023-01-26

## 2023-01-26 RX ADMIN — GENTAMICIN SULFATE 80 MG: 40 INJECTION, SOLUTION INTRAMUSCULAR; INTRAVENOUS at 11:01

## 2023-01-26 RX ADMIN — LIDOCAINE HYDROCHLORIDE 20 ML: 20 INJECTION, SOLUTION INFILTRATION; PERINEURAL at 11:01

## 2023-01-26 RX ADMIN — HEPARIN SODIUM 20000 UNITS: 10000 INJECTION, SOLUTION INTRAVENOUS; SUBCUTANEOUS at 11:01

## 2023-01-26 NOTE — PROGRESS NOTES
Patient, Edie Hernandez (MRN #[id number]), presented with a recorded BMI of 43.43 kg/m^2 consistent with the definition of morbid obesity (ICD-10 E66.01). The patient's morbid obesity was monitored, evaluated, addressed and/or treated. This addendum to the medical record is made on 01/26/2023.

## 2023-01-26 NOTE — PROGRESS NOTES
Subjective:       Patient ID: Edie Hernandez is a 67 y.o. female.    Chief Complaint: instillation      Edie Hernandez is a 67 y.o. female.who presents today for instillation.  She was last seen on 11/02/22.  She felt she was doing OK.  She had spinal fusion with Dr. Godinez on 11/08/22.  She states she was loaded up with antibiotics so she hasn't had any UTI's since.  She did drink a glass of tea the other day and this immediately started to bother her bladder. She has not had another tea since this incident and feels that her bladder is doing well.  She denies any other acute complaints/concerns at this time and is ready to proceed with the instillation.      Review of Systems   Constitutional:  Negative for activity change, fever and unexpected weight change.   HENT:  Negative for hearing loss.    Eyes:  Negative for visual disturbance.   Respiratory:  Negative for shortness of breath and wheezing.    Cardiovascular:  Negative for chest pain, palpitations and leg swelling.   Gastrointestinal:  Negative for abdominal pain, constipation and diarrhea.   Genitourinary:  Positive for frequency. Negative for dyspareunia, dysuria, urgency, vaginal bleeding and vaginal discharge.   Musculoskeletal:  Negative for gait problem and neck pain.   Skin:  Negative for rash and wound.   Allergic/Immunologic: Negative for immunocompromised state.   Neurological:  Negative for tremors, speech difficulty and weakness.   Hematological:  Does not bruise/bleed easily.   Psychiatric/Behavioral:  Negative for agitation and confusion.      Objective:      Physical Exam  Vitals reviewed. Exam conducted with a chaperone present.   Constitutional:       General: She is not in acute distress.     Appearance: She is well-developed.   HENT:      Head: Normocephalic and atraumatic.   Neck:      Thyroid: No thyromegaly.   Pulmonary:      Effort: Pulmonary effort is normal. No respiratory distress.   Abdominal:      Palpations: Abdomen is  soft.      Tenderness: There is no abdominal tenderness.      Hernia: No hernia is present.   Musculoskeletal:         General: Normal range of motion.      Cervical back: Normal range of motion.      Comments: Tenderness and mild decrease in mobility while recovering from spinal fusion.     Skin:     General: Skin is warm and dry.      Findings: No rash.   Neurological:      Mental Status: She is alert and oriented to person, place, and time.   Psychiatric:         Mood and Affect: Mood normal.         Behavior: Behavior normal.         Thought Content: Thought content normal.     Pelvic Exam:  V: No lesions. No palpable nodes.   Meatus:No caruncle or stenosis  Urethra: Non tender. No suburethral masses.  BL: Non tender  RV: No hemorrhoids.      Assessment:       1. Urinary frequency    2. Cystitis    3. Cystalgia    4. Atrophic vaginitis          Procedure note- After betadine irrigation of the urethra, lidocaine instilled via urojet.   A #14 Kinyarwanda red rubber tip catheter was inserted into the bladder. 30 mls of residual urine noted.  80mg of gentamicin,  20,000 units of heparin and 20 mls of 2% lidocaine instilled into bladder.  Pt instructed to hold mixture for at least 1 hour prior to voiding.  Pt verbalized understanding.      Plan:       Urinary frequency- monitor  -     POCT Urinalysis(Instrument)    Cystitis- instillation as noted above  -     LIDOcaine (PF) 20 mg/ml (2%) injection 400 mg  -     heparin (porcine) injection 20,000 Units  -     gentamicin injection 80 mg    Cystalgia- monitor    Atrophic vaginitis- monitor    RTC 6 weeks

## 2023-02-02 ENCOUNTER — PES CALL (OUTPATIENT)
Dept: ADMINISTRATIVE | Facility: CLINIC | Age: 68
End: 2023-02-02
Payer: MEDICARE

## 2023-02-08 ENCOUNTER — TELEPHONE (OUTPATIENT)
Dept: ORTHOPEDICS | Facility: CLINIC | Age: 68
End: 2023-02-08

## 2023-02-08 DIAGNOSIS — Z98.1 S/P LUMBAR FUSION: Primary | ICD-10-CM

## 2023-02-08 DIAGNOSIS — M48.062 LUMBAR STENOSIS WITH NEUROGENIC CLAUDICATION: ICD-10-CM

## 2023-02-08 RX ORDER — HYDROCODONE BITARTRATE AND ACETAMINOPHEN 10; 325 MG/1; MG/1
1 TABLET ORAL EVERY 8 HOURS PRN
Qty: 21 TABLET | Refills: 0 | Status: SHIPPED | OUTPATIENT
Start: 2023-02-08 | End: 2023-02-24

## 2023-02-08 NOTE — TELEPHONE ENCOUNTER
Last office visit - 1/4/23   L3-4, L4-5 ALIF 11/08/22  Plan : (Analilia) Unfortunately she has some poor or limited endurance of her lumbar spine as well as her lower extremities in general.  She will be referred to physical therapy to work on this to 3 times a week for 4-6 weeks.  We will again try Lyrica 100 mg q.h.s. to help with the lower extremity radicular pain at night.  Follow-up with us in 6 weeks or sooner if needed.  Discontinue Percocet and switch to Norco 10/325 q.6 hours p.r.n..                              ----- Message from Dat Membreno sent at 2/8/2023  9:28 AM CST -----  Phone #: [phone]  Patient is requesting a refill of Norco 10-325mg                        Pharmacy:   [facility] - [place] - [address]  [address] [address]  Phone: [phone] Fax: [phone]

## 2023-02-09 ENCOUNTER — TELEPHONE (OUTPATIENT)
Dept: UROGYNECOLOGY | Facility: CLINIC | Age: 68
End: 2023-02-09
Payer: MEDICARE

## 2023-02-09 ENCOUNTER — CLINICAL SUPPORT (OUTPATIENT)
Dept: UROGYNECOLOGY | Facility: CLINIC | Age: 68
End: 2023-02-09
Payer: MEDICARE

## 2023-02-09 DIAGNOSIS — Z00.00 ENCOUNTER FOR MEDICARE ANNUAL WELLNESS EXAM: ICD-10-CM

## 2023-02-09 DIAGNOSIS — R35.0 URINARY FREQUENCY: Primary | ICD-10-CM

## 2023-02-09 LAB
BILIRUBIN, UA POC OHS: NEGATIVE
BLOOD, UA POC OHS: ABNORMAL
CLARITY, UA POC OHS: ABNORMAL
COLOR, UA POC OHS: YELLOW
GLUCOSE, UA POC OHS: NEGATIVE
KETONES, UA POC OHS: NEGATIVE
LEUKOCYTES, UA POC OHS: ABNORMAL
NITRITE, UA POC OHS: POSITIVE
PH, UA POC OHS: 7
PROTEIN, UA POC OHS: ABNORMAL
SPECIFIC GRAVITY, UA POC OHS: 1.01
UROBILINOGEN, UA POC OHS: 0.2

## 2023-02-09 PROCEDURE — 99499 NO LOS: ICD-10-PCS | Mod: HCNC,S$GLB,, | Performed by: NURSE PRACTITIONER

## 2023-02-09 PROCEDURE — 81003 POCT URINALYSIS(INSTRUMENT): ICD-10-PCS | Mod: QW,HCNC,S$GLB, | Performed by: NURSE PRACTITIONER

## 2023-02-09 PROCEDURE — 87086 URINE CULTURE/COLONY COUNT: CPT | Mod: HCNC | Performed by: NURSE PRACTITIONER

## 2023-02-09 PROCEDURE — 99499 UNLISTED E&M SERVICE: CPT | Mod: HCNC,S$GLB,, | Performed by: NURSE PRACTITIONER

## 2023-02-09 PROCEDURE — 87077 CULTURE AEROBIC IDENTIFY: CPT | Mod: HCNC | Performed by: NURSE PRACTITIONER

## 2023-02-09 PROCEDURE — 81003 URINALYSIS AUTO W/O SCOPE: CPT | Mod: QW,HCNC,S$GLB, | Performed by: NURSE PRACTITIONER

## 2023-02-09 PROCEDURE — 87186 SC STD MICRODIL/AGAR DIL: CPT | Mod: HCNC | Performed by: NURSE PRACTITIONER

## 2023-02-09 PROCEDURE — 87088 URINE BACTERIA CULTURE: CPT | Mod: HCNC | Performed by: NURSE PRACTITIONER

## 2023-02-09 RX ORDER — SULFAMETHOXAZOLE AND TRIMETHOPRIM 800; 160 MG/1; MG/1
1 TABLET ORAL 2 TIMES DAILY
Qty: 10 TABLET | Refills: 0 | Status: SHIPPED | OUTPATIENT
Start: 2023-02-09 | End: 2023-02-14

## 2023-02-09 NOTE — TELEPHONE ENCOUNTER
----- Message from Lionel Scruggs sent at 2/9/2023 10:36 AM CST -----  Regarding: Advice  Contact: Patient  Type:  Needs Medical Advice    Who Called: patient  Symptoms (please be specific): Infection is back   How long has patient had these symptoms:    Pharmacy name and phone #:    Would the patient rather a call back or a response via [facility]? call  Best Call Back Number: [phone]    Additional Information: Patient called requesting a appointment due to her infection is back. Please call patient to advise.

## 2023-02-12 LAB — BACTERIA UR CULT: ABNORMAL

## 2023-02-13 PROBLEM — N17.9 AKI (ACUTE KIDNEY INJURY): Status: RESOLVED | Noted: 2022-11-09 | Resolved: 2023-02-13

## 2023-02-14 ENCOUNTER — EXTERNAL HOME HEALTH (OUTPATIENT)
Dept: HOME HEALTH SERVICES | Facility: HOSPITAL | Age: 68
End: 2023-02-14
Payer: MEDICARE

## 2023-02-15 ENCOUNTER — OFFICE VISIT (OUTPATIENT)
Dept: ORTHOPEDICS | Facility: CLINIC | Age: 68
End: 2023-02-15
Payer: MEDICARE

## 2023-02-15 VITALS
HEIGHT: 65 IN | BODY MASS INDEX: 43.49 KG/M2 | DIASTOLIC BLOOD PRESSURE: 74 MMHG | WEIGHT: 261 LBS | SYSTOLIC BLOOD PRESSURE: 128 MMHG

## 2023-02-15 DIAGNOSIS — Z98.1 S/P LUMBAR FUSION: Primary | ICD-10-CM

## 2023-02-15 PROCEDURE — 4010F PR ACE/ARB THEARPY RXD/TAKEN: ICD-10-PCS | Mod: CPTII,S$GLB,, | Performed by: ORTHOPAEDIC SURGERY

## 2023-02-15 PROCEDURE — 4010F ACE/ARB THERAPY RXD/TAKEN: CPT | Mod: CPTII,S$GLB,, | Performed by: ORTHOPAEDIC SURGERY

## 2023-02-15 PROCEDURE — 1160F RVW MEDS BY RX/DR IN RCRD: CPT | Mod: CPTII,S$GLB,, | Performed by: ORTHOPAEDIC SURGERY

## 2023-02-15 PROCEDURE — 3078F PR MOST RECENT DIASTOLIC BLOOD PRESSURE < 80 MM HG: ICD-10-PCS | Mod: CPTII,S$GLB,, | Performed by: ORTHOPAEDIC SURGERY

## 2023-02-15 PROCEDURE — 1101F PR PT FALLS ASSESS DOC 0-1 FALLS W/OUT INJ PAST YR: ICD-10-PCS | Mod: CPTII,S$GLB,, | Performed by: ORTHOPAEDIC SURGERY

## 2023-02-15 PROCEDURE — 3008F BODY MASS INDEX DOCD: CPT | Mod: CPTII,S$GLB,, | Performed by: ORTHOPAEDIC SURGERY

## 2023-02-15 PROCEDURE — 99213 PR OFFICE/OUTPT VISIT, EST, LEVL III, 20-29 MIN: ICD-10-PCS | Mod: S$GLB,,, | Performed by: ORTHOPAEDIC SURGERY

## 2023-02-15 PROCEDURE — 1160F PR REVIEW ALL MEDS BY PRESCRIBER/CLIN PHARMACIST DOCUMENTED: ICD-10-PCS | Mod: CPTII,S$GLB,, | Performed by: ORTHOPAEDIC SURGERY

## 2023-02-15 PROCEDURE — 1125F AMNT PAIN NOTED PAIN PRSNT: CPT | Mod: CPTII,S$GLB,, | Performed by: ORTHOPAEDIC SURGERY

## 2023-02-15 PROCEDURE — 3074F SYST BP LT 130 MM HG: CPT | Mod: CPTII,S$GLB,, | Performed by: ORTHOPAEDIC SURGERY

## 2023-02-15 PROCEDURE — 1159F MED LIST DOCD IN RCRD: CPT | Mod: CPTII,S$GLB,, | Performed by: ORTHOPAEDIC SURGERY

## 2023-02-15 PROCEDURE — 3074F PR MOST RECENT SYSTOLIC BLOOD PRESSURE < 130 MM HG: ICD-10-PCS | Mod: CPTII,S$GLB,, | Performed by: ORTHOPAEDIC SURGERY

## 2023-02-15 PROCEDURE — 3078F DIAST BP <80 MM HG: CPT | Mod: CPTII,S$GLB,, | Performed by: ORTHOPAEDIC SURGERY

## 2023-02-15 PROCEDURE — 3008F PR BODY MASS INDEX (BMI) DOCUMENTED: ICD-10-PCS | Mod: CPTII,S$GLB,, | Performed by: ORTHOPAEDIC SURGERY

## 2023-02-15 PROCEDURE — 1159F PR MEDICATION LIST DOCUMENTED IN MEDICAL RECORD: ICD-10-PCS | Mod: CPTII,S$GLB,, | Performed by: ORTHOPAEDIC SURGERY

## 2023-02-15 PROCEDURE — 1101F PT FALLS ASSESS-DOCD LE1/YR: CPT | Mod: CPTII,S$GLB,, | Performed by: ORTHOPAEDIC SURGERY

## 2023-02-15 PROCEDURE — 3288F PR FALLS RISK ASSESSMENT DOCUMENTED: ICD-10-PCS | Mod: CPTII,S$GLB,, | Performed by: ORTHOPAEDIC SURGERY

## 2023-02-15 PROCEDURE — 1125F PR PAIN SEVERITY QUANTIFIED, PAIN PRESENT: ICD-10-PCS | Mod: CPTII,S$GLB,, | Performed by: ORTHOPAEDIC SURGERY

## 2023-02-15 PROCEDURE — 3288F FALL RISK ASSESSMENT DOCD: CPT | Mod: CPTII,S$GLB,, | Performed by: ORTHOPAEDIC SURGERY

## 2023-02-15 PROCEDURE — 99213 OFFICE O/P EST LOW 20 MIN: CPT | Mod: S$GLB,,, | Performed by: ORTHOPAEDIC SURGERY

## 2023-02-15 NOTE — PROGRESS NOTES
Subjective:       Patient ID: Edie Hernandez is a 67 y.o. female.    Chief Complaint: Pain of the Lumbar Spine (s/p L3-4, L4-5 ALIF 11/08/22, doing well)      History of Present Illness    Prior to meeting with the patient I reviewed the medical chart in [facility]. This included reviewing the previous progress notes from our office, review of the patient's last appointment with their primary care provider, review of any visits to the emergency room, and review of any pain management appointments or procedures.   Patient is here 3 months status post L3-4 and L4-5 anterior lumbar interbody fusion.  Overall she is doing much better than prior to her surgery.  Her functional mobility is improved significantly.    Current Medications  Current Outpatient Medications   Medication Sig Dispense Refill    azelastine (ASTELIN) 137 mcg (0.1 %) nasal spray 1 spray (137 mcg total) by Nasal route 2 (two) times daily. (Patient taking differently: 1 spray by Nasal route 2 (two) times daily. PRN) 30 mL 11    clobetasol (TEMOVATE) 0.05 % cream Apply 1 application topically 2 (two) times daily. Apply to affected area 30 g 2    cyanocobalamin (VITAMIN B-12) 100 MCG tablet Take 100 mcg by mouth once daily.      cyclobenzaprine (FLEXERIL) 5 MG tablet TAKE 1 TO 2 TABLETS BY MOUTH NIGHTLY AT BEDTIME AS NEEDED FOR PAIN 90 tablet prn    diclofenac sodium (VOLTAREN) 1 % Gel Apply 2 g topically 4 (four) times daily. 450 g 0    ergocalciferol (ERGOCALCIFEROL) 50,000 unit Cap TAKE 1 CAPSULE BY MOUTH ONE TIME PER WEEK 12 capsule 1    estradioL (ESTRACE) 0.01 % (0.1 mg/gram) vaginal cream Apply 1 fingertipful to vaginal area nightly x 2 weeks then use on MWF 42.5 g 3    fluticasone propionate (FLONASE) 50 mcg/actuation nasal spray 1 spray (50 mcg total) by Each Nostril route daily as needed for Allergies. 16 g prn    furosemide (LASIX) 20 MG tablet Take 1 tablet (20 mg total) by mouth daily as needed (leg swelling). 60 tablet 0     HYDROcodone-acetaminophen (NORCO)  mg per tablet Take 1 tablet by mouth every 8 (eight) hours as needed for Pain. 21 tablet 0    lancets (ACCU-CHEK MULTICLIX LANCET) Misc Test 2 x day 200 each 3    levothyroxine (SYNTHROID, LEVOTHROID) 175 MCG tablet TAKE ONE TABLET BY MOUTH EVERY DAY 90 tablet 1    losartan (COZAAR) 50 MG tablet TAKE ONE TABLET (50 MG TOTAL) BY MOUTH ONCE DAILY (Patient taking differently: every evening. FOR RLS) 90 tablet 2    melatonin (MELATIN) Take by mouth every evening.      metFORMIN (GLUCOPHAGE-XR) 500 MG ER 24hr tablet TAKE TWO TABLETS BY MOUTH TWICE DAILY WITH MEALS 360 tablet 2    methylphenidate (RITALIN LA) 40 MG 24 hr capsule Take 40 mg by mouth daily as needed. Not on at this time      methylphenidate HCl (RITALIN) 10 MG tablet       ONETOUCH ULTRA BLUE TEST STRIP Strp USE TO TEST BLOOD SUGAR 100 strip 3    pantoprazole (PROTONIX) 40 MG tablet Take 1 tablet (40 mg total) by mouth once daily. 90 tablet 3    pregabalin (LYRICA) 100 MG capsule Take 1 capsule (100 mg total) by mouth every evening. 30 capsule 2    pregabalin (LYRICA) 150 MG capsule TAKE ONE CAPSULE BY MOUTH TWICE DAILY (Patient taking differently: 2 (two) times daily.) 60 capsule 2    blood-glucose meter kit Use as instructed 1 each 0    oxybutynin (DITROPAN XL) 15 MG TR24 Take 1 tablet (15 mg total) by mouth once daily. 30 tablet 06     No current facility-administered medications for this visit.     Facility-Administered Medications Ordered in Other Visits   Medication Dose Route Frequency Provider Last Rate Last Admin    lactated ringers infusion   Intravenous Continuous Dejan Simmons MD   Stopped at 07/06/20 1008       Allergies  Review of patient's allergies indicates:   Allergen Reactions    Codeine Nausea And Vomiting     Other reaction(s): Nausea, dizziness       Past Medical History  Past Medical History:   Diagnosis Date    Arthritis     Colon polyp, hyperplastic 01/2013    recheck 5-10 years    Diabetes  mellitus, type 2     Diverticulosis     Fatty liver     General anesthetics causing adverse effect in therapeutic use     woke up during tubal ligation    GERD (gastroesophageal reflux disease)     Gout, unspecified     Malignant melanoma of skin, unspecified     LEFT NECK    CLINT (obstructive sleep apnea)     C-pap    PONV (postoperative nausea and vomiting)     RLS (restless legs syndrome)     Thyroid disease     Wears glasses     Wears partial dentures     UPPER       Surgical History  Past Surgical History:   Procedure Laterality Date    ANTERIOR LUMBAR INTERBODY FUSION (ALIF) N/A 11/8/2022    Procedure: FUSION, SPINE, LUMBAR, ALIF L3-4, L4-5;  Surgeon: Everton Godinez MD;  Location: [facility] OR;  Service: Orthopedics;  Laterality: N/A;  [facility], [employer], DR SANDI ENRIQUEZ, CO-SURGEON    COLONOSCOPY  02/14/2013    Dr. Vogel: repeat in 5-10 years    COLONOSCOPY W/ BIOPSIES AND POLYPECTOMY  02/2013    hyperplastic, recheck 5-10 years    CYSTOSCOPY WITH HYDRODISTENSION OF BLADDER N/A 9/21/2022    Procedure: CYSTOSCOPY, WITH BLADDER HYDRODISTENSION;  Surgeon: Keely Chavez Jr., MD;  Location: [facility] OR;  Service: Urology;  Laterality: N/A;    EXTERNAL EAR SURGERY      FUSION, SPINE, LUMBAR, POSTERIOR APPROACH N/A 11/8/2022    Procedure: FUSION,SPINE,LUMBAR,POSTERIOR APPROACH L3-4, L4-5;  Surgeon: Everton Godinez MD;  Location: [facility] OR;  Service: Orthopedics;  Laterality: N/A;  NTI, [employer], DR SANDI ENRIQUEZ, CO-SURGEON    HYSTERECTOMY      INJECTION OF ANESTHETIC AGENT AROUND MEDIAL BRANCH NERVES INNERVATING LUMBAR FACET JOINT Bilateral 8/3/2020    Procedure: Block-nerve-medial branch-lumbar left L2, L3, L4, L5;  Surgeon: Dejan Simmons MD;  Location: [facility] OR;  Service: Pain Management;  Laterality: Bilateral;    INJECTION OF ANESTHETIC AGENT AROUND MEDIAL BRANCH NERVES INNERVATING LUMBAR FACET JOINT Left 8/13/2020    Procedure: Block-nerve-medial branch-lumbar.  L2, L3, L4, and L5;  Surgeon: Dejan Simmons MD;   Location: [facility] OR;  Service: Pain Management;  Laterality: Left;    MINIMALLY INVASIVE FUSION OF SPINE Left 10/12/2021    Procedure: FUSION, SPINE, MINIMALLY INVASIVE;  Surgeon: Everton Godinez MD;  Location: [facility] OR;  Service: Orthopedics;  Laterality: Left;  SI-Bone    RADIOFREQUENCY ABLATION OF LUMBAR MEDIAL BRANCH NERVE AT SINGLE LEVEL Left 8/27/2020    Procedure: Radiofrequency Ablation, Nerve, Spinal, Lumbar, Medial Branch,  L2, L3, L4, L5;  Surgeon: Dejan Simmons MD;  Location: [facility] OR;  Service: Pain Management;  Laterality: Left;  L2,l3,l4,l5    THROAT SURGERY      for CLINT    TRANSFORAMINAL EPIDURAL INJECTION OF STEROID Left 3/6/2020    Procedure: Injection,steroid,epidural,transforaminal approach;  Surgeon: Harmeet Zapata MD;  Location: [facility] OR;  Service: Pain Management;  Laterality: Left;  L4-L5    TRANSFORAMINAL EPIDURAL INJECTION OF STEROID Left 6/10/2020    Procedure: Injection,steroid,epidural,transforaminal approach.L4 and L5;  Surgeon: Dejan Simmons MD;  Location: [facility] OR;  Service: Pain Management;  Laterality: Left;    TRANSFORAMINAL EPIDURAL INJECTION OF STEROID Left 7/6/2020    Procedure: Injection,steroid,epidural,transforaminal approach. left L4 and L5;  Surgeon: Dejan Simmons MD;  Location: [facility] OR;  Service: Pain Management;  Laterality: Left;    TRANSFORAMINAL EPIDURAL INJECTION OF STEROID Left 11/12/2021    Procedure: Injection,steroid,epidural,transforaminal approach Left L5-S1;  Surgeon: Dejan Simmons MD;  Location: [facility] OR;  Service: Pain Management;  Laterality: Left;    UPPER GASTROINTESTINAL ENDOSCOPY         Family History:   Family History   Problem Relation Age of Onset    Hypertension Mother     Breast cancer Mother 50    Heart disease Father     Hypertension Father     Ovarian cancer Paternal Aunt     Macular degeneration Neg Hx     Retinal detachment Neg Hx     Glaucoma Neg Hx     Colon cancer Neg Hx     Colon polyps Neg Hx     Crohn's disease Neg Hx     Ulcerative colitis Neg Hx         Social History:   Social History     Socioeconomic History    Marital status:    Occupational History     Employer: [employer]   Tobacco Use    Smoking status: Never    Smokeless tobacco: Never   Substance and Sexual Activity    Alcohol use: No    Drug use: No    Sexual activity: Yes     Partners: Male       Hospitalization/Major Diagnostic Procedure:     Review of Systems     General/Constitutional:  Chills denies. Fatigue denies. Fever denies. Weight gain denies. Weight loss denies.    Respiratory:  Shortness of breath denies.    Cardiovascular:  Chest pain denies.    Gastrointestinal:  Constipation denies. Diarrhea denies. Nausea denies. Vomiting denies.     Hematology:  Easy bruising denies. Prolonged bleeding denies.     Genitourinary:  Frequent urination denies. Pain in lower back denies. Painful urination denies.     Musculoskeletal:  See HPI for details    Skin:  Rash denies.    Neurologic:  Dizziness denies. Gait abnormalities denies. Seizures denies. Tingling/Numbess denies.    Psychiatric:  Anxiety denies. Depressed mood denies.     Objective:   Vital Signs:   Vitals:    02/15/23 1048   BP: 128/74        Physical Exam      General Examination:     Constitutional: The patient is alert and oriented to lace person and time. Mood is pleasant.     Head/Face: Normal facial features normal eyebrows    Eyes: Normal extraocular motion bilaterally    Lungs: Respirations are equal and unlabored    Gait is coordinated.    Cardiovascular: There are no swelling or varicosities present.    Lymphatic: Negative for adenopathy    Skin: Normal    Neurological: Level of consciousness normal. Oriented to place person and time and situation    Psychiatric: Oriented to time place person and situation    Patient is obese.  She has a nonantalgic gait.  Incision remains well healed.  Lumbar range of motion within functional limitations but decreased at the end range of flexion secondary to body  habitus.  Bilateral lower extremities are distal neurovascular intact.    XRAY Report/ Interpretation:  Postop lumbar AP and lateral x-rays taken in the office today reviewed the patient demonstrate excellent interbody bony fusion as well as posterolateral fusion at L3-4 and L4-5.      Assessment:       1. S/P lumbar fusion        Plan:       Edie was seen today for pain.    Diagnoses and all orders for this visit:    S/P lumbar fusion  -     X-Ray Lumbar Spine Ap And Lateral         Follow up in about 3 months (around 5/15/2023) for 6 mths s/p  L3-4, L4-5 ALIF 11/8/22.  This is to attest that the physician's assistant Steve Jackson served in the capacity as a scribe for this patient's encounter.  This is also to verify that I have reviewed the patient's history and helped formulate the treatment plan and discussed it with the physician's assistant.  I have actively participated  in the evaluation and treatment plan for this patient visit.  The treatment plan and medical decision-making is as outlined below:  We discussed weight loss in great detail.  She is diabetic and I advised her to discuss medications for diabetes and weight loss with her primary care provider.  Continue with ambulation as much as tolerated.  Follow-up in 3 months or sooner if needed.    Treatment options were discussed with regards to the nature of the medical condition. Conservative pain intervention and surgical options were discussed in detail. The probability of success of each separate treatment option was discussed. The patient expressed a clear understanding of the treatment options. With regards to surgery, the procedure risk, benefits, complications, and outcomes were discussed. No guarantees were given with regards to surgical outcome.   The risk of complications, morbidity, and mortality of patient management decisions have been made at the time of this visit. These are associated with the patient's problems, diagnostic  procedures and treatment options. This includes the possible management options selected and those considered but not selected by the patient after shared medical decision making we discussed with the patient.     This note was created using Dragon voice recognition software that occasionally misinterpreted phrases or words.

## 2023-02-17 ENCOUNTER — LAB VISIT (OUTPATIENT)
Dept: LAB | Facility: HOSPITAL | Age: 68
End: 2023-02-17
Attending: FAMILY MEDICINE
Payer: MEDICARE

## 2023-02-17 DIAGNOSIS — E78.5 HYPERLIPIDEMIA, UNSPECIFIED HYPERLIPIDEMIA TYPE: ICD-10-CM

## 2023-02-17 DIAGNOSIS — E53.8 B12 DEFICIENCY: ICD-10-CM

## 2023-02-17 DIAGNOSIS — E11.9 TYPE 2 DIABETES MELLITUS WITHOUT COMPLICATION, WITHOUT LONG-TERM CURRENT USE OF INSULIN: ICD-10-CM

## 2023-02-17 DIAGNOSIS — E55.9 VITAMIN D DEFICIENCY: ICD-10-CM

## 2023-02-17 DIAGNOSIS — E03.9 HYPOTHYROIDISM (ACQUIRED): ICD-10-CM

## 2023-02-17 LAB
25(OH)D3+25(OH)D2 SERPL-MCNC: 32 NG/ML (ref 30–96)
ALBUMIN SERPL BCP-MCNC: 3.7 G/DL (ref 3.5–5.2)
ALP SERPL-CCNC: 122 U/L (ref 55–135)
ALT SERPL W/O P-5'-P-CCNC: 21 U/L (ref 10–44)
ANION GAP SERPL CALC-SCNC: 9 MMOL/L (ref 8–16)
AST SERPL-CCNC: 18 U/L (ref 10–40)
BILIRUB SERPL-MCNC: 0.6 MG/DL (ref 0.1–1)
BUN SERPL-MCNC: 13 MG/DL (ref 8–23)
CALCIUM SERPL-MCNC: 9.9 MG/DL (ref 8.7–10.5)
CHLORIDE SERPL-SCNC: 105 MMOL/L (ref 95–110)
CHOLEST SERPL-MCNC: 223 MG/DL (ref 120–199)
CHOLEST/HDLC SERPL: 3.6 {RATIO} (ref 2–5)
CO2 SERPL-SCNC: 27 MMOL/L (ref 23–29)
CREAT SERPL-MCNC: 1 MG/DL (ref 0.5–1.4)
EST. GFR  (NO RACE VARIABLE): >60 ML/MIN/1.73 M^2
GLUCOSE SERPL-MCNC: 136 MG/DL (ref 70–110)
HDLC SERPL-MCNC: 62 MG/DL (ref 40–75)
HDLC SERPL: 27.8 % (ref 20–50)
LDLC SERPL CALC-MCNC: 125.6 MG/DL (ref 63–159)
NONHDLC SERPL-MCNC: 161 MG/DL
POTASSIUM SERPL-SCNC: 4.7 MMOL/L (ref 3.5–5.1)
PROT SERPL-MCNC: 7.2 G/DL (ref 6–8.4)
SODIUM SERPL-SCNC: 141 MMOL/L (ref 136–145)
T4 FREE SERPL-MCNC: 0.99 NG/DL (ref 0.71–1.51)
TRIGL SERPL-MCNC: 177 MG/DL (ref 30–150)
TSH SERPL DL<=0.005 MIU/L-ACNC: 0.73 UIU/ML (ref 0.4–4)
VIT B12 SERPL-MCNC: 737 PG/ML (ref 210–950)

## 2023-02-17 PROCEDURE — 80053 COMPREHEN METABOLIC PANEL: CPT | Mod: HCNC | Performed by: FAMILY MEDICINE

## 2023-02-17 PROCEDURE — 83036 HEMOGLOBIN GLYCOSYLATED A1C: CPT | Mod: HCNC | Performed by: FAMILY MEDICINE

## 2023-02-17 PROCEDURE — 36415 COLL VENOUS BLD VENIPUNCTURE: CPT | Mod: HCNC,PO | Performed by: FAMILY MEDICINE

## 2023-02-17 PROCEDURE — 85025 COMPLETE CBC W/AUTO DIFF WBC: CPT | Mod: HCNC | Performed by: FAMILY MEDICINE

## 2023-02-17 PROCEDURE — 82607 VITAMIN B-12: CPT | Mod: HCNC | Performed by: FAMILY MEDICINE

## 2023-02-17 PROCEDURE — 84439 ASSAY OF FREE THYROXINE: CPT | Mod: HCNC | Performed by: FAMILY MEDICINE

## 2023-02-17 PROCEDURE — 80061 LIPID PANEL: CPT | Mod: HCNC | Performed by: FAMILY MEDICINE

## 2023-02-17 PROCEDURE — 84443 ASSAY THYROID STIM HORMONE: CPT | Mod: HCNC | Performed by: FAMILY MEDICINE

## 2023-02-17 PROCEDURE — 82306 VITAMIN D 25 HYDROXY: CPT | Mod: HCNC | Performed by: FAMILY MEDICINE

## 2023-02-18 LAB
BASOPHILS # BLD AUTO: 0.05 K/UL (ref 0–0.2)
BASOPHILS NFR BLD: 0.6 % (ref 0–1.9)
DIFFERENTIAL METHOD: ABNORMAL
EOSINOPHIL # BLD AUTO: 0.3 K/UL (ref 0–0.5)
EOSINOPHIL NFR BLD: 4.3 % (ref 0–8)
ERYTHROCYTE [DISTWIDTH] IN BLOOD BY AUTOMATED COUNT: 14.1 % (ref 11.5–14.5)
ESTIMATED AVG GLUCOSE: 166 MG/DL (ref 68–131)
HBA1C MFR BLD: 7.4 % (ref 4–5.6)
HCT VFR BLD AUTO: 46 % (ref 37–48.5)
HGB BLD-MCNC: 14 G/DL (ref 12–16)
IMM GRANULOCYTES # BLD AUTO: 0.03 K/UL (ref 0–0.04)
IMM GRANULOCYTES NFR BLD AUTO: 0.4 % (ref 0–0.5)
LYMPHOCYTES # BLD AUTO: 1.6 K/UL (ref 1–4.8)
LYMPHOCYTES NFR BLD: 20.9 % (ref 18–48)
MCH RBC QN AUTO: 27.8 PG (ref 27–31)
MCHC RBC AUTO-ENTMCNC: 30.4 G/DL (ref 32–36)
MCV RBC AUTO: 91 FL (ref 82–98)
MONOCYTES # BLD AUTO: 0.5 K/UL (ref 0.3–1)
MONOCYTES NFR BLD: 6.8 % (ref 4–15)
NEUTROPHILS # BLD AUTO: 5.2 K/UL (ref 1.8–7.7)
NEUTROPHILS NFR BLD: 67 % (ref 38–73)
NRBC BLD-RTO: 0 /100 WBC
PLATELET # BLD AUTO: 335 K/UL (ref 150–450)
PMV BLD AUTO: 10.3 FL (ref 9.2–12.9)
RBC # BLD AUTO: 5.04 M/UL (ref 4–5.4)
WBC # BLD AUTO: 7.7 K/UL (ref 3.9–12.7)

## 2023-02-24 ENCOUNTER — OFFICE VISIT (OUTPATIENT)
Dept: FAMILY MEDICINE | Facility: CLINIC | Age: 68
End: 2023-02-24
Payer: MEDICARE

## 2023-02-24 VITALS
HEART RATE: 80 BPM | HEIGHT: 65 IN | DIASTOLIC BLOOD PRESSURE: 78 MMHG | WEIGHT: 262.38 LBS | RESPIRATION RATE: 18 BRPM | OXYGEN SATURATION: 96 % | TEMPERATURE: 99 F | SYSTOLIC BLOOD PRESSURE: 137 MMHG | BODY MASS INDEX: 43.71 KG/M2

## 2023-02-24 DIAGNOSIS — Z79.891 LONG TERM (CURRENT) USE OF OPIATE ANALGESIC: ICD-10-CM

## 2023-02-24 DIAGNOSIS — E78.5 HYPERLIPIDEMIA ASSOCIATED WITH TYPE 2 DIABETES MELLITUS: ICD-10-CM

## 2023-02-24 DIAGNOSIS — E11.69 HYPERLIPIDEMIA ASSOCIATED WITH TYPE 2 DIABETES MELLITUS: ICD-10-CM

## 2023-02-24 DIAGNOSIS — Z98.1 S/P LUMBAR FUSION: ICD-10-CM

## 2023-02-24 DIAGNOSIS — F11.20 CONTINUOUS OPIOID DEPENDENCE: ICD-10-CM

## 2023-02-24 DIAGNOSIS — Z23 NEED FOR PNEUMOCOCCAL VACCINATION: ICD-10-CM

## 2023-02-24 DIAGNOSIS — M54.9 CHRONIC BACK PAIN GREATER THAN 3 MONTHS DURATION: ICD-10-CM

## 2023-02-24 DIAGNOSIS — F33.9 DEPRESSION, RECURRENT: ICD-10-CM

## 2023-02-24 DIAGNOSIS — M46.1 SACROILIITIS: ICD-10-CM

## 2023-02-24 DIAGNOSIS — E03.9 HYPOTHYROIDISM (ACQUIRED): ICD-10-CM

## 2023-02-24 DIAGNOSIS — E11.22 TYPE 2 DIABETES MELLITUS WITH CHRONIC KIDNEY DISEASE, WITHOUT LONG-TERM CURRENT USE OF INSULIN, UNSPECIFIED CKD STAGE: Primary | ICD-10-CM

## 2023-02-24 DIAGNOSIS — L02.221 FURUNCULOSIS OF ABDOMINAL WALL: ICD-10-CM

## 2023-02-24 DIAGNOSIS — G89.29 CHRONIC BACK PAIN GREATER THAN 3 MONTHS DURATION: ICD-10-CM

## 2023-02-24 DIAGNOSIS — E66.01 MORBID OBESITY WITH BMI OF 45.0-49.9, ADULT: ICD-10-CM

## 2023-02-24 PROCEDURE — 3075F SYST BP GE 130 - 139MM HG: CPT | Mod: HCNC,CPTII,S$GLB, | Performed by: FAMILY MEDICINE

## 2023-02-24 PROCEDURE — 99214 OFFICE O/P EST MOD 30 MIN: CPT | Mod: HCNC,S$GLB,, | Performed by: FAMILY MEDICINE

## 2023-02-24 PROCEDURE — 99999 PR PBB SHADOW E&M-EST. PATIENT-LVL III: CPT | Mod: PBBFAC,HCNC,, | Performed by: FAMILY MEDICINE

## 2023-02-24 PROCEDURE — 3075F PR MOST RECENT SYSTOLIC BLOOD PRESS GE 130-139MM HG: ICD-10-PCS | Mod: HCNC,CPTII,S$GLB, | Performed by: FAMILY MEDICINE

## 2023-02-24 PROCEDURE — 1159F PR MEDICATION LIST DOCUMENTED IN MEDICAL RECORD: ICD-10-PCS | Mod: HCNC,CPTII,S$GLB, | Performed by: FAMILY MEDICINE

## 2023-02-24 PROCEDURE — 1125F PR PAIN SEVERITY QUANTIFIED, PAIN PRESENT: ICD-10-PCS | Mod: HCNC,CPTII,S$GLB, | Performed by: FAMILY MEDICINE

## 2023-02-24 PROCEDURE — 99999 PR PBB SHADOW E&M-EST. PATIENT-LVL III: ICD-10-PCS | Mod: PBBFAC,HCNC,, | Performed by: FAMILY MEDICINE

## 2023-02-24 PROCEDURE — 1160F PR REVIEW ALL MEDS BY PRESCRIBER/CLIN PHARMACIST DOCUMENTED: ICD-10-PCS | Mod: HCNC,CPTII,S$GLB, | Performed by: FAMILY MEDICINE

## 2023-02-24 PROCEDURE — 3061F NEG MICROALBUMINURIA REV: CPT | Mod: HCNC,CPTII,S$GLB, | Performed by: FAMILY MEDICINE

## 2023-02-24 PROCEDURE — 1101F PR PT FALLS ASSESS DOC 0-1 FALLS W/OUT INJ PAST YR: ICD-10-PCS | Mod: HCNC,CPTII,S$GLB, | Performed by: FAMILY MEDICINE

## 2023-02-24 PROCEDURE — 3061F PR NEG MICROALBUMINURIA RESULT DOCUMENTED/REVIEW: ICD-10-PCS | Mod: HCNC,CPTII,S$GLB, | Performed by: FAMILY MEDICINE

## 2023-02-24 PROCEDURE — 3008F BODY MASS INDEX DOCD: CPT | Mod: HCNC,CPTII,S$GLB, | Performed by: FAMILY MEDICINE

## 2023-02-24 PROCEDURE — 3078F PR MOST RECENT DIASTOLIC BLOOD PRESSURE < 80 MM HG: ICD-10-PCS | Mod: HCNC,CPTII,S$GLB, | Performed by: FAMILY MEDICINE

## 2023-02-24 PROCEDURE — 80326 AMPHETAMINES 5 OR MORE: CPT | Mod: HCNC | Performed by: FAMILY MEDICINE

## 2023-02-24 PROCEDURE — 3051F HG A1C>EQUAL 7.0%<8.0%: CPT | Mod: HCNC,CPTII,S$GLB, | Performed by: FAMILY MEDICINE

## 2023-02-24 PROCEDURE — 1159F MED LIST DOCD IN RCRD: CPT | Mod: HCNC,CPTII,S$GLB, | Performed by: FAMILY MEDICINE

## 2023-02-24 PROCEDURE — 3078F DIAST BP <80 MM HG: CPT | Mod: HCNC,CPTII,S$GLB, | Performed by: FAMILY MEDICINE

## 2023-02-24 PROCEDURE — 3066F PR DOCUMENTATION OF TREATMENT FOR NEPHROPATHY: ICD-10-PCS | Mod: HCNC,CPTII,S$GLB, | Performed by: FAMILY MEDICINE

## 2023-02-24 PROCEDURE — 3008F PR BODY MASS INDEX (BMI) DOCUMENTED: ICD-10-PCS | Mod: HCNC,CPTII,S$GLB, | Performed by: FAMILY MEDICINE

## 2023-02-24 PROCEDURE — 1101F PT FALLS ASSESS-DOCD LE1/YR: CPT | Mod: HCNC,CPTII,S$GLB, | Performed by: FAMILY MEDICINE

## 2023-02-24 PROCEDURE — 3066F NEPHROPATHY DOC TX: CPT | Mod: HCNC,CPTII,S$GLB, | Performed by: FAMILY MEDICINE

## 2023-02-24 PROCEDURE — 1125F AMNT PAIN NOTED PAIN PRSNT: CPT | Mod: HCNC,CPTII,S$GLB, | Performed by: FAMILY MEDICINE

## 2023-02-24 PROCEDURE — 4010F PR ACE/ARB THEARPY RXD/TAKEN: ICD-10-PCS | Mod: HCNC,CPTII,S$GLB, | Performed by: FAMILY MEDICINE

## 2023-02-24 PROCEDURE — 1160F RVW MEDS BY RX/DR IN RCRD: CPT | Mod: HCNC,CPTII,S$GLB, | Performed by: FAMILY MEDICINE

## 2023-02-24 PROCEDURE — 4010F ACE/ARB THERAPY RXD/TAKEN: CPT | Mod: HCNC,CPTII,S$GLB, | Performed by: FAMILY MEDICINE

## 2023-02-24 PROCEDURE — 99214 PR OFFICE/OUTPT VISIT, EST, LEVL IV, 30-39 MIN: ICD-10-PCS | Mod: HCNC,S$GLB,, | Performed by: FAMILY MEDICINE

## 2023-02-24 PROCEDURE — 3051F PR MOST RECENT HEMOGLOBIN A1C LEVEL 7.0 - < 8.0%: ICD-10-PCS | Mod: HCNC,CPTII,S$GLB, | Performed by: FAMILY MEDICINE

## 2023-02-24 PROCEDURE — 3288F PR FALLS RISK ASSESSMENT DOCUMENTED: ICD-10-PCS | Mod: HCNC,CPTII,S$GLB, | Performed by: FAMILY MEDICINE

## 2023-02-24 PROCEDURE — 3288F FALL RISK ASSESSMENT DOCD: CPT | Mod: HCNC,CPTII,S$GLB, | Performed by: FAMILY MEDICINE

## 2023-02-24 RX ORDER — HYDROCODONE BITARTRATE AND ACETAMINOPHEN 10; 325 MG/1; MG/1
1 TABLET ORAL
Qty: 30 TABLET | Refills: 0 | Status: SHIPPED | OUTPATIENT
Start: 2023-02-24 | End: 2023-03-26

## 2023-02-24 RX ORDER — MUPIROCIN 20 MG/G
OINTMENT TOPICAL 3 TIMES DAILY
Qty: 60 G | Status: SHIPPED | OUTPATIENT
Start: 2023-02-24 | End: 2023-06-02 | Stop reason: SDUPTHER

## 2023-02-24 RX ORDER — SEMAGLUTIDE 1.34 MG/ML
INJECTION, SOLUTION SUBCUTANEOUS
Qty: 1 PEN | Refills: 5 | Status: SHIPPED | OUTPATIENT
Start: 2023-02-24 | End: 2023-07-25 | Stop reason: SDUPTHER

## 2023-02-24 RX ORDER — HYDROCODONE BITARTRATE AND ACETAMINOPHEN 10; 325 MG/1; MG/1
1 TABLET ORAL
Qty: 30 TABLET | Refills: 0 | Status: SHIPPED | OUTPATIENT
Start: 2023-04-25 | End: 2023-02-24 | Stop reason: SDUPTHER

## 2023-02-24 RX ORDER — HYDROCODONE BITARTRATE AND ACETAMINOPHEN 10; 325 MG/1; MG/1
1 TABLET ORAL
Qty: 30 TABLET | Refills: 0 | Status: SHIPPED | OUTPATIENT
Start: 2023-03-26 | End: 2023-04-25

## 2023-02-24 NOTE — TELEPHONE ENCOUNTER
----- Message from Azucena Adams sent at 2/24/2023  4:42 PM CST -----  Contact: Gabby from [facility]  aGbby from [facility]     HYDROcodone-acetaminophen (NORCO)  mg per tablet 30 tablet   Sig - Route: Take 1 tablet by mouth every 24 hours as needed for Pain. - Oral     Caller stated that: We are unable to fulfill her Rx that missing the documentation for the 7 days as it is medically necessary. Rx was dating for 04/25/2023. Please resend Rx/call back to advise at:    [facility] - [place] - [address]  [address] [address]  Phone: [phone] Fax: [phone]

## 2023-02-24 NOTE — TELEPHONE ENCOUNTER
No new care gaps identified.  [facility] Embedded Care Gaps. Reference number: [id number]. 2/24/2023   4:55:46 PM CST

## 2023-02-24 NOTE — PROGRESS NOTES
Subjective:       Patient ID: Edie Hernandez is a 67 y.o. female.    Chief Complaint: Follow-up (6mth f/u )    HPI  Review of Systems   Constitutional:  Negative for fatigue and unexpected weight change.   Respiratory:  Negative for chest tightness and shortness of breath.    Cardiovascular:  Negative for chest pain, palpitations and leg swelling.   Gastrointestinal:  Negative for abdominal pain.   Musculoskeletal:  Negative for arthralgias.   Neurological:  Negative for dizziness, syncope, light-headedness and headaches.     Patient Active Problem List   Diagnosis    GERD (gastroesophageal reflux disease)    Chronic back pain greater than 3 months duration    Environmental allergies    Fibromyalgia    B12 nutritional deficiency    CLINT (obstructive sleep apnea)    Chronic sinusitis    Asthma    Hypothyroidism (acquired)    Nausea    Night sweats    Tinnitus, bilateral    Frequent UTI    Hyperlipidemia associated with type 2 diabetes mellitus    Colon polyp, hyperplastic    Interstitial cystitis    Hemangioma    Wart    Globus sensation    DDD (degenerative disc disease), lumbar    Severe obesity (BMI >= 40)    Edema    Mixed incontinence    Continuous opioid dependence- contract 10/18/17-failed uds for norco, + 2/18    Type 2 diabetes mellitus    History of colon polyps    Lumbar radiculitis    Lumbar radiculopathy    Lumbar spondylosis    Sacroiliitis    Lumbar stenosis with neurogenic claudication    Urinary retention    S/P lumbar fusion    Impaired flexibility of lower extremity    Depression, recurrent     Patient is here for a chronic conditions follow up.    Reviewed labs 2/2023    C/o right CTS sx acting up.  Last 2 months has been wearing old splint     Urogyn Dr. Dominguez treating urinary frequency     Type 2 DM A1c 7.4. lipids high. CT coronary score 7/2021 0.  Not on aceI/ARB. Urine ma high.  Not on statin or asa     TFts nl   mammo neg 8/22  DEXA 8/22 normal     Ortho Dr. Godinez treating Sacroiliitis.  lumbar spondylolithesis s/p lumbar fusion 11/22     Phys Med Dr. Simmons treating chronic left sided low back pain.  Doing injections for low back pain which are helping        Pulm/Sleep Dr. Yang-CLINT on daily methylphendiate       Objective:      Physical Exam  Vitals and nursing note reviewed.   Constitutional:       Appearance: She is well-developed.   Cardiovascular:      Rate and Rhythm: Normal rate and regular rhythm.      Heart sounds: Normal heart sounds.   Pulmonary:      Effort: Pulmonary effort is normal.      Breath sounds: Normal breath sounds.   Skin:     General: Skin is warm and dry.   Neurological:      Mental Status: She is alert and oriented to person, place, and time.       Assessment:       1. Type 2 diabetes mellitus with chronic kidney disease, without long-term current use of insulin, unspecified CKD stage    2. Depression, recurrent    3. Morbid obesity with BMI of 45.0-49.9, adult    4. Sacroiliitis    5. Continuous opioid dependence    6. Hypothyroidism (acquired)    7. Hyperlipidemia associated with type 2 diabetes mellitus    8. S/P lumbar fusion    9. Need for pneumococcal vaccination    10. Chronic back pain greater than 3 months duration    11. Long term (current) use of opiate analgesic    12. Furunculosis of abdominal wall        Plan:         1. Type 2 diabetes mellitus with chronic kidney disease, without long-term current use of insulin, unspecified CKD stage  treat  - CBC Auto Differential; Future  - Comprehensive Metabolic Panel; Future  - Hemoglobin A1C; Future  - semaglutide (OZEMPIC) 0.25 mg or 0.5 mg(2 mg/1.5 mL) pen injector; .25 mg sq weekly x 2 weeks then increase to .5mg sq weekly  Dispense: 1 pen; Refill: 5    2. Depression, recurrent  Cont current mgmt    3. Morbid obesity with BMI of 45.0-49.9, adult  Counseled patient on his ideal body weight, health consequences of being obese and current recommendations including weekly exercise and a heart healthy diet.  Current  "BMI is:Estimated body mass index is 43.66 kg/m² as calculated from the following:    Height as of this encounter: 5' 5" (1.651 m).    Weight as of this encounter: 119 kg (262 lb 5.6 oz)..  Patient is aware that ideal BMI < 25 or Weight in (lb) to have BMI = 25: 149.9.      4. Sacroiliitis  Cont current mgmt    5. Continuous opioid dependence  Counseled patient on habit forming potential of opiates, risk of sedation, respiratory suppression or arrest especially if used in conjunction with benzodiazepines or alcohol.  Counseled patient to avoid driving while on the medication, rules of the pain contract and need for routine 3 month follow ups.  Patient agrees to all aspects of the plan of care and wishes to proceed with therapy.  The plan is always to use alternatives less habit forming, to utilize interventions and therapies that reduce pain as an adjunctive treatment, and limit and wean the dose of narcotics as soon as able and appropriate.        6. Hypothyroidism (acquired)  Stable condition.  Continue current medications.  Will adjust based on lab findings or if condition changes.    - TSH; Future  - T4, Free; Future    7. Hyperlipidemia associated with type 2 diabetes mellitus  Stable condition.  Continue current medications.  Will adjust based on lab findings or if condition changes.    - Lipid Panel; Future    8. S/P lumbar fusion  Cont post op care    9. Need for pneumococcal vaccination  defer    10. Chronic back pain greater than 3 months duration  Controlled on current medications.  Continue current medications.    - HYDROcodone-acetaminophen (NORCO)  mg per tablet; Take 1 tablet by mouth every 24 hours as needed for Pain.  Dispense: 30 tablet; Refill: 0  - HYDROcodone-acetaminophen (NORCO)  mg per tablet; Take 1 tablet by mouth every 24 hours as needed for Pain.  Dispense: 30 tablet; Refill: 0  - POCT Urine Drug Screen Pain Management  - Pain Clinic Drug Screen    11. Long term (current) use " of opiate analgesic  Screen and treat as indicated:    - POCT Urine Drug Screen Pain Management    12. Furunculosis of abdominal wall  Cleanse wound twice daily.  Apply antibiotic ointment and sterile bandage twice daily and as needed.  Monitor for increased pain, pus, redness and swelling.  If occurs then return to clinic or go to the emergency room if clinic is closed for a recheck.      - mupirocin (BACTROBAN) 2 % ointment; Apply topically 3 (three) times daily.  Dispense: 60 g; Refill: PRN      Time spent with patient: 20 minutes    Patient with be reevaluated in 3 months or sooner prn    Greater than 50% of this visit was spent counseling as described in above documentation:Yes

## 2023-02-27 RX ORDER — HYDROCODONE BITARTRATE AND ACETAMINOPHEN 10; 325 MG/1; MG/1
1 TABLET ORAL
Qty: 30 TABLET | Refills: 0 | Status: SHIPPED | OUTPATIENT
Start: 2023-04-25 | End: 2023-06-02 | Stop reason: SDUPTHER

## 2023-03-01 LAB
6MAM UR QL: NOT DETECTED
7AMINOCLONAZEPAM UR QL: NOT DETECTED
A-OH ALPRAZ UR QL: NOT DETECTED
ALPHA-OH-MIDAZOLAM: NOT DETECTED
ALPRAZ UR QL: NOT DETECTED
AMPHET UR QL SCN: NOT DETECTED
ANNOTATION COMMENT IMP: NORMAL
ANNOTATION COMMENT IMP: NORMAL
BARBITURATES UR QL: NOT DETECTED
BUPRENORPHINE UR QL: NOT DETECTED
BZE UR QL: NOT DETECTED
CARBOXYTHC UR QL: PRESENT
CARISOPRODOL UR QL: NOT DETECTED
CLONAZEPAM UR QL: NOT DETECTED
CODEINE UR QL: NOT DETECTED
CREAT UR-MCNC: 102.5 MG/DL (ref 20–400)
DIAZEPAM UR QL: NOT DETECTED
ETHYL GLUCURONIDE UR QL: NOT DETECTED
FENTANYL UR QL: NOT DETECTED
GABAPENTIN: NOT DETECTED
HYDROCODONE UR QL: NOT DETECTED
HYDROMORPHONE UR QL: NOT DETECTED
LORAZEPAM UR QL: NOT DETECTED
MDA UR QL: NOT DETECTED
MDEA UR QL: NOT DETECTED
MDMA UR QL: NOT DETECTED
ME-PHENIDATE UR QL: NOT DETECTED
METHADONE UR QL: NOT DETECTED
METHAMPHET UR QL: NOT DETECTED
MIDAZOLAM UR QL SCN: NOT DETECTED
MORPHINE UR QL: NOT DETECTED
NALOXONE: NOT DETECTED
NORBUPRENORPHINE UR QL CFM: NOT DETECTED
NORDIAZEPAM UR QL: NOT DETECTED
NORFENTANYL UR QL: NOT DETECTED
NORHYDROCODONE UR QL CFM: NOT DETECTED
NORMEPERIDINE UR QL CFM: NOT DETECTED
NOROXYCODONE UR QL CFM: NOT DETECTED
NOROXYMORPHONE UR QL SCN: NOT DETECTED
OXAZEPAM UR QL: NOT DETECTED
OXYCODONE UR QL: NOT DETECTED
OXYMORPHONE UR QL: NOT DETECTED
PATHOLOGY STUDY: NORMAL
PCP UR QL: NOT DETECTED
PHENTERMINE UR QL: NOT DETECTED
PREGABALIN: PRESENT
SERVICE CMNT-IMP: NORMAL
TAPENTADOL UR QL SCN: NOT DETECTED
TAPENTADOL UR QL SCN: NOT DETECTED
TEMAZEPAM UR QL: NOT DETECTED
TRAMADOL UR QL: NOT DETECTED
ZOLPIDEM METABOLITE: NOT DETECTED
ZOLPIDEM UR QL: NOT DETECTED

## 2023-03-07 ENCOUNTER — PATIENT MESSAGE (OUTPATIENT)
Dept: ADMINISTRATIVE | Facility: HOSPITAL | Age: 68
End: 2023-03-07
Payer: MEDICARE

## 2023-03-08 ENCOUNTER — OFFICE VISIT (OUTPATIENT)
Dept: URGENT CARE | Facility: CLINIC | Age: 68
End: 2023-03-08
Payer: MEDICARE

## 2023-03-08 ENCOUNTER — TELEPHONE (OUTPATIENT)
Dept: UROGYNECOLOGY | Facility: CLINIC | Age: 68
End: 2023-03-08
Payer: MEDICARE

## 2023-03-08 VITALS
WEIGHT: 255.81 LBS | SYSTOLIC BLOOD PRESSURE: 126 MMHG | HEART RATE: 68 BPM | RESPIRATION RATE: 20 BRPM | OXYGEN SATURATION: 98 % | DIASTOLIC BLOOD PRESSURE: 68 MMHG | HEIGHT: 65 IN | TEMPERATURE: 97 F | BODY MASS INDEX: 42.62 KG/M2

## 2023-03-08 DIAGNOSIS — R30.0 DYSURIA: Primary | ICD-10-CM

## 2023-03-08 DIAGNOSIS — R31.9 URINARY TRACT INFECTION WITH HEMATURIA, SITE UNSPECIFIED: ICD-10-CM

## 2023-03-08 DIAGNOSIS — N39.0 URINARY TRACT INFECTION WITH HEMATURIA, SITE UNSPECIFIED: ICD-10-CM

## 2023-03-08 LAB
BILIRUB UR QL STRIP: NEGATIVE
GLUCOSE UR QL STRIP: NEGATIVE
KETONES UR QL STRIP: NEGATIVE
LEUKOCYTE ESTERASE UR QL STRIP: POSITIVE
PH, POC UA: 5.5
POC BLOOD, URINE: POSITIVE
POC NITRATES, URINE: POSITIVE
PROT UR QL STRIP: POSITIVE
SP GR UR STRIP: 1.01 (ref 1–1.03)
UROBILINOGEN UR STRIP-ACNC: ABNORMAL (ref 0.1–1.1)

## 2023-03-08 PROCEDURE — 99214 OFFICE O/P EST MOD 30 MIN: CPT | Mod: S$GLB,,, | Performed by: PHYSICIAN ASSISTANT

## 2023-03-08 PROCEDURE — 81003 POCT URINALYSIS, DIPSTICK, AUTOMATED, W/O SCOPE: ICD-10-PCS | Mod: QW,S$GLB,, | Performed by: PHYSICIAN ASSISTANT

## 2023-03-08 PROCEDURE — 99214 PR OFFICE/OUTPT VISIT, EST, LEVL IV, 30-39 MIN: ICD-10-PCS | Mod: S$GLB,,, | Performed by: PHYSICIAN ASSISTANT

## 2023-03-08 PROCEDURE — 81003 URINALYSIS AUTO W/O SCOPE: CPT | Mod: QW,S$GLB,, | Performed by: PHYSICIAN ASSISTANT

## 2023-03-08 RX ORDER — NITROFURANTOIN 25; 75 MG/1; MG/1
100 CAPSULE ORAL 2 TIMES DAILY
Qty: 10 CAPSULE | Refills: 0 | Status: SHIPPED | OUTPATIENT
Start: 2023-03-08 | End: 2023-03-13

## 2023-03-08 NOTE — PROGRESS NOTES
"Subjective:       Patient ID: Edie Hernandez is a 67 y.o. female.    Vitals:  height is 5' 5" (1.651 m) and weight is 116 kg (255 lb 12.8 oz). Her temperature is 97.3 °F (36.3 °C). Her blood pressure is 126/68 and her pulse is 68. Her respiration is 20 and oxygen saturation is 98%.     Chief Complaint: Dysuria    Dysuria   The current episode started yesterday. The problem has been waxing and waning. The quality of the pain is described as burning. The pain is at a severity of 6/10. Associated symptoms include frequency and urgency.     Genitourinary:  Positive for dysuria, frequency and urgency.     Objective:      Physical Exam      Assessment:       No diagnosis found.      Plan:         There are no diagnoses linked to this encounter.                 "

## 2023-03-08 NOTE — TELEPHONE ENCOUNTER
Returned call and spoke with patient, informed there is no available appt on today. Patient states she will go to [place] urgent and will keep appt on tomorrow, patient verbally understood.

## 2023-03-08 NOTE — PROGRESS NOTES
"Subjective:       Patient ID: Edie Hernandez is a 67 y.o. female.    Vitals:  height is 5' 5" (1.651 m) and weight is 116 kg (255 lb 12.8 oz). Her temperature is 97.3 °F (36.3 °C). Her blood pressure is 126/68 and her pulse is 68. Her respiration is 20 and oxygen saturation is 98%.     Chief Complaint: Dysuria    Patient is a 67-year-old female who presents with dysuria, frequency and urgency for 1 day.  She has past medical history significant for interstitial cystitis, recurrent UTIs, diabetes and hypothyroid.  She denies fever.  She denies back pain.  She denies nausea or vomiting.    Constitution: Negative for chills and fever.   Gastrointestinal:  Negative for abdominal pain, nausea and vomiting.   Genitourinary:  Positive for dysuria, frequency and urgency. Negative for flank pain, bladder incontinence and hematuria.     Objective:      Physical Exam   Constitutional: She is oriented to person, place, and time. She appears well-developed.   HENT:   Head: Normocephalic and atraumatic.   Ears:   Right Ear: External ear normal.   Left Ear: External ear normal.   Nose: Nose normal. No nasal deformity. No epistaxis.   Eyes: Lids are normal.   Neck: Phonation normal. Neck supple.   Cardiovascular: Normal rate and regular rhythm.   Pulmonary/Chest: Effort normal and breath sounds normal.   Abdominal: Normal appearance. There is no left CVA tenderness and no right CVA tenderness.   Neurological: She is alert and oriented to person, place, and time.   Skin: Skin is warm, dry and intact.   Psychiatric: Her speech is normal and behavior is normal.   Nursing note and vitals reviewed.      Assessment:       1. Dysuria    2. Urinary tract infection with hematuria, site unspecified            Plan:         Dysuria  -     POCT Urinalysis, Dipstick, Automated, W/O Scope    Urinary tract infection with hematuria, site unspecified  -     nitrofurantoin, macrocrystal-monohydrate, (MACROBID) 100 MG capsule; Take 1 capsule (100 " mg total) by mouth 2 (two) times daily. for 5 days  Dispense: 10 capsule; Refill: 0           Medical Decision Making:   History:   Old Medical Records: I decided to obtain old medical records.  Clinical Tests:   Lab Tests: Ordered and Reviewed  Urgent Care Management:  Urgent evaluation of a well-appearing 67-year-old female who presents with dysuria, frequency and urgency for 1 day.  She has no CVA tenderness.  She is afebrile.  She is not tachycardic.  I doubt systemic illness.  No sign of pyelonephritis.  She does have a history of recurrent UTIs.  She states she is currently on amoxicillin for dental procedure.  Recent culture reviewed showed resistance to amoxicillin, will add Macrobid.  Return precautions given.

## 2023-03-08 NOTE — TELEPHONE ENCOUNTER
----- Message from Amber Chapa sent at 3/8/2023  8:45 AM CST -----  Contact: [phone]  Type:  Same Day Appointment Request    Caller is requesting a same day appointment.  Caller declined first available appointment listed below.      Name of Caller:  Pt   When is the first available appointment?  03/09  Symptoms:  UTI   Best Call Back Number:  [phone]    Additional Information:   Pls call back and advise. Pt stated she can not wait until tomorrow

## 2023-03-08 NOTE — PATIENT INSTRUCTIONS
Drink plenty of water.  Take antibiotics as prescribed.  Follow-up with your primary care provider.  Return for new or worsening symptoms.

## 2023-03-13 LAB
BACTERIA UR CULT: NORMAL
BACTERIA UR CULT: NORMAL

## 2023-03-31 ENCOUNTER — PATIENT OUTREACH (OUTPATIENT)
Dept: ADMINISTRATIVE | Facility: HOSPITAL | Age: 68
End: 2023-03-31
Payer: MEDICARE

## 2023-03-31 NOTE — LETTER
AUTHORIZATION FOR RELEASE OF   CONFIDENTIAL INFORMATION    Dear [facility],    We are seeing Edie Hernandez, date of birth 1955, in the clinic at [facility]. Lydia Eugene MD is the patient's PCP. Edie Hernandez has an outstanding lab/procedure at the time we reviewed her chart. In order to help keep her health information updated, she has authorized us to request the following medical record(s):                                                   ( X )  EYE EXAM -  most recent                   Please fax records to [facility], Amy L Hammons, MD, [phone]     If you have any questions, please contact [facility] at [phone].           Patient Name: Edie Hernandez  : 1955  Patient Phone #: [phone]

## 2023-04-03 ENCOUNTER — PATIENT OUTREACH (OUTPATIENT)
Dept: ADMINISTRATIVE | Facility: HOSPITAL | Age: 68
End: 2023-04-03
Payer: MEDICARE

## 2023-04-06 RX ORDER — OXYBUTYNIN CHLORIDE 15 MG/1
15 TABLET, EXTENDED RELEASE ORAL DAILY
Qty: 30 TABLET | Refills: 6 | Status: SHIPPED | OUTPATIENT
Start: 2023-04-06 | End: 2023-10-31

## 2023-04-11 ENCOUNTER — PATIENT MESSAGE (OUTPATIENT)
Dept: ADMINISTRATIVE | Facility: HOSPITAL | Age: 68
End: 2023-04-11
Payer: MEDICARE

## 2023-04-25 DIAGNOSIS — K21.9 GASTROESOPHAGEAL REFLUX DISEASE, UNSPECIFIED WHETHER ESOPHAGITIS PRESENT: ICD-10-CM

## 2023-04-25 RX ORDER — PANTOPRAZOLE SODIUM 40 MG/1
TABLET, DELAYED RELEASE ORAL
Qty: 90 TABLET | Refills: 3 | Status: SHIPPED | OUTPATIENT
Start: 2023-04-25 | End: 2023-10-31 | Stop reason: SDUPTHER

## 2023-04-25 NOTE — TELEPHONE ENCOUNTER
No new care gaps identified.  [facility] Embedded Care Gaps. Reference number: [id number]. 4/25/2023   8:02:52 AM GOYOT

## 2023-04-25 NOTE — TELEPHONE ENCOUNTER
Refill Decision Note      Refill Decision Note   Edie Hernandez  is requesting a refill authorization.  Brief Assessment and Rationale for Refill:  Approve     Medication Therapy Plan:         Comments:     Note composed:11:10 AM 04/25/2023             Appointments     Last Visit   2/24/2023 Lydia Eugene MD   Next Visit   8/24/2023 Lydia Eugene MD

## 2023-05-15 ENCOUNTER — OFFICE VISIT (OUTPATIENT)
Dept: ORTHOPEDICS | Facility: CLINIC | Age: 68
End: 2023-05-15
Payer: MEDICARE

## 2023-05-15 VITALS
HEIGHT: 65 IN | BODY MASS INDEX: 42.49 KG/M2 | WEIGHT: 255 LBS | SYSTOLIC BLOOD PRESSURE: 128 MMHG | DIASTOLIC BLOOD PRESSURE: 86 MMHG

## 2023-05-15 DIAGNOSIS — Z98.1 HISTORY OF LUMBAR SPINAL FUSION: Primary | ICD-10-CM

## 2023-05-15 DIAGNOSIS — M70.62 TROCHANTERIC BURSITIS OF LEFT HIP: ICD-10-CM

## 2023-05-15 PROCEDURE — 1160F RVW MEDS BY RX/DR IN RCRD: CPT | Mod: CPTII,S$GLB,, | Performed by: ORTHOPAEDIC SURGERY

## 2023-05-15 PROCEDURE — 99213 PR OFFICE/OUTPT VISIT, EST, LEVL III, 20-29 MIN: ICD-10-PCS | Mod: 25,S$GLB,, | Performed by: ORTHOPAEDIC SURGERY

## 2023-05-15 PROCEDURE — 3066F PR DOCUMENTATION OF TREATMENT FOR NEPHROPATHY: ICD-10-PCS | Mod: CPTII,S$GLB,, | Performed by: ORTHOPAEDIC SURGERY

## 2023-05-15 PROCEDURE — 3079F DIAST BP 80-89 MM HG: CPT | Mod: CPTII,S$GLB,, | Performed by: ORTHOPAEDIC SURGERY

## 2023-05-15 PROCEDURE — 3066F NEPHROPATHY DOC TX: CPT | Mod: CPTII,S$GLB,, | Performed by: ORTHOPAEDIC SURGERY

## 2023-05-15 PROCEDURE — 3079F PR MOST RECENT DIASTOLIC BLOOD PRESSURE 80-89 MM HG: ICD-10-PCS | Mod: CPTII,S$GLB,, | Performed by: ORTHOPAEDIC SURGERY

## 2023-05-15 PROCEDURE — 3008F BODY MASS INDEX DOCD: CPT | Mod: CPTII,S$GLB,, | Performed by: ORTHOPAEDIC SURGERY

## 2023-05-15 PROCEDURE — 1159F MED LIST DOCD IN RCRD: CPT | Mod: CPTII,S$GLB,, | Performed by: ORTHOPAEDIC SURGERY

## 2023-05-15 PROCEDURE — 1101F PT FALLS ASSESS-DOCD LE1/YR: CPT | Mod: CPTII,S$GLB,, | Performed by: ORTHOPAEDIC SURGERY

## 2023-05-15 PROCEDURE — 3008F PR BODY MASS INDEX (BMI) DOCUMENTED: ICD-10-PCS | Mod: CPTII,S$GLB,, | Performed by: ORTHOPAEDIC SURGERY

## 2023-05-15 PROCEDURE — 4010F ACE/ARB THERAPY RXD/TAKEN: CPT | Mod: CPTII,S$GLB,, | Performed by: ORTHOPAEDIC SURGERY

## 2023-05-15 PROCEDURE — 3061F PR NEG MICROALBUMINURIA RESULT DOCUMENTED/REVIEW: ICD-10-PCS | Mod: CPTII,S$GLB,, | Performed by: ORTHOPAEDIC SURGERY

## 2023-05-15 PROCEDURE — 1125F AMNT PAIN NOTED PAIN PRSNT: CPT | Mod: CPTII,S$GLB,, | Performed by: ORTHOPAEDIC SURGERY

## 2023-05-15 PROCEDURE — 3051F PR MOST RECENT HEMOGLOBIN A1C LEVEL 7.0 - < 8.0%: ICD-10-PCS | Mod: CPTII,S$GLB,, | Performed by: ORTHOPAEDIC SURGERY

## 2023-05-15 PROCEDURE — 3288F PR FALLS RISK ASSESSMENT DOCUMENTED: ICD-10-PCS | Mod: CPTII,S$GLB,, | Performed by: ORTHOPAEDIC SURGERY

## 2023-05-15 PROCEDURE — 3061F NEG MICROALBUMINURIA REV: CPT | Mod: CPTII,S$GLB,, | Performed by: ORTHOPAEDIC SURGERY

## 2023-05-15 PROCEDURE — 3051F HG A1C>EQUAL 7.0%<8.0%: CPT | Mod: CPTII,S$GLB,, | Performed by: ORTHOPAEDIC SURGERY

## 2023-05-15 PROCEDURE — 4010F PR ACE/ARB THEARPY RXD/TAKEN: ICD-10-PCS | Mod: CPTII,S$GLB,, | Performed by: ORTHOPAEDIC SURGERY

## 2023-05-15 PROCEDURE — 20610 DRAIN/INJ JOINT/BURSA W/O US: CPT | Mod: LT,S$GLB,, | Performed by: ORTHOPAEDIC SURGERY

## 2023-05-15 PROCEDURE — 1159F PR MEDICATION LIST DOCUMENTED IN MEDICAL RECORD: ICD-10-PCS | Mod: CPTII,S$GLB,, | Performed by: ORTHOPAEDIC SURGERY

## 2023-05-15 PROCEDURE — 3074F PR MOST RECENT SYSTOLIC BLOOD PRESSURE < 130 MM HG: ICD-10-PCS | Mod: CPTII,S$GLB,, | Performed by: ORTHOPAEDIC SURGERY

## 2023-05-15 PROCEDURE — 99213 OFFICE O/P EST LOW 20 MIN: CPT | Mod: 25,S$GLB,, | Performed by: ORTHOPAEDIC SURGERY

## 2023-05-15 PROCEDURE — 1101F PR PT FALLS ASSESS DOC 0-1 FALLS W/OUT INJ PAST YR: ICD-10-PCS | Mod: CPTII,S$GLB,, | Performed by: ORTHOPAEDIC SURGERY

## 2023-05-15 PROCEDURE — 3074F SYST BP LT 130 MM HG: CPT | Mod: CPTII,S$GLB,, | Performed by: ORTHOPAEDIC SURGERY

## 2023-05-15 PROCEDURE — 1125F PR PAIN SEVERITY QUANTIFIED, PAIN PRESENT: ICD-10-PCS | Mod: CPTII,S$GLB,, | Performed by: ORTHOPAEDIC SURGERY

## 2023-05-15 PROCEDURE — 20610 LARGE JOINT ASPIRATION/INJECTION: L GREATER TROCHANTERIC BURSA: ICD-10-PCS | Mod: LT,S$GLB,, | Performed by: ORTHOPAEDIC SURGERY

## 2023-05-15 PROCEDURE — 1160F PR REVIEW ALL MEDS BY PRESCRIBER/CLIN PHARMACIST DOCUMENTED: ICD-10-PCS | Mod: CPTII,S$GLB,, | Performed by: ORTHOPAEDIC SURGERY

## 2023-05-15 PROCEDURE — 3288F FALL RISK ASSESSMENT DOCD: CPT | Mod: CPTII,S$GLB,, | Performed by: ORTHOPAEDIC SURGERY

## 2023-05-15 RX ORDER — SIMVASTATIN 10 MG/1
10 TABLET, FILM COATED ORAL NIGHTLY
COMMUNITY
Start: 2023-02-08

## 2023-05-15 RX ORDER — TRIAMCINOLONE ACETONIDE 40 MG/ML
40 INJECTION, SUSPENSION INTRA-ARTICULAR; INTRAMUSCULAR
Status: DISCONTINUED | OUTPATIENT
Start: 2023-05-15 | End: 2023-05-15 | Stop reason: HOSPADM

## 2023-05-15 RX ADMIN — TRIAMCINOLONE ACETONIDE 40 MG: 40 INJECTION, SUSPENSION INTRA-ARTICULAR; INTRAMUSCULAR at 10:05

## 2023-05-15 NOTE — PROCEDURES
Large Joint Aspiration/Injection: L greater trochanteric bursa    Date/Time: 5/15/2023 10:30 AM  Performed by: Everton Godinez MD  Authorized by: Everton Godinez MD     Consent Done?:  Yes (Verbal)  Indications:  Pain  Site marked: the procedure site was marked    Timeout: prior to procedure the correct patient, procedure, and site was verified    Prep: patient was prepped and draped in usual sterile fashion      Local anesthesia used?: Yes    Local anesthetic:  Lidocaine 1% without epinephrine    Details:  Needle Size:  25 G  Ultrasonic Guidance for needle placement?: No    Location:  Hip  Site:  L greater trochanteric bursa  Medications:  40 mg triamcinolone acetonide 40 mg/mL  Patient tolerance:  Patient tolerated the procedure well with no immediate complications

## 2023-05-15 NOTE — PROGRESS NOTES
Subjective:       Patient ID: Edie Hernandez is a 67 y.o. female.    Chief Complaint: Pain of the Lumbar Spine (//XR// 3 month F/UP  L3-4, L4-5 ALIF 11/8/22. Doing okay, back pain is better since surgery, no leg pain, numbness left thigh, pain side of left hip)      History of Present Illness    Prior to meeting with the patient I reviewed the medical chart in [facility]. This included reviewing the previous progress notes from our office, review of the patient's last appointment with their primary care provider, review of any visits to the emergency room, and review of any pain management appointments or procedures.   67-year-old female, returns to clinic today 6 months post L3-4 and L4-5 anterior lumbar interbody fusion.  Overall she is doing much better than prior to her surgery.  Her functional mobility is improved significantly she goes shopping and perform her daily activities without significant limitation.    Today she has some pain over the lateral aspect of the bilateral hips, left greater than right.  Worse going up and down stairs, they have elevated home, it is approximately 22 steps.    Current Medications  Current Outpatient Medications   Medication Sig Dispense Refill    azelastine (ASTELIN) 137 mcg (0.1 %) nasal spray 1 spray (137 mcg total) by Nasal route 2 (two) times daily. (Patient taking differently: 1 spray by Nasal route 2 (two) times daily. PRN) 30 mL 11    blood-glucose meter kit Use as instructed 1 each 0    cyanocobalamin (VITAMIN B-12) 100 MCG tablet Take 100 mcg by mouth once daily.      cyclobenzaprine (FLEXERIL) 5 MG tablet TAKE 1 TO 2 TABLETS BY MOUTH NIGHTLY AT BEDTIME AS NEEDED FOR PAIN 90 tablet prn    diclofenac sodium (VOLTAREN) 1 % Gel Apply 2 g topically 4 (four) times daily. 450 g prn    ergocalciferol (ERGOCALCIFEROL) 50,000 unit Cap TAKE 1 CAPSULE BY MOUTH ONE TIME PER WEEK 12 capsule 1    estradioL (ESTRACE) 0.01 % (0.1 mg/gram) vaginal cream Apply 1 fingertipful to vaginal  area nightly x 2 weeks then use on MWF 42.5 g 3    fluticasone propionate (FLONASE) 50 mcg/actuation nasal spray 1 spray (50 mcg total) by Each Nostril route daily as needed for Allergies. 16 g prn    furosemide (LASIX) 20 MG tablet Take 1 tablet (20 mg total) by mouth daily as needed (leg swelling). 60 tablet 0    HYDROcodone-acetaminophen (NORCO)  mg per tablet Take 1 tablet by mouth every 24 hours as needed for Pain. 30 tablet 0    lancets (ACCU-CHEK MULTICLIX LANCET) Misc Test 2 x day 200 each 3    levothyroxine (SYNTHROID, LEVOTHROID) 175 MCG tablet TAKE ONE TABLET BY MOUTH EVERY DAY 90 tablet 3    losartan (COZAAR) 50 MG tablet TAKE ONE TABLET (50 MG TOTAL) BY MOUTH ONCE DAILY (Patient taking differently: every evening. FOR RLS) 90 tablet 2    melatonin (MELATIN) Take by mouth every evening.      metFORMIN (GLUCOPHAGE-XR) 500 MG ER 24hr tablet TAKE TWO TABLETS BY MOUTH TWICE DAILY WITH MEALS 360 tablet 2    methylphenidate (RITALIN LA) 40 MG 24 hr capsule Take 40 mg by mouth daily as needed. Not on at this time      methylphenidate HCl (RITALIN) 10 MG tablet       ONETOUCH ULTRA BLUE TEST STRIP Strp USE TO TEST BLOOD SUGAR 100 strip 3    oxybutynin (DITROPAN XL) 15 MG TR24 Take 1 tablet (15 mg total) by mouth once daily. 30 tablet 06    pantoprazole (PROTONIX) 40 MG tablet TAKE ONE TABLET (40MG TOTAL) BY MOUTH ONCE DAILY 90 tablet 3    pregabalin (LYRICA) 100 MG capsule TAKE 1 CAPSULE BY MOUTH EVERY EVENING 30 capsule 2    semaglutide (OZEMPIC) 0.25 mg or 0.5 mg(2 mg/1.5 mL) pen injector .25 mg sq weekly x 2 weeks then increase to .5mg sq weekly 1 pen 5    simvastatin (ZOCOR) 10 MG tablet Take 10 mg by mouth every evening.      clobetasol (TEMOVATE) 0.05 % cream Apply 1 application topically 2 (two) times daily. Apply to affected area (Patient not taking: Reported on 5/15/2023) 30 g 2    mupirocin (BACTROBAN) 2 % ointment Apply topically 3 (three) times daily. (Patient not taking: Reported on 5/15/2023)  60 g PRN     No current facility-administered medications for this visit.     Facility-Administered Medications Ordered in Other Visits   Medication Dose Route Frequency Provider Last Rate Last Admin    lactated ringers infusion   Intravenous Continuous Dejan Simmons MD   Stopped at 07/06/20 1008       Allergies  Review of patient's allergies indicates:   Allergen Reactions    Codeine Nausea And Vomiting     Other reaction(s): Nausea, dizziness       Past Medical History  Past Medical History:   Diagnosis Date    Arthritis     Colon polyp, hyperplastic 01/2013    recheck 5-10 years    Diabetes mellitus, type 2     Diverticulosis     Fatty liver     General anesthetics causing adverse effect in therapeutic use     woke up during tubal ligation    GERD (gastroesophageal reflux disease)     Gout, unspecified     Malignant melanoma of skin, unspecified     LEFT NECK    CLINT (obstructive sleep apnea)     C-pap    PONV (postoperative nausea and vomiting)     RLS (restless legs syndrome)     Thyroid disease     Wears glasses     Wears partial dentures     UPPER       Surgical History  Past Surgical History:   Procedure Laterality Date    ANTERIOR LUMBAR INTERBODY FUSION (ALIF) N/A 11/8/2022    Procedure: FUSION, SPINE, LUMBAR, ALIF L3-4, L4-5;  Surgeon: Everton Godinez MD;  Location: [facility] OR;  Service: Orthopedics;  Laterality: N/A;  NTI, [employer], DR SANDI ENRIQUEZ, CO-SURGEON    COLONOSCOPY  02/14/2013    Dr. Voegl: repeat in 5-10 years    COLONOSCOPY W/ BIOPSIES AND POLYPECTOMY  02/2013    hyperplastic, recheck 5-10 years    CYSTOSCOPY WITH HYDRODISTENSION OF BLADDER N/A 9/21/2022    Procedure: CYSTOSCOPY, WITH BLADDER HYDRODISTENSION;  Surgeon: Keely Chavez Jr., MD;  Location: [facility] OR;  Service: Urology;  Laterality: N/A;    EXTERNAL EAR SURGERY      FUSION, SPINE, LUMBAR, POSTERIOR APPROACH N/A 11/8/2022    Procedure: FUSION,SPINE,LUMBAR,POSTERIOR APPROACH L3-4, L4-5;  Surgeon: Everton Godinez MD;  Location:  [facility] OR;  Service: Orthopedics;  Laterality: N/A;  NTI, [employer], DR SANDI ENRIQUEZ, CO-SURGEON    HYSTERECTOMY      INJECTION OF ANESTHETIC AGENT AROUND MEDIAL BRANCH NERVES INNERVATING LUMBAR FACET JOINT Bilateral 8/3/2020    Procedure: Block-nerve-medial branch-lumbar left L2, L3, L4, L5;  Surgeon: Dejan Simmons MD;  Location: [facility] OR;  Service: Pain Management;  Laterality: Bilateral;    INJECTION OF ANESTHETIC AGENT AROUND MEDIAL BRANCH NERVES INNERVATING LUMBAR FACET JOINT Left 8/13/2020    Procedure: Block-nerve-medial branch-lumbar.  L2, L3, L4, and L5;  Surgeon: Dejan Simmons MD;  Location: [facility] OR;  Service: Pain Management;  Laterality: Left;    MINIMALLY INVASIVE FUSION OF SPINE Left 10/12/2021    Procedure: FUSION, SPINE, MINIMALLY INVASIVE;  Surgeon: Everton Godinez MD;  Location: [facility] OR;  Service: Orthopedics;  Laterality: Left;  SI-Bone    RADIOFREQUENCY ABLATION OF LUMBAR MEDIAL BRANCH NERVE AT SINGLE LEVEL Left 8/27/2020    Procedure: Radiofrequency Ablation, Nerve, Spinal, Lumbar, Medial Branch,  L2, L3, L4, L5;  Surgeon: Dejan Simmons MD;  Location: [facility] OR;  Service: Pain Management;  Laterality: Left;  L2,l3,l4,l5    THROAT SURGERY      for CLINT    TRANSFORAMINAL EPIDURAL INJECTION OF STEROID Left 3/6/2020    Procedure: Injection,steroid,epidural,transforaminal approach;  Surgeon: Harmeet Zapata MD;  Location: [facility] OR;  Service: Pain Management;  Laterality: Left;  L4-L5    TRANSFORAMINAL EPIDURAL INJECTION OF STEROID Left 6/10/2020    Procedure: Injection,steroid,epidural,transforaminal approach.L4 and L5;  Surgeon: Dejan Simmons MD;  Location: [facility] OR;  Service: Pain Management;  Laterality: Left;    TRANSFORAMINAL EPIDURAL INJECTION OF STEROID Left 7/6/2020    Procedure: Injection,steroid,epidural,transforaminal approach. left L4 and L5;  Surgeon: Dejan Simmons MD;  Location: [facility] OR;  Service: Pain Management;  Laterality: Left;    TRANSFORAMINAL EPIDURAL INJECTION OF STEROID Left 11/12/2021     Procedure: Injection,steroid,epidural,transforaminal approach Left L5-S1;  Surgeon: Dejan Simmons MD;  Location: [facility] OR;  Service: Pain Management;  Laterality: Left;    UPPER GASTROINTESTINAL ENDOSCOPY         Family History:   Family History   Problem Relation Age of Onset    Hypertension Mother     Breast cancer Mother 50    Heart disease Father     Hypertension Father     Ovarian cancer Paternal Aunt     Macular degeneration Neg Hx     Retinal detachment Neg Hx     Glaucoma Neg Hx     Colon cancer Neg Hx     Colon polyps Neg Hx     Crohn's disease Neg Hx     Ulcerative colitis Neg Hx        Social History:   Social History     Socioeconomic History    Marital status:    Occupational History     Employer: [email or web address]   Tobacco Use    Smoking status: Never    Smokeless tobacco: Never   Substance and Sexual Activity    Alcohol use: No    Drug use: No    Sexual activity: Yes     Partners: Male       Hospitalization/Major Diagnostic Procedure:     Review of Systems     General/Constitutional:  Chills denies. Fatigue denies. Fever denies. Weight gain denies. Weight loss denies.    Respiratory:  Shortness of breath denies.    Cardiovascular:  Chest pain denies.    Gastrointestinal:  Constipation denies. Diarrhea denies. Nausea denies. Vomiting denies.     Hematology:  Easy bruising denies. Prolonged bleeding denies.     Genitourinary:  Frequent urination denies. Pain in lower back denies. Painful urination denies.     Musculoskeletal:  See HPI for details    Skin:  Rash denies.    Neurologic:  Dizziness denies. Gait abnormalities denies. Seizures denies. Tingling/Numbess denies.    Psychiatric:  Anxiety denies. Depressed mood denies.     Objective:   Vital Signs:   Vitals:    05/15/23 1042   BP: 128/86        Physical Exam      General Examination:     Constitutional: The patient is alert and oriented to lace person and time. Mood is pleasant.     Head/Face: Normal facial features normal  "eyebrows    Eyes: Normal extraocular motion bilaterally    Lungs: Respirations are equal and unlabored    Gait is coordinated.    Cardiovascular: There are no swelling or varicosities present.    Lymphatic: Negative for adenopathy    Skin: Normal    Neurological: Level of consciousness normal. Oriented to place person and time and situation    Psychiatric: Oriented to time place person and situation    Examination of the lumbar spine, patient is obese.  She has a nonantalgic gait but waddling.  Incision remains well healed.  Lumbar range of motion within functional limitations but decreased at the end range of flexion secondary to body habitus.  Bilateral lower extremities are distal neurovascular intact.    Examination of the bilateral hips, patient with no groin pain, well-preserved range of motion no pain with internal external rotation.  She has tenderness to palpation bilaterally over the greater trochanters.  Left bothers her more than the right.    XRAY Report/ Interpretation:  AP and lateral views of lumbar spine taken today in the office demonstrate a L3-4 L4-5 fusion with interbody devices at both levels as well as posterior instrumentation from L3-L5.  No evidence of hardware failure or loosening.      Assessment:       1. History of lumbar spinal fusion    2. Trochanteric bursitis of left hip        Plan:       Edie was seen today for pain.    Diagnoses and all orders for this visit:    History of lumbar spinal fusion  -     X-Ray Lumbar Spine Ap And Lateral    Trochanteric bursitis of left hip  -     Large Joint Aspiration/Injection: L greater trochanteric bursa         Follow up if symptoms worsen or fail to improve.  This is to attest that the physician's assistant Desmond Armstrong served in the capacity as a scribe" for this patient encounter.  This is also to verify that I have reviewed the patient's history and helped formulate the treatment plan and discussed it with the physician's assistant.  I " have actively participated in the evaluation and treatment plan for this patient is visit.  The treatment plan and medical decision-making is outlined below:  67-year-old female who continues do well after multilevel anterior lumbar interbody fusion done approximately 6 months ago now.  She appears to have good bony incorporation into the interbody devices.  She also had a left sacroiliac joint fusion.  She has pain today primarily over the greater trochanter left greater than right.  We injected the area over the trochanteric bursa lidocaine and triamcinolone.  Tolerated the procedure well.  At this point, she can resume her normal activities as tolerated.  Follow-up with us p.r.n..    Treatment options were discussed with regards to the nature of the medical condition. Conservative pain intervention and surgical options were discussed in detail. The probability of success of each separate treatment option was discussed. The patient expressed a clear understanding of the treatment options. With regards to surgery, the procedure risk, benefits, complications, and outcomes were discussed. No guarantees were given with regards to surgical outcome.   The risk of complications, morbidity, and mortality of patient management decisions have been made at the time of this visit. These are associated with the patient's problems, diagnostic procedures and treatment options. This includes the possible management options selected and those considered but not selected by the patient after shared medical decision making we discussed with the patient.     This note was created using Dragon voice recognition software that occasionally misinterpreted phrases or words.

## 2023-05-17 DIAGNOSIS — E55.9 VITAMIN D DEFICIENCY: ICD-10-CM

## 2023-05-17 NOTE — TELEPHONE ENCOUNTER
Refill Routing Note   Medication(s) are not appropriate for processing by [facility] for the following reason(s):      Medication outside of protocol    [facility] action(s):  Route None identified            Appointments  past 12m or future 3m with PCP    Date Provider   Last Visit   2/24/2023 Lydia Eugene MD   Next Visit   8/24/2023 Lydia Eugene MD   ED visits in past 90 days: 0        Note composed:8:34 AM 05/17/2023

## 2023-05-21 RX ORDER — ERGOCALCIFEROL 1.25 MG/1
CAPSULE ORAL
Qty: 12 CAPSULE | Refills: 1 | Status: SHIPPED | OUTPATIENT
Start: 2023-05-21 | End: 2023-10-31 | Stop reason: SDUPTHER

## 2023-05-23 ENCOUNTER — PATIENT MESSAGE (OUTPATIENT)
Dept: RESEARCH | Facility: HOSPITAL | Age: 68
End: 2023-05-23
Payer: MEDICARE

## 2023-05-26 RX ORDER — LOSARTAN POTASSIUM 50 MG/1
TABLET ORAL
Qty: 90 TABLET | Refills: 3 | Status: SHIPPED | OUTPATIENT
Start: 2023-05-26 | End: 2023-10-31 | Stop reason: SDUPTHER

## 2023-05-26 NOTE — TELEPHONE ENCOUNTER
Refill Decision Note   Edie Hernandez  is requesting a refill authorization.  Brief Assessment and Rationale for Refill:  Approve     Medication Therapy Plan:       Medication Reconciliation Completed: No   Comments:     Provider Staff:     Action is required for this patient.   Please see care gap opportunities below in Care Due Message.     Thanks!  [facility]     Appointments      Date Provider   Last Visit   2/24/2023 Lydia Eugene MD   Next Visit   8/24/2023 Lydia Eugene MD     Note composed:9:00 AM 05/26/2023           Note composed:8:59 AM 05/26/2023

## 2023-05-26 NOTE — TELEPHONE ENCOUNTER
Care Due:                  Date            Visit Type   Department     Provider  --------------------------------------------------------------------------------                                EP -                              PRIMARY      [facility]  Last Visit: 02-      CARE ([facility])   GHASSAN Eugene                              EP -                              PRIMARY      [facility]  Next Visit: 08-      CARE ([facility])   MEDICINE       Lydia Eugene                                                            Last  Test          Frequency    Reason                     Performed    Due Date  --------------------------------------------------------------------------------    HBA1C.......  6 months...  metFORMIN, semaglutide...  02- 08-    [facility] Embedded Care Due Messages. Reference number: [id number].   5/26/2023 8:05:19 AM CDT

## 2023-05-29 DIAGNOSIS — E11.65 UNCONTROLLED TYPE 2 DIABETES MELLITUS WITH HYPERGLYCEMIA: ICD-10-CM

## 2023-05-29 RX ORDER — METFORMIN HYDROCHLORIDE 500 MG/1
TABLET, EXTENDED RELEASE ORAL
Qty: 360 TABLET | Refills: 3 | Status: SHIPPED | OUTPATIENT
Start: 2023-05-29 | End: 2023-10-31 | Stop reason: SDUPTHER

## 2023-05-29 NOTE — TELEPHONE ENCOUNTER
No care due was identified.  [facility] Embedded Care Due Messages. Reference number: [id number].   5/29/2023 8:06:27 AM CDT

## 2023-05-29 NOTE — TELEPHONE ENCOUNTER
Refill Decision Note   Edie Hernandez  is requesting a refill authorization.  Brief Assessment and Rationale for Refill:  Approve     Medication Therapy Plan:       Medication Reconciliation Completed: No   Comments:     No Care Gaps recommended.     Note composed:8:43 AM 05/29/2023

## 2023-06-02 DIAGNOSIS — M54.9 CHRONIC BACK PAIN GREATER THAN 3 MONTHS DURATION: ICD-10-CM

## 2023-06-02 DIAGNOSIS — G89.29 CHRONIC BACK PAIN GREATER THAN 3 MONTHS DURATION: ICD-10-CM

## 2023-06-02 DIAGNOSIS — L02.221 FURUNCULOSIS OF ABDOMINAL WALL: ICD-10-CM

## 2023-06-02 NOTE — TELEPHONE ENCOUNTER
No care due was identified.  [facility] Embedded Care Due Messages. Reference number: [id number].   6/02/2023 2:18:23 PM CDT

## 2023-06-05 RX ORDER — MUPIROCIN 20 MG/G
OINTMENT TOPICAL 3 TIMES DAILY
Qty: 60 G | Status: SHIPPED | OUTPATIENT
Start: 2023-06-05

## 2023-06-05 RX ORDER — HYDROCODONE BITARTRATE AND ACETAMINOPHEN 10; 325 MG/1; MG/1
1 TABLET ORAL
Qty: 30 TABLET | Refills: 0 | Status: SHIPPED | OUTPATIENT
Start: 2023-06-05 | End: 2023-07-06 | Stop reason: SDUPTHER

## 2023-06-21 ENCOUNTER — TELEPHONE (OUTPATIENT)
Dept: ORTHOPEDICS | Facility: CLINIC | Age: 68
End: 2023-06-21

## 2023-07-06 DIAGNOSIS — G89.29 CHRONIC BACK PAIN GREATER THAN 3 MONTHS DURATION: ICD-10-CM

## 2023-07-06 DIAGNOSIS — M54.9 CHRONIC BACK PAIN GREATER THAN 3 MONTHS DURATION: ICD-10-CM

## 2023-07-06 NOTE — TELEPHONE ENCOUNTER
No care due was identified.  [facility] Embedded Care Due Messages. Reference number: [id number].   7/06/2023 4:37:36 PM CDT

## 2023-07-08 RX ORDER — HYDROCODONE BITARTRATE AND ACETAMINOPHEN 10; 325 MG/1; MG/1
1 TABLET ORAL
Qty: 30 TABLET | Refills: 0 | Status: SHIPPED | OUTPATIENT
Start: 2023-07-08 | End: 2023-08-11 | Stop reason: SDUPTHER

## 2023-07-11 ENCOUNTER — OFFICE VISIT (OUTPATIENT)
Dept: PAIN MEDICINE | Facility: CLINIC | Age: 68
End: 2023-07-11
Payer: MEDICARE

## 2023-07-11 ENCOUNTER — TELEPHONE (OUTPATIENT)
Dept: PAIN MEDICINE | Facility: CLINIC | Age: 68
End: 2023-07-11

## 2023-07-11 VITALS
BODY MASS INDEX: 42.49 KG/M2 | DIASTOLIC BLOOD PRESSURE: 72 MMHG | SYSTOLIC BLOOD PRESSURE: 125 MMHG | WEIGHT: 255 LBS | HEART RATE: 84 BPM | HEIGHT: 65 IN

## 2023-07-11 DIAGNOSIS — M70.61 GREATER TROCHANTERIC BURSITIS OF BOTH HIPS: ICD-10-CM

## 2023-07-11 DIAGNOSIS — M70.62 GREATER TROCHANTERIC BURSITIS OF BOTH HIPS: ICD-10-CM

## 2023-07-11 DIAGNOSIS — M53.3 SACROILIAC JOINT PAIN: ICD-10-CM

## 2023-07-11 DIAGNOSIS — M46.1 BILATERAL SACROILIITIS: Primary | ICD-10-CM

## 2023-07-11 PROCEDURE — 1125F PR PAIN SEVERITY QUANTIFIED, PAIN PRESENT: ICD-10-PCS | Mod: HCNC,CPTII,S$GLB,

## 2023-07-11 PROCEDURE — 3078F DIAST BP <80 MM HG: CPT | Mod: HCNC,CPTII,S$GLB,

## 2023-07-11 PROCEDURE — 4010F ACE/ARB THERAPY RXD/TAKEN: CPT | Mod: HCNC,CPTII,S$GLB,

## 2023-07-11 PROCEDURE — 3288F FALL RISK ASSESSMENT DOCD: CPT | Mod: HCNC,CPTII,S$GLB,

## 2023-07-11 PROCEDURE — 3061F NEG MICROALBUMINURIA REV: CPT | Mod: HCNC,CPTII,S$GLB,

## 2023-07-11 PROCEDURE — 4010F PR ACE/ARB THEARPY RXD/TAKEN: ICD-10-PCS | Mod: HCNC,CPTII,S$GLB,

## 2023-07-11 PROCEDURE — 1159F MED LIST DOCD IN RCRD: CPT | Mod: HCNC,CPTII,S$GLB,

## 2023-07-11 PROCEDURE — 3288F PR FALLS RISK ASSESSMENT DOCUMENTED: ICD-10-PCS | Mod: HCNC,CPTII,S$GLB,

## 2023-07-11 PROCEDURE — 1101F PT FALLS ASSESS-DOCD LE1/YR: CPT | Mod: HCNC,CPTII,S$GLB,

## 2023-07-11 PROCEDURE — 1125F AMNT PAIN NOTED PAIN PRSNT: CPT | Mod: HCNC,CPTII,S$GLB,

## 2023-07-11 PROCEDURE — 3051F PR MOST RECENT HEMOGLOBIN A1C LEVEL 7.0 - < 8.0%: ICD-10-PCS | Mod: HCNC,CPTII,S$GLB,

## 2023-07-11 PROCEDURE — 99999 PR PBB SHADOW E&M-EST. PATIENT-LVL IV: ICD-10-PCS | Mod: PBBFAC,HCNC,,

## 2023-07-11 PROCEDURE — 3066F PR DOCUMENTATION OF TREATMENT FOR NEPHROPATHY: ICD-10-PCS | Mod: HCNC,CPTII,S$GLB,

## 2023-07-11 PROCEDURE — 99214 PR OFFICE/OUTPT VISIT, EST, LEVL IV, 30-39 MIN: ICD-10-PCS | Mod: HCNC,S$GLB,,

## 2023-07-11 PROCEDURE — 3074F PR MOST RECENT SYSTOLIC BLOOD PRESSURE < 130 MM HG: ICD-10-PCS | Mod: HCNC,CPTII,S$GLB,

## 2023-07-11 PROCEDURE — 1160F RVW MEDS BY RX/DR IN RCRD: CPT | Mod: HCNC,CPTII,S$GLB,

## 2023-07-11 PROCEDURE — 99999 PR PBB SHADOW E&M-EST. PATIENT-LVL IV: CPT | Mod: PBBFAC,HCNC,,

## 2023-07-11 PROCEDURE — 3078F PR MOST RECENT DIASTOLIC BLOOD PRESSURE < 80 MM HG: ICD-10-PCS | Mod: HCNC,CPTII,S$GLB,

## 2023-07-11 PROCEDURE — 1160F PR REVIEW ALL MEDS BY PRESCRIBER/CLIN PHARMACIST DOCUMENTED: ICD-10-PCS | Mod: HCNC,CPTII,S$GLB,

## 2023-07-11 PROCEDURE — 3066F NEPHROPATHY DOC TX: CPT | Mod: HCNC,CPTII,S$GLB,

## 2023-07-11 PROCEDURE — 3051F HG A1C>EQUAL 7.0%<8.0%: CPT | Mod: HCNC,CPTII,S$GLB,

## 2023-07-11 PROCEDURE — 3061F PR NEG MICROALBUMINURIA RESULT DOCUMENTED/REVIEW: ICD-10-PCS | Mod: HCNC,CPTII,S$GLB,

## 2023-07-11 PROCEDURE — 1159F PR MEDICATION LIST DOCUMENTED IN MEDICAL RECORD: ICD-10-PCS | Mod: HCNC,CPTII,S$GLB,

## 2023-07-11 PROCEDURE — 3008F PR BODY MASS INDEX (BMI) DOCUMENTED: ICD-10-PCS | Mod: HCNC,CPTII,S$GLB,

## 2023-07-11 PROCEDURE — 99214 OFFICE O/P EST MOD 30 MIN: CPT | Mod: HCNC,S$GLB,,

## 2023-07-11 PROCEDURE — 3008F BODY MASS INDEX DOCD: CPT | Mod: HCNC,CPTII,S$GLB,

## 2023-07-11 PROCEDURE — 1101F PR PT FALLS ASSESS DOC 0-1 FALLS W/OUT INJ PAST YR: ICD-10-PCS | Mod: HCNC,CPTII,S$GLB,

## 2023-07-11 PROCEDURE — 3074F SYST BP LT 130 MM HG: CPT | Mod: HCNC,CPTII,S$GLB,

## 2023-07-11 NOTE — TELEPHONE ENCOUNTER
Level of Service:86489     HI OFFICE/OUTPT VISIT, EST, LEVL IV, 30-39 MIN      Types of orders made on 07/11/2023: Procedure Request      Order Date:7/11/2023   Ordering User:YESSICA CHOWDARY [[id number]   ]   Encounter Provider:Yessica Chowdary NP [[id number]]   Authorizing Provider: Yessica Chowdary NP [[id number]]   Supervising Provider:ERNESTINA SALAS [[id number]]   Type of Supervision:Collaborating Physician   Department:[facility] PAIN MANAGEMENT[[id number]]      Common Order Information   Procedure -> Sacroiliac Injection (Specify laterality) Cmt: bilateral      Pre-op Diagnosis -> Sacroiliitis       Order Specific Information   Order: Proc   miyare Order to Pain Management [Custom: [id number]]  Order #:           [id number]: 1 FUTURE

## 2023-07-11 NOTE — PROGRESS NOTES
Referring Physician: No ref. provider found    PCP: Lydia Eugene MD      CC: bilateral hip pain    INTERVAL HPI:   Edie Hernandez is a 67 y.o. female established patient, new to me, for the management of hip pain.  Patient was last seen by Dr. Zapata in July 2022 for GTB injections.  Patient has significant history of morbid obesity, diabetes, left-sided SI joint fusion in 2021. She presents to us with constant aching constant throbbing, sharp pain over bilateral hips.  Pain radiates down her lateral legs. Pain is aggravated with lying on each side.  She has tenderness over bilateral hips.  The patient is s/p lumbar spine fusion on 11/8/2022. The patient is also s/p left GTB injection on 5/15/23 with dr. Godinez and denies of benefit. The patient reports her pain presented a few days ago and is unable to get relief.  She has tried physical therapy minimal benefit.  She has undergone lumbar epidural steroid injections as well as lumbar MB RFA procedures with mild to moderate benefit in the past.   She denies any worsening weakness.  No bowel bladder changes. The patient reports that her pain is a 6/10.     HPI:   Edie Hernandez is a 67 y.o. female referred to us for bilateral hip pain.  Patient has significant history of morbid obesity, diabetes, left-sided SI joint fusion in 2021. She presents to us with constant aching constant throbbing, sharp pain over bilateral hips.  Pain radiates down her lateral legs.  Pain improves with rest.  Pain is aggravated with lying on each side.  She has tenderness over bilateral hips.  She undergone greater trochanter bursa injections in the past with moderate benefit.  She desires repeat procedure with us.  She has tried physical therapy minimal benefit.  She has undergone lumbar epidural steroid injections as well as lumbar MB RFA procedures with mild to moderate benefit in the past.  Above person you did help with her lower back pain.  She denies any worsening weakness.  No  bowel bladder changes.      Interventional pain procedures:   7/25/2022: bilateral GTB injections- Dr. Zapata-     ROS:  CONSTITUTIONAL: No fevers, chills, night sweats, wt. loss, appetite changes  SKIN: no rashes or itching  ENT: No headaches, head trauma, vision changes, or eye pain  LYMPH NODES: None noticed   CV: No chest pain, palpitations.   RESP: No shortness of breath, dyspnea on exertion, cough, wheezing, or hemoptysis  GI: No nausea, emesis, diarrhea, constipation, melena, hematochezia, pain.    : No dysuria, hematuria, urgency, or frequency   HEME: No easy bruising, bleeding problems  PSYCHIATRIC: No depression, anxiety, psychosis, hallucinations.  NEURO: No seizures, memory loss, dizziness or difficulty sleeping  MSK:  Positive HPI      Past Medical History:   Diagnosis Date    Arthritis     Colon polyp, hyperplastic 01/2013    recheck 5-10 years    Diabetes mellitus, type 2     Diverticulosis     Fatty liver     General anesthetics causing adverse effect in therapeutic use     woke up during tubal ligation    GERD (gastroesophageal reflux disease)     Gout, unspecified     Malignant melanoma of skin, unspecified     LEFT NECK    CLINT (obstructive sleep apnea)     C-pap    PONV (postoperative nausea and vomiting)     RLS (restless legs syndrome)     Thyroid disease     Wears glasses     Wears partial dentures     UPPER     Past Surgical History:   Procedure Laterality Date    ANTERIOR LUMBAR INTERBODY FUSION (ALIF) N/A 11/8/2022    Procedure: FUSION, SPINE, LUMBAR, ALIF L3-4, L4-5;  Surgeon: Everton Godinez MD;  Location: [facility];  Service: Orthopedics;  Laterality: N/A;  NTI, [employer], DR SANDI ENRIQUEZ, CO-SURGEON    COLONOSCOPY  02/14/2013    Dr. Vogel: repeat in 5-10 years    COLONOSCOPY W/ BIOPSIES AND POLYPECTOMY  02/2013    hyperplastic, recheck 5-10 years    CYSTOSCOPY WITH HYDRODISTENSION OF BLADDER N/A 9/21/2022    Procedure: CYSTOSCOPY, WITH BLADDER HYDRODISTENSION;  Surgeon: Keely Chavez  MD Joanie;  Location: [facility] OR;  Service: Urology;  Laterality: N/A;    EXTERNAL EAR SURGERY      FUSION, SPINE, LUMBAR, POSTERIOR APPROACH N/A 11/8/2022    Procedure: FUSION,SPINE,LUMBAR,POSTERIOR APPROACH L3-4, L4-5;  Surgeon: Everton Godinez MD;  Location: [facility] OR;  Service: Orthopedics;  Laterality: N/A;  NTI, [employer], DR SANDI ENRIQUEZ, CO-SURGEON    HYSTERECTOMY      INJECTION OF ANESTHETIC AGENT AROUND MEDIAL BRANCH NERVES INNERVATING LUMBAR FACET JOINT Bilateral 8/3/2020    Procedure: Block-nerve-medial branch-lumbar left L2, L3, L4, L5;  Surgeon: Dejan Simmons MD;  Location: [facility] OR;  Service: Pain Management;  Laterality: Bilateral;    INJECTION OF ANESTHETIC AGENT AROUND MEDIAL BRANCH NERVES INNERVATING LUMBAR FACET JOINT Left 8/13/2020    Procedure: Block-nerve-medial branch-lumbar.  L2, L3, L4, and L5;  Surgeon: Dejan Simmons MD;  Location: [facility] OR;  Service: Pain Management;  Laterality: Left;    MINIMALLY INVASIVE FUSION OF SPINE Left 10/12/2021    Procedure: FUSION, SPINE, MINIMALLY INVASIVE;  Surgeon: Everton Godinez MD;  Location: [facility] OR;  Service: Orthopedics;  Laterality: Left;  SI-Bone    RADIOFREQUENCY ABLATION OF LUMBAR MEDIAL BRANCH NERVE AT SINGLE LEVEL Left 8/27/2020    Procedure: Radiofrequency Ablation, Nerve, Spinal, Lumbar, Medial Branch,  L2, L3, L4, L5;  Surgeon: Dejan Simmons MD;  Location: [facility] OR;  Service: Pain Management;  Laterality: Left;  L2,l3,l4,l5    THROAT SURGERY      for CLINT    TRANSFORAMINAL EPIDURAL INJECTION OF STEROID Left 3/6/2020    Procedure: Injection,steroid,epidural,transforaminal approach;  Surgeon: Harmeet Zapata MD;  Location: [facility] OR;  Service: Pain Management;  Laterality: Left;  L4-L5    TRANSFORAMINAL EPIDURAL INJECTION OF STEROID Left 6/10/2020    Procedure: Injection,steroid,epidural,transforaminal approach.L4 and L5;  Surgeon: Dejan Simmons MD;  Location: [facility] OR;  Service: Pain Management;  Laterality: Left;    TRANSFORAMINAL EPIDURAL INJECTION OF  "STEROID Left 7/6/2020    Procedure: Injection,steroid,epidural,transforaminal approach. left L4 and L5;  Surgeon: Dejan Simmons MD;  Location: [facility] OR;  Service: Pain Management;  Laterality: Left;    TRANSFORAMINAL EPIDURAL INJECTION OF STEROID Left 11/12/2021    Procedure: Injection,steroid,epidural,transforaminal approach Left L5-S1;  Surgeon: Dejan Simmons MD;  Location: [facility] OR;  Service: Pain Management;  Laterality: Left;    UPPER GASTROINTESTINAL ENDOSCOPY       Family History   Problem Relation Age of Onset    Hypertension Mother     Breast cancer Mother 50    Heart disease Father     Hypertension Father     Ovarian cancer Paternal Aunt     Macular degeneration Neg Hx     Retinal detachment Neg Hx     Glaucoma Neg Hx     Colon cancer Neg Hx     Colon polyps Neg Hx     Crohn's disease Neg Hx     Ulcerative colitis Neg Hx      Social History     Socioeconomic History    Marital status:    Occupational History     Employer: [employer]   Tobacco Use    Smoking status: Never    Smokeless tobacco: Never   Substance and Sexual Activity    Alcohol use: No    Drug use: No    Sexual activity: Yes     Partners: Male         Medications/Allergies: See med card    Vitals:    07/11/23 1105   BP: 125/72   Pulse: 84   Weight: 115.7 kg (255 lb)   Height: 5' 5" (1.651 m)   PainSc:   6           Physical exam:    GENERAL: A and O x3, the patient appears well groomed and is in no acute distress.  Skin: No rashes or obvious lesions  HEENT: normocephalic, atraumatic  CARDIOVASCULAR:  Palpable peripheral pulses  LUNGS: easy work of breathing  ABDOMEN: soft, nontender   UPPER EXTREMITIES: Normal alignment, normal range of motion, no atrophy, no skin changes,  hair growth and nail growth normal and equal bilaterally. No swelling, no tenderness.    LOWER EXTREMITIES:  Normal alignment, normal range of motion, no atrophy, no skin changes,  hair growth and nail growth normal and equal bilaterally. No swelling, " no tenderness.  LUMBAR SPINE  Lumbar spine: ROM is limited with flexion extension and oblique extension with moderate increased pain.    Francisco's test causes increased pain on bilateral side.    Supine straight leg raise is positive bilaterally.    Internal and external rotation of the hip causes no increased pain on either side.  Myofascial exam: Tenderness to palpation across lumbar paraspinous muscles.  Positive tenderness over bilateral  GTB  ((+)) TTP at the SI joint bilateral  ((+)) SAMUEL's test  ((+)) One leg stand   ((--)) Distraction test  ((+)) Thigh thrust       MENTAL STATUS: normal orientation, speech, language, and fund of knowledge for social situation.  Emotional state appropriate.      MOTOR: Tone and bulk: normal bilateral upper and lower Strength: normal   SENSATION: Light touch and pinprick intact bilaterally  REFLEXES: normal, symmetric, nonbrisk.  Toes down, no clonus. No hoffmans.  GAIT: normal rise, base, steps, and arm swing.        Imaging:  Xray lumbar spine: 5/2023  XRAY Report/ Interpretation:  AP and lateral views of lumbar spine taken today in the office demonstrate a L3-4 L4-5 fusion with interbody devices at both levels as well as posterior instrumentation from L3-L5.  No evidence of hardware failure or loosening.    Assessment:  Patient presents as an established patient ,new to me, with lower back pain with radiation into her bilateral hips.  1. Bilateral sacroiliitis    2. Sacroiliac joint pain    3. Greater trochanteric bursitis of both hips            Plan:  1. I have stressed the importance of physical activity and exercise to improve overall health  2. Reviewed pertinent imaging and records with patient  3. Schedule for bilateral SI Joint injections   4. Provided the patient with SI joint stretches and GTB stretches.    5. Discussed and recommended aquatic PT, pt defers due to OOP costs.   6. RTC for the procedure as outlined above   Yessica Kan NP

## 2023-07-25 ENCOUNTER — OFFICE VISIT (OUTPATIENT)
Dept: FAMILY MEDICINE | Facility: CLINIC | Age: 68
End: 2023-07-25
Payer: MEDICARE

## 2023-07-25 VITALS
OXYGEN SATURATION: 98 % | DIASTOLIC BLOOD PRESSURE: 70 MMHG | SYSTOLIC BLOOD PRESSURE: 120 MMHG | HEART RATE: 78 BPM | HEIGHT: 65 IN | TEMPERATURE: 98 F | WEIGHT: 251.13 LBS | BODY MASS INDEX: 41.84 KG/M2

## 2023-07-25 DIAGNOSIS — E11.22 TYPE 2 DIABETES MELLITUS WITH CHRONIC KIDNEY DISEASE, WITHOUT LONG-TERM CURRENT USE OF INSULIN, UNSPECIFIED CKD STAGE: ICD-10-CM

## 2023-07-25 DIAGNOSIS — Z65.9 OTHER SOCIAL STRESSOR: ICD-10-CM

## 2023-07-25 DIAGNOSIS — R45.89 DEPRESSED MOOD: Primary | ICD-10-CM

## 2023-07-25 DIAGNOSIS — T14.8XXA SKIN ABRASION: ICD-10-CM

## 2023-07-25 PROCEDURE — 3061F NEG MICROALBUMINURIA REV: CPT | Mod: HCNC,CPTII,S$GLB, | Performed by: NURSE PRACTITIONER

## 2023-07-25 PROCEDURE — 99213 PR OFFICE/OUTPT VISIT, EST, LEVL III, 20-29 MIN: ICD-10-PCS | Mod: HCNC,S$GLB,, | Performed by: NURSE PRACTITIONER

## 2023-07-25 PROCEDURE — 1160F PR REVIEW ALL MEDS BY PRESCRIBER/CLIN PHARMACIST DOCUMENTED: ICD-10-PCS | Mod: HCNC,CPTII,S$GLB, | Performed by: NURSE PRACTITIONER

## 2023-07-25 PROCEDURE — 4010F ACE/ARB THERAPY RXD/TAKEN: CPT | Mod: HCNC,CPTII,S$GLB, | Performed by: NURSE PRACTITIONER

## 2023-07-25 PROCEDURE — 1101F PT FALLS ASSESS-DOCD LE1/YR: CPT | Mod: HCNC,CPTII,S$GLB, | Performed by: NURSE PRACTITIONER

## 2023-07-25 PROCEDURE — 3074F PR MOST RECENT SYSTOLIC BLOOD PRESSURE < 130 MM HG: ICD-10-PCS | Mod: HCNC,CPTII,S$GLB, | Performed by: NURSE PRACTITIONER

## 2023-07-25 PROCEDURE — 99213 OFFICE O/P EST LOW 20 MIN: CPT | Mod: HCNC,S$GLB,, | Performed by: NURSE PRACTITIONER

## 2023-07-25 PROCEDURE — 3078F PR MOST RECENT DIASTOLIC BLOOD PRESSURE < 80 MM HG: ICD-10-PCS | Mod: HCNC,CPTII,S$GLB, | Performed by: NURSE PRACTITIONER

## 2023-07-25 PROCEDURE — 3288F FALL RISK ASSESSMENT DOCD: CPT | Mod: HCNC,CPTII,S$GLB, | Performed by: NURSE PRACTITIONER

## 2023-07-25 PROCEDURE — 3078F DIAST BP <80 MM HG: CPT | Mod: HCNC,CPTII,S$GLB, | Performed by: NURSE PRACTITIONER

## 2023-07-25 PROCEDURE — 1125F AMNT PAIN NOTED PAIN PRSNT: CPT | Mod: HCNC,CPTII,S$GLB, | Performed by: NURSE PRACTITIONER

## 2023-07-25 PROCEDURE — 4010F PR ACE/ARB THEARPY RXD/TAKEN: ICD-10-PCS | Mod: HCNC,CPTII,S$GLB, | Performed by: NURSE PRACTITIONER

## 2023-07-25 PROCEDURE — 3074F SYST BP LT 130 MM HG: CPT | Mod: HCNC,CPTII,S$GLB, | Performed by: NURSE PRACTITIONER

## 2023-07-25 PROCEDURE — 1159F PR MEDICATION LIST DOCUMENTED IN MEDICAL RECORD: ICD-10-PCS | Mod: HCNC,CPTII,S$GLB, | Performed by: NURSE PRACTITIONER

## 2023-07-25 PROCEDURE — 3051F PR MOST RECENT HEMOGLOBIN A1C LEVEL 7.0 - < 8.0%: ICD-10-PCS | Mod: HCNC,CPTII,S$GLB, | Performed by: NURSE PRACTITIONER

## 2023-07-25 PROCEDURE — 1159F MED LIST DOCD IN RCRD: CPT | Mod: HCNC,CPTII,S$GLB, | Performed by: NURSE PRACTITIONER

## 2023-07-25 PROCEDURE — 1160F RVW MEDS BY RX/DR IN RCRD: CPT | Mod: HCNC,CPTII,S$GLB, | Performed by: NURSE PRACTITIONER

## 2023-07-25 PROCEDURE — 1101F PR PT FALLS ASSESS DOC 0-1 FALLS W/OUT INJ PAST YR: ICD-10-PCS | Mod: HCNC,CPTII,S$GLB, | Performed by: NURSE PRACTITIONER

## 2023-07-25 PROCEDURE — 3051F HG A1C>EQUAL 7.0%<8.0%: CPT | Mod: HCNC,CPTII,S$GLB, | Performed by: NURSE PRACTITIONER

## 2023-07-25 PROCEDURE — 3066F PR DOCUMENTATION OF TREATMENT FOR NEPHROPATHY: ICD-10-PCS | Mod: HCNC,CPTII,S$GLB, | Performed by: NURSE PRACTITIONER

## 2023-07-25 PROCEDURE — 3288F PR FALLS RISK ASSESSMENT DOCUMENTED: ICD-10-PCS | Mod: HCNC,CPTII,S$GLB, | Performed by: NURSE PRACTITIONER

## 2023-07-25 PROCEDURE — 1125F PR PAIN SEVERITY QUANTIFIED, PAIN PRESENT: ICD-10-PCS | Mod: HCNC,CPTII,S$GLB, | Performed by: NURSE PRACTITIONER

## 2023-07-25 PROCEDURE — 3008F BODY MASS INDEX DOCD: CPT | Mod: HCNC,CPTII,S$GLB, | Performed by: NURSE PRACTITIONER

## 2023-07-25 PROCEDURE — 99999 PR PBB SHADOW E&M-EST. PATIENT-LVL IV: ICD-10-PCS | Mod: PBBFAC,HCNC,, | Performed by: NURSE PRACTITIONER

## 2023-07-25 PROCEDURE — 99999 PR PBB SHADOW E&M-EST. PATIENT-LVL IV: CPT | Mod: PBBFAC,HCNC,, | Performed by: NURSE PRACTITIONER

## 2023-07-25 PROCEDURE — 3066F NEPHROPATHY DOC TX: CPT | Mod: HCNC,CPTII,S$GLB, | Performed by: NURSE PRACTITIONER

## 2023-07-25 PROCEDURE — 3008F PR BODY MASS INDEX (BMI) DOCUMENTED: ICD-10-PCS | Mod: HCNC,CPTII,S$GLB, | Performed by: NURSE PRACTITIONER

## 2023-07-25 PROCEDURE — 3061F PR NEG MICROALBUMINURIA RESULT DOCUMENTED/REVIEW: ICD-10-PCS | Mod: HCNC,CPTII,S$GLB, | Performed by: NURSE PRACTITIONER

## 2023-07-25 RX ORDER — ESCITALOPRAM OXALATE 10 MG/1
10 TABLET ORAL DAILY
Qty: 30 TABLET | Refills: 2 | Status: SHIPPED | OUTPATIENT
Start: 2023-07-25 | End: 2023-10-23 | Stop reason: SDUPTHER

## 2023-07-25 RX ORDER — SEMAGLUTIDE 1.34 MG/ML
INJECTION, SOLUTION SUBCUTANEOUS
Qty: 1 EACH | Refills: 5 | Status: SHIPPED | OUTPATIENT
Start: 2023-07-25 | End: 2023-08-24

## 2023-07-25 RX ORDER — MUPIROCIN 20 MG/G
OINTMENT TOPICAL 3 TIMES DAILY
Qty: 30 G | Refills: 0 | Status: SHIPPED | OUTPATIENT
Start: 2023-07-25

## 2023-07-25 NOTE — PROGRESS NOTES
Subjective:       Patient ID: Edie Hernandez is a 67 y.o. female.    Chief Complaint: discuss stress     HPI   68 y/o female patient with medical problems listed below presents to discuss stress. She states has been stressed out lately from family issue. She states lives with 2 grandchildren (19 y/o and 18 y/o) and one of them has Autism, , and a daughter who recently got out of the [employer]. States feels depressed and has little energy doing stuffs. Denies SI. She states has tried to see psychiatrist but needs referral. Denies smoking, etoh.   She requests refill of ozempic.   Noted scar on her right lower arm and she states her dog scratched on the arm but she was not bitten by her dog.     Labs reviewed from 2/2023    Patient Active Problem List   Diagnosis    GERD (gastroesophageal reflux disease)    Chronic back pain greater than 3 months duration    Environmental allergies    Fibromyalgia    B12 nutritional deficiency    CLINT (obstructive sleep apnea)    Chronic sinusitis    Asthma    Hypothyroidism (acquired)    Nausea    Night sweats    Tinnitus, bilateral    Frequent UTI    Hyperlipidemia associated with type 2 diabetes mellitus    Colon polyp, hyperplastic    Interstitial cystitis    Hemangioma    Wart    Globus sensation    DDD (degenerative disc disease), lumbar    Severe obesity (BMI >= 40)    Edema    Mixed incontinence    Continuous opioid dependence- contract 10/18/17-failed uds for norco, + 2/18    Type 2 diabetes mellitus    History of colon polyps    Lumbar radiculitis    Lumbar radiculopathy    Lumbar spondylosis    Sacroiliitis    Lumbar stenosis with neurogenic claudication    Urinary retention    S/P lumbar fusion    Impaired flexibility of lower extremity    Depression, recurrent      Review of patient's allergies indicates:   Allergen Reactions    Codeine Nausea And Vomiting     Other reaction(s): Nausea, dizziness     Past Surgical History:   Procedure Laterality Date    ANTERIOR  LUMBAR INTERBODY FUSION (ALIF) N/A 11/8/2022    Procedure: FUSION, SPINE, LUMBAR, ALIF L3-4, L4-5;  Surgeon: Everton Godinez MD;  Location: [facility] OR;  Service: Orthopedics;  Laterality: N/A;  NTI, [employer], DR SANDI ENRIQUEZ, CO-SURGEON    COLONOSCOPY  02/14/2013    Dr. Vogel: repeat in 5-10 years    COLONOSCOPY W/ BIOPSIES AND POLYPECTOMY  02/2013    hyperplastic, recheck 5-10 years    CYSTOSCOPY WITH HYDRODISTENSION OF BLADDER N/A 9/21/2022    Procedure: CYSTOSCOPY, WITH BLADDER HYDRODISTENSION;  Surgeon: Keely Chavez Jr., MD;  Location: [facility] OR;  Service: Urology;  Laterality: N/A;    EXTERNAL EAR SURGERY      FUSION, SPINE, LUMBAR, POSTERIOR APPROACH N/A 11/8/2022    Procedure: FUSION,SPINE,LUMBAR,POSTERIOR APPROACH L3-4, L4-5;  Surgeon: Everton Godinez MD;  Location: [facility] OR;  Service: Orthopedics;  Laterality: N/A;  NTI, [employer], DR SANDI ENRIQUEZ, CO-SURGEON    HYSTERECTOMY      INJECTION OF ANESTHETIC AGENT AROUND MEDIAL BRANCH NERVES INNERVATING LUMBAR FACET JOINT Bilateral 8/3/2020    Procedure: Block-nerve-medial branch-lumbar left L2, L3, L4, L5;  Surgeon: Dejan Simmons MD;  Location: [facility] OR;  Service: Pain Management;  Laterality: Bilateral;    INJECTION OF ANESTHETIC AGENT AROUND MEDIAL BRANCH NERVES INNERVATING LUMBAR FACET JOINT Left 8/13/2020    Procedure: Block-nerve-medial branch-lumbar.  L2, L3, L4, and L5;  Surgeon: Dejan Simmons MD;  Location: [facility] OR;  Service: Pain Management;  Laterality: Left;    MINIMALLY INVASIVE FUSION OF SPINE Left 10/12/2021    Procedure: FUSION, SPINE, MINIMALLY INVASIVE;  Surgeon: Everton Godinez MD;  Location: [facility] OR;  Service: Orthopedics;  Laterality: Left;  SI-Bone    RADIOFREQUENCY ABLATION OF LUMBAR MEDIAL BRANCH NERVE AT SINGLE LEVEL Left 8/27/2020    Procedure: Radiofrequency Ablation, Nerve, Spinal, Lumbar, Medial Branch,  L2, L3, L4, L5;  Surgeon: Dejan Simmons MD;  Location: [facility] OR;  Service: Pain Management;  Laterality: Left;  L2,l3,l4,l5     THROAT SURGERY      for CLINT    TRANSFORAMINAL EPIDURAL INJECTION OF STEROID Left 3/6/2020    Procedure: Injection,steroid,epidural,transforaminal approach;  Surgeon: Harmeet Zapata MD;  Location: [facility] OR;  Service: Pain Management;  Laterality: Left;  L4-L5    TRANSFORAMINAL EPIDURAL INJECTION OF STEROID Left 6/10/2020    Procedure: Injection,steroid,epidural,transforaminal approach.L4 and L5;  Surgeon: Dejan Simmons MD;  Location: [facility] OR;  Service: Pain Management;  Laterality: Left;    TRANSFORAMINAL EPIDURAL INJECTION OF STEROID Left 7/6/2020    Procedure: Injection,steroid,epidural,transforaminal approach. left L4 and L5;  Surgeon: Dejan Simmons MD;  Location: [facility] OR;  Service: Pain Management;  Laterality: Left;    TRANSFORAMINAL EPIDURAL INJECTION OF STEROID Left 11/12/2021    Procedure: Injection,steroid,epidural,transforaminal approach Left L5-S1;  Surgeon: Dejan Simmons MD;  Location: [facility] OR;  Service: Pain Management;  Laterality: Left;    UPPER GASTROINTESTINAL ENDOSCOPY          Current Outpatient Medications:     azelastine (ASTELIN) 137 mcg (0.1 %) nasal spray, 1 spray (137 mcg total) by Nasal route 2 (two) times daily. (Patient taking differently: 1 spray by Nasal route 2 (two) times daily. PRN), Disp: 30 mL, Rfl: 11    clobetasol (TEMOVATE) 0.05 % cream, Apply 1 application topically 2 (two) times daily. Apply to affected area, Disp: 30 g, Rfl: 2    cyanocobalamin (VITAMIN B-12) 100 MCG tablet, Take 100 mcg by mouth once daily., Disp: , Rfl:     cyclobenzaprine (FLEXERIL) 5 MG tablet, TAKE 1 TO 2 TABLETS BY MOUTH NIGHTLY AT BEDTIME AS NEEDED FOR PAIN, Disp: 90 tablet, Rfl: prn    diclofenac sodium (VOLTAREN) 1 % Gel, Apply 2 g topically 4 (four) times daily., Disp: 450 g, Rfl: prn    ergocalciferol (ERGOCALCIFEROL) 50,000 unit Cap, TAKE 1 CAPSULE BY MOUTH ONE TIME PER WEEK, Disp: 12 capsule, Rfl: 1    estradioL (ESTRACE) 0.01 % (0.1 mg/gram) vaginal cream, Apply 1 fingertipful to vaginal area nightly x  2 weeks then use on [facility], Disp: 42.5 g, Rfl: 3    fluticasone propionate (FLONASE) 50 mcg/actuation nasal spray, 1 spray (50 mcg total) by Each Nostril route daily as needed for Allergies., Disp: 16 g, Rfl: prn    HYDROcodone-acetaminophen (NORCO)  mg per tablet, Take 1 tablet by mouth every 24 hours as needed for Pain., Disp: 30 tablet, Rfl: 0    lancets (ACCU-CHEK MULTICLIX LANCET) Misc, Test 2 x day, Disp: 200 each, Rfl: 3    levothyroxine (SYNTHROID, LEVOTHROID) 175 MCG tablet, TAKE ONE TABLET BY MOUTH EVERY DAY, Disp: 90 tablet, Rfl: 3    losartan (COZAAR) 50 MG tablet, TAKE ONE TABLET (50 MG TOTAL) BY MOUTH ONCE DAILY, Disp: 90 tablet, Rfl: 3    melatonin (MELATIN), Take by mouth every evening., Disp: , Rfl:     metFORMIN (GLUCOPHAGE-XR) 500 MG ER 24hr tablet, TAKE TWO TABLETS BY MOUTH TWICE DAILY WITH MEALS, Disp: 360 tablet, Rfl: 3    methylphenidate (RITALIN LA) 40 MG 24 hr capsule, Take 40 mg by mouth daily as needed. Not on at this time, Disp: , Rfl:     mupirocin (BACTROBAN) 2 % ointment, Apply topically 3 (three) times daily., Disp: 60 g, Rfl: PRN    ONETOUCH ULTRA BLUE TEST STRIP Strp, USE TO TEST BLOOD SUGAR, Disp: 100 strip, Rfl: 3    pantoprazole (PROTONIX) 40 MG tablet, TAKE ONE TABLET (40MG TOTAL) BY MOUTH ONCE DAILY, Disp: 90 tablet, Rfl: 3    pregabalin (LYRICA) 100 MG capsule, TAKE 1 CAPSULE BY MOUTH EVERY EVENING, Disp: 30 capsule, Rfl: 2    simvastatin (ZOCOR) 10 MG tablet, Take 10 mg by mouth every evening., Disp: , Rfl:     blood-glucose meter kit, Use as instructed, Disp: 1 each, Rfl: 0    EScitalopram oxalate (LEXAPRO) 10 MG tablet, Take 1 tablet (10 mg total) by mouth once daily., Disp: 30 tablet, Rfl: 2    furosemide (LASIX) 20 MG tablet, Take 1 tablet (20 mg total) by mouth daily as needed (leg swelling)., Disp: 60 tablet, Rfl: 0    methylphenidate HCl (RITALIN) 10 MG tablet, , Disp: , Rfl:     mupirocin (BACTROBAN) 2 % ointment, Apply topically 3 (three) times daily., Disp: 30 g,  "Rfl: 0    oxybutynin (DITROPAN XL) 15 MG TR24, Take 1 tablet (15 mg total) by mouth once daily., Disp: 30 tablet, Rfl: 06    semaglutide (OZEMPIC) 0.25 mg or 0.5 mg(2 mg/1.5 mL) pen injector, Please take .5mg sq weekly, Disp: 1 each, Rfl: 5  No current facility-administered medications for this visit.    Facility-Administered Medications Ordered in Other Visits:     lactated ringers infusion, , Intravenous, Continuous, Dejan Simmons MD, Stopped at 07/06/20 1008    Lab Results   Component Value Date    WBC 7.70 02/17/2023    HGB 14.0 02/17/2023    HCT 46.0 02/17/2023     02/17/2023    CHOL 223 (H) 02/17/2023    TRIG 177 (H) 02/17/2023    HDL 62 02/17/2023    ALT 21 02/17/2023    AST 18 02/17/2023     02/17/2023    K 4.7 02/17/2023     02/17/2023    CREATININE 1.0 02/17/2023    BUN 13 02/17/2023    CO2 27 02/17/2023    TSH 0.733 02/17/2023    HGBA1C 7.4 (H) 02/17/2023     Review of Systems   Constitutional:  Negative for activity change and unexpected weight change.   HENT:  Negative for hearing loss, rhinorrhea and trouble swallowing.    Respiratory:  Negative for chest tightness and wheezing.    Cardiovascular:  Negative for chest pain and palpitations.   Gastrointestinal:  Negative for blood in stool, constipation, diarrhea and vomiting.   Endocrine: Negative for polydipsia and polyuria.   Genitourinary:  Negative for difficulty urinating, dysuria, hematuria and menstrual problem.   Musculoskeletal:  Positive for arthralgias. Negative for neck pain.   Neurological:  Negative for weakness and headaches.   Psychiatric/Behavioral:  Positive for dysphoric mood.        Objective:   /70 (BP Location: Left arm, Patient Position: Sitting, BP Method: Large (Manual))   Pulse 78   Temp 97.6 °F (36.4 °C) (Oral)   Ht 5' 5" (1.651 m)   Wt 113.9 kg (251 lb 1.7 oz)   SpO2 98%   BMI 41.79 kg/m²         Physical Exam  Constitutional:       General: She is not in acute distress.     Appearance: Normal " appearance.   HENT:      Head: Atraumatic.   Cardiovascular:      Rate and Rhythm: Normal rate and regular rhythm.      Pulses: Normal pulses.      Heart sounds: Normal heart sounds.   Pulmonary:      Effort: Pulmonary effort is normal.      Breath sounds: Normal breath sounds.   Abdominal:      General: Abdomen is flat. Bowel sounds are normal.      Palpations: Abdomen is soft.   Skin:     General: Skin is warm and dry.      Comments: +abrasion on right lower arm   Neurological:      General: No focal deficit present.      Mental Status: She is alert and oriented to person, place, and time.   Psychiatric:         Mood and Affect: Mood normal.       Assessment:       1. Depressed mood    2. Skin abrasion    3. Other social stressor    4. Type 2 diabetes mellitus with chronic kidney disease, without long-term current use of insulin, unspecified CKD stage        Plan:       1. Skin abrasion  - mupirocin (BACTROBAN) 2 % ointment; Apply topically 3 (three) times daily.  Dispense: 30 g; Refill: 0    2. Other social stressor  - Ambulatory referral/consult to Social Work; Future    3. Depressed mood  - No SI reported  - PHQ 9 Score 10  - Emotional support provided  - Ambulatory referral/consult to Social Work; Future  - EScitalopram oxalate (LEXAPRO) 10 MG tablet; Take 1 tablet (10 mg total) by mouth once daily.  Dispense: 30 tablet; Refill: 2  - Ambulatory referral/consult to Psychiatry; Future    4. Type 2 diabetes mellitus with chronic kidney disease, without long-term current use of insulin, unspecified CKD stage  - semaglutide (OZEMPIC) 0.25 mg or 0.5 mg(2 mg/1.5 mL) pen injector; Please take .5mg sq weekly  Dispense: 1 each; Refill: 5     Patient with be reevaluated in  as scheduled  or sooner reena Gambino NP

## 2023-07-26 ENCOUNTER — PATIENT MESSAGE (OUTPATIENT)
Dept: PSYCHIATRY | Facility: CLINIC | Age: 68
End: 2023-07-26
Payer: MEDICARE

## 2023-07-26 DIAGNOSIS — E11.9 TYPE 2 DIABETES MELLITUS WITHOUT COMPLICATION, UNSPECIFIED WHETHER LONG TERM INSULIN USE: ICD-10-CM

## 2023-07-27 ENCOUNTER — TELEPHONE (OUTPATIENT)
Dept: PSYCHIATRY | Facility: CLINIC | Age: 68
End: 2023-07-27
Payer: MEDICARE

## 2023-07-27 NOTE — TELEPHONE ENCOUNTER
Spoke with patient regarding medication management referral. Patient would like to be added to wait list for [facility].

## 2023-08-02 ENCOUNTER — PATIENT MESSAGE (OUTPATIENT)
Dept: ADMINISTRATIVE | Facility: HOSPITAL | Age: 68
End: 2023-08-02
Payer: MEDICARE

## 2023-08-05 DIAGNOSIS — M54.9 CHRONIC BACK PAIN GREATER THAN 3 MONTHS DURATION: ICD-10-CM

## 2023-08-05 DIAGNOSIS — G89.29 CHRONIC BACK PAIN GREATER THAN 3 MONTHS DURATION: ICD-10-CM

## 2023-08-06 NOTE — TELEPHONE ENCOUNTER
Care Due:                  Date            Visit Type   Department     Provider  --------------------------------------------------------------------------------                                EP -                              PRIMARY      [facility]  Last Visit: 02-      CARE ([facility])   GHASSAN Eugene                              EP -                              PRIMARY      [facility]  Next Visit: 08-      CARE ([facility])   GHASSAN Eugene                                                            Last  Test          Frequency    Reason                     Performed    Due Date  --------------------------------------------------------------------------------    HBA1C.......  6 months...  metFORMIN................  02- 08-    [facility] Embedded Care Due Messages. Reference number: [id number].   8/05/2023 11:14:33 PM CDT

## 2023-08-07 RX ORDER — HYDROCODONE BITARTRATE AND ACETAMINOPHEN 10; 325 MG/1; MG/1
1 TABLET ORAL
Qty: 30 TABLET | Refills: 0 | OUTPATIENT
Start: 2023-08-07 | End: 2023-09-06

## 2023-08-08 ENCOUNTER — OFFICE VISIT (OUTPATIENT)
Dept: UROGYNECOLOGY | Facility: CLINIC | Age: 68
End: 2023-08-08
Payer: MEDICARE

## 2023-08-08 VITALS
HEART RATE: 84 BPM | DIASTOLIC BLOOD PRESSURE: 77 MMHG | BODY MASS INDEX: 41.84 KG/M2 | HEIGHT: 65 IN | WEIGHT: 251.13 LBS | SYSTOLIC BLOOD PRESSURE: 134 MMHG

## 2023-08-08 DIAGNOSIS — R35.0 URINARY FREQUENCY: Primary | ICD-10-CM

## 2023-08-08 DIAGNOSIS — N30.90 CYSTITIS: ICD-10-CM

## 2023-08-08 DIAGNOSIS — R39.89 CYSTALGIA: ICD-10-CM

## 2023-08-08 DIAGNOSIS — N95.2 ATROPHIC VAGINITIS: ICD-10-CM

## 2023-08-08 LAB
BILIRUBIN, UA POC OHS: NEGATIVE
BLOOD, UA POC OHS: NEGATIVE
CLARITY, UA POC OHS: CLEAR
COLOR, UA POC OHS: YELLOW
GLUCOSE, UA POC OHS: NEGATIVE
KETONES, UA POC OHS: NEGATIVE
LEUKOCYTES, UA POC OHS: ABNORMAL
NITRITE, UA POC OHS: NEGATIVE
PH, UA POC OHS: 7.5
PROTEIN, UA POC OHS: NEGATIVE
SPECIFIC GRAVITY, UA POC OHS: 1.02
UROBILINOGEN, UA POC OHS: 0.2

## 2023-08-08 PROCEDURE — 81003 URINALYSIS AUTO W/O SCOPE: CPT | Mod: QW,HCNC,S$GLB, | Performed by: NURSE PRACTITIONER

## 2023-08-08 PROCEDURE — 3078F DIAST BP <80 MM HG: CPT | Mod: HCNC,CPTII,S$GLB, | Performed by: NURSE PRACTITIONER

## 2023-08-08 PROCEDURE — 3078F PR MOST RECENT DIASTOLIC BLOOD PRESSURE < 80 MM HG: ICD-10-PCS | Mod: HCNC,CPTII,S$GLB, | Performed by: NURSE PRACTITIONER

## 2023-08-08 PROCEDURE — 99214 PR OFFICE/OUTPT VISIT, EST, LEVL IV, 30-39 MIN: ICD-10-PCS | Mod: HCNC,25,S$GLB, | Performed by: NURSE PRACTITIONER

## 2023-08-08 PROCEDURE — 3008F PR BODY MASS INDEX (BMI) DOCUMENTED: ICD-10-PCS | Mod: HCNC,CPTII,S$GLB, | Performed by: NURSE PRACTITIONER

## 2023-08-08 PROCEDURE — 3075F SYST BP GE 130 - 139MM HG: CPT | Mod: HCNC,CPTII,S$GLB, | Performed by: NURSE PRACTITIONER

## 2023-08-08 PROCEDURE — 3051F PR MOST RECENT HEMOGLOBIN A1C LEVEL 7.0 - < 8.0%: ICD-10-PCS | Mod: HCNC,CPTII,S$GLB, | Performed by: NURSE PRACTITIONER

## 2023-08-08 PROCEDURE — 3075F PR MOST RECENT SYSTOLIC BLOOD PRESS GE 130-139MM HG: ICD-10-PCS | Mod: HCNC,CPTII,S$GLB, | Performed by: NURSE PRACTITIONER

## 2023-08-08 PROCEDURE — 3051F HG A1C>EQUAL 7.0%<8.0%: CPT | Mod: HCNC,CPTII,S$GLB, | Performed by: NURSE PRACTITIONER

## 2023-08-08 PROCEDURE — 1160F RVW MEDS BY RX/DR IN RCRD: CPT | Mod: HCNC,CPTII,S$GLB, | Performed by: NURSE PRACTITIONER

## 2023-08-08 PROCEDURE — 81003 POCT URINALYSIS(INSTRUMENT): ICD-10-PCS | Mod: QW,HCNC,S$GLB, | Performed by: NURSE PRACTITIONER

## 2023-08-08 PROCEDURE — 1126F PR PAIN SEVERITY QUANTIFIED, NO PAIN PRESENT: ICD-10-PCS | Mod: HCNC,CPTII,S$GLB, | Performed by: NURSE PRACTITIONER

## 2023-08-08 PROCEDURE — 3288F FALL RISK ASSESSMENT DOCD: CPT | Mod: HCNC,CPTII,S$GLB, | Performed by: NURSE PRACTITIONER

## 2023-08-08 PROCEDURE — 51700 PR IRRIGATION, BLADDER: ICD-10-PCS | Mod: HCNC,S$GLB,, | Performed by: NURSE PRACTITIONER

## 2023-08-08 PROCEDURE — 1159F PR MEDICATION LIST DOCUMENTED IN MEDICAL RECORD: ICD-10-PCS | Mod: HCNC,CPTII,S$GLB, | Performed by: NURSE PRACTITIONER

## 2023-08-08 PROCEDURE — 51700 IRRIGATION OF BLADDER: CPT | Mod: HCNC,S$GLB,, | Performed by: NURSE PRACTITIONER

## 2023-08-08 PROCEDURE — 3061F PR NEG MICROALBUMINURIA RESULT DOCUMENTED/REVIEW: ICD-10-PCS | Mod: HCNC,CPTII,S$GLB, | Performed by: NURSE PRACTITIONER

## 2023-08-08 PROCEDURE — 87086 URINE CULTURE/COLONY COUNT: CPT | Mod: HCNC | Performed by: NURSE PRACTITIONER

## 2023-08-08 PROCEDURE — 3066F NEPHROPATHY DOC TX: CPT | Mod: HCNC,CPTII,S$GLB, | Performed by: NURSE PRACTITIONER

## 2023-08-08 PROCEDURE — 1126F AMNT PAIN NOTED NONE PRSNT: CPT | Mod: HCNC,CPTII,S$GLB, | Performed by: NURSE PRACTITIONER

## 2023-08-08 PROCEDURE — 99999 PR PBB SHADOW E&M-EST. PATIENT-LVL IV: ICD-10-PCS | Mod: PBBFAC,HCNC,, | Performed by: NURSE PRACTITIONER

## 2023-08-08 PROCEDURE — 3061F NEG MICROALBUMINURIA REV: CPT | Mod: HCNC,CPTII,S$GLB, | Performed by: NURSE PRACTITIONER

## 2023-08-08 PROCEDURE — 1159F MED LIST DOCD IN RCRD: CPT | Mod: HCNC,CPTII,S$GLB, | Performed by: NURSE PRACTITIONER

## 2023-08-08 PROCEDURE — 99999 PR PBB SHADOW E&M-EST. PATIENT-LVL IV: CPT | Mod: PBBFAC,HCNC,, | Performed by: NURSE PRACTITIONER

## 2023-08-08 PROCEDURE — 1101F PT FALLS ASSESS-DOCD LE1/YR: CPT | Mod: HCNC,CPTII,S$GLB, | Performed by: NURSE PRACTITIONER

## 2023-08-08 PROCEDURE — 3066F PR DOCUMENTATION OF TREATMENT FOR NEPHROPATHY: ICD-10-PCS | Mod: HCNC,CPTII,S$GLB, | Performed by: NURSE PRACTITIONER

## 2023-08-08 PROCEDURE — 1160F PR REVIEW ALL MEDS BY PRESCRIBER/CLIN PHARMACIST DOCUMENTED: ICD-10-PCS | Mod: HCNC,CPTII,S$GLB, | Performed by: NURSE PRACTITIONER

## 2023-08-08 PROCEDURE — 4010F PR ACE/ARB THEARPY RXD/TAKEN: ICD-10-PCS | Mod: HCNC,CPTII,S$GLB, | Performed by: NURSE PRACTITIONER

## 2023-08-08 PROCEDURE — 1101F PR PT FALLS ASSESS DOC 0-1 FALLS W/OUT INJ PAST YR: ICD-10-PCS | Mod: HCNC,CPTII,S$GLB, | Performed by: NURSE PRACTITIONER

## 2023-08-08 PROCEDURE — 99214 OFFICE O/P EST MOD 30 MIN: CPT | Mod: HCNC,25,S$GLB, | Performed by: NURSE PRACTITIONER

## 2023-08-08 PROCEDURE — 3288F PR FALLS RISK ASSESSMENT DOCUMENTED: ICD-10-PCS | Mod: HCNC,CPTII,S$GLB, | Performed by: NURSE PRACTITIONER

## 2023-08-08 PROCEDURE — 3008F BODY MASS INDEX DOCD: CPT | Mod: HCNC,CPTII,S$GLB, | Performed by: NURSE PRACTITIONER

## 2023-08-08 PROCEDURE — 4010F ACE/ARB THERAPY RXD/TAKEN: CPT | Mod: HCNC,CPTII,S$GLB, | Performed by: NURSE PRACTITIONER

## 2023-08-08 RX ORDER — LIDOCAINE HYDROCHLORIDE 20 MG/ML
20 INJECTION, SOLUTION INFILTRATION; PERINEURAL
Status: COMPLETED | OUTPATIENT
Start: 2023-08-08 | End: 2023-08-08

## 2023-08-08 RX ORDER — HEPARIN SODIUM 10000 [USP'U]/ML
20000 INJECTION, SOLUTION INTRAVENOUS; SUBCUTANEOUS ONCE
Status: COMPLETED | OUTPATIENT
Start: 2023-08-08 | End: 2023-08-08

## 2023-08-08 RX ORDER — GENTAMICIN SULFATE 40 MG/ML
80 INJECTION, SOLUTION INTRAMUSCULAR; INTRAVENOUS ONCE
Status: COMPLETED | OUTPATIENT
Start: 2023-08-08 | End: 2023-08-08

## 2023-08-08 RX ADMIN — HEPARIN SODIUM 20000 UNITS: 10000 INJECTION, SOLUTION INTRAVENOUS; SUBCUTANEOUS at 11:08

## 2023-08-08 RX ADMIN — GENTAMICIN SULFATE 80 MG: 40 INJECTION, SOLUTION INTRAMUSCULAR; INTRAVENOUS at 11:08

## 2023-08-08 RX ADMIN — LIDOCAINE HYDROCHLORIDE 20 ML: 20 INJECTION, SOLUTION INFILTRATION; PERINEURAL at 11:08

## 2023-08-08 NOTE — PROGRESS NOTES
Subjective:       Patient ID: Edie Hernandez is a 68 y.o. female.    Chief Complaint: Instillation      Edie Hernandez is a 68 y.o. female.  Who presents today for possible instillation.  She was last seen in our office on 01/26/2023.  She did have a positive urine by culture on 02/09/2023 which showed E coli.  She had a repeat urine culture on 03/08/2023 which showed multiple organisms not in predominance.  She feels that she has done fairly well from a bladder perspective until about a week ago.  She was not sure of any specific trigger that might have started her bladder problems.  But she was having frequency, urgency and feels as if she has to push to void.  Her UA today is mostly negative except for trace leukocytes.  We will send her urine for culture but we will do an instillation to try to help calm the bladder today.  She denies any other acute complaints/concerns at this time is ready to proceed with the instillation    Review of Systems   Constitutional:  Negative for activity change, fever and unexpected weight change.   HENT:  Negative for hearing loss.    Eyes:  Negative for visual disturbance.   Respiratory:  Negative for shortness of breath and wheezing.    Cardiovascular:  Negative for chest pain, palpitations and leg swelling.   Gastrointestinal:  Negative for abdominal pain, constipation and diarrhea.   Genitourinary:  Positive for difficulty urinating, frequency and urgency. Negative for dyspareunia, dysuria, vaginal bleeding and vaginal discharge.   Musculoskeletal:  Negative for gait problem and neck pain.   Skin:  Negative for rash and wound.   Allergic/Immunologic: Negative for immunocompromised state.   Neurological:  Negative for tremors, speech difficulty and weakness.   Hematological:  Does not bruise/bleed easily.   Psychiatric/Behavioral:  Negative for agitation and confusion.        Objective:      Physical Exam  Vitals reviewed. Exam conducted with a chaperone present.    Constitutional:       General: She is not in acute distress.     Appearance: She is well-developed.   HENT:      Head: Normocephalic and atraumatic.   Neck:      Thyroid: No thyromegaly.   Pulmonary:      Effort: Pulmonary effort is normal. No respiratory distress.   Abdominal:      Palpations: Abdomen is soft.      Tenderness: There is no abdominal tenderness.      Hernia: No hernia is present.   Musculoskeletal:         General: Normal range of motion.      Cervical back: Normal range of motion.   Skin:     General: Skin is warm and dry.      Findings: No rash.   Neurological:      Mental Status: She is alert and oriented to person, place, and time.   Psychiatric:         Mood and Affect: Mood normal.         Behavior: Behavior normal.         Thought Content: Thought content normal.       Pelvic Exam:  V: No lesions. No palpable nodes.   Meatus:No caruncle or stenosis  Urethra: Non tender. No suburethral masses.  BL: Non tender  RV: No hemorrhoids.      Assessment:       1. Urinary frequency    2. Cystitis    3. Cystalgia    4. Atrophic vaginitis          Procedure note- After betadine irrigation of the urethra, lidocaine instilled via urojet.   A #14 Estonian red rubber tip catheter was inserted into the bladder. 120 mls of residual urine noted.  Patient had voided approximately 40 minutes prior to catheterizations.  80mg of gentamicin,  20,000 units of heparin and 20 mls of 2% lidocaine instilled into bladder.  Pt instructed to hold mixture for at least 1 hour prior to voiding.  Pt verbalized understanding.        Plan:       Urinary frequency we will send her urine for culture on the catheterized specimen.  -     POCT Urinalysis(Instrument)  -     Urine culture    Cystitis instillation as noted above  -     LIDOcaine HCL 20 mg/ml (2%) injection 20 mL  -     heparin (porcine) injection 20,000 Units  -     gentamicin injection 80 mg    Cystalgia instillation as noted above    Atrophic vaginitis monitor    RTC  ale

## 2023-08-09 LAB — BACTERIA UR CULT: NO GROWTH

## 2023-08-17 ENCOUNTER — LAB VISIT (OUTPATIENT)
Dept: LAB | Facility: HOSPITAL | Age: 68
End: 2023-08-17
Attending: FAMILY MEDICINE
Payer: MEDICARE

## 2023-08-17 DIAGNOSIS — E03.9 HYPOTHYROIDISM (ACQUIRED): ICD-10-CM

## 2023-08-17 DIAGNOSIS — E11.22 TYPE 2 DIABETES MELLITUS WITH CHRONIC KIDNEY DISEASE, WITHOUT LONG-TERM CURRENT USE OF INSULIN, UNSPECIFIED CKD STAGE: ICD-10-CM

## 2023-08-17 DIAGNOSIS — E11.69 HYPERLIPIDEMIA ASSOCIATED WITH TYPE 2 DIABETES MELLITUS: ICD-10-CM

## 2023-08-17 DIAGNOSIS — E78.5 HYPERLIPIDEMIA ASSOCIATED WITH TYPE 2 DIABETES MELLITUS: ICD-10-CM

## 2023-08-17 LAB
ALBUMIN SERPL BCP-MCNC: 3.5 G/DL (ref 3.5–5.2)
ALP SERPL-CCNC: 97 U/L (ref 55–135)
ALT SERPL W/O P-5'-P-CCNC: 30 U/L (ref 10–44)
ANION GAP SERPL CALC-SCNC: 9 MMOL/L (ref 8–16)
AST SERPL-CCNC: 26 U/L (ref 10–40)
BASOPHILS # BLD AUTO: 0.05 K/UL (ref 0–0.2)
BASOPHILS NFR BLD: 0.8 % (ref 0–1.9)
BILIRUB SERPL-MCNC: 1.1 MG/DL (ref 0.1–1)
BUN SERPL-MCNC: 12 MG/DL (ref 8–23)
CALCIUM SERPL-MCNC: 9 MG/DL (ref 8.7–10.5)
CHLORIDE SERPL-SCNC: 104 MMOL/L (ref 95–110)
CHOLEST SERPL-MCNC: 186 MG/DL (ref 120–199)
CHOLEST/HDLC SERPL: 3.2 {RATIO} (ref 2–5)
CO2 SERPL-SCNC: 28 MMOL/L (ref 23–29)
CREAT SERPL-MCNC: 1 MG/DL (ref 0.5–1.4)
DIFFERENTIAL METHOD: NORMAL
EOSINOPHIL # BLD AUTO: 0.3 K/UL (ref 0–0.5)
EOSINOPHIL NFR BLD: 4.7 % (ref 0–8)
ERYTHROCYTE [DISTWIDTH] IN BLOOD BY AUTOMATED COUNT: 14.2 % (ref 11.5–14.5)
EST. GFR  (NO RACE VARIABLE): >60 ML/MIN/1.73 M^2
ESTIMATED AVG GLUCOSE: 120 MG/DL (ref 68–131)
GLUCOSE SERPL-MCNC: 86 MG/DL (ref 70–110)
HBA1C MFR BLD: 5.8 % (ref 4–5.6)
HCT VFR BLD AUTO: 42.3 % (ref 37–48.5)
HDLC SERPL-MCNC: 58 MG/DL (ref 40–75)
HDLC SERPL: 31.2 % (ref 20–50)
HGB BLD-MCNC: 13.7 G/DL (ref 12–16)
IMM GRANULOCYTES # BLD AUTO: 0.02 K/UL (ref 0–0.04)
IMM GRANULOCYTES NFR BLD AUTO: 0.3 % (ref 0–0.5)
LDLC SERPL CALC-MCNC: 100.6 MG/DL (ref 63–159)
LYMPHOCYTES # BLD AUTO: 1.5 K/UL (ref 1–4.8)
LYMPHOCYTES NFR BLD: 23.5 % (ref 18–48)
MCH RBC QN AUTO: 29.8 PG (ref 27–31)
MCHC RBC AUTO-ENTMCNC: 32.4 G/DL (ref 32–36)
MCV RBC AUTO: 92 FL (ref 82–98)
MONOCYTES # BLD AUTO: 0.5 K/UL (ref 0.3–1)
MONOCYTES NFR BLD: 7.1 % (ref 4–15)
NEUTROPHILS # BLD AUTO: 4 K/UL (ref 1.8–7.7)
NEUTROPHILS NFR BLD: 63.6 % (ref 38–73)
NONHDLC SERPL-MCNC: 128 MG/DL
NRBC BLD-RTO: 0 /100 WBC
PLATELET # BLD AUTO: 314 K/UL (ref 150–450)
PMV BLD AUTO: 9.9 FL (ref 9.2–12.9)
POTASSIUM SERPL-SCNC: 4.2 MMOL/L (ref 3.5–5.1)
PROT SERPL-MCNC: 6.6 G/DL (ref 6–8.4)
RBC # BLD AUTO: 4.6 M/UL (ref 4–5.4)
SODIUM SERPL-SCNC: 141 MMOL/L (ref 136–145)
T4 FREE SERPL-MCNC: 1.16 NG/DL (ref 0.71–1.51)
TRIGL SERPL-MCNC: 137 MG/DL (ref 30–150)
TSH SERPL DL<=0.005 MIU/L-ACNC: 0.72 UIU/ML (ref 0.4–4)
WBC # BLD AUTO: 6.34 K/UL (ref 3.9–12.7)

## 2023-08-17 PROCEDURE — 80053 COMPREHEN METABOLIC PANEL: CPT | Mod: HCNC | Performed by: FAMILY MEDICINE

## 2023-08-17 PROCEDURE — 36415 COLL VENOUS BLD VENIPUNCTURE: CPT | Mod: HCNC,PO | Performed by: FAMILY MEDICINE

## 2023-08-17 PROCEDURE — 80061 LIPID PANEL: CPT | Mod: HCNC | Performed by: FAMILY MEDICINE

## 2023-08-17 PROCEDURE — 84443 ASSAY THYROID STIM HORMONE: CPT | Mod: HCNC | Performed by: FAMILY MEDICINE

## 2023-08-17 PROCEDURE — 83036 HEMOGLOBIN GLYCOSYLATED A1C: CPT | Mod: HCNC | Performed by: FAMILY MEDICINE

## 2023-08-17 PROCEDURE — 85025 COMPLETE CBC W/AUTO DIFF WBC: CPT | Mod: HCNC | Performed by: FAMILY MEDICINE

## 2023-08-17 PROCEDURE — 84439 ASSAY OF FREE THYROXINE: CPT | Mod: HCNC | Performed by: FAMILY MEDICINE

## 2023-08-24 ENCOUNTER — OFFICE VISIT (OUTPATIENT)
Dept: FAMILY MEDICINE | Facility: CLINIC | Age: 68
End: 2023-08-24
Payer: MEDICARE

## 2023-08-24 VITALS
BODY MASS INDEX: 41.39 KG/M2 | WEIGHT: 248.44 LBS | TEMPERATURE: 99 F | DIASTOLIC BLOOD PRESSURE: 70 MMHG | OXYGEN SATURATION: 96 % | HEIGHT: 65 IN | SYSTOLIC BLOOD PRESSURE: 137 MMHG | HEART RATE: 80 BPM | RESPIRATION RATE: 18 BRPM

## 2023-08-24 DIAGNOSIS — Z01.00 DIABETIC EYE EXAM: ICD-10-CM

## 2023-08-24 DIAGNOSIS — G89.29 CHRONIC BACK PAIN GREATER THAN 3 MONTHS DURATION: ICD-10-CM

## 2023-08-24 DIAGNOSIS — M54.9 CHRONIC BACK PAIN GREATER THAN 3 MONTHS DURATION: ICD-10-CM

## 2023-08-24 DIAGNOSIS — E78.5 HYPERLIPIDEMIA ASSOCIATED WITH TYPE 2 DIABETES MELLITUS: Primary | ICD-10-CM

## 2023-08-24 DIAGNOSIS — E66.01 SEVERE OBESITY (BMI >= 40): ICD-10-CM

## 2023-08-24 DIAGNOSIS — E11.9 DIABETIC EYE EXAM: ICD-10-CM

## 2023-08-24 DIAGNOSIS — F11.20 CONTINUOUS OPIOID DEPENDENCE: ICD-10-CM

## 2023-08-24 DIAGNOSIS — E03.9 HYPOTHYROIDISM (ACQUIRED): ICD-10-CM

## 2023-08-24 DIAGNOSIS — Z23 NEED FOR PNEUMOCOCCAL VACCINATION: ICD-10-CM

## 2023-08-24 DIAGNOSIS — E11.69 HYPERLIPIDEMIA ASSOCIATED WITH TYPE 2 DIABETES MELLITUS: Primary | ICD-10-CM

## 2023-08-24 DIAGNOSIS — E11.22 TYPE 2 DIABETES MELLITUS WITH CHRONIC KIDNEY DISEASE, WITHOUT LONG-TERM CURRENT USE OF INSULIN, UNSPECIFIED CKD STAGE: ICD-10-CM

## 2023-08-24 DIAGNOSIS — G47.33 OSA (OBSTRUCTIVE SLEEP APNEA): ICD-10-CM

## 2023-08-24 DIAGNOSIS — K21.9 GASTROESOPHAGEAL REFLUX DISEASE WITHOUT ESOPHAGITIS: ICD-10-CM

## 2023-08-24 DIAGNOSIS — E53.8 B12 NUTRITIONAL DEFICIENCY: ICD-10-CM

## 2023-08-24 DIAGNOSIS — Z12.31 ENCOUNTER FOR SCREENING MAMMOGRAM FOR MALIGNANT NEOPLASM OF BREAST: ICD-10-CM

## 2023-08-24 PROCEDURE — 3008F BODY MASS INDEX DOCD: CPT | Mod: HCNC,CPTII,S$GLB, | Performed by: FAMILY MEDICINE

## 2023-08-24 PROCEDURE — 1159F PR MEDICATION LIST DOCUMENTED IN MEDICAL RECORD: ICD-10-PCS | Mod: HCNC,CPTII,S$GLB, | Performed by: FAMILY MEDICINE

## 2023-08-24 PROCEDURE — 3061F PR NEG MICROALBUMINURIA RESULT DOCUMENTED/REVIEW: ICD-10-PCS | Mod: HCNC,CPTII,S$GLB, | Performed by: FAMILY MEDICINE

## 2023-08-24 PROCEDURE — 1101F PT FALLS ASSESS-DOCD LE1/YR: CPT | Mod: HCNC,CPTII,S$GLB, | Performed by: FAMILY MEDICINE

## 2023-08-24 PROCEDURE — 1159F MED LIST DOCD IN RCRD: CPT | Mod: HCNC,CPTII,S$GLB, | Performed by: FAMILY MEDICINE

## 2023-08-24 PROCEDURE — 3078F PR MOST RECENT DIASTOLIC BLOOD PRESSURE < 80 MM HG: ICD-10-PCS | Mod: HCNC,CPTII,S$GLB, | Performed by: FAMILY MEDICINE

## 2023-08-24 PROCEDURE — 3288F FALL RISK ASSESSMENT DOCD: CPT | Mod: HCNC,CPTII,S$GLB, | Performed by: FAMILY MEDICINE

## 2023-08-24 PROCEDURE — 3078F DIAST BP <80 MM HG: CPT | Mod: HCNC,CPTII,S$GLB, | Performed by: FAMILY MEDICINE

## 2023-08-24 PROCEDURE — 3008F PR BODY MASS INDEX (BMI) DOCUMENTED: ICD-10-PCS | Mod: HCNC,CPTII,S$GLB, | Performed by: FAMILY MEDICINE

## 2023-08-24 PROCEDURE — 1160F RVW MEDS BY RX/DR IN RCRD: CPT | Mod: HCNC,CPTII,S$GLB, | Performed by: FAMILY MEDICINE

## 2023-08-24 PROCEDURE — 3044F PR MOST RECENT HEMOGLOBIN A1C LEVEL <7.0%: ICD-10-PCS | Mod: HCNC,CPTII,S$GLB, | Performed by: FAMILY MEDICINE

## 2023-08-24 PROCEDURE — 3288F PR FALLS RISK ASSESSMENT DOCUMENTED: ICD-10-PCS | Mod: HCNC,CPTII,S$GLB, | Performed by: FAMILY MEDICINE

## 2023-08-24 PROCEDURE — 3075F SYST BP GE 130 - 139MM HG: CPT | Mod: HCNC,CPTII,S$GLB, | Performed by: FAMILY MEDICINE

## 2023-08-24 PROCEDURE — 1101F PR PT FALLS ASSESS DOC 0-1 FALLS W/OUT INJ PAST YR: ICD-10-PCS | Mod: HCNC,CPTII,S$GLB, | Performed by: FAMILY MEDICINE

## 2023-08-24 PROCEDURE — 3061F NEG MICROALBUMINURIA REV: CPT | Mod: HCNC,CPTII,S$GLB, | Performed by: FAMILY MEDICINE

## 2023-08-24 PROCEDURE — 99214 OFFICE O/P EST MOD 30 MIN: CPT | Mod: HCNC,S$GLB,, | Performed by: FAMILY MEDICINE

## 2023-08-24 PROCEDURE — 99999 PR PBB SHADOW E&M-EST. PATIENT-LVL IV: CPT | Mod: PBBFAC,HCNC,, | Performed by: FAMILY MEDICINE

## 2023-08-24 PROCEDURE — 1125F PR PAIN SEVERITY QUANTIFIED, PAIN PRESENT: ICD-10-PCS | Mod: HCNC,CPTII,S$GLB, | Performed by: FAMILY MEDICINE

## 2023-08-24 PROCEDURE — 99999 PR PBB SHADOW E&M-EST. PATIENT-LVL IV: ICD-10-PCS | Mod: PBBFAC,HCNC,, | Performed by: FAMILY MEDICINE

## 2023-08-24 PROCEDURE — 4010F ACE/ARB THERAPY RXD/TAKEN: CPT | Mod: HCNC,CPTII,S$GLB, | Performed by: FAMILY MEDICINE

## 2023-08-24 PROCEDURE — 4010F PR ACE/ARB THEARPY RXD/TAKEN: ICD-10-PCS | Mod: HCNC,CPTII,S$GLB, | Performed by: FAMILY MEDICINE

## 2023-08-24 PROCEDURE — 99214 PR OFFICE/OUTPT VISIT, EST, LEVL IV, 30-39 MIN: ICD-10-PCS | Mod: HCNC,S$GLB,, | Performed by: FAMILY MEDICINE

## 2023-08-24 PROCEDURE — 3075F PR MOST RECENT SYSTOLIC BLOOD PRESS GE 130-139MM HG: ICD-10-PCS | Mod: HCNC,CPTII,S$GLB, | Performed by: FAMILY MEDICINE

## 2023-08-24 PROCEDURE — 3044F HG A1C LEVEL LT 7.0%: CPT | Mod: HCNC,CPTII,S$GLB, | Performed by: FAMILY MEDICINE

## 2023-08-24 PROCEDURE — 3066F NEPHROPATHY DOC TX: CPT | Mod: HCNC,CPTII,S$GLB, | Performed by: FAMILY MEDICINE

## 2023-08-24 PROCEDURE — 80307 DRUG TEST PRSMV CHEM ANLYZR: CPT | Mod: HCNC | Performed by: FAMILY MEDICINE

## 2023-08-24 PROCEDURE — 1160F PR REVIEW ALL MEDS BY PRESCRIBER/CLIN PHARMACIST DOCUMENTED: ICD-10-PCS | Mod: HCNC,CPTII,S$GLB, | Performed by: FAMILY MEDICINE

## 2023-08-24 PROCEDURE — 3066F PR DOCUMENTATION OF TREATMENT FOR NEPHROPATHY: ICD-10-PCS | Mod: HCNC,CPTII,S$GLB, | Performed by: FAMILY MEDICINE

## 2023-08-24 PROCEDURE — 1125F AMNT PAIN NOTED PAIN PRSNT: CPT | Mod: HCNC,CPTII,S$GLB, | Performed by: FAMILY MEDICINE

## 2023-08-24 RX ORDER — SEMAGLUTIDE 1.34 MG/ML
1 INJECTION, SOLUTION SUBCUTANEOUS
Qty: 3 ML | Refills: 11 | Status: SHIPPED | OUTPATIENT
Start: 2023-08-24 | End: 2023-10-31 | Stop reason: SDUPTHER

## 2023-08-24 RX ORDER — HYDROCODONE BITARTRATE AND ACETAMINOPHEN 10; 325 MG/1; MG/1
1 TABLET ORAL DAILY
Qty: 30 TABLET | Refills: 0 | Status: SHIPPED | OUTPATIENT
Start: 2023-09-23 | End: 2023-12-01 | Stop reason: SDUPTHER

## 2023-08-24 RX ORDER — HYDROCODONE BITARTRATE AND ACETAMINOPHEN 10; 325 MG/1; MG/1
1 TABLET ORAL DAILY
Qty: 30 TABLET | Refills: 0 | Status: SHIPPED | OUTPATIENT
Start: 2023-10-23 | End: 2023-11-22

## 2023-08-24 RX ORDER — HYDROCODONE BITARTRATE AND ACETAMINOPHEN 10; 325 MG/1; MG/1
1 TABLET ORAL
Qty: 30 TABLET | Refills: 0 | Status: SHIPPED | OUTPATIENT
Start: 2023-08-24 | End: 2023-09-23

## 2023-08-24 NOTE — PROGRESS NOTES
Subjective:       Patient ID: Edie Hernandez is a 68 y.o. female.    Chief Complaint: Follow-up (6mth f/u)    HPI  Review of Systems   Constitutional:  Negative for fatigue and unexpected weight change.   Respiratory:  Negative for chest tightness and shortness of breath.    Cardiovascular:  Negative for chest pain, palpitations and leg swelling.   Gastrointestinal:  Negative for abdominal pain.   Musculoskeletal:  Positive for arthralgias and back pain.   Neurological:  Negative for dizziness, syncope, light-headedness and headaches.       Patient Active Problem List   Diagnosis    GERD (gastroesophageal reflux disease)    Chronic back pain greater than 3 months duration    Environmental allergies    Fibromyalgia    B12 nutritional deficiency    CLINT (obstructive sleep apnea)    Chronic sinusitis    Asthma    Hypothyroidism (acquired)    Nausea    Night sweats    Tinnitus, bilateral    Frequent UTI    Hyperlipidemia associated with type 2 diabetes mellitus    Colon polyp, hyperplastic    Interstitial cystitis    Hemangioma    Wart    Globus sensation    DDD (degenerative disc disease), lumbar    Severe obesity (BMI >= 40)    Edema    Mixed incontinence    Continuous opioid dependence- contract 10/18/17-failed uds for norco, + 2/18    Type 2 diabetes mellitus    History of colon polyps    Lumbar radiculitis    Lumbar radiculopathy    Lumbar spondylosis    Sacroiliitis    Lumbar stenosis with neurogenic claudication    Urinary retention    S/P lumbar fusion    Impaired flexibility of lower extremity    Depression, recurrent     Patient is here for a chronic conditions follow up.    Reviewed labs 8/2023  Declines pneumonvax    Continuous use of opioids follow-up:  Current medication and dosing:  norco 10 1 x day  Supply:  90 day supply  Side effects: none  Pain controlled: yes  Medication compliance: yes  DPS checked today: today, no evidence of diversion  UDS: 2/23 +lyrica and NILESH, not for norco. Admits she had tried  neighbor's chavez to help her sleep.  She has not taken any since 2/23 but is thinking about pursuing medical mj   pain contract signed: today  Opioid risk tool done:n/a  Cause of Pain: low back pain     Pain NP Loreta treating sandeep SI     C/o right CTS sx acting up.  Last 2 months has been wearing old splint     Urogyn Dr. Dominguez treating urinary frequency     Type 2 DM A1c 5.8. lipids high. CT coronary score 7/2021 0.  Not on aceI/ARB. Urine ma high.  Not on statin or asa. On ozempic. Lost 14 lbs since 2/23     TFts nl   mammo neg 8/22  DEXA 8/22 normal     Ortho Dr. Godinez treating Sacroiliitis. lumbar spondylolithesis s/p lumbar fusion 11/22     Phys Med Dr. Simmons treating chronic left sided low back pain.  Doing injections for low back pain which are helping        Pulm/Sleep Dr. Yang-CLINT on daily methylphendiate     Objective:      Physical Exam  Vitals and nursing note reviewed.   Constitutional:       Appearance: She is well-developed.   Cardiovascular:      Rate and Rhythm: Normal rate and regular rhythm.      Heart sounds: Normal heart sounds.   Pulmonary:      Effort: Pulmonary effort is normal.      Breath sounds: Normal breath sounds.   Skin:     General: Skin is warm and dry.   Neurological:      Mental Status: She is alert and oriented to person, place, and time.         Assessment:       1. Hyperlipidemia associated with type 2 diabetes mellitus    2. Type 2 diabetes mellitus with chronic kidney disease, without long-term current use of insulin, unspecified CKD stage    3. Hypothyroidism (acquired)    4. B12 nutritional deficiency    5. Severe obesity (BMI >= 40)    6. Gastroesophageal reflux disease without esophagitis    7. Chronic back pain greater than 3 months duration    8. CLINT (obstructive sleep apnea)    9. Continuous opioid dependence- contract 10/18/17-failed uds for norco, + 2/18    10. Diabetic eye exam    11. Need for pneumococcal vaccination    12. Encounter for screening  "mammogram for malignant neoplasm of breast        Plan:         1. Hyperlipidemia associated with type 2 diabetes mellitus  Controlled on current medications.  Continue current medications.    - Lipid Panel; Future    2. Type 2 diabetes mellitus with chronic kidney disease, without long-term current use of insulin, unspecified CKD stage  Controlled on current medications.  Continue current medications.  increase  - CBC Auto Differential; Future  - Comprehensive Metabolic Panel; Future  - Hemoglobin A1C; Future  - semaglutide (OZEMPIC) 1 mg/dose (4 mg/3 mL); Inject 1 mg into the skin every 7 days.  Dispense: 3 mL; Refill: 11    3. Hypothyroidism (acquired)  Controlled on current medications.  Continue current medications.      4. B12 nutritional deficiency  Screen and treat as indicated:      5. Severe obesity (BMI >= 40)  Counseled patient on his ideal body weight, health consequences of being obese and current recommendations including weekly exercise and a heart healthy diet.  Current BMI is:Estimated body mass index is 41.35 kg/m² as calculated from the following:    Height as of this encounter: 5' 5" (1.651 m).    Weight as of this encounter: 112.7 kg (248 lb 7.3 oz)..  Patient is aware that ideal BMI < 25 or Weight in (lb) to have BMI = 25: 149.9.      6. Gastroesophageal reflux disease without esophagitis  Controlled on current medications.  Continue current medications.      7. Chronic back pain greater than 3 months duration  Counseled patient on habit forming potential of opiates, risk of sedation, respiratory suppression or arrest especially if used in conjunction with benzodiazepines or alcohol.  Counseled patient to avoid driving while on the medication, rules of the pain contract and need for routine 3 month follow ups.  Patient agrees to all aspects of the plan of care and wishes to proceed with therapy.  The plan is always to use alternatives less habit forming, to utilize interventions and therapies " that reduce pain as an adjunctive treatment, and limit and wean the dose of narcotics as soon as able and appropriate.    Contract signed today  - POCT Urine Drug Screen Pain Management  - Pain Clinic Drug Screen  - HYDROcodone-acetaminophen (NORCO)  mg per tablet; Take 1 tablet by mouth every 24 hours as needed for Pain.  Dispense: 30 tablet; Refill: 0  - HYDROcodone-acetaminophen (NORCO)  mg per tablet; Take 1 tablet by mouth once daily.  Dispense: 30 tablet; Refill: 0  - HYDROcodone-acetaminophen (NORCO)  mg per tablet; Take 1 tablet by mouth once daily.  Dispense: 30 tablet; Refill: 0    8. CLINT (obstructive sleep apnea)  Cont cpap use consistently    9. Continuous opioid dependence- contract 10/18/17-failed uds for norco, + 2/18  Screen and treat as indicated:    - POCT Urine Drug Screen Pain Management  - Pain Clinic Drug Screen    10. Diabetic eye exam  Screen and treat as indicated:    - Ambulatory referral/consult to Optometry; Future    11. Need for pneumococcal vaccination  declined    12. Encounter for screening mammogram for malignant neoplasm of breast  Screen and treat as indicated:    - Mammo Digital Screening Bilat w/ [facility]; Future      Time spent with patient: 20 minutes    Patient with be reevaluated in 3 months or sooner prn    Greater than 50% of this visit was spent counseling as described in above documentation:Yes

## 2023-08-24 NOTE — LETTER
2023                                 NAME:Edie Hernandez  : 1955   MRN: [id number]     [facility]  [address] [address]  Phone: [phone]  Fax: [phone]      [facility]  PAIN MANAGEMENT    PAIN MANAGEMENT CONTRACT      My doctor and I have decided that as part of my treatment for chronic pain, I will receive prescriptions for controlled substances. As a patient, I will agree to the following terms in order for my provider to effectively treat my pain and also comply with the rules set forth by [facility] Board of Medical Examiners and Drug Enforcement Agency.     A single physician shall be responsible for prescribing my pain medication.    I understand that in order for me to receive the best possible care, my prescribing doctor needs me to provide a copy of any of my previous copy of any previous medical records,including MRI, office notes, lab results, etc.    I will also provide a full list of ALL of my medications, current dose, and how often I take my medication. I must inform my doctor if I am taking any other medications, particularly sedating medications, such as Valium, Ativan, seizure medications, or psychiatric medications.    I will inform my physician of any current or prior history of abuse or misuse of prescription medication, illegal drugs, or alcohol.    I must not obtain controlled substances (including but not limited to, Xanax, Vicodin,Percocet, Tylenol #3,  ) from any other doctor without first telling my physician at this clinic.    I understand that my physician will assess the efficacy of my treatment with controlled substances and monitor my progress every twelve weeks, unless my physician, in his orher clinical judgment, determines otherwise.    It is my responsibility to keep my appointments. If I miss my scheduled appointment, I will be rescheduled to the first available time slot. I understand that pain  "medications may not be refilled until seen during a scheduled appointment.    I will take my medications only in the directed doses and manner and at the directed time. I will not deviate from my prescribed usage.    I will not combine these prescribed medications with alcohol or recreational medications,including, but not limited to, marijuana.    I will not drive or operate heavy machinery while on this medication.    I will not cut or chew long-acting pain medication.    I must inform my physician of any side effects that I may be experiencing.    If severe sedation (sleepiness) or any other medical emergency relating to my pain medication occurs, I will make contact with my doctors office or seek ER attention immediately.    If my doctor agrees to refill my pain medication by telephone, I will call the office at least 5 business days in advance to request a refill prescription.    Refill prescriptions will not be written in the evenings, weekends, or on holidays.    Each prescription is expected to last at least one month. Refills will not be given early if I"run out early", "lose a prescription", "spill" or "misplace" my medication. I will report stolen medications to the police.    No prescription refills will be given if I have not been seen by my physician in the clinic for 3 months.    It may be necessary for prescriptions to be picked up in person and proof of identification may be required.    I will have my pain medication filled at only one pharmacy.          (pharmacy name/address)     A baseline medication screen may be completed on my first visit and or randomly at other routine clinic visits.    These medications may be harmful to an unborn child. I have been advised to use 2 forms of birth control (at least one barrier, such as condoms) while using these medications. I will inform my doctor immediately if I become pregnant while on this medication.    If I test positive for medications that my " doctor has not prescribed, and/or if I refuse a random medication test, my physician has the right to stop my controlled substance, end his/her relationship with me, and I may be terminated from the clinic.    If at any time I become violent or abusive, verbally or physically, my actions will be considered cause to terminate care from the clinic and discontinuation of pain medications.    I have read and understand the above information. I will, to the best of my ability, adhere to these policies and commitments. I further understand that noncompliance with these policies or demonstration of signs suspicious of medication overuse or abuse, may result in my being discharged as the patient.     I DO HEREBY AGREE AND UNDERSTAND ALL OF THE ABOVE. I HAVE RECEIVED A COPY OF THIS CONTROLLED SUBSTANCE TREATMENT ORIENTATION AND BEHAVIOR AGREEMENT.       Patient Signature:______________________________ Date/Time:________________    Patient Printed Name: Edie Hernandez     Physician Signature:____________________________ Date/Time:________________    Physician Printed Name: Lydia Eugene MD    Witness Signature:_____________________________ Date/Time:________________    Witness Printed Name        Hydroquinone Counseling:  Patient advised that medication may result in skin irritation, lightening (hypopigmentation), dryness, and burning.  In the event of skin irritation, the patient was advised to reduce the amount of the drug applied or use it less frequently.  Rarely, spots that are treated with hydroquinone can become darker (pseudoochronosis).  Should this occur, patient instructed to stop medication and call the office. The patient verbalized understanding of the proper use and possible adverse effects of hydroquinone.  All of the patient's questions and concerns were addressed.

## 2023-08-25 ENCOUNTER — TELEPHONE (OUTPATIENT)
Dept: FAMILY MEDICINE | Facility: CLINIC | Age: 68
End: 2023-08-25
Payer: MEDICARE

## 2023-08-28 ENCOUNTER — PATIENT MESSAGE (OUTPATIENT)
Dept: ADMINISTRATIVE | Facility: HOSPITAL | Age: 68
End: 2023-08-28
Payer: MEDICARE

## 2023-08-29 LAB
6MAM UR QL: NOT DETECTED
7AMINOCLONAZEPAM UR QL: NOT DETECTED
A-OH ALPRAZ UR QL: NOT DETECTED
ALPHA-OH-MIDAZOLAM: NOT DETECTED
ALPRAZ UR QL: NOT DETECTED
AMPHET UR QL SCN: NOT DETECTED
ANNOTATION COMMENT IMP: NORMAL
ANNOTATION COMMENT IMP: NORMAL
BARBITURATES UR QL: NOT DETECTED
BUPRENORPHINE UR QL: NOT DETECTED
BZE UR QL: NOT DETECTED
CARBOXYTHC UR QL: NOT DETECTED
CARISOPRODOL UR QL: NOT DETECTED
CLONAZEPAM UR QL: NOT DETECTED
CODEINE UR QL: NOT DETECTED
CREAT UR-MCNC: 94.9 MG/DL (ref 20–400)
DIAZEPAM UR QL: NOT DETECTED
ETHYL GLUCURONIDE UR QL: NOT DETECTED
FENTANYL UR QL: NOT DETECTED
GABAPENTIN: NOT DETECTED
HYDROCODONE UR QL: PRESENT
HYDROMORPHONE UR QL: PRESENT
LORAZEPAM UR QL: NOT DETECTED
MDA UR QL: NOT DETECTED
MDEA UR QL: NOT DETECTED
MDMA UR QL: NOT DETECTED
ME-PHENIDATE UR QL: NOT DETECTED
METHADONE UR QL: NOT DETECTED
METHAMPHET UR QL: NOT DETECTED
MIDAZOLAM UR QL SCN: NOT DETECTED
MORPHINE UR QL: NOT DETECTED
NALOXONE: NOT DETECTED
NORBUPRENORPHINE UR QL CFM: NOT DETECTED
NORDIAZEPAM UR QL: NOT DETECTED
NORFENTANYL UR QL: NOT DETECTED
NORHYDROCODONE UR QL CFM: PRESENT
NORMEPERIDINE UR QL CFM: NOT DETECTED
NOROXYCODONE UR QL CFM: NOT DETECTED
NOROXYMORPHONE UR QL SCN: NOT DETECTED
OXAZEPAM UR QL: NOT DETECTED
OXYCODONE UR QL: NOT DETECTED
OXYMORPHONE UR QL: NOT DETECTED
PATHOLOGY STUDY: NORMAL
PCP UR QL: NOT DETECTED
PHENTERMINE UR QL: NOT DETECTED
PREGABALIN: NOT DETECTED
SERVICE CMNT-IMP: NORMAL
TAPENTADOL UR QL SCN: NOT DETECTED
TAPENTADOL UR QL SCN: NOT DETECTED
TEMAZEPAM UR QL: NOT DETECTED
TRAMADOL UR QL: NOT DETECTED
ZOLPIDEM METABOLITE: NOT DETECTED
ZOLPIDEM UR QL: NOT DETECTED

## 2023-09-09 DIAGNOSIS — M54.9 CHRONIC BACK PAIN GREATER THAN 3 MONTHS DURATION: ICD-10-CM

## 2023-09-09 DIAGNOSIS — G89.29 CHRONIC BACK PAIN GREATER THAN 3 MONTHS DURATION: ICD-10-CM

## 2023-09-09 NOTE — TELEPHONE ENCOUNTER
No care due was identified.  [facility] Embedded Care Due Messages. Reference number: [id number].   9/09/2023 10:53:44 AM CDT

## 2023-09-11 RX ORDER — CYCLOBENZAPRINE HCL 5 MG
TABLET ORAL
Qty: 90 TABLET | Status: SHIPPED | OUTPATIENT
Start: 2023-09-11 | End: 2023-10-31 | Stop reason: SDUPTHER

## 2023-09-15 ENCOUNTER — HOSPITAL ENCOUNTER (OUTPATIENT)
Dept: RADIOLOGY | Facility: CLINIC | Age: 68
Discharge: HOME | End: 2023-09-15
Attending: FAMILY MEDICINE
Payer: MEDICARE

## 2023-09-15 DIAGNOSIS — Z12.31 ENCOUNTER FOR SCREENING MAMMOGRAM FOR MALIGNANT NEOPLASM OF BREAST: ICD-10-CM

## 2023-09-15 PROCEDURE — 77067 SCR MAMMO BI INCL CAD: CPT | Mod: TC,HCNC,PO

## 2023-09-15 PROCEDURE — 77063 BREAST TOMOSYNTHESIS BI: CPT | Mod: 26,HCNC,, | Performed by: RADIOLOGY

## 2023-09-15 PROCEDURE — 77067 MAMMO DIGITAL SCREENING BILAT WITH TOMO: ICD-10-PCS | Mod: 26,HCNC,, | Performed by: RADIOLOGY

## 2023-09-15 PROCEDURE — 77067 SCR MAMMO BI INCL CAD: CPT | Mod: 26,HCNC,, | Performed by: RADIOLOGY

## 2023-09-15 PROCEDURE — 77063 MAMMO DIGITAL SCREENING BILAT WITH TOMO: ICD-10-PCS | Mod: 26,HCNC,, | Performed by: RADIOLOGY

## 2023-10-03 ENCOUNTER — TELEPHONE (OUTPATIENT)
Dept: FAMILY MEDICINE | Facility: CLINIC | Age: 68
End: 2023-10-03

## 2023-10-03 ENCOUNTER — PATIENT OUTREACH (OUTPATIENT)
Dept: ADMINISTRATIVE | Facility: HOSPITAL | Age: 68
End: 2023-10-03
Payer: MEDICARE

## 2023-10-03 NOTE — TELEPHONE ENCOUNTER
----- Message from Jasper Kee LPN sent at 10/3/2023 11:13 AM CDT -----  Good Morning,  Ms. Irizarry is coming up on a statin report. Should she be on a statin or should there be an exclusion in place? Thank you in advance.    Jasper

## 2023-10-03 NOTE — PROGRESS NOTES
Population Health Chart Review & Patient Outreach Details:     Reason for Outreach Encounter:     []  Non-Compliant Report   [x]  Payor Report ([employer], PHN, BCBS, MSSP, MCIP, UHC, etc.)   []  Pre-Visit Chart Review     Updates Requested / Reviewed:     [x]  Care Everywhere    []     []  External Sources (LabCorp, Quest, DIS, etc.)   []  Care Team Updated    Patient Outreach Method:    []  Telephone Outreach Completed   [] Successful   [] Left Voicemail   [] Unable to Contact (wrong number, no voicemail)  []  [employer] [facility] Outreach Sent  []  Letter Outreach Mailed  []  Fax Sent for External Records  []  External Records Upload    Health Maintenance Topics Addressed and Outreach Outcomes / Actions Taken:        []      Breast Cancer Screening []  Mammo Scheduled      []  External Records Requested     []  Added Reminder to Complete to Upcoming Primary Care Appt Notes     []  Patient Declined     []  Patient Will Call Back to Schedule     []  Patient Will Schedule with External Provider / Order Routed if Applicable             []       Cervical Cancer Screening []  Pap Scheduled      []  External Records Requested     []  Added Reminder to Complete to Upcoming Primary Care Appt Notes     []  Patient Declined     []  Patient Will Call Back to Schedule     []  Patient Will Schedule with External Provider               []          Colorectal Cancer Screening []  Colonoscopy Case Request or Referral Placed     []  External Records Requested     []  Added Reminder to Complete to Upcoming Primary Care Appt Notes     []  Patient Declined     []  Patient Will Call Back to Schedule     []  Patient Will Schedule with External Provider     []  Fit Kit Mailed (add the SmartPhrase under additional notes)     []  Reminded Patient to Complete Home Test             []      Diabetic Eye Exam []  Eye Camera Scheduled or Optometry Referral Placed     []  External Records Requested     []  Added Reminder to Complete to  Upcoming Primary Care Appt Notes     []  Patient Declined     []  Patient Will Call Back to Schedule     []  Patient Will Schedule with External Provider             []      Blood Pressure Control []  Primary Care Follow Up Visit Scheduled     []  Remote Blood Pressure Reading Captured     []  Added Reminder to Complete to Upcoming Primary Care Appt Notes     []  Patient Declined     []  Patient Will Call Back / Patient Will Send Portal Message with Reading     []  Patient Will Call Back to Schedule Provider Visit             []       HbA1c & Other Labs []  Lab Appt Scheduled for Due Labs     []  Primary Care Follow Up Visit Scheduled      []  Reminded Patient to Complete Home Test     []  Added Reminder to Complete to Upcoming Primary Care Appt Notes     []  Patient Declined     []  Patient Will Call Back to Schedule     []  Patient Will Schedule with External Provider / Order Routed if Applicable           []    Schedule Primary Care Appt []  Primary Care Appt Scheduled     []  Patient Declined     []  Patient Will Call Back to Schedule     []  Pt Established with External Provider & Updated Care Team             [x]      Medication Adherence []  Primary Care Appointment Scheduled     []  Added Reminder to Upcoming Primary Care Appt Notes     []  Patient Reminded to  Prescription     []  Patient Declined, Provider Notified if Needed     [x]  Sent Provider Message to Review and/or Add Exclusion to Problem List             []      Osteoporosis Screening []  DXA Appointment Scheduled     []  External Records Requested     []  Added Reminder to Complete to Upcoming Primary Care Appt Notes     []  Patient Declined     []  Patient Will Call Back to Schedule     []  Patient Will Schedule with External Provider / Order Routed if Applicable     Additional Care Coordinator Notes:         Further Action Needed If Patient Returns Outreach:

## 2023-10-26 ENCOUNTER — PATIENT MESSAGE (OUTPATIENT)
Dept: UROGYNECOLOGY | Facility: CLINIC | Age: 68
End: 2023-10-26
Payer: MEDICARE

## 2023-10-31 ENCOUNTER — TELEPHONE (OUTPATIENT)
Dept: FAMILY MEDICINE | Facility: CLINIC | Age: 68
End: 2023-10-31
Payer: MEDICARE

## 2023-10-31 DIAGNOSIS — G89.29 CHRONIC BACK PAIN GREATER THAN 3 MONTHS DURATION: ICD-10-CM

## 2023-10-31 DIAGNOSIS — E03.9 HYPOTHYROIDISM (ACQUIRED): ICD-10-CM

## 2023-10-31 DIAGNOSIS — E11.65 UNCONTROLLED TYPE 2 DIABETES MELLITUS WITH HYPERGLYCEMIA: ICD-10-CM

## 2023-10-31 DIAGNOSIS — E11.22 TYPE 2 DIABETES MELLITUS WITH CHRONIC KIDNEY DISEASE, WITHOUT LONG-TERM CURRENT USE OF INSULIN, UNSPECIFIED CKD STAGE: ICD-10-CM

## 2023-10-31 DIAGNOSIS — R45.89 DEPRESSED MOOD: ICD-10-CM

## 2023-10-31 DIAGNOSIS — E55.9 VITAMIN D DEFICIENCY: ICD-10-CM

## 2023-10-31 DIAGNOSIS — M54.9 CHRONIC BACK PAIN GREATER THAN 3 MONTHS DURATION: ICD-10-CM

## 2023-10-31 DIAGNOSIS — K21.9 GASTROESOPHAGEAL REFLUX DISEASE, UNSPECIFIED WHETHER ESOPHAGITIS PRESENT: ICD-10-CM

## 2023-10-31 RX ORDER — OXYBUTYNIN CHLORIDE 15 MG/1
15 TABLET, EXTENDED RELEASE ORAL
Qty: 30 TABLET | Refills: 6 | Status: SHIPPED | OUTPATIENT
Start: 2023-10-31 | End: 2023-10-31 | Stop reason: SDUPTHER

## 2023-10-31 RX ORDER — LOSARTAN POTASSIUM 50 MG/1
TABLET ORAL
Qty: 90 TABLET | Refills: 3 | Status: SHIPPED | OUTPATIENT
Start: 2023-10-31

## 2023-10-31 RX ORDER — ESCITALOPRAM OXALATE 10 MG/1
10 TABLET ORAL DAILY
Qty: 90 TABLET | Refills: 3 | Status: SHIPPED | OUTPATIENT
Start: 2023-10-31

## 2023-10-31 RX ORDER — CYCLOBENZAPRINE HCL 5 MG
TABLET ORAL
Qty: 90 TABLET | Status: SHIPPED | OUTPATIENT
Start: 2023-10-31

## 2023-10-31 RX ORDER — OXYBUTYNIN CHLORIDE 15 MG/1
15 TABLET, EXTENDED RELEASE ORAL DAILY
Qty: 30 TABLET | Refills: 6 | Status: CANCELLED | OUTPATIENT
Start: 2023-10-31

## 2023-10-31 RX ORDER — OXYBUTYNIN CHLORIDE 15 MG/1
15 TABLET, EXTENDED RELEASE ORAL DAILY
Qty: 90 TABLET | Refills: 3 | Status: SHIPPED | OUTPATIENT
Start: 2023-10-31 | End: 2024-10-25

## 2023-10-31 RX ORDER — SEMAGLUTIDE 1.34 MG/ML
1 INJECTION, SOLUTION SUBCUTANEOUS
Qty: 9 ML | Refills: 3 | Status: SHIPPED | OUTPATIENT
Start: 2023-10-31 | End: 2024-10-30

## 2023-10-31 RX ORDER — PANTOPRAZOLE SODIUM 40 MG/1
TABLET, DELAYED RELEASE ORAL
Qty: 90 TABLET | Refills: 3 | Status: SHIPPED | OUTPATIENT
Start: 2023-10-31

## 2023-10-31 RX ORDER — OXYBUTYNIN CHLORIDE 15 MG/1
15 TABLET, EXTENDED RELEASE ORAL DAILY
Qty: 90 TABLET | Refills: 4 | Status: CANCELLED | OUTPATIENT
Start: 2023-10-31

## 2023-10-31 RX ORDER — LEVOTHYROXINE SODIUM 175 UG/1
175 TABLET ORAL DAILY
Qty: 90 TABLET | Refills: 3 | Status: SHIPPED | OUTPATIENT
Start: 2023-10-31

## 2023-10-31 RX ORDER — ERGOCALCIFEROL 1.25 MG/1
50000 CAPSULE ORAL
Qty: 12 CAPSULE | Refills: 1 | Status: SHIPPED | OUTPATIENT
Start: 2023-10-31

## 2023-10-31 RX ORDER — METFORMIN HYDROCHLORIDE 500 MG/1
1000 TABLET, EXTENDED RELEASE ORAL 2 TIMES DAILY WITH MEALS
Qty: 360 TABLET | Refills: 3 | Status: SHIPPED | OUTPATIENT
Start: 2023-10-31

## 2023-10-31 NOTE — TELEPHONE ENCOUNTER
----- Message from Martipieter Artis sent at 10/31/2023 11:38 AM CDT -----  Contact: pt  Type: Needs Medical Advice    Who Called: pt  Best Call Back Number:[phone]    Additional Information: Requesting a call back regarding Pharm is sending a request to office for all pt rx to be sent to them not to local pharm.      oxybutynin (DITROPAN XL) 15 MG TR24  EScitalopram oxalate (LEXAPRO) 10 MG tablet  cyclobenzaprine (FLEXERIL) 5 MG tablet  semaglutide (OZEMPIC) 1 mg/dose (4 mg/3 mL)  metFORMIN (GLUCOPHAGE-XR) 500 MG ER 24hr tablet  losartan (COZAAR) 50 MG tablet  ergocalciferol (ERGOCALCIFEROL) 50,000 unit Cap  pantoprazole (PROTONIX) 40 MG tablet  levothyroxine (SYNTHROID, LEVOTHROID) 175 MCG tablet      [facility] - [place], [place] - [address]  [address] [facility] [address] [facility] [address]  Phone: [phone] Fax: [phone]    Pt is asking for office not to send the follow rx to mail order due to her wanting to  locally.    HYDROcodone-acetaminophen (NORCO)  mg per tablet  mupirocin (BACTROBAN) 2 % ointment  diclofenac sodium (VOLTAREN) 1 % Gel  azelastine (ASTELIN) 137 mcg (0.1 %) nasal spray      Please Advise- Thank you

## 2023-10-31 NOTE — TELEPHONE ENCOUNTER
No care due was identified.  [facility] Embedded Care Due Messages. Reference number: [id number].   10/31/2023 12:37:39 PM CDT

## 2023-10-31 NOTE — TELEPHONE ENCOUNTER
----- Message from Amber Chapa sent at 10/31/2023 10:17 AM CDT -----  Contact: [facility] [phone]  Type:  RX Refill Request    Who Called:  [facility]   Refill or New Rx:  refill   RX Name and Strength:  oxybutynin (DITROPAN XL) 15 MG TR24  How is the patient currently taking it? (ex. 1XDay):  1xday   Is this a 30 day or 90 day RX:  90  Preferred Pharmacy with phone number:       [facility] - [place], [place] - [address]  [address] [address]  Phone: [phone] Fax: [phone]     Local or Mail Order:  mail   Ordering Provider:  Alberto Moreno Call Back Number:  [phone]    Additional Information:  [facility] requesting rx be sent to them

## 2023-10-31 NOTE — TELEPHONE ENCOUNTER
----- Message from Amber Chapa sent at 10/31/2023 10:24 AM CDT -----  Contact: pt [phone]  Type: Needs Medical Advice  Who Called:  Pt     Best Call Back Number: [phone]    Additional Information: Pt calling to ask if his paperwork that was dropped off yesterday was ready to be picked up. Pls call back and advise

## 2023-10-31 NOTE — TELEPHONE ENCOUNTER
Call to patient, this is the wrong patient. Ms. Hernandez says she has not dropped off any paperwork. Does not know anything about this. Thanked patient and hung up.

## 2023-11-29 ENCOUNTER — TELEPHONE (OUTPATIENT)
Dept: FAMILY MEDICINE | Facility: CLINIC | Age: 68
End: 2023-11-29
Payer: MEDICARE

## 2023-12-01 ENCOUNTER — OFFICE VISIT (OUTPATIENT)
Dept: FAMILY MEDICINE | Facility: CLINIC | Age: 68
End: 2023-12-01
Payer: MEDICARE

## 2023-12-01 VITALS
HEART RATE: 82 BPM | WEIGHT: 246.25 LBS | TEMPERATURE: 99 F | SYSTOLIC BLOOD PRESSURE: 130 MMHG | DIASTOLIC BLOOD PRESSURE: 68 MMHG | BODY MASS INDEX: 41.03 KG/M2 | HEIGHT: 65 IN | OXYGEN SATURATION: 98 %

## 2023-12-01 DIAGNOSIS — M54.9 CHRONIC BACK PAIN GREATER THAN 3 MONTHS DURATION: ICD-10-CM

## 2023-12-01 DIAGNOSIS — H04.123 DRY EYES: ICD-10-CM

## 2023-12-01 DIAGNOSIS — E11.69 HYPERLIPIDEMIA ASSOCIATED WITH TYPE 2 DIABETES MELLITUS: ICD-10-CM

## 2023-12-01 DIAGNOSIS — E78.5 HYPERLIPIDEMIA ASSOCIATED WITH TYPE 2 DIABETES MELLITUS: ICD-10-CM

## 2023-12-01 DIAGNOSIS — G89.29 CHRONIC BACK PAIN GREATER THAN 3 MONTHS DURATION: ICD-10-CM

## 2023-12-01 DIAGNOSIS — E03.9 HYPOTHYROIDISM (ACQUIRED): ICD-10-CM

## 2023-12-01 DIAGNOSIS — E11.22 TYPE 2 DIABETES MELLITUS WITH CHRONIC KIDNEY DISEASE, WITHOUT LONG-TERM CURRENT USE OF INSULIN, UNSPECIFIED CKD STAGE: Primary | ICD-10-CM

## 2023-12-01 DIAGNOSIS — K21.9 GASTROESOPHAGEAL REFLUX DISEASE WITHOUT ESOPHAGITIS: ICD-10-CM

## 2023-12-01 PROCEDURE — 3078F DIAST BP <80 MM HG: CPT | Mod: HCNC,CPTII,S$GLB, | Performed by: NURSE PRACTITIONER

## 2023-12-01 PROCEDURE — 1101F PT FALLS ASSESS-DOCD LE1/YR: CPT | Mod: HCNC,CPTII,S$GLB, | Performed by: NURSE PRACTITIONER

## 2023-12-01 PROCEDURE — 3075F SYST BP GE 130 - 139MM HG: CPT | Mod: HCNC,CPTII,S$GLB, | Performed by: NURSE PRACTITIONER

## 2023-12-01 PROCEDURE — 3066F NEPHROPATHY DOC TX: CPT | Mod: HCNC,CPTII,S$GLB, | Performed by: NURSE PRACTITIONER

## 2023-12-01 PROCEDURE — 4010F PR ACE/ARB THEARPY RXD/TAKEN: ICD-10-PCS | Mod: HCNC,CPTII,S$GLB, | Performed by: NURSE PRACTITIONER

## 2023-12-01 PROCEDURE — 3078F PR MOST RECENT DIASTOLIC BLOOD PRESSURE < 80 MM HG: ICD-10-PCS | Mod: HCNC,CPTII,S$GLB, | Performed by: NURSE PRACTITIONER

## 2023-12-01 PROCEDURE — 1101F PR PT FALLS ASSESS DOC 0-1 FALLS W/OUT INJ PAST YR: ICD-10-PCS | Mod: HCNC,CPTII,S$GLB, | Performed by: NURSE PRACTITIONER

## 2023-12-01 PROCEDURE — 99213 PR OFFICE/OUTPT VISIT, EST, LEVL III, 20-29 MIN: ICD-10-PCS | Mod: HCNC,S$GLB,, | Performed by: NURSE PRACTITIONER

## 2023-12-01 PROCEDURE — 3061F NEG MICROALBUMINURIA REV: CPT | Mod: HCNC,CPTII,S$GLB, | Performed by: NURSE PRACTITIONER

## 2023-12-01 PROCEDURE — 3008F BODY MASS INDEX DOCD: CPT | Mod: HCNC,CPTII,S$GLB, | Performed by: NURSE PRACTITIONER

## 2023-12-01 PROCEDURE — 3044F HG A1C LEVEL LT 7.0%: CPT | Mod: HCNC,CPTII,S$GLB, | Performed by: NURSE PRACTITIONER

## 2023-12-01 PROCEDURE — 1159F PR MEDICATION LIST DOCUMENTED IN MEDICAL RECORD: ICD-10-PCS | Mod: HCNC,CPTII,S$GLB, | Performed by: NURSE PRACTITIONER

## 2023-12-01 PROCEDURE — 99213 OFFICE O/P EST LOW 20 MIN: CPT | Mod: HCNC,S$GLB,, | Performed by: NURSE PRACTITIONER

## 2023-12-01 PROCEDURE — 3288F PR FALLS RISK ASSESSMENT DOCUMENTED: ICD-10-PCS | Mod: HCNC,CPTII,S$GLB, | Performed by: NURSE PRACTITIONER

## 2023-12-01 PROCEDURE — 1160F PR REVIEW ALL MEDS BY PRESCRIBER/CLIN PHARMACIST DOCUMENTED: ICD-10-PCS | Mod: HCNC,CPTII,S$GLB, | Performed by: NURSE PRACTITIONER

## 2023-12-01 PROCEDURE — 3075F PR MOST RECENT SYSTOLIC BLOOD PRESS GE 130-139MM HG: ICD-10-PCS | Mod: HCNC,CPTII,S$GLB, | Performed by: NURSE PRACTITIONER

## 2023-12-01 PROCEDURE — 4010F ACE/ARB THERAPY RXD/TAKEN: CPT | Mod: HCNC,CPTII,S$GLB, | Performed by: NURSE PRACTITIONER

## 2023-12-01 PROCEDURE — 1125F AMNT PAIN NOTED PAIN PRSNT: CPT | Mod: HCNC,CPTII,S$GLB, | Performed by: NURSE PRACTITIONER

## 2023-12-01 PROCEDURE — 3288F FALL RISK ASSESSMENT DOCD: CPT | Mod: HCNC,CPTII,S$GLB, | Performed by: NURSE PRACTITIONER

## 2023-12-01 PROCEDURE — 1160F RVW MEDS BY RX/DR IN RCRD: CPT | Mod: HCNC,CPTII,S$GLB, | Performed by: NURSE PRACTITIONER

## 2023-12-01 PROCEDURE — 99999 PR PBB SHADOW E&M-EST. PATIENT-LVL III: ICD-10-PCS | Mod: PBBFAC,HCNC,, | Performed by: NURSE PRACTITIONER

## 2023-12-01 PROCEDURE — 1159F MED LIST DOCD IN RCRD: CPT | Mod: HCNC,CPTII,S$GLB, | Performed by: NURSE PRACTITIONER

## 2023-12-01 PROCEDURE — 1125F PR PAIN SEVERITY QUANTIFIED, PAIN PRESENT: ICD-10-PCS | Mod: HCNC,CPTII,S$GLB, | Performed by: NURSE PRACTITIONER

## 2023-12-01 PROCEDURE — 99999 PR PBB SHADOW E&M-EST. PATIENT-LVL III: CPT | Mod: PBBFAC,HCNC,, | Performed by: NURSE PRACTITIONER

## 2023-12-01 PROCEDURE — 3008F PR BODY MASS INDEX (BMI) DOCUMENTED: ICD-10-PCS | Mod: HCNC,CPTII,S$GLB, | Performed by: NURSE PRACTITIONER

## 2023-12-01 PROCEDURE — 3044F PR MOST RECENT HEMOGLOBIN A1C LEVEL <7.0%: ICD-10-PCS | Mod: HCNC,CPTII,S$GLB, | Performed by: NURSE PRACTITIONER

## 2023-12-01 PROCEDURE — 3066F PR DOCUMENTATION OF TREATMENT FOR NEPHROPATHY: ICD-10-PCS | Mod: HCNC,CPTII,S$GLB, | Performed by: NURSE PRACTITIONER

## 2023-12-01 PROCEDURE — 3061F PR NEG MICROALBUMINURIA RESULT DOCUMENTED/REVIEW: ICD-10-PCS | Mod: HCNC,CPTII,S$GLB, | Performed by: NURSE PRACTITIONER

## 2023-12-01 NOTE — PROGRESS NOTES
Subjective:       Patient ID: Edie Hernandez is a 68 y.o. female.    Chief Complaint: Follow-up     HPI   67 y/o female patient with medical problems listed below presents for 3 months follow up. Patient last seen by pcp was in 8/2023. Patient states has had dry eyes and tearing in b/l but denies eye pain or itching. She states used artificial eye drops OTC but did not help much and states will follow up with private ophthalmology.     Patient states currently takes Norco 10 once a day as needed for chronic back pain. States had back spine surgery done a year ago and hip surgery 2 days ago but still has constant pain. She states was seen by Dr. Dejesus a month and half ago and started with marihuana gummy which helps with pain and sleeps. States she sees Dr. Dejesus q 3 months.     Continuous use of opioids follow-up:  Current medication and dosing:  norco 10 1 x day  Supply:  90 day supply  Side effects: none  Pain controlled: yes  Medication compliance: yes  DPS checked today: today, +marihuana gummys  UDS: 8/23 +hydrocordone, negative for marihuana   pain contract signed: 8/2023    Labs reviewed from 8/2023    Patient Active Problem List   Diagnosis    GERD (gastroesophageal reflux disease)    Chronic back pain greater than 3 months duration    Environmental allergies    Fibromyalgia    B12 nutritional deficiency    CLINT (obstructive sleep apnea)    Chronic sinusitis    Asthma    Hypothyroidism (acquired)    Nausea    Night sweats    Tinnitus, bilateral    Frequent UTI    Hyperlipidemia associated with type 2 diabetes mellitus    Colon polyp, hyperplastic    Interstitial cystitis    Hemangioma    Wart    Globus sensation    DDD (degenerative disc disease), lumbar    Severe obesity (BMI >= 40)    Edema    Mixed incontinence    Continuous opioid dependence- contract 10/18/17-failed uds for norco, + 2/18    Type 2 diabetes mellitus    History of colon polyps    Lumbar radiculitis    Lumbar radiculopathy    Lumbar  spondylosis    Sacroiliitis    Lumbar stenosis with neurogenic claudication    Urinary retention    S/P lumbar fusion    Impaired flexibility of lower extremity    Depression, recurrent      Review of patient's allergies indicates:   Allergen Reactions    Codeine Nausea And Vomiting     Other reaction(s): Nausea, dizziness     Past Surgical History:   Procedure Laterality Date    ANTERIOR LUMBAR INTERBODY FUSION (ALIF) N/A 11/8/2022    Procedure: FUSION, SPINE, LUMBAR, ALIF L3-4, L4-5;  Surgeon: Everton Godinez MD;  Location: [facility] OR;  Service: Orthopedics;  Laterality: N/A;  [facility], [employer], DR SANDI ENRIQUEZ, CO-SURGEON    COLONOSCOPY  02/14/2013    Dr. Vogel: repeat in 5-10 years    COLONOSCOPY W/ BIOPSIES AND POLYPECTOMY  02/2013    hyperplastic, recheck 5-10 years    CYSTOSCOPY WITH HYDRODISTENSION OF BLADDER N/A 9/21/2022    Procedure: CYSTOSCOPY, WITH BLADDER HYDRODISTENSION;  Surgeon: Keely Chavez Jr., MD;  Location: [facility] OR;  Service: Urology;  Laterality: N/A;    EXTERNAL EAR SURGERY      FUSION, SPINE, LUMBAR, POSTERIOR APPROACH N/A 11/8/2022    Procedure: FUSION,SPINE,LUMBAR,POSTERIOR APPROACH L3-4, L4-5;  Surgeon: Everton Godinez MD;  Location: [facility] OR;  Service: Orthopedics;  Laterality: N/A;  NTI, [employer], DR SANDI ENRIQUEZ, CO-SURGEON    HYSTERECTOMY      INJECTION OF ANESTHETIC AGENT AROUND MEDIAL BRANCH NERVES INNERVATING LUMBAR FACET JOINT Bilateral 8/3/2020    Procedure: Block-nerve-medial branch-lumbar left L2, L3, L4, L5;  Surgeon: Dejan Simmons MD;  Location: [facility] OR;  Service: Pain Management;  Laterality: Bilateral;    INJECTION OF ANESTHETIC AGENT AROUND MEDIAL BRANCH NERVES INNERVATING LUMBAR FACET JOINT Left 8/13/2020    Procedure: Block-nerve-medial branch-lumbar.  L2, L3, L4, and L5;  Surgeon: Dejan Simmons MD;  Location: [facility] OR;  Service: Pain Management;  Laterality: Left;    MINIMALLY INVASIVE FUSION OF SPINE Left 10/12/2021    Procedure: FUSION, SPINE, MINIMALLY INVASIVE;   Surgeon: Everton Godinez MD;  Location: [facility] OR;  Service: Orthopedics;  Laterality: Left;  SI-Bone    RADIOFREQUENCY ABLATION OF LUMBAR MEDIAL BRANCH NERVE AT SINGLE LEVEL Left 8/27/2020    Procedure: Radiofrequency Ablation, Nerve, Spinal, Lumbar, Medial Branch,  L2, L3, L4, L5;  Surgeon: Dejan Simmons MD;  Location: [facility] OR;  Service: Pain Management;  Laterality: Left;  L2,l3,l4,l5    THROAT SURGERY      for CLINT    TRANSFORAMINAL EPIDURAL INJECTION OF STEROID Left 3/6/2020    Procedure: Injection,steroid,epidural,transforaminal approach;  Surgeon: Harmeet Zapata MD;  Location: [facility] OR;  Service: Pain Management;  Laterality: Left;  L4-L5    TRANSFORAMINAL EPIDURAL INJECTION OF STEROID Left 6/10/2020    Procedure: Injection,steroid,epidural,transforaminal approach.L4 and L5;  Surgeon: Dejan Simmons MD;  Location: [facility] OR;  Service: Pain Management;  Laterality: Left;    TRANSFORAMINAL EPIDURAL INJECTION OF STEROID Left 7/6/2020    Procedure: Injection,steroid,epidural,transforaminal approach. left L4 and L5;  Surgeon: Dejan Simmons MD;  Location: [facility] OR;  Service: Pain Management;  Laterality: Left;    TRANSFORAMINAL EPIDURAL INJECTION OF STEROID Left 11/12/2021    Procedure: Injection,steroid,epidural,transforaminal approach Left L5-S1;  Surgeon: Dejan Simmons MD;  Location: [facility] OR;  Service: Pain Management;  Laterality: Left;    UPPER GASTROINTESTINAL ENDOSCOPY          Current Outpatient Medications:     clobetasol (TEMOVATE) 0.05 % cream, Apply 1 application topically 2 (two) times daily. Apply to affected area, Disp: 30 g, Rfl: 2    cyanocobalamin (VITAMIN B-12) 100 MCG tablet, Take 100 mcg by mouth once daily., Disp: , Rfl:     cyclobenzaprine (FLEXERIL) 5 MG tablet, TAKE 1 TO 2 TABLETS BY MOUTH NIGHTLY AT BEDTIME AS NEEDED FOR PAIN, Disp: 90 tablet, Rfl: PRN    diclofenac sodium (VOLTAREN) 1 % Gel, Apply 2 g topically 4 (four) times daily., Disp: 450 g, Rfl: prn    ergocalciferol (ERGOCALCIFEROL) 50,000 unit  Cap, Take 1 capsule (50,000 Units total) by mouth every 7 days., Disp: 12 capsule, Rfl: 1    EScitalopram oxalate (LEXAPRO) 10 MG tablet, Take 1 tablet (10 mg total) by mouth once daily., Disp: 90 tablet, Rfl: 3    estradioL (ESTRACE) 0.01 % (0.1 mg/gram) vaginal cream, Apply 1 fingertipful to vaginal area nightly x 2 weeks then use on MW, Disp: 42.5 g, Rfl: 3    fluticasone propionate (FLONASE) 50 mcg/actuation nasal spray, 1 spray (50 mcg total) by Each Nostril route daily as needed for Allergies., Disp: 16 g, Rfl: prn    lancets (ACCU-CHEK MULTICLIX LANCET) Misc, Test 2 x day, Disp: 200 each, Rfl: 3    levothyroxine (SYNTHROID, LEVOTHROID) 175 MCG tablet, Take 1 tablet (175 mcg total) by mouth once daily., Disp: 90 tablet, Rfl: 3    losartan (COZAAR) 50 MG tablet, TAKE ONE TABLET (50 MG TOTAL) BY MOUTH ONCE DAILY, Disp: 90 tablet, Rfl: 3    melatonin (MELATIN), Take by mouth every evening., Disp: , Rfl:     metFORMIN (GLUCOPHAGE-XR) 500 MG ER 24hr tablet, Take 2 tablets (1,000 mg total) by mouth 2 (two) times daily with meals., Disp: 360 tablet, Rfl: 3    methylphenidate (RITALIN LA) 40 MG 24 hr capsule, Take 40 mg by mouth daily as needed. Not on at this time, Disp: , Rfl:     methylphenidate HCl (RITALIN) 10 MG tablet, , Disp: , Rfl:     mupirocin (BACTROBAN) 2 % ointment, Apply topically 3 (three) times daily., Disp: 60 g, Rfl: PRN    mupirocin (BACTROBAN) 2 % ointment, Apply topically 3 (three) times daily., Disp: 30 g, Rfl: 0    ONETOUCH ULTRA BLUE TEST STRIP Strp, USE TO TEST BLOOD SUGAR, Disp: 100 strip, Rfl: 3    oxybutynin (DITROPAN XL) 15 MG TR24, Take 1 tablet (15 mg total) by mouth once daily., Disp: 90 tablet, Rfl: 3    pantoprazole (PROTONIX) 40 MG tablet, TAKE ONE TABLET (40MG TOTAL) BY MOUTH ONCE DAILY, Disp: 90 tablet, Rfl: 3    pregabalin (LYRICA) 100 MG capsule, TAKE 1 CAPSULE BY MOUTH EVERY EVENING, Disp: 30 capsule, Rfl: 2    semaglutide (OZEMPIC) 1 mg/dose (4 mg/3 mL), Inject 1 mg into the  "skin every 7 days., Disp: 9 mL, Rfl: 3    simvastatin (ZOCOR) 10 MG tablet, Take 10 mg by mouth every evening., Disp: , Rfl:     azelastine (ASTELIN) 137 mcg (0.1 %) nasal spray, 1 spray (137 mcg total) by Nasal route 2 (two) times daily. (Patient taking differently: 1 spray by Nasal route 2 (two) times daily. PRN), Disp: 30 mL, Rfl: 11    blood-glucose meter kit, Use as instructed, Disp: 1 each, Rfl: 0    furosemide (LASIX) 20 MG tablet, Take 1 tablet (20 mg total) by mouth daily as needed (leg swelling)., Disp: 60 tablet, Rfl: 0    Current Facility-Administered Medications:     LIDOcaine HCL 20 mg/ml (2%) injection 20 mL, 20 mL, Other, 1 time in Clinic/HOD, LUIS Dominguez FNP    Facility-Administered Medications Ordered in Other Visits:     lactated ringers infusion, , Intravenous, Continuous, Dejan Simmons MD, Stopped at 07/06/20 1008    Lab Results   Component Value Date    WBC 6.34 08/17/2023    HGB 13.7 08/17/2023    HCT 42.3 08/17/2023     08/17/2023    CHOL 186 08/17/2023    TRIG 137 08/17/2023    HDL 58 08/17/2023    ALT 30 08/17/2023    AST 26 08/17/2023     08/17/2023    K 4.2 08/17/2023     08/17/2023    CREATININE 1.0 08/17/2023    BUN 12 08/17/2023    CO2 28 08/17/2023    TSH 0.722 08/17/2023    HGBA1C 5.8 (H) 08/17/2023     Review of Systems   Constitutional:  Negative for chills and fever.   Respiratory:  Negative for cough, chest tightness and shortness of breath.    Cardiovascular:  Negative for chest pain and palpitations.   Gastrointestinal:  Negative for abdominal pain.   Musculoskeletal:  Positive for arthralgias and back pain.   Neurological:  Negative for dizziness and headaches.       Objective:   /68 (BP Location: Right arm, Patient Position: Sitting, BP Method: Large (Manual))   Pulse 82   Temp 98.6 °F (37 °C) (Oral)   Ht 5' 5" (1.651 m)   Wt 111.7 kg (246 lb 4.1 oz)   SpO2 98%   BMI 40.98 kg/m²         Physical Exam  Constitutional:       General: " She is not in acute distress.     Appearance: Normal appearance.   HENT:      Head: Atraumatic.   Cardiovascular:      Rate and Rhythm: Normal rate and regular rhythm.      Pulses: Normal pulses.      Heart sounds: Normal heart sounds.   Pulmonary:      Effort: Pulmonary effort is normal.      Breath sounds: Normal breath sounds.   Abdominal:      General: Abdomen is flat. Bowel sounds are normal.      Palpations: Abdomen is soft.      Tenderness: There is no abdominal tenderness.   Neurological:      Mental Status: She is oriented to person, place, and time.         Assessment:       1. Type 2 diabetes mellitus with chronic kidney disease, without long-term current use of insulin, unspecified CKD stage    2. Hypothyroidism (acquired)    3. Hyperlipidemia associated with type 2 diabetes mellitus    4. Gastroesophageal reflux disease without esophagitis    5. Dry eyes    6. Chronic back pain greater than 3 months duration        Plan:       1. Type 2 diabetes mellitus with chronic kidney disease, without long-term current use of insulin, unspecified CKD stage  - Hemoglobin A1C; Future  - CBC Auto Differential; Future  - Comprehensive Metabolic Panel; Future    2. Hypothyroidism (acquired)  - TSH; Future  - T4, Free; Future    3. Hyperlipidemia associated with type 2 diabetes mellitus  - Lipid Panel; Future    4. Gastroesophageal reflux disease without esophagitis  - Controlled and continue with current regimen     5. Dry eyes  - Patient reports will follow up with private ophthalmology     6. Chronic back pain greater than 3 months duration  - Checked DPS: +jacqui reich, last rx of norco filled on 11/10  - Patient is now followed by Dr. Dejesus and uses marihuana gummy   - will send norco refill request to pcp dr. Eugene     Patient with be reevaluated in 3 months or sooner reena Gambino NP

## 2023-12-04 RX ORDER — HYDROCODONE BITARTRATE AND ACETAMINOPHEN 10; 325 MG/1; MG/1
1 TABLET ORAL DAILY
Qty: 30 TABLET | Refills: 0 | Status: SHIPPED | OUTPATIENT
Start: 2024-01-08 | End: 2024-02-07

## 2023-12-04 RX ORDER — HYDROCODONE BITARTRATE AND ACETAMINOPHEN 10; 325 MG/1; MG/1
1 TABLET ORAL DAILY
Qty: 30 TABLET | Refills: 0 | Status: SHIPPED | OUTPATIENT
Start: 2024-02-08 | End: 2024-03-18 | Stop reason: SDUPTHER

## 2023-12-04 RX ORDER — HYDROCODONE BITARTRATE AND ACETAMINOPHEN 10; 325 MG/1; MG/1
1 TABLET ORAL DAILY
Qty: 30 TABLET | Refills: 0 | Status: SHIPPED | OUTPATIENT
Start: 2023-12-08 | End: 2024-01-07

## 2023-12-18 ENCOUNTER — PATIENT MESSAGE (OUTPATIENT)
Dept: ADMINISTRATIVE | Facility: HOSPITAL | Age: 68
End: 2023-12-18
Payer: MEDICARE

## 2024-02-22 ENCOUNTER — LAB VISIT (OUTPATIENT)
Dept: LAB | Facility: HOSPITAL | Age: 69
End: 2024-02-22
Attending: NURSE PRACTITIONER
Payer: MEDICARE

## 2024-02-22 DIAGNOSIS — E78.5 HYPERLIPIDEMIA ASSOCIATED WITH TYPE 2 DIABETES MELLITUS: ICD-10-CM

## 2024-02-22 DIAGNOSIS — E11.22 TYPE 2 DIABETES MELLITUS WITH CHRONIC KIDNEY DISEASE, WITHOUT LONG-TERM CURRENT USE OF INSULIN, UNSPECIFIED CKD STAGE: ICD-10-CM

## 2024-02-22 DIAGNOSIS — E11.69 HYPERLIPIDEMIA ASSOCIATED WITH TYPE 2 DIABETES MELLITUS: ICD-10-CM

## 2024-02-22 DIAGNOSIS — E03.9 HYPOTHYROIDISM (ACQUIRED): ICD-10-CM

## 2024-02-22 LAB
ALBUMIN SERPL BCP-MCNC: 3.3 G/DL (ref 3.5–5.2)
ALP SERPL-CCNC: 103 U/L (ref 55–135)
ALT SERPL W/O P-5'-P-CCNC: 14 U/L (ref 10–44)
ANION GAP SERPL CALC-SCNC: 9 MMOL/L (ref 8–16)
AST SERPL-CCNC: 20 U/L (ref 10–40)
BASOPHILS # BLD AUTO: 0.03 K/UL (ref 0–0.2)
BASOPHILS NFR BLD: 0.4 % (ref 0–1.9)
BILIRUB SERPL-MCNC: 1.2 MG/DL (ref 0.1–1)
BUN SERPL-MCNC: 12 MG/DL (ref 8–23)
CALCIUM SERPL-MCNC: 9.5 MG/DL (ref 8.7–10.5)
CHLORIDE SERPL-SCNC: 107 MMOL/L (ref 95–110)
CHOLEST SERPL-MCNC: 191 MG/DL (ref 120–199)
CHOLEST/HDLC SERPL: 3.5 {RATIO} (ref 2–5)
CO2 SERPL-SCNC: 25 MMOL/L (ref 23–29)
CREAT SERPL-MCNC: 0.9 MG/DL (ref 0.5–1.4)
DIFFERENTIAL METHOD BLD: NORMAL
EOSINOPHIL # BLD AUTO: 0.4 K/UL (ref 0–0.5)
EOSINOPHIL NFR BLD: 6.4 % (ref 0–8)
ERYTHROCYTE [DISTWIDTH] IN BLOOD BY AUTOMATED COUNT: 13.8 % (ref 11.5–14.5)
EST. GFR  (NO RACE VARIABLE): >60 ML/MIN/1.73 M^2
ESTIMATED AVG GLUCOSE: 117 MG/DL (ref 68–131)
GLUCOSE SERPL-MCNC: 126 MG/DL (ref 70–110)
HBA1C MFR BLD: 5.7 % (ref 4–5.6)
HCT VFR BLD AUTO: 43.4 % (ref 37–48.5)
HDLC SERPL-MCNC: 55 MG/DL (ref 40–75)
HDLC SERPL: 28.8 % (ref 20–50)
HGB BLD-MCNC: 13.9 G/DL (ref 12–16)
IMM GRANULOCYTES # BLD AUTO: 0.02 K/UL (ref 0–0.04)
IMM GRANULOCYTES NFR BLD AUTO: 0.3 % (ref 0–0.5)
LDLC SERPL CALC-MCNC: 102.2 MG/DL (ref 63–159)
LYMPHOCYTES # BLD AUTO: 1.4 K/UL (ref 1–4.8)
LYMPHOCYTES NFR BLD: 20.1 % (ref 18–48)
MCH RBC QN AUTO: 28.6 PG (ref 27–31)
MCHC RBC AUTO-ENTMCNC: 32 G/DL (ref 32–36)
MCV RBC AUTO: 89 FL (ref 82–98)
MONOCYTES # BLD AUTO: 0.4 K/UL (ref 0.3–1)
MONOCYTES NFR BLD: 5.7 % (ref 4–15)
NEUTROPHILS # BLD AUTO: 4.5 K/UL (ref 1.8–7.7)
NEUTROPHILS NFR BLD: 67.1 % (ref 38–73)
NONHDLC SERPL-MCNC: 136 MG/DL
NRBC BLD-RTO: 0 /100 WBC
PLATELET # BLD AUTO: 265 K/UL (ref 150–450)
PMV BLD AUTO: 10.5 FL (ref 9.2–12.9)
POTASSIUM SERPL-SCNC: 4.3 MMOL/L (ref 3.5–5.1)
PROT SERPL-MCNC: 6.6 G/DL (ref 6–8.4)
RBC # BLD AUTO: 4.86 M/UL (ref 4–5.4)
SODIUM SERPL-SCNC: 141 MMOL/L (ref 136–145)
T4 FREE SERPL-MCNC: 1.28 NG/DL (ref 0.71–1.51)
TRIGL SERPL-MCNC: 169 MG/DL (ref 30–150)
TSH SERPL DL<=0.005 MIU/L-ACNC: 0.02 UIU/ML (ref 0.4–4)
WBC # BLD AUTO: 6.7 K/UL (ref 3.9–12.7)

## 2024-02-22 PROCEDURE — 80053 COMPREHEN METABOLIC PANEL: CPT | Mod: HCNC | Performed by: NURSE PRACTITIONER

## 2024-02-22 PROCEDURE — 83036 HEMOGLOBIN GLYCOSYLATED A1C: CPT | Mod: HCNC | Performed by: NURSE PRACTITIONER

## 2024-02-22 PROCEDURE — 36415 COLL VENOUS BLD VENIPUNCTURE: CPT | Mod: HCNC,PO | Performed by: NURSE PRACTITIONER

## 2024-02-22 PROCEDURE — 84443 ASSAY THYROID STIM HORMONE: CPT | Mod: HCNC | Performed by: NURSE PRACTITIONER

## 2024-02-22 PROCEDURE — 80061 LIPID PANEL: CPT | Mod: HCNC | Performed by: NURSE PRACTITIONER

## 2024-02-22 PROCEDURE — 85025 COMPLETE CBC W/AUTO DIFF WBC: CPT | Mod: HCNC | Performed by: NURSE PRACTITIONER

## 2024-02-22 PROCEDURE — 84439 ASSAY OF FREE THYROXINE: CPT | Mod: HCNC | Performed by: NURSE PRACTITIONER

## 2024-03-11 ENCOUNTER — PATIENT MESSAGE (OUTPATIENT)
Dept: ADMINISTRATIVE | Facility: HOSPITAL | Age: 69
End: 2024-03-11
Payer: MEDICARE

## 2024-03-18 DIAGNOSIS — G89.29 CHRONIC BACK PAIN GREATER THAN 3 MONTHS DURATION: ICD-10-CM

## 2024-03-18 DIAGNOSIS — M54.9 CHRONIC BACK PAIN GREATER THAN 3 MONTHS DURATION: ICD-10-CM

## 2024-03-18 RX ORDER — HYDROCODONE BITARTRATE AND ACETAMINOPHEN 10; 325 MG/1; MG/1
1 TABLET ORAL DAILY
Qty: 30 TABLET | Refills: 0 | Status: SHIPPED | OUTPATIENT
Start: 2024-03-18 | End: 2024-03-19 | Stop reason: SDUPTHER

## 2024-03-18 NOTE — TELEPHONE ENCOUNTER
Care Due:                  Date            Visit Type   Department     Provider  --------------------------------------------------------------------------------                                EP -                              PRIMARY      [facility]  Last Visit: 08-      CARE ([facility])   MEDICINE       Lydia Eugene  Next Visit: None Scheduled  None         None Found                                                            Last  Test          Frequency    Reason                     Performed    Due Date  --------------------------------------------------------------------------------    Vitamin D...  12 months..  ergocalciferol...........  02- 02-    [facility] Embedded Care Due Messages. Reference number: [id number].   3/18/2024 11:17:41 AM CDT

## 2024-03-19 ENCOUNTER — OFFICE VISIT (OUTPATIENT)
Dept: FAMILY MEDICINE | Facility: CLINIC | Age: 69
End: 2024-03-19
Payer: MEDICARE

## 2024-03-19 VITALS
SYSTOLIC BLOOD PRESSURE: 130 MMHG | OXYGEN SATURATION: 98 % | HEIGHT: 65 IN | HEART RATE: 69 BPM | TEMPERATURE: 99 F | WEIGHT: 227.06 LBS | BODY MASS INDEX: 37.83 KG/M2 | DIASTOLIC BLOOD PRESSURE: 68 MMHG

## 2024-03-19 DIAGNOSIS — M54.9 CHRONIC BACK PAIN GREATER THAN 3 MONTHS DURATION: ICD-10-CM

## 2024-03-19 DIAGNOSIS — G89.29 CHRONIC BACK PAIN GREATER THAN 3 MONTHS DURATION: Primary | ICD-10-CM

## 2024-03-19 DIAGNOSIS — E11.22 TYPE 2 DIABETES MELLITUS WITH CHRONIC KIDNEY DISEASE, WITHOUT LONG-TERM CURRENT USE OF INSULIN, UNSPECIFIED CKD STAGE: ICD-10-CM

## 2024-03-19 DIAGNOSIS — E03.9 HYPOTHYROIDISM (ACQUIRED): ICD-10-CM

## 2024-03-19 DIAGNOSIS — G89.29 CHRONIC BACK PAIN GREATER THAN 3 MONTHS DURATION: ICD-10-CM

## 2024-03-19 DIAGNOSIS — M54.9 CHRONIC BACK PAIN GREATER THAN 3 MONTHS DURATION: Primary | ICD-10-CM

## 2024-03-19 PROCEDURE — 1125F AMNT PAIN NOTED PAIN PRSNT: CPT | Mod: HCNC,CPTII,S$GLB, | Performed by: NURSE PRACTITIONER

## 2024-03-19 PROCEDURE — 99999 PR PBB SHADOW E&M-EST. PATIENT-LVL III: CPT | Mod: PBBFAC,HCNC,, | Performed by: NURSE PRACTITIONER

## 2024-03-19 PROCEDURE — 1159F MED LIST DOCD IN RCRD: CPT | Mod: HCNC,CPTII,S$GLB, | Performed by: NURSE PRACTITIONER

## 2024-03-19 PROCEDURE — 1160F RVW MEDS BY RX/DR IN RCRD: CPT | Mod: HCNC,CPTII,S$GLB, | Performed by: NURSE PRACTITIONER

## 2024-03-19 PROCEDURE — 3288F FALL RISK ASSESSMENT DOCD: CPT | Mod: HCNC,CPTII,S$GLB, | Performed by: NURSE PRACTITIONER

## 2024-03-19 PROCEDURE — 3075F SYST BP GE 130 - 139MM HG: CPT | Mod: HCNC,CPTII,S$GLB, | Performed by: NURSE PRACTITIONER

## 2024-03-19 PROCEDURE — 99213 OFFICE O/P EST LOW 20 MIN: CPT | Mod: HCNC,S$GLB,, | Performed by: NURSE PRACTITIONER

## 2024-03-19 PROCEDURE — 3078F DIAST BP <80 MM HG: CPT | Mod: HCNC,CPTII,S$GLB, | Performed by: NURSE PRACTITIONER

## 2024-03-19 PROCEDURE — 4010F ACE/ARB THERAPY RXD/TAKEN: CPT | Mod: HCNC,CPTII,S$GLB, | Performed by: NURSE PRACTITIONER

## 2024-03-19 PROCEDURE — 3008F BODY MASS INDEX DOCD: CPT | Mod: HCNC,CPTII,S$GLB, | Performed by: NURSE PRACTITIONER

## 2024-03-19 PROCEDURE — 1101F PT FALLS ASSESS-DOCD LE1/YR: CPT | Mod: HCNC,CPTII,S$GLB, | Performed by: NURSE PRACTITIONER

## 2024-03-19 PROCEDURE — 3044F HG A1C LEVEL LT 7.0%: CPT | Mod: HCNC,CPTII,S$GLB, | Performed by: NURSE PRACTITIONER

## 2024-03-19 NOTE — PROGRESS NOTES
Subjective:       Patient ID: Edie Hernandez is a 68 y.o. female.    Chief Complaint: Follow-up     HPI   69 y/o female patient with medical problems listed below presents for 3 months follow up of the refill of Norco. Patient currently takes Norco 10 mg once a day as needed for chronic back pain. States had back spine surgery done a year ago and hip surgery in 10/2023. She states was seen by Dr. Dejesus Neurology and started with marihuana gummy and vape which helps with pain and sleeps. States she sees Dr. Dejesus q 3 months.     Continuous use of opioids follow-up:  Current medication and dosing:  norco 10 1 x day  Supply:  90 day supply  Side effects: none  Pain controlled: yes  Medication compliance: yes  DPS checked today: today, +marihuana gummy and vape  UDS: 8/23 +hydrocordone, negative for marihuana   pain contract signed: 8/2023    Labs reviewed from 2/2024- TSH low 0.017 with normal free T4, TB mildly elevated 1.2 with normal AST/ALT, A1C 5.7    Patient Active Problem List   Diagnosis    GERD (gastroesophageal reflux disease)    Chronic back pain greater than 3 months duration    Environmental allergies    Fibromyalgia    B12 nutritional deficiency    CLINT (obstructive sleep apnea)    Chronic sinusitis    Asthma    Hypothyroidism (acquired)    Nausea    Night sweats    Tinnitus, bilateral    Frequent UTI    Hyperlipidemia associated with type 2 diabetes mellitus    Colon polyp, hyperplastic    Interstitial cystitis    Hemangioma    Wart    Globus sensation    DDD (degenerative disc disease), lumbar    Severe obesity (BMI >= 40)    Edema    Mixed incontinence    Continuous opioid dependence- contract 10/18/17-failed uds for norco, + 2/18    Type 2 diabetes mellitus    History of colon polyps    Lumbar radiculitis    Lumbar radiculopathy    Lumbar spondylosis    Sacroiliitis    Lumbar stenosis with neurogenic claudication    Urinary retention    S/P lumbar fusion    Impaired flexibility of lower  extremity    Depression, recurrent      Review of patient's allergies indicates:   Allergen Reactions    Codeine Nausea And Vomiting     Other reaction(s): Nausea, dizziness     Past Surgical History:   Procedure Laterality Date    ANTERIOR LUMBAR INTERBODY FUSION (ALIF) N/A 11/8/2022    Procedure: FUSION, SPINE, LUMBAR, ALIF L3-4, L4-5;  Surgeon: Everton Godinez MD;  Location: [facility] OR;  Service: Orthopedics;  Laterality: N/A;  NTI, [employer], DR SANDI ENRIQUEZ, CO-SURGEON    COLONOSCOPY  02/14/2013    Dr. Vogel: repeat in 5-10 years    COLONOSCOPY W/ BIOPSIES AND POLYPECTOMY  02/2013    hyperplastic, recheck 5-10 years    CYSTOSCOPY WITH HYDRODISTENSION OF BLADDER N/A 9/21/2022    Procedure: CYSTOSCOPY, WITH BLADDER HYDRODISTENSION;  Surgeon: Keely Chavez Jr., MD;  Location: [facility] OR;  Service: Urology;  Laterality: N/A;    EXTERNAL EAR SURGERY      FUSION, SPINE, LUMBAR, POSTERIOR APPROACH N/A 11/8/2022    Procedure: FUSION,SPINE,LUMBAR,POSTERIOR APPROACH L3-4, L4-5;  Surgeon: Everton Godinez MD;  Location: [facility] OR;  Service: Orthopedics;  Laterality: N/A;  NTI, [employer], DR SANDI ENRIQUEZ, CO-SURGEON    HYSTERECTOMY      INJECTION OF ANESTHETIC AGENT AROUND MEDIAL BRANCH NERVES INNERVATING LUMBAR FACET JOINT Bilateral 8/3/2020    Procedure: Block-nerve-medial branch-lumbar left L2, L3, L4, L5;  Surgeon: Dejan Simmons MD;  Location: [facility] OR;  Service: Pain Management;  Laterality: Bilateral;    INJECTION OF ANESTHETIC AGENT AROUND MEDIAL BRANCH NERVES INNERVATING LUMBAR FACET JOINT Left 8/13/2020    Procedure: Block-nerve-medial branch-lumbar.  L2, L3, L4, and L5;  Surgeon: Dejan Simmons MD;  Location: [facility] OR;  Service: Pain Management;  Laterality: Left;    MINIMALLY INVASIVE FUSION OF SPINE Left 10/12/2021    Procedure: FUSION, SPINE, MINIMALLY INVASIVE;  Surgeon: Everton Godinez MD;  Location: [facility] OR;  Service: Orthopedics;  Laterality: Left;  SI-Bone    RADIOFREQUENCY ABLATION OF LUMBAR MEDIAL  BRANCH NERVE AT SINGLE LEVEL Left 8/27/2020    Procedure: Radiofrequency Ablation, Nerve, Spinal, Lumbar, Medial Branch,  L2, L3, L4, L5;  Surgeon: Dejan Simmons MD;  Location: [facility] OR;  Service: Pain Management;  Laterality: Left;  L2,l3,l4,l5    THROAT SURGERY      for CLINT    TRANSFORAMINAL EPIDURAL INJECTION OF STEROID Left 3/6/2020    Procedure: Injection,steroid,epidural,transforaminal approach;  Surgeon: Harmeet Zapata MD;  Location: [facility] OR;  Service: Pain Management;  Laterality: Left;  L4-L5    TRANSFORAMINAL EPIDURAL INJECTION OF STEROID Left 6/10/2020    Procedure: Injection,steroid,epidural,transforaminal approach.L4 and L5;  Surgeon: Dejan Simmons MD;  Location: [facility] OR;  Service: Pain Management;  Laterality: Left;    TRANSFORAMINAL EPIDURAL INJECTION OF STEROID Left 7/6/2020    Procedure: Injection,steroid,epidural,transforaminal approach. left L4 and L5;  Surgeon: Dejan Simmons MD;  Location: [facility] OR;  Service: Pain Management;  Laterality: Left;    TRANSFORAMINAL EPIDURAL INJECTION OF STEROID Left 11/12/2021    Procedure: Injection,steroid,epidural,transforaminal approach Left L5-S1;  Surgeon: Dejan Simmons MD;  Location: [facility] OR;  Service: Pain Management;  Laterality: Left;    UPPER GASTROINTESTINAL ENDOSCOPY          Current Outpatient Medications:     clobetasol (TEMOVATE) 0.05 % cream, Apply 1 application topically 2 (two) times daily. Apply to affected area, Disp: 30 g, Rfl: 2    cyanocobalamin (VITAMIN B-12) 100 MCG tablet, Take 100 mcg by mouth once daily., Disp: , Rfl:     cyclobenzaprine (FLEXERIL) 5 MG tablet, TAKE 1 TO 2 TABLETS BY MOUTH NIGHTLY AT BEDTIME AS NEEDED FOR PAIN, Disp: 90 tablet, Rfl: PRN    diclofenac sodium (VOLTAREN) 1 % Gel, Apply 2 g topically 4 (four) times daily., Disp: 450 g, Rfl: prn    ergocalciferol (ERGOCALCIFEROL) 50,000 unit Cap, Take 1 capsule (50,000 Units total) by mouth every 7 days., Disp: 12 capsule, Rfl: 1    EScitalopram oxalate (LEXAPRO) 10 MG tablet, Take 1  tablet (10 mg total) by mouth once daily., Disp: 90 tablet, Rfl: 3    estradioL (ESTRACE) 0.01 % (0.1 mg/gram) vaginal cream, Apply 1 fingertipful to vaginal area nightly x 2 weeks then use on MWF, Disp: 42.5 g, Rfl: 3    fluticasone propionate (FLONASE) 50 mcg/actuation nasal spray, 1 spray (50 mcg total) by Each Nostril route daily as needed for Allergies., Disp: 16 g, Rfl: prn    HYDROcodone-acetaminophen (NORCO)  mg per tablet, Take 1 tablet by mouth once daily., Disp: 30 tablet, Rfl: 0    lancets (ACCU-CHEK MULTICLIX LANCET) Misc, Test 2 x day, Disp: 200 each, Rfl: 3    levothyroxine (SYNTHROID, LEVOTHROID) 175 MCG tablet, Take 1 tablet (175 mcg total) by mouth once daily., Disp: 90 tablet, Rfl: 3    losartan (COZAAR) 50 MG tablet, TAKE ONE TABLET (50 MG TOTAL) BY MOUTH ONCE DAILY, Disp: 90 tablet, Rfl: 3    melatonin (MELATIN), Take by mouth every evening., Disp: , Rfl:     metFORMIN (GLUCOPHAGE-XR) 500 MG ER 24hr tablet, Take 2 tablets (1,000 mg total) by mouth 2 (two) times daily with meals., Disp: 360 tablet, Rfl: 3    methylphenidate (RITALIN LA) 40 MG 24 hr capsule, Take 40 mg by mouth daily as needed. Not on at this time, Disp: , Rfl:     methylphenidate HCl (RITALIN) 10 MG tablet, , Disp: , Rfl:     mupirocin (BACTROBAN) 2 % ointment, Apply topically 3 (three) times daily., Disp: 60 g, Rfl: PRN    mupirocin (BACTROBAN) 2 % ointment, Apply topically 3 (three) times daily., Disp: 30 g, Rfl: 0    ONETOUCH ULTRA BLUE TEST STRIP Strp, USE TO TEST BLOOD SUGAR, Disp: 100 strip, Rfl: 3    oxybutynin (DITROPAN XL) 15 MG TR24, Take 1 tablet (15 mg total) by mouth once daily., Disp: 90 tablet, Rfl: 3    pantoprazole (PROTONIX) 40 MG tablet, TAKE ONE TABLET (40MG TOTAL) BY MOUTH ONCE DAILY, Disp: 90 tablet, Rfl: 3    pregabalin (LYRICA) 100 MG capsule, TAKE 1 CAPSULE BY MOUTH EVERY EVENING, Disp: 30 capsule, Rfl: 2    semaglutide (OZEMPIC) 1 mg/dose (4 mg/3 mL), Inject 1 mg into the skin every 7 days., Disp:  9 mL, Rfl: 3    simvastatin (ZOCOR) 10 MG tablet, Take 10 mg by mouth every evening., Disp: , Rfl:     azelastine (ASTELIN) 137 mcg (0.1 %) nasal spray, 1 spray (137 mcg total) by Nasal route 2 (two) times daily. (Patient taking differently: 1 spray by Nasal route 2 (two) times daily. PRN), Disp: 30 mL, Rfl: 11    blood-glucose meter kit, Use as instructed, Disp: 1 each, Rfl: 0    furosemide (LASIX) 20 MG tablet, Take 1 tablet (20 mg total) by mouth daily as needed (leg swelling)., Disp: 60 tablet, Rfl: 0    Current Facility-Administered Medications:     LIDOcaine HCL 20 mg/ml (2%) injection 20 mL, 20 mL, Other, 1 time in Clinic/HOD, LUIS Dominguez, LYNETTE    Facility-Administered Medications Ordered in Other Visits:     lactated ringers infusion, , Intravenous, Continuous, Dejan Simmons MD, Stopped at 07/06/20 1008    Lab Results   Component Value Date    WBC 6.70 02/22/2024    HGB 13.9 02/22/2024    HCT 43.4 02/22/2024     02/22/2024    CHOL 191 02/22/2024    TRIG 169 (H) 02/22/2024    HDL 55 02/22/2024    ALT 14 02/22/2024    AST 20 02/22/2024     02/22/2024    K 4.3 02/22/2024     02/22/2024    CREATININE 0.9 02/22/2024    BUN 12 02/22/2024    CO2 25 02/22/2024    TSH 0.017 (L) 02/22/2024    HGBA1C 5.7 (H) 02/22/2024     Review of Systems   Constitutional:  Negative for activity change and unexpected weight change.   HENT:  Negative for hearing loss, rhinorrhea and trouble swallowing.    Eyes:  Negative for discharge and visual disturbance.   Respiratory:  Negative for chest tightness and wheezing.    Cardiovascular:  Negative for chest pain and palpitations.   Gastrointestinal:  Negative for blood in stool, constipation, diarrhea and vomiting.   Endocrine: Negative for polydipsia and polyuria.   Genitourinary:  Negative for difficulty urinating, dysuria, hematuria and menstrual problem.   Musculoskeletal:  Positive for neck pain. Negative for arthralgias and joint swelling.  "  Neurological:  Negative for weakness and headaches.   Psychiatric/Behavioral:  Negative for confusion and dysphoric mood.        Objective:   /68 (BP Location: Right arm, Patient Position: Sitting, BP Method: Large (Manual))   Pulse 69   Temp 98.6 °F (37 °C) (Oral)   Ht 5' 5" (1.651 m)   Wt 103 kg (227 lb 1.2 oz)   SpO2 98%   BMI 37.79 kg/m²         Physical Exam  Constitutional:       General: She is not in acute distress.     Appearance: Normal appearance.   HENT:      Head: Atraumatic.   Cardiovascular:      Rate and Rhythm: Normal rate and regular rhythm.      Pulses: Normal pulses.      Heart sounds: Normal heart sounds.   Pulmonary:      Effort: Pulmonary effort is normal.      Breath sounds: Normal breath sounds.   Abdominal:      General: Abdomen is flat. Bowel sounds are normal.      Palpations: Abdomen is soft.   Neurological:      Mental Status: She is oriented to person, place, and time.         Assessment:       1. Chronic back pain greater than 3 months duration    2. Type 2 diabetes mellitus with chronic kidney disease, without long-term current use of insulin, unspecified CKD stage    3. Hypothyroidism (acquired)        Plan:       1. Type 2 diabetes mellitus with chronic kidney disease, without long-term current use of insulin, unspecified CKD stage  Controlled and continue with current regimen   - Comprehensive Metabolic Panel; Future    2. Hypothyroidism (acquired)  - Will repeat TFT in 3 months and adjust the dose accordingly   - T4, Free; Future  - TSH; Future    3. Chronic back pain greater than 3 months duration   - Checked DPS: +jacqui ordaz, last rx of norco filled on 2/14  - will send norco refill request to pcp dr. Eugene     Patient with be reevaluated in 3 months or sooner prn  Immanuel NP  "

## 2024-03-20 RX ORDER — HYDROCODONE BITARTRATE AND ACETAMINOPHEN 10; 325 MG/1; MG/1
1 TABLET ORAL DAILY
Qty: 30 TABLET | Refills: 0 | Status: SHIPPED | OUTPATIENT
Start: 2024-05-19 | End: 2024-06-13 | Stop reason: SDUPTHER

## 2024-03-20 RX ORDER — HYDROCODONE BITARTRATE AND ACETAMINOPHEN 10; 325 MG/1; MG/1
1 TABLET ORAL DAILY
Qty: 30 TABLET | Refills: 0 | Status: SHIPPED | OUTPATIENT
Start: 2024-04-18 | End: 2024-05-18

## 2024-04-04 ENCOUNTER — PATIENT MESSAGE (OUTPATIENT)
Dept: ADMINISTRATIVE | Facility: HOSPITAL | Age: 69
End: 2024-04-04
Payer: MEDICARE

## 2024-04-08 DIAGNOSIS — E55.9 VITAMIN D DEFICIENCY: ICD-10-CM

## 2024-04-08 NOTE — TELEPHONE ENCOUNTER
No care due was identified.  [facility] Embedded Care Due Messages. Reference number: [id number].   4/08/2024 1:37:45 AM CDT

## 2024-04-11 RX ORDER — ERGOCALCIFEROL 1.25 MG/1
CAPSULE ORAL
Qty: 12 CAPSULE | Refills: 3 | Status: SHIPPED | OUTPATIENT
Start: 2024-04-11

## 2024-04-12 ENCOUNTER — PATIENT MESSAGE (OUTPATIENT)
Dept: ADMINISTRATIVE | Facility: HOSPITAL | Age: 69
End: 2024-04-12
Payer: MEDICARE

## 2024-04-18 NOTE — PROGRESS NOTES
Two patient identifiers used urine tested and was positive for uti sent for culture, informed CHERY Dominguez     Would like a call back about lab results.

## 2024-04-22 ENCOUNTER — TELEPHONE (OUTPATIENT)
Dept: PSYCHIATRY | Facility: CLINIC | Age: 69
End: 2024-04-22
Payer: MEDICARE

## 2024-04-22 NOTE — TELEPHONE ENCOUNTER
Spoke to the patient regarding the referral on the wait list. Patient states she is no longer in need of services and requests the referral closed.     Referral closed per patient request.

## 2024-04-24 DIAGNOSIS — E11.9 TYPE 2 DIABETES MELLITUS WITHOUT COMPLICATION: ICD-10-CM

## 2024-05-10 ENCOUNTER — OFFICE VISIT (OUTPATIENT)
Dept: FAMILY MEDICINE | Facility: CLINIC | Age: 69
End: 2024-05-10
Payer: MEDICARE

## 2024-05-10 VITALS
OXYGEN SATURATION: 99 % | HEIGHT: 65 IN | TEMPERATURE: 98 F | DIASTOLIC BLOOD PRESSURE: 68 MMHG | BODY MASS INDEX: 36.18 KG/M2 | WEIGHT: 217.13 LBS | SYSTOLIC BLOOD PRESSURE: 126 MMHG | HEART RATE: 73 BPM

## 2024-05-10 DIAGNOSIS — E66.9 OBESITY (BMI 30-39.9): ICD-10-CM

## 2024-05-10 DIAGNOSIS — J30.1 SEASONAL ALLERGIC RHINITIS DUE TO POLLEN: ICD-10-CM

## 2024-05-10 DIAGNOSIS — F33.9 DEPRESSION, RECURRENT: ICD-10-CM

## 2024-05-10 DIAGNOSIS — M46.1 BILATERAL SACROILIITIS: ICD-10-CM

## 2024-05-10 DIAGNOSIS — M25.552 LEFT HIP PAIN: Primary | ICD-10-CM

## 2024-05-10 DIAGNOSIS — F11.20 CONTINUOUS OPIOID DEPENDENCE: ICD-10-CM

## 2024-05-10 PROCEDURE — 3074F SYST BP LT 130 MM HG: CPT | Mod: HCNC,CPTII,S$GLB, | Performed by: NURSE PRACTITIONER

## 2024-05-10 PROCEDURE — 1125F AMNT PAIN NOTED PAIN PRSNT: CPT | Mod: HCNC,CPTII,S$GLB, | Performed by: NURSE PRACTITIONER

## 2024-05-10 PROCEDURE — 3078F DIAST BP <80 MM HG: CPT | Mod: HCNC,CPTII,S$GLB, | Performed by: NURSE PRACTITIONER

## 2024-05-10 PROCEDURE — 1159F MED LIST DOCD IN RCRD: CPT | Mod: HCNC,CPTII,S$GLB, | Performed by: NURSE PRACTITIONER

## 2024-05-10 PROCEDURE — 3044F HG A1C LEVEL LT 7.0%: CPT | Mod: HCNC,CPTII,S$GLB, | Performed by: NURSE PRACTITIONER

## 2024-05-10 PROCEDURE — 4010F ACE/ARB THERAPY RXD/TAKEN: CPT | Mod: HCNC,CPTII,S$GLB, | Performed by: NURSE PRACTITIONER

## 2024-05-10 PROCEDURE — 3008F BODY MASS INDEX DOCD: CPT | Mod: HCNC,CPTII,S$GLB, | Performed by: NURSE PRACTITIONER

## 2024-05-10 PROCEDURE — 96372 THER/PROPH/DIAG INJ SC/IM: CPT | Mod: HCNC,S$GLB,, | Performed by: NURSE PRACTITIONER

## 2024-05-10 PROCEDURE — 3288F FALL RISK ASSESSMENT DOCD: CPT | Mod: HCNC,CPTII,S$GLB, | Performed by: NURSE PRACTITIONER

## 2024-05-10 PROCEDURE — 1101F PT FALLS ASSESS-DOCD LE1/YR: CPT | Mod: HCNC,CPTII,S$GLB, | Performed by: NURSE PRACTITIONER

## 2024-05-10 PROCEDURE — 1160F RVW MEDS BY RX/DR IN RCRD: CPT | Mod: HCNC,CPTII,S$GLB, | Performed by: NURSE PRACTITIONER

## 2024-05-10 PROCEDURE — 99999 PR PBB SHADOW E&M-EST. PATIENT-LVL V: CPT | Mod: PBBFAC,HCNC,, | Performed by: NURSE PRACTITIONER

## 2024-05-10 PROCEDURE — 99213 OFFICE O/P EST LOW 20 MIN: CPT | Mod: HCNC,25,S$GLB, | Performed by: NURSE PRACTITIONER

## 2024-05-10 RX ORDER — AZELASTINE 1 MG/ML
1 SPRAY, METERED NASAL 2 TIMES DAILY PRN
Qty: 30 ML | Refills: 5 | Status: SHIPPED | OUTPATIENT
Start: 2024-05-10

## 2024-05-10 RX ORDER — KETOROLAC TROMETHAMINE 30 MG/ML
30 INJECTION, SOLUTION INTRAMUSCULAR; INTRAVENOUS
Status: COMPLETED | OUTPATIENT
Start: 2024-05-10 | End: 2024-05-10

## 2024-05-10 RX ADMIN — KETOROLAC TROMETHAMINE 30 MG: 30 INJECTION, SOLUTION INTRAMUSCULAR; INTRAVENOUS at 03:05

## 2024-05-10 NOTE — PROGRESS NOTES
Subjective:       Patient ID: Edie Hernandez is a 68 y.o. female.    Chief Complaint: left hip pain     HPI   67 y/o female patient with medical problems listed below presents for left hip pain which is chronic but getting more frequent pain for a week. She requests toradol injection which she states helped with pain in the past. Denies recent trauma to the affected area. Describes pain as aching, pain 4/10 in intensity, non radiating, not associated with nausea, vomiting, abdominal pain, urinary or bowel symptoms, fever, chills.     Patient Active Problem List   Diagnosis    GERD (gastroesophageal reflux disease)    Chronic back pain greater than 3 months duration    Environmental allergies    Fibromyalgia    B12 nutritional deficiency    CLINT (obstructive sleep apnea)    Chronic sinusitis    Asthma    Hypothyroidism (acquired)    Nausea    Night sweats    Tinnitus, bilateral    Frequent UTI    Hyperlipidemia associated with type 2 diabetes mellitus    Colon polyp, hyperplastic    Interstitial cystitis    Hemangioma    Wart    Globus sensation    DDD (degenerative disc disease), lumbar    Obesity (BMI 30-39.9)    Edema    Mixed incontinence    Continuous opioid dependence- contract 10/18/17-failed uds for norco, + 2/18    Type 2 diabetes mellitus    History of colon polyps    Lumbar radiculitis    Lumbar radiculopathy    Lumbar spondylosis    Sacroiliitis    Lumbar stenosis with neurogenic claudication    Urinary retention    S/P lumbar fusion    Impaired flexibility of lower extremity    Depression, recurrent      Review of patient's allergies indicates:   Allergen Reactions    Codeine Nausea And Vomiting     Other reaction(s): Nausea, dizziness     Past Surgical History:   Procedure Laterality Date    ANTERIOR LUMBAR INTERBODY FUSION (ALIF) N/A 11/8/2022    Procedure: FUSION, SPINE, LUMBAR, ALIF L3-4, L4-5;  Surgeon: Everton Godinez MD;  Location: [facility];  Service: Orthopedics;  Laterality: N/A;  NTI,  [employer], DR SANDI ENRIQUEZ, CO-SURGEON    COLONOSCOPY  02/14/2013    Dr. Vogel: repeat in 5-10 years    COLONOSCOPY W/ BIOPSIES AND POLYPECTOMY  02/2013    hyperplastic, recheck 5-10 years    CYSTOSCOPY WITH HYDRODISTENSION OF BLADDER N/A 9/21/2022    Procedure: CYSTOSCOPY, WITH BLADDER HYDRODISTENSION;  Surgeon: Keely Chavez Jr., MD;  Location: [facility] OR;  Service: Urology;  Laterality: N/A;    EXTERNAL EAR SURGERY      FUSION, SPINE, LUMBAR, POSTERIOR APPROACH N/A 11/8/2022    Procedure: FUSION,SPINE,LUMBAR,POSTERIOR APPROACH L3-4, L4-5;  Surgeon: Everton Godinez MD;  Location: [facility] OR;  Service: Orthopedics;  Laterality: N/A;  NTI, [employer], DR SANDI ENRIQUEZ, CO-SURGEON    HYSTERECTOMY      INJECTION OF ANESTHETIC AGENT AROUND MEDIAL BRANCH NERVES INNERVATING LUMBAR FACET JOINT Bilateral 8/3/2020    Procedure: Block-nerve-medial branch-lumbar left L2, L3, L4, L5;  Surgeon: Dejan Simmons MD;  Location: [facility] OR;  Service: Pain Management;  Laterality: Bilateral;    INJECTION OF ANESTHETIC AGENT AROUND MEDIAL BRANCH NERVES INNERVATING LUMBAR FACET JOINT Left 8/13/2020    Procedure: Block-nerve-medial branch-lumbar.  L2, L3, L4, and L5;  Surgeon: Dejan Simmons MD;  Location: [facility] OR;  Service: Pain Management;  Laterality: Left;    MINIMALLY INVASIVE FUSION OF SPINE Left 10/12/2021    Procedure: FUSION, SPINE, MINIMALLY INVASIVE;  Surgeon: Everton Godinez MD;  Location: [facility] OR;  Service: Orthopedics;  Laterality: Left;  SI-Bone    RADIOFREQUENCY ABLATION OF LUMBAR MEDIAL BRANCH NERVE AT SINGLE LEVEL Left 8/27/2020    Procedure: Radiofrequency Ablation, Nerve, Spinal, Lumbar, Medial Branch,  L2, L3, L4, L5;  Surgeon: Dejan Simmons MD;  Location: [facility] OR;  Service: Pain Management;  Laterality: Left;  L2,l3,l4,l5    THROAT SURGERY      for CLINT    TRANSFORAMINAL EPIDURAL INJECTION OF STEROID Left 3/6/2020    Procedure: Injection,steroid,epidural,transforaminal approach;  Surgeon: Harmeet Zapata MD;  Location:  [facility] OR;  Service: Pain Management;  Laterality: Left;  L4-L5    TRANSFORAMINAL EPIDURAL INJECTION OF STEROID Left 6/10/2020    Procedure: Injection,steroid,epidural,transforaminal approach.L4 and L5;  Surgeon: Dejan Simmons MD;  Location: [facility] OR;  Service: Pain Management;  Laterality: Left;    TRANSFORAMINAL EPIDURAL INJECTION OF STEROID Left 7/6/2020    Procedure: Injection,steroid,epidural,transforaminal approach. left L4 and L5;  Surgeon: Dejan Simmons MD;  Location: [facility] OR;  Service: Pain Management;  Laterality: Left;    TRANSFORAMINAL EPIDURAL INJECTION OF STEROID Left 11/12/2021    Procedure: Injection,steroid,epidural,transforaminal approach Left L5-S1;  Surgeon: Dejan Simmons MD;  Location: [facility] OR;  Service: Pain Management;  Laterality: Left;    UPPER GASTROINTESTINAL ENDOSCOPY          Current Outpatient Medications:     clobetasol (TEMOVATE) 0.05 % cream, Apply 1 application topically 2 (two) times daily. Apply to affected area, Disp: 30 g, Rfl: 2    cyanocobalamin (VITAMIN B-12) 100 MCG tablet, Take 100 mcg by mouth once daily., Disp: , Rfl:     cyclobenzaprine (FLEXERIL) 5 MG tablet, TAKE 1 TO 2 TABLETS BY MOUTH NIGHTLY AT BEDTIME AS NEEDED FOR PAIN, Disp: 90 tablet, Rfl: PRN    diclofenac sodium (VOLTAREN) 1 % Gel, Apply 2 g topically 4 (four) times daily., Disp: 450 g, Rfl: prn    ergocalciferol (ERGOCALCIFEROL) 50,000 unit Cap, TAKE 1 CAPSULE EVERY 7 DAYS, Disp: 12 capsule, Rfl: 3    EScitalopram oxalate (LEXAPRO) 10 MG tablet, Take 1 tablet (10 mg total) by mouth once daily., Disp: 90 tablet, Rfl: 3    estradioL (ESTRACE) 0.01 % (0.1 mg/gram) vaginal cream, Apply 1 fingertipful to vaginal area nightly x 2 weeks then use on MWF, Disp: 42.5 g, Rfl: 3    fluticasone propionate (FLONASE) 50 mcg/actuation nasal spray, 1 spray (50 mcg total) by Each Nostril route daily as needed for Allergies., Disp: 16 g, Rfl: prn    [START ON 5/19/2024] HYDROcodone-acetaminophen (NORCO)  mg per tablet, Take 1  tablet by mouth once daily., Disp: 30 tablet, Rfl: 0    HYDROcodone-acetaminophen (NORCO)  mg per tablet, Take 1 tablet by mouth once daily., Disp: 30 tablet, Rfl: 0    lancets (ACCU-CHEK MULTICLIX LANCET) Misc, Test 2 x day, Disp: 200 each, Rfl: 3    levothyroxine (SYNTHROID, LEVOTHROID) 175 MCG tablet, Take 1 tablet (175 mcg total) by mouth once daily., Disp: 90 tablet, Rfl: 3    losartan (COZAAR) 50 MG tablet, TAKE ONE TABLET (50 MG TOTAL) BY MOUTH ONCE DAILY, Disp: 90 tablet, Rfl: 3    melatonin (MELATIN), Take by mouth every evening., Disp: , Rfl:     metFORMIN (GLUCOPHAGE-XR) 500 MG ER 24hr tablet, Take 2 tablets (1,000 mg total) by mouth 2 (two) times daily with meals., Disp: 360 tablet, Rfl: 3    methylphenidate (RITALIN LA) 40 MG 24 hr capsule, Take 40 mg by mouth daily as needed. Not on at this time, Disp: , Rfl:     methylphenidate HCl (RITALIN) 10 MG tablet, , Disp: , Rfl:     mupirocin (BACTROBAN) 2 % ointment, Apply topically 3 (three) times daily., Disp: 60 g, Rfl: PRN    mupirocin (BACTROBAN) 2 % ointment, Apply topically 3 (three) times daily., Disp: 30 g, Rfl: 0    ONETOUCH ULTRA BLUE TEST STRIP Strp, USE TO TEST BLOOD SUGAR, Disp: 100 strip, Rfl: 3    oxybutynin (DITROPAN XL) 15 MG TR24, Take 1 tablet (15 mg total) by mouth once daily., Disp: 90 tablet, Rfl: 3    pantoprazole (PROTONIX) 40 MG tablet, TAKE ONE TABLET (40MG TOTAL) BY MOUTH ONCE DAILY, Disp: 90 tablet, Rfl: 3    pregabalin (LYRICA) 100 MG capsule, TAKE 1 CAPSULE BY MOUTH EVERY EVENING, Disp: 30 capsule, Rfl: 2    semaglutide (OZEMPIC) 1 mg/dose (4 mg/3 mL), Inject 1 mg into the skin every 7 days., Disp: 9 mL, Rfl: 3    simvastatin (ZOCOR) 10 MG tablet, Take 10 mg by mouth every evening., Disp: , Rfl:     azelastine (ASTELIN) 137 mcg (0.1 %) nasal spray, 1 spray (137 mcg total) by Nasal route 2 (two) times daily as needed for Rhinitis., Disp: 30 mL, Rfl: 5    blood-glucose meter kit, Use as instructed, Disp: 1 each, Rfl: 0     "furosemide (LASIX) 20 MG tablet, Take 1 tablet (20 mg total) by mouth daily as needed (leg swelling)., Disp: 60 tablet, Rfl: 0    Current Facility-Administered Medications:     LIDOcaine HCL 20 mg/ml (2%) injection 20 mL, 20 mL, Other, 1 time in Clinic/HOD, LUIS Dominguez FNP    Facility-Administered Medications Ordered in Other Visits:     lactated ringers infusion, , Intravenous, Continuous, Dejan Simmons MD, Stopped at 07/06/20 1008    Review of Systems   Constitutional:  Negative for chills and fever.   Respiratory:  Negative for cough and shortness of breath.    Cardiovascular:  Negative for chest pain and palpitations.   Gastrointestinal:  Negative for abdominal pain.   Musculoskeletal:  Positive for back pain.        Left hip pain   Neurological:  Negative for dizziness and headaches.       Objective:   /68 (BP Location: Right arm, Patient Position: Sitting, BP Method: Medium (Manual))   Pulse 73   Temp 98.2 °F (36.8 °C) (Oral)   Ht 5' 5" (1.651 m)   Wt 98.5 kg (217 lb 2.5 oz)   SpO2 99%   BMI 36.14 kg/m²         Physical Exam  Constitutional:       General: She is not in acute distress.     Appearance: Normal appearance.   HENT:      Head: Atraumatic.   Cardiovascular:      Rate and Rhythm: Normal rate and regular rhythm.      Pulses: Normal pulses.      Heart sounds: Normal heart sounds.   Pulmonary:      Effort: Pulmonary effort is normal.      Breath sounds: Normal breath sounds.   Abdominal:      General: Abdomen is flat. Bowel sounds are normal.      Palpations: Abdomen is soft.   Musculoskeletal:      Comments: +Tenderness in left lateral hip   Neurological:      Mental Status: She is oriented to person, place, and time.         Assessment:       1. Left hip pain    2. Obesity (BMI 30-39.9)    3. Continuous opioid dependence    4. Bilateral sacroiliitis    5. Depression, recurrent    6. Seasonal allergic rhinitis due to pollen        Plan:       1. Obesity (BMI 30-39.9)  - Continue " with diet and exercise     2. Continuous opioid dependence    3. Bilateral sacroiliitis  - ketorolac injection 30 mg     4. Depression, recurrent  - Stable and continue with current regimen     5. Seasonal allergic rhinitis due to pollen  - azelastine (ASTELIN) 137 mcg (0.1 %) nasal spray; 1 spray (137 mcg total) by Nasal route 2 (two) times daily as needed for Rhinitis.  Dispense: 30 mL; Refill: 5    6. Left hip pain  - ketorolac injection 30 mg     Patient with be reevaluated in  as scheduled  or sooner prn  Immanuel TRAN

## 2024-06-13 ENCOUNTER — LAB VISIT (OUTPATIENT)
Dept: LAB | Facility: HOSPITAL | Age: 69
End: 2024-06-13
Attending: NURSE PRACTITIONER
Payer: MEDICARE

## 2024-06-13 DIAGNOSIS — M54.9 CHRONIC BACK PAIN GREATER THAN 3 MONTHS DURATION: ICD-10-CM

## 2024-06-13 DIAGNOSIS — E03.9 HYPOTHYROIDISM (ACQUIRED): ICD-10-CM

## 2024-06-13 DIAGNOSIS — G89.29 CHRONIC BACK PAIN GREATER THAN 3 MONTHS DURATION: ICD-10-CM

## 2024-06-13 DIAGNOSIS — E11.22 TYPE 2 DIABETES MELLITUS WITH CHRONIC KIDNEY DISEASE, WITHOUT LONG-TERM CURRENT USE OF INSULIN, UNSPECIFIED CKD STAGE: ICD-10-CM

## 2024-06-13 LAB
ALBUMIN SERPL BCP-MCNC: 3.4 G/DL (ref 3.5–5.2)
ALP SERPL-CCNC: 81 U/L (ref 55–135)
ALT SERPL W/O P-5'-P-CCNC: 14 U/L (ref 10–44)
ANION GAP SERPL CALC-SCNC: 12 MMOL/L (ref 8–16)
AST SERPL-CCNC: 13 U/L (ref 10–40)
BILIRUB SERPL-MCNC: 1.5 MG/DL (ref 0.1–1)
BUN SERPL-MCNC: 16 MG/DL (ref 8–23)
CALCIUM SERPL-MCNC: 9.8 MG/DL (ref 8.7–10.5)
CHLORIDE SERPL-SCNC: 108 MMOL/L (ref 95–110)
CO2 SERPL-SCNC: 22 MMOL/L (ref 23–29)
CREAT SERPL-MCNC: 1 MG/DL (ref 0.5–1.4)
EST. GFR  (NO RACE VARIABLE): >60 ML/MIN/1.73 M^2
GLUCOSE SERPL-MCNC: 87 MG/DL (ref 70–110)
POTASSIUM SERPL-SCNC: 4.2 MMOL/L (ref 3.5–5.1)
PROT SERPL-MCNC: 6.5 G/DL (ref 6–8.4)
SODIUM SERPL-SCNC: 142 MMOL/L (ref 136–145)
T4 FREE SERPL-MCNC: 1.28 NG/DL (ref 0.71–1.51)
TSH SERPL DL<=0.005 MIU/L-ACNC: 0.01 UIU/ML (ref 0.4–4)

## 2024-06-13 PROCEDURE — 36415 COLL VENOUS BLD VENIPUNCTURE: CPT | Mod: HCNC,PO | Performed by: NURSE PRACTITIONER

## 2024-06-13 PROCEDURE — 84443 ASSAY THYROID STIM HORMONE: CPT | Mod: HCNC | Performed by: NURSE PRACTITIONER

## 2024-06-13 PROCEDURE — 80053 COMPREHEN METABOLIC PANEL: CPT | Mod: HCNC | Performed by: NURSE PRACTITIONER

## 2024-06-13 PROCEDURE — 84439 ASSAY OF FREE THYROXINE: CPT | Mod: HCNC | Performed by: NURSE PRACTITIONER

## 2024-06-13 RX ORDER — HYDROCODONE BITARTRATE AND ACETAMINOPHEN 10; 325 MG/1; MG/1
1 TABLET ORAL DAILY
Qty: 30 TABLET | Refills: 0 | Status: SHIPPED | OUTPATIENT
Start: 2024-06-13 | End: 2024-07-13

## 2024-06-13 NOTE — TELEPHONE ENCOUNTER
----- Message from Brian Lara sent at 6/13/2024  9:11 AM CDT -----  Contact: self  Type: RX Refill Request        Who Called: Patient   Refill or New Rx: refill  RX Name and Strength: HYDROcodone-acetaminophen (NORCO)  mg per tablet  How is the patient currently taking it? (ex. 1XDay): as directed   Is this a 30 day or 90 day RX: n/a  Preferred Pharmacy with phone number:   [facility] - [facility] [address]  [address] [address]  Phone: [phone] Fax: [phone]  Local or Mail Order: mail order   Ordering Provider: Dr. Valorie Moreno Call Back Number: [phone] (home) [phone] (work) [facility] #: [phone]  Additional Information: Pt needs a refill. Thanks

## 2024-06-13 NOTE — TELEPHONE ENCOUNTER
Care Due:                  Date            Visit Type   Department     Provider  --------------------------------------------------------------------------------                                EP -                              PRIMARY      [facility]  Last Visit: 08-      CARE ([facility])   GHASSAN Eugene                              EP -                              PRIMARY      [facility]  Next Visit: 09-      CARE ([facility])   MEDICINE       Lydia Eugene                                                            Last  Test          Frequency    Reason                     Performed    Due Date  --------------------------------------------------------------------------------    Office Visit  12 months..  diclofenac,                08- 08-                             ergocalciferol...........    HBA1C.......  6 months...  metFORMIN, semaglutide...  02- 08-    Vitamin D...  12 months..  ergocalciferol...........  02- 02-    [facility] Embedded Care Due Messages. Reference number: [id number].   6/13/2024 9:18:01 AM CDT

## 2024-06-20 ENCOUNTER — OFFICE VISIT (OUTPATIENT)
Dept: FAMILY MEDICINE | Facility: CLINIC | Age: 69
End: 2024-06-20
Payer: MEDICARE

## 2024-06-20 VITALS
OXYGEN SATURATION: 98 % | HEIGHT: 65 IN | DIASTOLIC BLOOD PRESSURE: 60 MMHG | HEART RATE: 64 BPM | SYSTOLIC BLOOD PRESSURE: 114 MMHG | BODY MASS INDEX: 34.85 KG/M2 | TEMPERATURE: 98 F | WEIGHT: 209.19 LBS

## 2024-06-20 DIAGNOSIS — E03.9 HYPOTHYROIDISM (ACQUIRED): ICD-10-CM

## 2024-06-20 DIAGNOSIS — M54.9 CHRONIC BACK PAIN GREATER THAN 3 MONTHS DURATION: ICD-10-CM

## 2024-06-20 DIAGNOSIS — E78.5 HYPERLIPIDEMIA ASSOCIATED WITH TYPE 2 DIABETES MELLITUS: ICD-10-CM

## 2024-06-20 DIAGNOSIS — E11.69 HYPERLIPIDEMIA ASSOCIATED WITH TYPE 2 DIABETES MELLITUS: ICD-10-CM

## 2024-06-20 DIAGNOSIS — G89.29 CHRONIC BACK PAIN GREATER THAN 3 MONTHS DURATION: Primary | ICD-10-CM

## 2024-06-20 DIAGNOSIS — G89.29 CHRONIC BACK PAIN GREATER THAN 3 MONTHS DURATION: ICD-10-CM

## 2024-06-20 DIAGNOSIS — E11.22 TYPE 2 DIABETES MELLITUS WITH CHRONIC KIDNEY DISEASE, WITHOUT LONG-TERM CURRENT USE OF INSULIN, UNSPECIFIED CKD STAGE: ICD-10-CM

## 2024-06-20 DIAGNOSIS — R21 SKIN RASH: ICD-10-CM

## 2024-06-20 DIAGNOSIS — M54.9 CHRONIC BACK PAIN GREATER THAN 3 MONTHS DURATION: Primary | ICD-10-CM

## 2024-06-20 PROCEDURE — 99214 OFFICE O/P EST MOD 30 MIN: CPT | Mod: HCNC,S$GLB,, | Performed by: NURSE PRACTITIONER

## 2024-06-20 PROCEDURE — 3044F HG A1C LEVEL LT 7.0%: CPT | Mod: HCNC,CPTII,S$GLB, | Performed by: NURSE PRACTITIONER

## 2024-06-20 PROCEDURE — 1159F MED LIST DOCD IN RCRD: CPT | Mod: HCNC,CPTII,S$GLB, | Performed by: NURSE PRACTITIONER

## 2024-06-20 PROCEDURE — 3074F SYST BP LT 130 MM HG: CPT | Mod: HCNC,CPTII,S$GLB, | Performed by: NURSE PRACTITIONER

## 2024-06-20 PROCEDURE — 4010F ACE/ARB THERAPY RXD/TAKEN: CPT | Mod: HCNC,CPTII,S$GLB, | Performed by: NURSE PRACTITIONER

## 2024-06-20 PROCEDURE — 3008F BODY MASS INDEX DOCD: CPT | Mod: HCNC,CPTII,S$GLB, | Performed by: NURSE PRACTITIONER

## 2024-06-20 PROCEDURE — 1101F PT FALLS ASSESS-DOCD LE1/YR: CPT | Mod: HCNC,CPTII,S$GLB, | Performed by: NURSE PRACTITIONER

## 2024-06-20 PROCEDURE — 1160F RVW MEDS BY RX/DR IN RCRD: CPT | Mod: HCNC,CPTII,S$GLB, | Performed by: NURSE PRACTITIONER

## 2024-06-20 PROCEDURE — 3078F DIAST BP <80 MM HG: CPT | Mod: HCNC,CPTII,S$GLB, | Performed by: NURSE PRACTITIONER

## 2024-06-20 PROCEDURE — 3288F FALL RISK ASSESSMENT DOCD: CPT | Mod: HCNC,CPTII,S$GLB, | Performed by: NURSE PRACTITIONER

## 2024-06-20 PROCEDURE — 1125F AMNT PAIN NOTED PAIN PRSNT: CPT | Mod: HCNC,CPTII,S$GLB, | Performed by: NURSE PRACTITIONER

## 2024-06-20 PROCEDURE — 99999 PR PBB SHADOW E&M-EST. PATIENT-LVL V: CPT | Mod: PBBFAC,HCNC,, | Performed by: NURSE PRACTITIONER

## 2024-06-20 RX ORDER — HYDROCODONE BITARTRATE AND ACETAMINOPHEN 10; 325 MG/1; MG/1
1 TABLET ORAL DAILY
Qty: 30 TABLET | Refills: 0 | Status: SHIPPED | OUTPATIENT
Start: 2024-08-16 | End: 2024-09-15

## 2024-06-20 RX ORDER — VALACYCLOVIR HYDROCHLORIDE 1 G/1
1000 TABLET, FILM COATED ORAL 3 TIMES DAILY
Qty: 21 TABLET | Refills: 0 | Status: SHIPPED | OUTPATIENT
Start: 2024-06-20 | End: 2024-06-27

## 2024-06-20 RX ORDER — TRIAMCINOLONE ACETONIDE 1 MG/G
OINTMENT TOPICAL 2 TIMES DAILY
Qty: 80 G | Refills: 0 | Status: SHIPPED | OUTPATIENT
Start: 2024-06-20

## 2024-06-20 RX ORDER — HYDROCODONE BITARTRATE AND ACETAMINOPHEN 10; 325 MG/1; MG/1
1 TABLET ORAL DAILY
Qty: 30 TABLET | Refills: 0 | Status: SHIPPED | OUTPATIENT
Start: 2024-09-16 | End: 2024-10-16

## 2024-06-20 RX ORDER — HYDROCODONE BITARTRATE AND ACETAMINOPHEN 10; 325 MG/1; MG/1
1 TABLET ORAL DAILY
Qty: 30 TABLET | Refills: 0 | Status: SHIPPED | OUTPATIENT
Start: 2024-07-16 | End: 2024-08-15

## 2024-06-20 RX ORDER — LEVOTHYROXINE SODIUM 150 UG/1
150 TABLET ORAL
Qty: 30 TABLET | Refills: 3 | Status: SHIPPED | OUTPATIENT
Start: 2024-06-20

## 2024-06-20 RX ORDER — HYDROXYZINE HYDROCHLORIDE 25 MG/1
25 TABLET, FILM COATED ORAL 3 TIMES DAILY PRN
Qty: 60 TABLET | Refills: 1 | Status: SHIPPED | OUTPATIENT
Start: 2024-06-20

## 2024-06-20 NOTE — PROGRESS NOTES
Subjective:       Patient ID: Edie Hernandez is a 68 y.o. female.    Chief Complaint: Follow-up     HPI   67 y/o female patient with medical problems listed below presents for 3 months follow up of chronic back pain and refill of Norco which she takes 10 mg once a day as needed. States had back spine surgery done a year ago and hip surgery in 10/2023. She states was seen by Dr. Dejesus Neurology and started with marihuana gummy and vape which helps with pain and sleeps. States she sees Dr. Dejesus q 3 months.      Patient states currently takes Metformin  mg 1 tab once a day instead of taking 2 tabs bid daily since a month ago due to lower home FBS than usual. Reports home FBS ranges b/w 130s (a few readings were 100s). Denies hypoglycemic symptoms. She states continued taking Ozempic 1 mg weekly.     On questioning regarding simvastatin, patient states unsure of if she takes it.     She states has had skin itching around right bra line a month ago and noted skin rash a few weeks ago. States feels itchy and burning. States had shingles on left shoulder 6 months ago and states the symptoms are similar to when she had the shingle rash. She currently uses Calamine lotion with minimal relief. Denies recent new product use or outdoor activities.      Continuous use of opioids follow-up:  Current medication and dosing:  norco 10 1 x day  Supply:  90 day supply  Side effects: none  Pain controlled: yes  Medication compliance: yes  DPS checked today: today, +marihuana gummy and vape  UDS: 8/23 +hydrocordone, negative for marihuana   pain contract signed: 8/2023    Labs reviewed- TB elevated 1.5 with normal AST/ALT, TSH low with normal free T4    The 10-year ASCVD risk score (Bob BIRMINGHAM, et al., 2019) is: 10.2%    Values used to calculate the score:      Age: 68 years      Sex: Female      Is Non- : No      Diabetic: Yes      Tobacco smoker: No      Systolic Blood Pressure: 114 mmHg      Is  BP treated: No      HDL Cholesterol: 55 mg/dL      Total Cholesterol: 191 mg/dL      Patient Active Problem List   Diagnosis    GERD (gastroesophageal reflux disease)    Chronic back pain greater than 3 months duration    Environmental allergies    Fibromyalgia    B12 nutritional deficiency    CLINT (obstructive sleep apnea)    Chronic sinusitis    Asthma    Hypothyroidism (acquired)    Nausea    Night sweats    Tinnitus, bilateral    Frequent UTI    Hyperlipidemia associated with type 2 diabetes mellitus    Colon polyp, hyperplastic    Interstitial cystitis    Hemangioma    Wart    Globus sensation    DDD (degenerative disc disease), lumbar    Obesity (BMI 30-39.9)    Edema    Mixed incontinence    Continuous opioid dependence- contract 10/18/17-failed uds for norco, + 2/18    Type 2 diabetes mellitus    History of colon polyps    Lumbar radiculitis    Lumbar radiculopathy    Lumbar spondylosis    Sacroiliitis    Lumbar stenosis with neurogenic claudication    Urinary retention    S/P lumbar fusion    Impaired flexibility of lower extremity    Depression, recurrent      Review of patient's allergies indicates:   Allergen Reactions    Codeine Nausea And Vomiting     Other reaction(s): Nausea, dizziness     Past Surgical History:   Procedure Laterality Date    ANTERIOR LUMBAR INTERBODY FUSION (ALIF) N/A 11/8/2022    Procedure: FUSION, SPINE, LUMBAR, ALIF L3-4, L4-5;  Surgeon: Everton Godinez MD;  Location: [facility] OR;  Service: Orthopedics;  Laterality: N/A;  [facility], [employer], DR SANDI ENRIQUEZ, CO-SURGEON    COLONOSCOPY  02/14/2013    Dr. Vogel: repeat in 5-10 years    COLONOSCOPY W/ BIOPSIES AND POLYPECTOMY  02/2013    hyperplastic, recheck 5-10 years    CYSTOSCOPY WITH HYDRODISTENSION OF BLADDER N/A 9/21/2022    Procedure: CYSTOSCOPY, WITH BLADDER HYDRODISTENSION;  Surgeon: Keely Chavez Jr., MD;  Location: [facility] OR;  Service: Urology;  Laterality: N/A;    EXTERNAL EAR SURGERY      FUSION, SPINE, LUMBAR, POSTERIOR  APPROACH N/A 11/8/2022    Procedure: FUSION,SPINE,LUMBAR,POSTERIOR APPROACH L3-4, L4-5;  Surgeon: Everton Godinez MD;  Location: [facility] OR;  Service: Orthopedics;  Laterality: N/A;  [facility], [employer], DR SANDI ENRIQUEZ, CO-SURGEON    HYSTERECTOMY      INJECTION OF ANESTHETIC AGENT AROUND MEDIAL BRANCH NERVES INNERVATING LUMBAR FACET JOINT Bilateral 8/3/2020    Procedure: Block-nerve-medial branch-lumbar left L2, L3, L4, L5;  Surgeon: Dejan Simmons MD;  Location: [facility] OR;  Service: Pain Management;  Laterality: Bilateral;    INJECTION OF ANESTHETIC AGENT AROUND MEDIAL BRANCH NERVES INNERVATING LUMBAR FACET JOINT Left 8/13/2020    Procedure: Block-nerve-medial branch-lumbar.  L2, L3, L4, and L5;  Surgeon: eDjan Simmons MD;  Location: [facility] OR;  Service: Pain Management;  Laterality: Left;    MINIMALLY INVASIVE FUSION OF SPINE Left 10/12/2021    Procedure: FUSION, SPINE, MINIMALLY INVASIVE;  Surgeon: Everton Godinez MD;  Location: [facility] OR;  Service: Orthopedics;  Laterality: Left;  SI-Bone    RADIOFREQUENCY ABLATION OF LUMBAR MEDIAL BRANCH NERVE AT SINGLE LEVEL Left 8/27/2020    Procedure: Radiofrequency Ablation, Nerve, Spinal, Lumbar, Medial Branch,  L2, L3, L4, L5;  Surgeon: Dejan Simmons MD;  Location: [facility] OR;  Service: Pain Management;  Laterality: Left;  L2,l3,l4,l5    THROAT SURGERY      for CLINT    TRANSFORAMINAL EPIDURAL INJECTION OF STEROID Left 3/6/2020    Procedure: Injection,steroid,epidural,transforaminal approach;  Surgeon: Harmeet Zapata MD;  Location: [facility] OR;  Service: Pain Management;  Laterality: Left;  L4-L5    TRANSFORAMINAL EPIDURAL INJECTION OF STEROID Left 6/10/2020    Procedure: Injection,steroid,epidural,transforaminal approach.L4 and L5;  Surgeon: Dejan Simmons MD;  Location: [facility] OR;  Service: Pain Management;  Laterality: Left;    TRANSFORAMINAL EPIDURAL INJECTION OF STEROID Left 7/6/2020    Procedure: Injection,steroid,epidural,transforaminal approach. left L4 and L5;  Surgeon: Dejan Simmons MD;   [facility]  Service: Pain Management;  Laterality: Left;    TRANSFORAMINAL EPIDURAL INJECTION OF STEROID Left 11/12/2021    Procedure: Injection,steroid,epidural,transforaminal approach Left L5-S1;  Surgeon: Dejan Simmons MD;  [facility];  Service: Pain Management;  Laterality: Left;    UPPER GASTROINTESTINAL ENDOSCOPY          Current Outpatient Medications:     azelastine (ASTELIN) 137 mcg (0.1 %) nasal spray, 1 spray (137 mcg total) by Nasal route 2 (two) times daily as needed for Rhinitis., Disp: 30 mL, Rfl: 5    clobetasol (TEMOVATE) 0.05 % cream, Apply 1 application topically 2 (two) times daily. Apply to affected area, Disp: 30 g, Rfl: 2    cyanocobalamin (VITAMIN B-12) 100 MCG tablet, Take 100 mcg by mouth once daily., Disp: , Rfl:     cyclobenzaprine (FLEXERIL) 5 MG tablet, TAKE 1 TO 2 TABLETS BY MOUTH NIGHTLY AT BEDTIME AS NEEDED FOR PAIN, Disp: 90 tablet, Rfl: PRN    diclofenac sodium (VOLTAREN) 1 % Gel, Apply 2 g topically 4 (four) times daily., Disp: 450 g, Rfl: prn    ergocalciferol (ERGOCALCIFEROL) 50,000 unit Cap, TAKE 1 CAPSULE EVERY 7 DAYS, Disp: 12 capsule, Rfl: 3    EScitalopram oxalate (LEXAPRO) 10 MG tablet, Take 1 tablet (10 mg total) by mouth once daily., Disp: 90 tablet, Rfl: 3    estradioL (ESTRACE) 0.01 % (0.1 mg/gram) vaginal cream, Apply 1 fingertipful to vaginal area nightly x 2 weeks then use on MWF, Disp: 42.5 g, Rfl: 3    fluticasone propionate (FLONASE) 50 mcg/actuation nasal spray, 1 spray (50 mcg total) by Each Nostril route daily as needed for Allergies., Disp: 16 g, Rfl: prn    HYDROcodone-acetaminophen (NORCO)  mg per tablet, Take 1 tablet by mouth once daily., Disp: 30 tablet, Rfl: 0    lancets (ACCU-CHEK MULTICLIX LANCET) Misc, Test 2 x day, Disp: 200 each, Rfl: 3    losartan (COZAAR) 50 MG tablet, TAKE ONE TABLET (50 MG TOTAL) BY MOUTH ONCE DAILY, Disp: 90 tablet, Rfl: 3    melatonin (MELATIN), Take by mouth every evening., Disp: , Rfl:     metFORMIN  (GLUCOPHAGE-XR) 500 MG ER 24hr tablet, Take 2 tablets (1,000 mg total) by mouth 2 (two) times daily with meals., Disp: 360 tablet, Rfl: 3    methylphenidate (RITALIN LA) 40 MG 24 hr capsule, Take 40 mg by mouth daily as needed. Not on at this time, Disp: , Rfl:     methylphenidate HCl (RITALIN) 10 MG tablet, , Disp: , Rfl:     mupirocin (BACTROBAN) 2 % ointment, Apply topically 3 (three) times daily., Disp: 60 g, Rfl: PRN    mupirocin (BACTROBAN) 2 % ointment, Apply topically 3 (three) times daily., Disp: 30 g, Rfl: 0    ONETOUCH ULTRA BLUE TEST STRIP Strp, USE TO TEST BLOOD SUGAR, Disp: 100 strip, Rfl: 3    oxybutynin (DITROPAN XL) 15 MG TR24, Take 1 tablet (15 mg total) by mouth once daily., Disp: 90 tablet, Rfl: 3    pantoprazole (PROTONIX) 40 MG tablet, TAKE ONE TABLET (40MG TOTAL) BY MOUTH ONCE DAILY, Disp: 90 tablet, Rfl: 3    pregabalin (LYRICA) 100 MG capsule, TAKE 1 CAPSULE BY MOUTH EVERY EVENING, Disp: 30 capsule, Rfl: 2    semaglutide (OZEMPIC) 1 mg/dose (4 mg/3 mL), Inject 1 mg into the skin every 7 days., Disp: 9 mL, Rfl: 3    simvastatin (ZOCOR) 10 MG tablet, Take 10 mg by mouth every evening., Disp: , Rfl:     blood-glucose meter kit, Use as instructed, Disp: 1 each, Rfl: 0    furosemide (LASIX) 20 MG tablet, Take 1 tablet (20 mg total) by mouth daily as needed (leg swelling)., Disp: 60 tablet, Rfl: 0    hydrOXYzine HCL (ATARAX) 25 MG tablet, Take 1 tablet (25 mg total) by mouth 3 (three) times daily as needed for Itching., Disp: 60 tablet, Rfl: 1    levothyroxine (SYNTHROID) 150 MCG tablet, Take 1 tablet (150 mcg total) by mouth before breakfast., Disp: 30 tablet, Rfl: 3    triamcinolone acetonide 0.1% (KENALOG) 0.1 % ointment, Apply topically 2 (two) times daily., Disp: 80 g, Rfl: 0    valACYclovir (VALTREX) 1000 MG tablet, Take 1 tablet (1,000 mg total) by mouth 3 (three) times daily. for 7 days, Disp: 21 tablet, Rfl: 0    Current Facility-Administered Medications:     LIDOcaine HCL 20 mg/ml (2%)  "injection 20 mL, 20 mL, Other, 1 time in Clinic/HOD, LUIS Dominguez, LYNETTE    Facility-Administered Medications Ordered in Other Visits:     lactated ringers infusion, , Intravenous, Continuous, Dejan Simmons MD, Stopped at 07/06/20 1008    Review of Systems   Constitutional:  Negative for chills and fever.   Respiratory:  Negative for cough and shortness of breath.    Cardiovascular:  Negative for chest pain and palpitations.   Gastrointestinal:  Negative for abdominal pain.   Musculoskeletal:  Positive for back pain.   Skin:  Positive for color change and rash.   Neurological:  Negative for dizziness and headaches.       Objective:   /60 (BP Location: Right arm, Patient Position: Sitting, BP Method: Medium (Manual))   Pulse 64   Temp 98.4 °F (36.9 °C) (Oral)   Ht 5' 5" (1.651 m)   Wt 94.9 kg (209 lb 3.5 oz)   SpO2 98%   BMI 34.82 kg/m²       Physical Exam  Constitutional:       General: She is not in acute distress.     Appearance: Normal appearance.   HENT:      Head: Atraumatic.   Cardiovascular:      Rate and Rhythm: Normal rate and regular rhythm.      Pulses: Normal pulses.      Heart sounds: Normal heart sounds.   Pulmonary:      Effort: Pulmonary effort is normal.      Breath sounds: Normal breath sounds.   Abdominal:      General: Abdomen is flat. Bowel sounds are normal.      Palpations: Abdomen is soft.   Skin:     Comments: +erythematous maculopapular skin rash in right lateral back but no vesicles    Neurological:      Mental Status: She is oriented to person, place, and time.           Assessment:       1. Chronic back pain greater than 3 months duration    2. Hypothyroidism (acquired)    3. Hyperlipidemia associated with type 2 diabetes mellitus    4. Gastroesophageal reflux disease without esophagitis    5. B12 nutritional deficiency    6. Type 2 diabetes mellitus with chronic kidney disease, without long-term current use of insulin, unspecified CKD stage    7. Skin rash      "   Plan:       1. Hypothyroidism (acquired)  - Repeated TSH is low to 0.010  - Will decreased to    levothyroxine (SYNTHROID) 150 MCG tablet; Take 1 tablet (150 mcg total) by mouth before breakfast.  Dispense: 30 tablet; Refill: 3  - Repeat TFT in 3 months   - T4, Free; Future  - TSH; Future  - CBC Auto Differential; Future  - Comprehensive Metabolic Panel; Future    2. Hyperlipidemia associated with type 2 diabetes mellitus  - Lipid Panel; Future    3. Type 2 diabetes mellitus with chronic kidney disease, without long-term current use of insulin, unspecified CKD stage  - Last A1C 5.7 in 2/2024  - Continue with current regimen  - Hypoglycemia precautions discussed   - Hemoglobin A1C; Future  - Microalbumin/Creatinine Ratio, Urine; Future    4. Skin rash  Likely dermatitis contact vs other pathology vs shingles   - hydrOXYzine HCL (ATARAX) 25 MG tablet; Take 1 tablet (25 mg total) by mouth 3 (three) times daily as needed for Itching.  Dispense: 60 tablet; Refill: 1  - triamcinolone acetonide 0.1% (KENALOG) 0.1 % ointment; Apply topically 2 (two) times daily.  Dispense: 80 g; Refill: 0  - valACYclovir (VALTREX) 1000 MG tablet; Take 1 tablet (1,000 mg total) by mouth 3 (three) times daily. for 7 days  Dispense: 21 tablet; Refill: 0    5. Chronic back pain greater than 3 months duration  - Will submit Norco refill request to pcp dr. Eugene     Advised to bring all bottles of medications at each clinic visit   Patient with be reevaluated in  2 weeks to check skin rash  or sooner reena Gambino NP

## 2024-07-16 DIAGNOSIS — E11.65 UNCONTROLLED TYPE 2 DIABETES MELLITUS WITH HYPERGLYCEMIA: ICD-10-CM

## 2024-07-16 RX ORDER — METFORMIN HYDROCHLORIDE 500 MG/1
1000 TABLET, EXTENDED RELEASE ORAL 2 TIMES DAILY WITH MEALS
Qty: 360 TABLET | Refills: 0 | Status: SHIPPED | OUTPATIENT
Start: 2024-07-16

## 2024-07-16 RX ORDER — LOSARTAN POTASSIUM 50 MG/1
TABLET ORAL
Qty: 90 TABLET | Refills: 0 | Status: SHIPPED | OUTPATIENT
Start: 2024-07-16

## 2024-07-16 NOTE — TELEPHONE ENCOUNTER
Refill Decision Note   Edie Hernandez  is requesting a refill authorization.  Brief Assessment and Rationale for Refill:  Approve     Medication Therapy Plan:         Pharmacist review requested: Yes   Extended chart review required: Yes   Comments:     Note composed:11:58 AM 07/16/2024

## 2024-07-16 NOTE — TELEPHONE ENCOUNTER
No care due was identified.  [facility] Embedded Care Due Messages. Reference number: [id number].   7/16/2024 9:14:24 AM CDT

## 2024-07-31 DIAGNOSIS — E11.9 TYPE 2 DIABETES MELLITUS WITHOUT COMPLICATION, UNSPECIFIED WHETHER LONG TERM INSULIN USE: ICD-10-CM

## 2024-08-20 ENCOUNTER — PATIENT MESSAGE (OUTPATIENT)
Dept: ADMINISTRATIVE | Facility: HOSPITAL | Age: 69
End: 2024-08-20
Payer: MEDICARE

## 2024-08-28 DIAGNOSIS — E11.22 TYPE 2 DIABETES MELLITUS WITH CHRONIC KIDNEY DISEASE, WITHOUT LONG-TERM CURRENT USE OF INSULIN, UNSPECIFIED CKD STAGE: ICD-10-CM

## 2024-08-28 DIAGNOSIS — R45.89 DEPRESSED MOOD: ICD-10-CM

## 2024-08-28 DIAGNOSIS — E11.65 UNCONTROLLED TYPE 2 DIABETES MELLITUS WITH HYPERGLYCEMIA: ICD-10-CM

## 2024-08-28 RX ORDER — LOSARTAN POTASSIUM 50 MG/1
TABLET ORAL
Qty: 90 TABLET | Refills: 0 | Status: SHIPPED | OUTPATIENT
Start: 2024-08-28

## 2024-08-28 RX ORDER — METFORMIN HYDROCHLORIDE 500 MG/1
1000 TABLET, EXTENDED RELEASE ORAL
Qty: 360 TABLET | Refills: 3 | Status: SHIPPED | OUTPATIENT
Start: 2024-08-28

## 2024-08-28 RX ORDER — SEMAGLUTIDE 1.34 MG/ML
1 INJECTION, SOLUTION SUBCUTANEOUS
Qty: 9 EACH | Refills: 3 | Status: SHIPPED | OUTPATIENT
Start: 2024-08-28

## 2024-08-28 RX ORDER — ESCITALOPRAM OXALATE 10 MG/1
10 TABLET ORAL
Qty: 90 TABLET | Refills: 0 | Status: SHIPPED | OUTPATIENT
Start: 2024-08-28

## 2024-08-28 NOTE — TELEPHONE ENCOUNTER
Care Due:                  Date            Visit Type   Department     Provider  --------------------------------------------------------------------------------                                EP -                              PRIMARY      [facility]  Last Visit: 08-      CARE ([facility])   GHASSAN Eugene                              EP -                              PRIMARY      [facility]  Next Visit: 09-      CARE ([facility])   MEDICINE       Lydia Eugene                                                            Last  Test          Frequency    Reason                     Performed    Due Date  --------------------------------------------------------------------------------    HBA1C.......  6 months...  metFORMIN, semaglutide...  02- 08-    Vitamin D...  12 months..  ergocalciferol...........  02- 02-    [employer] Embedded Care Due Messages. Reference number: [id number].   8/28/2024 1:47:21 AM CDT

## 2024-08-28 NOTE — TELEPHONE ENCOUNTER
Refill Routing Note   Medication(s) are not appropriate for processing by [facility] for the following reason(s):        Required labs outdated    [facility] action(s):  Approve  Defer   Requires labs : Yes      Medication Therapy Plan: vitamin D lab needed      Appointments  past 12m or future 3m with PCP    Date Provider   Last Visit   8/24/2023 Lydia Eugene MD   Next Visit   9/19/2024 Lydia Eugene MD   ED visits in past 90 days: 0        Note composed:10:22 AM 08/28/2024

## 2024-09-14 DIAGNOSIS — E03.9 HYPOTHYROIDISM (ACQUIRED): ICD-10-CM

## 2024-09-16 ENCOUNTER — LAB VISIT (OUTPATIENT)
Dept: LAB | Facility: HOSPITAL | Age: 69
End: 2024-09-16
Attending: NURSE PRACTITIONER
Payer: MEDICARE

## 2024-09-16 DIAGNOSIS — E78.5 HYPERLIPIDEMIA ASSOCIATED WITH TYPE 2 DIABETES MELLITUS: ICD-10-CM

## 2024-09-16 DIAGNOSIS — E03.9 HYPOTHYROIDISM (ACQUIRED): ICD-10-CM

## 2024-09-16 DIAGNOSIS — E11.22 TYPE 2 DIABETES MELLITUS WITH CHRONIC KIDNEY DISEASE, WITHOUT LONG-TERM CURRENT USE OF INSULIN, UNSPECIFIED CKD STAGE: ICD-10-CM

## 2024-09-16 DIAGNOSIS — E11.69 HYPERLIPIDEMIA ASSOCIATED WITH TYPE 2 DIABETES MELLITUS: ICD-10-CM

## 2024-09-16 LAB
ALBUMIN SERPL BCP-MCNC: 3.3 G/DL (ref 3.5–5.2)
ALBUMIN/CREAT UR: 3.7 UG/MG (ref 0–30)
ALP SERPL-CCNC: 74 U/L (ref 55–135)
ALT SERPL W/O P-5'-P-CCNC: 10 U/L (ref 10–44)
ANION GAP SERPL CALC-SCNC: 10 MMOL/L (ref 8–16)
AST SERPL-CCNC: 12 U/L (ref 10–40)
BASOPHILS # BLD AUTO: 0.04 K/UL (ref 0–0.2)
BASOPHILS NFR BLD: 0.5 % (ref 0–1.9)
BILIRUB SERPL-MCNC: 1.2 MG/DL (ref 0.1–1)
BUN SERPL-MCNC: 16 MG/DL (ref 8–23)
CALCIUM SERPL-MCNC: 9.4 MG/DL (ref 8.7–10.5)
CHLORIDE SERPL-SCNC: 107 MMOL/L (ref 95–110)
CHOLEST SERPL-MCNC: 214 MG/DL (ref 120–199)
CHOLEST/HDLC SERPL: 4.8 {RATIO} (ref 2–5)
CO2 SERPL-SCNC: 24 MMOL/L (ref 23–29)
CREAT SERPL-MCNC: 0.8 MG/DL (ref 0.5–1.4)
CREAT UR-MCNC: 162 MG/DL (ref 15–325)
DIFFERENTIAL METHOD BLD: NORMAL
EOSINOPHIL # BLD AUTO: 0.4 K/UL (ref 0–0.5)
EOSINOPHIL NFR BLD: 4.7 % (ref 0–8)
ERYTHROCYTE [DISTWIDTH] IN BLOOD BY AUTOMATED COUNT: 13.7 % (ref 11.5–14.5)
EST. GFR  (NO RACE VARIABLE): >60 ML/MIN/1.73 M^2
ESTIMATED AVG GLUCOSE: 100 MG/DL (ref 68–131)
GLUCOSE SERPL-MCNC: 84 MG/DL (ref 70–110)
HBA1C MFR BLD: 5.1 % (ref 4–5.6)
HCT VFR BLD AUTO: 43.1 % (ref 37–48.5)
HDLC SERPL-MCNC: 45 MG/DL (ref 40–75)
HDLC SERPL: 21 % (ref 20–50)
HGB BLD-MCNC: 14 G/DL (ref 12–16)
IMM GRANULOCYTES # BLD AUTO: 0.04 K/UL (ref 0–0.04)
IMM GRANULOCYTES NFR BLD AUTO: 0.5 % (ref 0–0.5)
LDLC SERPL CALC-MCNC: 140.6 MG/DL (ref 63–159)
LYMPHOCYTES # BLD AUTO: 1.6 K/UL (ref 1–4.8)
LYMPHOCYTES NFR BLD: 18 % (ref 18–48)
MCH RBC QN AUTO: 30.3 PG (ref 27–31)
MCHC RBC AUTO-ENTMCNC: 32.5 G/DL (ref 32–36)
MCV RBC AUTO: 93 FL (ref 82–98)
MICROALBUMIN UR DL<=1MG/L-MCNC: 6 UG/ML
MONOCYTES # BLD AUTO: 0.5 K/UL (ref 0.3–1)
MONOCYTES NFR BLD: 6 % (ref 4–15)
NEUTROPHILS # BLD AUTO: 6.2 K/UL (ref 1.8–7.7)
NEUTROPHILS NFR BLD: 70.3 % (ref 38–73)
NONHDLC SERPL-MCNC: 169 MG/DL
NRBC BLD-RTO: 0 /100 WBC
PLATELET # BLD AUTO: 284 K/UL (ref 150–450)
PMV BLD AUTO: 10.1 FL (ref 9.2–12.9)
POTASSIUM SERPL-SCNC: 4.1 MMOL/L (ref 3.5–5.1)
PROT SERPL-MCNC: 6.3 G/DL (ref 6–8.4)
RBC # BLD AUTO: 4.62 M/UL (ref 4–5.4)
SODIUM SERPL-SCNC: 141 MMOL/L (ref 136–145)
T4 FREE SERPL-MCNC: 1.07 NG/DL (ref 0.71–1.51)
TRIGL SERPL-MCNC: 142 MG/DL (ref 30–150)
TSH SERPL DL<=0.005 MIU/L-ACNC: 0.04 UIU/ML (ref 0.4–4)
WBC # BLD AUTO: 8.77 K/UL (ref 3.9–12.7)

## 2024-09-16 PROCEDURE — 84443 ASSAY THYROID STIM HORMONE: CPT | Mod: HCNC | Performed by: NURSE PRACTITIONER

## 2024-09-16 PROCEDURE — 82570 ASSAY OF URINE CREATININE: CPT | Mod: HCNC | Performed by: NURSE PRACTITIONER

## 2024-09-16 PROCEDURE — 85025 COMPLETE CBC W/AUTO DIFF WBC: CPT | Mod: HCNC | Performed by: NURSE PRACTITIONER

## 2024-09-16 PROCEDURE — 80053 COMPREHEN METABOLIC PANEL: CPT | Mod: HCNC | Performed by: NURSE PRACTITIONER

## 2024-09-16 PROCEDURE — 82043 UR ALBUMIN QUANTITATIVE: CPT | Mod: HCNC | Performed by: NURSE PRACTITIONER

## 2024-09-16 PROCEDURE — 83036 HEMOGLOBIN GLYCOSYLATED A1C: CPT | Mod: HCNC | Performed by: NURSE PRACTITIONER

## 2024-09-16 PROCEDURE — 80061 LIPID PANEL: CPT | Mod: HCNC | Performed by: NURSE PRACTITIONER

## 2024-09-16 PROCEDURE — 84439 ASSAY OF FREE THYROXINE: CPT | Mod: HCNC | Performed by: NURSE PRACTITIONER

## 2024-09-16 RX ORDER — LEVOTHYROXINE SODIUM 150 UG/1
150 TABLET ORAL
Qty: 90 TABLET | Refills: 3 | Status: SHIPPED | OUTPATIENT
Start: 2024-09-16 | End: 2024-09-19

## 2024-09-16 NOTE — TELEPHONE ENCOUNTER
LOV 6/20/24  LAB 6/13/24  Next OV 9/19/24      Patient comment: I don't have many left. I thought there were more. I can't find any remaining pills.

## 2024-09-18 ENCOUNTER — PATIENT MESSAGE (OUTPATIENT)
Dept: FAMILY MEDICINE | Facility: CLINIC | Age: 69
End: 2024-09-18
Payer: MEDICARE

## 2024-09-19 ENCOUNTER — OFFICE VISIT (OUTPATIENT)
Dept: FAMILY MEDICINE | Facility: CLINIC | Age: 69
End: 2024-09-19
Payer: MEDICARE

## 2024-09-19 VITALS
WEIGHT: 201.25 LBS | TEMPERATURE: 98 F | DIASTOLIC BLOOD PRESSURE: 70 MMHG | HEIGHT: 65 IN | SYSTOLIC BLOOD PRESSURE: 110 MMHG | BODY MASS INDEX: 33.53 KG/M2 | OXYGEN SATURATION: 98 % | RESPIRATION RATE: 18 BRPM | HEART RATE: 60 BPM

## 2024-09-19 DIAGNOSIS — E11.69 HYPERLIPIDEMIA ASSOCIATED WITH TYPE 2 DIABETES MELLITUS: ICD-10-CM

## 2024-09-19 DIAGNOSIS — Z01.00 DIABETIC EYE EXAM: ICD-10-CM

## 2024-09-19 DIAGNOSIS — E55.9 VITAMIN D DEFICIENCY: ICD-10-CM

## 2024-09-19 DIAGNOSIS — Z12.31 ENCOUNTER FOR SCREENING MAMMOGRAM FOR MALIGNANT NEOPLASM OF BREAST: ICD-10-CM

## 2024-09-19 DIAGNOSIS — E66.9 OBESITY (BMI 30-39.9): ICD-10-CM

## 2024-09-19 DIAGNOSIS — E11.22 TYPE 2 DIABETES MELLITUS WITH CHRONIC KIDNEY DISEASE, WITHOUT LONG-TERM CURRENT USE OF INSULIN, UNSPECIFIED CKD STAGE: ICD-10-CM

## 2024-09-19 DIAGNOSIS — M54.9 CHRONIC BACK PAIN GREATER THAN 3 MONTHS DURATION: Primary | ICD-10-CM

## 2024-09-19 DIAGNOSIS — E78.5 HYPERLIPIDEMIA ASSOCIATED WITH TYPE 2 DIABETES MELLITUS: ICD-10-CM

## 2024-09-19 DIAGNOSIS — E11.9 DIABETIC EYE EXAM: ICD-10-CM

## 2024-09-19 DIAGNOSIS — G89.29 CHRONIC BACK PAIN GREATER THAN 3 MONTHS DURATION: Primary | ICD-10-CM

## 2024-09-19 DIAGNOSIS — Z79.891 LONG TERM (CURRENT) USE OF OPIATE ANALGESIC: ICD-10-CM

## 2024-09-19 DIAGNOSIS — E03.9 HYPOTHYROIDISM (ACQUIRED): ICD-10-CM

## 2024-09-19 PROCEDURE — 99999 PR PBB SHADOW E&M-EST. PATIENT-LVL IV: CPT | Mod: PBBFAC,HCNC,, | Performed by: FAMILY MEDICINE

## 2024-09-19 PROCEDURE — 80347 BENZODIAZEPINES 13 OR MORE: CPT | Mod: HCNC | Performed by: FAMILY MEDICINE

## 2024-09-19 RX ORDER — HYDROCODONE BITARTRATE AND ACETAMINOPHEN 10; 325 MG/1; MG/1
1 TABLET ORAL DAILY
Qty: 30 TABLET | Refills: 0 | Status: SHIPPED | OUTPATIENT
Start: 2024-11-15

## 2024-09-19 RX ORDER — LEVOTHYROXINE SODIUM 137 UG/1
137 TABLET ORAL
Qty: 90 TABLET | Refills: 3 | Status: SHIPPED | OUTPATIENT
Start: 2024-09-19 | End: 2025-09-19

## 2024-09-19 RX ORDER — SEMAGLUTIDE 2.68 MG/ML
2 INJECTION, SOLUTION SUBCUTANEOUS
Qty: 3 ML | Refills: 5 | Status: SHIPPED | OUTPATIENT
Start: 2024-09-19 | End: 2025-09-19

## 2024-09-19 RX ORDER — HYDROCODONE BITARTRATE AND ACETAMINOPHEN 10; 325 MG/1; MG/1
1 TABLET ORAL DAILY
Qty: 30 TABLET | Refills: 0 | Status: SHIPPED | OUTPATIENT
Start: 2024-10-17 | End: 2024-11-16

## 2024-09-19 RX ORDER — HYDROCODONE BITARTRATE AND ACETAMINOPHEN 10; 325 MG/1; MG/1
1 TABLET ORAL DAILY
Qty: 30 TABLET | Refills: 0 | Status: SHIPPED | OUTPATIENT
Start: 2024-09-19 | End: 2024-10-19

## 2024-09-19 RX ORDER — CYCLOBENZAPRINE HCL 5 MG
TABLET ORAL
Qty: 90 TABLET | Status: SHIPPED | OUTPATIENT
Start: 2024-09-19

## 2024-09-19 RX ORDER — METHYLPREDNISOLONE 4 MG/1
TABLET ORAL
Qty: 21 EACH | Refills: 0 | Status: SHIPPED | OUTPATIENT
Start: 2024-09-19 | End: 2024-10-10

## 2024-09-19 RX ORDER — KETOROLAC TROMETHAMINE 30 MG/ML
60 INJECTION, SOLUTION INTRAMUSCULAR; INTRAVENOUS
Status: COMPLETED | OUTPATIENT
Start: 2024-09-19 | End: 2024-09-19

## 2024-09-19 RX ADMIN — KETOROLAC TROMETHAMINE 60 MG: 30 INJECTION, SOLUTION INTRAMUSCULAR; INTRAVENOUS at 03:09

## 2024-09-19 NOTE — LETTER
2024                                 NAME:Edie Hernandez  : 1955   MRN: [id number]     [facility]  [address] [address]  Phone: [phone]  Fax: [phone]      [facility]  PAIN MANAGEMENT    PAIN MANAGEMENT CONTRACT      My doctor and I have decided that as part of my treatment for chronic pain, I will receive prescriptions for controlled substances. As a patient, I will agree to the following terms in order for my provider to effectively treat my pain and also comply with the rules set forth by [facility] Board of Medical Examiners and Drug Enforcement Agency.     A single physician shall be responsible for prescribing my pain medication.    I understand that in order for me to receive the best possible care, my prescribing doctor needs me to provide a copy of any of my previous copy of any previous medical records,including MRI, office notes, lab results, etc.    I will also provide a full list of ALL of my medications, current dose, and how often I take my medication. I must inform my doctor if I am taking any other medications, particularly sedating medications, such as Valium, Ativan, seizure medications, or psychiatric medications.    I will inform my physician of any current or prior history of abuse or misuse of prescription medication, illegal drugs, or alcohol.    I must not obtain controlled substances (including but not limited to, Xanax, Vicodin,Percocet, Tylenol #3,  ) from any other doctor without first telling my physician at this clinic.    I understand that my physician will assess the efficacy of my treatment with controlled substances and monitor my progress every twelve weeks, unless my physician, in his orher clinical judgment, determines otherwise.    It is my responsibility to keep my appointments. If I miss my scheduled appointment, I will be rescheduled to the first available time slot. I understand that pain  "medications may not be refilled until seen during a scheduled appointment.    I will take my medications only in the directed doses and manner and at the directed time. I will not deviate from my prescribed usage.    I will not combine these prescribed medications with alcohol or recreational medications,including, but not limited to, marijuana.    I will not drive or operate heavy machinery while on this medication.    I will not cut or chew long-acting pain medication.    I must inform my physician of any side effects that I may be experiencing.    If severe sedation (sleepiness) or any other medical emergency relating to my pain medication occurs, I will make contact with my doctors office or seek ER attention immediately.    If my doctor agrees to refill my pain medication by telephone, I will call the office at least 5 business days in advance to request a refill prescription.    Refill prescriptions will not be written in the evenings, weekends, or on holidays.    Each prescription is expected to last at least one month. Refills will not be given early if I"run out early", "lose a prescription", "spill" or "misplace" my medication. I will report stolen medications to the police.    No prescription refills will be given if I have not been seen by my physician in the clinic for 3 months.    It may be necessary for prescriptions to be picked up in person and proof of identification may be required.    I will have my pain medication filled at only one pharmacy.          (pharmacy name/address)     A baseline medication screen may be completed on my first visit and or randomly at other routine clinic visits.    These medications may be harmful to an unborn child. I have been advised to use 2 forms of birth control (at least one barrier, such as condoms) while using these medications. I will inform my doctor immediately if I become pregnant while on this medication.    If I test positive for medications that my " doctor has not prescribed, and/or if I refuse a random medication test, my physician has the right to stop my controlled substance, end his/her relationship with me, and I may be terminated from the clinic.    If at any time I become violent or abusive, verbally or physically, my actions will be considered cause to terminate care from the clinic and discontinuation of pain medications.    I have read and understand the above information. I will, to the best of my ability, adhere to these policies and commitments. I further understand that noncompliance with these policies or demonstration of signs suspicious of medication overuse or abuse, may result in my being discharged as the patient.     I DO HEREBY AGREE AND UNDERSTAND ALL OF THE ABOVE. I HAVE RECEIVED A COPY OF THIS CONTROLLED SUBSTANCE TREATMENT ORIENTATION AND BEHAVIOR AGREEMENT.       Patient Signature:______________________________ Date/Time:________________    Patient Printed Name: Edie Hernandez     Physician Signature:____________________________ Date/Time:________________    Physician Printed Name: Lydia Eugene MD    Witness Signature:_____________________________ Date/Time:________________    Witness Printed Name

## 2024-09-19 NOTE — PROGRESS NOTES
Subjective:       Patient ID: Edie Hernandez is a 69 y.o. female.    Chief Complaint: Follow-up (6MTH F/U)    HPI  Review of Systems   Constitutional:  Negative for fatigue and unexpected weight change.   Respiratory:  Negative for chest tightness and shortness of breath.    Cardiovascular:  Negative for chest pain, palpitations and leg swelling.   Gastrointestinal:  Negative for abdominal pain.   Musculoskeletal:  Negative for arthralgias.   Neurological:  Negative for dizziness, syncope, light-headedness and headaches.       Patient Active Problem List   Diagnosis    GERD (gastroesophageal reflux disease)    Chronic back pain greater than 3 months duration    Environmental allergies    Fibromyalgia    B12 nutritional deficiency    CLINT (obstructive sleep apnea)    Chronic sinusitis    Asthma    Hypothyroidism (acquired)    Nausea    Night sweats    Tinnitus, bilateral    Frequent UTI    Hyperlipidemia associated with type 2 diabetes mellitus    Colon polyp, hyperplastic    Interstitial cystitis    Hemangioma    Wart    Globus sensation    DDD (degenerative disc disease), lumbar    Obesity (BMI 30-39.9)    Edema    Mixed incontinence    Continuous opioid dependence- contract 10/18/17-failed uds for norco, + 2/18    Type 2 diabetes mellitus    History of colon polyps    Lumbar radiculitis    Lumbar radiculopathy    Lumbar spondylosis    Sacroiliitis    Lumbar stenosis with neurogenic claudication    Urinary retention    S/P lumbar fusion    Impaired flexibility of lower extremity    Depression, recurrent     Patient is here for a chronic conditions follow up.    Reviewed labs 9/24  Declines pneumonvax      GI Dr. Mancini colonoscopy 2019 1 polpy- on 5 year surveillance     Pain Continuous use of opioids follow-up:  Current medication and dosing:  norco 10 1 x day  Supply:  90 day supply  Side effects: none  Pain controlled: yes  Medication compliance: yes  DPS checked today: today, no evidence of diversion  UDS:  8/23 + hydrocodone as expected.   2/23 +lyrica and MJ, not for norco. Admits she had tried neighbor's chavez to help her sleep.  Since then she has taken some medical MJ as evidenced on DPS  pain contract signed:8/23  Opioid risk tool done:n/a  Cause of Pain: low back pain      Pain Dr. Dejesus  treating sandeep SI, lumbar spondylosis.Had rizotomy lower back 9/16/24.  Tripped and fell flat on abdomen. Slid. Now severe pain radiating to abd       Neuro C/o right CTS sx acting up.  Last 2 months has been wearing old splint     Urogyn Dr. Dominguez treating urinary frequency     Endocrine Type 2 DM A1c 5.1.  CT coronary score 7/2021 0.  Urine ma nl. On ozempic 1 mg, metformin XR 500mg 2 po bid .highest weight 262 ( 2/23), lowest 209. On zocor ? and ARB   -hypothyroid  Tsh low, t4 normal     GYN mammo neg 9/23  DEXA 8/22 normal     Ortho Dr. Godinez treating Sacroiliitis. lumbar spondylolithesis s/p lumbar fusion 11/22     Phys Med Dr. Simmons treating chronic left sided low back pain.  Doing injections for low back pain which are helping        Pulm/Sleep Dr. Yang-CLINT on daily methylphendiate  Objective:      Physical Exam  Vitals and nursing note reviewed.   Constitutional:       Appearance: She is well-developed.   Cardiovascular:      Rate and Rhythm: Normal rate and regular rhythm.      Heart sounds: Normal heart sounds.   Pulmonary:      Effort: Pulmonary effort is normal.      Breath sounds: Normal breath sounds.   Skin:     General: Skin is warm and dry.   Neurological:      Mental Status: She is alert and oriented to person, place, and time.         Assessment:       1. Chronic back pain greater than 3 months duration    2. Encounter for screening mammogram for malignant neoplasm of breast    3. Diabetic eye exam    4. Type 2 diabetes mellitus with chronic kidney disease, without long-term current use of insulin, unspecified CKD stage    5. Hypothyroidism (acquired)    6. Hyperlipidemia associated with type 2  diabetes mellitus    7. Obesity (BMI 30-39.9)    8. Vitamin D deficiency    9. Long term (current) use of opiate analgesic        Plan:       1. Chronic back pain greater than 3 months duration  Controlled on current medications.  Continue current medications.    - POCT Urine Drug Screen Pain Management  - Pain Clinic Drug Screen  - HYDROcodone-acetaminophen (NORCO)  mg per tablet; Take 1 tablet by mouth once daily.  Dispense: 30 tablet; Refill: 0  - HYDROcodone-acetaminophen (NORCO)  mg per tablet; Take 1 tablet by mouth once daily.  Dispense: 30 tablet; Refill: 0  - HYDROcodone-acetaminophen (NORCO)  mg per tablet; Take 1 tablet by mouth once daily.  Dispense: 30 tablet; Refill: 0  - ketorolac injection 60 mg  - methylPREDNISolone (MEDROL DOSEPACK) 4 mg tablet; use as directed  Dispense: 21 each; Refill: 0  - cyclobenzaprine (FLEXERIL) 5 MG tablet; TAKE 1 TO 2 TABLETS BY MOUTH NIGHTLY AT BEDTIME AS NEEDED FOR PAIN  Dispense: 90 tablet; Refill: PRN    2. Encounter for screening mammogram for malignant neoplasm of breast  Screen and treat as indicated:    - Mammo Digital Screening Bilat w/ [facility]; Future    3. Diabetic eye exam  refer  - Ambulatory referral/consult to Optometry; Future    4. Type 2 diabetes mellitus with chronic kidney disease, without long-term current use of insulin, unspecified CKD stage  Increase to 2mg   - Comprehensive Metabolic Panel; Future  - Hemoglobin A1C; Future  - semaglutide (OZEMPIC) 2 mg/dose (8 mg/3 mL) PnIj; Inject 2 mg into the skin every 7 days.  Dispense: 3 mL; Refill: 5    5. Hypothyroidism (acquired)  Decrease to  - CBC Auto Differential; Future  - TSH; Future  - T4, Free; Future  - levothyroxine (SYNTHROID) 137 MCG Tab tablet; Take 1 tablet (137 mcg total) by mouth before breakfast.  Dispense: 90 tablet; Refill: 3    6. Hyperlipidemia associated with type 2 diabetes mellitus  Controlled on current medications.  Continue current medications.  On zocor  - Lipid  "Panel; Future    7. Obesity (BMI 30-39.9)  Counseled patient on his ideal body weight, health consequences of being obese and current recommendations including weekly exercise and a heart healthy diet.  Current BMI is:Estimated body mass index is 33.49 kg/m² as calculated from the following:    Height as of this encounter: 5' 5" (1.651 m).    Weight as of this encounter: 91.3 kg (201 lb 4.5 oz)..  Patient is aware that ideal BMI < 25 or Weight in (lb) to have BMI = 25: 149.9.      8. Vitamin D deficiency  Cont supplement and Screen and treat as indicated:    - Vitamin D; Future    9. Long term (current) use of opiate analgesic  Counseled patient on habit forming potential of opiates, risk of sedation, respiratory suppression or arrest especially if used in conjunction with benzodiazepines or alcohol.  Counseled patient to avoid driving while on the medication, rules of the pain contract and need for routine 3 month follow ups.  Patient agrees to all aspects of the plan of care and wishes to proceed with therapy.  The plan is always to use alternatives less habit forming, to utilize interventions and therapies that reduce pain as an adjunctive treatment, and limit and wean the dose of narcotics as soon as able and appropriate.      - POCT Urine Drug Screen Pain Management  - Pain Clinic Drug Screen        Time spent with patient: 20 minutes    Patient with be reevaluated in 3 months or sooner prn    Greater than 50% of this visit was spent counseling as described in above documentation:Yes  "

## 2024-09-26 ENCOUNTER — PATIENT MESSAGE (OUTPATIENT)
Dept: OPHTHALMOLOGY | Facility: CLINIC | Age: 69
End: 2024-09-26
Payer: MEDICARE

## 2024-10-02 ENCOUNTER — HOSPITAL ENCOUNTER (OUTPATIENT)
Dept: RADIOLOGY | Facility: CLINIC | Age: 69
Discharge: HOME | End: 2024-10-02
Attending: FAMILY MEDICINE
Payer: MEDICARE

## 2024-10-02 DIAGNOSIS — Z12.31 ENCOUNTER FOR SCREENING MAMMOGRAM FOR MALIGNANT NEOPLASM OF BREAST: ICD-10-CM

## 2024-10-02 PROCEDURE — 77063 BREAST TOMOSYNTHESIS BI: CPT | Mod: 26,HCNC,, | Performed by: RADIOLOGY

## 2024-10-02 PROCEDURE — 77067 SCR MAMMO BI INCL CAD: CPT | Mod: TC,HCNC,PO

## 2024-10-02 PROCEDURE — 77067 SCR MAMMO BI INCL CAD: CPT | Mod: 26,HCNC,, | Performed by: RADIOLOGY

## 2024-10-17 ENCOUNTER — OFFICE VISIT (OUTPATIENT)
Dept: FAMILY MEDICINE | Facility: CLINIC | Age: 69
End: 2024-10-17
Payer: MEDICARE

## 2024-10-17 VITALS
WEIGHT: 200.81 LBS | DIASTOLIC BLOOD PRESSURE: 70 MMHG | BODY MASS INDEX: 33.46 KG/M2 | HEIGHT: 65 IN | SYSTOLIC BLOOD PRESSURE: 110 MMHG | TEMPERATURE: 99 F | OXYGEN SATURATION: 96 % | HEART RATE: 96 BPM

## 2024-10-17 DIAGNOSIS — B00.1 FEVER BLISTER: ICD-10-CM

## 2024-10-17 DIAGNOSIS — L03.116 CELLULITIS OF LEFT LOWER EXTREMITY: Primary | ICD-10-CM

## 2024-10-17 PROCEDURE — 99214 OFFICE O/P EST MOD 30 MIN: CPT | Mod: HCNC,S$GLB,, | Performed by: NURSE PRACTITIONER

## 2024-10-17 PROCEDURE — 1159F MED LIST DOCD IN RCRD: CPT | Mod: HCNC,CPTII,S$GLB, | Performed by: NURSE PRACTITIONER

## 2024-10-17 PROCEDURE — 3061F NEG MICROALBUMINURIA REV: CPT | Mod: HCNC,CPTII,S$GLB, | Performed by: NURSE PRACTITIONER

## 2024-10-17 PROCEDURE — 3288F FALL RISK ASSESSMENT DOCD: CPT | Mod: HCNC,CPTII,S$GLB, | Performed by: NURSE PRACTITIONER

## 2024-10-17 PROCEDURE — 99999 PR PBB SHADOW E&M-EST. PATIENT-LVL V: CPT | Mod: PBBFAC,HCNC,, | Performed by: NURSE PRACTITIONER

## 2024-10-17 PROCEDURE — 1125F AMNT PAIN NOTED PAIN PRSNT: CPT | Mod: HCNC,CPTII,S$GLB, | Performed by: NURSE PRACTITIONER

## 2024-10-17 PROCEDURE — 3066F NEPHROPATHY DOC TX: CPT | Mod: HCNC,CPTII,S$GLB, | Performed by: NURSE PRACTITIONER

## 2024-10-17 PROCEDURE — 1101F PT FALLS ASSESS-DOCD LE1/YR: CPT | Mod: HCNC,CPTII,S$GLB, | Performed by: NURSE PRACTITIONER

## 2024-10-17 PROCEDURE — 3008F BODY MASS INDEX DOCD: CPT | Mod: HCNC,CPTII,S$GLB, | Performed by: NURSE PRACTITIONER

## 2024-10-17 PROCEDURE — 3044F HG A1C LEVEL LT 7.0%: CPT | Mod: HCNC,CPTII,S$GLB, | Performed by: NURSE PRACTITIONER

## 2024-10-17 PROCEDURE — 1160F RVW MEDS BY RX/DR IN RCRD: CPT | Mod: HCNC,CPTII,S$GLB, | Performed by: NURSE PRACTITIONER

## 2024-10-17 PROCEDURE — 3074F SYST BP LT 130 MM HG: CPT | Mod: HCNC,CPTII,S$GLB, | Performed by: NURSE PRACTITIONER

## 2024-10-17 PROCEDURE — 4010F ACE/ARB THERAPY RXD/TAKEN: CPT | Mod: HCNC,CPTII,S$GLB, | Performed by: NURSE PRACTITIONER

## 2024-10-17 PROCEDURE — 3078F DIAST BP <80 MM HG: CPT | Mod: HCNC,CPTII,S$GLB, | Performed by: NURSE PRACTITIONER

## 2024-10-17 RX ORDER — VALACYCLOVIR HYDROCHLORIDE 1 G/1
1000 TABLET, FILM COATED ORAL 2 TIMES DAILY
Qty: 14 TABLET | Refills: 0 | Status: SHIPPED | OUTPATIENT
Start: 2024-10-17 | End: 2024-10-24

## 2024-10-17 RX ORDER — MUPIROCIN 20 MG/G
OINTMENT TOPICAL 3 TIMES DAILY
Qty: 30 G | Refills: 0 | Status: SHIPPED | OUTPATIENT
Start: 2024-10-17

## 2024-10-17 RX ORDER — DOXYCYCLINE 100 MG/1
100 CAPSULE ORAL EVERY 12 HOURS
Qty: 20 CAPSULE | Refills: 0 | Status: SHIPPED | OUTPATIENT
Start: 2024-10-17 | End: 2024-10-27

## 2024-10-17 NOTE — PROGRESS NOTES
Subjective:       Patient ID: Edie Hernandez is a 69 y.o. female.    Chief Complaint: No chief complaint on file.     HPI  68 y/o female patient with medical problems listed below presents for left lower leg infection since last Saturday. She states accidentally ran into a bench last Saturday and hurt her left shin. She denies wound discharges, fever, chills. States she has blisters around b/l upper and lower lips, and feels tingling and burning.     Patient Active Problem List   Diagnosis    GERD (gastroesophageal reflux disease)    Chronic back pain greater than 3 months duration    Environmental allergies    Fibromyalgia    B12 nutritional deficiency    CLINT (obstructive sleep apnea)    Chronic sinusitis    Asthma    Hypothyroidism (acquired)    Nausea    Night sweats    Tinnitus, bilateral    Frequent UTI    Hyperlipidemia associated with type 2 diabetes mellitus    Colon polyp, hyperplastic    Interstitial cystitis    Hemangioma    Wart    Globus sensation    DDD (degenerative disc disease), lumbar    Obesity (BMI 30-39.9)    Edema    Mixed incontinence    Continuous opioid dependence- contract 10/18/17-failed uds for norco, + 2/18    Type 2 diabetes mellitus    History of colon polyps    Lumbar radiculitis    Lumbar radiculopathy    Lumbar spondylosis    Sacroiliitis    Lumbar stenosis with neurogenic claudication    Urinary retention    S/P lumbar fusion    Impaired flexibility of lower extremity    Depression, recurrent      Review of patient's allergies indicates:   Allergen Reactions    Codeine Nausea And Vomiting     Other reaction(s): Nausea, dizziness     Past Surgical History:   Procedure Laterality Date    ANTERIOR LUMBAR INTERBODY FUSION (ALIF) N/A 11/8/2022    Procedure: FUSION, SPINE, LUMBAR, ALIF L3-4, L4-5;  Surgeon: Everton Godinez MD;  Location: [facility];  Service: Orthopedics;  Laterality: N/A;  [facility], [employer], DR SANDI ENRIQUEZ, CO-SURGEON    COLONOSCOPY  02/14/2013    Dr. Vogel: repeat  in 5-10 years    COLONOSCOPY W/ BIOPSIES AND POLYPECTOMY  02/2013    hyperplastic, recheck 5-10 years    CYSTOSCOPY WITH HYDRODISTENSION OF BLADDER N/A 9/21/2022    Procedure: CYSTOSCOPY, WITH BLADDER HYDRODISTENSION;  Surgeon: Keely Chavez Jr., MD;  Location: [facility] OR;  Service: Urology;  Laterality: N/A;    EXTERNAL EAR SURGERY      FUSION, SPINE, LUMBAR, POSTERIOR APPROACH N/A 11/8/2022    Procedure: FUSION,SPINE,LUMBAR,POSTERIOR APPROACH L3-4, L4-5;  Surgeon: Everton Godinez MD;  Location: [facility] OR;  Service: Orthopedics;  Laterality: N/A;  NTI, [employer], DR SANDI ENRIQUEZ, CO-SURGEON    HYSTERECTOMY      INJECTION OF ANESTHETIC AGENT AROUND MEDIAL BRANCH NERVES INNERVATING LUMBAR FACET JOINT Bilateral 8/3/2020    Procedure: Block-nerve-medial branch-lumbar left L2, L3, L4, L5;  Surgeon: Dejan Simmons MD;  Location: [facility] OR;  Service: Pain Management;  Laterality: Bilateral;    INJECTION OF ANESTHETIC AGENT AROUND MEDIAL BRANCH NERVES INNERVATING LUMBAR FACET JOINT Left 8/13/2020    Procedure: Block-nerve-medial branch-lumbar.  L2, L3, L4, and L5;  Surgeon: Dejan Simmons MD;  Location: [facility] OR;  Service: Pain Management;  Laterality: Left;    MINIMALLY INVASIVE FUSION OF SPINE Left 10/12/2021    Procedure: FUSION, SPINE, MINIMALLY INVASIVE;  Surgeon: Everton Godinez MD;  Location: [facility] OR;  Service: Orthopedics;  Laterality: Left;  SI-Bone    RADIOFREQUENCY ABLATION OF LUMBAR MEDIAL BRANCH NERVE AT SINGLE LEVEL Left 8/27/2020    Procedure: Radiofrequency Ablation, Nerve, Spinal, Lumbar, Medial Branch,  L2, L3, L4, L5;  Surgeon: Dejan Simmons MD;  Location: [facility] OR;  Service: Pain Management;  Laterality: Left;  L2,l3,l4,l5    THROAT SURGERY      for CLINT    TRANSFORAMINAL EPIDURAL INJECTION OF STEROID Left 3/6/2020    Procedure: Injection,steroid,epidural,transforaminal approach;  Surgeon: Harmeet Zapata MD;  Location: [facility] OR;  Service: Pain Management;  Laterality: Left;  L4-L5    TRANSFORAMINAL EPIDURAL  INJECTION OF STEROID Left 6/10/2020    Procedure: Injection,steroid,epidural,transforaminal approach.L4 and L5;  Surgeon: Dejan Simmons MD;  Location: [facility] OR;  Service: Pain Management;  Laterality: Left;    TRANSFORAMINAL EPIDURAL INJECTION OF STEROID Left 7/6/2020    Procedure: Injection,steroid,epidural,transforaminal approach. left L4 and L5;  Surgeon: Dejan Simmons MD;  Location: [facility] OR;  Service: Pain Management;  Laterality: Left;    TRANSFORAMINAL EPIDURAL INJECTION OF STEROID Left 11/12/2021    Procedure: Injection,steroid,epidural,transforaminal approach Left L5-S1;  Surgeon: Dejan Simmons MD;  Location: [facility] OR;  Service: Pain Management;  Laterality: Left;    UPPER GASTROINTESTINAL ENDOSCOPY          Current Outpatient Medications:     azelastine (ASTELIN) 137 mcg (0.1 %) nasal spray, 1 spray (137 mcg total) by Nasal route 2 (two) times daily as needed for Rhinitis., Disp: 30 mL, Rfl: 5    clobetasol (TEMOVATE) 0.05 % cream, Apply 1 application topically 2 (two) times daily. Apply to affected area, Disp: 30 g, Rfl: 2    cyanocobalamin (VITAMIN B-12) 100 MCG tablet, Take 100 mcg by mouth once daily., Disp: , Rfl:     cyclobenzaprine (FLEXERIL) 5 MG tablet, TAKE 1 TO 2 TABLETS BY MOUTH NIGHTLY AT BEDTIME AS NEEDED FOR PAIN, Disp: 90 tablet, Rfl: PRN    diclofenac sodium (VOLTAREN) 1 % Gel, Apply 2 g topically 4 (four) times daily., Disp: 450 g, Rfl: prn    ergocalciferol (ERGOCALCIFEROL) 50,000 unit Cap, TAKE 1 CAPSULE EVERY 7 DAYS, Disp: 12 capsule, Rfl: 3    EScitalopram oxalate (LEXAPRO) 10 MG tablet, TAKE 1 TABLET ONE TIME DAILY, Disp: 90 tablet, Rfl: 0    estradioL (ESTRACE) 0.01 % (0.1 mg/gram) vaginal cream, Apply 1 fingertipful to vaginal area nightly x 2 weeks then use on MWF, Disp: 42.5 g, Rfl: 3    fluticasone propionate (FLONASE) 50 mcg/actuation nasal spray, 1 spray (50 mcg total) by Each Nostril route daily as needed for Allergies., Disp: 16 g, Rfl: prn    HYDROcodone-acetaminophen (NORCO)   mg per tablet, Take 1 tablet by mouth once daily., Disp: 30 tablet, Rfl: 0    HYDROcodone-acetaminophen (NORCO)  mg per tablet, Take 1 tablet by mouth once daily., Disp: 30 tablet, Rfl: 0    [START ON 11/15/2024] HYDROcodone-acetaminophen (NORCO)  mg per tablet, Take 1 tablet by mouth once daily., Disp: 30 tablet, Rfl: 0    hydrOXYzine HCL (ATARAX) 25 MG tablet, Take 1 tablet (25 mg total) by mouth 3 (three) times daily as needed for Itching., Disp: 60 tablet, Rfl: 1    lancets (ACCU-CHEK MULTICLIX LANCET) Misc, Test 2 x day, Disp: 200 each, Rfl: 3    levothyroxine (SYNTHROID) 137 MCG Tab tablet, Take 1 tablet (137 mcg total) by mouth before breakfast., Disp: 90 tablet, Rfl: 3    losartan (COZAAR) 50 MG tablet, TAKE 1 TABLET ONE TIME DAILY, Disp: 90 tablet, Rfl: 0    melatonin (MELATIN), Take by mouth every evening., Disp: , Rfl:     ONETOUCH ULTRA BLUE TEST STRIP Strp, USE TO TEST BLOOD SUGAR, Disp: 100 strip, Rfl: 3    oxybutynin (DITROPAN XL) 15 MG TR24, Take 1 tablet (15 mg total) by mouth once daily., Disp: 90 tablet, Rfl: 3    pantoprazole (PROTONIX) 40 MG tablet, TAKE ONE TABLET (40MG TOTAL) BY MOUTH ONCE DAILY, Disp: 90 tablet, Rfl: 3    pregabalin (LYRICA) 100 MG capsule, TAKE 1 CAPSULE BY MOUTH EVERY EVENING, Disp: 30 capsule, Rfl: 2    semaglutide (OZEMPIC) 2 mg/dose (8 mg/3 mL) PnIj, Inject 2 mg into the skin every 7 days., Disp: 3 mL, Rfl: 5    simvastatin (ZOCOR) 10 MG tablet, Take 10 mg by mouth every evening., Disp: , Rfl:     triamcinolone acetonide 0.1% (KENALOG) 0.1 % ointment, Apply topically 2 (two) times daily., Disp: 80 g, Rfl: 0    blood-glucose meter kit, Use as instructed, Disp: 1 each, Rfl: 0    doxycycline (VIBRAMYCIN) 100 MG Cap, Take 1 capsule (100 mg total) by mouth every 12 (twelve) hours. for 10 days, Disp: 20 capsule, Rfl: 0    furosemide (LASIX) 20 MG tablet, Take 1 tablet (20 mg total) by mouth daily as needed (leg swelling)., Disp: 60 tablet, Rfl: 0     "mupirocin (BACTROBAN) 2 % ointment, Apply topically 3 (three) times daily., Disp: 30 g, Rfl: 0    valACYclovir (VALTREX) 1000 MG tablet, Take 1 tablet (1,000 mg total) by mouth 2 (two) times daily. for 7 days, Disp: 14 tablet, Rfl: 0    Current Facility-Administered Medications:     LIDOcaine HCL 20 mg/ml (2%) injection 20 mL, 20 mL, Other, 1 time in Clinic/HOD, LUIS Dominguez FNP    Facility-Administered Medications Ordered in Other Visits:     lactated ringers infusion, , Intravenous, Continuous, Dejan Simmons MD, Stopped at 07/06/20 1008    Review of Systems   Constitutional:  Negative for chills and fever.   Respiratory:  Negative for cough and shortness of breath.    Cardiovascular:  Negative for chest pain and palpitations.   Gastrointestinal:  Negative for abdominal pain.   Skin:  Positive for color change.   Neurological:  Negative for dizziness and headaches.       Objective:   /70 (BP Location: Left arm, Patient Position: Sitting)   Pulse 96   Temp 98.5 °F (36.9 °C) (Oral)   Ht 5' 5" (1.651 m)   Wt 91.1 kg (200 lb 13.4 oz)   SpO2 96%   BMI 33.42 kg/m²         Physical Exam  Constitutional:       General: She is not in acute distress.     Appearance: Normal appearance.   HENT:      Head: Atraumatic.   Cardiovascular:      Rate and Rhythm: Normal rate and regular rhythm.      Pulses: Normal pulses.      Heart sounds: Normal heart sounds.   Pulmonary:      Effort: Pulmonary effort is normal.      Breath sounds: Normal breath sounds.   Abdominal:      General: Abdomen is flat. Bowel sounds are normal.      Palpations: Abdomen is soft.   Musculoskeletal:         General: Tenderness present.   Skin:     Findings: Erythema present.      Comments: +vesicular lesion around b/l  vermillion border edge   Neurological:      Mental Status: She is oriented to person, place, and time.                 Assessment:       1. Cellulitis of left lower extremity    2. Fever blister        Plan:       1. " Fever blister  - valACYclovir (VALTREX) 1000 MG tablet; Take 1 tablet (1,000 mg total) by mouth 2 (two) times daily. for 7 days  Dispense: 14 tablet; Refill: 0    2. Cellulitis of left lower extremity (Primary)  - doxycycline (VIBRAMYCIN) 100 MG Cap; Take 1 capsule (100 mg total) by mouth every 12 (twelve) hours. for 10 days  Dispense: 20 capsule; Refill: 0  - mupirocin (BACTROBAN) 2 % ointment; Apply topically 3 (three) times daily.  Dispense: 30 g; Refill: 0     Patient with be reevaluated in  2 weeks  or sooner reena Gambino NP

## 2024-10-31 ENCOUNTER — OFFICE VISIT (OUTPATIENT)
Dept: FAMILY MEDICINE | Facility: CLINIC | Age: 69
End: 2024-10-31
Payer: MEDICARE

## 2024-10-31 VITALS
TEMPERATURE: 99 F | SYSTOLIC BLOOD PRESSURE: 124 MMHG | DIASTOLIC BLOOD PRESSURE: 70 MMHG | WEIGHT: 202.19 LBS | HEART RATE: 67 BPM | OXYGEN SATURATION: 98 % | HEIGHT: 65 IN | BODY MASS INDEX: 33.69 KG/M2

## 2024-10-31 DIAGNOSIS — L03.116 CELLULITIS OF LEFT LOWER EXTREMITY: Primary | ICD-10-CM

## 2024-10-31 DIAGNOSIS — M79.662 PAIN IN LEFT LOWER LEG: ICD-10-CM

## 2024-10-31 DIAGNOSIS — G89.29 CHRONIC BACK PAIN GREATER THAN 3 MONTHS DURATION: ICD-10-CM

## 2024-10-31 DIAGNOSIS — M54.9 CHRONIC BACK PAIN GREATER THAN 3 MONTHS DURATION: ICD-10-CM

## 2024-10-31 PROCEDURE — 99999 PR PBB SHADOW E&M-EST. PATIENT-LVL V: CPT | Mod: PBBFAC,,, | Performed by: NURSE PRACTITIONER

## 2024-10-31 RX ORDER — FLUCONAZOLE 150 MG/1
150 TABLET ORAL ONCE
Qty: 2 TABLET | Refills: 0 | Status: SHIPPED | OUTPATIENT
Start: 2024-10-31 | End: 2024-10-31

## 2024-10-31 RX ORDER — KETOROLAC TROMETHAMINE 30 MG/ML
30 INJECTION, SOLUTION INTRAMUSCULAR; INTRAVENOUS ONCE
Status: COMPLETED | OUTPATIENT
Start: 2024-10-31 | End: 2024-10-31

## 2024-10-31 RX ORDER — MUPIROCIN 20 MG/G
OINTMENT TOPICAL 3 TIMES DAILY
Qty: 30 G | Refills: 0 | Status: SHIPPED | OUTPATIENT
Start: 2024-10-31

## 2024-10-31 RX ORDER — SULFAMETHOXAZOLE AND TRIMETHOPRIM 800; 160 MG/1; MG/1
1 TABLET ORAL 2 TIMES DAILY
Qty: 20 TABLET | Refills: 0 | Status: SHIPPED | OUTPATIENT
Start: 2024-10-31 | End: 2024-11-10

## 2024-10-31 RX ADMIN — KETOROLAC TROMETHAMINE 30 MG: 30 INJECTION, SOLUTION INTRAMUSCULAR; INTRAVENOUS at 11:10

## 2024-11-01 ENCOUNTER — HOSPITAL ENCOUNTER (OUTPATIENT)
Dept: RADIOLOGY | Facility: CLINIC | Age: 69
Discharge: HOME | End: 2024-11-01
Attending: NURSE PRACTITIONER
Payer: MEDICARE

## 2024-11-01 DIAGNOSIS — M79.662 PAIN IN LEFT LOWER LEG: ICD-10-CM

## 2024-11-01 PROCEDURE — 73590 X-RAY EXAM OF LOWER LEG: CPT | Mod: TC,FY,PO,LT

## 2024-11-01 PROCEDURE — 73590 X-RAY EXAM OF LOWER LEG: CPT | Mod: 26,LT,, | Performed by: RADIOLOGY

## 2024-11-04 DIAGNOSIS — R45.89 DEPRESSED MOOD: ICD-10-CM

## 2024-11-04 DIAGNOSIS — K21.9 GASTROESOPHAGEAL REFLUX DISEASE, UNSPECIFIED WHETHER ESOPHAGITIS PRESENT: ICD-10-CM

## 2024-11-04 RX ORDER — LOSARTAN POTASSIUM 50 MG/1
TABLET ORAL
Qty: 90 TABLET | Refills: 3 | Status: SHIPPED | OUTPATIENT
Start: 2024-11-04

## 2024-11-04 RX ORDER — PANTOPRAZOLE SODIUM 40 MG/1
TABLET, DELAYED RELEASE ORAL
Qty: 90 TABLET | Refills: 3 | Status: SHIPPED | OUTPATIENT
Start: 2024-11-04

## 2024-11-04 RX ORDER — ESCITALOPRAM OXALATE 10 MG/1
10 TABLET ORAL
Qty: 90 TABLET | Refills: 3 | Status: SHIPPED | OUTPATIENT
Start: 2024-11-04

## 2024-11-04 NOTE — TELEPHONE ENCOUNTER
Care Due:                  Date            Visit Type   Department     Provider  --------------------------------------------------------------------------------                                EP -                              PRIMARY      [facility]  Last Visit: 09-      CARE ([facility])   GHASSAN Eugene                              EP -                              PRIMARY      [facility]  Next Visit: 05-      CARE ([facility])   MEDICINE       Lydia Eugene                                                            Last  Test          Frequency    Reason                     Performed    Due Date  --------------------------------------------------------------------------------    Vitamin D...  12 months..  ergocalciferol...........  02- 02-    Health [facility] Embedded Care Due Messages. Reference number: [id number].   11/04/2024 1:59:31 AM CST

## 2024-11-04 NOTE — TELEPHONE ENCOUNTER
Refill Decision Note   Edie Hernandez  is requesting a refill authorization.  Brief Assessment and Rationale for Refill:  Approve     Medication Therapy Plan:         Comments:     Note composed:12:51 PM 11/04/2024

## 2024-11-05 RX ORDER — OXYBUTYNIN CHLORIDE 15 MG/1
15 TABLET, EXTENDED RELEASE ORAL
Qty: 90 TABLET | Refills: 0 | Status: SHIPPED | OUTPATIENT
Start: 2024-11-05

## 2024-11-13 DIAGNOSIS — G89.29 CHRONIC BACK PAIN GREATER THAN 3 MONTHS DURATION: ICD-10-CM

## 2024-11-13 DIAGNOSIS — M54.9 CHRONIC BACK PAIN GREATER THAN 3 MONTHS DURATION: ICD-10-CM

## 2024-11-13 NOTE — TELEPHONE ENCOUNTER
No care due was identified.  Health [facility] Embedded Care Due Messages. Reference number: [id number].   11/13/2024 3:02:15 AM CST

## 2024-11-14 RX ORDER — CYCLOBENZAPRINE HCL 5 MG
TABLET ORAL
Qty: 90 TABLET | Status: SHIPPED | OUTPATIENT
Start: 2024-11-14

## 2024-11-19 ENCOUNTER — PATIENT MESSAGE (OUTPATIENT)
Dept: FAMILY MEDICINE | Facility: CLINIC | Age: 69
End: 2024-11-19
Payer: MEDICARE

## 2024-11-19 DIAGNOSIS — E11.22 TYPE 2 DIABETES MELLITUS WITH CHRONIC KIDNEY DISEASE, WITHOUT LONG-TERM CURRENT USE OF INSULIN, UNSPECIFIED CKD STAGE: Primary | ICD-10-CM

## 2024-11-19 DIAGNOSIS — G89.29 CHRONIC BACK PAIN GREATER THAN 3 MONTHS DURATION: ICD-10-CM

## 2024-11-19 DIAGNOSIS — M54.9 CHRONIC BACK PAIN GREATER THAN 3 MONTHS DURATION: ICD-10-CM

## 2024-11-19 DIAGNOSIS — M79.89 LEG SWELLING: ICD-10-CM

## 2024-11-19 NOTE — TELEPHONE ENCOUNTER
No care due was identified.  Health [facility] Embedded Care Due Messages. Reference number: [id number].   11/19/2024 3:01:57 PM CST

## 2024-11-20 RX ORDER — SEMAGLUTIDE 1.34 MG/ML
1 INJECTION, SOLUTION SUBCUTANEOUS
Qty: 3 ML | Refills: 11 | Status: SHIPPED | OUTPATIENT
Start: 2024-11-20 | End: 2025-11-20

## 2024-11-20 RX ORDER — DICLOFENAC SODIUM 10 MG/G
2 GEL TOPICAL 4 TIMES DAILY
Qty: 450 G | Status: SHIPPED | OUTPATIENT
Start: 2024-11-20

## 2024-11-20 RX ORDER — FUROSEMIDE 20 MG/1
20 TABLET ORAL DAILY PRN
Qty: 90 TABLET | Status: SHIPPED | OUTPATIENT
Start: 2024-11-20 | End: 2025-11-20

## 2024-11-25 DIAGNOSIS — G89.29 CHRONIC BACK PAIN GREATER THAN 3 MONTHS DURATION: ICD-10-CM

## 2024-11-25 DIAGNOSIS — M54.9 CHRONIC BACK PAIN GREATER THAN 3 MONTHS DURATION: ICD-10-CM

## 2024-11-25 RX ORDER — HYDROCODONE BITARTRATE AND ACETAMINOPHEN 10; 325 MG/1; MG/1
1 TABLET ORAL DAILY
Qty: 30 TABLET | Refills: 0 | Status: SHIPPED | OUTPATIENT
Start: 2024-11-25

## 2024-11-25 NOTE — TELEPHONE ENCOUNTER
No care due was identified.  [facility] Embedded Care Due Messages. Reference number: [id number].   11/25/2024 12:52:35 PM CST

## 2024-12-06 DIAGNOSIS — G89.29 CHRONIC BACK PAIN GREATER THAN 3 MONTHS DURATION: ICD-10-CM

## 2024-12-06 DIAGNOSIS — M54.9 CHRONIC BACK PAIN GREATER THAN 3 MONTHS DURATION: ICD-10-CM

## 2024-12-06 NOTE — TELEPHONE ENCOUNTER
----- Message from Del sent at 12/6/2024  3:42 PM CST -----  Regarding: refill  Type:  RX Refill Request    Who Called: pt    Refill or New Rx:refill    RX Name and Strength:HYDROcodone-acetaminophen (NORCO)  mg per tablet 30 tablet 0 11/25/2024 -   Sig - Route: Take 1 tablet by mouth once daily. - Oral   Sent to pharmacy as: HYDROcodone-acetaminophen (NORCO)  mg per tablet   Earliest Fill Date: 11/25/2024   Notes to Pharmacy: Quantity prescribed more than 7 day supply? Yes, quantity medically necessary   E-Prescribing Status: Receipt confirmed by pharmacy (11/25/2024  7:51 PM CST)         How is the patient currently taking it? (ex. 1XDay):see above    Is this a 30 day or 90 day RX:see above    Preferred Pharmacy with phone number:    [facility] - [place] - [address]  [address] [address]  Phone: [phone] Fax: [phone]      Local or Mail Order:local     Ordering Provider:jose rafael Moreno Call Back Number:[phone]      Additional Information:  Please call to discuss.

## 2024-12-06 NOTE — TELEPHONE ENCOUNTER
No care due was identified.  Health [facility] Embedded Care Due Messages. Reference number: [id number].   12/06/2024 3:51:23 PM CST

## 2024-12-10 ENCOUNTER — OFFICE VISIT (OUTPATIENT)
Dept: FAMILY MEDICINE | Facility: CLINIC | Age: 69
End: 2024-12-10
Payer: MEDICARE

## 2024-12-10 VITALS
BODY MASS INDEX: 32.73 KG/M2 | DIASTOLIC BLOOD PRESSURE: 70 MMHG | TEMPERATURE: 99 F | SYSTOLIC BLOOD PRESSURE: 122 MMHG | WEIGHT: 196.44 LBS | HEART RATE: 65 BPM | HEIGHT: 65 IN | OXYGEN SATURATION: 99 %

## 2024-12-10 DIAGNOSIS — E11.22 TYPE 2 DIABETES MELLITUS WITH CHRONIC KIDNEY DISEASE, WITHOUT LONG-TERM CURRENT USE OF INSULIN, UNSPECIFIED CKD STAGE: ICD-10-CM

## 2024-12-10 DIAGNOSIS — M54.9 CHRONIC BACK PAIN GREATER THAN 3 MONTHS DURATION: ICD-10-CM

## 2024-12-10 DIAGNOSIS — E03.9 HYPOTHYROIDISM (ACQUIRED): ICD-10-CM

## 2024-12-10 DIAGNOSIS — L03.116 CELLULITIS OF LEFT LOWER EXTREMITY: ICD-10-CM

## 2024-12-10 DIAGNOSIS — M54.9 CHRONIC BACK PAIN GREATER THAN 3 MONTHS DURATION: Primary | ICD-10-CM

## 2024-12-10 DIAGNOSIS — G89.29 CHRONIC BACK PAIN GREATER THAN 3 MONTHS DURATION: Primary | ICD-10-CM

## 2024-12-10 DIAGNOSIS — G89.29 CHRONIC BACK PAIN GREATER THAN 3 MONTHS DURATION: ICD-10-CM

## 2024-12-10 PROCEDURE — 3074F SYST BP LT 130 MM HG: CPT | Mod: HCNC,CPTII,S$GLB, | Performed by: NURSE PRACTITIONER

## 2024-12-10 PROCEDURE — 3008F BODY MASS INDEX DOCD: CPT | Mod: HCNC,CPTII,S$GLB, | Performed by: NURSE PRACTITIONER

## 2024-12-10 PROCEDURE — 3061F NEG MICROALBUMINURIA REV: CPT | Mod: HCNC,CPTII,S$GLB, | Performed by: NURSE PRACTITIONER

## 2024-12-10 PROCEDURE — 3288F FALL RISK ASSESSMENT DOCD: CPT | Mod: HCNC,CPTII,S$GLB, | Performed by: NURSE PRACTITIONER

## 2024-12-10 PROCEDURE — 1159F MED LIST DOCD IN RCRD: CPT | Mod: HCNC,CPTII,S$GLB, | Performed by: NURSE PRACTITIONER

## 2024-12-10 PROCEDURE — 3044F HG A1C LEVEL LT 7.0%: CPT | Mod: HCNC,CPTII,S$GLB, | Performed by: NURSE PRACTITIONER

## 2024-12-10 PROCEDURE — 1101F PT FALLS ASSESS-DOCD LE1/YR: CPT | Mod: HCNC,CPTII,S$GLB, | Performed by: NURSE PRACTITIONER

## 2024-12-10 PROCEDURE — 1125F AMNT PAIN NOTED PAIN PRSNT: CPT | Mod: HCNC,CPTII,S$GLB, | Performed by: NURSE PRACTITIONER

## 2024-12-10 PROCEDURE — 1160F RVW MEDS BY RX/DR IN RCRD: CPT | Mod: HCNC,CPTII,S$GLB, | Performed by: NURSE PRACTITIONER

## 2024-12-10 PROCEDURE — 4010F ACE/ARB THERAPY RXD/TAKEN: CPT | Mod: HCNC,CPTII,S$GLB, | Performed by: NURSE PRACTITIONER

## 2024-12-10 PROCEDURE — 99999 PR PBB SHADOW E&M-EST. PATIENT-LVL V: CPT | Mod: PBBFAC,HCNC,, | Performed by: NURSE PRACTITIONER

## 2024-12-10 PROCEDURE — 3078F DIAST BP <80 MM HG: CPT | Mod: HCNC,CPTII,S$GLB, | Performed by: NURSE PRACTITIONER

## 2024-12-10 PROCEDURE — 3066F NEPHROPATHY DOC TX: CPT | Mod: HCNC,CPTII,S$GLB, | Performed by: NURSE PRACTITIONER

## 2024-12-10 PROCEDURE — 99214 OFFICE O/P EST MOD 30 MIN: CPT | Mod: HCNC,S$GLB,, | Performed by: NURSE PRACTITIONER

## 2024-12-10 RX ORDER — HYDROCODONE BITARTRATE AND ACETAMINOPHEN 10; 325 MG/1; MG/1
1 TABLET ORAL DAILY
Qty: 30 TABLET | Refills: 0 | Status: SHIPPED | OUTPATIENT
Start: 2025-02-10 | End: 2025-03-12

## 2024-12-10 RX ORDER — HYDROCODONE BITARTRATE AND ACETAMINOPHEN 10; 325 MG/1; MG/1
1 TABLET ORAL DAILY
Qty: 30 TABLET | Refills: 0 | OUTPATIENT
Start: 2024-12-10

## 2024-12-10 RX ORDER — HYDROCODONE BITARTRATE AND ACETAMINOPHEN 10; 325 MG/1; MG/1
1 TABLET ORAL DAILY
Qty: 30 TABLET | Refills: 0 | Status: SHIPPED | OUTPATIENT
Start: 2024-12-10 | End: 2024-12-10 | Stop reason: SDUPTHER

## 2024-12-10 RX ORDER — HYDROCODONE BITARTRATE AND ACETAMINOPHEN 10; 325 MG/1; MG/1
1 TABLET ORAL DAILY
Qty: 30 TABLET | Refills: 0 | Status: SHIPPED | OUTPATIENT
Start: 2025-01-10 | End: 2025-02-09

## 2024-12-10 RX ORDER — METFORMIN HYDROCHLORIDE 500 MG/1
TABLET, EXTENDED RELEASE ORAL
COMMUNITY
Start: 2024-11-05

## 2024-12-10 NOTE — PROGRESS NOTES
Subjective:       Patient ID: Edie Hernandez is a 69 y.o. female.    Chief Complaint: Follow-up     HPI   68 y/o female patient with medical problems listed below presents for 3 months follow up of chronic back pain and refill of Norco which she takes 10 mg once a day as needed. States has been out of the medication a week ago.  had back spine surgery done a year ago and hip surgery in 10/2023. Patient sees Dr. Dejesus Neurology and on marihuana gummy and vape which helps with pain and sleeps. She takes flexeril 5 mg as needed.    She states unable to tolerate ozempic 2 mg due to nausea/vomiting and so went down to 1 mg. Stopped metformin due to low blood sugar.     Labs reviewed from 9/2024- TSH low 0.039 and synthroid decreased to 137 mcg in 9/2024, A1C 5.1, Normal for MALB    Continuous use of opioids follow-up:  Current medication and dosing:  norco 10 1 x day  Supply:  90 day supply  Side effects: none  Pain controlled: yes  Medication compliance: yes  DPS checked today: today, no evidence of diversion   UDS: 9/24 +hydrocordone and medical marihuana as expected   pain contract signed: 8/2023    Patient Active Problem List   Diagnosis    GERD (gastroesophageal reflux disease)    Chronic back pain greater than 3 months duration    Environmental allergies    Fibromyalgia    B12 nutritional deficiency    CLINT (obstructive sleep apnea)    Chronic sinusitis    Asthma    Hypothyroidism (acquired)    Nausea    Night sweats    Tinnitus, bilateral    Frequent UTI    Hyperlipidemia associated with type 2 diabetes mellitus    Colon polyp, hyperplastic    Interstitial cystitis    Hemangioma    Wart    Globus sensation    DDD (degenerative disc disease), lumbar    Obesity (BMI 30-39.9)    Edema    Mixed incontinence    Continuous opioid dependence- contract 10/18/17-failed uds for norco, + 2/18    Type 2 diabetes mellitus    History of colon polyps    Lumbar radiculitis    Lumbar radiculopathy    Lumbar spondylosis     Sacroiliitis    Lumbar stenosis with neurogenic claudication    Urinary retention    S/P lumbar fusion    Impaired flexibility of lower extremity    Depression, recurrent      Review of patient's allergies indicates:   Allergen Reactions    Codeine Nausea And Vomiting     Other reaction(s): Nausea, dizziness     Past Surgical History:   Procedure Laterality Date    ANTERIOR LUMBAR INTERBODY FUSION (ALIF) N/A 11/8/2022    Procedure: FUSION, SPINE, LUMBAR, ALIF L3-4, L4-5;  Surgeon: Everton Godinez MD;  Location: [facility] OR;  Service: Orthopedics;  Laterality: N/A;  NTI, [employer], DR SANDI ENRIQUEZ, CO-SURGEON    COLONOSCOPY  02/14/2013    Dr. Vogel: repeat in 5-10 years    COLONOSCOPY W/ BIOPSIES AND POLYPECTOMY  02/2013    hyperplastic, recheck 5-10 years    CYSTOSCOPY WITH HYDRODISTENSION OF BLADDER N/A 9/21/2022    Procedure: CYSTOSCOPY, WITH BLADDER HYDRODISTENSION;  Surgeon: Keely Chavez Jr., MD;  Location: [facility] OR;  Service: Urology;  Laterality: N/A;    EXTERNAL EAR SURGERY      FUSION, SPINE, LUMBAR, POSTERIOR APPROACH N/A 11/8/2022    Procedure: FUSION,SPINE,LUMBAR,POSTERIOR APPROACH L3-4, L4-5;  Surgeon: Everton Godinez MD;  Location: [facility] OR;  Service: Orthopedics;  Laterality: N/A;  NTI, [employer], DR SANDI ENRIQUEZ, CO-SURGEON    HYSTERECTOMY      INJECTION OF ANESTHETIC AGENT AROUND MEDIAL BRANCH NERVES INNERVATING LUMBAR FACET JOINT Bilateral 8/3/2020    Procedure: Block-nerve-medial branch-lumbar left L2, L3, L4, L5;  Surgeon: Dejan Simmons MD;  Location: [facility] OR;  Service: Pain Management;  Laterality: Bilateral;    INJECTION OF ANESTHETIC AGENT AROUND MEDIAL BRANCH NERVES INNERVATING LUMBAR FACET JOINT Left 8/13/2020    Procedure: Block-nerve-medial branch-lumbar.  L2, L3, L4, and L5;  Surgeon: Dejan Simmons MD;  Location: [facility] OR;  Service: Pain Management;  Laterality: Left;    MINIMALLY INVASIVE FUSION OF SPINE Left 10/12/2021    Procedure: FUSION, SPINE, MINIMALLY INVASIVE;  Surgeon:  Everton Godinez MD;  Location: [facility] OR;  Service: Orthopedics;  Laterality: Left;  SI-Bone    RADIOFREQUENCY ABLATION OF LUMBAR MEDIAL BRANCH NERVE AT SINGLE LEVEL Left 8/27/2020    Procedure: Radiofrequency Ablation, Nerve, Spinal, Lumbar, Medial Branch,  L2, L3, L4, L5;  Surgeon: Dejan Simmons MD;  Location: [facility] OR;  Service: Pain Management;  Laterality: Left;  L2,l3,l4,l5    THROAT SURGERY      for CLINT    TRANSFORAMINAL EPIDURAL INJECTION OF STEROID Left 3/6/2020    Procedure: Injection,steroid,epidural,transforaminal approach;  Surgeon: Harmeet Zapata MD;  Location: [facility] OR;  Service: Pain Management;  Laterality: Left;  L4-L5    TRANSFORAMINAL EPIDURAL INJECTION OF STEROID Left 6/10/2020    Procedure: Injection,steroid,epidural,transforaminal approach.L4 and L5;  Surgeon: Dejan Simmons MD;  Location: [facility] OR;  Service: Pain Management;  Laterality: Left;    TRANSFORAMINAL EPIDURAL INJECTION OF STEROID Left 7/6/2020    Procedure: Injection,steroid,epidural,transforaminal approach. left L4 and L5;  Surgeon: Dejan Simmons MD;  Location: [facility] OR;  Service: Pain Management;  Laterality: Left;    TRANSFORAMINAL EPIDURAL INJECTION OF STEROID Left 11/12/2021    Procedure: Injection,steroid,epidural,transforaminal approach Left L5-S1;  Surgeon: Dejan Simmons MD;  Location: [facility] OR;  Service: Pain Management;  Laterality: Left;    UPPER GASTROINTESTINAL ENDOSCOPY          Current Outpatient Medications:     azelastine (ASTELIN) 137 mcg (0.1 %) nasal spray, 1 spray (137 mcg total) by Nasal route 2 (two) times daily as needed for Rhinitis., Disp: 30 mL, Rfl: 5    clobetasol (TEMOVATE) 0.05 % cream, Apply 1 application topically 2 (two) times daily. Apply to affected area, Disp: 30 g, Rfl: 2    cyanocobalamin (VITAMIN B-12) 100 MCG tablet, Take 100 mcg by mouth once daily., Disp: , Rfl:     cyclobenzaprine (FLEXERIL) 5 MG tablet, TAKE 1 TO 2 TABLETS EVERY NIGHT AT BEDTIME AS NEEDED FOR PAIN, Disp: 90 tablet, Rfl: PRN     diclofenac sodium (VOLTAREN) 1 % Gel, Apply 2 g topically 4 (four) times daily., Disp: 450 g, Rfl: prn    ergocalciferol (ERGOCALCIFEROL) 50,000 unit Cap, TAKE 1 CAPSULE EVERY 7 DAYS, Disp: 12 capsule, Rfl: 3    EScitalopram oxalate (LEXAPRO) 10 MG tablet, TAKE 1 TABLET EVERY DAY, Disp: 90 tablet, Rfl: 3    estradioL (ESTRACE) 0.01 % (0.1 mg/gram) vaginal cream, Apply 1 fingertipful to vaginal area nightly x 2 weeks then use on MWF, Disp: 42.5 g, Rfl: 3    fluticasone propionate (FLONASE) 50 mcg/actuation nasal spray, 1 spray (50 mcg total) by Each Nostril route daily as needed for Allergies., Disp: 16 g, Rfl: prn    furosemide (LASIX) 20 MG tablet, Take 1 tablet (20 mg total) by mouth daily as needed (leg swelling)., Disp: 90 tablet, Rfl: PRN    hydrOXYzine HCL (ATARAX) 25 MG tablet, Take 1 tablet (25 mg total) by mouth 3 (three) times daily as needed for Itching., Disp: 60 tablet, Rfl: 1    lancets (ACCU-CHEK MULTICLIX LANCET) Misc, Test 2 x day, Disp: 200 each, Rfl: 3    levothyroxine (SYNTHROID) 137 MCG Tab tablet, Take 1 tablet (137 mcg total) by mouth before breakfast., Disp: 90 tablet, Rfl: 3    mupirocin (BACTROBAN) 2 % ointment, Apply topically 3 (three) times daily., Disp: 30 g, Rfl: prn    ONETOUCH ULTRA BLUE TEST STRIP Strp, USE TO TEST BLOOD SUGAR, Disp: 100 strip, Rfl: 3    oxybutynin (DITROPAN XL) 15 MG TR24, TAKE 1 TABLET ONE TIME DAILY, Disp: 90 tablet, Rfl: 0    pantoprazole (PROTONIX) 40 MG tablet, TAKE 1 TABLET ONE TIME DAILY, Disp: 90 tablet, Rfl: 3    pregabalin (LYRICA) 100 MG capsule, TAKE 1 CAPSULE BY MOUTH EVERY EVENING, Disp: 30 capsule, Rfl: 2    semaglutide (OZEMPIC) 1 mg/dose (4 mg/3 mL), Inject 1 mg into the skin every 7 days., Disp: 3 mL, Rfl: 11    simvastatin (ZOCOR) 10 MG tablet, Take 10 mg by mouth every evening., Disp: , Rfl:     triamcinolone acetonide 0.1% (KENALOG) 0.1 % ointment, Apply topically 2 (two) times daily., Disp: 80 g, Rfl: 0    blood-glucose meter kit, Use as  "instructed, Disp: 1 each, Rfl: 0    HYDROcodone-acetaminophen (NORCO)  mg per tablet, Take 1 tablet by mouth once daily., Disp: 30 tablet, Rfl: 0    metFORMIN (GLUCOPHAGE-XR) 500 MG ER 24hr tablet, SMARTSI.0 Tablet(s) By Mouth Twice Daily (Patient not taking: Reported on 12/10/2024), Disp: , Rfl:     valACYclovir (VALTREX) 1000 MG tablet, Take 1 tablet (1,000 mg total) by mouth 2 (two) times daily. for 7 days, Disp: 14 tablet, Rfl: 0    Current Facility-Administered Medications:     LIDOcaine HCL 20 mg/ml (2%) injection 20 mL, 20 mL, Other, 1 time in Clinic/HOD, LUIS Dominguez FNP    Facility-Administered Medications Ordered in Other Visits:     lactated ringers infusion, , Intravenous, Continuous, Dejan Simmons MD, Stopped at 20 1008    Review of Systems   Constitutional:  Negative for chills and fever.   Respiratory:  Negative for cough and shortness of breath.    Cardiovascular:  Negative for chest pain and palpitations.   Gastrointestinal:  Negative for abdominal pain.   Musculoskeletal:  Positive for back pain.   Neurological:  Negative for dizziness and headaches.       Objective:   /70 (BP Location: Right arm, Patient Position: Sitting)   Pulse 65   Temp 98.7 °F (37.1 °C) (Oral)   Ht 5' 5" (1.651 m)   Wt 89.1 kg (196 lb 6.9 oz)   SpO2 99%   BMI 32.69 kg/m²         Physical Exam  Constitutional:       General: She is not in acute distress.     Appearance: Normal appearance.   HENT:      Head: Atraumatic.   Cardiovascular:      Rate and Rhythm: Normal rate and regular rhythm.      Pulses: Normal pulses.      Heart sounds: Normal heart sounds.   Pulmonary:      Effort: Pulmonary effort is normal.      Breath sounds: Normal breath sounds.   Abdominal:      General: Abdomen is flat. Bowel sounds are normal.      Palpations: Abdomen is soft.   Neurological:      Mental Status: She is oriented to person, place, and time.         Assessment:       1. Chronic back pain greater than " 3 months duration    2. Hypothyroidism (acquired)    3. Type 2 diabetes mellitus with chronic kidney disease, without long-term current use of insulin, unspecified CKD stage        Plan:       1. Hypothyroidism (acquired)  Repeat the lab and adjust accordingly   - T4, Free; Future  - TSH; Future    2. Chronic back pain greater than 3 months duration (Primary)  - Checked DPS, no evidence of diversion, last rx filled on 10/23/2024  - Will send in one month supply of norco  mg to the pharmacy and submit another 2 months supply refill request to pcp dr. Eugene   - HYDROcodone-acetaminophen (NORCO)  mg per tablet; Take 1 tablet by mouth once daily.  Dispense: 30 tablet; Refill: 0    3. Type 2 diabetes mellitus with chronic kidney disease, without long-term current use of insulin, unspecified CKD stage  - Hemoglobin A1C; Future     Patient with be reevaluated in 3 months or sooner reena Gambino NP

## 2024-12-16 ENCOUNTER — LAB VISIT (OUTPATIENT)
Dept: LAB | Facility: HOSPITAL | Age: 69
End: 2024-12-16
Attending: NURSE PRACTITIONER
Payer: MEDICARE

## 2024-12-16 DIAGNOSIS — E11.22 TYPE 2 DIABETES MELLITUS WITH CHRONIC KIDNEY DISEASE, WITHOUT LONG-TERM CURRENT USE OF INSULIN, UNSPECIFIED CKD STAGE: ICD-10-CM

## 2024-12-16 DIAGNOSIS — E03.9 HYPOTHYROIDISM (ACQUIRED): ICD-10-CM

## 2024-12-16 LAB
ESTIMATED AVG GLUCOSE: 103 MG/DL (ref 68–131)
HBA1C MFR BLD: 5.2 % (ref 4–5.6)
T4 FREE SERPL-MCNC: 1.06 NG/DL (ref 0.71–1.51)
TSH SERPL DL<=0.005 MIU/L-ACNC: 0.09 UIU/ML (ref 0.4–4)

## 2024-12-16 PROCEDURE — 83036 HEMOGLOBIN GLYCOSYLATED A1C: CPT | Mod: HCNC | Performed by: NURSE PRACTITIONER

## 2024-12-16 PROCEDURE — 84443 ASSAY THYROID STIM HORMONE: CPT | Mod: HCNC | Performed by: NURSE PRACTITIONER

## 2024-12-16 PROCEDURE — 84439 ASSAY OF FREE THYROXINE: CPT | Mod: HCNC | Performed by: NURSE PRACTITIONER

## 2024-12-22 DIAGNOSIS — E03.9 HYPOTHYROIDISM (ACQUIRED): Primary | ICD-10-CM

## 2024-12-22 RX ORDER — LEVOTHYROXINE SODIUM 125 UG/1
125 TABLET ORAL
Qty: 30 TABLET | Refills: 5 | Status: SHIPPED | OUTPATIENT
Start: 2024-12-22

## 2025-01-03 ENCOUNTER — OFFICE VISIT (OUTPATIENT)
Dept: URGENT CARE | Facility: CLINIC | Age: 70
End: 2025-01-03
Payer: COMMERCIAL

## 2025-01-03 VITALS
TEMPERATURE: 99 F | WEIGHT: 196 LBS | HEART RATE: 75 BPM | DIASTOLIC BLOOD PRESSURE: 77 MMHG | HEIGHT: 65 IN | RESPIRATION RATE: 16 BRPM | OXYGEN SATURATION: 95 % | BODY MASS INDEX: 32.65 KG/M2 | SYSTOLIC BLOOD PRESSURE: 121 MMHG

## 2025-01-03 DIAGNOSIS — R42 LIGHT HEADEDNESS: ICD-10-CM

## 2025-01-03 DIAGNOSIS — J22 LOWER RESPIRATORY INFECTION: Primary | ICD-10-CM

## 2025-01-03 DIAGNOSIS — R09.89 CHEST CONGESTION: ICD-10-CM

## 2025-01-03 DIAGNOSIS — R09.81 SINUS CONGESTION: ICD-10-CM

## 2025-01-03 DIAGNOSIS — R05.9 COUGH, UNSPECIFIED TYPE: ICD-10-CM

## 2025-01-03 LAB
CTP QC/QA: YES
CTP QC/QA: YES
FLUAV AG NPH QL: NEGATIVE
FLUBV AG NPH QL: NEGATIVE
SARS-COV-2 AG RESP QL IA.RAPID: NEGATIVE

## 2025-01-03 PROCEDURE — 71046 X-RAY EXAM CHEST 2 VIEWS: CPT | Mod: S$GLB,,, | Performed by: RADIOLOGY

## 2025-01-03 RX ORDER — ALBUTEROL SULFATE 90 UG/1
2 INHALANT RESPIRATORY (INHALATION) EVERY 6 HOURS PRN
Qty: 6.7 G | Refills: 0 | Status: SHIPPED | OUTPATIENT
Start: 2025-01-03 | End: 2025-02-02

## 2025-01-03 RX ORDER — GUAIFENESIN 600 MG/1
600 TABLET, EXTENDED RELEASE ORAL 2 TIMES DAILY
Qty: 20 TABLET | Refills: 0 | Status: SHIPPED | OUTPATIENT
Start: 2025-01-03 | End: 2025-01-13

## 2025-01-03 RX ORDER — CHLOPHEDIANOL HCL AND PYRILAMINE MALEATE 12.5; 12.5 MG/5ML; MG/5ML
10 SOLUTION ORAL EVERY 8 HOURS PRN
Qty: 300 ML | Refills: 0 | Status: SHIPPED | OUTPATIENT
Start: 2025-01-03 | End: 2025-01-13

## 2025-01-03 RX ORDER — AMOXICILLIN AND CLAVULANATE POTASSIUM 875; 125 MG/1; MG/1
1 TABLET, FILM COATED ORAL EVERY 12 HOURS
Qty: 14 TABLET | Refills: 0 | Status: SHIPPED | OUTPATIENT
Start: 2025-01-03 | End: 2025-01-10

## 2025-01-03 NOTE — PATIENT INSTRUCTIONS
Symptomatic treatment to include:     Rest, increase fluid intake to include electrolyte replacement  Ibuprofen/Tylenol as directed for fever, sore throat, body aches  Ninjacof for relief of cough and congestion  Antibiotics as prescribed  Warm, salt water gargles, over the counter throat lozenges or sprays as desires.   ER for difficulty breathing not relieved by rest, excessive lethargy and/or change in mental status

## 2025-01-03 NOTE — PROGRESS NOTES
"Subjective:      Patient ID: Edie Hernandez is a 69 y.o. female.    Vitals:  height is 5' 5" (1.651 m) and weight is 88.9 kg (196 lb). Her oral temperature is 98.6 °F (37 °C). Her blood pressure is 121/77 and her pulse is 75. Her respiration is 16 and oxygen saturation is 95%.     Chief Complaint: Cough    61-year-old female presents for evaluation, she reports Cough and congestion X 2 days. Lightheaded and chest tightness x 3-4 days that has been intermittent. CBG has been running about 120 which is normal for her.  Lightheadedness as described as feeling like she is in a fog or fish bowl.  She denies history of heart disease or pulmonary issues last documented oxygen saturation 99%.  Today pox is 95%.  Lung sounds are clear bilaterally with no adventitious breath sounds.  Patient reports it is difficult to take a deep breath due to cough and phlegm and chest tightness.  She denies chest pain or shortness of breath.  She has not checked his blood sugar this morning.  She denies any changes her medications recently.  She reports taking Lasix but only as needed when her feet swell but hasn't had to take any recently.     Cough  This is a new problem. The current episode started in the past 7 days. Associated symptoms include chills, myalgias and postnasal drip. Pertinent negatives include no fever, headaches, shortness of breath or wheezing. She has tried OTC cough suppressant for the symptoms.       Constitution: Positive for chills and fatigue. Negative for fever.   HENT:  Positive for congestion and postnasal drip.    Respiratory:  Positive for chest tightness, cough and sputum production. Negative for shortness of breath and wheezing.    Musculoskeletal:  Positive for muscle ache.   Neurological:  Positive for light-headedness. Negative for passing out, loss of balance, headaches and loss of consciousness.      Objective:     Physical Exam   Constitutional: She is oriented to person, place, and time. She " appears well-developed. She is cooperative.  Non-toxic appearance. She does not appear ill. No distress.   HENT:   Head: Normocephalic and atraumatic.   Ears:   Right Ear: Hearing, tympanic membrane, external ear and ear canal normal.   Left Ear: Hearing, tympanic membrane, external ear and ear canal normal.   Nose: Rhinorrhea and congestion present. No mucosal edema or nasal deformity. No epistaxis. Right sinus exhibits no maxillary sinus tenderness and no frontal sinus tenderness. Left sinus exhibits no maxillary sinus tenderness and no frontal sinus tenderness.   Mouth/Throat: Uvula is midline, oropharynx is clear and moist and mucous membranes are normal. Mucous membranes are moist. No trismus in the jaw. Normal dentition. No uvula swelling. No oropharyngeal exudate, posterior oropharyngeal edema or posterior oropharyngeal erythema.   Eyes: Conjunctivae and lids are normal. No scleral icterus.   Neck: Trachea normal and phonation normal. Neck supple. No edema present. No erythema present. No neck rigidity present.   Cardiovascular: Normal rate, regular rhythm and normal heart sounds.   Pulmonary/Chest: Effort normal and breath sounds normal. No respiratory distress. She has no decreased breath sounds. She has no wheezes. She has no rhonchi. She has no rales.   Abdominal: Normal appearance.   Musculoskeletal: Normal range of motion.         General: No deformity. Normal range of motion.   Neurological: She is alert and oriented to person, place, and time. She displays no weakness. She exhibits normal muscle tone.   Skin: Skin is warm, dry, intact, not diaphoretic and not pale.   Psychiatric: Her speech is normal and behavior is normal. Mood, judgment and thought content normal.   Nursing note and vitals reviewed.      Assessment:     1. Lower respiratory infection    2. Cough, unspecified type    3. Light headedness    4. Sinus congestion    5. Chest congestion        Plan:       Lower respiratory infection  -      amoxicillin-clavulanate 875-125mg (AUGMENTIN) 875-125 mg per tablet; Take 1 tablet by mouth every 12 (twelve) hours. for 7 days  Dispense: 14 tablet; Refill: 0  -     albuterol (PROVENTIL HFA) 90 mcg/actuation inhaler; Inhale 2 puffs into the lungs every 6 (six) hours as needed for Wheezing. Rescue  Dispense: 6.7 g; Refill: 0  -     guaiFENesin (MUCINEX) 600 mg 12 hr tablet; Take 1 tablet (600 mg total) by mouth 2 (two) times daily. for 10 days  Dispense: 20 tablet; Refill: 0  -     pyrilamine-chlophedianoL (NINJACOF) 12.5-12.5 mg/5 mL Liqd; Take 10 mLs by mouth every 8 (eight) hours as needed.  Dispense: 300 mL; Refill: 0    Cough, unspecified type  -     SARS Coronavirus 2 Antigen, POCT Manual Read  -     POCT Influenza A/B Rapid Antigen  -     XR CHEST PA AND LATERAL; Future; Expected date: 01/03/2025  -     guaiFENesin (MUCINEX) 600 mg 12 hr tablet; Take 1 tablet (600 mg total) by mouth 2 (two) times daily. for 10 days  Dispense: 20 tablet; Refill: 0  -     pyrilamine-chlophedianoL (NINJACOF) 12.5-12.5 mg/5 mL Liqd; Take 10 mLs by mouth every 8 (eight) hours as needed.  Dispense: 300 mL; Refill: 0    Light headedness  -     EKG 12-lead; Future  -     POCT Glucose, Hand-Held Device    Sinus congestion  -     pyrilamine-chlophedianoL (NINJACOF) 12.5-12.5 mg/5 mL Liqd; Take 10 mLs by mouth every 8 (eight) hours as needed.  Dispense: 300 mL; Refill: 0    Chest congestion      Covid: neg  Flu:neg    EKG: compared to previous EKG from 2022.  Appears very similar with no obvious acute abnormality.  No ST elevation or T-wave inversion other than slight elevation in leads to which was present on previous EKGs.  Patient isn't having any chest pain.    CXR:  Chest x-ray reveals no acute cardiopulmonary process.    Unable to complete CBG as we do not have strips in clinic.     Discussed with patient if symptoms worsen or highly suggest going to the ER for further evaluation and management of symptoms.  Walking O2  evaluation produce an increase in pox to 96%.  We will begin treatment walking pneumonia due to decreased pox and chest tightness/intermittent shortness of breath.  Patient has acknowledge strict follow up precautions as well as acknowledged instructions and medication management.

## 2025-02-13 ENCOUNTER — PATIENT MESSAGE (OUTPATIENT)
Dept: FAMILY MEDICINE | Facility: CLINIC | Age: 70
End: 2025-02-13
Payer: COMMERCIAL

## 2025-02-13 DIAGNOSIS — K21.9 GASTROESOPHAGEAL REFLUX DISEASE, UNSPECIFIED WHETHER ESOPHAGITIS PRESENT: ICD-10-CM

## 2025-02-13 DIAGNOSIS — E11.22 TYPE 2 DIABETES MELLITUS WITH CHRONIC KIDNEY DISEASE, WITHOUT LONG-TERM CURRENT USE OF INSULIN, UNSPECIFIED CKD STAGE: ICD-10-CM

## 2025-02-13 DIAGNOSIS — E55.9 VITAMIN D DEFICIENCY: ICD-10-CM

## 2025-02-13 DIAGNOSIS — M54.9 CHRONIC BACK PAIN GREATER THAN 3 MONTHS DURATION: ICD-10-CM

## 2025-02-13 DIAGNOSIS — M79.89 LEG SWELLING: ICD-10-CM

## 2025-02-13 DIAGNOSIS — G89.29 CHRONIC BACK PAIN GREATER THAN 3 MONTHS DURATION: ICD-10-CM

## 2025-02-13 DIAGNOSIS — R45.89 DEPRESSED MOOD: ICD-10-CM

## 2025-02-13 DIAGNOSIS — E03.9 HYPOTHYROIDISM (ACQUIRED): ICD-10-CM

## 2025-02-18 RX ORDER — PANTOPRAZOLE SODIUM 40 MG/1
TABLET, DELAYED RELEASE ORAL
Qty: 90 TABLET | Refills: 3 | Status: SHIPPED | OUTPATIENT
Start: 2025-02-18

## 2025-02-18 RX ORDER — OXYBUTYNIN CHLORIDE 15 MG/1
15 TABLET, EXTENDED RELEASE ORAL DAILY
Qty: 90 TABLET | Refills: 3 | Status: SHIPPED | OUTPATIENT
Start: 2025-02-18

## 2025-02-18 RX ORDER — METFORMIN HYDROCHLORIDE 500 MG/1
1000 TABLET, EXTENDED RELEASE ORAL 2 TIMES DAILY WITH MEALS
Qty: 360 TABLET | Refills: 3 | Status: SHIPPED | OUTPATIENT
Start: 2025-02-18

## 2025-02-18 RX ORDER — LEVOTHYROXINE SODIUM 125 UG/1
125 TABLET ORAL
Qty: 30 TABLET | Refills: 5 | Status: SHIPPED | OUTPATIENT
Start: 2025-02-18

## 2025-02-18 RX ORDER — ERGOCALCIFEROL 1.25 MG/1
50000 CAPSULE ORAL
Qty: 12 CAPSULE | Refills: 3 | Status: SHIPPED | OUTPATIENT
Start: 2025-02-18

## 2025-02-18 RX ORDER — SEMAGLUTIDE 1.34 MG/ML
1 INJECTION, SOLUTION SUBCUTANEOUS
Qty: 9 ML | Refills: 3 | Status: SHIPPED | OUTPATIENT
Start: 2025-02-18 | End: 2026-02-18

## 2025-02-18 RX ORDER — HYDROCODONE BITARTRATE AND ACETAMINOPHEN 10; 325 MG/1; MG/1
1 TABLET ORAL DAILY
Qty: 30 TABLET | Refills: 0 | Status: SHIPPED | OUTPATIENT
Start: 2025-02-18 | End: 2025-03-20

## 2025-02-18 RX ORDER — ESCITALOPRAM OXALATE 10 MG/1
10 TABLET ORAL DAILY
Qty: 90 TABLET | Refills: 3 | Status: SHIPPED | OUTPATIENT
Start: 2025-02-18

## 2025-02-18 RX ORDER — SIMVASTATIN 10 MG/1
10 TABLET, FILM COATED ORAL NIGHTLY
Qty: 90 TABLET | Refills: 3 | Status: SHIPPED | OUTPATIENT
Start: 2025-02-18

## 2025-02-18 RX ORDER — FUROSEMIDE 20 MG/1
20 TABLET ORAL DAILY PRN
Qty: 90 TABLET | Status: SHIPPED | OUTPATIENT
Start: 2025-02-18 | End: 2026-02-18

## 2025-02-28 ENCOUNTER — PATIENT MESSAGE (OUTPATIENT)
Dept: FAMILY MEDICINE | Facility: CLINIC | Age: 70
End: 2025-02-28

## 2025-02-28 ENCOUNTER — OFFICE VISIT (OUTPATIENT)
Dept: FAMILY MEDICINE | Facility: CLINIC | Age: 70
End: 2025-02-28
Payer: MEDICARE

## 2025-02-28 VITALS
HEIGHT: 65 IN | BODY MASS INDEX: 32.29 KG/M2 | DIASTOLIC BLOOD PRESSURE: 68 MMHG | HEART RATE: 68 BPM | WEIGHT: 193.81 LBS | SYSTOLIC BLOOD PRESSURE: 130 MMHG | TEMPERATURE: 99 F | OXYGEN SATURATION: 99 %

## 2025-02-28 DIAGNOSIS — G89.29 CHRONIC BACK PAIN GREATER THAN 3 MONTHS DURATION: ICD-10-CM

## 2025-02-28 DIAGNOSIS — M25.512 ACUTE PAIN OF LEFT SHOULDER: ICD-10-CM

## 2025-02-28 DIAGNOSIS — M54.9 CHRONIC BACK PAIN GREATER THAN 3 MONTHS DURATION: ICD-10-CM

## 2025-02-28 DIAGNOSIS — H60.511 ACUTE ACTINIC OTITIS EXTERNA OF RIGHT EAR: Primary | ICD-10-CM

## 2025-02-28 PROCEDURE — 99999 PR PBB SHADOW E&M-EST. PATIENT-LVL V: CPT | Mod: PBBFAC,,, | Performed by: NURSE PRACTITIONER

## 2025-02-28 RX ORDER — FLUCONAZOLE 150 MG/1
150 TABLET ORAL ONCE
Qty: 2 TABLET | Refills: 0 | Status: SHIPPED | OUTPATIENT
Start: 2025-02-28 | End: 2025-02-28

## 2025-02-28 RX ORDER — AMOXICILLIN AND CLAVULANATE POTASSIUM 875; 125 MG/1; MG/1
1 TABLET, FILM COATED ORAL EVERY 12 HOURS
Qty: 14 TABLET | Refills: 0 | Status: SHIPPED | OUTPATIENT
Start: 2025-02-28 | End: 2025-03-07

## 2025-02-28 RX ORDER — KETOROLAC TROMETHAMINE 30 MG/ML
30 INJECTION, SOLUTION INTRAMUSCULAR; INTRAVENOUS
Status: COMPLETED | OUTPATIENT
Start: 2025-02-28 | End: 2025-02-28

## 2025-02-28 RX ORDER — CIPROFLOXACIN AND DEXAMETHASONE 3; 1 MG/ML; MG/ML
4 SUSPENSION/ DROPS AURICULAR (OTIC) 2 TIMES DAILY
Qty: 7.5 ML | Refills: 0 | Status: SHIPPED | OUTPATIENT
Start: 2025-02-28 | End: 2025-03-07

## 2025-02-28 RX ADMIN — KETOROLAC TROMETHAMINE 30 MG: 30 INJECTION, SOLUTION INTRAMUSCULAR; INTRAVENOUS at 03:02

## 2025-02-28 NOTE — PROGRESS NOTES
Subjective:       Patient ID: Edie Hernandez is a 69 y.o. female.    Chief Complaint: left shoulder pain (Other/) and right ear throbbing     HPI   68 y/o female patient with medical problems listed below presents for left shoulder pain for 4 days and right ear throbbing pain for 3 days. Denies recent trauma to the affected area or recent travel by plane. She states has had left shoulder pain intermittently for several years, which has been managed with rubbing alcohol and topical Voltaren gel. However, these treatments have only provided temporary relief recently. She describes the pain as aching and non-radiating, and it is not associated with dizziness, chest pain, shortness of breath, nausea, vomiting, tingling, or numbness. She mentions receiving a Toradol injection for similar pain in the past, which worked well, and she prefers the injection.  Patient complains of right ear throbbing pain for the past 3 days. She states she felt itching in the right ear, used a Q-tip, and noticed hard yellowish crusting. After the crusting was removed, the pain has been improving. She denies fever or chills. The patient also reports a history of yeast infections when taking antibiotics and requests medication    Problem List[1]     Review of patient's allergies indicates:   Allergen Reactions    Codeine Nausea And Vomiting     Other reaction(s): Nausea, dizziness     Past Surgical History:   Procedure Laterality Date    ANTERIOR LUMBAR INTERBODY FUSION (ALIF) N/A 11/8/2022    Procedure: FUSION, SPINE, LUMBAR, ALIF L3-4, L4-5;  Surgeon: Everton Godinez MD;  Location: [facility];  Service: Orthopedics;  Laterality: N/A;  NT[facility], [employer], DR SANDI ENRIQUEZ, CO-SURGEON    COLONOSCOPY  02/14/2013    Dr. Vogel: repeat in 5-10 years    COLONOSCOPY W/ BIOPSIES AND POLYPECTOMY  02/2013    hyperplastic, recheck 5-10 years    CYSTOSCOPY WITH HYDRODISTENSION OF BLADDER N/A 9/21/2022    Procedure: CYSTOSCOPY, WITH BLADDER  HYDRODISTENSION;  Surgeon: Keely Chavez Jr., MD;  Location: [facility] OR;  Service: Urology;  Laterality: N/A;    EXTERNAL EAR SURGERY      FUSION, SPINE, LUMBAR, POSTERIOR APPROACH N/A 11/8/2022    Procedure: FUSION,SPINE,LUMBAR,POSTERIOR APPROACH L3-4, L4-5;  Surgeon: Everton Godinez MD;  Location: [facility] OR;  Service: Orthopedics;  Laterality: N/A;  NTI, [employer], DR SANDI ENRIQUEZ, CO-SURGEON    HYSTERECTOMY      INJECTION OF ANESTHETIC AGENT AROUND MEDIAL BRANCH NERVES INNERVATING LUMBAR FACET JOINT Bilateral 8/3/2020    Procedure: Block-nerve-medial branch-lumbar left L2, L3, L4, L5;  Surgeon: Dejan Simmons MD;  Location: [facility] OR;  Service: Pain Management;  Laterality: Bilateral;    INJECTION OF ANESTHETIC AGENT AROUND MEDIAL BRANCH NERVES INNERVATING LUMBAR FACET JOINT Left 8/13/2020    Procedure: Block-nerve-medial branch-lumbar.  L2, L3, L4, and L5;  Surgeon: Dejan Simmons MD;  Location: [facility] OR;  Service: Pain Management;  Laterality: Left;    MINIMALLY INVASIVE FUSION OF SPINE Left 10/12/2021    Procedure: FUSION, SPINE, MINIMALLY INVASIVE;  Surgeon: Everton Godinez MD;  Location: [facility] OR;  Service: Orthopedics;  Laterality: Left;  SI-Bone    RADIOFREQUENCY ABLATION OF LUMBAR MEDIAL BRANCH NERVE AT SINGLE LEVEL Left 8/27/2020    Procedure: Radiofrequency Ablation, Nerve, Spinal, Lumbar, Medial Branch,  L2, L3, L4, L5;  Surgeon: Dejan Simmons MD;  Location: [facility] OR;  Service: Pain Management;  Laterality: Left;  L2,l3,l4,l5    THROAT SURGERY      for CLINT    TRANSFORAMINAL EPIDURAL INJECTION OF STEROID Left 3/6/2020    Procedure: Injection,steroid,epidural,transforaminal approach;  Surgeon: Harmeet Zapata MD;  Location: [facility] OR;  Service: Pain Management;  Laterality: Left;  L4-L5    TRANSFORAMINAL EPIDURAL INJECTION OF STEROID Left 6/10/2020    Procedure: Injection,steroid,epidural,transforaminal approach.L4 and L5;  Surgeon: Dejan Simmons MD;  Location: [facility] OR;  Service: Pain Management;  Laterality: Left;     "TRANSFORAMINAL EPIDURAL INJECTION OF STEROID Left 7/6/2020    Procedure: Injection,steroid,epidural,transforaminal approach. left L4 and L5;  Surgeon: Dejan Simmons MD;  Location: [facility] OR;  Service: Pain Management;  Laterality: Left;    TRANSFORAMINAL EPIDURAL INJECTION OF STEROID Left 11/12/2021    Procedure: Injection,steroid,epidural,transforaminal approach Left L5-S1;  Surgeon: Dejan Simmons MD;  Location: [facility] OR;  Service: Pain Management;  Laterality: Left;    UPPER GASTROINTESTINAL ENDOSCOPY        Current Medications[2]    Review of Systems   Constitutional:  Negative for chills and fever.   HENT:  Positive for ear pain. Negative for congestion and ear discharge.    Respiratory:  Negative for cough and shortness of breath.    Cardiovascular:  Negative for chest pain and palpitations.   Gastrointestinal:  Negative for abdominal pain.   Musculoskeletal:         Left shoulder pain   Neurological:  Negative for dizziness and headaches.       Objective:   /68 (BP Location: Right arm, Patient Position: Sitting)   Pulse 68   Temp 98.6 °F (37 °C) (Oral)   Ht 5' 5" (1.651 m)   Wt 87.9 kg (193 lb 12.6 oz)   SpO2 99%   BMI 32.25 kg/m²         Physical Exam  Constitutional:       General: She is not in acute distress.     Appearance: Normal appearance.   HENT:      Head: Atraumatic.      Left Ear: A middle ear effusion is present. Tympanic membrane is bulging.      Ears:      Comments: +mild erythema in left ear canal  Cardiovascular:      Rate and Rhythm: Normal rate and regular rhythm.      Pulses: Normal pulses.      Heart sounds: Normal heart sounds.   Pulmonary:      Effort: Pulmonary effort is normal.      Breath sounds: Normal breath sounds.   Abdominal:      General: Abdomen is flat. Bowel sounds are normal.      Palpations: Abdomen is soft.   Musculoskeletal:         General: Tenderness present.   Neurological:      Mental Status: She is oriented to person, place, and time.         Assessment:     "   1. Acute actinic otitis externa of right ear    2. Acute pain of left shoulder        Plan:       1. Acute actinic otitis externa of right ear (Primary)  - amoxicillin-clavulanate 875-125mg (AUGMENTIN) 875-125 mg per tablet; Take 1 tablet by mouth every 12 (twelve) hours. for 7 days  Dispense: 14 tablet; Refill: 0  - ciprofloxacin-dexAMETHasone 0.3-0.1% (CIPRODEX) 0.3-0.1 % DrpS; Place 4 drops into the right ear 2 (two) times daily. for 7 days  Dispense: 7.5 mL; Refill: 0  - fluconazole (DIFLUCAN) 150 MG Tab; Take 1 tablet (150 mg total) by mouth once. May repeat 1 in 48 hours prn for 1 dose  Dispense: 2 tablet; Refill: 0    2. Acute pain of left shoulder on chronic   - ketorolac injection 30 mg     Patient with be reevaluated in  as scheduled in 3/2025  or sooner prn  Immanuel NP         [1]   Patient Active Problem List  Diagnosis    GERD (gastroesophageal reflux disease)    Chronic back pain greater than 3 months duration    Environmental allergies    Fibromyalgia    B12 nutritional deficiency    CLINT (obstructive sleep apnea)    Chronic sinusitis    Asthma    Hypothyroidism (acquired)    Nausea    Night sweats    Tinnitus, bilateral    Frequent UTI    Hyperlipidemia associated with type 2 diabetes mellitus    Colon polyp, hyperplastic    Interstitial cystitis    Hemangioma    Wart    Globus sensation    DDD (degenerative disc disease), lumbar    Obesity (BMI 30-39.9)    Edema    Mixed incontinence    Continuous opioid dependence- contract 10/18/17-failed uds for norco, + 2/18    Type 2 diabetes mellitus    History of colon polyps    Lumbar radiculitis    Lumbar radiculopathy    Lumbar spondylosis    Sacroiliitis    Lumbar stenosis with neurogenic claudication    Urinary retention    S/P lumbar fusion    Impaired flexibility of lower extremity    Depression, recurrent   [2]   Current Outpatient Medications:     azelastine (ASTELIN) 137 mcg (0.1 %) nasal spray, 1 spray (137 mcg total) by Nasal route 2 (two) times  daily as needed for Rhinitis., Disp: 30 mL, Rfl: 5    clobetasol (TEMOVATE) 0.05 % cream, Apply 1 application topically 2 (two) times daily. Apply to affected area, Disp: 30 g, Rfl: 2    cyanocobalamin (VITAMIN B-12) 100 MCG tablet, Take 100 mcg by mouth once daily., Disp: , Rfl:     cyclobenzaprine (FLEXERIL) 5 MG tablet, TAKE 1 TO 2 TABLETS EVERY NIGHT AT BEDTIME AS NEEDED FOR PAIN, Disp: 90 tablet, Rfl: PRN    diclofenac sodium (VOLTAREN) 1 % Gel, Apply 2 g topically 4 (four) times daily., Disp: 450 g, Rfl: prn    ergocalciferol (ERGOCALCIFEROL) 50,000 unit Cap, Take 1 capsule (50,000 Units total) by mouth every 7 days., Disp: 12 capsule, Rfl: 3    EScitalopram oxalate (LEXAPRO) 10 MG tablet, Take 1 tablet (10 mg total) by mouth once daily., Disp: 90 tablet, Rfl: 3    estradioL (ESTRACE) 0.01 % (0.1 mg/gram) vaginal cream, Apply 1 fingertipful to vaginal area nightly x 2 weeks then use on [facility], Disp: 42.5 g, Rfl: 3    fluticasone propionate (FLONASE) 50 mcg/actuation nasal spray, 1 spray (50 mcg total) by Each Nostril route daily as needed for Allergies., Disp: 16 g, Rfl: prn    furosemide (LASIX) 20 MG tablet, Take 1 tablet (20 mg total) by mouth daily as needed (leg swelling)., Disp: 90 tablet, Rfl: PRN    HYDROcodone-acetaminophen (NORCO)  mg per tablet, Take 1 tablet by mouth once daily., Disp: 30 tablet, Rfl: 0    hydrOXYzine HCL (ATARAX) 25 MG tablet, Take 1 tablet (25 mg total) by mouth 3 (three) times daily as needed for Itching., Disp: 60 tablet, Rfl: 1    lancets (ACCU-CHEK MULTICLIX LANCET) Misc, Test 2 x day, Disp: 200 each, Rfl: 3    levothyroxine (SYNTHROID) 125 MCG tablet, Take 1 tablet (125 mcg total) by mouth before breakfast., Disp: 30 tablet, Rfl: 5    metFORMIN (GLUCOPHAGE-XR) 500 MG ER 24hr tablet, Take 2 tablets (1,000 mg total) by mouth 2 (two) times daily with meals., Disp: 360 tablet, Rfl: 3    mupirocin (BACTROBAN) 2 % ointment, Apply topically 3 (three) times daily., Disp: 30 g,  Rfl: prn    ONETOUCH ULTRA BLUE TEST STRIP Strp, USE TO TEST BLOOD SUGAR, Disp: 100 strip, Rfl: 3    oxybutynin (DITROPAN XL) 15 MG TR24, Take 1 tablet (15 mg total) by mouth once daily., Disp: 90 tablet, Rfl: 3    pantoprazole (PROTONIX) 40 MG tablet, TAKE 1 TABLET ONE TIME DAILY, Disp: 90 tablet, Rfl: 3    semaglutide (OZEMPIC) 1 mg/dose (4 mg/3 mL), Inject 1 mg into the skin every 7 days., Disp: 9 mL, Rfl: 3    simvastatin (ZOCOR) 10 MG tablet, Take 1 tablet (10 mg total) by mouth every evening., Disp: 90 tablet, Rfl: 3    triamcinolone acetonide 0.1% (KENALOG) 0.1 % ointment, Apply topically 2 (two) times daily., Disp: 80 g, Rfl: 0    albuterol (PROVENTIL HFA) 90 mcg/actuation inhaler, Inhale 2 puffs into the lungs every 6 (six) hours as needed for Wheezing. Rescue, Disp: 6.7 g, Rfl: 0    amoxicillin-clavulanate 875-125mg (AUGMENTIN) 875-125 mg per tablet, Take 1 tablet by mouth every 12 (twelve) hours. for 7 days, Disp: 14 tablet, Rfl: 0    blood-glucose meter kit, Use as instructed, Disp: 1 each, Rfl: 0    ciprofloxacin-dexAMETHasone 0.3-0.1% (CIPRODEX) 0.3-0.1 % DrpS, Place 4 drops into the right ear 2 (two) times daily. for 7 days, Disp: 7.5 mL, Rfl: 0    fluconazole (DIFLUCAN) 150 MG Tab, Take 1 tablet (150 mg total) by mouth once. May repeat 1 in 48 hours prn for 1 dose, Disp: 2 tablet, Rfl: 0    valACYclovir (VALTREX) 1000 MG tablet, Take 1 tablet (1,000 mg total) by mouth 2 (two) times daily. for 7 days, Disp: 14 tablet, Rfl: 0    Current Facility-Administered Medications:     LIDOcaine HCL 20 mg/ml (2%) injection 20 mL, 20 mL, Other, 1 time in Clinic/HOD, LUIS Dominguez FNP    Facility-Administered Medications Ordered in Other Visits:     lactated ringers infusion, , Intravenous, Continuous, Dejan Simmons MD, Stopped at 07/06/20 9684

## 2025-02-28 NOTE — TELEPHONE ENCOUNTER
Care Due:                  Date            Visit Type   Department     Provider  --------------------------------------------------------------------------------                                EP -                              PRIMARY      [facility]  Last Visit: 09-      CARE ([facility])   GHASSAN Eugene                              EP -                              PRIMARY      [facility]  Next Visit: 05-      CARE ([facility])   MEDICINE       Lydia Eugene                                                            Last  Test          Frequency    Reason                     Performed    Due Date  --------------------------------------------------------------------------------    Vitamin D...  12 months..  ergocalciferol...........  Not Found    Overdue    [employer] Embedded Care Due Messages. Reference number: [id number].   2/28/2025 4:18:50 PM CST

## 2025-03-03 RX ORDER — HYDROCODONE BITARTRATE AND ACETAMINOPHEN 10; 325 MG/1; MG/1
1 TABLET ORAL DAILY
Qty: 30 TABLET | Refills: 0 | Status: SHIPPED | OUTPATIENT
Start: 2025-03-03 | End: 2025-04-02

## 2025-05-02 ENCOUNTER — OFFICE VISIT (OUTPATIENT)
Dept: FAMILY MEDICINE | Facility: CLINIC | Age: 70
End: 2025-05-02
Payer: MEDICARE

## 2025-05-02 ENCOUNTER — LAB VISIT (OUTPATIENT)
Dept: LAB | Facility: HOSPITAL | Age: 70
End: 2025-05-02
Attending: FAMILY MEDICINE
Payer: MEDICARE

## 2025-05-02 VITALS
WEIGHT: 190.94 LBS | SYSTOLIC BLOOD PRESSURE: 132 MMHG | BODY MASS INDEX: 31.81 KG/M2 | OXYGEN SATURATION: 99 % | HEART RATE: 64 BPM | DIASTOLIC BLOOD PRESSURE: 60 MMHG | HEIGHT: 65 IN | TEMPERATURE: 98 F

## 2025-05-02 DIAGNOSIS — G47.33 OSA (OBSTRUCTIVE SLEEP APNEA): ICD-10-CM

## 2025-05-02 DIAGNOSIS — E78.5 HYPERLIPIDEMIA ASSOCIATED WITH TYPE 2 DIABETES MELLITUS: ICD-10-CM

## 2025-05-02 DIAGNOSIS — E03.9 HYPOTHYROIDISM (ACQUIRED): ICD-10-CM

## 2025-05-02 DIAGNOSIS — Z01.00 DIABETIC EYE EXAM: ICD-10-CM

## 2025-05-02 DIAGNOSIS — G89.29 CHRONIC BACK PAIN GREATER THAN 3 MONTHS DURATION: ICD-10-CM

## 2025-05-02 DIAGNOSIS — S51.811S SKIN TEAR OF RIGHT FOREARM WITHOUT COMPLICATION, SEQUELA: ICD-10-CM

## 2025-05-02 DIAGNOSIS — M54.9 CHRONIC BACK PAIN GREATER THAN 3 MONTHS DURATION: ICD-10-CM

## 2025-05-02 DIAGNOSIS — Z86.0100 HISTORY OF COLON POLYPS: ICD-10-CM

## 2025-05-02 DIAGNOSIS — E53.8 B12 NUTRITIONAL DEFICIENCY: ICD-10-CM

## 2025-05-02 DIAGNOSIS — K21.9 GASTROESOPHAGEAL REFLUX DISEASE, UNSPECIFIED WHETHER ESOPHAGITIS PRESENT: ICD-10-CM

## 2025-05-02 DIAGNOSIS — E11.69 HYPERLIPIDEMIA ASSOCIATED WITH TYPE 2 DIABETES MELLITUS: ICD-10-CM

## 2025-05-02 DIAGNOSIS — E11.9 DIABETIC EYE EXAM: ICD-10-CM

## 2025-05-02 DIAGNOSIS — E55.9 VITAMIN D DEFICIENCY: ICD-10-CM

## 2025-05-02 DIAGNOSIS — E66.9 OBESITY (BMI 30-39.9): ICD-10-CM

## 2025-05-02 DIAGNOSIS — E11.9 TYPE 2 DIABETES MELLITUS WITHOUT COMPLICATION: ICD-10-CM

## 2025-05-02 DIAGNOSIS — E11.22 TYPE 2 DIABETES MELLITUS WITH CHRONIC KIDNEY DISEASE, WITHOUT LONG-TERM CURRENT USE OF INSULIN, UNSPECIFIED CKD STAGE: Primary | ICD-10-CM

## 2025-05-02 DIAGNOSIS — E11.22 TYPE 2 DIABETES MELLITUS WITH CHRONIC KIDNEY DISEASE, WITHOUT LONG-TERM CURRENT USE OF INSULIN, UNSPECIFIED CKD STAGE: ICD-10-CM

## 2025-05-02 LAB
25(OH)D3+25(OH)D2 SERPL-MCNC: 43 NG/ML (ref 30–96)
ABSOLUTE EOSINOPHIL (OHS): 0.62 K/UL
ABSOLUTE MONOCYTE (OHS): 0.41 K/UL (ref 0.3–1)
ABSOLUTE NEUTROPHIL COUNT (OHS): 3.98 K/UL (ref 1.8–7.7)
ALBUMIN SERPL BCP-MCNC: 3.5 G/DL (ref 3.5–5.2)
ALBUMIN/CREAT UR: NORMAL
ALP SERPL-CCNC: 77 UNIT/L (ref 40–150)
ALT SERPL W/O P-5'-P-CCNC: 9 UNIT/L (ref 10–44)
ANION GAP (OHS): 13 MMOL/L (ref 8–16)
AST SERPL-CCNC: 14 UNIT/L (ref 11–45)
BASOPHILS # BLD AUTO: 0.04 K/UL
BASOPHILS NFR BLD AUTO: 0.6 %
BILIRUB SERPL-MCNC: 1.1 MG/DL (ref 0.1–1)
BUN SERPL-MCNC: 19 MG/DL (ref 8–23)
CALCIUM SERPL-MCNC: 9.3 MG/DL (ref 8.7–10.5)
CHLORIDE SERPL-SCNC: 107 MMOL/L (ref 95–110)
CHOLEST SERPL-MCNC: 203 MG/DL (ref 120–199)
CHOLEST/HDLC SERPL: 3.2 {RATIO} (ref 2–5)
CO2 SERPL-SCNC: 23 MMOL/L (ref 23–29)
CREAT SERPL-MCNC: 0.9 MG/DL (ref 0.5–1.4)
CREAT UR-MCNC: 68 MG/DL (ref 15–325)
EAG (OHS): 103 MG/DL (ref 68–131)
ERYTHROCYTE [DISTWIDTH] IN BLOOD BY AUTOMATED COUNT: 13.8 % (ref 11.5–14.5)
GFR SERPLBLD CREATININE-BSD FMLA CKD-EPI: >60 ML/MIN/1.73/M2
GLUCOSE SERPL-MCNC: 78 MG/DL (ref 70–110)
HBA1C MFR BLD: 5.2 % (ref 4–5.6)
HCT VFR BLD AUTO: 44.1 % (ref 37–48.5)
HDLC SERPL-MCNC: 63 MG/DL (ref 40–75)
HDLC SERPL: 31 % (ref 20–50)
HGB BLD-MCNC: 14.2 GM/DL (ref 12–16)
IMM GRANULOCYTES # BLD AUTO: 0.01 K/UL (ref 0–0.04)
IMM GRANULOCYTES NFR BLD AUTO: 0.2 % (ref 0–0.5)
LDLC SERPL CALC-MCNC: 119.8 MG/DL (ref 63–159)
LYMPHOCYTES # BLD AUTO: 1.13 K/UL (ref 1–4.8)
MCH RBC QN AUTO: 28.8 PG (ref 27–31)
MCHC RBC AUTO-ENTMCNC: 32.2 G/DL (ref 32–36)
MCV RBC AUTO: 90 FL (ref 82–98)
MICROALBUMIN UR-MCNC: <5 UG/ML (ref ?–5000)
NONHDLC SERPL-MCNC: 140 MG/DL
NUCLEATED RBC (/100WBC) (OHS): 0 /100 WBC
PLATELET # BLD AUTO: 274 K/UL (ref 150–450)
PMV BLD AUTO: 10 FL (ref 9.2–12.9)
POTASSIUM SERPL-SCNC: 4.1 MMOL/L (ref 3.5–5.1)
PROT SERPL-MCNC: 6.6 GM/DL (ref 6–8.4)
RBC # BLD AUTO: 4.93 M/UL (ref 4–5.4)
RELATIVE EOSINOPHIL (OHS): 10 %
RELATIVE LYMPHOCYTE (OHS): 18.3 % (ref 18–48)
RELATIVE MONOCYTE (OHS): 6.6 % (ref 4–15)
RELATIVE NEUTROPHIL (OHS): 64.3 % (ref 38–73)
SODIUM SERPL-SCNC: 143 MMOL/L (ref 136–145)
T4 FREE SERPL-MCNC: 1.08 NG/DL (ref 0.71–1.51)
TRIGL SERPL-MCNC: 101 MG/DL (ref 30–150)
TSH SERPL-ACNC: 0.08 UIU/ML (ref 0.4–4)
WBC # BLD AUTO: 6.19 K/UL (ref 3.9–12.7)

## 2025-05-02 PROCEDURE — 80053 COMPREHEN METABOLIC PANEL: CPT

## 2025-05-02 PROCEDURE — 36415 COLL VENOUS BLD VENIPUNCTURE: CPT | Mod: PO

## 2025-05-02 PROCEDURE — 82306 VITAMIN D 25 HYDROXY: CPT

## 2025-05-02 PROCEDURE — 85025 COMPLETE CBC W/AUTO DIFF WBC: CPT

## 2025-05-02 PROCEDURE — 80061 LIPID PANEL: CPT

## 2025-05-02 PROCEDURE — 99999 PR PBB SHADOW E&M-EST. PATIENT-LVL V: CPT | Mod: PBBFAC,,, | Performed by: FAMILY MEDICINE

## 2025-05-02 PROCEDURE — 82570 ASSAY OF URINE CREATININE: CPT

## 2025-05-02 PROCEDURE — 84439 ASSAY OF FREE THYROXINE: CPT

## 2025-05-02 PROCEDURE — 83036 HEMOGLOBIN GLYCOSYLATED A1C: CPT

## 2025-05-02 PROCEDURE — 84443 ASSAY THYROID STIM HORMONE: CPT

## 2025-05-02 RX ORDER — HYDROCODONE BITARTRATE AND ACETAMINOPHEN 10; 325 MG/1; MG/1
1 TABLET ORAL
Qty: 30 TABLET | Refills: 0 | Status: SHIPPED | OUTPATIENT
Start: 2025-07-01 | End: 2025-07-31

## 2025-05-02 RX ORDER — HYDROCODONE BITARTRATE AND ACETAMINOPHEN 10; 325 MG/1; MG/1
1 TABLET ORAL
Qty: 30 TABLET | Refills: 0 | Status: SHIPPED | OUTPATIENT
Start: 2025-05-02 | End: 2025-06-01

## 2025-05-02 RX ORDER — HYDROCODONE BITARTRATE AND ACETAMINOPHEN 10; 325 MG/1; MG/1
1 TABLET ORAL
Qty: 30 TABLET | Refills: 0 | Status: SHIPPED | OUTPATIENT
Start: 2025-06-01 | End: 2025-07-01

## 2025-05-02 RX ORDER — SILVER SULFADIAZINE 10 G/1000G
CREAM TOPICAL 2 TIMES DAILY
Qty: 85 G | Refills: 0 | Status: SHIPPED | OUTPATIENT
Start: 2025-05-02

## 2025-05-02 NOTE — PROGRESS NOTES
Subjective:       Patient ID: Edie Hernandez is a 69 y.o. female.    Chief Complaint: Follow-up (6 months)    Problem List[1]  Patient is here for a chronic conditions follow up.    Reviewed labs 5/2025-pending  History of Present Illness    CHIEF COMPLAINT:  Ms. Hernandez presents today for follow up    WEIGHT MANAGEMENT:  She has achieved significant weight loss, decreasing from a maximum weight of 275 lbs in 2022 to current weight of 190 lbs.    CURRENT MEDICATIONS:  She is currently taking Ozempic 1mg, having decreased from 2mg due to GI side effects. She is not taking Metformin or Zocor.    PAIN MANAGEMENT:  She received plasma treatment at pain clinic in January. She experienced initial aching for 5 days post-treatment, followed by gradual improvement in symptoms, particularly in the treated back area. She reports significant overall improvement in symptoms after several months, with benefits continuing to persist.    OPHTHALMOLOGY:  Recent dilated eye exam was normal, particularly regarding diabetes-related concerns.      ROS:  ROS findings as noted in HPI. 2022 highest 275 lbs , now 190        Review of Systems   Constitutional:  Negative for fatigue and unexpected weight change.   Respiratory:  Negative for chest tightness and shortness of breath.    Cardiovascular:  Negative for chest pain, palpitations and leg swelling.   Gastrointestinal:  Negative for abdominal pain.   Musculoskeletal:  Negative for arthralgias.   Neurological:  Negative for dizziness, syncope, light-headedness and headaches.      Relevant History:  Declines pneumonvax        GI Dr. Mancini colonoscopy 2019 1 polpy- on 10 year surveillance.       Pain Continuous use of opioids follow-up:  Current medication and dosing:  norco 10 1 x day  Supply:  90 day supply  Side effects: none  Pain controlled: yes  Medication compliance: yes  DPS checked today: today, no evidence of diversion  UDS: 8/23 + hydrocodone as expected.   2/23 +lyrica  and MJ, not for norco. Admits she had tried neighbor's chavez to help her sleep.  Since then she has taken some medical MJ as evidenced on DPS  pain contract signed:8/23  Opioid risk tool done:n/a  Cause of Pain: low back pain      Pain Dr. Dejesus  treating sandeep SI, lumbar spondylosis.Had rizotomy lower back 9/16/24.  Tripped and fell flat on abdomen. Slid. Now severe pain radiating to abd       Neuro C/o right CTS sx acting up.  Last 2 months has been wearing old splint     Urogyn Dr. Dominguez treating urinary frequency     Endocrine Type 2 DM A1c 5.2.  CT coronary score 7/2021 0.  Urine ma nl. On ozempic 1 mg, .highest weight 262 ( 2/23), lowest 190 . On zocor ? and ARB   -hypothyroid  Tsh low, t4 normal     GYN mammo neg 10/24  DEXA 8/22 normal     Ortho Dr. Godinez treating Sacroiliitis. lumbar spondylolithesis s/p lumbar fusion 11/22     Phys Med Dr. Simmons treating chronic left sided low back pain.  Doing injections for low back pain which are helping        Pulm/Sleep Dr. Yang-CLINT on daily methylphendiate  Objective:      Physical Exam  Vitals and nursing note reviewed.   Constitutional:       Appearance: She is well-developed.   Cardiovascular:      Rate and Rhythm: Normal rate and regular rhythm.      Heart sounds: Normal heart sounds.   Pulmonary:      Effort: Pulmonary effort is normal.      Breath sounds: Normal breath sounds.   Skin:     General: Skin is warm and dry.   Neurological:      Mental Status: She is alert and oriented to person, place, and time.         Assessment:       ICD-10-CM ICD-9-CM    1. Type 2 diabetes mellitus with chronic kidney disease, without long-term current use of insulin, unspecified CKD stage  E11.22 250.40 Comprehensive Metabolic Panel     585.9 Hemoglobin A1C      2. Hypothyroidism (acquired)  E03.9 244.9 CBC Auto Differential      TSH      T4, Free      3. Gastroesophageal reflux disease, unspecified whether esophagitis present  K21.9 530.81       4. Vitamin D  deficiency  E55.9 268.9       5. B12 nutritional deficiency  E53.8 266.2       6. CLINT (obstructive sleep apnea)  G47.33 327.23       7. Obesity (BMI 30-39.9)  E66.9 278.00       8. Hyperlipidemia associated with type 2 diabetes mellitus  E11.69 250.80 Lipid Panel    E78.5 272.4       9. Chronic back pain greater than 3 months duration  M54.9 724.5 HYDROcodone-acetaminophen (NORCO)  mg per tablet    G89.29 338.29 HYDROcodone-acetaminophen (NORCO)  mg per tablet      HYDROcodone-acetaminophen (NORCO)  mg per tablet      10. History of colon polyps  Z86.0100 V12.72       11. Diabetic eye exam  Z01.00 V72.0     E11.9 250.00       12. Skin tear of right forearm without complication, sequela  S51.811S 906.1 silver sulfADIAZINE 1% (SILVADENE) 1 % cream         Plan:   1. Type 2 diabetes mellitus with chronic kidney disease, without long-term current use of insulin, unspecified CKD stage (Primary)  Stable condition.  Continue current medications.  Will adjust based on lab findings or if condition changes.    - Comprehensive Metabolic Panel; Future  - Hemoglobin A1C; Future    2. Hypothyroidism (acquired)  Stable condition.  Continue current medications.  Will adjust based on lab findings or if condition changes.    - CBC Auto Differential; Future  - TSH; Future  - T4, Free; Future    3. Gastroesophageal reflux disease, unspecified whether esophagitis present  Controlled on current medications.  Continue current medications.  Protonix 40mg daily    4. Vitamin D deficiency  Screen and treat as indicated:      5. B12 nutritional deficiency  Screen and treat as indicated:      6. CLINT (obstructive sleep apnea)  Cont current mgmt    7. Obesity (BMI 30-39.9)  Counseled patient on his ideal body weight, health consequences of being obese and current recommendations including weekly exercise and a heart healthy diet.  Current BMI is:Estimated body mass index is 31.77 kg/m² as calculated from the following:     "Height as of this encounter: 5' 5" (1.651 m).    Weight as of this encounter: 86.6 kg (190 lb 14.7 oz)..  Patient is aware that ideal BMI < 25 or Weight in (lb) to have BMI = 25: 149.9.      8. Hyperlipidemia associated with type 2 diabetes mellitus  Stable condition.  Continue current medications.  Will adjust based on lab findings or if condition changes.  ? Not taking zocor  - Lipid Panel; Future    9. Chronic back pain greater than 3 months duration  Controlled on current medications.  Continue current medications.    - HYDROcodone-acetaminophen (NORCO)  mg per tablet; Take 1 tablet by mouth every 24 hours as needed for Pain.  Dispense: 30 tablet; Refill: 0  - HYDROcodone-acetaminophen (NORCO)  mg per tablet; Take 1 tablet by mouth every 24 hours as needed for Pain.  Dispense: 30 tablet; Refill: 0  - HYDROcodone-acetaminophen (NORCO)  mg per tablet; Take 1 tablet by mouth every 24 hours as needed for Pain.  Dispense: 30 tablet; Refill: 0    10. History of colon polyps  Cont gi surveillance when due    11. Diabetic eye exam  Cont annual check ups    12. Skin tear of right forearm without complication, sequela  Cleanse wound twice daily.  Apply antibiotic ointment and sterile bandage twice daily and as needed.  Monitor for increased pain, pus, redness and swelling.  If occurs then return to clinic or go to the emergency room if clinic is closed for a recheck.      - silver sulfADIAZINE 1% (SILVADENE) 1 % cream; Apply topically 2 (two) times daily.  Dispense: 85 g; Refill: 0  Assessment & Plan    - Explained that cervical cancer risk decreases with age, justifying cessation of Pap smears after 65.  - Discussed proper wound care for skin tear, emphasizing use of soap and water for cleaning, avoiding prolonged use of peroxide.  - Started Silvadene cream for arm wound with instructions to apply; recommend using non-stick pad and wrap with Coban for wound care, avoiding adhesive bandages.  - Continued " Ozempic 1 mg and discontinued metformin.  - Ordered labs and will review and communicate results to patient.  - Contact the office if colonoscopy-related pain recurs for potential sonogram or CT.         Time spent with patient: 20 minutes  Patient with be reevaluated in 3 months or sooner prn  Greater than 50% of this visit was spent counseling as described in above documentation:Yes  This note was generated with the assistance of ambient listening technology. Verbal consent was obtained by the patient and accompanying visitor(s) for the recording of patient appointment to facilitate this note. I attest to having reviewed and edited the generated note for accuracy, though some syntax or spelling errors may persist. Please contact the author of this note for any clarification.            [1]   Patient Active Problem List  Diagnosis    GERD (gastroesophageal reflux disease)    Chronic back pain greater than 3 months duration    Environmental allergies    Fibromyalgia    B12 nutritional deficiency    CLINT (obstructive sleep apnea)    Chronic sinusitis    Asthma    Hypothyroidism (acquired)    Nausea    Night sweats    Tinnitus, bilateral    Frequent UTI    Hyperlipidemia associated with type 2 diabetes mellitus    Colon polyp, hyperplastic    Interstitial cystitis    Hemangioma    Wart    Globus sensation    DDD (degenerative disc disease), lumbar    Obesity (BMI 30-39.9)    Edema    Mixed incontinence    Continuous opioid dependence- contract 10/18/17-failed uds for norco, + 2/18    Type 2 diabetes mellitus    History of colon polyps    Lumbar radiculitis    Lumbar radiculopathy    Lumbar spondylosis    Sacroiliitis    Lumbar stenosis with neurogenic claudication    Urinary retention    S/P lumbar fusion    Impaired flexibility of lower extremity    Depression, recurrent

## 2025-05-05 ENCOUNTER — PATIENT OUTREACH (OUTPATIENT)
Dept: ADMINISTRATIVE | Facility: HOSPITAL | Age: 70
End: 2025-05-05
Payer: MEDICARE

## 2025-05-05 NOTE — PROGRESS NOTES
Population Health Chart Review & Patient Outreach Details    Updates Requested / Reviewed:      Updated Care Coordination Note, Care Everywhere, , and Immunizations Reconciliation Completed or Queried: [place]         Health Maintenance Topics Overdue:      [facility] Score: 3     Colon Cancer Screening  Eye Exam  Foot Exam    Pneumonia Vaccine  Shingles/Zoster Vaccine  RSV Vaccine                  Health Maintenance Topic(s) Outreach Outcomes & Actions Taken:    Eye Exam - Outreach Outcomes & Actions Taken  : External Records Requested & Care Team Updated if Applicable [facility]

## 2025-05-05 NOTE — LETTER
AUTHORIZATION FOR RELEASE OF   CONFIDENTIAL INFORMATION    Dear [facility],    We are seeing Edie Hernandez, date of birth 1955, in the clinic at [facility]. Lydia Eugene MD is the patient's PCP. Edie Hernandez has an outstanding lab/procedure at the time we reviewed her chart. In order to help keep her health information updated, she has authorized us to request the following medical record(s):     Diabetic EYE EXAM  2024 - 2025 most recent         Please include the actual eye exam report along with the significant findings:    ________ No Diabetic Retinopathy  ________ Minimal Background Diabetic Retinopathy  ________ Moderate to Severe Background Diabetic Retinopathy  ________ Clinically Significant Macular Edema  ________ Proliferative Diabetic Retinopathy         Please fax records to [facility], Hammons, Amy L., MD,  at [phone] or email to [email or web address].      If you have any questions, please contact [facility] at [phone].         Patient Name: Edie Hernandez  : 1955  Patient Phone #: [phone]                Edie Hernandez  MRN: [id number]  : 1955  Age: 69 y.o.  Sex: female         Patient/Legal Guardian Signature  This signature was collected at 2025    Edie Hernandez       _______________________________   Printed Name/Relationship to Patient      Consent for Examination and Treatment: I hereby authorize the providers and employees of [facility] ([facility]) to provide medical treatment/services which includes, but is not limited to, performing and administering tests and diagnostic procedures that are deemed necessary, including, but not limited to, imaging examinations, blood tests and other laboratory procedures as may be required by the hospital, clinic, or may be ordered by my physician(s) or persons working under the general and/or special instructions of my physician(s).      I understand and agree  that this consent covers all authorized persons, including but not limited to physicians, residents, nurse practitioners, physicians' assistants, specialists, consultants, student nurses, and independently contracted physicians, who are called upon by the physician in charge, to carry out the diagnostic procedures and medical or surgical treatment.     I hereby authorize [facility] to retain or dispose of any specimens or tissue, should there be such remaining from any test or procedure.     I hereby authorize and give consent for [facility] providers and employees to take photographs, images or videotapes of such diagnostic, surgical or treatment procedures of Patient as may be required by [facility] or as may be ordered by a physician. I further acknowledge and agree that [facility] may use cameras or other devices for patient monitoring.     I am aware that the practice of medicine is not an exact science, and I acknowledge that no guarantees have been made to me as to the outcome of any tests, procedures or treatment.     Authorization for Release of Information: I understand that my insurance company and/or their agents may need information necessary to make determinations about payment/reimbursement. I hereby provide authorization to release to all insurance companies, their successors, assignees, other parties with whom they may have contracted, or others acting on their behalf, that are involved with payment for any hospital and/or clinic charges incurred by the patient, any information that they request and deem necessary for payment/reimbursement, and/or quality review.  I further authorize the release of my health information to physicians or other health care practitioners on staff who are involved in my health care now and in the future, and to other health care providers, entities, or institutions for the purpose of my continued care and treatment, including referrals.     REGISTRATION AUTHORIZATION  Form No.  20225 (Rev. 3/25/2024)    Page 1 of 3                       Medicare Patient's Certification and Authorization to Release Information and Payment Request:  I certify that the information given by me in applying for payment under Title XVIII of the Social Security Act is correct. I authorize any mata of medical or other information about me to release to the Social SecurityAdministration, or its intermediaries or carriers, any information needed for this or a related Medicare claim. I request that payment of authorized benefits be made on my behalf.     Assignment of Insurance Benefits:   I hereby authorize any and all insurance companies, health plans, defined   benefit plans, health insurers or any entity that is or may be responsible for payment of my medical expenses to pay all hospital and medical benefits now due, and to become due and payable to me under any hospital benefits, sick benefits, injury benefits or any other benefit for services rendered to me, including Major Medical Benefits, direct to [facility] and all independently contracted physicians. I assign any and all rights that I may have against any and all insurance companies, health plans, defined benefit plans, health insurers or any entity that is or may be responsible for payment of my medical expenses, including, but not limited to any right to appeal a denial of a claim, any right to bring any action, lawsuit, administrative proceeding, or other cause of action on my behalf. I specifically assign my right to pursue litigation against any and all insurance companies, health plans, defined benefit plans, health insurers or any entity that is or may be responsible for payment of my medical expenses based upon a refusal to pay charges.            E. Valuables: It is understood and agreed that [facility] is not liable for the damage to or loss of any money, jewelry,   documents, dentures, eye glasses, hearing aids, prosthetics, or other property of  value.     F. Computer Equipment: I understand and agree that should I choose to use computer equipment owned by [facility] or if I choose to access the Internet via [facility]s network, I do so at my own risk. [facility] is not responsible for any damage to my computer equipment or to any damages of any type that might arise from my loss of equipment or data.     G. Acceptance of Financial Responsibility:  I agree that in consideration of the services and   supplies that have been   or will be furnished to the patient, I am hereby obligated to pay all charges made for or on the account of the patient according to the standard rates (in effect at the time the services and supplies are delivered) established by [facility], including its Patient Financial Assistance Policy to the extent it is applicable. I understand that I am responsible for all charges, or portions thereof, not covered by insurance or other sources. Patient refunds will be distributed only after balances at all [facility] facilities are paid.     H. Communication Authorization:  I hereby authorize [facility] and its representatives, along with any billing service   or  who may work on their behalf, to contact me on   my cell phone and/or home phone using pre- recorded messages, artificial voice messages, automatic telephone dialing devices or other computer assisted technology, or by electronic      mail, text messaging, or by any other form of electronic communication. This includes, but is not limited to, appointment reminders, yearly physical exam reminders, preventive care reminders, patient campaigns, welcome calls, and calls about account balances on my account or any account on which I am listed as a guarantor. I understand I have the right to opt out of these communications at any time.      Relationship  Between  Facility and  Provider:      I understand that some, but not all, providers furnishing services to the patient are not employees  or agents of [facility]. The patient is under the care and supervision of his/her attending physician, and it is the responsibility of the facility and its nursing staff to carry out the instructions of such physicians. It is the responsibility of the patient's physician/designee to obtain the patient's informed consent, when required, for medical or surgical treatment, special diagnostic or therapeutic procedures, or hospital services rendered for the patient under the special instructions of the physician/designee.           REGISTRATION AUTHORIZATION  Form No. 73245 (Rev. 3/25/2024)    Page 2 of 3                       Immunizations: [facility] shares immunization information with state sponsored health departments to help you and your doctor keep track of your immunization records. By signing, you consent to have this information shared with the health department in your state:                                [place] - LINKS ([place] Immunization Network for Kids Statewide)                                [place] - MIIX ([place] Immunization Information eXchange)                                [place] - ImmPRINT (Immunization Patient Registry with Integrated Technology)     TERM: This authorization is valid for this and subsequent care/treatment I receive at [facility] and will remain valid unless/until revoked in writing by me.     [facility]: As used in this document, [facility] means all [facility] owned and managed facilities, including, but not limited to, all health centers, surgery centers, clinics, urgent care centers, and hospitals.         [facility] complies with applicable Federal civil rights laws and does not discriminate on the basis of race, color, national origin, age, disability, or sex.  ATENCIÓN: si habla guy, tiene a dallas disposición servicios gratuitos de asistencia lingüística. Adrianna cardenas [phone].  [facility] Ý: N?u b?n nói Ti?ng Vi?t, có các d?ch v? h? tr? ngôn ng?  mi?n phí dành cho b?n. G?i s? [phone].        REGISTRATION AUTHORIZATION  Form No. 07360 (Rev. 3/25/2024)   Page 3 of 3

## 2025-05-15 ENCOUNTER — PATIENT MESSAGE (OUTPATIENT)
Dept: ADMINISTRATIVE | Facility: HOSPITAL | Age: 70
End: 2025-05-15
Payer: MEDICARE

## 2025-06-09 DIAGNOSIS — M54.9 CHRONIC BACK PAIN GREATER THAN 3 MONTHS DURATION: ICD-10-CM

## 2025-06-09 DIAGNOSIS — G89.29 CHRONIC BACK PAIN GREATER THAN 3 MONTHS DURATION: ICD-10-CM

## 2025-06-10 RX ORDER — HYDROCODONE BITARTRATE AND ACETAMINOPHEN 10; 325 MG/1; MG/1
1 TABLET ORAL
Qty: 30 TABLET | Refills: 0 | Status: SHIPPED | OUTPATIENT
Start: 2025-08-06 | End: 2025-09-05

## 2025-06-10 RX ORDER — HYDROCODONE BITARTRATE AND ACETAMINOPHEN 10; 325 MG/1; MG/1
1 TABLET ORAL
Qty: 30 TABLET | Refills: 0 | Status: SHIPPED | OUTPATIENT
Start: 2025-06-10 | End: 2025-07-10

## 2025-06-10 RX ORDER — HYDROCODONE BITARTRATE AND ACETAMINOPHEN 10; 325 MG/1; MG/1
1 TABLET ORAL
Qty: 30 TABLET | Refills: 0 | Status: SHIPPED | OUTPATIENT
Start: 2025-07-08 | End: 2025-08-07

## 2025-06-10 NOTE — TELEPHONE ENCOUNTER
No care due was identified.  [facility] Embedded Care Due Messages. Reference number: [id number].   6/09/2025 7:49:07 PM CDT

## 2025-07-14 DIAGNOSIS — G89.29 CHRONIC BACK PAIN GREATER THAN 3 MONTHS DURATION: ICD-10-CM

## 2025-07-14 DIAGNOSIS — M54.9 CHRONIC BACK PAIN GREATER THAN 3 MONTHS DURATION: ICD-10-CM

## 2025-07-14 RX ORDER — HYDROCODONE BITARTRATE AND ACETAMINOPHEN 10; 325 MG/1; MG/1
1 TABLET ORAL
Qty: 30 TABLET | Refills: 0 | Status: SHIPPED | OUTPATIENT
Start: 2025-07-14 | End: 2025-08-13

## 2025-07-23 ENCOUNTER — OFFICE VISIT (OUTPATIENT)
Dept: FAMILY MEDICINE | Facility: CLINIC | Age: 70
End: 2025-07-23
Payer: MEDICARE

## 2025-07-23 VITALS
HEART RATE: 69 BPM | TEMPERATURE: 98 F | BODY MASS INDEX: 30.25 KG/M2 | WEIGHT: 181.56 LBS | DIASTOLIC BLOOD PRESSURE: 60 MMHG | SYSTOLIC BLOOD PRESSURE: 137 MMHG | HEIGHT: 65 IN | OXYGEN SATURATION: 98 % | RESPIRATION RATE: 16 BRPM

## 2025-07-23 DIAGNOSIS — K21.9 GASTROESOPHAGEAL REFLUX DISEASE, UNSPECIFIED WHETHER ESOPHAGITIS PRESENT: ICD-10-CM

## 2025-07-23 DIAGNOSIS — M54.9 CHRONIC BACK PAIN GREATER THAN 3 MONTHS DURATION: ICD-10-CM

## 2025-07-23 DIAGNOSIS — R45.89 DEPRESSED MOOD: ICD-10-CM

## 2025-07-23 DIAGNOSIS — E55.9 VITAMIN D DEFICIENCY: ICD-10-CM

## 2025-07-23 DIAGNOSIS — G89.29 CHRONIC BACK PAIN GREATER THAN 3 MONTHS DURATION: ICD-10-CM

## 2025-07-23 DIAGNOSIS — N64.4 BREAST PAIN, RIGHT: Primary | ICD-10-CM

## 2025-07-23 DIAGNOSIS — E03.9 HYPOTHYROIDISM (ACQUIRED): ICD-10-CM

## 2025-07-23 DIAGNOSIS — R11.0 NAUSEA: ICD-10-CM

## 2025-07-23 PROCEDURE — 3061F NEG MICROALBUMINURIA REV: CPT | Mod: CPTII,S$GLB,, | Performed by: FAMILY MEDICINE

## 2025-07-23 PROCEDURE — 99999 PR PBB SHADOW E&M-EST. PATIENT-LVL IV: CPT | Mod: PBBFAC,,, | Performed by: FAMILY MEDICINE

## 2025-07-23 PROCEDURE — 1126F AMNT PAIN NOTED NONE PRSNT: CPT | Mod: CPTII,S$GLB,, | Performed by: FAMILY MEDICINE

## 2025-07-23 PROCEDURE — 3078F DIAST BP <80 MM HG: CPT | Mod: CPTII,S$GLB,, | Performed by: FAMILY MEDICINE

## 2025-07-23 PROCEDURE — 1159F MED LIST DOCD IN RCRD: CPT | Mod: CPTII,S$GLB,, | Performed by: FAMILY MEDICINE

## 2025-07-23 PROCEDURE — 99214 OFFICE O/P EST MOD 30 MIN: CPT | Mod: S$GLB,,, | Performed by: FAMILY MEDICINE

## 2025-07-23 PROCEDURE — 3288F FALL RISK ASSESSMENT DOCD: CPT | Mod: CPTII,S$GLB,, | Performed by: FAMILY MEDICINE

## 2025-07-23 PROCEDURE — 3075F SYST BP GE 130 - 139MM HG: CPT | Mod: CPTII,S$GLB,, | Performed by: FAMILY MEDICINE

## 2025-07-23 PROCEDURE — 3008F BODY MASS INDEX DOCD: CPT | Mod: CPTII,S$GLB,, | Performed by: FAMILY MEDICINE

## 2025-07-23 PROCEDURE — 1160F RVW MEDS BY RX/DR IN RCRD: CPT | Mod: CPTII,S$GLB,, | Performed by: FAMILY MEDICINE

## 2025-07-23 PROCEDURE — G2211 COMPLEX E/M VISIT ADD ON: HCPCS | Mod: S$GLB,,, | Performed by: FAMILY MEDICINE

## 2025-07-23 PROCEDURE — 3066F NEPHROPATHY DOC TX: CPT | Mod: CPTII,S$GLB,, | Performed by: FAMILY MEDICINE

## 2025-07-23 PROCEDURE — 3044F HG A1C LEVEL LT 7.0%: CPT | Mod: CPTII,S$GLB,, | Performed by: FAMILY MEDICINE

## 2025-07-23 PROCEDURE — 1101F PT FALLS ASSESS-DOCD LE1/YR: CPT | Mod: CPTII,S$GLB,, | Performed by: FAMILY MEDICINE

## 2025-07-23 RX ORDER — HYDROCODONE BITARTRATE AND ACETAMINOPHEN 10; 325 MG/1; MG/1
1 TABLET ORAL
Qty: 30 TABLET | Refills: 0 | Status: SHIPPED | OUTPATIENT
Start: 2025-09-20 | End: 2025-10-20

## 2025-07-23 RX ORDER — OXYBUTYNIN CHLORIDE 15 MG/1
15 TABLET, EXTENDED RELEASE ORAL DAILY
Qty: 90 TABLET | Refills: 3 | Status: SHIPPED | OUTPATIENT
Start: 2025-07-23

## 2025-07-23 RX ORDER — PROMETHAZINE HYDROCHLORIDE 25 MG/1
25 TABLET ORAL EVERY 6 HOURS PRN
Qty: 20 TABLET | Refills: 0 | Status: SHIPPED | OUTPATIENT
Start: 2025-07-23 | End: 2025-07-30

## 2025-07-23 RX ORDER — LEVOTHYROXINE SODIUM 112 UG/1
112 TABLET ORAL
Qty: 90 TABLET | Refills: 3 | Status: SHIPPED | OUTPATIENT
Start: 2025-07-23

## 2025-07-23 RX ORDER — HYDROCODONE BITARTRATE AND ACETAMINOPHEN 10; 325 MG/1; MG/1
1 TABLET ORAL
Qty: 30 TABLET | Refills: 0 | Status: SHIPPED | OUTPATIENT
Start: 2025-07-23 | End: 2025-08-22

## 2025-07-23 RX ORDER — ESCITALOPRAM OXALATE 10 MG/1
10 TABLET ORAL DAILY
Qty: 90 TABLET | Refills: 3 | Status: SHIPPED | OUTPATIENT
Start: 2025-07-23

## 2025-07-23 RX ORDER — HYDROCODONE BITARTRATE AND ACETAMINOPHEN 10; 325 MG/1; MG/1
1 TABLET ORAL
Qty: 30 TABLET | Refills: 0 | Status: SHIPPED | OUTPATIENT
Start: 2025-08-21 | End: 2025-09-20

## 2025-07-23 RX ORDER — SIMVASTATIN 10 MG/1
10 TABLET, FILM COATED ORAL NIGHTLY
Qty: 90 TABLET | Refills: 3 | Status: SHIPPED | OUTPATIENT
Start: 2025-07-23

## 2025-07-23 RX ORDER — ERGOCALCIFEROL 1.25 MG/1
50000 CAPSULE ORAL
Qty: 12 CAPSULE | Refills: 3 | Status: SHIPPED | OUTPATIENT
Start: 2025-07-23

## 2025-07-23 RX ORDER — PANTOPRAZOLE SODIUM 40 MG/1
TABLET, DELAYED RELEASE ORAL
Qty: 90 TABLET | Refills: 3 | Status: SHIPPED | OUTPATIENT
Start: 2025-07-23

## 2025-07-23 NOTE — PROGRESS NOTES
Subjective:       Patient ID: Edie Hernandez is a 69 y.o. female.    Chief Complaint: GI Problem and Right breast pain    Problem List[1]  Patient is here for a problem visit.    Reviewed labs 5/2025  History of Present Illness    CHIEF COMPLAINT:  Ms. Hernandez presents today for nausea and vomiting.    GASTROINTESTINAL:  She reports 3-week history of severe nausea and vomiting greenish bile with significant belching. She describes generalized and diffuse abdominal pain. She denies fever and diarrhea. Due to GI distress, she has limited her diet to soup and bland foods. She uses OTC Nauzene for nausea management.    MEDICATIONS:  She continues semaglutide 1 mg for approximately 2 years and Protonix.    BREAST PAIN:  She reports right breast pain for almost 3 weeks, localized to the nipple area and internal in nature. She denies redness, rash, external breast changes, or visible bruising. She expresses concern due to family history of maternal breast cancer. Last mammogram was completed in October.    RECENT PROCEDURES:  She recently underwent PRP treatment to the sciatic area and SI joint, reporting severe procedural pain that was more intense than her previous PRP procedure.    RECENT FALL:  In October, she experienced a fall while getting out of bed due to confusion about her location and bed configuration.      ROS:  ROS findings as noted in HPI.        Review of Systems   Constitutional:  Negative for fatigue, fever and unexpected weight change.   Respiratory:  Negative for chest tightness and shortness of breath.    Cardiovascular:  Negative for chest pain, palpitations and leg swelling.   Gastrointestinal:  Positive for abdominal distention, abdominal pain, constipation, diarrhea, nausea and vomiting. Negative for blood in stool.   Genitourinary:  Negative for dysuria and hematuria.   Musculoskeletal:  Positive for arthralgias.   Neurological:  Negative for dizziness, syncope, light-headedness and headaches.       Relevant History:  Declines pneumonvax        GI Dr. Mancini colonoscopy 2019 1 polpy- on 10 year surveillance.       Pain Continuous use of opioids follow-up:  Current medication and dosing:  norco 10 1 x day  Supply:  90 day supply  Side effects: none  Pain controlled: yes  Medication compliance: yes  DPS checked today: today, no evidence of diversion  UDS: 8/23 + hydrocodone as expected.   2/23 +lyrica and MJ, not for norco. Admits she had tried neighbor's chavez to help her sleep.  Since then she has taken some medical MJ as evidenced on DPS  pain contract signed:8/23  Opioid risk tool done:n/a  Cause of Pain: low back pain      Pain Dr. Dejesus  treating sandeep SI, lumbar spondylosis.Had rizotomy lower back 9/16/24.  Tripped and fell flat on abdomen. Slid. Now severe pain radiating to abd       Neuro C/o right CTS sx acting up.  Last 2 months has been wearing old splint     Urogyn Dr. Dominguez treating urinary frequency     Endocrine Type 2 DM A1c 5.2.  CT coronary score 7/2021 0.  Urine ma nl. On ozempic 1 mg, .highest weight 262 ( 2/23), lowest 190 . On zocor ? and ARB   -hypothyroid  Tsh low, t4 normal     GYN mammo neg 10/24  DEXA 8/22 normal     Ortho Dr. Godinez treating Sacroiliitis. lumbar spondylolithesis s/p lumbar fusion 11/22     Phys Med Dr. Simmons treating chronic left sided low back pain.  Doing injections for low back pain which are helping        Pulm/Sleep Dr. Yang-CLINT on daily methylphendiate  Objective:      Physical Exam  Vitals and nursing note reviewed.   Constitutional:       Appearance: She is well-developed.   Cardiovascular:      Rate and Rhythm: Normal rate and regular rhythm.      Heart sounds: Normal heart sounds.   Pulmonary:      Effort: Pulmonary effort is normal.      Breath sounds: Normal breath sounds.   Chest:   Breasts:     Right: Swelling and tenderness present. No nipple discharge or skin change.       Skin:     General: Skin is warm and dry.   Neurological:       Mental Status: She is alert and oriented to person, place, and time.         Assessment:       ICD-10-CM ICD-9-CM    1. Breast pain, right  N64.4 611.71 Mammo Digital Diagnostic Right      US Breast Right Complete      2. Nausea  R11.0 787.02 promethazine (PHENERGAN) 25 MG tablet      3. Chronic back pain greater than 3 months duration  M54.9 724.5 HYDROcodone-acetaminophen (NORCO)  mg per tablet    G89.29 338.29 HYDROcodone-acetaminophen (NORCO)  mg per tablet      HYDROcodone-acetaminophen (NORCO)  mg per tablet      4. Hypothyroidism (acquired)  E03.9 244.9 levothyroxine (SYNTHROID) 112 MCG tablet      5. Depressed mood  R45.89 799.29 EScitalopram oxalate (LEXAPRO) 10 MG tablet      6. Vitamin D deficiency  E55.9 268.9 ergocalciferol (ERGOCALCIFEROL) 50,000 unit Cap      7. Gastroesophageal reflux disease, unspecified whether esophagitis present  K21.9 530.81 pantoprazole (PROTONIX) 40 MG tablet         Plan:   1. Breast pain, right (Primary)  Work up  - Mammo Digital Diagnostic Right; Future  - US Breast Right Complete; Future    2. Nausea  Stop ozempic.  Consider restartig with mounjaro 1 month if BS and weight start increasing.  Use prn  - promethazine (PHENERGAN) 25 MG tablet; Take 1 tablet (25 mg total) by mouth every 6 (six) hours as needed for Nausea.  Dispense: 20 tablet; Refill: 0    3. Chronic back pain greater than 3 months duration  Controlled on current medications.  Continue current medications.    - HYDROcodone-acetaminophen (NORCO)  mg per tablet; Take 1 tablet by mouth every 24 hours as needed for Pain.  Dispense: 30 tablet; Refill: 0  - HYDROcodone-acetaminophen (NORCO)  mg per tablet; Take 1 tablet by mouth every 24 hours as needed for Pain.  Dispense: 30 tablet; Refill: 0  - HYDROcodone-acetaminophen (NORCO)  mg per tablet; Take 1 tablet by mouth every 24 hours as needed for Pain.  Dispense: 30 tablet; Refill: 0    4. Hypothyroidism (acquired)  Stable  condition.  Continue current medications.  Will adjust based on lab findings or if condition changes.    - levothyroxine (SYNTHROID) 112 MCG tablet; Take 1 tablet (112 mcg total) by mouth before breakfast.  Dispense: 90 tablet; Refill: 3    5. Depressed mood  Controlled on current medications.  Continue current medications.    - EScitalopram oxalate (LEXAPRO) 10 MG tablet; Take 1 tablet (10 mg total) by mouth once daily.  Dispense: 90 tablet; Refill: 3    6. Vitamin D deficiency  Stable condition.  Continue current medications.  Will adjust based on lab findings or if condition changes.    - ergocalciferol (ERGOCALCIFEROL) 50,000 unit Cap; Take 1 capsule (50,000 Units total) by mouth every 7 days.  Dispense: 12 capsule; Refill: 3    7. Gastroesophageal reflux disease, unspecified whether esophagitis present  Controlled on current medications.  Continue current medications.    - pantoprazole (PROTONIX) 40 MG tablet; TAKE 1 TABLET ONE TIME DAILY  Dispense: 90 tablet; Refill: 3,  Assessment & Plan    - Explained that temporary increase in appetite may occur after discontinuing semaglutide.  - Ms. Hernandez to monitor weight for any significant changes after discontinuing semaglutide.  - Message if noticing weight gain after discontinuing semaglutide.  - Discussed that pain during PRP treatment may indicate it is addressing the target area, but should improve rapidly.  - Ms. Hernandez to start with bland, mild foods and gradually reintroduce other foods as tolerated.  - Recommend avoiding greasy and spicy foods initially.  - Continued Protonix at current dose.  - Started promethazine (Phenergan) as needed for nausea.  - Ordered mammogram and US of right breast due to patient-reported pain and family history of breast cancer.  - Follow up in 3-4 months (November).  - Contact the office if experiencing abdominal pain or vomiting.         Time spent with patient: 20 minutes  Patient with be reevaluated in 4 months or sooner  prn  Greater than 50% of this visit was spent counseling as described in above documentation:Yes  This note was generated with the assistance of ambient listening technology. Verbal consent was obtained by the patient and accompanying visitor(s) for the recording of patient appointment to facilitate this note. I attest to having reviewed and edited the generated note for accuracy, though some syntax or spelling errors may persist. Please contact the author of this note for any clarification.            [1]   Patient Active Problem List  Diagnosis    GERD (gastroesophageal reflux disease)    Chronic back pain greater than 3 months duration    Environmental allergies    Fibromyalgia    B12 nutritional deficiency    CLINT (obstructive sleep apnea)    Chronic sinusitis    Asthma    Hypothyroidism (acquired)    Nausea    Night sweats    Tinnitus, bilateral    Frequent UTI    Hyperlipidemia associated with type 2 diabetes mellitus    Colon polyp, hyperplastic    Interstitial cystitis    Hemangioma    Wart    Globus sensation    DDD (degenerative disc disease), lumbar    Obesity (BMI 30-39.9)    Edema    Mixed incontinence    Continuous opioid dependence- contract 10/18/17-failed uds for norco, + 2/18    Type 2 diabetes mellitus    History of colon polyps    Lumbar radiculitis    Lumbar radiculopathy    Lumbar spondylosis    Sacroiliitis    Lumbar stenosis with neurogenic claudication    Urinary retention    S/P lumbar fusion    Impaired flexibility of lower extremity    Depression, recurrent

## (undated) DEVICE — NDL 27G X 1 1/4

## (undated) DEVICE — Device

## (undated) DEVICE — SPONGE KITTNER DISECT 1/4X9/16

## (undated) DEVICE — SYR DISP LL 5CC

## (undated) DEVICE — PACK CUSTOM UNIV BASIN SLI

## (undated) DEVICE — TOWEL OR DISP STRL BLUE 4/PK

## (undated) DEVICE — STRAP OR TABLE 5IN X 72IN

## (undated) DEVICE — APPLICATOR CHLORAPREP ORN 26ML

## (undated) DEVICE — APPLIER LIGACLIP MED 11IN

## (undated) DEVICE — WATER STERILE INJ 500ML BAG

## (undated) DEVICE — SPONGE LAP 4X18 PREWASHED

## (undated) DEVICE — GLOVE SURG ULTRA TOUCH 7.5

## (undated) DEVICE — NDL SAFETY 25G X 1.5 ECLIPSE

## (undated) DEVICE — SEALER BIPOLAR TISSUE 6.0

## (undated) DEVICE — SPONGE GAUZE 16PLY 4X4

## (undated) DEVICE — DRAPE INCISE IOBAN 2 23X17IN

## (undated) DEVICE — SPONGE SUPER KERLIX 6X6.75IN

## (undated) DEVICE — SEE MEDLINE ITEM 146292

## (undated) DEVICE — LINER SUCTION 3000CC

## (undated) DEVICE — UNDERGLOVES BIOGEL PI SIZE 8.5

## (undated) DEVICE — TAPE MEDIPORE 3 X 10YD

## (undated) DEVICE — DRAPE STERI-DRAPE 1000 17X11IN

## (undated) DEVICE — CHLORAPREP 10.5 ML APPLICATOR

## (undated) DEVICE — NDL SPINAL 22GX5

## (undated) DEVICE — DRAPE MEDIUM SHEET 40X70IN

## (undated) DEVICE — SLEEVE SCD EXPRESS KNEE MEDIUM

## (undated) DEVICE — PACK CUSTOM LUMBAR LAM SLI

## (undated) DEVICE — CANNULA RADIOPAQUE 20G CURVED

## (undated) DEVICE — SOL IRR NACL .9% 3000ML

## (undated) DEVICE — NDL HYPODERMIC BLUNT 18G 1.5IN

## (undated) DEVICE — SUT BONE WAX 2.5 GRMS 12/BX

## (undated) DEVICE — SYS LABEL CORRECT MED

## (undated) DEVICE — ALCOHOL 70% ISOP RUBBING 4OZ

## (undated) DEVICE — SUT SILK BLK. BR. 3-0 10

## (undated) DEVICE — SOL 9P NACL IRR PIC IL

## (undated) DEVICE — DRAIN SINGLE ROUND 3/16 15F

## (undated) DEVICE — GLOVE SURG ULTRA TOUCH 8

## (undated) DEVICE — SUT CTD VICRYL 1 UND OS-8

## (undated) DEVICE — PAD ABD 8X10 STERILE

## (undated) DEVICE — SOL NS 1000CC

## (undated) DEVICE — DRAPE C-ARMOR EQUIPMENT COVER

## (undated) DEVICE — TUBING MINIBORE EXTENSION

## (undated) DEVICE — NDL BOX COUNTER

## (undated) DEVICE — GLOVE SURGEONS ULTRA TOUCH 6.5

## (undated) DEVICE — NDL SPINAL 25GX3.5 SPINOCAN

## (undated) DEVICE — CELL SAVER 4/CASE

## (undated) DEVICE — GLOVE SURG ULTRA TOUCH 6

## (undated) DEVICE — DRAPE STERI INSTRUMENT 1018

## (undated) DEVICE — GOWN X-LG STERILE BACK

## (undated) DEVICE — DRAPE C ARM 42 X 120 10/BX

## (undated) DEVICE — NDL SPINAL SPINOCAN 22GX3.5

## (undated) DEVICE — DRAPE THREE-QTR REINF 53X77IN

## (undated) DEVICE — DRESSING N ADH OIL EMUL 3X8 3S

## (undated) DEVICE — SEE MEDLINE ITEM 157131

## (undated) DEVICE — DRAPE O-ARM STERILE

## (undated) DEVICE — GOWN POLY REINF BRTH SLV 2XL

## (undated) DEVICE — SOL SOD CHLORIDE 0.9% 10ML

## (undated) DEVICE — NDL SPINAL 18GX3.5 SPINOCAN

## (undated) DEVICE — BLADE SURG CARBON STEEL #10

## (undated) DEVICE — COVER SURG LIGHT HANDLE

## (undated) DEVICE — SUT CTD VICRYL 2-0 UND BR O

## (undated) DEVICE — SYR 3CC LUER LOC

## (undated) DEVICE — STAPLER SKIN ROTATING HEAD

## (undated) DEVICE — ELECTRODE REM PLYHSV RETURN 9

## (undated) DEVICE — SPONGE BULKEE II ABSRB 6X6.75

## (undated) DEVICE — PAD GROUNDING DISPER ELECTRODE

## (undated) DEVICE — CONTAINER SPECIMEN STRL 4OZ

## (undated) DEVICE — PACK SIRUS BASIC V SURG STRL

## (undated) DEVICE — SEE MEDLINE ITEM 157148

## (undated) DEVICE — KIT EVACUATOR 3-SPRING 1/8 DRN

## (undated) DEVICE — BLADE MILL COARSE DISPOSABLE

## (undated) DEVICE — UNDERGLOVES BIOGEL PI SZ 7 LF

## (undated) DEVICE — SET CYSTO IRRIGATION UNIV SPIK

## (undated) DEVICE — SKINMARKER W/RULER DEVON

## (undated) DEVICE — TUBE SUCTION YANKAUER HI CAP

## (undated) DEVICE — SEE MEDLINE ITEM 157117

## (undated) DEVICE — UNDERGLOVES BIOGEL PI SIZE 8

## (undated) DEVICE — SYR 10CC LUER LOCK

## (undated) DEVICE — FORCEP BAYO INSU SGL USE STRGT

## (undated) DEVICE — APPLIER LIGACLIP SM 9.38IN

## (undated) DEVICE — SLEEVE SCD EXPRESS CALF MEDIUM

## (undated) DEVICE — SEE MEDLINE ITEM 152622

## (undated) DEVICE — PACK BASIC

## (undated) DEVICE — SUT CTD VICRYL 1 VIL BR CR/

## (undated) DEVICE — ELECTRODE BLD EXT 6.50 ST DISP

## (undated) DEVICE — SCRUB 10% POVIDONE IODINE 4OZ

## (undated) DEVICE — CLIPPER BLADE MOD 4406 (CAREF)

## (undated) DEVICE — GOWN POLY REINF BRTH SLV XL

## (undated) DEVICE — SPHERE NDI PASSIVE

## (undated) DEVICE — KIT C.A.T.S. FAST START 4/CASE

## (undated) DEVICE — SYRINGE 0.9% NACL 10MIL PREFIL

## (undated) DEVICE — SYR 30CC LUER LOCK

## (undated) DEVICE — SYR 50CC LL

## (undated) DEVICE — GLOVE SURG ULTRA TOUCH 7

## (undated) DEVICE — SUT 2-0 VICRYL / CT-1

## (undated) DEVICE — COVER PROXIMA MAYO STAND

## (undated) DEVICE — YANKAUER OPEN TIP W/O VENT

## (undated) DEVICE — CONTAINER SPECIMEN OR STER 4OZ

## (undated) DEVICE — GOWN POLY REINF X-LONG XL

## (undated) DEVICE — DRAPE LAP T SHT W/ INSTR PAD

## (undated) DEVICE — TAPE SILK 3IN

## (undated) DEVICE — COVER TABLE 44X90 STERILE

## (undated) DEVICE — SUT CTD VICRYL CT-1 27

## (undated) DEVICE — TAPE MEDIPORE 4IN X 2YDS

## (undated) DEVICE — DRESSING GAUZE OIL EMUL 3X8

## (undated) DEVICE — CORD BIPOLAR 12 FOOT